# Patient Record
Sex: FEMALE | Race: WHITE | NOT HISPANIC OR LATINO | Employment: OTHER | URBAN - METROPOLITAN AREA
[De-identification: names, ages, dates, MRNs, and addresses within clinical notes are randomized per-mention and may not be internally consistent; named-entity substitution may affect disease eponyms.]

---

## 2017-01-02 ENCOUNTER — TRANSFERRED RECORDS (OUTPATIENT)
Dept: HEALTH INFORMATION MANAGEMENT | Facility: CLINIC | Age: 77
End: 2017-01-02

## 2017-01-05 ENCOUNTER — ANTICOAGULATION THERAPY VISIT (OUTPATIENT)
Dept: ANTICOAGULATION | Facility: CLINIC | Age: 77
End: 2017-01-05
Payer: MEDICARE

## 2017-01-05 DIAGNOSIS — Z79.01 LONG-TERM (CURRENT) USE OF ANTICOAGULANTS: Primary | ICD-10-CM

## 2017-01-05 LAB — INR POINT OF CARE: 2 (ref 0.86–1.14)

## 2017-01-05 PROCEDURE — 99207 ZZC NO CHARGE NURSE ONLY: CPT

## 2017-01-05 PROCEDURE — 85610 PROTHROMBIN TIME: CPT | Mod: QW

## 2017-01-05 PROCEDURE — 36416 COLLJ CAPILLARY BLOOD SPEC: CPT

## 2017-01-05 NOTE — PROGRESS NOTES
ANTICOAGULATION FOLLOW-UP CLINIC VISIT    Patient Name:  Geneva Shepherd  Date:  1/5/2017  Contact Type:  Face to Face    SUBJECTIVE:     Patient Findings     Positives No Problem Findings    Comments Patient is having knee replacement done on 1/16/17.  Will see PCP on 1/9/17 and will discuss holding warfarin and lovenox injections.  Pt will contact [facility] once discharged from TCU for followup.            OBJECTIVE    INR PROTIME   Date Value Ref Range Status   01/05/2017 2.0* 0.86 - 1.14 Final       ASSESSMENT / PLAN  INR assessment THER    Recheck INR In: 3 WEEKS    INR Location Clinic      Anticoagulation Summary as of 1/5/2017     INR goal 2.0-3.0   Selected INR 2.0 (1/5/2017)   Maintenance plan 7.5 mg (5 mg x 1.5) on Mon, Wed, Fri; 5 mg (5 mg x 1) all other days   Full instructions 7.5 mg on Mon, Wed, Fri; 5 mg all other days   Weekly total 42.5 mg   No change documented Camelia He RN   Plan last modified Ofelia Cavazos RN (12/31/2015)   Next INR check 1/26/2017   Target end date     Indications   Long-term (current) use of anticoagulants [Z79.01] [Z79.01]  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]  Embolism and thrombosis (H) (Resolved) [I74.9]         Anticoagulation Episode Summary     INR check location     Preferred lab     Send INR reminders to [facility]    Comments             See the Encounter Report to view Anticoagulation Flowsheet and Dosing Calendar (Go to Encounters tab in chart review, and find the Anticoagulation Therapy Visit)        Camelia He RN

## 2017-01-09 ENCOUNTER — OFFICE VISIT (OUTPATIENT)
Dept: INTERNAL MEDICINE | Facility: CLINIC | Age: 77
End: 2017-01-09
Payer: MEDICARE

## 2017-01-09 VITALS
TEMPERATURE: 98.2 F | WEIGHT: 178 LBS | HEIGHT: 66 IN | SYSTOLIC BLOOD PRESSURE: 110 MMHG | BODY MASS INDEX: 28.61 KG/M2 | OXYGEN SATURATION: 93 % | HEART RATE: 84 BPM | DIASTOLIC BLOOD PRESSURE: 62 MMHG

## 2017-01-09 DIAGNOSIS — E03.9 HYPOTHYROIDISM, UNSPECIFIED TYPE: ICD-10-CM

## 2017-01-09 DIAGNOSIS — Z23 NEED FOR PROPHYLACTIC VACCINATION AGAINST STREPTOCOCCUS PNEUMONIAE (PNEUMOCOCCUS): ICD-10-CM

## 2017-01-09 DIAGNOSIS — Z23 NEED FOR PROPHYLACTIC VACCINATION AND INOCULATION AGAINST INFLUENZA: ICD-10-CM

## 2017-01-09 DIAGNOSIS — Z01.818 PREOPERATIVE EXAMINATION: Primary | ICD-10-CM

## 2017-01-09 LAB
ANION GAP SERPL CALCULATED.3IONS-SCNC: 5 MMOL/L (ref 3–14)
BUN SERPL-MCNC: 9 MG/DL (ref 7–30)
CALCIUM SERPL-MCNC: 9.9 MG/DL (ref 8.5–10.1)
CHLORIDE SERPL-SCNC: 102 MMOL/L (ref 94–109)
CO2 SERPL-SCNC: 30 MMOL/L (ref 20–32)
CREAT SERPL-MCNC: 0.63 MG/DL (ref 0.52–1.04)
GFR SERPL CREATININE-BSD FRML MDRD: ABNORMAL ML/MIN/1.7M2
GLUCOSE SERPL-MCNC: 102 MG/DL (ref 70–99)
POTASSIUM SERPL-SCNC: 4.1 MMOL/L (ref 3.4–5.3)
SODIUM SERPL-SCNC: 137 MMOL/L (ref 133–144)
TSH SERPL DL<=0.005 MIU/L-ACNC: 1.9 MU/L (ref 0.4–4)

## 2017-01-09 PROCEDURE — 80048 BASIC METABOLIC PNL TOTAL CA: CPT | Performed by: INTERNAL MEDICINE

## 2017-01-09 PROCEDURE — 93000 ELECTROCARDIOGRAM COMPLETE: CPT | Performed by: INTERNAL MEDICINE

## 2017-01-09 PROCEDURE — 90670 PCV13 VACCINE IM: CPT | Performed by: INTERNAL MEDICINE

## 2017-01-09 PROCEDURE — 90662 IIV NO PRSV INCREASED AG IM: CPT | Performed by: INTERNAL MEDICINE

## 2017-01-09 PROCEDURE — 99215 OFFICE O/P EST HI 40 MIN: CPT | Mod: 25 | Performed by: INTERNAL MEDICINE

## 2017-01-09 PROCEDURE — 36415 COLL VENOUS BLD VENIPUNCTURE: CPT | Performed by: INTERNAL MEDICINE

## 2017-01-09 PROCEDURE — G0008 ADMIN INFLUENZA VIRUS VAC: HCPCS | Performed by: INTERNAL MEDICINE

## 2017-01-09 PROCEDURE — G0009 ADMIN PNEUMOCOCCAL VACCINE: HCPCS | Performed by: INTERNAL MEDICINE

## 2017-01-09 PROCEDURE — 84443 ASSAY THYROID STIM HORMONE: CPT | Performed by: INTERNAL MEDICINE

## 2017-01-09 NOTE — PROGRESS NOTES
Injectable Influenza Immunization Documentation    1.  Is the person to be vaccinated sick today?  No    2. Does the person to be vaccinated have an allergy to eggs or to a component of the vaccine?  No    3. Has the person to be vaccinated today ever had a serious reaction to influenza vaccine in the past?  No    4. Has the person to be vaccinated ever had Guillain-Idalia syndrome?  No     Form completed by Payal Lawler MA

## 2017-01-09 NOTE — PROGRESS NOTES
[address] [address]  [phone]  Dept: [phone]    PRE-OP EVALUATION:  Today's date: 2017    Geneva Shepherd (: 1940) presents for pre-operative evaluation assessment as requested by Dr. Maddox.  He requires evaluation and anesthesia risk assessment prior to undergoing surgery/procedure for treatment of left  Knee pain .  Proposed procedure: LEFT TOTAL KNEE ARTHROPLASTY     Date of Surgery/ Procedure: 17  Time of Surgery/ Procedure: AM  Hospital/Surgical Facility: [facility]  Primary Physician: Jacoby Boo  Type of Anesthesia Anticipated: to be determined    Patient has a Health Care Directive or Living Will:  Unsure        HPI:                                                      Brief HPI related to upcoming procedure:  Hx chronic left knee pain with DJD. Limiting activity.  Now presents for clearance to undergo surgical correction. See below for other medical issues. Had lab eval already throuhh the [facility] at [facility]. No infusions needed preop. Not able to  walk far with knee pain. Used Stationary bike for 20 min 1 week ago without chest pain or shortness of breath. See below for other medical issues. On chronic Coumadin for hx DVT  RLE , chronic LE varicosities and positive factor 5 Leiden. Has tolerated being off of Coumadin prior to procedures without bridging therapy as long as remains active         MEDICAL HISTORY:                                                      Patient Active Problem List    Diagnosis Date Noted     Insomnia, unspecified insomnia 2016     Priority: Medium     Strain of thoracic paraspinal muscles excluding T1 and T2 levels, initial encounter 2016     Priority: Medium     Hypothyroidism 2016     Priority: Medium     Long-term (current) use of anticoagulants [Z79.01] 12/15/2015     Priority: Medium     Depression 10/27/2015     Priority: Medium     Asymptomatic varicose veins,  bilateral 09/03/2015     Priority: Medium     Personal history of RLE DVT (deep vein thrombosis)(2003) 09/03/2015     Priority: Medium     Anticoagulated on Coumadin 09/03/2015     Priority: Medium     Hip joint replacement status: Left 8/31/15 08/31/2015     Priority: Medium     Elevated antinuclear antibody (JUAN ANTONIO) level 07/04/2015     Priority: Medium     Low back pain 08/07/2014     Priority: Medium     Diagnosis updated by automated process. Provider to review and confirm.       Obesity 09/11/2013     Priority: Medium     Osteoarthrosis, unspecified whether generalized or localized, involving lower leg 01/21/2013     Priority: Medium     Problem list name updated by automated process. Provider to review       Advanced directives, counseling/discussion 05/18/2012     Priority: Medium     Patient states has Advance Directive and will bring in a copy to clinic. 5/18/2012          HYPERLIPIDEMIA LDL GOAL <160 10/31/2010     Priority: Medium     GERD (gastroesophageal reflux disease) 08/06/2008     Priority: Medium     FACTOR 5 LEIDEN DEFECT (HYPERCOAGULABLE) 07/30/2003     Priority: Medium     Irritable bowel syndrome 09/09/2002     Priority: Medium      Past Medical History   Diagnosis Date     Asymptomatic varicose veins      Diverticulitis of colon (without mention of hemorrhage)(562.11)      Urinary tract infection, site not specified      Sprain of lumbar region      Irritable bowel syndrome      Pain in joint, lower leg      Embolism and thrombosis of unspecified site 3/03     RLE     FACTOR 5 LEIDEN DEFECT (HYPERCOAGULABLE) 7/03      Heterozygote     Phlebitis and thrombophlebitis of superficial vessels of lower extremities 7/03     right     Unspecified hypothyroidism      FRACTURE OF RIGHT LATERAL PROXIMAL TIBIA 11/07     Depression, major      Ankle fracture, lateral malleolus, closed  March 2012     right ankle     Hyperlipidemia LDL goal <160 10/31/2010     Gastro-oesophageal reflux disease      rare      DJD (degenerative joint disease)      Obesity 9/11/2013     Elevated antinuclear antibody (JUAN ANTONIO) level 7/4/2015     minimal     Insomnia      Past Surgical History   Procedure Laterality Date     C nonspecific procedure  7/00/01     Endometrial Biopsy.     C nonspecific procedure       Tubal Ligation.     C nonspecific procedure  6/02     negative coronary angio     C nonspecific procedure  7/04     RLE varicose vein stripping     Cholecystectomy       Vascular surgery       Arthroplasty knee  1/17/2013     Procedure: ARTHROPLASTY KNEE;  RIGHT TOTAL KNEE ARTHROPLASTY (BIOMET)^ ;  Surgeon: Benjamín Maddox MD;  Location:  OR     Arthroplasty hip Left 8/31/2015     Procedure: ARTHROPLASTY HIP;  Surgeon: Benjamín Maddox MD;  Location:  OR     Gyn surgery       tubal     Colonoscopy N/A 4/26/2016     Procedure: COMBINED COLONOSCOPY, SINGLE OR MULTIPLE BIOPSY/POLYPECTOMY BY BIOPSY;  Surgeon: Mario Coe MD;  Location:  GI     Current Outpatient Prescriptions   Medication Sig Dispense Refill     order for DME Equipment being ordered: Thigh High 30-40 mmhg Compression Stockings. 4 Device 0     PARoxetine (PAXIL) 20 MG tablet Take 2 tablets (40 mg) by mouth At Bedtime 180 tablet 1     warfarin (COUMADIN) 5 MG tablet Take 1 tablets daily, except 1 1/2 tablets on MWF,   as directed by anticoagulation clinic 135 tablet 0     order for DME Equipment being ordered: jobst stockings knee high bilateral medium strength 20-30 mm Hg.  Dispense 2 pair 2 Package 11     levothyroxine (SYNTHROID) 50 MCG tablet Take 1 tablet (50 mcg) by mouth daily 90 tablet 2     order for DME Equipment being ordered: [facility] Fax [phone]    Primary Dressing Mepilex Border 4x4   Qty 30  Length of Need: 1 month  Frequency of dressing change: daily 30 days 0     amitriptyline (ELAVIL) 25 MG tablet Take 1 tablet (25 mg) by mouth daily 90 tablet 3     order for DME Equipment being ordered: Compression Stockings  Knee High 20-30 mmhg 2 Device 2     order for DME Equipment being ordered: [facility] Fax [phone]    Primary Dressing Mepilex Border 4x4   Qty 30  Length of Need: 1 month  Frequency of dressing change: daily 30 days 0     ascorbic acid (VITAMIN C) 500 MG tablet Take 2 tablets (1,000 mg) by mouth daily 30 tablet      Acetaminophen (TYLENOL PO) Take 500 mg by mouth 2 times daily With scheduled oxycodone       vitamin  B complex with vitamin C (VITAMIN  B COMPLEX) TABS Take 1 tablet by mouth daily DOSE UNKNOWN       FOLIC ACID PO Take 400 mcg by mouth.       BETA CAROTENE PO Take 25,000 Units by mouth daily.       levOCARNitine (CARNITOR) 330 MG tablet Take by mouth 2 times daily        ZINC 50 MG OR CAPS 1 daily       CHROMIUM PICOLINATE 800 MCG OR TABS 1 daily       FISH OIL 1000 MG OR CAPS 1 daily       FLAX SEED OIL 1000 MG OR CAPS 1 daily       MULTIVITAMIN/MULTIMINERAL TABS   OR 1 TABLET DAILY 30 0     OXYBUTYNIN CHLORIDE PO Prescribed by urology       OTC products: occ Tylenol    Allergies   Allergen Reactions     Cephalexin Monohydrate      diarrhea      Latex Allergy: NO    Social History   Substance Use Topics     Smoking status: Former Smoker     Quit date: 09/01/1968     Smokeless tobacco: Never Used      Comment: quit in 1968     Alcohol Use: 0.0 oz/week     0 Standard drinks or equivalent per week      Comment: rare     History   Drug Use No       REVIEW OF SYSTEMS:                                                    C: NEGATIVE for fever, chills, change in weight  I: NEGATIVE for worrisome rashes, moles or lesions  E: NEGATIVE for  Irritation. Reduce vision bilateral eyes and cataracts and some vitreol changes left eye. Followed by eye doctor and will be having possible surgery in March  E/M: NEGATIVE for ear, mouth and throat problems  R: NEGATIVE for significant cough or SOB  B: NEGATIVE for masses, tenderness or discharge  CV: NEGATIVE for chest pain, palpitations. Stable edema with  "30-40mm Hg comp socks RLE and 20-30 mm Hgb LLE  GI: NEGATIVE for nausea, abdominal pain,   or change in bowel habits. 2 episodes of GERD after larger holiday meals with some belching. Took Zantac and resolved  : NEGATIVE for frequency, dysuria, or hematuria  M:  See HPI  N: NEGATIVE for weakness, dizziness or paresthesias  E: NEGATIVE for temperature intolerance, skin/hair changes  H:  On Coumadin for hx  DVT RLE in 2003 and [id number]  P: NEGATIVE for changes in mood or affect with Paxil use.     EXAM:                                                    /62 mmHg  Pulse 84  Temp(Src) 98.2  F (36.8  C) (Oral)  Ht 5' 6\" (1.676 m)  Wt 178 lb (80.74 kg)  BMI 28.74 kg/m2  SpO2 93%  Breastfeeding? No  Eye: PERRL, EOMI  HENT: ear canals and TM's normal and nose and mouth without ulcers or lesions,. Dentures  Neck: no adenopathy. Thyroid normal to palpation. No bruits  Pulm: Lungs clear to auscultation   CV: Regular rates and rhythm  GI: Soft, nontender, Normal active bowel sounds, No hepatosplenomegaly or masses palpable  Ext: Peripheral pulses intact.  Mild chronic BLE edema with nontender varicose veins. Wearing compression stockings. Tenderness to FROM left knee against resistance. No effusion  Neuro: Normal strength and tone, sensory exam grossly normal      DIAGNOSTICS:                                                    EKG:  NSR with rate 84. Poor lead detection lead 3. No acute ST/T changes    Recent Labs   Lab Test 01/05/17 12/29/16   1550 12/27/16 04/28/16   1925   02/15/16   1519  02/13/16   1801   HGB   --   14.2   --    --   13.6   --    --   11.9   PLT   --    --    --    --   284   --    --   308   INR  2.0*   --   4.2*   < >  0.97   < >  1.75*  8.85*   NA   --    --    --    --   139   --   135   --    POTASSIUM   --    --    --    --   4.0   --   4.6   --    CR   --    --    --    --   0.49*   --   0.70   --     < > = values in this interval not displayed.      Component      Latest Ref Rng " 1/9/2017   Sodium      133 - 144 mmol/L 137   Potassium      3.4 - 5.3 mmol/L 4.1   Chloride      94 - 109 mmol/L 102   Carbon Dioxide      20 - 32 mmol/L 30   Anion Gap      3 - 14 mmol/L 5   Glucose      70 - 99 mg/dL 102 (H)   Urea Nitrogen      7 - 30 mg/dL 9   Creatinine      0.52 - 1.04 mg/dL 0.63   GFR Estimate      >60 mL/min/1.7m2 >90 . . .   GFR Estimate If Black      >60 mL/min/1.7m2 >90 . . .   Calcium      8.5 - 10.1 mg/dL 9.9   TSH      0.40 - 4.00 mU/L 1.90       IMPRESSION:                                                    Reason for surgery/procedure: Left knee DJD   Diagnosis/reason for consult: Hypothyroidism, Depression ( well controlled with meds), positive factor 5 Leiden with previous hx DVT    The proposed surgical procedure is considered INTERMEDIATE risk.    REVISED CARDIAC RISK INDEX  The patient has the following serious cardiovascular risks for perioperative complications such as (MI, PE, VFib and 3  AV Block):  No serious cardiac risks  INTERPRETATION: 0 risks: Class I (very low risk - 0.4% complication rate)    The patient has the following additional risks for perioperative complications:  Factor 5 Leiden positive status      RECOMMENDATIONS:                                                      APPROVAL GIVEN to proceed with proposed procedure, without further diagnostic evaluation  Coumadin  on hold 5 days prior to surgery. Pt remaining active with ambulation while off Coumadin. No bridging needed preop  Recommend pt be restarted with anticoagulation  post op as soon as OK with ortho surgery with other DVT prophylaxis measures used (pneumoboots, Lovenox, etc)  between surgery and Coumadin re-initiation  Take  Levothyroxine and Paroxetine/Paxil the AM of surgery with a small sip water. Otherwise nothing else to eat/drink after midnight prior to surgery   Hold over the counter supplements between now and surgery  Prevnar and influenza vaccinations       Signed Electronically by: Jacoby  KENNEDY Boo MD    Copy of this evaluation report is provided to requesting physician.    [place] Preop Guidelines

## 2017-01-09 NOTE — NURSING NOTE
"Chief Complaint   Patient presents with     Pre-Op Exam       Initial /62 mmHg  Pulse 84  Temp(Src) 98.2  F (36.8  C) (Oral)  Ht 5' 6\" (1.676 m)  Wt 178 lb (80.74 kg)  BMI 28.74 kg/m2  SpO2 93%  Breastfeeding? No Estimated body mass index is 28.74 kg/(m^2) as calculated from the following:    Height as of this encounter: 5' 6\" (1.676 m).    Weight as of this encounter: 178 lb (80.74 kg).  BP completed using cuff size: regular    "

## 2017-01-09 NOTE — PATIENT INSTRUCTIONS
Coumadin  on hold 5 days prior to surgery. Pt remaining active with ambulation while off Coumadin. No bridging being used preop  Recommend pt be restarted with anticoagulation  post op as soon as OK with ortho surgery with other DVT prophylaxis measures used  between surgery and Coumadin re-initiation  Take  Levothyroxine and Paroxetine/Paxil the AM of surgery with a small sip water. Otherwise nothing else to eat/drink after midnight prior to surgery   Hold over the counter supplements between now and surgery

## 2017-01-09 NOTE — MR AVS SNAPSHOT
After Visit Summary   1/9/2017    Geneva Shepherd    MRN: [id number]           Patient Information     Date Of Birth          1940        Visit Information        Provider Department      1/9/2017 3:00 PM Jacoby Boo MD [facility]        Today's Diagnoses     Preoperative examination    -  1     Hypothyroidism, unspecified type           Care Instructions    Coumadin  on hold 5 days prior to surgery. Pt remaining active with ambulation while off Coumadin. No bridging being used preop  Recommend pt be restarted with anticoagulation  post op as soon as OK with ortho surgery with other DVT prophylaxis measures used  between surgery and Coumadin re-initiation  Take  Levothyroxine and Paroxetine/Paxil the AM of surgery with a small sip water. Otherwise nothing else to eat/drink after midnight prior to surgery   Hold over the counter supplements between now and surgery        Follow-ups after your visit        Your next 10 appointments already scheduled     Jan 16, 2017   Procedure with Benjamín Maddox MD   [facility] (--)    [address]  [facility] [address]   [phone]            Jan 27, 2017  1:00 PM   CONSUELO Extremity with Keren Velázquez PT   [facility] ([facility]  )    [address]  [facility] [id number] [phone]            Jan 30, 2017  1:20 PM   CONSUELO Extremity with Micaela Terrell, PT   [facility] ([facility]  )    [address]  [facility] [address] [phone]            Feb 02, 2017  1:20 PM   CONSUELO Extremity with Micaela Terrell, PT   [facility] ([facility]  )    [address]  [facility] [id number] [phone]              Who to contact     If you have questions or need follow up  "information about today's clinic visit or your schedule please contact [facility] directly at [phone].  Normal or non-critical lab and imaging results will be communicated to you by MyChart, letter or phone within 4 business days after the clinic has received the results. If you do not hear from us within 7 days, please contact the clinic through [email or web address] or phone. If you have a critical or abnormal lab result, we will notify you by phone as soon as possible.  Submit refill requests through [employer] or call your pharmacy and they will forward the refill request to us. Please allow 3 business days for your refill to be completed.          Additional Information About Your Visit        [email or web address] Information     [employer] gives you secure access to your electronic health record. If you see a primary care provider, you can also send messages to your care team and make appointments. If you have questions, please call your primary care clinic.  If you do not have a primary care provider, please call [phone] and they will assist you.        Care EveryWhere ID     This is your Care EveryWhere ID. This could be used by other organizations to access your [place] medical records  [id number]        Your Vitals Were     Pulse Temperature Height BMI (Body Mass Index) Pulse Oximetry Breastfeeding?    84 98.2  F (36.8  C) (Oral) 5' 6\" (1.676 m) 28.74 kg/m2 93% No       Blood Pressure from Last 3 Encounters:   01/09/17 110/62   12/17/16 112/70   08/20/16 120/80    Weight from Last 3 Encounters:   01/09/17 178 lb (80.74 kg)   12/17/16 178 lb (80.74 kg)   05/09/16 178 lb (80.74 kg)              We Performed the Following     Basic metabolic panel     EKG 12-lead complete w/read - Clinics     TSH with free T4 reflex        Primary Care Provider Office Phone # Fax #    Jacoby Boo [phone]       [facility] [address] [facility] [address]        Thank you!     Thank you " for choosing [facility]  for your care. Our goal is always to provide you with excellent care. Hearing back from our patients is one way we can continue to improve our services. Please take a few minutes to complete the written survey that you may receive in the mail after your visit with us. Thank you!             Your Updated Medication List - Protect others around you: Learn how to safely use, store and throw away your medicines at www.disposemymeds.org.          This list is accurate as of: 1/9/17  3:43 PM.  Always use your most recent med list.                   Brand Name Dispense Instructions for use    amitriptyline 25 MG tablet    ELAVIL    90 tablet    Take 1 tablet (25 mg) by mouth daily       ascorbic acid 500 MG tablet    VITAMIN C    30 tablet    Take 2 tablets (1,000 mg) by mouth daily       BETA CAROTENE PO      Take 25,000 Units by mouth daily.       Chromium Picolinate 800 MCG Tabs      1 daily       fish oil-omega-3 fatty acids 1000 MG capsule      1 daily       flax seed oil 1000 MG capsule      1 daily       FOLIC ACID PO      Take 400 mcg by mouth.       levOCARNitine 330 MG tablet    CARNITOR     Take by mouth 2 times daily       levothyroxine 50 MCG tablet    SYNTHROID    90 tablet    Take 1 tablet (50 mcg) by mouth daily       MULTIVITAMIN/MULTIMINERAL TABS   OR     30    1 TABLET DAILY       * order for DME     2 Device    Equipment being ordered: Compression Stockings Knee High 20-30 mmhg       * order for DME     30 days    Equipment being ordered: [facility] Fax [phone]  Primary Dressing Mepilex Border 4x4   Qty 30 Length of Need: 1 month Frequency of dressing change: daily       * order for DME     30 days    Equipment being ordered: [facility] Fax [phone]  Primary Dressing Mepilex Border 4x4   Qty 30 Length of Need: 1 month Frequency of dressing change: daily       order for DME     2 Package    Equipment being ordered: jobst  stockings knee high bilateral medium strength 20-30 mm Hg.  Dispense 2 pair       * order for DME     4 Device    Equipment being ordered: Thigh High 30-40 mmhg Compression Stockings.       OXYBUTYNIN CHLORIDE PO      Prescribed by urology       PARoxetine 20 MG tablet    PAXIL    180 tablet    Take 2 tablets (40 mg) by mouth At Bedtime       TYLENOL PO      Take 500 mg by mouth 2 times daily With scheduled oxycodone       vitamin B complex with vitamin C Tabs tablet      Take 1 tablet by mouth daily DOSE UNKNOWN       warfarin 5 MG tablet    COUMADIN    135 tablet    Take 1 tablets daily, except 1 1/2 tablets on MWF,   as directed by anticoagulation clinic       Zinc 50 MG Caps      1 daily       * Notice:  This list has 4 medication(s) that are the same as other medications prescribed for you. Read the directions carefully, and ask your doctor or other care provider to review them with you.

## 2017-01-09 NOTE — NURSING NOTE
Screening Questionnaire for Adult Immunization    Are you sick today?   No   Do you have allergies to medications, food, a vaccine component or latex?   Yes   Have you ever had a serious reaction after receiving a vaccination?   No   Do you have a long-term health problem with heart disease, lung disease, asthma, kidney disease, metabolic disease (e.g. diabetes), anemia, or other blood disorder?   No   Do you have cancer, leukemia, HIV/AIDS, or any other immune system problem?   No   In the past 3 months, have you taken medications that affect  your immune system, such as prednisone, other steroids, or anticancer drugs; drugs for the treatment of rheumatoid arthritis, Crohn s disease, or psoriasis; or have you had radiation treatments?   No   Have you had a seizure, or a brain or other nervous system problem?   No   During the past year, have you received a transfusion of blood or blood     products, or been given immune (gamma) globulin or antiviral drug?   No   For women: Are you pregnant or is there a chance you could become        pregnant during the next month?   No   Have you received any vaccinations in the past 4 weeks?   No     Immunization questionnaire was positive for at least one answer.  Notified PMD.      MNVFC doesn't apply on this patient    Per orders of Dr. Boo, injection of Prevnar 13  given by Payal Lawler. Patient instructed to remain in clinic for 20 minutes afterwards, and to report any adverse reaction to me immediately.       Screening performed by Payal Lawler on 1/9/2017 at 3:52 PM.

## 2017-01-14 ENCOUNTER — MYC MEDICAL ADVICE (OUTPATIENT)
Dept: INTERNAL MEDICINE | Facility: CLINIC | Age: 77
End: 2017-01-14

## 2017-01-14 DIAGNOSIS — E03.9 HYPOTHYROIDISM, UNSPECIFIED TYPE: Primary | ICD-10-CM

## 2017-01-14 RX ORDER — LEVOTHYROXINE SODIUM 50 UG/1
50 TABLET ORAL DAILY
Qty: 90 TABLET | Refills: 3 | Status: SHIPPED | OUTPATIENT
Start: 2017-01-14 | End: 2018-02-18

## 2017-01-16 ENCOUNTER — APPOINTMENT (OUTPATIENT)
Dept: PHYSICAL THERAPY | Facility: CLINIC | Age: 77
DRG: 470 | End: 2017-01-16
Attending: ORTHOPAEDIC SURGERY
Payer: MEDICARE

## 2017-01-16 ENCOUNTER — APPOINTMENT (OUTPATIENT)
Dept: GENERAL RADIOLOGY | Facility: CLINIC | Age: 77
DRG: 470 | End: 2017-01-16
Attending: ORTHOPAEDIC SURGERY
Payer: MEDICARE

## 2017-01-16 ENCOUNTER — ANESTHESIA (OUTPATIENT)
Dept: SURGERY | Facility: CLINIC | Age: 77
DRG: 470 | End: 2017-01-16
Payer: MEDICARE

## 2017-01-16 ENCOUNTER — ANESTHESIA EVENT (OUTPATIENT)
Dept: SURGERY | Facility: CLINIC | Age: 77
DRG: 470 | End: 2017-01-16
Payer: MEDICARE

## 2017-01-16 PROBLEM — Z96.659 KNEE JOINT REPLACEMENT STATUS: Status: ACTIVE | Noted: 2017-01-16

## 2017-01-16 PROCEDURE — 25000125 ZZHC RX 250: Performed by: NURSE ANESTHETIST, CERTIFIED REGISTERED

## 2017-01-16 PROCEDURE — 25000128 H RX IP 250 OP 636: Performed by: ANESTHESIOLOGY

## 2017-01-16 PROCEDURE — 25800025 ZZH RX 258: Performed by: ANESTHESIOLOGY

## 2017-01-16 PROCEDURE — 25000125 ZZHC RX 250: Performed by: ANESTHESIOLOGY

## 2017-01-16 PROCEDURE — 40000940 XR KNEE PORT LT 1/2 VW: Mod: LT

## 2017-01-16 PROCEDURE — 25000125 ZZHC RX 250: Performed by: ORTHOPAEDIC SURGERY

## 2017-01-16 PROCEDURE — S0077 INJECTION, CLINDAMYCIN PHOSP: HCPCS | Performed by: ORTHOPAEDIC SURGERY

## 2017-01-16 RX ORDER — EPHEDRINE SULFATE 50 MG/ML
INJECTION, SOLUTION INTRAMUSCULAR; INTRAVENOUS; SUBCUTANEOUS PRN
Status: DISCONTINUED | OUTPATIENT
Start: 2017-01-16 | End: 2017-01-16

## 2017-01-16 RX ORDER — DEXAMETHASONE SODIUM PHOSPHATE 4 MG/ML
INJECTION, SOLUTION INTRA-ARTICULAR; INTRALESIONAL; INTRAMUSCULAR; INTRAVENOUS; SOFT TISSUE PRN
Status: DISCONTINUED | OUTPATIENT
Start: 2017-01-16 | End: 2017-01-16

## 2017-01-16 RX ORDER — PROPOFOL 10 MG/ML
INJECTION, EMULSION INTRAVENOUS CONTINUOUS PRN
Status: DISCONTINUED | OUTPATIENT
Start: 2017-01-16 | End: 2017-01-16

## 2017-01-16 RX ORDER — PROPOFOL 10 MG/ML
INJECTION, EMULSION INTRAVENOUS PRN
Status: DISCONTINUED | OUTPATIENT
Start: 2017-01-16 | End: 2017-01-16

## 2017-01-16 RX ORDER — BUPIVACAINE HYDROCHLORIDE 7.5 MG/ML
INJECTION, SOLUTION INTRASPINAL PRN
Status: DISCONTINUED | OUTPATIENT
Start: 2017-01-16 | End: 2017-01-16

## 2017-01-16 RX ORDER — LIDOCAINE HYDROCHLORIDE 20 MG/ML
INJECTION, SOLUTION INFILTRATION; PERINEURAL PRN
Status: DISCONTINUED | OUTPATIENT
Start: 2017-01-16 | End: 2017-01-16

## 2017-01-16 RX ORDER — ONDANSETRON 2 MG/ML
INJECTION INTRAMUSCULAR; INTRAVENOUS PRN
Status: DISCONTINUED | OUTPATIENT
Start: 2017-01-16 | End: 2017-01-16

## 2017-01-16 RX ADMIN — PHENYLEPHRINE HYDROCHLORIDE 200 MCG: 10 INJECTION, SOLUTION INTRAMUSCULAR; INTRAVENOUS; SUBCUTANEOUS at 08:36

## 2017-01-16 RX ADMIN — DEXAMETHASONE SODIUM PHOSPHATE 4 MG: 4 INJECTION, SOLUTION INTRAMUSCULAR; INTRAVENOUS at 07:36

## 2017-01-16 RX ADMIN — PHENYLEPHRINE HYDROCHLORIDE 100 MCG: 10 INJECTION, SOLUTION INTRAMUSCULAR; INTRAVENOUS; SUBCUTANEOUS at 07:46

## 2017-01-16 RX ADMIN — Medication 10 MG: at 08:25

## 2017-01-16 RX ADMIN — BUPIVACAINE HYDROCHLORIDE IN DEXTROSE 12 MG: 7.5 INJECTION, SOLUTION SUBARACHNOID at 07:27

## 2017-01-16 RX ADMIN — PHENYLEPHRINE HYDROCHLORIDE 100 MCG: 10 INJECTION, SOLUTION INTRAMUSCULAR; INTRAVENOUS; SUBCUTANEOUS at 07:43

## 2017-01-16 RX ADMIN — PHENYLEPHRINE HYDROCHLORIDE 100 MCG: 10 INJECTION, SOLUTION INTRAMUSCULAR; INTRAVENOUS; SUBCUTANEOUS at 08:02

## 2017-01-16 RX ADMIN — ROPIVACAINE HYDROCHLORIDE 25 ML GIVEN: 5 INJECTION, SOLUTION EPIDURAL; INFILTRATION; PERINEURAL at 07:14

## 2017-01-16 RX ADMIN — PROPOFOL 30 MG: 10 INJECTION, EMULSION INTRAVENOUS at 07:31

## 2017-01-16 RX ADMIN — Medication 10 MG: at 08:16

## 2017-01-16 RX ADMIN — PHENYLEPHRINE HYDROCHLORIDE 200 MCG: 10 INJECTION, SOLUTION INTRAMUSCULAR; INTRAVENOUS; SUBCUTANEOUS at 07:55

## 2017-01-16 RX ADMIN — PHENYLEPHRINE HYDROCHLORIDE 200 MCG: 10 INJECTION, SOLUTION INTRAMUSCULAR; INTRAVENOUS; SUBCUTANEOUS at 08:25

## 2017-01-16 RX ADMIN — CLINDAMYCIN PHOSPHATE 900 MG: 18 INJECTION, SOLUTION INTRAVENOUS at 07:29

## 2017-01-16 RX ADMIN — PHENYLEPHRINE HYDROCHLORIDE 200 MCG: 10 INJECTION, SOLUTION INTRAMUSCULAR; INTRAVENOUS; SUBCUTANEOUS at 08:07

## 2017-01-16 RX ADMIN — PHENYLEPHRINE HYDROCHLORIDE 100 MCG: 10 INJECTION, SOLUTION INTRAMUSCULAR; INTRAVENOUS; SUBCUTANEOUS at 07:38

## 2017-01-16 RX ADMIN — Medication 5 MG: at 08:07

## 2017-01-16 RX ADMIN — PROPOFOL 50 MCG/KG/MIN: 10 INJECTION, EMULSION INTRAVENOUS at 07:31

## 2017-01-16 RX ADMIN — PHENYLEPHRINE HYDROCHLORIDE 200 MCG: 10 INJECTION, SOLUTION INTRAMUSCULAR; INTRAVENOUS; SUBCUTANEOUS at 08:16

## 2017-01-16 RX ADMIN — SODIUM CHLORIDE, POTASSIUM CHLORIDE, SODIUM LACTATE AND CALCIUM CHLORIDE: 600; 310; 30; 20 INJECTION, SOLUTION INTRAVENOUS at 07:54

## 2017-01-16 RX ADMIN — PROPOFOL 20 MG: 10 INJECTION, EMULSION INTRAVENOUS at 07:33

## 2017-01-16 RX ADMIN — ONDANSETRON 4 MG: 2 INJECTION INTRAMUSCULAR; INTRAVENOUS at 08:23

## 2017-01-16 RX ADMIN — LIDOCAINE HYDROCHLORIDE 60 MG: 20 INJECTION, SOLUTION INFILTRATION; PERINEURAL at 07:31

## 2017-01-16 ASSESSMENT — COPD QUESTIONNAIRES: COPD: 0

## 2017-01-16 ASSESSMENT — LIFESTYLE VARIABLES: TOBACCO_USE: 1

## 2017-01-16 NOTE — ANESTHESIA PREPROCEDURE EVALUATION
Anesthesia Evaluation     .        ROS/MED HX    ENT/Pulmonary:     (+)tobacco use, Past use , . .   (-) COPD, sleep apnea and recent URI   Neurologic:  - neg neurologic ROS     Cardiovascular:     (+) Dyslipidemia, ----. : . . . :. .       METS/Exercise Tolerance:  >4 METS   Hematologic: Comments: + FV leiden    (+) History of blood clots pt is anticoagulated, -      Musculoskeletal: Comment: S/p RAF, TKA  (+) arthritis, , , -       GI/Hepatic:     (+) GERD Asymptomatic on medication,       Renal/Genitourinary:      (-) renal disease   Endo:     (+) thyroid problem hypothyroidism, Obesity, .      Psychiatric:         Infectious Disease:         Malignancy:         Other:               Physical Exam      Airway   Mallampati: II  TM distance: >3 FB  Neck ROM: full    Dental   (+) chipped    Cardiovascular   Rhythm and rate: regular and normal      Pulmonary    breath sounds clear to auscultation                    Anesthesia Plan      History & Physical Review  History and physical reviewed and following examination; no interval change.    ASA Status:  2 .    NPO Status:  > 8 hours    Plan for Periph. Nerve Block for postop pain and Spinal          Postoperative Care  Postoperative pain management:  IV analgesics, Oral pain medications and Peripheral nerve block (Continuous).      Consents  Anesthetic plan, risks, benefits and alternatives discussed with:  Patient.  Use of blood products discussed: Yes.   Use of blood products discussed with Patient.  Consented to blood products.  .                          .

## 2017-01-16 NOTE — ANESTHESIA PROCEDURE NOTES
Peripheral nerve/Neuraxial procedure note : femoral  Pre-Procedure  Performed by VARGHESE GARCIA  Location: pre-op      Pre-Anesthestic Checklist: patient identified, IV checked, site marked, risks and benefits discussed, informed consent, monitors and equipment checked, at physician/surgeon's request and post-op pain management    Timeout  Correct Patient: Yes   Correct Procedure: Yes   Correct Site: Yes   Correct Laterality: Yes   Correct Position: Yes   Site Marked: Yes   .   Procedure Documentation    .    Procedure:    Femoral.  Local skin infiltrated with 5 mL of 1% lidocaine.     Ultrasound used to identify targeted nerve, plexus, or vascular marker and placed a needle adjacent to it. A permanent image is entered into the patient's record.  Patient Prep;mask, sterile gloves, chlorhexidine gluconate and isopropyl alcohol, patient draped.  .  Needle: insulated, short bevel (17 G. 2 in). .  Spinal Needle: . . . Catheter: 20 G .  .  .  Catheter threaded easily at the skin cm in the epidural space.     Assessment/Narrative  Paresthesias: No.  Injection made incrementally with aspirations every 5 mL..  The placement was negative for: blood aspirated, painful injection and site bleeding.  Bolus given via needle. No blood aspirated via catheter.   Secured via Tunneling.   Complications: none. Comments:  Continuous Femoral Nerve Block  20 ml 0.5% Ropivacaine with 1:400,000 Epinephrine via needle.  Catheter threaded easily. Negative aspiration, negative test dose (5 cc 0.5% rop with epi).  No abnormal pain or paresthesia throughout.  Patient tolerated well.     This catheter was never in epidural space, error on part of the  EPIC macro    Peripheral nerve/Neuraxial procedure note : intrathecal  Pre-Procedure  Performed by VARGHESE GARCIA  Location: OR      Pre-Anesthestic Checklist: patient identified, IV checked, risks and benefits discussed, informed consent, monitors and equipment checked and pre-op  evaluation    Timeout  Correct Patient: Yes   Correct Procedure: Yes   Correct Site: Yes   Correct Laterality: N/A   Correct Position: Yes   Site Marked: N/A   .   Procedure Documentation    .    Procedure:    Intrathecal.  Insertion Site:L3-4  (midline approach)      Patient Prep;mask, sterile gloves, povidone-iodine 7.5% surgical scrub, patient draped.  .  Needle: (). # of attempts: 1. # of redirects:. Spinal Needle: Lydia tip 25 G. 3.5 in.  Introducer used. Introducer: 20 G. .     Assessment/Narrative  Paresthesias: No.  .  .  clear CSF fluid removed . Comments:  Subarachnoid Block.  Clear CSF pre and post injection.  No abnormal pain or paresthesia throughout.  Patient tolerated well.    1.6 ml 0.75% bupivacaine with dextrose and 100 mcg epinephrine

## 2017-01-16 NOTE — ANESTHESIA POSTPROCEDURE EVALUATION
Patient: Geneva Shepherd    ARTHROPLASTY KNEE (Left Knee)  Additional InformationProcedure(s):  LEFT TOTAL KNEE ARTHROPLASTY  - Wound Class: I-Clean    Diagnosis:djd left knee  Diagnosis Additional Information: No value filed.    Anesthesia Type:  Periph. Nerve Block for postop pain, Spinal    Note:  Anesthesia Post Evaluation    Patient location during evaluation: PACU  Patient participation: Able to fully participate in evaluation  Level of consciousness: awake  Pain management: adequate  Airway patency: patent  Cardiovascular status: acceptable  Respiratory status: acceptable  Hydration status: acceptable  PONV: none     Anesthetic complications: None          Last vitals:  Filed Vitals:    01/16/17 1055 01/16/17 1125 01/16/17 1251   BP: 100/33 118/68 115/63   Pulse: 80 86 88   Temp:      Resp: 16 16 16   SpO2: 94% 91% 100%       Electronically Signed By: Jason Ayers MD  January 16, 2017  1:14 PM

## 2017-01-16 NOTE — ANESTHESIA CARE TRANSFER NOTE
Patient: Geneva Shepherd    ARTHROPLASTY KNEE (Left Knee)  Additional InformationProcedure(s):  LEFT TOTAL KNEE ARTHROPLASTY  - Wound Class: I-Clean    Diagnosis: djd left knee  Diagnosis Additional Information: No value filed.    Anesthesia Type:   Periph. Nerve Block for postop pain, Spinal     Note:  Airway :Face Mask  Patient transferred to:PACU  Comments: Pt to PACU with O2 via mask, airway patent, VSS.  Report to RN.      Vitals: (Last set prior to Anesthesia Care Transfer)              Electronically Signed By: MONY Whitmore CRNA  January 16, 2017  8:53 AM

## 2017-01-17 ENCOUNTER — APPOINTMENT (OUTPATIENT)
Dept: PHYSICAL THERAPY | Facility: CLINIC | Age: 77
DRG: 470 | End: 2017-01-17
Attending: ORTHOPAEDIC SURGERY
Payer: MEDICARE

## 2017-01-18 ENCOUNTER — APPOINTMENT (OUTPATIENT)
Dept: PHYSICAL THERAPY | Facility: CLINIC | Age: 77
DRG: 470 | End: 2017-01-18
Attending: ORTHOPAEDIC SURGERY
Payer: MEDICARE

## 2017-01-19 ENCOUNTER — APPOINTMENT (OUTPATIENT)
Dept: PHYSICAL THERAPY | Facility: CLINIC | Age: 77
DRG: 470 | End: 2017-01-19
Attending: ORTHOPAEDIC SURGERY
Payer: MEDICARE

## 2017-01-19 PROBLEM — N30.00 ACUTE CYSTITIS WITHOUT HEMATURIA: Status: ACTIVE | Noted: 2017-01-19

## 2017-01-20 ENCOUNTER — NURSING HOME VISIT (OUTPATIENT)
Dept: GERIATRICS | Facility: CLINIC | Age: 77
End: 2017-01-20
Payer: MEDICARE

## 2017-01-20 VITALS
RESPIRATION RATE: 16 BRPM | OXYGEN SATURATION: 94 % | DIASTOLIC BLOOD PRESSURE: 71 MMHG | HEART RATE: 86 BPM | TEMPERATURE: 97.2 F | WEIGHT: 180 LBS | BODY MASS INDEX: 29.07 KG/M2 | SYSTOLIC BLOOD PRESSURE: 116 MMHG

## 2017-01-20 DIAGNOSIS — K59.09 OTHER CONSTIPATION: ICD-10-CM

## 2017-01-20 DIAGNOSIS — D62 ANEMIA DUE TO BLOOD LOSS, ACUTE: ICD-10-CM

## 2017-01-20 DIAGNOSIS — Z47.1 AFTERCARE FOLLOWING KNEE JOINT REPLACEMENT SURGERY, UNSPECIFIED LATERALITY: ICD-10-CM

## 2017-01-20 DIAGNOSIS — Z96.659 AFTERCARE FOLLOWING KNEE JOINT REPLACEMENT SURGERY, UNSPECIFIED LATERALITY: ICD-10-CM

## 2017-01-20 DIAGNOSIS — N39.0 URINARY TRACT INFECTION, SITE UNSPECIFIED: ICD-10-CM

## 2017-01-20 DIAGNOSIS — Z86.718 PERSONAL HISTORY OF DVT (DEEP VEIN THROMBOSIS): ICD-10-CM

## 2017-01-20 DIAGNOSIS — Z79.01 LONG-TERM (CURRENT) USE OF ANTICOAGULANTS: ICD-10-CM

## 2017-01-20 PROBLEM — K59.00 CONSTIPATION: Status: ACTIVE | Noted: 2017-01-20

## 2017-01-20 PROCEDURE — 99310 SBSQ NF CARE HIGH MDM 45: CPT | Performed by: NURSE PRACTITIONER

## 2017-01-20 PROCEDURE — 99207 ZZC CDG-CORRECTLY CODED, REVIEWED AND AGREE: CPT | Performed by: NURSE PRACTITIONER

## 2017-01-20 NOTE — PROGRESS NOTES
[place] [facility]  PRIMARY CARE PROVIDER AND CLINIC:  Jacoby Boo [facility] [address]  / [facility] [address]  Chief Complaint   Patient presents with     Hospital F/U       HPI:    Geneva Shepherd is a 76 year old  (1940),admitted to the [facility] from [facility].  Hospital stay 1/16/17 through 1/19/17.  Admitted to this facility for  rehab, medical management and nursing care. Current issues are:      {FGS DX:113330}     Last 3 BPs: 116/71, 111/66, 131/79  Admission Weight: 180lbs  Last 3 INRs: 1.55, 1.24, 1.13    CODE STATUS/ADVANCE DIRECTIVES DISCUSSION:   CPR/Full code   Patient's living condition: lives alone    ALLERGIES:Cephalexin monohydrate  PAST MEDICAL HISTORY:  has a past medical history of Asymptomatic varicose veins; Diverticulitis of colon (without mention of hemorrhage)(562.11); Urinary tract infection, site not specified; Sprain of lumbar region; Irritable bowel syndrome; Pain in joint, lower leg; Embolism and thrombosis of unspecified site (3/03); 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  PAST SURGICAL HISTORY:  has past surgical history that includes NONSPECIFIC PROCEDURE (7/00/01); NONSPECIFIC PROCEDURE; NONSPECIFIC PROCEDURE (6/02); NONSPECIFIC PROCEDURE (7/04); Cholecystectomy; vascular surgery; Arthroplasty knee (1/17/2013); Arthroplasty hip (Left, 8/31/2015); GYN surgery; Colonoscopy (N/A, 4/26/2016); and Arthroplasty knee (Left, 1/16/2017).  FAMILY HISTORY: family history includes  Cardiovascular in her mother; Circulatory in her sister; Coronary Artery Disease in her father; HEART DISEASE in her father.  SOCIAL HISTORY:  reports that she quit smoking about 48 years ago. She has never used smokeless tobacco. She reports that she drinks alcohol. She reports that she does not use illicit drugs.    Post Discharge Medication Reconciliation Status: {[facility] Med Rec (Provider):124442}.  Current Outpatient Prescriptions   Medication Sig Dispense Refill     ciprofloxacin (CIPRO) 500 MG tablet Take 1 tablet (500 mg) by mouth every 12 hours for 5 days  0     oxyCODONE (ROXICODONE) 5 MG IR tablet Take 1-2 tablets (5-10 mg) by mouth every 3 hours as needed for moderate to severe pain 70 tablet 0     senna-docusate (SENOKOT-S;PERICOLACE) 8.6-50 MG per tablet Take 1-2 tablets by mouth 2 times daily 100 tablet 0     Amitriptyline HCl (ELAVIL PO) Take 25 mg by mouth At Bedtime       Ascorbic Acid (VITAMIN C PO) Take 1,000 mg by mouth every morning (Patient takes 2 X 500 mg = 1,000 mg dose)       levOCARNitine (CARNITOR) 330 MG tablet Take 330 mg by mouth every morning       PARoxetine HCl (PAXIL PO) Take 40 mg by mouth daily (patient takes 2 X 20 mg = 40 mg dose)       WARFARIN SODIUM PO Take 5-7.5 mg by mouth every evening (Patient takes 5 mg dose on Tuesday,Thursday, Saturday and Sunday. Patient takes 1.5 X 5 mg = 7.5 mg dose on Monday,Wednesday and Friday)       BIOTIN PO Take 1 tablet by mouth every morning       TURMERIC PO Take 1 capsule by mouth every morning       MAGNESIUM OXIDE PO Take 400 mg by mouth daily       Acetylcysteine (N-ACETYL-L-CYSTEINE PO) Take 1 capsule by mouth every morning       COCONUT OIL PO Take 1 capsule by mouth every morning       levothyroxine (SYNTHROID) 50 MCG tablet Take 1 tablet (50 mcg) by mouth daily 90 tablet 3     order for DME Equipment being ordered: Thigh High 30-40 mmhg Compression Stockings. 4 Device 0     order for DME Equipment being ordered: jobst stockings knee  high bilateral medium strength 20-30 mm Hg.  Dispense 2 pair 2 Package 11     order for DME Equipment being ordered: [facility] Fax [phone]    Primary Dressing Mepilex Border 4x4   Qty 30  Length of Need: 1 month  Frequency of dressing change: daily 30 days 0     order for DME Equipment being ordered: Compression Stockings Knee High 20-30 mmhg 2 Device 2     order for DME Equipment being ordered: [facility] Fax [phone]    Primary Dressing Mepilex Border 4x4   Qty 30  Length of Need: 1 month  Frequency of dressing change: daily 30 days 0     Acetaminophen (TYLENOL PO) Take 1,000 mg by mouth daily as needed        vitamin  B complex with vitamin C (VITAMIN  B COMPLEX) TABS Take 1 tablet by mouth daily DOSE UNKNOWN       FOLIC ACID PO Take 400 mcg by mouth daily        BETA CAROTENE PO Take 25,000 Units by mouth daily.       ZINC 50 MG OR CAPS 1 daily       CHROMIUM PICOLINATE 800 MCG OR TABS 1 daily       FISH OIL 1000 MG OR CAPS 1 daily       FLAX SEED OIL 1000 MG OR CAPS 1 daily       MULTIVITAMIN/MULTIMINERAL TABS   OR 1 TABLET DAILY 30 0       ROS:  {ROS FGS:405630}    Exam:  /71 mmHg  Pulse 86  Temp(Src) 97.2  F (36.2  C)  Resp 16  Wt 180 lb (81.647 kg)  SpO2 94%  {Nursing home physical exam :502372}    Lab/Diagnostic data:  CBC RESULTS:   Recent Labs   Lab Test  01/19/17   0625  01/18/17   0628  01/17/17   0644  01/16/17   1255   04/28/16   1925  02/13/16   1801   WBC   --    --    --    --    --   9.1  9.8   RBC   --    --    --    --    --   4.25  3.86   HGB   --   11.3*  11.2*   --    < >  13.6  11.9   HCT   --    --    --    --    --   41.7  37.7   MCV   --    --    --    --    --   98  98   MCH   --    --    --    --    --   32.0  30.8   MCHC   --    --    --    --    --   32.6  31.6   RDW   --    --    --    --    --   13.6  14.9   PLT  230   --    --   246   --   284  308    < > = values in this interval not displayed.       Last Basic Metabolic Panel:  Recent Labs    Lab Test  01/19/17   0625  01/16/17   1255  01/09/17   1603  04/28/16   1925   NA   --    --   137  139   POTASSIUM   --    --   4.1  4.0   CHLORIDE   --    --   102  104   TUAN   --    --   9.9  9.9   CO2   --    --   30  29   BUN   --    --   9  5*   CR  0.60  0.57  0.63  0.49*   GLC   --    --   102*  97       Liver Function Studies -   Recent Labs   Lab Test  02/15/16   1519  09/22/15   1233   PROTTOTAL  7.5  6.9   ALBUMIN  3.0*  3.4   BILITOTAL  0.3  0.3   ALKPHOS  130  185*   AST  21  27   ALT  45  34       TSH   Date Value Ref Range Status   01/09/2017 1.90 0.40 - 4.00 mU/L Final   02/15/2016 3.20 0.40 - 4.00 mU/L Final     INR     1.55   1/19/2017  INR     1.24   1/18/2017  INR     1.13   1/17/2017  INR     1.02   1/16/2017  INR      2.0   1/5/2017  INR      4.2   12/27/2016  INR      3.1   12/6/2016  INR      1.7   11/10/2016  INR      2.6   10/13/2016  INR      2.4   9/8/2016  INR      2.8   8/31/2016  INR      1.9   8/10/2016  INR      1.7   7/20/2016  INR      2.0   7/6/2016  INR      1.7   6/15/2016  INR      2.6   5/23/2016  INR     0.97   4/28/2016  INR     1.06   4/26/2016  INR     1.36   2/20/2016  INR     1.75   2/15/2016  INR     8.85   2/13/2016            ASSESSMENT/PLAN:  {FGS DX INITIAL:684499}    Orders:  ***    Information reviewed:  Medications, vital signs, orders, nursing notes, problem list, hospital information. Total time spent with patient visit was {1/2/3/4/5:659427} including {1/2/3/4/5:607216}. Greater than 50% of total time spent with counseling and coordinating care.    Electronically signed by:  Pamella Waldron

## 2017-01-20 NOTE — PROGRESS NOTES
[place] [facility]  PRIMARY CARE PROVIDER AND CLINIC:  Jacoby Boo [facility] CL[facility]IC [address]  / [facility] [address]  Chief Complaint   Patient presents with     Hospital F/U       HPI:    Geneva Shepherd is a 76 year old  (1940),PMH IBS, DJD, obesity, RLE DVT on anticoagulation, hypothyroidism presents for scheduled surgery. admitted to the [facility] from [facility].  Hospital stay 1/16/17 through 1/19/17  DJD s/p left TKA Dr. Maddox: post op complications includes  UTI: started on cipro  Anemia: no blood transfusion required, Hgb 11.3 at DC  .  Admitted to this facility for  rehab, medical management and nursing care. Current issues are:    On exam today patient is very pleasant, alert, states pain in left knee is rated 4/10 after therapy, denies fever, chills, cough, congestion, SOB, CP, N/V/D has had some constipation, no abdominal pain.   .     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  PAST SURGICAL HISTORY:  has past surgical history that includes NONSPECIFIC PROCEDURE (7/00/01); NONSPECIFIC PROCEDURE; NONSPECIFIC PROCEDURE (6/02); NONSPECIFIC PROCEDURE (7/04); Cholecystectomy; vascular surgery; Arthroplasty knee (1/17/2013); Arthroplasty hip (Left, 8/31/2015); GYN surgery; Colonoscopy (N/A, 4/26/2016); and Arthroplasty knee (Left, 1/16/2017).  FAMILY HISTORY: family history includes Cardiovascular in her mother; Circulatory in her sister; Coronary Artery Disease in her father; HEART DISEASE in her father.  SOCIAL HISTORY:  reports that she quit smoking about 48 years ago. She has never used smokeless tobacco. She reports that she drinks alcohol. She reports that she does not use illicit drugs.    Post Discharge Medication Reconciliation Status: discharge medications reconciled, continue medications without change.  Current Outpatient Prescriptions   Medication Sig Dispense Refill     ciprofloxacin (CIPRO) 500 MG tablet Take 1 tablet (500 mg) by mouth every 12 hours for 5 days  0     oxyCODONE (ROXICODONE) 5 MG IR tablet Take 1-2 tablets (5-10 mg) by mouth every 3 hours as needed for moderate to severe pain 70 tablet 0     senna-docusate (SENOKOT-S;PERICOLACE) 8.6-50 MG per tablet Take 1-2 tablets by mouth 2 times daily 100 tablet 0     Amitriptyline HCl (ELAVIL PO) Take 25 mg by mouth At Bedtime       Ascorbic Acid (VITAMIN C PO) Take 1,000 mg by mouth every morning (Patient takes 2 X 500 mg = 1,000 mg dose)       levOCARNitine (CARNITOR) 330 MG tablet Take 330 mg by mouth every morning       PARoxetine HCl (PAXIL PO) Take 40 mg by mouth daily (patient takes 2 X 20 mg = 40 mg dose)       WARFARIN SODIUM PO Take 5-7.5 mg by mouth every evening (Patient takes 5 mg dose on Tuesday,Thursday, Saturday and Sunday. Patient takes 1.5 X 5 mg = 7.5 mg dose on Monday,Wednesday and Friday)       BIOTIN PO Take 1 tablet by mouth every morning       TURMERIC PO Take 1 capsule by  mouth every morning       MAGNESIUM OXIDE PO Take 400 mg by mouth daily       Acetylcysteine (N-ACETYL-L-CYSTEINE PO) Take 1 capsule by mouth every morning       COCONUT OIL PO Take 1 capsule by mouth every morning       levothyroxine (SYNTHROID) 50 MCG tablet Take 1 tablet (50 mcg) by mouth daily 90 tablet 3     order for DME Equipment being ordered: Thigh High 30-40 mmhg Compression Stockings. 4 Device 0     order for DME Equipment being ordered: jobst stockings knee high bilateral medium strength 20-30 mm Hg.  Dispense 2 pair 2 Package 11     order for DME Equipment being ordered: [facility] Fax [phone]    Primary Dressing Mepilex Border 4x4   Qty 30  Length of Need: 1 month  Frequency of dressing change: daily 30 days 0     order for DME Equipment being ordered: Compression Stockings Knee High 20-30 mmhg 2 Device 2     order for DME Equipment being ordered: [facility] Fax [phone]    Primary Dressing Mepilex Border 4x4   Qty 30  Length of Need: 1 month  Frequency of dressing change: daily 30 days 0     Acetaminophen (TYLENOL PO) Take 1,000 mg by mouth daily as needed        vitamin  B complex with vitamin C (VITAMIN  B COMPLEX) TABS Take 1 tablet by mouth daily DOSE UNKNOWN       FOLIC ACID PO Take 400 mcg by mouth daily        BETA CAROTENE PO Take 25,000 Units by mouth daily.       ZINC 50 MG OR CAPS 1 daily       CHROMIUM PICOLINATE 800 MCG OR TABS 1 daily       FISH OIL 1000 MG OR CAPS 1 daily       FLAX SEED OIL 1000 MG OR CAPS 1 daily       MULTIVITAMIN/MULTIMINERAL TABS   OR 1 TABLET DAILY 30 0       ROS:  10 point ROS of systems including Constitutional, Eyes, Respiratory, Cardiovascular, Gastroenterology, Genitourinary, Integumentary, Muscularskeletal, Psychiatric were all negative except for pertinent positives noted in my HPI.    Exam:  /71 mmHg  Pulse 86  Temp(Src) 97.2  F (36.2  C)  Resp 16  Wt 180 lb (81.647 kg)  SpO2 94%  GENERAL APPEARANCE:  Alert, in no  distress, morbidly obese  ENT:  Mouth and posterior oropharynx normal, moist mucous membranes, normal hearing acuity  EYES:  EOM, conjunctivae, lids, pupils and irises normal, PERRL  RESP:  respiratory effort and palpation of chest normal, lungs clear to auscultation , no respiratory distress  CV:  Palpation and auscultation of heart done , regular rate and rhythm, no murmur, rub, or gallop, no edema  ABDOMEN:  normal bowel sounds, soft, nontender, no hepatosplenomegaly or other masses  M/S:   Examination of:   right upper extremity, left upper extremity and right lower extremity  Inspection, ROM, stability and muscle strength normal and decreased ROM and strength LLE  SKIN:  Inspection of skin and subcutaneous tissue baseline, did not visualize left knee incision  NEURO:   Cranial nerves 2-12 are normal tested and grossly at patient's baseline, speech WNL    Lab/Diagnostic data:  CBC RESULTS:   Recent Labs   Lab Test  01/19/17   0625  01/18/17   0628  01/17/17   0644  01/16/17   1255   04/28/16   1925  02/13/16   1801   WBC   --    --    --    --    --   9.1  9.8   RBC   --    --    --    --    --   4.25  3.86   HGB   --   11.3*  11.2*   --    < >  13.6  11.9   HCT   --    --    --    --    --   41.7  37.7   MCV   --    --    --    --    --   98  98   MCH   --    --    --    --    --   32.0  30.8   MCHC   --    --    --    --    --   32.6  31.6   RDW   --    --    --    --    --   13.6  14.9   PLT  230   --    --   246   --   284  308    < > = values in this interval not displayed.       Last Basic Metabolic Panel:  Recent Labs   Lab Test  01/19/17   0625  01/16/17   1255  01/09/17   1603  04/28/16   1925   NA   --    --   137  139   POTASSIUM   --    --   4.1  4.0   CHLORIDE   --    --   102  104   TUAN   --    --   9.9  9.9   CO2   --    --   30  29   BUN   --    --   9  5*   CR  0.60  0.57  0.63  0.49*   GLC   --    --   102*  97       Liver Function Studies -   Recent Labs   Lab Test  02/15/16   1519  09/22/15    1233   PROTTOTAL  7.5  6.9   ALBUMIN  3.0*  3.4   BILITOTAL  0.3  0.3   ALKPHOS  130  185*   AST  21  27   ALT  45  34       TSH   Date Value Ref Range Status   01/09/2017 1.90 0.40 - 4.00 mU/L Final   02/15/2016 3.20 0.40 - 4.00 mU/L Final     INR     1.55   1/19/2017  INR     1.24   1/18/2017  INR     1.13   1/17/2017  INR     1.02   1/16/2017  INR      2.0   1/5/2017  INR      4.2   12/27/2016  INR      3.1   12/6/2016  INR      1.7   11/10/2016  INR      2.6   10/13/2016  INR      2.4   9/8/2016  INR      2.8   8/31/2016  INR      1.9   8/10/2016  INR      1.7   7/20/2016  INR      2.0   7/6/2016  INR      1.7   6/15/2016  INR      2.6   5/23/2016  INR     0.97   4/28/2016  INR     1.06   4/26/2016  INR     1.36   2/20/2016  INR     1.75   2/15/2016  INR     8.85   2/13/2016            ASSESSMENT/PLAN:  Osteoarthrosis involving lower leg  Aftercare following knee joint replacement surgery, unspecified laterality  Acute s/p Left TKA Dr. Maddox: PT and OT for strengthening, oxycodone 5-10mg q 3 hours prn, tylenol 1000mg TID scheduled, on coumadin for Hx of DVT     Urinary tract infection, site unspecified  Acute: cipro 500mg q 12 hours, monitor for further symptoms    Constipation:   Acute: senna s 2 PO bid scheduled, miralax 17gm QD prn    Anemia due to blood loss, acute  Acute: Hgb on Monday, follow    Personal history of RLE DVT (deep vein thrombosis)(2003)  Long-term (current) use of anticoagulants [Z79.01]  On coumadin chronically, INR goal of 2-3       Orders:  BMP and Hgb on Monday  Tylenol 1000mg TID scheduled  Senna s 2 PO BID  miralax 17gm QD prn    Information reviewed:  Medications, vital signs, orders, nursing notes, problem list, hospital information. Total time spent with patient visit was 45 min including patient visit and review of past records. Greater than 50% of total time spent with counseling and coordinating care.    Electronically signed by:  Tonya Lynn Haase, APRN CNP

## 2017-01-23 ENCOUNTER — TRANSFERRED RECORDS (OUTPATIENT)
Dept: HEALTH INFORMATION MANAGEMENT | Facility: CLINIC | Age: 77
End: 2017-01-23

## 2017-01-23 LAB
BUN SERPL-MCNC: <10 MG/DL (ref 9–26)
CALCIUM SERPL-MCNC: 9.8 MG/DL (ref 8.4–10.2)
CHLORIDE SERPLBLD-SCNC: 108 MMOL/L (ref 98–109)
CO2 SERPL-SCNC: 27 MMOL/L (ref 22–31)
CREAT SERPL-MCNC: 0.59 MG/DL (ref 0.55–1.02)
GFR SERPL CREATININE-BSD FRML MDRD: >60 ML/MIN/1.73M2
GLUCOSE SERPL-MCNC: 95 MG/DL (ref 70–100)
HEMOGLOBIN: 11.4 G/DL (ref 11.8–15.5)
POTASSIUM SERPL-SCNC: 4.2 MMOL/L (ref 3.5–5.2)
SODIUM SERPL-SCNC: 140 MMOL/L (ref 136–145)

## 2017-01-24 ENCOUNTER — NURSING HOME VISIT (OUTPATIENT)
Dept: GERIATRICS | Facility: CLINIC | Age: 77
End: 2017-01-24
Payer: MEDICARE

## 2017-01-24 DIAGNOSIS — Z79.01 LONG-TERM (CURRENT) USE OF ANTICOAGULANTS: ICD-10-CM

## 2017-01-24 DIAGNOSIS — Z47.1 AFTERCARE FOLLOWING KNEE JOINT REPLACEMENT SURGERY, UNSPECIFIED LATERALITY: Primary | ICD-10-CM

## 2017-01-24 DIAGNOSIS — N39.0 URINARY TRACT INFECTION, SITE UNSPECIFIED: ICD-10-CM

## 2017-01-24 DIAGNOSIS — Z96.659 AFTERCARE FOLLOWING KNEE JOINT REPLACEMENT SURGERY, UNSPECIFIED LATERALITY: Primary | ICD-10-CM

## 2017-01-24 PROCEDURE — 99207 ZZC CDG-CORRECTLY CODED, REVIEWED AND AGREE: CPT | Performed by: INTERNAL MEDICINE

## 2017-01-24 PROCEDURE — 99304 1ST NF CARE SF/LOW MDM 25: CPT | Performed by: INTERNAL MEDICINE

## 2017-01-25 ENCOUNTER — DISCHARGE SUMMARY NURSING HOME (OUTPATIENT)
Dept: GERIATRICS | Facility: CLINIC | Age: 77
End: 2017-01-25
Payer: MEDICARE

## 2017-01-25 VITALS
BODY MASS INDEX: 28.93 KG/M2 | OXYGEN SATURATION: 96 % | RESPIRATION RATE: 18 BRPM | TEMPERATURE: 97 F | HEIGHT: 66 IN | DIASTOLIC BLOOD PRESSURE: 64 MMHG | SYSTOLIC BLOOD PRESSURE: 126 MMHG | HEART RATE: 95 BPM | WEIGHT: 180 LBS

## 2017-01-25 DIAGNOSIS — N39.0 URINARY TRACT INFECTION, SITE UNSPECIFIED: ICD-10-CM

## 2017-01-25 DIAGNOSIS — Z47.1 AFTERCARE FOLLOWING KNEE JOINT REPLACEMENT SURGERY, UNSPECIFIED LATERALITY: ICD-10-CM

## 2017-01-25 DIAGNOSIS — Z96.659 AFTERCARE FOLLOWING KNEE JOINT REPLACEMENT SURGERY, UNSPECIFIED LATERALITY: ICD-10-CM

## 2017-01-25 DIAGNOSIS — D62 ANEMIA DUE TO BLOOD LOSS, ACUTE: ICD-10-CM

## 2017-01-25 DIAGNOSIS — Z86.718 PERSONAL HISTORY OF DVT (DEEP VEIN THROMBOSIS): ICD-10-CM

## 2017-01-25 DIAGNOSIS — K59.09 OTHER CONSTIPATION: ICD-10-CM

## 2017-01-25 DIAGNOSIS — Z79.01 ANTICOAGULATED ON COUMADIN: ICD-10-CM

## 2017-01-25 PROCEDURE — 99316 NF DSCHRG MGMT 30 MIN+: CPT | Performed by: NURSE PRACTITIONER

## 2017-01-25 RX ORDER — POLYETHYLENE GLYCOL 3350 17 G/17G
17 POWDER, FOR SOLUTION ORAL DAILY PRN
COMMUNITY
End: 2017-03-14

## 2017-01-25 RX ORDER — AMOXICILLIN 250 MG
2 CAPSULE ORAL 2 TIMES DAILY
COMMUNITY
End: 2017-03-14

## 2017-01-25 RX ORDER — ACETAMINOPHEN 500 MG
1000 TABLET ORAL 3 TIMES DAILY
COMMUNITY
End: 2019-04-25

## 2017-01-25 NOTE — PROGRESS NOTES
[place] [facility] DISCHARGE SUMMARY    PATIENT'S NAME: Geneva Shepherd  YOB: 1940  MEDICAL RECORD NUMBER:  [id number]    PRIMARY CARE PROVIDER AND CLINIC RESPONSIBLE AFTER TRANSFER: Jacoby Boo [facility] [address]  / [facility] [address]    CODE STATUS/ADVANCE DIRECTIVES DISCUSSION:   CPR/Full code        Allergies   Allergen Reactions     Cephalexin Monohydrate      diarrhea       TRANSFERRING PROVIDERS: Tonya Lynn Haase, APRN CNP, Dr. Martinez MD  DATE OF SNF ADMISSION:  January / 19 / 2017  DATE OF SNF (anticipated) DISCHARGE: January / 25 / 2016  DISCHARGE DISPOSITION: [facility] Provider   Nursing Facility: [facility] stay 1/16/17 to 1/19/17.     Condition on Discharge:  Stable.  Function:  Walking > 150 feet using a can for short distances and walker for longer distances, indep with ADL's  Cognitive Scores: BIMS 15/15    Equipment: walker    DISCHARGE DIAGNOSIS:   1. Osteoarthrosis involving lower leg    2. Aftercare following knee joint replacement surgery, unspecified laterality    3. Urinary tract infection, site unspecified    4. Anemia due to blood loss, acute    5. Other constipation    6. Personal history of RLE DVT (deep vein thrombosis)(2003)    7. 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    DISCHARGE MEDICATIONS:  Current Outpatient Prescriptions   Medication Sig Dispense Refill     Warfarin Therapy Reminder 1 each continuous prn       senna-docusate (SENOKOT-S;PERICOLACE) 8.6-50 MG per tablet Take 2 tablets by mouth 2 times daily       acetaminophen (TYLENOL) 500 MG tablet Take 1,000 mg by mouth 3 times daily       polyethylene glycol (MIRALAX/GLYCOLAX) powder Take 17 g by mouth daily as needed for constipation       oxyCODONE (ROXICODONE) 5 MG IR tablet Take 1-2 tablets (5-10 mg) by mouth every 3 hours as needed for moderate to severe pain 70 tablet 0     Amitriptyline HCl (ELAVIL PO) Take 25 mg by mouth At Bedtime       Ascorbic Acid (VITAMIN C PO) Take 1,000 mg by mouth every morning (Patient takes 2 X 500 mg = 1,000 mg dose)       levOCARNitine (CARNITOR) 330 MG tablet Take 330 mg by mouth every morning       PARoxetine HCl (PAXIL PO) Take 40 mg by mouth daily (patient takes 2 X 20 mg = 40 mg dose)       TURMERIC PO Take 1 capsule by mouth every morning       MAGNESIUM OXIDE PO Take 400 mg by mouth daily       COCONUT OIL PO Take 1 capsule by mouth every morning       levothyroxine (SYNTHROID) 50 MCG tablet Take 1 tablet (50 mcg) by mouth daily 90 tablet 3     order for DME Equipment being ordered: Thigh High 30-40 mmhg Compression Stockings. 4 Device 0     order for DME Equipment being ordered: jobst stockings knee high bilateral medium strength 20-30 mm Hg.  Dispense 2 pair 2 Package 11     order for DME Equipment being ordered: 2 Minutes Order Fax [phone]    Primary Dressing Mepilex Border 4x4   Qty 30  Length of Need: 1 month  Frequency of dressing change: daily 30 days 0     order for DME Equipment being ordered: Compression Stockings Knee High 20-30 mmhg 2 Device 2     order for DME Equipment being ordered: [employer]  "Order Fax [phone]    Primary Dressing Mepilex Border 4x4   Qty 30  Length of Need: 1 month  Frequency of dressing change: daily 30 days 0     vitamin  B complex with vitamin C (VITAMIN  B COMPLEX) TABS Take 1 tablet by mouth daily DOSE UNKNOWN       FOLIC ACID PO Take 400 mcg by mouth daily        BETA CAROTENE PO Take 25,000 Units by mouth daily.       ZINC 50 MG OR CAPS 1 daily       CHROMIUM PICOLINATE 800 MCG OR TABS 1 daily       FISH OIL 1000 MG OR CAPS 1 daily       FLAX SEED OIL 1000 MG OR CAPS 1 daily       MULTIVITAMIN/MULTIMINERAL TABS   OR 1 TABLET DAILY 30 0     BIOTIN PO Take 1 tablet by mouth every morning       Acetylcysteine (N-ACETYL-L-CYSTEINE PO) Take 1 capsule by mouth every morning         MEDICATION CHANGES/RATIONALE:   Started patient on senna and miralax for constipation  No other medication changes made  Last 3 BPs: 110/66, 115/74, 149/72.  Admission Weight: 180lbs  Last 3 INRS: 1.5, 1.9, 2.8    Controlled medications sent with patient: Script for oxycodone 5-10mg q 4 hours prn medication for 35 tabs and 0 refills given to patient at dischage to have them fill at their out patient pharmacy     ROS:    10 point ROS of systems including Constitutional, Eyes, Respiratory, Cardiovascular, Gastroenterology, Genitourinary, Integumentary, Muscularskeletal, Psychiatric were all negative except for pertinent positives noted in my HPI.    Physical Exam:   Vitals: /64 mmHg  Pulse 95  Temp(Src) 97  F (36.1  C)  Resp 18  Ht 5' 6\" (1.676 m)  Wt 180 lb (81.647 kg)  BMI 29.07 kg/m2  SpO2 96%  BMI= Body mass index is 29.07 kg/(m^2).    GENERAL APPEARANCE:  Alert, in no distress  ENT:  Mouth and posterior oropharynx normal, moist mucous membranes, normal hearing acuity  EYES:  EOM, conjunctivae, lids, pupils and irises normal, PERRL  RESP:  respiratory effort and palpation of chest normal, lungs clear to auscultation , no respiratory distress  CV:  Palpation and auscultation of heart done , " regular rate and rhythm, no murmur, rub, or gallop, no edema  ABDOMEN:  normal bowel sounds, soft, nontender, no hepatosplenomegaly or other masses  M/S:   Examination of:   right upper extremity, left upper extremity and right lower extremity  Inspection, ROM, stability and muscle strength normal and decreased strength and ROM LLE ROM is 109 degrees flexion  SKIN:  Inspection of skin and subcutaneous tissue baseline  NEURO:   Cranial nerves 2-12 are normal tested and grossly at patient's baseline, speech WNL     HPI Nursing Facility Course:  Patient progressed in therapy to indep ambulation walking > 150 feet using a cane, patient uses a walker for longer distances and community mobility. Patient is able to flex left knee to 109 degrees passive will DC home with OP PT.    ASSESSMENT/PLAN:  Osteoarthrosis involving lower leg  Aftercare following knee joint replacement surgery, unspecified laterality  Acute s/p Left TKA Dr. Maddox: DC home with OP PT, oxycodone 5-10mg q 3 hours prn, tylenol 1000mg TID scheduled, on coumadin for Hx of DVT     Urinary tract infection, site unspecified  Resolved, Tx with Cipro    Constipation:   Acute: senna s 2 PO bid scheduled, miralax 17gm QD prn    Anemia due to blood loss, acute  Acute: stable    Personal history of RLE DVT (deep vein thrombosis)(2003)  Long-term (current) use of anticoagulants [Z79.01]  On coumadin chronically, INR goal of 2-3      DISCHARGE PLAN:  Outpatient Physical Therapy per [facility]  Patient instructed to follow-up with:  PCP in 5 to 7 days       Current [place] scheduled appointments:  Next 5 appointments (look out 90 days)     Jan 31, 2017  1:00 PM   Office Visit with Jacoby Boo MD   [facility] ([facility]    [address] [address] [phone]                  Pending labs: none    SNF labs   CBC RESULTS:   Lab Test 01/23/17 01/19/17   0625  01/18/17   0628  01/16/17   7939    WBC   --    --    --    --    RBC   --    --    --    --    HGB  11.4*   --   11.3*   --    HCT   --    --    --    --    MCV   --    --    --    --    MCH   --    --    --    --    MCHC   --    --    --    --    RDW   --    --    --    --    PLT   --   230   --   246       Last Basic Metabolic Panel:  Lab Test 01/23/17 01/19/17   0625  01/09/17   1603   NA  140   --   137   POTASSIUM  4.2   --   4.1   CHLORIDE  108   --   102   TUAN  9.8   --   9.9   CO2  27   --   30   BUN  <10   --   9   CR  0.59  0.60  0.63   GLC  95   --   102*     TSH   Date Value Ref Range Status   01/09/2017 1.90 0.40 - 4.00 mU/L Final     Discharge Treatments: none    TOTAL DISCHARGE TIME:   Greater than 30 minutes  Electronically signed by:  Tonya Lynn Haase, APRN CNP

## 2017-01-27 ENCOUNTER — THERAPY VISIT (OUTPATIENT)
Dept: PHYSICAL THERAPY | Facility: CLINIC | Age: 77
End: 2017-01-27
Payer: MEDICARE

## 2017-01-27 DIAGNOSIS — Z47.1 AFTERCARE FOLLOWING LEFT KNEE JOINT REPLACEMENT SURGERY: Primary | ICD-10-CM

## 2017-01-27 DIAGNOSIS — R26.9 ABNORMAL GAIT: ICD-10-CM

## 2017-01-27 DIAGNOSIS — Z96.652 AFTERCARE FOLLOWING LEFT KNEE JOINT REPLACEMENT SURGERY: Primary | ICD-10-CM

## 2017-01-27 PROCEDURE — G8978 MOBILITY CURRENT STATUS: HCPCS | Mod: GP | Performed by: PHYSICAL THERAPIST

## 2017-01-27 PROCEDURE — 97110 THERAPEUTIC EXERCISES: CPT | Mod: GP | Performed by: PHYSICAL THERAPIST

## 2017-01-27 PROCEDURE — 97161 PT EVAL LOW COMPLEX 20 MIN: CPT | Mod: GP | Performed by: PHYSICAL THERAPIST

## 2017-01-27 PROCEDURE — 97530 THERAPEUTIC ACTIVITIES: CPT | Mod: GP | Performed by: PHYSICAL THERAPIST

## 2017-01-27 PROCEDURE — G8979 MOBILITY GOAL STATUS: HCPCS | Mod: GP | Performed by: PHYSICAL THERAPIST

## 2017-01-27 ASSESSMENT — ACTIVITIES OF DAILY LIVING (ADL)
PAIN: NOT ANSWERED
GIVING WAY, BUCKLING OR SHIFTING OF KNEE: I HAVE THE SYMPTOM BUT IT DOES NOT AFFECT MY ACTIVITY
KNEE_ACTIVITY_OF_DAILY_LIVING_SCORE: 66.16
LIMPING: I HAVE THE SYMPTOM BUT IT DOES NOT AFFECT MY ACTIVITY
RAW_SCORE: 46.31
STAND: ACTIVITY IS MINIMALLY DIFFICULT
SIT WITH YOUR KNEE BENT: ACTIVITY IS SOMEWHAT DIFFICULT
HOW_WOULD_YOU_RATE_THE_CURRENT_FUNCTION_OF_YOUR_KNEE_DURING_YOUR_USUAL_DAILY_ACTIVITIES_ON_A_SCALE_FROM_0_TO_100_WITH_100_BEING_YOUR_LEVEL_OF_KNEE_FUNCTION_PRIOR_TO_YOUR_INJURY_AND_0_BEING_THE_INABILITY_TO_PERFORM_ANY_OF_YOUR_USUAL_DAILY_ACTIVITIES?: 80
STIFFNESS: THE SYMPTOM AFFECTS MY ACTIVITY SLIGHTLY
GO DOWN STAIRS: ACTIVITY IS MINIMALLY DIFFICULT
SQUAT: ACTIVITY IS FAIRLY DIFFICULT
KNEE_ACTIVITY_OF_DAILY_LIVING_SUM: 43
WALK: ACTIVITY IS MINIMALLY DIFFICULT
HOW_WOULD_YOU_RATE_THE_OVERALL_FUNCTION_OF_YOUR_KNEE_DURING_YOUR_USUAL_DAILY_ACTIVITIES?: NEARLY NORMAL
AS_A_RESULT_OF_YOUR_KNEE_INJURY,_HOW_WOULD_YOU_RATE_YOUR_CURRENT_LEVEL_OF_DAILY_ACTIVITY?: NEARLY NORMAL
RISE FROM A CHAIR: ACTIVITY IS SOMEWHAT DIFFICULT
KNEEL ON THE FRONT OF YOUR KNEE: ACTIVITY IS VERY DIFFICULT
SWELLING: THE SYMPTOM AFFECTS MY ACTIVITY SLIGHTLY
GO UP STAIRS: ACTIVITY IS MINIMALLY DIFFICULT
WEAKNESS: I HAVE THE SYMPTOM BUT IT DOES NOT AFFECT MY ACTIVITY

## 2017-01-27 NOTE — PROGRESS NOTES
[place] [facility] Initial Evaluation    Subjective:    Geneva Shepherd is a 76 year old female with a left knee condition.  Condition occurred with:  Degenerative joint disease.  Condition occurred: for unknown reasons.  This is a new condition  Status post L TKA from 1-16-17. Went to [facility] for 7 days. Walking with walker. Has upstairs and basement stairs which she is using a non-reciprocal gait and railing. Sleeping well. Taking oxycodone for pain relief. Overall feels she is doing very well.    Patient reports pain:  Anterior.  Radiates to:  Lower leg.  Pain is described as aching and is intermittent and reported as 2/10 and 7/10.   Worse during: varies.  Symptoms are exacerbated by ascending stairs, descending stairs and bending/squatting and relieved by rest and NSAID's.  Since onset symptoms are gradually improving.        General health as reported by patient is good.                  Barriers include:  Stairs.    Red flags:  None as reported by the patient.                      Objective:      Gait:    Weight Bearing Status:  WBAT   Assistive Devices:  Walker  Deviations:  Knee:  Knee extension decr LGeneral Deviations:  Janiya decr                                                      Knee Evaluation:  ROM:    AROM      Extension: Left:    Right:  12    PROM      Extension: Left: 10   Right:   Flexion: Left: 85   Right:       Strength:         Quad Set Left: Good    Pain:             Edema:    Circumference:      Joint Line:  Left:  42.9 cm   Right:  41.0 cm              General     ROS    Assessment/Plan:      Patient is a 76 year old female with left side knee complaints.  Presents with signs and sx's consistent with post surgical status. Staples in place, will have them removed next week. Walking fairly well with walker. Her quad set is good. Has an independent SLR with min extensor lag. She is off to a great start. She is motivated to improve.  Patient has the following significant  findings with corresponding treatment plan.                Diagnosis 1:  S/p L TKA  Pain -  hot/cold therapy, self management, education and home program  Decreased ROM/flexibility - manual therapy, therapeutic exercise and home program  Decreased strength - therapeutic exercise, therapeutic activities and home program  Edema - cold therapy and self management/home program  Impaired gait - gait training  Decreased function - therapeutic activities and home program    Therapy Evaluation Codes:   1) History comprised of:   Personal factors that impact the plan of care:      None.    Comorbidity factors that impact the plan of care are:      None.     Medications impacting care: None.  2) Examination of Body Systems comprised of:   Body structures and functions that impact the plan of care:      Knee.   Activity limitations that impact the plan of care are:      Bathing, Bending, Dressing, Squatting/kneeling, Stairs and Walking.  3) Clinical presentation characteristics are:   Stable/Uncomplicated.  4) Decision-Making    Low complexity using standardized patient assessment instrument and/or measureable assessment of functional outcome.  Cumulative Therapy Evaluation is: Low complexity.    Previous and current functional limitations:  (See Goal Flow Sheet for this information)    Short term and Long term goals: (See Goal Flow Sheet for this information)     Communication ability:  Patient appears to be able to clearly communicate and understand verbal and written communication and follow directions correctly.  Treatment Explanation - The following has been discussed with the patient:   RX ordered/plan of care  Anticipated outcomes  Possible risks and side effects  This patient would benefit from PT intervention to resume normal activities.   Rehab potential is good.    Frequency:  2 X week, once daily  Duration:  for 4 weeks (per MD orders)  Discharge Plan:  Achieve all LTG.  Independent in home treatment  program.  Reach maximal therapeutic benefit.    Please refer to the daily flowsheet for treatment today, total treatment time and time spent performing 1:1 timed codes.

## 2017-01-27 NOTE — Clinical Note
DEPARTMENT OF HEALTH AND HUMAN SERVICES  CENTERS FOR MEDICARE & MEDICAID SERVICES    PLAN/UPDATED PLAN OF PROGRESS FOR OUTPATIENT REHABILITATION    PATIENTS NAME:  Geneva Shepherd   : 1940  PROVID[facility] NUMBER:    [id number]  [id number]  PROVIDER NAME: [facility] PHYS[facility]  MEDICAL RECORD NUMBER: [id number]   START OF CARE DATE:  SOC Date: 17   TYPE:  PT  PRIMARY/TREATMENT DIAGNOSIS: (Pertinent Medical Diagnosis)     Aftercare following left knee joint replacement surgery  Abnormal gait    VISITS FROM START OF CARE:  Rxs Used: 1     [facility] Initial Evaluation    Subjective:  Geneva Shepherd is a 76 year old female with a left knee condition.  Condition occurred with:  Degenerative joint disease.  Condition occurred: for unknown reasons.  This is a new condition  Status post L TKA from 17. Went to [place] for 7 days. Walking with walker. Has upstairs and basement stairs which she is using a non-reciprocal gait and railing. Sleeping well. Taking oxycodone for pain relief. Overall feels she is doing very well.  Patient reports pain:  Anterior.  Radiates to:  Lower leg.  Pain is described as aching and is intermittent and reported as 2/10 and 7/10.   Worse during: varies.  Symptoms are exacerbated by ascending stairs, descending stairs and bending/squatting and relieved by rest and NSAID's.  Since onset symptoms are gradually improving.  General health as reported by patient is good.  Barriers include:  Stairs.  Red flags:  None as reported by the patient.  Pertinent medical history includes:  Thyroid problems and depression.  Medical allergies: yes (Keflex).  Other surgeries include:  Orthopedic surgery and other (Rt knee, lt hip, gallbladder).  Current medications:  Thyroid medication and anti-depressants.  Current occupation is Retired.   Knee Activity of Daily Living Score: 66.16       Objective:  Gait:    Weight Bearing  Status:  WBAT   Assistive Devices:  Walker  Deviations:  Knee:  Knee extension decr LGeneral Deviations:  Janiya decr  Knee Evaluation:  ROM:    AROM  Extension: Left:    Right:  12  PROM  Extension: Left: 10   Right:   Flexion: Left: 85   Right:   Strength:   Quad Set Left: Good    Pain:   Edema:    Circumference:  Joint Line:  Left:  42.9 cm   Right:  41.0 cm    ROS  Assessment/Plan:    Patient is a 76 year old female with left side knee complaints.  Presents with signs and sx's consistent with post surgical status. Staples in place, will have them removed next week. Walking fairly well with walker. Her quad set is good. Has an independent SLR with min extensor lag. She is off to a great start. She is motivated to improve.  Patient has the following significant findings with corresponding treatment plan.                Diagnosis 1:  S/p L TKA  Pain -  hot/cold therapy, self management, education and home program  Decreased ROM/flexibility - manual therapy, therapeutic exercise and home program  Decreased strength - therapeutic exercise, therapeutic activities and home program  Edema - cold therapy and self management/home program  Impaired gait - gait training  Decreased function - therapeutic activities and home program    Therapy Evaluation Codes:   1) History comprised of:   Personal factors that impact the plan of care:      None.    Comorbidity factors that impact the plan of care are:      None.     Medications impacting care: None.  2) Examination of Body Systems comprised of:   Body structures and functions that impact the plan of care:      Knee.   Activity limitations that impact the plan of care are:      Bathing, Bending, Dressing, Squatting/kneeling, Stairs and Walking.  3) Clinical presentation characteristics are:   Stable/Uncomplicated.  4) Decision-Making    Low complexity using standardized patient assessment instrument and/or measureable assessment of functional outcome.  Cumulative Therapy  "Evaluation is: Low complexity.    Previous and current functional limitations:  (See Goal Flow Sheet for this information)    Short term and Long term goals: (See Goal Flow Sheet for this information)   Communication ability:  Patient appears to be able to clearly communicate and understand verbal and written communication and follow directions correctly.  Treatment Explanation - The following has been discussed with the patient:   RX ordered/plan of care  Anticipated outcomes  Possible risks and side effects  This patient would benefit from PT intervention to resume normal activities.   Rehab potential is good.  Frequency:  2 X week, once daily  Duration:  for 4 weeks (per MD orders)  Discharge Plan:  Achieve all LTG.  Independent in home treatment program.  Reach maximal therapeutic benefit.      Caregiver Signature/Credentials _____________________________ Date ________       Treating Provider: eKren Velázquez, PT   I have reviewed and certified the need for these services and plan of treatment while under my care.        PHYSICIAN'S SIGNATURE:   _____________________________________ Date___________                         Benjamín Maddox    Certification period:  Beginning of Cert date period: 01/27/17 to  End of Cert period date: 04/26/17     Functional Level Progress Report: Please see attached \"Goal Flow sheet for Functional level.\"    ____X____ Continue Services or       ________ DC Services                Service dates: From  SOC Date: 01/27/17 date to present                           "

## 2017-01-27 NOTE — PROGRESS NOTES
[place] [facility]  PRIMARY CARE PROVIDER AND CLINIC:  Jacoby Boo [facility] [address] / [facility] [address]      Pt seen by Dr Henriquez on 1/24/17 for an initial TCU visit    HPI:    Geneva Shepherd is a 76 year old  (1940), PMH of RLE DVT on anticoagulation, who underwent a L TKA on 1/16/17 by Dr Maddox.    There were no significant post op complications  Pt was treated with cipro for a UTI.     Admitted to this facility for  rehab, medical management and nursing care.      Pt reports feeling very well  Pain has been controlled  She denies dysuria, fevers, chills, abd pain.  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  PAST SURGICAL HISTORY:  has past surgical history that includes NONSPECIFIC PROCEDURE (7/00/01); NONSPECIFIC PROCEDURE; NONSPECIFIC PROCEDURE (6/02); NONSPECIFIC PROCEDURE (7/04); Cholecystectomy; vascular surgery; Arthroplasty knee (1/17/2013); Arthroplasty hip (Left, 8/31/2015); GYN surgery; Colonoscopy  (N/A, 4/26/2016); and Arthroplasty knee (Left, 1/16/2017).  FAMILY HISTORY: family history includes Cardiovascular in her mother; Circulatory in her sister; Coronary Artery Disease in her father; HEART DISEASE in her father.  SOCIAL HISTORY:  reports that she quit smoking about 48 years ago. She has never used smokeless tobacco. She reports that she drinks alcohol. She reports that she does not use illicit drugs.        Post Discharge Medication Reconciliation Status:   .  Current Outpatient Prescriptions   Medication Sig Dispense Refill     Warfarin Therapy Reminder 1 each continuous prn       senna-docusate (SENOKOT-S;PERICOLACE) 8.6-50 MG per tablet Take 2 tablets by mouth 2 times daily       acetaminophen (TYLENOL) 500 MG tablet Take 1,000 mg by mouth 3 times daily       polyethylene glycol (MIRALAX/GLYCOLAX) powder Take 17 g by mouth daily as needed for constipation       oxyCODONE (ROXICODONE) 5 MG IR tablet Take 1-2 tablets (5-10 mg) by mouth every 3 hours as needed for moderate to severe pain 70 tablet 0     Amitriptyline HCl (ELAVIL PO) Take 25 mg by mouth At Bedtime       Ascorbic Acid (VITAMIN C PO) Take 1,000 mg by mouth every morning (Patient takes 2 X 500 mg = 1,000 mg dose)       levOCARNitine (CARNITOR) 330 MG tablet Take 330 mg by mouth every morning       PARoxetine HCl (PAXIL PO) Take 40 mg by mouth daily (patient takes 2 X 20 mg = 40 mg dose)       BIOTIN PO Take 1 tablet by mouth every morning       TURMERIC PO Take 1 capsule by mouth every morning       MAGNESIUM OXIDE PO Take 400 mg by mouth daily       Acetylcysteine (N-ACETYL-L-CYSTEINE PO) Take 1 capsule by mouth every morning       COCONUT OIL PO Take 1 capsule by mouth every morning       levothyroxine (SYNTHROID) 50 MCG tablet Take 1 tablet (50 mcg) by mouth daily 90 tablet 3     order for DME Equipment being ordered: Thigh High 30-40 mmhg Compression Stockings. 4 Device 0     order for DME Equipment being ordered: jobst stockings knee  high bilateral medium strength 20-30 mm Hg.  Dispense 2 pair 2 Package 11     order for DME Equipment being ordered: [employer] Order Fax [phone]    Primary Dressing Mepilex Border 4x4   Qty 30  Length of Need: 1 month  Frequency of dressing change: daily 30 days 0     order for DME Equipment being ordered: Compression Stockings Knee High 20-30 mmhg 2 Device 2     order for DME Equipment being ordered: [employer] Order Fax [phone]    Primary Dressing Mepilex Border 4x4   Qty 30  Length of Need: 1 month  Frequency of dressing change: daily 30 days 0     vitamin  B complex with vitamin C (VITAMIN  B COMPLEX) TABS Take 1 tablet by mouth daily DOSE UNKNOWN       FOLIC ACID PO Take 400 mcg by mouth daily        BETA CAROTENE PO Take 25,000 Units by mouth daily.       ZINC 50 MG OR CAPS 1 daily       CHROMIUM PICOLINATE 800 MCG OR TABS 1 daily       FISH OIL 1000 MG OR CAPS 1 daily       FLAX SEED OIL 1000 MG OR CAPS 1 daily       MULTIVITAMIN/MULTIMINERAL TABS   OR 1 TABLET DAILY 30 0       ROS:  10 point ROS neg    Exam:    GENERAL APPEARANCE:  Alert, in no distress, very pleasant  HEENT neg  RESP:  RR 12, Lungs clear  CV:  RRR no M  ABDOMEN: soft, non-tender  M/S:   Mod swelling R knee, no erythema, no post calf tenderness. Incision was not examined by me  Gait not assessed  NEURO:   Cranial nerves 2-12 are normal tested and grossly at patient's baseline, speech WNL        Assessment    S/p L TKA  Pt is doing well, is progressing in therapies  Plan PT/OT, continue current pain med regimen. Chronic warfarin. Bowel regimen  Possible d/c to home tomorrow      Polo Henriquez MD

## 2017-01-30 ENCOUNTER — THERAPY VISIT (OUTPATIENT)
Dept: PHYSICAL THERAPY | Facility: CLINIC | Age: 77
End: 2017-01-30
Payer: MEDICARE

## 2017-01-30 ENCOUNTER — ANTICOAGULATION THERAPY VISIT (OUTPATIENT)
Dept: ANTICOAGULATION | Facility: CLINIC | Age: 77
End: 2017-01-30
Payer: MEDICARE

## 2017-01-30 DIAGNOSIS — Z96.652 AFTERCARE FOLLOWING LEFT KNEE JOINT REPLACEMENT SURGERY: Primary | ICD-10-CM

## 2017-01-30 DIAGNOSIS — Z79.01 LONG-TERM (CURRENT) USE OF ANTICOAGULANTS: Primary | ICD-10-CM

## 2017-01-30 DIAGNOSIS — R26.9 ABNORMAL GAIT: ICD-10-CM

## 2017-01-30 DIAGNOSIS — Z47.1 AFTERCARE FOLLOWING LEFT KNEE JOINT REPLACEMENT SURGERY: Primary | ICD-10-CM

## 2017-01-30 LAB — INR POINT OF CARE: 2.8 (ref 0.86–1.14)

## 2017-01-30 PROCEDURE — 36416 COLLJ CAPILLARY BLOOD SPEC: CPT

## 2017-01-30 PROCEDURE — 97110 THERAPEUTIC EXERCISES: CPT | Mod: GP | Performed by: PHYSICAL THERAPIST

## 2017-01-30 PROCEDURE — 85610 PROTHROMBIN TIME: CPT | Mod: QW

## 2017-01-30 PROCEDURE — 97530 THERAPEUTIC ACTIVITIES: CPT | Mod: GP | Performed by: PHYSICAL THERAPIST

## 2017-01-30 PROCEDURE — 99207 ZZC NO CHARGE NURSE ONLY: CPT

## 2017-01-30 NOTE — PROGRESS NOTES
ANTICOAGULATION FOLLOW-UP CLINIC VISIT    Patient Name:  Geneva Shepherd  Date:  1/30/2017  Contact Type:  Face to Face    SUBJECTIVE:     Patient Findings     Positives No Problem Findings    Comments Recent knee surgery 2 weeks   ago           OBJECTIVE    INR PROTIME   Date Value Ref Range Status   01/30/2017 2.8* 0.86 - 1.14 Final       ASSESSMENT / PLAN  INR assessment THER    Recheck INR In: 3 WEEKS    INR Location Clinic      Anticoagulation Summary as of 1/30/2017     INR goal 2.0-3.0   Selected INR 2.8 (1/30/2017)   Maintenance plan 7.5 mg (5 mg x 1.5) on Mon, Wed, Fri; 5 mg (5 mg x 1) all other days   Full instructions 7.5 mg on Mon, Wed, Fri; 5 mg all other days   Weekly total 42.5 mg   No change documented Huma Juares, RN   Plan last modified Ofelia Cavazos RN (12/31/2015)   Next INR check 2/20/2017   Target end date     Indications   Long-term (current) use of anticoagulants [Z79.01] [Z79.01]  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]  Embolism and thrombosis (H) (Resolved) [I74.9]         Anticoagulation Episode Summary     INR check location     Preferred lab     Send INR reminders to  [facility]    Comments             See the Encounter Report to view Anticoagulation Flowsheet and Dosing Calendar (Go to Encounters tab in chart review, and find the Anticoagulation Therapy Visit)        uHma Juares RN

## 2017-01-30 NOTE — PROGRESS NOTES
Subjective:                                       Pertinent medical history includes:  Thyroid problems and depression.  Medical allergies: yes (Keflex).  Other surgeries include:  Orthopedic surgery and other (Rt knee, lt hip, gallbladder).  Current medications:  Thyroid medication and anti-depressants.  Current occupation is Retired.                     Knee Activity of Daily Living Score: 66.16            Objective:    System    Physical Exam    General     ROS    Assessment/Plan:

## 2017-02-02 ENCOUNTER — THERAPY VISIT (OUTPATIENT)
Dept: PHYSICAL THERAPY | Facility: CLINIC | Age: 77
End: 2017-02-02
Payer: MEDICARE

## 2017-02-02 DIAGNOSIS — Z96.659 AFTERCARE FOLLOWING KNEE JOINT REPLACEMENT SURGERY, UNSPECIFIED LATERALITY: Primary | ICD-10-CM

## 2017-02-02 DIAGNOSIS — Z96.652 AFTERCARE FOLLOWING LEFT KNEE JOINT REPLACEMENT SURGERY: ICD-10-CM

## 2017-02-02 DIAGNOSIS — Z47.1 AFTERCARE FOLLOWING LEFT KNEE JOINT REPLACEMENT SURGERY: ICD-10-CM

## 2017-02-02 DIAGNOSIS — Z47.1 AFTERCARE FOLLOWING KNEE JOINT REPLACEMENT SURGERY, UNSPECIFIED LATERALITY: Primary | ICD-10-CM

## 2017-02-02 PROCEDURE — 97110 THERAPEUTIC EXERCISES: CPT | Mod: GP | Performed by: PHYSICAL THERAPIST

## 2017-02-02 PROCEDURE — 97530 THERAPEUTIC ACTIVITIES: CPT | Mod: GP | Performed by: PHYSICAL THERAPIST

## 2017-02-10 ENCOUNTER — THERAPY VISIT (OUTPATIENT)
Dept: PHYSICAL THERAPY | Facility: CLINIC | Age: 77
End: 2017-02-10
Payer: MEDICARE

## 2017-02-10 DIAGNOSIS — Z47.1 AFTERCARE FOLLOWING LEFT KNEE JOINT REPLACEMENT SURGERY: Primary | ICD-10-CM

## 2017-02-10 DIAGNOSIS — Z96.652 AFTERCARE FOLLOWING LEFT KNEE JOINT REPLACEMENT SURGERY: Primary | ICD-10-CM

## 2017-02-10 DIAGNOSIS — R26.9 ABNORMAL GAIT: ICD-10-CM

## 2017-02-10 PROCEDURE — 97110 THERAPEUTIC EXERCISES: CPT | Mod: GP | Performed by: PHYSICAL THERAPIST

## 2017-02-13 ENCOUNTER — OFFICE VISIT (OUTPATIENT)
Dept: INTERNAL MEDICINE | Facility: CLINIC | Age: 77
End: 2017-02-13
Payer: MEDICARE

## 2017-02-13 ENCOUNTER — THERAPY VISIT (OUTPATIENT)
Dept: PHYSICAL THERAPY | Facility: CLINIC | Age: 77
End: 2017-02-13
Payer: MEDICARE

## 2017-02-13 VITALS
HEART RATE: 87 BPM | BODY MASS INDEX: 29.05 KG/M2 | DIASTOLIC BLOOD PRESSURE: 72 MMHG | SYSTOLIC BLOOD PRESSURE: 110 MMHG | WEIGHT: 180 LBS | OXYGEN SATURATION: 94 % | TEMPERATURE: 98.1 F

## 2017-02-13 DIAGNOSIS — Z79.01 LONG-TERM (CURRENT) USE OF ANTICOAGULANTS: ICD-10-CM

## 2017-02-13 DIAGNOSIS — Z96.659 S/P KNEE REPLACEMENT: Primary | ICD-10-CM

## 2017-02-13 DIAGNOSIS — R26.9 ABNORMAL GAIT: ICD-10-CM

## 2017-02-13 DIAGNOSIS — Z96.652 AFTERCARE FOLLOWING LEFT KNEE JOINT REPLACEMENT SURGERY: ICD-10-CM

## 2017-02-13 DIAGNOSIS — Z47.1 AFTERCARE FOLLOWING LEFT KNEE JOINT REPLACEMENT SURGERY: ICD-10-CM

## 2017-02-13 PROCEDURE — 97110 THERAPEUTIC EXERCISES: CPT | Mod: GP | Performed by: PHYSICAL THERAPIST

## 2017-02-13 PROCEDURE — 99213 OFFICE O/P EST LOW 20 MIN: CPT | Performed by: INTERNAL MEDICINE

## 2017-02-13 PROCEDURE — 97530 THERAPEUTIC ACTIVITIES: CPT | Mod: GP | Performed by: PHYSICAL THERAPIST

## 2017-02-13 NOTE — MR AVS SNAPSHOT
After Visit Summary   2/13/2017    Geneva Shepherd    MRN: [id number]           Patient Information     Date Of Birth          1940        Visit Information        Provider Department      2/13/2017 2:30 PM Jacoby Boo MD [facility]        Today's Diagnoses     S/P knee replacement    -  1    Long-term (current) use of anticoagulants [Z79.01]           Follow-ups after your visit        Your next 10 appointments already scheduled     Feb 20, 2017 12:10 PM CST   CONSUELO Extremity with Micaela Terrell, PT   [facility] ([facility]  )    [address]  [place] [id number] [phone]            Feb 20, 2017  1:00 PM CST   Anticoagulation Visit with [facility]   [facility]    [address] [id number] [phone]            Feb 24, 2017  2:20 PM CST   CONSUELO Extremity with Keren Velázquez PT   [facility] ([facility]  )    [address]  [place] [id number] [phone]              Who to contact     If you have questions or need follow up information about today's clinic visit or your schedule please contact [facility] directly at [phone].  Normal or non-critical lab and imaging results will be communicated to you by MyChart, letter or phone within 4 business days after the clinic has received the results. If you do not hear from us within 7 days, please contact the clinic through MyChart or phone. If you have a critical or abnormal lab result, we will notify you by phone as soon as possible.  Submit refill requests through "[employer] or call your pharmacy and they will forward the refill request to us. Please allow 3 business days for your refill to be completed.          Additional Information  About Your Visit        [email or web address] Information     [employer] gives you secure access to your electronic health record. If you see a primary care provider, you can also send messages to your care team and make appointments. If you have questions, please call your primary care clinic.  If you do not have a primary care provider, please call [phone] and they will assist you.        Care EveryWhere ID     This is your Care EveryWhere ID. This could be used by other organizations to access your [place] medical records  [id number]        Your Vitals Were     Pulse Temperature Pulse Oximetry Breastfeeding? BMI (Body Mass Index)       87 98.1  F (36.7  C) (Oral) 94% No 29.05 kg/m2        Blood Pressure from Last 3 Encounters:   02/13/17 110/72   01/25/17 126/64   01/20/17 116/71    Weight from Last 3 Encounters:   02/13/17 180 lb (81.6 kg)   01/25/17 180 lb (81.6 kg)   01/20/17 180 lb (81.6 kg)              Today, you had the following     No orders found for display       Primary Care Provider Office Phone # Fax #    Jacoby Boo [phone]       [facility] [address] [facility] [address]        Thank you!     Thank you for choosing [facility][1]  for your care. Our goal is always to provide you with excellent care. Hearing back from our patients is one way we can continue to improve our services. Please take a few minutes to complete the written survey that you may receive in the mail after your visit with us. Thank you!             Your Updated Medication List - Protect others around you: Learn how to safely use, store and throw away your medicines at www.disposemymeds.org.          This list is accurate as of: 2/13/17 11:59 PM.  Always use your most recent med list.                   Brand Name Dispense Instructions for use    acetaminophen 500 MG tablet    TYLENOL     Take 1,000 mg by mouth 3 times daily       BETA CAROTENE PO      Take 25,000 Units by mouth  daily.       BIOTIN PO      Take 1 tablet by mouth every morning       Chromium Picolinate 800 MCG Tabs      1 daily       COCONUT OIL PO      Take 1 capsule by mouth every morning       ELAVIL PO      Take 25 mg by mouth At Bedtime       fish oil-omega-3 fatty acids 1000 MG capsule      1 daily       flax seed oil 1000 MG capsule      1 daily       FOLIC ACID PO      Take 400 mcg by mouth daily       levOCARNitine 330 MG tablet    CARNITOR     Take 330 mg by mouth every morning       levothyroxine 50 MCG tablet    SYNTHROID    90 tablet    Take 1 tablet (50 mcg) by mouth daily       MAGNESIUM OXIDE PO      Take 400 mg by mouth daily       MULTIVITAMIN/MULTIMINERAL TABS   OR     30    1 TABLET DAILY       N-ACETYL-L-CYSTEINE PO      Take 1 capsule by mouth every morning       * order for DME     2 Device    Equipment being ordered: Compression Stockings Knee High 20-30 mmhg       * order for DME     30 days    Equipment being ordered: [employer] Fax [phone]  Primary Dressing Mepilex Border 4x4   Qty 30 Length of Need: 1 month Frequency of dressing change: daily       * order for DME     30 days    Equipment being ordered: [employer] Fax [phone]  Primary Dressing Mepilex Border 4x4   Qty 30 Length of Need: 1 month Frequency of dressing change: daily       order for DME     2 Package    Equipment being ordered: jobst stockings knee high bilateral medium strength 20-30 mm Hg.  Dispense 2 pair       * order for DME     4 Device    Equipment being ordered: Thigh High 30-40 mmhg Compression Stockings.       oxyCODONE 5 MG IR tablet    ROXICODONE    70 tablet    Take 1-2 tablets (5-10 mg) by mouth every 3 hours as needed for moderate to severe pain       PAXIL PO      Take 40 mg by mouth daily (patient takes 2 X 20 mg = 40 mg dose)       polyethylene glycol powder    MIRALAX/GLYCOLAX     Take 17 g by mouth daily as needed for constipation       senna-docusate 8.6-50 MG per tablet     SENOKOT-S;PERICOLACE     Take 2 tablets by mouth 2 times daily       TURMERIC PO      Take 1 capsule by mouth every morning       vitamin B complex with vitamin C Tabs tablet      Take 1 tablet by mouth daily DOSE UNKNOWN       VITAMIN C PO      Take 1,000 mg by mouth every morning (Patient takes 2 X 500 mg = 1,000 mg dose)       Warfarin Therapy Reminder      1 each continuous prn       Zinc 50 MG Caps      1 daily       * Notice:  This list has 4 medication(s) that are the same as other medications prescribed for you. Read the directions carefully, and ask your doctor or other care provider to review them with you.

## 2017-02-13 NOTE — NURSING NOTE
"Chief Complaint   Patient presents with     Hospital F/U       Initial /72  Pulse 87  Temp 98.1  F (36.7  C) (Oral)  Wt 180 lb (81.6 kg)  SpO2 94%  Breastfeeding? No  BMI 29.05 kg/m2 Estimated body mass index is 29.05 kg/(m^2) as calculated from the following:    Height as of 1/25/17: 5' 6\" (1.676 m).    Weight as of this encounter: 180 lb (81.6 kg).  Medication Reconciliation: complete    "

## 2017-02-13 NOTE — PROGRESS NOTES
SUBJECTIVE:                                                    Geneva Shepherd is a 76 year old female who presents to clinic today for the following health issues:          Hospital Follow-up Visit:    Hospital/Nursing Home/ Rehab Facility: [facility]  Date of Admission: 1-16-17  Date of Discharge: 1-19-17 to [place]- d/c'd 1-25-17  Reason(s) for Admission: left knee replacement  No tele contact after discharge            Problems taking medications regularly:  None       Medication changes since discharge: None       Problems adhering to non-medication therapy:  None    Summary of hospitalization:  [facility] and NH rehab  discharge summaries reviewed  Diagnostic Tests/Treatments reviewed.  Follow up needed: none. Post op Hgb 1/23/17 noted and almost back to normal  Other Healthcare Providers Involved in Patient s Care:         ortho  Update since discharge: improved.     Post Discharge Medication Reconciliation: discharge medications reconciled, continue medications without change.  Plan of care communicated with patient     Coding guidelines for this visit:  Type of Medical   Decision Making Face-to-Face Visit       within 7 Days of discharge Face-to-Face Visit        within 14 days of discharge   Moderate Complexity [id number]   High Complexity [id number]            Pt's past medical history, family history, habits, medications and allergies were reviewed with the patient today.  See snap shot for  HCM status. Most recent lab results reviewed with pt. Problem list and histories reviewed & adjusted, as indicated.  Additional history as below:    Hospital and NH discharge summaries reviewed. Part of the NH summary as below:    Osteoarthrosis involving lower leg  Aftercare following knee joint replacement surgery, unspecified laterality  Acute s/p Left TKA Dr. Maddox: DC home with OP PT, oxycodone 5-10mg q 3 hours prn, tylenol 1000mg TID scheduled, on coumadin for Hx of DVT  "     Urinary tract infection, site unspecified  Resolved, Tx with Cipro     Constipation:   Acute: senna s 2 PO bid scheduled, miralax 17gm QD prn     Anemia due to blood loss, acute  Acute: stable     Personal history of RLE DVT (deep vein thrombosis)(2003)  Long-term (current) use of anticoagulants [Z79.01]  On coumadin chronically, INR goal of 2-3     Using 4-6 Oxycodone/day. Doing PT and had therapy today and getting better.  NO N/V or costipation. No chest pain or shortness of breath. Minimal anemia after surgery. Last Hgb 11.4. Last INR  2.8 after surgery.  Using compression stockings for chronic edema. No orthopnea, PND  PHQ= 0    Additional ROS:   Constitutional, HEENT, Cardiovascular, Pulmonary, GI and , Neuro, MSK and Psych review of systems/symptoms are otherwise negative or unchanged from previous, except as noted above.      OBJECTIVE:  /72  Pulse 87  Temp 98.1  F (36.7  C) (Oral)  Wt 180 lb (81.6 kg)  SpO2 94%  Breastfeeding? No  BMI 29.05 kg/m2   Estimated body mass index is 29.05 kg/(m^2) as calculated from the following:    Height as of 1/25/17: 5' 6\" (1.676 m).    Weight as of this encounter: 180 lb (81.6 kg).  Eye: PERRL, EOMI  HENT: ear canals and TM's normal and nose and mouth without ulcers or lesions   Neck: no adenopathy. Thyroid normal to palpation. No bruits  Pulm: Lungs clear to auscultation   CV: Regular rates and rhythm  GI: Soft, nontender, Normal active bowel sounds, No hepatosplenomegaly or masses palpable  Ext: Peripheral pulses intact. Chronic 1 plus BLE edema with varicose veins. Left TKA incision CDI.  Neuro: Normal strength and tone, sensory exam grossly normal    Assessment/Plan: (See plan discussion below for further details)  1. S/P knee replacement  Stable. Continue management per ortho    2. Long-term (current) use of anticoagulants [Z79.01]   INR at goal. Continue Coumadin and repeat  INR check 2/20/17            Jacoby Boo MD  Internal Medicine " [facility]

## 2017-02-14 ASSESSMENT — PATIENT HEALTH QUESTIONNAIRE - PHQ9: SUM OF ALL RESPONSES TO PHQ QUESTIONS 1-9: 0

## 2017-02-19 PROBLEM — R26.9 ABNORMAL GAIT: Status: RESOLVED | Noted: 2017-01-27 | Resolved: 2017-02-19

## 2017-02-20 ENCOUNTER — THERAPY VISIT (OUTPATIENT)
Dept: PHYSICAL THERAPY | Facility: CLINIC | Age: 77
End: 2017-02-20
Payer: MEDICARE

## 2017-02-20 ENCOUNTER — ANTICOAGULATION THERAPY VISIT (OUTPATIENT)
Dept: ANTICOAGULATION | Facility: CLINIC | Age: 77
End: 2017-02-20
Payer: MEDICARE

## 2017-02-20 DIAGNOSIS — Z47.1 AFTERCARE FOLLOWING LEFT KNEE JOINT REPLACEMENT SURGERY: ICD-10-CM

## 2017-02-20 DIAGNOSIS — Z96.652 AFTERCARE FOLLOWING LEFT KNEE JOINT REPLACEMENT SURGERY: ICD-10-CM

## 2017-02-20 DIAGNOSIS — Z86.718 PERSONAL HISTORY OF DVT (DEEP VEIN THROMBOSIS): ICD-10-CM

## 2017-02-20 DIAGNOSIS — Z79.01 LONG-TERM (CURRENT) USE OF ANTICOAGULANTS: Primary | ICD-10-CM

## 2017-02-20 LAB — INR POINT OF CARE: 3.2 (ref 0.86–1.14)

## 2017-02-20 PROCEDURE — 85610 PROTHROMBIN TIME: CPT | Mod: QW

## 2017-02-20 PROCEDURE — 97530 THERAPEUTIC ACTIVITIES: CPT | Mod: GP | Performed by: PHYSICAL THERAPIST

## 2017-02-20 PROCEDURE — 36416 COLLJ CAPILLARY BLOOD SPEC: CPT

## 2017-02-20 PROCEDURE — 97110 THERAPEUTIC EXERCISES: CPT | Mod: GP | Performed by: PHYSICAL THERAPIST

## 2017-02-20 RX ORDER — WARFARIN SODIUM 5 MG/1
TABLET ORAL
Qty: 135 TABLET | Refills: 0 | Status: SHIPPED | OUTPATIENT
Start: 2017-02-20 | End: 2017-05-22

## 2017-02-20 ASSESSMENT — ACTIVITIES OF DAILY LIVING (ADL)
KNEE_ACTIVITY_OF_DAILY_LIVING_SUM: 61
KNEE_ACTIVITY_OF_DAILY_LIVING_SCORE: 87.14
KNEEL ON THE FRONT OF YOUR KNEE: ACTIVITY IS MINIMALLY DIFFICULT
SIT WITH YOUR KNEE BENT: ACTIVITY IS NOT DIFFICULT
HOW_WOULD_YOU_RATE_THE_OVERALL_FUNCTION_OF_YOUR_KNEE_DURING_YOUR_USUAL_DAILY_ACTIVITIES?: NEARLY NORMAL
WALK: ACTIVITY IS NOT DIFFICULT
GO UP STAIRS: ACTIVITY IS MINIMALLY DIFFICULT
SWELLING: THE SYMPTOM AFFECTS MY ACTIVITY SLIGHTLY
GIVING WAY, BUCKLING OR SHIFTING OF KNEE: I HAVE THE SYMPTOM BUT IT DOES NOT AFFECT MY ACTIVITY
SQUAT: ACTIVITY IS MINIMALLY DIFFICULT
RAW_SCORE: 61
RISE FROM A CHAIR: ACTIVITY IS NOT DIFFICULT
PAIN: I HAVE THE SYMPTOM BUT IT DOES NOT AFFECT MY ACTIVITY
WEAKNESS: I DO NOT HAVE THE SYMPTOM
STAND: ACTIVITY IS NOT DIFFICULT
AS_A_RESULT_OF_YOUR_KNEE_INJURY,_HOW_WOULD_YOU_RATE_YOUR_CURRENT_LEVEL_OF_DAILY_ACTIVITY?: NEARLY NORMAL
GO DOWN STAIRS: ACTIVITY IS MINIMALLY DIFFICULT
LIMPING: I DO NOT HAVE THE SYMPTOM
HOW_WOULD_YOU_RATE_THE_CURRENT_FUNCTION_OF_YOUR_KNEE_DURING_YOUR_USUAL_DAILY_ACTIVITIES_ON_A_SCALE_FROM_0_TO_100_WITH_100_BEING_YOUR_LEVEL_OF_KNEE_FUNCTION_PRIOR_TO_YOUR_INJURY_AND_0_BEING_THE_INABILITY_TO_PERFORM_ANY_OF_YOUR_USUAL_DAILY_ACTIVITIES?: 0
STIFFNESS: I HAVE THE SYMPTOM BUT IT DOES NOT AFFECT MY ACTIVITY

## 2017-02-20 NOTE — TELEPHONE ENCOUNTER
warfarin    Last Written Prescription Date: 11/18/2017  Last Fill Qty: 135, # refills: 0  Last Office Visit with [facility] or [facility] prescribing provider: 1/9/17       Date and Result of Last PT/INR:   Lab Results   Component Value Date    INR 2.8 01/30/2017    INR 1.55 01/19/2017    INR 1.24 01/18/2017    INR 2.0 01/05/2017        Prescription approved per [facility] Refill Protocol.

## 2017-02-20 NOTE — MR AVS SNAPSHOT
MarshaDebra Shephedr   2/20/2017 1:00 PM   Anticoagulation Therapy Visit    Description:  76 year old female   Provider:   ANTICOAGULATION CLINIC   Department:   Anti Coagulation           INR as of 2/20/2017     Today's INR 3.2!      Anticoagulation Summary as of 2/20/2017     INR goal 2.0-3.0   Today's INR 3.2!   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        February 2017 Details    Sun Mon Tue Wed Thu Fri Sat        1               2               3               4                 5               6               7               8               9               10               11                 12               13               14               15               16               17               18                 19               20      5 mg   See details      21      5 mg         22      7.5 mg         23      5 mg         24      7.5 mg         25      5 mg           26      5 mg         27      7.5 mg         28      5 mg              Date Details   02/20 This INR check               How to take your warfarin dose     To take:  5 mg Take 1 of the 5 mg tablets.    To take:  7.5 mg Take 1.5 of the 5 mg tablets.           March 2017 Details    Sun Mon Tue Wed Thu Fri Sat        1      7.5  mg         2      5 mg         3      7.5 mg         4      5 mg           5      5 mg         6            7               8               9               10               11                 12               13               14               15               16               17               18                 19               20               21               22               23               24               25                 26               27               28               29               30               31                 Date Details   No additional details    Date of next INR:  3/6/2017         How to take your warfarin dose     To take:  5 mg Take 1 of the 5 mg tablets.    To take:  7.5 mg Take 1.5 of the 5 mg tablets.

## 2017-02-20 NOTE — PROGRESS NOTES
ANTICOAGULATION FOLLOW-UP CLINIC VISIT    Patient Name:  Geneva Shepherd  Date:  2/20/2017  Contact Type:  Face to Face    SUBJECTIVE:     Patient Findings     Positives No Problem Findings           OBJECTIVE    INR Protime   Date Value Ref Range Status   02/20/2017 3.2 (A) 0.86 - 1.14 Final       ASSESSMENT / PLAN  INR assessment THER    Recheck INR In: 2 WEEKS    INR Location Clinic      Anticoagulation Summary as of 2/20/2017     INR goal 2.0-3.0   Today's INR 3.2!   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

## 2017-03-03 ENCOUNTER — TRANSFERRED RECORDS (OUTPATIENT)
Dept: HEALTH INFORMATION MANAGEMENT | Facility: CLINIC | Age: 77
End: 2017-03-03

## 2017-03-08 ENCOUNTER — ANTICOAGULATION THERAPY VISIT (OUTPATIENT)
Dept: ANTICOAGULATION | Facility: CLINIC | Age: 77
End: 2017-03-08
Payer: MEDICARE

## 2017-03-08 DIAGNOSIS — Z79.01 LONG-TERM (CURRENT) USE OF ANTICOAGULANTS: ICD-10-CM

## 2017-03-08 LAB — INR POINT OF CARE: 2.8 (ref 0.86–1.14)

## 2017-03-08 PROCEDURE — 36416 COLLJ CAPILLARY BLOOD SPEC: CPT

## 2017-03-08 PROCEDURE — 85610 PROTHROMBIN TIME: CPT | Mod: QW

## 2017-03-08 PROCEDURE — 99207 ZZC NO CHARGE NURSE ONLY: CPT

## 2017-03-08 NOTE — MR AVS SNAPSHOT
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  Please call  [phone] to cancel and/or reschedule your appointment   Please call  [phone] with any problems or questions regarding your therapy.        March 2017 Details    Sun Mon Tue Wed Thu Fri Sat        1               2               3               4                 5               6               7               8      7.5 mg   See details      9      5 mg         10      7.5 mg         11      5 mg           12      5 mg         13      7.5 mg         14      5 mg         15            16               17               18                 19               20               21               22               23               24               25                 26               27               28               29               30               31                 Date Details   03/08 This INR check       Date of next INR:  3/15/2017         How to take your warfarin dose     To take:  5 mg Take 1 of the 5 mg tablets.    To take:  7.5 mg Take 1.5 of the 5 mg tablets.

## 2017-03-08 NOTE — PROGRESS NOTES
ANTICOAGULATION FOLLOW-UP CLINIC VISIT    Patient Name:  Geneva Shepherd  Date:  3/8/2017  Contact Type:  Face to Face    SUBJECTIVE:     Patient Findings     Positives No Problem Findings           OBJECTIVE    INR Protime   Date Value Ref Range Status   03/08/2017 2.8 (A) 0.86 - 1.14 Final       ASSESSMENT / PLAN  No question data found.  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

## 2017-03-14 ENCOUNTER — OFFICE VISIT (OUTPATIENT)
Dept: INTERNAL MEDICINE | Facility: CLINIC | Age: 77
End: 2017-03-14
Payer: MEDICARE

## 2017-03-14 VITALS
WEIGHT: 180 LBS | DIASTOLIC BLOOD PRESSURE: 76 MMHG | HEART RATE: 80 BPM | BODY MASS INDEX: 29.05 KG/M2 | TEMPERATURE: 97.8 F | SYSTOLIC BLOOD PRESSURE: 122 MMHG | OXYGEN SATURATION: 98 %

## 2017-03-14 DIAGNOSIS — Z01.818 PREOPERATIVE EXAMINATION: Primary | ICD-10-CM

## 2017-03-14 DIAGNOSIS — D62 ANEMIA DUE TO BLOOD LOSS, ACUTE: ICD-10-CM

## 2017-03-14 DIAGNOSIS — F32.0 MAJOR DEPRESSIVE DISORDER, SINGLE EPISODE, MILD (H): ICD-10-CM

## 2017-03-14 DIAGNOSIS — Z13.6 CARDIOVASCULAR SCREENING; LDL GOAL LESS THAN 130: ICD-10-CM

## 2017-03-14 DIAGNOSIS — H57.9 EYE DISORDER: ICD-10-CM

## 2017-03-14 DIAGNOSIS — Z79.01 LONG-TERM (CURRENT) USE OF ANTICOAGULANTS: ICD-10-CM

## 2017-03-14 DIAGNOSIS — G47.00 INSOMNIA, UNSPECIFIED TYPE: Primary | ICD-10-CM

## 2017-03-14 PROBLEM — N30.00 ACUTE CYSTITIS WITHOUT HEMATURIA: Status: RESOLVED | Noted: 2017-01-19 | Resolved: 2017-03-14

## 2017-03-14 PROBLEM — N39.0 URINARY TRACT INFECTION: Status: RESOLVED | Noted: 2017-01-20 | Resolved: 2017-03-14

## 2017-03-14 PROBLEM — K59.00 CONSTIPATION: Status: RESOLVED | Noted: 2017-01-20 | Resolved: 2017-03-14

## 2017-03-14 LAB
CHOLEST SERPL-MCNC: 171 MG/DL
HDLC SERPL-MCNC: 48 MG/DL
HGB BLD-MCNC: 12.8 G/DL (ref 11.7–15.7)
INR PPP: 2.36 (ref 0.86–1.14)
IRON SATN MFR SERPL: 22 % (ref 15–46)
IRON SERPL-MCNC: 76 UG/DL (ref 35–180)
LDLC SERPL CALC-MCNC: 91 MG/DL
NONHDLC SERPL-MCNC: 123 MG/DL
TIBC SERPL-MCNC: 343 UG/DL (ref 240–430)
TRIGL SERPL-MCNC: 158 MG/DL

## 2017-03-14 PROCEDURE — 83550 IRON BINDING TEST: CPT | Performed by: INTERNAL MEDICINE

## 2017-03-14 PROCEDURE — 85610 PROTHROMBIN TIME: CPT | Performed by: INTERNAL MEDICINE

## 2017-03-14 PROCEDURE — 85018 HEMOGLOBIN: CPT | Performed by: INTERNAL MEDICINE

## 2017-03-14 PROCEDURE — 80061 LIPID PANEL: CPT | Performed by: INTERNAL MEDICINE

## 2017-03-14 PROCEDURE — 99214 OFFICE O/P EST MOD 30 MIN: CPT | Performed by: INTERNAL MEDICINE

## 2017-03-14 PROCEDURE — 36415 COLL VENOUS BLD VENIPUNCTURE: CPT | Performed by: INTERNAL MEDICINE

## 2017-03-14 PROCEDURE — 83540 ASSAY OF IRON: CPT | Performed by: INTERNAL MEDICINE

## 2017-03-14 NOTE — PROGRESS NOTES
[facility]  [address] [address]  [phone]  Dept: [phone]    PRE-OP EVALUATION:  Today's date: 3/14/2017    Geneva Shepherd (: 1940) presents for pre-operative evaluation assessment as requested by Dr. Johnston.  She requires evaluation and anesthesia risk assessment prior to undergoing surgery/procedure for treatment of left eye .  Proposed procedure: Vitrectomy    Date of Surgery/ Procedure: 3-16-17  Time of Surgery/ Procedure: 730am  Hospital/Surgical Facility: [facility]  Fax number for surgical facility: [phone]  Primary Physician: Jacoby Boo  Type of Anesthesia Anticipated: to be determined    Patient has a Health Care Directive or Living Will:  YES         HPI:                                                      Brief HPI related to upcoming procedure:  Hx reduced vision acuity left eye. No clinic notes from ophthalmology to review. Pt states pressures have been OK. Following  consultation with ophthalmology, pt has elected to  proceed with surgical correction as above. See below for other medical issues that are stable including hypothyroidism, depression, insomnia and hx recurrent DVT with factor 5 Leiden (on Coumadin now longterm). Per pt, pt has been instructed by opthalmology that she may continue Coumadin  prior to surgery.  Pt does some general walking through the day for exercise. NO specific exercise program now. Denies chest pain or shortness of breath with this level of activity           MEDICAL HISTORY:                                                      Patient Active Problem List    Diagnosis Date Noted     Major depressive disorder, single episode, mild (H) 2017     Priority: Medium     Aftercare following left knee joint replacement surgery 2017     Priority: Medium     Anemia due to blood loss, acute 2017     Priority: Medium     Knee joint replacement status 2017     Priority:  Medium     Insomnia, unspecified insomnia 02/17/2016     Priority: Medium     Hypothyroidism 01/01/2016     Priority: Medium     Long-term (current) use of anticoagulants [Z79.01] 12/15/2015     Priority: Medium     Asymptomatic varicose veins, bilateral 09/03/2015     Priority: Medium     Personal history of RLE DVT (deep vein thrombosis)(2003) 09/03/2015     Priority: Medium     Anticoagulated on Coumadin 09/03/2015     Priority: Medium     Hip joint replacement status: Left 8/31/15 08/31/2015     Priority: Medium     Elevated antinuclear antibody (JUAN ANTONIO) level 07/04/2015     Priority: Medium     Obesity 09/11/2013     Priority: Medium     Osteoarthrosis involving lower leg 01/21/2013     Priority: Medium     Problem list name updated by automated process. Provider to review       Advanced directives, counseling/discussion 05/18/2012     Priority: Medium     Patient states has Advance Directive and will bring in a copy to clinic. 5/18/2012          GERD (gastroesophageal reflux disease) 08/06/2008     Priority: Medium     FACTOR 5 LEIDEN DEFECT (HYPERCOAGULABLE) 07/30/2003     Priority: Medium     Irritable bowel syndrome 09/09/2002     Priority: Medium      Past Medical History   Diagnosis Date     Ankle fracture, lateral malleolus, closed  March 2012     right ankle     Asymptomatic varicose veins      Depression, major      Diverticulitis of colon (without mention of hemorrhage)(562.11)      DJD (degenerative joint disease)      Elevated antinuclear antibody (JUAN ANTONIO) level 7/4/2015     minimal     Embolism and thrombosis of unspecified site 3/03     RLE     FACTOR 5 LEIDEN DEFECT (HYPERCOAGULABLE) 7/03      Heterozygote     FRACTURE OF RIGHT LATERAL PROXIMAL TIBIA 11/07     Gastro-oesophageal reflux disease      rare     Hyperlipidemia LDL goal <160 10/31/2010     Insomnia      Irritable bowel syndrome      Obesity 9/11/2013     Pain in joint, lower leg      Phlebitis and thrombophlebitis of superficial vessels of  lower extremities 7/03     right     Sprain of lumbar region      Unspecified hypothyroidism      Urinary tract infection, site not specified      Past Surgical History   Procedure Laterality Date     C nonspecific procedure  7/00/01     Endometrial Biopsy.     C nonspecific procedure       Tubal Ligation.     C nonspecific procedure  6/02     negative coronary angio     C nonspecific procedure  7/04     RLE varicose vein stripping     Cholecystectomy       Vascular surgery       Arthroplasty knee  1/17/2013     Procedure: ARTHROPLASTY KNEE;  RIGHT TOTAL KNEE ARTHROPLASTY (BIOMET)^ ;  Surgeon: Benjamín Maddox MD;  Location:  OR     Arthroplasty hip Left 8/31/2015     Procedure: ARTHROPLASTY HIP;  Surgeon: Benjamín Maddox MD;  Location:  OR     Gyn surgery       tubal     Colonoscopy N/A 4/26/2016     Procedure: COMBINED COLONOSCOPY, SINGLE OR MULTIPLE BIOPSY/POLYPECTOMY BY BIOPSY;  Surgeon: Mario Coe MD;  Location:  GI     Arthroplasty knee Left 1/16/2017     Procedure: ARTHROPLASTY KNEE;  Surgeon: Benjamín Maddox MD;  Location:  OR     Current Outpatient Prescriptions   Medication Sig Dispense Refill     warfarin (COUMADIN) 5 MG tablet 1 1/2 tablets (7.5mg)Mon Wed Fri, and 1 tablet all other days per  tablet 0     acetaminophen (TYLENOL) 500 MG tablet Take 1,000 mg by mouth 3 times daily       Amitriptyline HCl (ELAVIL PO) Take 25 mg by mouth At Bedtime       Ascorbic Acid (VITAMIN C PO) Take 1,000 mg by mouth every morning (Patient takes 2 X 500 mg = 1,000 mg dose)       levOCARNitine (CARNITOR) 330 MG tablet Take 330 mg by mouth every morning       PARoxetine HCl (PAXIL PO) Take 40 mg by mouth daily (patient takes 2 X 20 mg = 40 mg dose)       BIOTIN PO Take 1 tablet by mouth every morning       TURMERIC PO Take 1 capsule by mouth every morning       MAGNESIUM OXIDE PO Take 400 mg by mouth daily       Acetylcysteine (N-ACETYL-L-CYSTEINE PO) Take 1 capsule by  mouth every morning       COCONUT OIL PO Take 1 capsule by mouth every morning       levothyroxine (SYNTHROID) 50 MCG tablet Take 1 tablet (50 mcg) by mouth daily 90 tablet 3     order for DME Equipment being ordered: Thigh High 30-40 mmhg Compression Stockings. 4 Device 0     order for DME Equipment being ordered: [facility] Fax [phone]    Primary Dressing Mepilex Border 4x4   Qty 30  Length of Need: 1 month  Frequency of dressing change: daily 30 days 0     order for DME Equipment being ordered: Compression Stockings Knee High 20-30 mmhg 2 Device 2     vitamin  B complex with vitamin C (VITAMIN  B COMPLEX) TABS Take 1 tablet by mouth daily DOSE UNKNOWN       FOLIC ACID PO Take 400 mcg by mouth daily        BETA CAROTENE PO Take 25,000 Units by mouth daily.       ZINC 50 MG OR CAPS 1 daily       CHROMIUM PICOLINATE 800 MCG OR TABS 1 daily       FISH OIL 1000 MG OR CAPS 1 daily       FLAX SEED OIL 1000 MG OR CAPS 1 daily       MULTIVITAMIN/MULTIMINERAL TABS   OR 1 TABLET DAILY 30 0     [DISCONTINUED] Warfarin Therapy Reminder 1 each continuous prn       OTC products: None, except as noted above    Allergies   Allergen Reactions     Cephalexin Monohydrate      diarrhea      Latex Allergy: NO    Social History   Substance Use Topics     Smoking status: Former Smoker     Quit date: 9/1/1968     Smokeless tobacco: Never Used      Comment: quit in 1968     Alcohol use 0.0 oz/week     0 Standard drinks or equivalent per week      Comment: 1 glass of wine a month     History   Drug Use No       REVIEW OF SYSTEMS:                                                    C: NEGATIVE for fever, chills, change in weight  I: NEGATIVE for worrisome rashes, moles or lesions  E:  See HPI  E/M: NEGATIVE for ear, mouth and throat problems  R: NEGATIVE for significant cough or SOB  B: NEGATIVE for masses, tenderness or discharge  CV: NEGATIVE for chest pain, palpitations. Stable BLE peripheral edema. No orthopnea, PND. Hx  nontender varicose veins. Using compression stockings  GI: NEGATIVE for nausea, abdominal pain, heartburn, or change in bowel habits  : NEGATIVE for frequency, dysuria, or hematuria  M:  POSITIVE for mild general arthralgias, stable. Better with ROM exercises  N: NEGATIVE for weakness, dizziness or paresthesias  E:  POSITIVE for hypothyroidism, on replacement therapy  H: NEGATIVE for bleeding problems with Coumadin. On AC chronically for hx positive F5L and  recurrent DVTs  P: NEGATIVE for changes in mood or affect with medication. Recent PHQ=0    EXAM:                                                    /76  Pulse 80  Temp 97.8  F (36.6  C) (Oral)  Wt 180 lb (81.6 kg)  SpO2 98%  Breastfeeding? No  BMI 29.05 kg/m2  Eye: PERRL, EOMI  HENT: ear canals and TM's normal and nose and mouth without ulcers or lesions   Neck: no adenopathy. Thyroid normal to palpation. No bruits  Pulm: Lungs clear to auscultation   CV: Regular rates and rhythm  GI: Soft, nontender, Normal active bowel sounds, No hepatosplenomegaly or masses palpable  Ext: Peripheral pulses intact. Mild BLE chronic edema. Wearing compression stockings. PIP and DIP joints hands with osteoarthritis changes  Neuro: Normal strength and tone, sensory exam grossly normal      DIAGNOSTICS:                                                    EK17 showed NSR with rate 84. No acute ST/T changes  Labs:  Component      Latest Ref Rng & Units 2017 2017 3/14/2017   Sodium      136 - 145 mmol/L  140    Potassium      3.5 - 5.2 mmol/L  4.2    Chloride      98 - 109 mmol/L  108    Carbon Dioxide      22 - 31 mmol/L  27    Glucose      70 - 100 mg/dL  95    Urea Nitrogen      9 - 26 mg/dL  <10    Creatinine      0.55 - 1.02 mg/dL  0.59    Calcium      8.4 - 10.2 mg/dL  9.8    GFR Estimate      >60 ml/min/1.73m2  >60    GFR Estimate If Black      >60 ml/min/1.73m2  >60    TSH      0.40 - 4.00 mU/L 1.90     Hemoglobin      11.7 - 15.7 g/dL   12.8   INR       0.86 - 1.14   2.36 (H)          IMPRESSION:                                                    Reason for surgery/procedure: reduced acuity left eye  Diagnosis/reason for consult: depression (stable), hx recurrent DVT with Factor 5 Leiden (on Coumadin longterm and INR at goal), hypothyroidism, chronic insomnia (stable)    The proposed surgical procedure is considered LOW risk.    REVISED CARDIAC RISK INDEX  The patient has the following serious cardiovascular risks for perioperative complications such as (MI, PE, VFib and 3  AV Block):  No serious cardiac risks  INTERPRETATION: 0 risks: Class I (very low risk - 0.4% complication rate)    The patient has the following additional risks for perioperative complications:  No identified additional risks        RECOMMENDATIONS:                                                      APPROVAL GIVEN to proceed with proposed procedure, without further diagnostic evaluation  Take Paroxetine and  Levothyroxine the AM of surgery when you first get up with a small sip water. Otherwise nothing to eat/drink after midnight prior to surgery  Continue current Coumadin dosing. Further management per anticoagulation clinic       Signed Electronically by: Jacoby Boo MD    Copy of this evaluation report is provided to requesting physician.    [place] Preop Guidelines

## 2017-03-14 NOTE — MR AVS SNAPSHOT
After Visit Summary   3/14/2017    Geneva Shepherd    MRN: [id number]           Patient Information     Date Of Birth          1940        Visit Information        Provider Department      3/14/2017 9:00 AM Jacoby Boo MD [facility]        Today's Diagnoses     Preoperative examination    -  1    Eye disorder        Anemia due to blood loss, acute        Major depressive disorder, single episode, mild (H)        Long-term (current) use of anticoagulants [Z79.01]        CARDIOVASCULAR SCREENING; LDL GOAL LESS THAN 130          Care Instructions    Labs as ordered  Take Paroxetine and  Levothyroxine the AM of surgery when you first get up with a small sip water. Otherwise nothing to eat/drink after midnight prior to surgery        Follow-ups after your visit        Your next 10 appointments already scheduled     Mar 15, 2017  2:45 PM CDT   Anticoagulation Visit with [facility]   [facility]    [address] [id number] [phone]              Who to contact     If you have questions or need follow up information about today's clinic visit or your schedule please contact [facility] directly at [phone].  Normal or non-critical lab and imaging results will be communicated to you by MyChart, letter or phone within 4 business days after the clinic has received the results. If you do not hear from us within 7 days, please contact the clinic through MyChart or phone. If you have a critical or abnormal lab result, we will notify you by phone as soon as possible.  Submit refill requests through [facility] or call your pharmacy and they will forward the refill request to us. Please allow 3 business days for your refill to be completed.          Additional Information About Your Visit        [employer] Information     [facility] gives you secure access to your  electronic health record. If you see a primary care provider, you can also send messages to your care team and make appointments. If you have questions, please call your primary care clinic.  If you do not have a primary care provider, please call [phone] and they will assist you.        Care EveryWhere ID     This is your Care EveryWhere ID. This could be used by other organizations to access your [facility] medical records  [id number]        Your Vitals Were     Pulse Temperature Pulse Oximetry Breastfeeding? BMI (Body Mass Index)       80 97.8  F (36.6  C) (Oral) 98% No 29.05 kg/m2        Blood Pressure from Last 3 Encounters:   03/14/17 122/76   02/13/17 110/72   01/25/17 126/64    Weight from Last 3 Encounters:   03/14/17 180 lb (81.6 kg)   02/13/17 180 lb (81.6 kg)   01/25/17 180 lb (81.6 kg)              We Performed the Following     DEPRESSION ACTION PLAN (DAP) Order [[id number]]     Hemoglobin     INR     Iron and iron binding capacity     Lipid panel reflex to direct LDL          Today's Medication Changes          These changes are accurate as of: 3/14/17  9:35 AM.  If you have any questions, ask your nurse or doctor.               These medicines have changed or have updated prescriptions.        Dose/Directions    * order for DME   This may have changed:  Another medication with the same name was removed. Continue taking this medication, and follow the directions you see here.   Used for:  Venous ulcer of ankle, right (H), Venous (peripheral) insufficiency, Varicose vein of leg   Changed by:  Abi Cardenas DPM, Podiatry/Foot and Ankle Surgery        Equipment being ordered: Compression Stockings Knee High 20-30 mmhg   Quantity:  2 Device   Refills:  2       * order for DME   This may have changed:  Another medication with the same name was removed. Continue taking this medication, and follow the directions you see here.   Used for:  Venous ulcer of ankle, right (H)   Changed by:  Abi Cardenas  CRYSTAL, Podiatry/Foot and Ankle Surgery        Equipment being ordered: [facility] Fax [phone]  Primary Dressing Mepilex Border 4x4   Qty 30 Length of Need: 1 month Frequency of dressing change: daily   Quantity:  30 days   Refills:  0       * order for DME   This may have changed:  Another medication with the same name was removed. Continue taking this medication, and follow the directions you see here.   Used for:  Venous ulcer of right leg (H), Venous (peripheral) insufficiency, Swelling of limb   Changed by:  Abi Cardenas DPM, Podiatry/Foot and Ankle Surgery        Equipment being ordered: Thigh High 30-40 mmhg Compression Stockings.   Quantity:  4 Device   Refills:  0       warfarin 5 MG tablet   Commonly known as:  COUMADIN   This may have changed:  Another medication with the same name was removed. Continue taking this medication, and follow the directions you see here.   Used for:  Long-term (current) use of anticoagulants, Personal history of DVT (deep vein thrombosis)   Changed by:  Jacoby Boo MD        1 1/2 tablets (7.5mg)Mon Wed Fri, and 1 tablet all other days per ACC   Quantity:  135 tablet   Refills:  0       * Notice:  This list has 3 medication(s) that are the same as other medications prescribed for you. Read the directions carefully, and ask your doctor or other care provider to review them with you.      Stop taking these medicines if you haven't already. Please contact your care team if you have questions.     order for DME   Stopped by:  Jacoby Boo MD           oxyCODONE 5 MG IR tablet   Commonly known as:  ROXICODONE   Stopped by:  Jacoby Boo MD           polyethylene glycol powder   Commonly known as:  MIRALAX/GLYCOLAX   Stopped by:  Jacoby Boo MD           senna-docusate 8.6-50 MG per tablet   Commonly known as:  SENOKOT-S;PERICOLACE   Stopped by:  Jacoby Boo MD                    Primary Care Provider Office Phone # Fax #    Jacoby Boo [phone]  [phone]       [facility] [address]  [facility] [address]        Thank you!     Thank you for choosing [facility]  for your care. Our goal is always to provide you with excellent care. Hearing back from our patients is one way we can continue to improve our services. Please take a few minutes to complete the written survey that you may receive in the mail after your visit with us. Thank you!             Your Updated Medication List - Protect others around you: Learn how to safely use, store and throw away your medicines at www.disposemymeds.org.          This list is accurate as of: 3/14/17  9:35 AM.  Always use your most recent med list.                   Brand Name Dispense Instructions for use    acetaminophen 500 MG tablet    TYLENOL     Take 1,000 mg by mouth 3 times daily       BETA CAROTENE PO      Take 25,000 Units by mouth daily.       BIOTIN PO      Take 1 tablet by mouth every morning       Chromium Picolinate 800 MCG Tabs      1 daily       COCONUT OIL PO      Take 1 capsule by mouth every morning       ELAVIL PO      Take 25 mg by mouth At Bedtime       fish oil-omega-3 fatty acids 1000 MG capsule      1 daily       flax seed oil 1000 MG capsule      1 daily       FOLIC ACID PO      Take 400 mcg by mouth daily       levOCARNitine 330 MG tablet    CARNITOR     Take 330 mg by mouth every morning       levothyroxine 50 MCG tablet    SYNTHROID    90 tablet    Take 1 tablet (50 mcg) by mouth daily       MAGNESIUM OXIDE PO      Take 400 mg by mouth daily       MULTIVITAMIN/MULTIMINERAL TABS   OR     30    1 TABLET DAILY       N-ACETYL-L-CYSTEINE PO      Take 1 capsule by mouth every morning       * order for DME     2 Device    Equipment being ordered: Compression Stockings Knee High 20-30 mmhg       * order for DME     30 days    Equipment being ordered: [facility] Fax [phone]  Primary Dressing Mepilex Border 4x4   Qty 30 Length of Need: 1 month  Frequency of dressing change: daily       * order for DME     4 Device    Equipment being ordered: Thigh High 30-40 mmhg Compression Stockings.       PAXIL PO      Take 40 mg by mouth daily (patient takes 2 X 20 mg = 40 mg dose)       TURMERIC PO      Take 1 capsule by mouth every morning       vitamin B complex with vitamin C Tabs tablet      Take 1 tablet by mouth daily DOSE UNKNOWN       VITAMIN C PO      Take 1,000 mg by mouth every morning (Patient takes 2 X 500 mg = 1,000 mg dose)       warfarin 5 MG tablet    COUMADIN    135 tablet    1 1/2 tablets (7.5mg)Mon Wed Fri, and 1 tablet all other days per ACC       Zinc 50 MG Caps      1 daily       * Notice:  This list has 3 medication(s) that are the same as other medications prescribed for you. Read the directions carefully, and ask your doctor or other care provider to review them with you.

## 2017-03-14 NOTE — NURSING NOTE
"Chief Complaint   Patient presents with     Pre-Op Exam       Initial /76  Pulse 80  Temp 97.8  F (36.6  C) (Oral)  Wt 180 lb (81.6 kg)  SpO2 98%  Breastfeeding? No  BMI 29.05 kg/m2 Estimated body mass index is 29.05 kg/(m^2) as calculated from the following:    Height as of 1/25/17: 5' 6\" (1.676 m).    Weight as of this encounter: 180 lb (81.6 kg).  Medication Reconciliation: complete    "

## 2017-03-14 NOTE — PATIENT INSTRUCTIONS
Labs as ordered  Take Paroxetine and  Levothyroxine the AM of surgery when you first get up with a small sip water. Otherwise nothing to eat/drink after midnight prior to surgery

## 2017-03-14 NOTE — TELEPHONE ENCOUNTER
Amitriptyline HCl (ELAVIL PO)      Last Written Prescription Date:  0  Last Fill Quantity: 0,   # refills: 0  Last Office Visit with G, P or [facility] prescribing provider: 3/14/2017  Future Office visit:       Routing refill request to provider for review/approval because:  Medication is reported/historical

## 2017-03-23 ENCOUNTER — ANTICOAGULATION THERAPY VISIT (OUTPATIENT)
Dept: ANTICOAGULATION | Facility: CLINIC | Age: 77
End: 2017-03-23
Payer: MEDICARE

## 2017-03-23 DIAGNOSIS — Z79.01 LONG-TERM (CURRENT) USE OF ANTICOAGULANTS: ICD-10-CM

## 2017-03-23 LAB — INR POINT OF CARE: 3.2 (ref 0.86–1.14)

## 2017-03-23 PROCEDURE — 99207 ZZC NO CHARGE NURSE ONLY: CPT

## 2017-03-23 PROCEDURE — 36416 COLLJ CAPILLARY BLOOD SPEC: CPT

## 2017-03-23 PROCEDURE — 85610 PROTHROMBIN TIME: CPT | Mod: QW

## 2017-03-23 NOTE — PROGRESS NOTES
ANTICOAGULATION FOLLOW-UP CLINIC VISIT    Patient Name:  Geneva Shepherd  Date:  3/23/2017  Contact Type:  Face to Face    SUBJECTIVE:     Patient Findings     Positives Activity level change (pt had eye surgery and has been less active since procedure )           OBJECTIVE    INR Protime   Date Value Ref Range Status   03/23/2017 3.2 (A) 0.86 - 1.14 Final       ASSESSMENT / PLAN  INR assessment SUPRA    Recheck INR In: 2 WEEKS    INR Location Clinic      Anticoagulation Summary as of 3/23/2017     INR goal 2.0-3.0   Today's INR 3.2!   Maintenance plan 7.5 mg (5 mg x 1.5) on Mon, Wed, Fri; 5 mg (5 mg x 1) all other days   Full instructions 3/23: 2.5 mg; Otherwise 7.5 mg on Mon, Wed, Fri; 5 mg all other days   Weekly total 42.5 mg   Plan last modified Ofelia Cavazos RN (12/31/2015)   Next INR check 4/6/2017   Target end date     Indications   Long-term (current) use of anticoagulants [Z79.01] [Z79.01]  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]  Embolism and thrombosis (H) (Resolved) [I74.9]         Anticoagulation Episode Summary     INR check location     Preferred lab     Send INR reminders to [facility]    Comments             See the Encounter Report to view Anticoagulation Flowsheet and Dosing Calendar (Go to Encounters tab in chart review, and find the Anticoagulation Therapy Visit)    Dosage adjustment made based on physician directed care plan.    Camelia He RN

## 2017-03-23 NOTE — MR AVS SNAPSHOT
MarshaDebra Shepherd   3/23/2017 4:15 PM   Anticoagulation Therapy Visit    Description:  76 year old female   Provider:   ANTICOAGULATION CLINIC   Department:   Anti Coagulation           INR as of 3/23/2017     Today's INR 3.2!      Anticoagulation Summary as of 3/23/2017     INR goal 2.0-3.0   Today's INR 3.2!   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        March 2017 Details    Sun Mon Tue Wed Thu Fri Sat        1               2               3               4                 5               6               7               8               9               10               11                 12               13               14               15               16               17               18                 19               20               21               22               23      2.5 mg   See details      24      7.5 mg         25      5 mg           26      5 mg         27      7.5 mg         28      5 mg         29      7.5 mg         30      5 mg         31      7.5 mg           Date Details   03/23 This INR check               How to take your warfarin dose     To take:  2.5 mg Take 0.5 of a 5 mg tablet.    To take:  5 mg Take 1 of the 5 mg tablets.    To take:  7.5 mg Take 1.5  of the 5 mg tablets.           April 2017 Details    Sun Mon Tue Wed Thu Fri Sat           1      5 mg           2      5 mg         3      7.5 mg         4      5 mg         5      7.5 mg         6            7               8                 9               10               11               12               13               14               15                 16               17               18               19               20               21               22                 23               24               25               26               27               28               29                 30                      Date Details   No additional details    Date of next INR:  4/6/2017         How to take your warfarin dose     To take:  5 mg Take 1 of the 5 mg tablets.    To take:  7.5 mg Take 1.5 of the 5 mg tablets.

## 2017-04-06 ENCOUNTER — ANTICOAGULATION THERAPY VISIT (OUTPATIENT)
Dept: ANTICOAGULATION | Facility: CLINIC | Age: 77
End: 2017-04-06
Payer: MEDICARE

## 2017-04-06 DIAGNOSIS — Z79.01 LONG-TERM (CURRENT) USE OF ANTICOAGULANTS: ICD-10-CM

## 2017-04-06 LAB — INR POINT OF CARE: 2.4 (ref 0.86–1.14)

## 2017-04-06 PROCEDURE — 36416 COLLJ CAPILLARY BLOOD SPEC: CPT

## 2017-04-06 PROCEDURE — 99207 ZZC NO CHARGE NURSE ONLY: CPT

## 2017-04-06 PROCEDURE — 85610 PROTHROMBIN TIME: CPT | Mod: QW

## 2017-04-06 NOTE — PROGRESS NOTES
ANTICOAGULATION FOLLOW-UP CLINIC VISIT    Patient Name:  Geneva Shepherd  Date:  4/6/2017  Contact Type:  Face to Face    SUBJECTIVE:     Patient Findings     Positives No Problem Findings           OBJECTIVE    INR Protime   Date Value Ref Range Status   04/06/2017 2.4 (A) 0.86 - 1.14 Final       ASSESSMENT / PLAN  INR assessment THER    Recheck INR In: 3 WEEKS    INR Location Clinic      Anticoagulation Summary as of 4/6/2017     INR goal 2.0-3.0   Today's INR 2.4   Maintenance plan 7.5 mg (5 mg x 1.5) on Mon, Wed, Fri; 5 mg (5 mg x 1) all other days   Full instructions 7.5 mg on Mon, Wed, Fri; 5 mg all other days   Weekly total 42.5 mg   No change documented Camelia He RN   Plan last modified Ofelia Cavazos RN (12/31/2015)   Next INR check 4/27/2017   Target end date     Indications   Long-term (current) use of anticoagulants [Z79.01] [Z79.01]  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    Camelia He RN

## 2017-04-06 NOTE — MR AVS SNAPSHOT
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        April 2017 Details    Sun Mon Tue Wed Thu Fri Sat           1                 2               3               4               5               6      5 mg   See details      7      7.5 mg         8      5 mg           9      5 mg         10      7.5 mg         11      5 mg         12      7.5 mg         13      5 mg         14      7.5 mg         15      5 mg           16      5 mg         17      7.5 mg         18      5 mg         19      7.5 mg         20      5 mg         21      7.5 mg         22      5 mg           23      5 mg         24      7.5 mg         25      5 mg         26      7.5 mg         27            28               29                 30                      Date Details   04/06 This INR check       Date of next INR:  4/27/2017         How to take your warfarin dose     To take:  5 mg Take 1 of the 5 mg tablets.    To take:  7.5 mg Take 1.5  of the 5 mg tablets.

## 2017-04-27 ENCOUNTER — ANTICOAGULATION THERAPY VISIT (OUTPATIENT)
Dept: ANTICOAGULATION | Facility: CLINIC | Age: 77
End: 2017-04-27
Payer: MEDICARE

## 2017-04-27 DIAGNOSIS — Z79.01 LONG-TERM (CURRENT) USE OF ANTICOAGULANTS: ICD-10-CM

## 2017-04-27 LAB — INR POINT OF CARE: 3.1 (ref 0.86–1.14)

## 2017-04-27 PROCEDURE — 36416 COLLJ CAPILLARY BLOOD SPEC: CPT

## 2017-04-27 PROCEDURE — 99207 ZZC NO CHARGE NURSE ONLY: CPT

## 2017-04-27 PROCEDURE — 85610 PROTHROMBIN TIME: CPT | Mod: QW

## 2017-04-27 NOTE — MR AVS SNAPSHOT
MarshaDebra Shepherd   4/27/2017 4:15 PM   Anticoagulation Therapy Visit    Description:  76 year old female   Provider:   ANTICOAGULATION CLINIC   Department:   Anti Coagulation           INR as of 4/27/2017     Today's INR 3.1!      Anticoagulation Summary as of 4/27/2017     INR goal 2.0-3.0   Today's INR 3.1!   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        April 2017 Details    Sun Mon Tue Wed Thu Fri Sat           1                 2               3               4               5               6               7               8                 9               10               11               12               13               14               15                 16               17               18               19               20               21               22                 23               24               25               26               27      2.5 mg   See details      28      7.5 mg         29      5 mg           30      5 mg                Date Details   04/27 This INR check               How to take your warfarin dose     To take:  2.5 mg Take 0.5 of a 5 mg tablet.    To take:  5 mg Take 1 of the 5 mg tablets.    To take:  7.5 mg Take 1.5 of the 5 mg tablets.            May 2017 Details    Sun Mon Tue Wed Thu Fri Sat      1      7.5 mg         2      5 mg         3      7.5 mg         4      5 mg         5      7.5 mg         6      5 mg           7      5 mg         8      7.5 mg         9      5 mg         10      7.5 mg         11      5 mg         12      7.5 mg         13      5 mg           14      5 mg         15      7.5 mg         16      5 mg         17      7.5 mg         18      5 mg         19      7.5 mg         20      5 mg           21      5 mg         22      7.5 mg         23      5 mg         24      7.5 mg         25            26               27                 28               29               30               31                   Date Details   No additional details    Date of next INR:  5/25/2017         How to take your warfarin dose     To take:  5 mg Take 1 of the 5 mg tablets.    To take:  7.5 mg Take 1.5 of the 5 mg tablets.

## 2017-04-27 NOTE — PROGRESS NOTES
ANTICOAGULATION FOLLOW-UP CLINIC VISIT    Patient Name:  Geneva Shepherd  Date:  4/27/2017  Contact Type:  Face to Face    SUBJECTIVE:     Patient Findings     Positives No Problem Findings           OBJECTIVE    INR Protime   Date Value Ref Range Status   04/27/2017 3.1 (A) 0.86 - 1.14 Final       ASSESSMENT / PLAN  No question data found.  Anticoagulation Summary as of 4/27/2017     INR goal 2.0-3.0   Today's INR 3.1!   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    Camelia He RN

## 2017-05-08 ENCOUNTER — TRANSFERRED RECORDS (OUTPATIENT)
Dept: HEALTH INFORMATION MANAGEMENT | Facility: CLINIC | Age: 77
End: 2017-05-08

## 2017-05-22 DIAGNOSIS — Z79.01 LONG-TERM (CURRENT) USE OF ANTICOAGULANTS: ICD-10-CM

## 2017-05-22 DIAGNOSIS — Z86.718 PERSONAL HISTORY OF DVT (DEEP VEIN THROMBOSIS): ICD-10-CM

## 2017-05-22 RX ORDER — WARFARIN SODIUM 5 MG/1
TABLET ORAL
Qty: 135 TABLET | Refills: 0 | Status: SHIPPED | OUTPATIENT
Start: 2017-05-22 | End: 2017-08-17

## 2017-05-22 NOTE — TELEPHONE ENCOUNTER
Prescription approved per [facility] [place] [place] Refill Protocol.

## 2017-05-22 NOTE — TELEPHONE ENCOUNTER
Warfarin     Last Written Prescription Date: 2/20/17  Last Fill Qty: 135, # refills: 0  Last Office Visit with [facility] prescribing provider: 3/14/17       Date and Result of Last PT/INR:   Lab Results   Component Value Date    INR 3.1 04/27/2017    INR 2.4 04/06/2017    INR 2.36 03/14/2017    INR 1.55 01/19/2017

## 2017-05-25 ENCOUNTER — ANTICOAGULATION THERAPY VISIT (OUTPATIENT)
Dept: ANTICOAGULATION | Facility: CLINIC | Age: 77
End: 2017-05-25
Payer: MEDICARE

## 2017-05-25 DIAGNOSIS — Z79.01 LONG-TERM (CURRENT) USE OF ANTICOAGULANTS: ICD-10-CM

## 2017-05-25 LAB — INR POINT OF CARE: 3.9 (ref 0.86–1.14)

## 2017-05-25 PROCEDURE — 85610 PROTHROMBIN TIME: CPT | Mod: QW

## 2017-05-25 PROCEDURE — 99207 ZZC NO CHARGE NURSE ONLY: CPT

## 2017-05-25 PROCEDURE — 36416 COLLJ CAPILLARY BLOOD SPEC: CPT

## 2017-05-25 NOTE — PROGRESS NOTES
ANTICOAGULATION FOLLOW-UP CLINIC VISIT    Patient Name:  Geneva Shepherd  Date:  5/25/2017  Contact Type:  Face to Face    SUBJECTIVE:     Patient Findings     Positives No Problem Findings           OBJECTIVE    INR Protime   Date Value Ref Range Status   05/25/2017 3.9 (A) 0.86 - 1.14 Final       ASSESSMENT / PLAN  INR assessment SUPRA    Recheck INR In: 2 WEEKS    INR Location Clinic      Anticoagulation Summary as of 5/25/2017     INR goal 2.0-3.0   Today's INR 3.9!   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

## 2017-05-25 NOTE — MR AVS SNAPSHOT
MarshaDebra Shepherd   5/25/2017 4:15 PM   Anticoagulation Therapy Visit    Description:  76 year old female   Provider:   ANTICOAGULATION CLINIC   Department:   Anti Coagulation           INR as of 5/25/2017     Today's INR 3.9!      Anticoagulation Summary as of 5/25/2017     INR goal 2.0-3.0   Today's INR 3.9!   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        May 2017 Details    Sun Mon Tue Wed Thu Fri Sat      1               2               3               4               5               6                 7               8               9               10               11               12               13                 14               15               16               17               18               19               20                 21               22               23               24               25      2.5 mg   See details      26      5 mg         27      5 mg           28      5 mg         29      7.5 mg         30      5 mg         31      7.5 mg             Date Details   05/25 This INR check               How to take your warfarin dose     To take:  2.5 mg Take 0.5 of a 5 mg tablet.    To take:  5 mg Take 1 of the 5 mg tablets.    To take:  7.5 mg  Take 1.5 of the 5 mg tablets.           June 2017 Details    Sun Mon Tue Wed Thu Fri Sat         1      5 mg         2      5 mg         3      5 mg           4      5 mg         5      7.5 mg         6      5 mg         7            8               9               10                 11               12               13               14               15               16               17                 18               19               20               21               22               23               24                 25               26               27               28               29               30                 Date Details   No additional details    Date of next INR:  6/7/2017         How to take your warfarin dose     To take:  5 mg Take 1 of the 5 mg tablets.    To take:  7.5 mg Take 1.5 of the 5 mg tablets.

## 2017-05-31 DIAGNOSIS — F32.A DEPRESSION, UNSPECIFIED DEPRESSION TYPE: ICD-10-CM

## 2017-05-31 RX ORDER — PAROXETINE 20 MG/1
TABLET, FILM COATED ORAL
Qty: 180 TABLET | Refills: 0 | Status: SHIPPED | OUTPATIENT
Start: 2017-05-31 | End: 2017-08-30

## 2017-06-07 ENCOUNTER — ANTICOAGULATION THERAPY VISIT (OUTPATIENT)
Dept: ANTICOAGULATION | Facility: CLINIC | Age: 77
End: 2017-06-07
Payer: MEDICARE

## 2017-06-07 DIAGNOSIS — Z79.01 LONG-TERM (CURRENT) USE OF ANTICOAGULANTS: ICD-10-CM

## 2017-06-07 LAB — INR POINT OF CARE: 2.9 (ref 0.86–1.14)

## 2017-06-07 PROCEDURE — 85610 PROTHROMBIN TIME: CPT | Mod: QW

## 2017-06-07 PROCEDURE — 99207 ZZC NO CHARGE NURSE ONLY: CPT

## 2017-06-07 PROCEDURE — 36416 COLLJ CAPILLARY BLOOD SPEC: CPT

## 2017-06-07 NOTE — MR AVS SNAPSHOT
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        June 2017 Details    Sun Mon Tue Wed Thu Fri Sat         1               2               3                 4               5               6               7      7.5 mg   See details      8      5 mg         9      7.5 mg         10      5 mg           11      5 mg         12      7.5 mg         13      5 mg         14      7.5 mg         15      5 mg         16      7.5 mg         17      5 mg           18      5 mg         19      7.5 mg         20      5 mg         21      7.5 mg         22      5 mg         23      7.5 mg         24      5 mg           25      5 mg         26      7.5 mg         27      5 mg         28            29               30                 Date Details   06/07 This INR check       Date of next INR:  6/28/2017         How to take your warfarin dose     To take:  5 mg Take 1 of the 5 mg tablets.    To take:  7.5 mg Take 1.5 of the 5 mg  tablets.

## 2017-06-29 ENCOUNTER — ANTICOAGULATION THERAPY VISIT (OUTPATIENT)
Dept: ANTICOAGULATION | Facility: CLINIC | Age: 77
End: 2017-06-29
Payer: MEDICARE

## 2017-06-29 DIAGNOSIS — Z79.01 LONG-TERM (CURRENT) USE OF ANTICOAGULANTS: ICD-10-CM

## 2017-06-29 LAB — INR POINT OF CARE: 3.6 (ref 0.86–1.14)

## 2017-06-29 PROCEDURE — 99207 ZZC NO CHARGE NURSE ONLY: CPT

## 2017-06-29 PROCEDURE — 36416 COLLJ CAPILLARY BLOOD SPEC: CPT

## 2017-06-29 PROCEDURE — 85610 PROTHROMBIN TIME: CPT | Mod: QW

## 2017-06-29 NOTE — PROGRESS NOTES
ANTICOAGULATION FOLLOW-UP CLINIC VISIT    Patient Name:  Geneva Shepherd  Date:  6/29/2017  Contact Type:  Face to Face    SUBJECTIVE:     Patient Findings     Positives Change in diet/appetite (less greens )           OBJECTIVE    INR Protime   Date Value Ref Range Status   06/29/2017 3.6 (A) 0.86 - 1.14 Final       ASSESSMENT / PLAN  No question data found.  Anticoagulation Summary as of 6/29/2017     INR goal 2.0-3.0   Today's INR 3.6!   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    Camelia He RN

## 2017-06-29 NOTE — MR AVS SNAPSHOT
MarshaDebra Shepherd   6/29/2017 4:15 PM   Anticoagulation Therapy Visit    Description:  76 year old female   Provider:   ANTICOAGULATION CLINIC   Department:   Anti Coagulation           INR as of 6/29/2017     Today's INR 3.6!      Anticoagulation Summary as of 6/29/2017     INR goal 2.0-3.0   Today's INR 3.6!   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        June 2017 Details    Sun Mon Tue Wed Thu Fri Sat         1               2               3                 4               5               6               7               8               9               10                 11               12               13               14               15               16               17                 18               19               20               21               22               23               24                 25               26               27               28               29      2.5 mg   See details      30      7.5 mg           Date Details   06/29 This INR check               How to take your warfarin dose     To take:  2.5 mg Take 0.5 of a 5 mg tablet.    To take:  7.5 mg Take 1.5 of the 5 mg tablets.           July 2017 Details    Sun Mon Tue Wed Thu Fri Sat           1       5 mg           2      5 mg         3      7.5 mg         4      5 mg         5      7.5 mg         6      5 mg         7      7.5 mg         8      5 mg           9      5 mg         10      7.5 mg         11      5 mg         12      7.5 mg         13      5 mg         14      7.5 mg         15      5 mg           16      5 mg         17      7.5 mg         18      5 mg         19      7.5 mg         20            21               22                 23               24               25               26               27               28               29                 30               31                     Date Details   No additional details    Date of next INR:  7/20/2017         How to take your warfarin dose     To take:  5 mg Take 1 of the 5 mg tablets.    To take:  7.5 mg Take 1.5 of the 5 mg tablets.

## 2017-06-30 ENCOUNTER — HOSPITAL ENCOUNTER (OUTPATIENT)
Dept: MAMMOGRAPHY | Facility: CLINIC | Age: 77
Discharge: HOME | End: 2017-06-30
Attending: INTERNAL MEDICINE | Admitting: INTERNAL MEDICINE
Payer: MEDICARE

## 2017-06-30 DIAGNOSIS — Z12.31 VISIT FOR SCREENING MAMMOGRAM: ICD-10-CM

## 2017-06-30 PROCEDURE — G0202 SCR MAMMO BI INCL CAD: HCPCS

## 2017-07-20 ENCOUNTER — ANTICOAGULATION THERAPY VISIT (OUTPATIENT)
Dept: ANTICOAGULATION | Facility: CLINIC | Age: 77
End: 2017-07-20
Payer: MEDICARE

## 2017-07-20 DIAGNOSIS — Z79.01 LONG-TERM (CURRENT) USE OF ANTICOAGULANTS: ICD-10-CM

## 2017-07-20 LAB — INR POINT OF CARE: 2.9 (ref 0.86–1.14)

## 2017-07-20 PROCEDURE — 36416 COLLJ CAPILLARY BLOOD SPEC: CPT

## 2017-07-20 PROCEDURE — 99207 ZZC NO CHARGE NURSE ONLY: CPT

## 2017-07-20 PROCEDURE — 85610 PROTHROMBIN TIME: CPT | Mod: QW

## 2017-07-20 NOTE — MR AVS SNAPSHOT
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        July 2017 Details    Sun Mon Tue Wed Thu Fri Sat           1                 2               3               4               5               6               7               8                 9               10               11               12               13               14               15                 16               17               18               19               20      5 mg   See details      21      7.5 mg         22      5 mg           23      5 mg         24      7.5 mg         25      5 mg         26      7.5 mg         27      5 mg         28      7.5 mg         29      5 mg           30      5 mg         31      7.5 mg               Date Details   07/20 This INR check               How to take your warfarin dose     To take:  5 mg Take 1 of the 5 mg tablets.    To take:  7.5 mg Take 1.5 of the 5 mg tablets.           August 2017 Details     Sun Mon Tue Wed Thu Fri Sat       1      5 mg         2      7.5 mg         3      5 mg         4      7.5 mg         5      5 mg           6      5 mg         7      7.5 mg         8      5 mg         9      7.5 mg         10      5 mg         11      7.5 mg         12      5 mg           13      5 mg         14      7.5 mg         15      5 mg         16      7.5 mg         17            18               19                 20               21               22               23               24               25               26                 27               28               29               30               31                  Date Details   No additional details    Date of next INR:  8/17/2017         How to take your warfarin dose     To take:  5 mg Take 1 of the 5 mg tablets.    To take:  7.5 mg Take 1.5 of the 5 mg tablets.

## 2017-07-20 NOTE — PROGRESS NOTES
ANTICOAGULATION FOLLOW-UP CLINIC VISIT    Patient Name:  Geneva Shepherd  Date:  7/20/2017  Contact Type:  Face to Face    SUBJECTIVE:     Patient Findings     Positives No Problem Findings           OBJECTIVE    INR Protime   Date Value Ref Range Status   07/20/2017 2.9 (A) 0.86 - 1.14 Final       ASSESSMENT / PLAN  INR assessment THER    Recheck INR In: 4 WEEKS    INR Location Clinic      Anticoagulation Summary as of 7/20/2017     INR goal 2.0-3.0   Today's INR 2.9   Maintenance plan 7.5 mg (5 mg x 1.5) on Mon, Wed, Fri; 5 mg (5 mg x 1) all other days   Full instructions 7.5 mg on Mon, Wed, Fri; 5 mg all other days   Weekly total 42.5 mg   No change documented Huma Juares, RN   Plan last modified Ofelia Cavazos RN (12/31/2015)   Next INR check 8/17/2017   Target end date     Indications   Long-term (current) use of anticoagulants [Z79.01] [Z79.01]  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]  Embolism and thrombosis (H) (Resolved) [I74.9]         Anticoagulation Episode Summary     INR check location     Preferred lab     Send INR reminders to [facility]    Comments             See the Encounter Report to view Anticoagulation Flowsheet and Dosing Calendar (Go to Encounters tab in chart review, and find the Anticoagulation Therapy Visit)        Huma Juares RN

## 2017-08-11 ENCOUNTER — TRANSFERRED RECORDS (OUTPATIENT)
Dept: HEALTH INFORMATION MANAGEMENT | Facility: CLINIC | Age: 77
End: 2017-08-11

## 2017-08-14 ENCOUNTER — TELEPHONE (OUTPATIENT)
Dept: INTERNAL MEDICINE | Facility: CLINIC | Age: 77
End: 2017-08-14

## 2017-08-14 NOTE — TELEPHONE ENCOUNTER
Reason for Call:  Other call back    Detailed comments: pt is very fatigue and wants to talk to dr massey nurse about it. Please call pt back ASAP    Phone Number Patient can be reached at: Home number on file [phone] (home)    Best Time: anytime    Can we leave a detailed message on this number? YES    Call taken on 8/14/2017 at 4:22 PM by GOPI WILCOX

## 2017-08-17 ENCOUNTER — OFFICE VISIT (OUTPATIENT)
Dept: INTERNAL MEDICINE | Facility: CLINIC | Age: 77
End: 2017-08-17
Payer: MEDICARE

## 2017-08-17 ENCOUNTER — ANTICOAGULATION THERAPY VISIT (OUTPATIENT)
Dept: ANTICOAGULATION | Facility: CLINIC | Age: 77
End: 2017-08-17
Payer: MEDICARE

## 2017-08-17 VITALS
BODY MASS INDEX: 29.05 KG/M2 | WEIGHT: 180 LBS | SYSTOLIC BLOOD PRESSURE: 126 MMHG | DIASTOLIC BLOOD PRESSURE: 76 MMHG | HEART RATE: 82 BPM | TEMPERATURE: 98 F

## 2017-08-17 DIAGNOSIS — F32.0 MAJOR DEPRESSIVE DISORDER, SINGLE EPISODE, MILD (H): ICD-10-CM

## 2017-08-17 DIAGNOSIS — Z79.01 LONG-TERM (CURRENT) USE OF ANTICOAGULANTS: ICD-10-CM

## 2017-08-17 DIAGNOSIS — R53.83 OTHER FATIGUE: Primary | ICD-10-CM

## 2017-08-17 DIAGNOSIS — R06.83 SNORING: ICD-10-CM

## 2017-08-17 DIAGNOSIS — Z86.718 PERSONAL HISTORY OF DVT (DEEP VEIN THROMBOSIS): ICD-10-CM

## 2017-08-17 PROBLEM — D62 ANEMIA DUE TO BLOOD LOSS, ACUTE: Status: RESOLVED | Noted: 2017-01-20 | Resolved: 2017-08-17

## 2017-08-17 LAB
ERYTHROCYTE [DISTWIDTH] IN BLOOD BY AUTOMATED COUNT: 14.1 % (ref 10–15)
HCT VFR BLD AUTO: 43.5 % (ref 35–47)
HGB BLD-MCNC: 13.7 G/DL (ref 11.7–15.7)
INR POINT OF CARE: 1.9 (ref 0.86–1.14)
MCH RBC QN AUTO: 31.1 PG (ref 26.5–33)
MCHC RBC AUTO-ENTMCNC: 31.5 G/DL (ref 31.5–36.5)
MCV RBC AUTO: 99 FL (ref 78–100)
PLATELET # BLD AUTO: 284 10E9/L (ref 150–450)
RBC # BLD AUTO: 4.41 10E12/L (ref 3.8–5.2)
WBC # BLD AUTO: 8.7 10E9/L (ref 4–11)

## 2017-08-17 PROCEDURE — 85610 PROTHROMBIN TIME: CPT | Mod: QW

## 2017-08-17 PROCEDURE — 82533 TOTAL CORTISOL: CPT | Performed by: INTERNAL MEDICINE

## 2017-08-17 PROCEDURE — 84443 ASSAY THYROID STIM HORMONE: CPT | Performed by: INTERNAL MEDICINE

## 2017-08-17 PROCEDURE — 99214 OFFICE O/P EST MOD 30 MIN: CPT | Performed by: INTERNAL MEDICINE

## 2017-08-17 PROCEDURE — 85027 COMPLETE CBC AUTOMATED: CPT | Performed by: INTERNAL MEDICINE

## 2017-08-17 PROCEDURE — 80053 COMPREHEN METABOLIC PANEL: CPT | Performed by: INTERNAL MEDICINE

## 2017-08-17 PROCEDURE — 99207 ZZC NO CHARGE NURSE ONLY: CPT

## 2017-08-17 PROCEDURE — 36416 COLLJ CAPILLARY BLOOD SPEC: CPT

## 2017-08-17 RX ORDER — WARFARIN SODIUM 5 MG/1
TABLET ORAL
Qty: 135 TABLET | Refills: 3 | Status: SHIPPED | OUTPATIENT
Start: 2017-08-17 | End: 2018-11-27

## 2017-08-17 ASSESSMENT — PATIENT HEALTH QUESTIONNAIRE - PHQ9: SUM OF ALL RESPONSES TO PHQ QUESTIONS 1-9: 3

## 2017-08-17 NOTE — NURSING NOTE
"Chief Complaint   Patient presents with     Fatigue       Initial /76  Pulse 82  Temp 98  F (36.7  C) (Oral)  Wt 180 lb (81.6 kg)  BMI 29.05 kg/m2 Estimated body mass index is 29.05 kg/(m^2) as calculated from the following:    Height as of 1/25/17: 5' 6\" (1.676 m).    Weight as of this encounter: 180 lb (81.6 kg).  Medication Reconciliation: complete      "

## 2017-08-17 NOTE — MR AVS SNAPSHOT
After Visit Summary   8/17/2017    Geneva Shepherd    MRN: [id number]           Patient Information     Date Of Birth          1940        Visit Information        Provider Department      8/17/2017 6:30 PM Jacoby Boo MD [facility]        Today's Diagnoses     Other fatigue    -  1    Snoring          Care Instructions    Labs as ordered  Continue Paroxetine at bedtime as done the past couple days  If fatigue not better with above, then schedule appt with  Sleep Clinic. Call for appt  Future cataract surgeries when ready to have done  Flu shot this Fall           Follow-ups after your visit        Additional Services     SLEEP EVALUATION & MANAGEMENT REFERRAL - ADULT       Please be aware that coverage of these services is subject to the terms and limitations of your health insurance plan.  Call member services at your health plan with any benefit or coverage questions.      Please bring the following to your appointment:    >>   List of current medications   >>   This referral request   >>   Any documents/labs given to you for this referral    [facility]ichael[facility])   [phone] (Age 18 and up)                  Your next 10 appointments already scheduled     Sep 01, 2017  4:00 PM CDT   Anticoagulation Visit with  ANTICO[facility]ULATION CLINIC   [facility]    [address] [address] [phone]              Future tests that were ordered for you today     Open Future Orders        Priority Expected Expires Ordered    SLEEP EVALUATION & MANAGEMENT REFERRAL - ADULT Routine  8/17/2018 8/17/2017            Who to contact     If you have questions or need follow up information about today's clinic visit or your schedule please contact [facility] directly at [phone].  Normal or non-critical lab and imaging results will be communicated to you by  MyChart, letter or phone within 4 business days after the clinic has received the results. If you do not hear from us within 7 days, please contact the clinic through [employer] or phone. If you have a critical or abnormal lab result, we will notify you by phone as soon as possible.  Submit refill requests through [employer] or call your pharmacy and they will forward the refill request to us. Please allow 3 business days for your refill to be completed.          Additional Information About Your Visit        [employer] Information     [employer] gives you secure access to your electronic health record. If you see a primary care provider, you can also send messages to your care team and make appointments. If you have questions, please call your primary care clinic.  If you do not have a primary care provider, please call [phone] and they will assist you.        Care EveryWhere ID     This is your Care EveryWhere ID. This could be used by other organizations to access your [place] medical records  [id number]        Your Vitals Were     Pulse Temperature BMI (Body Mass Index)             82 98  F (36.7  C) (Oral) 29.05 kg/m2          Blood Pressure from Last 3 Encounters:   08/17/17 126/76   03/14/17 122/76   02/13/17 110/72    Weight from Last 3 Encounters:   08/17/17 180 lb (81.6 kg)   03/14/17 180 lb (81.6 kg)   02/13/17 180 lb (81.6 kg)              We Performed the Following     CBC with platelets     Comprehensive metabolic panel     Cortisol     TSH with free T4 reflex          Today's Medication Changes          These changes are accurate as of: 8/17/17  7:09 PM.  If you have any questions, ask your nurse or doctor.               These medicines have changed or have updated prescriptions.        Dose/Directions    PARoxetine 20 MG tablet   Commonly known as:  PAXIL   This may have changed:  Another medication with the same name was removed. Continue taking this medication, and follow the directions you see here.    Used for:  Depression, unspecified depression type   Changed by:  Jacoby Boo MD        TAKE 2 TABLETS BY MOUTH AT BEDTIME   Quantity:  180 tablet   Refills:  0                Primary Care Provider Office Phone # Fax #    Jacoby Boo [phone]       [address] [facility] [address]        Equal Access to Services     [facility]: Hadii aad ku hadasho Soomaali, waaxda luqadaha, qaybta kaalmada adeegyada, waxay idiin hayaan adeeg kharash la'aan . So [facility] [phone].    ATENCIÓN: Si habla español, tiene a luciano disposición servicios gratuitos de asistencia lingüística. Llame al [phone].    We comply with applicable federal civil rights laws and [place] laws. We do not discriminate on the basis of race, color, national origin, age, disability sex, sexual orientation or gender identity.            Thank you!     Thank you for choosing [facility]  for your care. Our goal is always to provide you with excellent care. Hearing back from our patients is one way we can continue to improve our services. Please take a few minutes to complete the written survey that you may receive in the mail after your visit with us. Thank you!             Your Updated Medication List - Protect others around you: Learn how to safely use, store and throw away your medicines at www.disposemymeds.org.          This list is accurate as of: 8/17/17  7:09 PM.  Always use your most recent med list.                   Brand Name Dispense Instructions for use Diagnosis    acetaminophen 500 MG tablet    TYLENOL     Take 1,000 mg by mouth 3 times daily        amitriptyline 25 MG tablet    ELAVIL    90 tablet    Take 1 tablet (25 mg) by mouth At Bedtime    Insomnia, unspecified type       BETA CAROTENE PO      Take 25,000 Units by mouth daily.        BIOTIN PO      Take 1 tablet by mouth every morning        Chromium Picolinate 800 MCG Tabs      1 daily        COCONUT OIL PO      Take 1 capsule by  mouth every morning        fish oil-omega-3 fatty acids 1000 MG capsule      1 daily        flax seed oil 1000 MG capsule      1 daily        FOLIC ACID PO      Take 400 mcg by mouth daily        levOCARNitine 330 MG tablet    CARNITOR     Take 330 mg by mouth every morning        levothyroxine 50 MCG tablet    SYNTHROID    90 tablet    Take 1 tablet (50 mcg) by mouth daily    Hypothyroidism, unspecified type       MAGNESIUM OXIDE PO      Take 400 mg by mouth daily        MULTIVITAMIN/MULTIMINERAL TABS   OR     30    1 TABLET DAILY        N-ACETYL-L-CYSTEINE PO      Take 1 capsule by mouth every morning        * order for DME     2 Device    Equipment being ordered: Compression Stockings Knee High 20-30 mmhg    Venous ulcer of ankle, right (H), Venous (peripheral) insufficiency, Varicose vein of leg       * order for DME     30 days    Equipment being ordered: [employer] Fax [phone]  Primary Dressing Mepilex Border 4x4   Qty 30 Length of Need: 1 month Frequency of dressing change: daily    Venous ulcer of ankle, right (H)       * order for DME     4 Device    Equipment being ordered: Thigh High 30-40 mmhg Compression Stockings.    Venous ulcer of right leg (H), Venous (peripheral) insufficiency, Swelling of limb       PARoxetine 20 MG tablet    PAXIL    180 tablet    TAKE 2 TABLETS BY MOUTH AT BEDTIME    Depression, unspecified depression type       TURMERIC PO      Take 1 capsule by mouth every morning        vitamin B complex with vitamin C Tabs tablet      Take 1 tablet by mouth daily DOSE UNKNOWN        VITAMIN C PO      Take 1,000 mg by mouth every morning (Patient takes 2 X 500 mg = 1,000 mg dose)        warfarin 5 MG tablet    COUMADIN    135 tablet    TAKE 1 & 1/2 TABLETS (7.5MG) BY MOUTH ON MON WED FRI, AND 1 TABLET ALL OTHER DAYS PER [facility]    Long-term (current) use of anticoagulants, Personal history of DVT (deep vein thrombosis)       Zinc 50 MG Caps      1 daily        * Notice:  This list  has 3 medication(s) that are the same as other medications prescribed for you. Read the directions carefully, and ask your doctor or other care provider to review them with you.

## 2017-08-17 NOTE — PROGRESS NOTES
ANTICOAGULATION FOLLOW-UP CLINIC VISIT    Patient Name:  Geneva Shepherd  Date:  8/17/2017  Contact Type:  Face to Face    SUBJECTIVE:     Patient Findings     Positives No Problem Findings    Comments Tired in am lately           OBJECTIVE    INR Protime   Date Value Ref Range Status   08/17/2017 1.9 (A) 0.86 - 1.14 Final       ASSESSMENT / PLAN  INR assessment THER    Recheck INR In: 2 WEEKS    INR Location Clinic      Anticoagulation Summary as of 8/17/2017     INR goal 2.0-3.0   Today's INR 1.9!   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

## 2017-08-17 NOTE — PROGRESS NOTES
"  SUBJECTIVE:   Geneva Shepherd is a 76 year old female who presents to clinic today for the following health issues:      Fatigued, Patient recently discovered she snores    Amount of exercise or physical activity: 3-4 days/week for an average of 30-45 minutes    Problems taking medications regularly: No    Medication side effects: none    Diet: regular (no restrictions)    Pt's past medical history, family history, habits, medications and allergies were reviewed with the patient today.  See snap shot for  HCM status. Most recent lab results reviewed with pt. Problem list and histories reviewed & adjusted, as indicated.  Additional history as below:    Granddaughter was with pt and said that she snores a lot. Moved Paxil to bedtime along with Nortriptyline for the past 2 days and then slept a little better. Energy maybe a little better.  Usually fatigued in AM. States to awake a little more in the later afternoon or early evening. Likes to stay up at night and read. Bedtime 11:30 PM. Able  to fall asleep Has nocturia 2x/night often and falls asleep again soon. Up 9 AM in AM. Has coffee in AM only.  Mood good with meds. History of bilateral cataract. Patient will be having surgery likely sometime in the next 6 months      Additional ROS:   Constitutional, HEENT, Cardiovascular, Pulmonary, GI and , Neuro, MSK and Psych review of systems/symptoms are otherwise negative or unchanged from previous, except as noted above.      OBJECTIVE:  /76  Pulse 82  Temp 98  F (36.7  C) (Oral)  Wt 180 lb (81.6 kg)  BMI 29.05 kg/m2   Estimated body mass index is 29.05 kg/(m^2) as calculated from the following:    Height as of 1/25/17: 5' 6\" (1.676 m).    Weight as of this encounter: 180 lb (81.6 kg).  Eye: PERRL, EOMI  HENT: ear canals and TM's normal and nose and mouth without ulcers or lesions   Neck: no adenopathy. Thyroid normal to palpation. No bruits  Pulm: Lungs clear to auscultation   CV: Regular rates and " rhythm  GI: Soft, nontender, Normal active bowel sounds, No hepatosplenomegaly or masses palpable  Ext: Peripheral pulses intact. Mild chronic BLE edema with nontender varicose veins  Neuro: Normal strength and tone, sensory exam grossly normal    Assessment/Plan: (See plan discussion below for further details)  1. Other fatigue  ?  obstructive sleep apnea vs other.  - Cortisol  - Comprehensive metabolic panel  - CBC with platelets  - TSH with free T4 reflex  - SLEEP EVALUATION & MANAGEMENT REFERRAL - ADULT; Future    2. Snoring  Needs sleep study to assess for ? JULIO  - SLEEP EVALUATION & MANAGEMENT REFERRAL - ADULT; Future    3. Major depressive disorder, single episode, mild (H)  Mood doing well with Paxil. Continue med. PHQ=3    4. Personal history of RLE DVT (deep vein thrombosis)(2003)   INR today minimally low. Plan per [facility]. Continue Coumadin  - warfarin (COUMADIN) 5 MG tablet; 1.5 TABLETS (7.5MG) BY MOUTH ON MON WED FRI, AND 1 TABLET ALL OTHER DAYS PER [facility]  Dispense: 135 tablet; Refill: 3    Plan discussion:  Labs as ordered  Continue Paroxetine at bedtime as done the past couple days  If fatigue not better with above, then schedule appt with  Sleep Clinic. Call for appt  Future cataract surgeries when ready to have done  Flu shot this Fall        Jacoby Boo MD  Internal Medicine Department  [facility]

## 2017-08-17 NOTE — MR AVS SNAPSHOT
MarshaDebra Shepherd   8/17/2017 6:15 PM   Anticoagulation Therapy Visit    Description:  76 year old female   Provider:   ANTICOAGULATION CLINIC   Department:   Anti Coagulation           INR as of 8/17/2017     Today's INR 1.9!      Anticoagulation Summary as of 8/17/2017     INR goal 2.0-3.0   Today's INR 1.9!   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        August 2017 Details    Sun Mon Tue Wed Thu Fri Sat       1               2               3               4               5                 6               7               8               9               10               11               12                 13               14               15               16               17      5 mg   See details      18      7.5 mg         19      5 mg           20      5 mg         21      7.5 mg         22      5 mg         23      7.5 mg         24      5 mg         25      7.5 mg         26      5 mg           27      5 mg         28      7.5 mg         29      5 mg         30      7.5 mg         31      5 mg            Date Details   08/17 This INR check               How to take your warfarin dose     To take:  5 mg Take 1 of the 5 mg tablets.    To take:  7.5 mg Take 1.5 of the 5 mg tablets.           September 2017  Details    Sun Mon Tue Wed Thu Fri Sat          1            2                 3               4               5               6               7               8               9                 10               11               12               13               14               15               16                 17               18               19               20               21               22               23                 24               25               26               27               28               29               30                Date Details   No additional details    Date of next INR:  9/1/2017         How to take your warfarin dose     To take:  7.5 mg Take 1.5 of the 5 mg tablets.

## 2017-08-18 LAB
ALBUMIN SERPL-MCNC: 4 G/DL (ref 3.4–5)
ALP SERPL-CCNC: 123 U/L (ref 40–150)
ALT SERPL W P-5'-P-CCNC: 32 U/L (ref 0–50)
ANION GAP SERPL CALCULATED.3IONS-SCNC: 7 MMOL/L (ref 3–14)
AST SERPL W P-5'-P-CCNC: 28 U/L (ref 0–45)
BILIRUB SERPL-MCNC: 0.4 MG/DL (ref 0.2–1.3)
BUN SERPL-MCNC: 12 MG/DL (ref 7–30)
CALCIUM SERPL-MCNC: 10.4 MG/DL (ref 8.5–10.1)
CHLORIDE SERPL-SCNC: 102 MMOL/L (ref 94–109)
CO2 SERPL-SCNC: 30 MMOL/L (ref 20–32)
CORTIS SERPL-MCNC: 5.8 UG/DL (ref 4–22)
CREAT SERPL-MCNC: 0.7 MG/DL (ref 0.52–1.04)
GFR SERPL CREATININE-BSD FRML MDRD: 81 ML/MIN/1.7M2
GLUCOSE SERPL-MCNC: 113 MG/DL (ref 70–99)
POTASSIUM SERPL-SCNC: 3.9 MMOL/L (ref 3.4–5.3)
PROT SERPL-MCNC: 7.5 G/DL (ref 6.8–8.8)
SODIUM SERPL-SCNC: 139 MMOL/L (ref 133–144)
TSH SERPL DL<=0.005 MIU/L-ACNC: 1.53 MU/L (ref 0.4–4)

## 2017-08-18 NOTE — PATIENT INSTRUCTIONS
Labs as ordered  Continue Paroxetine at bedtime as done the past couple days  If fatigue not better with above, then schedule appt with  Sleep Clinic. Call for appt  Future cataract surgeries when ready to have done  Flu shot this Fall

## 2017-08-19 ENCOUNTER — MYC MEDICAL ADVICE (OUTPATIENT)
Dept: URGENT CARE | Facility: URGENT CARE | Age: 77
End: 2017-08-19

## 2017-08-19 DIAGNOSIS — E83.52 HYPERCALCEMIA: Primary | ICD-10-CM

## 2017-08-30 DIAGNOSIS — F32.A DEPRESSION, UNSPECIFIED DEPRESSION TYPE: ICD-10-CM

## 2017-08-30 RX ORDER — PAROXETINE 20 MG/1
TABLET, FILM COATED ORAL
Qty: 180 TABLET | Refills: 1 | Status: SHIPPED | OUTPATIENT
Start: 2017-08-30 | End: 2018-02-28

## 2017-08-30 NOTE — TELEPHONE ENCOUNTER
paxil        Last Written Prescription Date: 5/31/17  Last Fill Quantity: 180; # refills: 0  Last Office Visit with [facility], [facility] or  Health prescribing provider:  8/17/17        Last PHQ-9 score on record=   PHQ-9 SCORE 8/17/2017   Total Score -   Total Score 3       Lab Results   Component Value Date    AST 28 08/17/2017     Lab Results   Component Value Date    ALT 32 08/17/2017

## 2017-09-18 ENCOUNTER — ANTICOAGULATION THERAPY VISIT (OUTPATIENT)
Dept: ANTICOAGULATION | Facility: CLINIC | Age: 77
End: 2017-09-18
Payer: MEDICARE

## 2017-09-18 DIAGNOSIS — Z79.01 LONG-TERM (CURRENT) USE OF ANTICOAGULANTS: ICD-10-CM

## 2017-09-18 LAB — INR POINT OF CARE: 2 (ref 0.86–1.14)

## 2017-09-18 PROCEDURE — 36416 COLLJ CAPILLARY BLOOD SPEC: CPT

## 2017-09-18 PROCEDURE — 99207 ZZC NO CHARGE NURSE ONLY: CPT

## 2017-09-18 PROCEDURE — 85610 PROTHROMBIN TIME: CPT | Mod: QW

## 2017-09-18 NOTE — MR AVS SNAPSHOT
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        September 2017 Details    Sun Mon Tue Wed Thu Fri Sat          1               2                 3               4               5               6               7               8               9                 10               11               12               13               14               15               16                 17               18      7.5 mg   See details      19      5 mg         20      7.5 mg         21      5 mg         22      7.5 mg         23      5 mg           24      5 mg         25      7.5 mg         26      5 mg         27      7.5 mg         28      5 mg         29      7.5 mg         30      5 mg          Date Details   09/18 This INR check               How to take your warfarin dose     To take:  5 mg Take 1 of the 5 mg tablets.    To take:  7.5 mg Take 1.5 of the 5 mg tablets.           October 2017 Details    Sun Mon Tue  Wed Thu Fri Sat     1      5 mg         2      7.5 mg         3      5 mg         4      7.5 mg         5      5 mg         6      7.5 mg         7      5 mg           8      5 mg         9      7.5 mg         10      5 mg         11      7.5 mg         12      5 mg         13      7.5 mg         14      5 mg           15      5 mg         16            17               18               19               20               21                 22               23               24               25               26               27               28                 29               30               31                    Date Details   No additional details    Date of next INR:  10/16/2017         How to take your warfarin dose     To take:  5 mg Take 1 of the 5 mg tablets.    To take:  7.5 mg Take 1.5 of the 5 mg tablets.

## 2017-09-18 NOTE — PROGRESS NOTES
ANTICOAGULATION FOLLOW-UP CLINIC VISIT    Patient Name:  Geneva Shepherd  Date:  9/18/2017  Contact Type:  Face to Face    SUBJECTIVE:     Patient Findings     Positives No Problem Findings           OBJECTIVE    INR Protime   Date Value Ref Range Status   09/18/2017 2.0 (A) 0.86 - 1.14 Final       ASSESSMENT / PLAN  No question data found.  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    Camelia He RN

## 2017-10-17 ENCOUNTER — ANTICOAGULATION THERAPY VISIT (OUTPATIENT)
Dept: ANTICOAGULATION | Facility: CLINIC | Age: 77
End: 2017-10-17
Payer: MEDICARE

## 2017-10-17 LAB — INR POINT OF CARE: 2.5 (ref 0.86–1.14)

## 2017-10-17 PROCEDURE — 85610 PROTHROMBIN TIME: CPT | Mod: QW

## 2017-10-17 PROCEDURE — 36416 COLLJ CAPILLARY BLOOD SPEC: CPT

## 2017-10-17 NOTE — MR AVS SNAPSHOT
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        October 2017 Details    Sun Mon Tue Wed Thu Fri Sat     1               2               3               4               5               6               7                 8               9               10               11               12               13               14                 15               16               17      5 mg   See details      18      7.5 mg         19      5 mg         20      7.5 mg         21      5 mg           22      5 mg         23      7.5 mg         24      5 mg         25      7.5 mg         26      5 mg         27      7.5 mg         28      5 mg           29      5 mg         30      7.5 mg         31      5 mg              Date Details   10/17 This INR check               How to take your warfarin dose     To take:  5 mg Take 1 of the 5 mg tablets.    To take:  7.5 mg Take 1.5 of the 5 mg tablets.           November 2017 Details    Sun Mon Tue Wed Thu Fri Sat        1      7.5 mg         2      5 mg         3      7.5 mg         4      5 mg           5      5 mg         6      7.5 mg         7      5 mg         8      7.5 mg         9      5 mg         10      7.5 mg         11      5 mg           12       5 mg         13      7.5 mg         14            15               16               17               18                 19               20               21               22               23               24               25                 26               27               28               29               30                  Date Details   No additional details    Date of next INR:  11/14/2017         How to take your warfarin dose     To take:  5 mg Take 1 of the 5 mg tablets.    To take:  7.5 mg Take 1.5 of the 5 mg tablets.

## 2017-10-17 NOTE — PROGRESS NOTES
ANTICOAGULATION FOLLOW-UP CLINIC VISIT    Patient Name:  Geneva Shepherd  Date:  10/17/2017  Contact Type:  Face to Face    SUBJECTIVE:     Patient Findings     Positives No Problem Findings           OBJECTIVE    INR Protime   Date Value Ref Range Status   10/17/2017 2.5 (A) 0.86 - 1.14 Final       ASSESSMENT / PLAN  INR assessment THER    Recheck INR In: 4 WEEKS    INR Location Clinic      Anticoagulation Summary as of 10/17/2017     INR goal 2.0-3.0   Today's INR 2.5   Maintenance plan 7.5 mg (5 mg x 1.5) on Mon, Wed, Fri; 5 mg (5 mg x 1) all other days   Full instructions 7.5 mg on Mon, Wed, Fri; 5 mg all other days   Weekly total 42.5 mg   No change documented Ofelia Cavazos RN   Plan last modified Ofelia Cavazos RN (12/31/2015)   Next INR check 11/14/2017   Target end date     Indications   Long-term (current) use of anticoagulants [Z79.01] [Z79.01]  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    Ofelia Cavazos RN

## 2017-10-19 ENCOUNTER — OFFICE VISIT (OUTPATIENT)
Dept: URGENT CARE | Facility: URGENT CARE | Age: 77
End: 2017-10-19
Payer: MEDICARE

## 2017-10-19 VITALS
OXYGEN SATURATION: 97 % | DIASTOLIC BLOOD PRESSURE: 70 MMHG | TEMPERATURE: 97.9 F | HEART RATE: 88 BPM | SYSTOLIC BLOOD PRESSURE: 122 MMHG
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
## 2017-10-19 DIAGNOSIS — Z79.01 LONG-TERM (CURRENT) USE OF ANTICOAGULANTS: ICD-10-CM

## 2017-10-19 DIAGNOSIS — M62.830 BACK MUSCLE SPASM: ICD-10-CM

## 2017-10-19 DIAGNOSIS — S39.012A REPETITIVE STRAIN INJURY OF LOWER BACK, INITIAL ENCOUNTER: Primary | ICD-10-CM

## 2017-10-19 PROCEDURE — 99214 OFFICE O/P EST MOD 30 MIN: CPT | Performed by: PHYSICIAN ASSISTANT

## 2017-10-19 RX ORDER — TIZANIDINE 2 MG/1
2 TABLET ORAL 3 TIMES DAILY
Qty: 30 TABLET | Refills: 0 | Status: SHIPPED | OUTPATIENT
Start: 2017-10-19 | End: 2018-02-16

## 2017-10-19 RX ORDER — OMEPRAZOLE 40 MG/1
40 CAPSULE, DELAYED RELEASE ORAL DAILY
Qty: 7 CAPSULE | Refills: 0 | Status: SHIPPED | OUTPATIENT
Start: 2017-10-19 | End: 2017-11-20

## 2017-10-19 RX ORDER — METHYLPREDNISOLONE 4 MG
TABLET, DOSE PACK ORAL
Qty: 21 TABLET | Refills: 0 | Status: SHIPPED | OUTPATIENT
Start: 2017-10-19 | End: 2017-11-20

## 2017-10-19 NOTE — MR AVS SNAPSHOT
After Visit Summary   10/19/2017    Geneva Shepherd    MRN: [id number]           Patient Information     Date Of Birth          1940        Visit Information        Provider Department      10/19/2017 3:25 PM Zach Anaya PA-C [facility]        Today's Diagnoses     Repetitive strain injury of lower back, initial encounter    -  1    Back muscle spasm        Long-term (current) use of anticoagulants [Z79.01]           Follow-ups after your visit        Your next 10 appointments already scheduled     Nov 16, 2017  3:45 PM CST   Anticoagulation Visit with  ANTICOAGULATION CLINIC   [facility]    [address] [address] [phone]              Who to contact     If you have questions or need follow up information about today's clinic visit or your schedule please contact [facility] directly at [phone].  Normal or non-critical lab and imaging results will be communicated to you by [facility], letter or phone within 4 business days after the clinic has received the results. If you do not hear from us within 7 days, please contact the clinic through [email or web address] or phone. If you have a critical or abnormal lab result, we will notify you by phone as soon as possible.  Submit refill requests through [employer] or call your pharmacy and they will forward the refill request to us. Please allow 3 business days for your refill to be completed.          Additional Information About Your Visit        MyChart Information     [employer] gives you secure access to your electronic health record. If you see a primary care provider, you can also send messages to your care team and make appointments. If you have questions, please call your primary care clinic.  If you do not have a primary care provider, please call [phone] and they will assist you.        Care  EveryWhere ID     This is your Care EveryWhere ID. This could be used by other organizations to access your [facility] medical records  [id number]        Your Vitals Were     Pulse Temperature Pulse Oximetry             88 97.9  F (36.6  C) (Oral) 97%          Blood Pressure from Last 3 Encounters:   10/19/17 122/70   08/17/17 126/76   03/14/17 122/76    Weight from Last 3 Encounters:   08/17/17 180 lb (81.6 kg)   03/14/17 180 lb (81.6 kg)   02/13/17 180 lb (81.6 kg)              Today, you had the following     No orders found for display         Today's Medication Changes          These changes are accurate as of: 10/19/17 11:59 PM.  If you have any questions, ask your nurse or doctor.               Start taking these medicines.        Dose/Directions    methylPREDNISolone 4 MG tablet   Commonly known as:  MEDROL DOSEPAK   Used for:  Repetitive strain injury of lower back, initial encounter   Started by:  Zach Anaya PA-C        Follow package instructions   Quantity:  21 tablet   Refills:  0       omeprazole 40 MG capsule   Commonly known as:  priLOSEC   Used for:  Long-term (current) use of anticoagulants   Started by:  Zach Anaya PA-C        Dose:  40 mg   Take 1 capsule (40 mg) by mouth daily Take 30-60 minutes before a meal.   Quantity:  7 capsule   Refills:  0       tiZANidine 2 MG tablet   Commonly known as:  ZANAFLEX   Used for:  Back muscle spasm   Started by:  Zach Anaya PA-C        Dose:  2 mg   Take 1 tablet (2 mg) by mouth 3 times daily   Quantity:  30 tablet   Refills:  0            Where to get your medicines      These medications were sent to [facility] [address] [address]     Phone:  [phone]     methylPREDNISolone 4 MG tablet    omeprazole 40 MG capsule    tiZANidine 2 MG tablet                Primary Care Provider Office Phone # Fax #    Jacoby Boo [phone]       [address] [facility]  [facility] [address]        Equal Access to Services     JING PAGAN : Hadii aad ku hadellislaura Humaali, waluisda luqgeneha, qaoctavianota monolindacynthia yun. So [facility] [phone].    ATENCIÓN: Si habla español, tiene a luciano disposición servicios gratuitos de asistencia lingüística. Adilsoname al [phone].    We comply with applicable federal civil rights laws and [place] laws. We do not discriminate on the basis of race, color, national origin, age, disability, sex, sexual orientation, or gender identity.            Thank you!     Thank you for choosing [facility]  for your care. Our goal is always to provide you with excellent care. Hearing back from our patients is one way we can continue to improve our services. Please take a few minutes to complete the written survey that you may receive in the mail after your visit with us. Thank you!             Your Updated Medication List - Protect others around you: Learn how to safely use, store and throw away your medicines at www.disposemymeds.org.          This list is accurate as of: 10/19/17 11:59 PM.  Always use your most recent med list.                   Brand Name Dispense Instructions for use Diagnosis    acetaminophen 500 MG tablet    TYLENOL     Take 1,000 mg by mouth 3 times daily        amitriptyline 25 MG tablet    ELAVIL    90 tablet    Take 1 tablet (25 mg) by mouth At Bedtime    Insomnia, unspecified type       BETA CAROTENE PO      Take 25,000 Units by mouth daily.        BIOTIN PO      Take 1 tablet by mouth every morning        Chromium Picolinate 800 MCG Tabs      1 daily        COCONUT OIL PO      Take 1 capsule by mouth every morning        fish oil-omega-3 fatty acids 1000 MG capsule      1 daily        flax seed oil 1000 MG capsule      1 daily        FOLIC ACID PO      Take 400 mcg by mouth daily        levOCARNitine 330 MG tablet    CARNITOR     Take 330 mg by mouth every morning         levothyroxine 50 MCG tablet    SYNTHROID    90 tablet    Take 1 tablet (50 mcg) by mouth daily    Hypothyroidism, unspecified type       MAGNESIUM OXIDE PO      Take 400 mg by mouth daily        methylPREDNISolone 4 MG tablet    MEDROL DOSEPAK    21 tablet    Follow package instructions    Repetitive strain injury of lower back, initial encounter       MULTIVITAMIN/MULTIMINERAL TABS   OR     30    1 TABLET DAILY        N-ACETYL-L-CYSTEINE PO      Take 1 capsule by mouth every morning        omeprazole 40 MG capsule    priLOSEC    7 capsule    Take 1 capsule (40 mg) by mouth daily Take 30-60 minutes before a meal.    Long-term (current) use of anticoagulants       * order for DME     2 Device    Equipment being ordered: Compression Stockings Knee High 20-30 mmhg    Venous ulcer of ankle, right (H), Venous (peripheral) insufficiency, Varicose vein of leg       * order for DME     30 days    Equipment being ordered: [facility] Fax [phone]  Primary Dressing Mepilex Border 4x4   Qty 30 Length of Need: 1 month Frequency of dressing change: daily    Venous ulcer of ankle, right (H)       * order for DME     4 Device    Equipment being ordered: Thigh High 30-40 mmhg Compression Stockings.    Venous ulcer of right leg (H), Venous (peripheral) insufficiency, Swelling of limb       PARoxetine 20 MG tablet    PAXIL    180 tablet    TAKE 2 TABLETS BY MOUTH AT BEDTIME    Depression, unspecified depression type       tiZANidine 2 MG tablet    ZANAFLEX    30 tablet    Take 1 tablet (2 mg) by mouth 3 times daily    Back muscle spasm       TURMERIC PO      Take 1 capsule by mouth every morning        vitamin B complex with vitamin C Tabs tablet      Take 1 tablet by mouth daily DOSE UNKNOWN        VITAMIN C PO      Take 1,000 mg by mouth every morning (Patient takes 2 X 500 mg = 1,000 mg dose)        warfarin 5 MG tablet    COUMADIN    135 tablet    1.5 TABLETS (7.5MG) BY MOUTH ON MON WED FRI, AND 1 TABLET ALL OTHER  DAYS PER ACC    Personal history of DVT (deep vein thrombosis)       Zinc 50 MG Caps      1 daily        * Notice:  This list has 3 medication(s) that are the same as other medications prescribed for you. Read the directions carefully, and ask your doctor or other care provider to review them with you.

## 2017-10-19 NOTE — NURSING NOTE
"Chief Complaint   Patient presents with     Back Pain     Back pain x 3 days. Possible injury from exercise.        Initial /70  Pulse 88  Temp 97.9  F (36.6  C) (Oral)  SpO2 97% Estimated body mass index is 29.05 kg/(m^2) as calculated from the following:    Height as of 1/25/17: 5' 6\" (1.676 m).    Weight as of 8/17/17: 180 lb (81.6 kg).  Medication Reconciliation: complete    "

## 2017-10-20 NOTE — PROGRESS NOTES
SUBJECTIVE  HPI: Geneva Shepherd is a 76 year old female who presents for evaluation of back pain  Symptoms began 2 day(s) ago, have been onset gradual and are worse.  Pain is located in the low back bilateral region, with radiation to does not radiate, and are at worst a 4 on a scale of 1-10.  Recent injury:recent heavy lifting and turning/bending   Personal hx of back pain is recurrent self limited episodes of low back pain in the past.  Pain is exacerbated by: certain movements, bending over.  Pain is relieved by: sitting still   Associated sx include: spasms.  Red flag symptoms: negative, fever, chills, night sweats.    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     FRACTURE OF RIGHT LATERAL PROXIMAL TIBIA 11/07     Gastro-oesophageal reflux disease     rare     Hyperlipidemia LDL goal <160 10/31/2010     Insomnia      Irritable bowel syndrome      Obesity 9/11/2013     Pain in joint, lower leg      Phlebitis and thrombophlebitis of superficial vessels of lower extremities 7/03    right     Sprain of lumbar region      Unspecified hypothyroidism      Urinary tract infection, site not specified      Allergies   Allergen Reactions     Cephalexin Monohydrate      diarrhea     Social History   Substance Use Topics     Smoking status: Former Smoker     Quit date: 9/1/1968     Smokeless tobacco: Never Used      Comment: quit in 1968     Alcohol use 0.0 oz/week     0 Standard drinks or equivalent per week      Comment: 1 glass of wine a month       ROS:  CONSTITUTIONAL:POSITIVE  for fever,  chills  INTEGUMENTARY/SKIN: NEGATIVE for worrisome rashes, moles or lesions  ENT/MOUTH: NEGATIVE for ear, mouth and throat problems  RESP:NEGATIVE for significant cough or SOB  CV: NEGATIVE for chest pain, palpitations or peripheral edema  GI: NEGATIVE for nausea, abdominal pain, heartburn, or change in bowel habits  MUSCULOSKELETAL: Positive for lower back pain, spasms  NEURO: NEGATIVE for weakness, dizziness or paresthesias    OBJECTIVE:  /70  Pulse 88  Temp 97.9  F (36.6  C) (Oral)  SpO2 97%  Back examination: Back symmetric, no curvature. ROM normal. No CVA tenderness., positive findings: paraspinal muscle spasm, tenderness to palpation lumbar region  STRAIGHT leg raise test: negative  GENERAL APPEARANCE: healthy, alert and no distress  ABDOMEN:  soft, nontender, no HSM or masses and bowel sounds normal  NEURO: Normal strength and tone with no weakness or sensory deficit noted, reflexes normal   Extremities: no peripheral edema or tenderness, peripheral pulses normal  MS:  Positive for lower back pain, tenderness, spasms  SKIN: no suspicious lesions or rashes    ASSESSMENT/IMPRESSION/PLAN:      ICD-10-CM    1. Repetitive strain injury of lower back, initial encounter S39.012A methylPREDNISolone (MEDROL DOSEPAK) 4 MG tablet   2. Back muscle spasm M62.830 tiZANidine (ZANAFLEX) 2 MG tablet   3. Long-term (current) use of anticoagulants [Z79.01] Z79.01 omeprazole (PRILOSEC) 40 MG capsule       EDUCATION      1.  Continue stretching and strengthening exercises.       2.  Continue prn heat or ice application.    Orders Placed This Encounter     methylPREDNISolone (MEDROL DOSEPAK) 4 MG tablet     omeprazole (PRILOSEC) 40 MG capsule     tiZANidine (ZANAFLEX) 2 MG tablet     Follow up with PCP as needed

## 2017-10-26 ENCOUNTER — RADIANT APPOINTMENT (OUTPATIENT)
Dept: GENERAL RADIOLOGY | Facility: CLINIC | Age: 77
End: 2017-10-26
Attending: NURSE PRACTITIONER
Payer: MEDICARE

## 2017-10-26 ENCOUNTER — OFFICE VISIT (OUTPATIENT)
Dept: URGENT CARE | Facility: URGENT CARE | Age: 77
End: 2017-10-26
Payer: MEDICARE

## 2017-10-26 VITALS
RESPIRATION RATE: 18 BRPM | OXYGEN SATURATION: 99 % | SYSTOLIC BLOOD PRESSURE: 120 MMHG | HEART RATE: 79 BPM | TEMPERATURE: 98 F | DIASTOLIC BLOOD PRESSURE: 74 MMHG
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
## 2017-10-26 DIAGNOSIS — R07.89 CHEST WALL PAIN: ICD-10-CM
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
## 2017-10-26 LAB — INR PPP: 3.68 (ref 0.86–1.14)

## 2017-10-26 PROCEDURE — 71020 XR CHEST 2 VW: CPT

## 2017-10-26 PROCEDURE — 99213 OFFICE O/P EST LOW 20 MIN: CPT | Performed by: NURSE PRACTITIONER

## 2017-10-26 PROCEDURE — 36415 COLL VENOUS BLD VENIPUNCTURE: CPT | Performed by: NURSE PRACTITIONER

## 2017-10-26 PROCEDURE — 85610 PROTHROMBIN TIME: CPT | Performed by: NURSE PRACTITIONER

## 2017-10-26 NOTE — NURSING NOTE
"Chief Complaint   Patient presents with     Fall     PT was weeding outside when she fell and injured her lt side x today. Pt complains chest pain.        Initial /74  Pulse 79  Temp 98  F (36.7  C) (Oral)  Resp 18  SpO2 99% Estimated body mass index is 29.05 kg/(m^2) as calculated from the following:    Height as of 1/25/17: 5' 6\" (1.676 m).    Weight as of 8/17/17: 180 lb (81.6 kg).  Medication Reconciliation: complete    "

## 2017-10-26 NOTE — PROGRESS NOTES
"HPI  Pt c/o fall. She states that she was weeding today. She was bending over and lost her balance. She fell and landed on her right arm. She denies arm pain, but feels like she \"pulled something\" in her chest. Denies chest pain, cough, or sob. No bruising to chest. Denies hitting head or LOC. No other signs of bleeding. She notes she is on Warfarin. Her last INR was 2.5 last week.     ROS   10 point ROS assessed, documented in HPI otherwise negative.     Physical Exam   Constitutional: She is oriented to person, place, and time and well-developed, well-nourished, and in no distress. No distress.   HENT:   Head: Normocephalic.   Eyes: Pupils are equal, round, and reactive to light.   Neck: Neck supple.   Cardiovascular: Normal rate and regular rhythm.    Pulmonary/Chest: Effort normal and breath sounds normal. No respiratory distress. She has no wheezes. She has no rales. She exhibits tenderness.   Abdominal: Soft. She exhibits no distension. There is no tenderness.   Neurological: She is alert and oriented to person, place, and time. Gait normal. GCS score is 15.   Skin: Skin is warm and dry. She is not diaphoretic.   No bruising noted to chest.        /74  Pulse 79  Temp 98  F (36.7  C) (Oral)  Resp 18  SpO2 99%        MDM:  Exam is unremarkable. I did think about rib fx, tension pneumothorax and pneumothorax. CXR obtained, unremarkable and unchanged. INR 3.68. We discussed results. Pt has not taken her Warfarin yet today. She will hold tonight's dose and call her PCP in the am. I do not think she needs advanced imaging at this time. She looks well and there is no evidence of bleeding. Recommend rest, ice, Tylenol. We discussed very specific s/s that would warrant urgent re-evaluation. Pt verbalizes understanding and agreement of plan.       Dx:  Chest wall pain  Fall    Ronda Caro, KATHERIN  "

## 2017-10-26 NOTE — MR AVS SNAPSHOT
After Visit Summary   10/26/2017    Geneva Shepherd    MRN: [id number]           Patient Information     Date Of Birth          1940        Visit Information        Provider Department      10/26/2017 5:30 PM Ronda Caro NP [facility]        Today's Diagnoses     Fall, initial encounter    -  1    Chest wall pain           Follow-ups after your visit        Your next 10 appointments already scheduled     Nov 16, 2017  3:45 PM CST   Anticoagulation Visit with  ANTICOAGULATION CLINIC   [facility] ([facility]    [address] [address] [phone]              Who to contact     If you have questions or need follow up information about today's clinic visit or your schedule please contact [facility] directly at [phone].  Normal or non-critical lab and imaging results will be communicated to you by [employer]art, letter or phone within 4 business days after the clinic has received the results. If you do not hear from us within 7 days, please contact the clinic through Soundvamphart or phone. If you have a critical or abnormal lab result, we will notify you by phone as soon as possible.  Submit refill requests through [employer] or call your pharmacy and they will forward the refill request to us. Please allow 3 business days for your refill to be completed.          Additional Information About Your Visit        MyChart Information     [employer] gives you secure access to your electronic health record. If you see a primary care provider, you can also send messages to your care team and make appointments. If you have questions, please call your primary care clinic.  If you do not have a primary care provider, please call [phone] and they will assist you.        Care EveryWhere ID     This is your Care EveryWhere ID. This could be used by other organizations to access  your [facility] medical records  [id number]        Your Vitals Were     Pulse Temperature Respirations Pulse Oximetry          79 98  F (36.7  C) (Oral) 18 99%         Blood Pressure from Last 3 Encounters:   10/26/17 120/74   10/19/17 122/70   08/17/17 126/76    Weight from Last 3 Encounters:   08/17/17 180 lb (81.6 kg)   03/14/17 180 lb (81.6 kg)   02/13/17 180 lb (81.6 kg)              We Performed the Following     INR        Primary Care Provider Office Phone # Fax #    Jacoby Boo [phone]       [address] [facility] [address]        Equal Access to Services     JING PAGAN : Hadii david garnicao Socandida, waaxda luqadaha, qaybta kaalmada adeegyada, cynthia cueva . So [facility] [phone].    ATENCIÓN: Si habla español, tiene a luciano disposición servicios gratuitos de asistencia lingüística. Llame al [phone].    We comply with applicable federal civil rights laws and [place] laws. We do not discriminate on the basis of race, color, national origin, age, disability, sex, sexual orientation, or gender identity.            Thank you!     Thank you for choosing [facility]  [place] your care. Our goal is always to provide you with excellent care. Hearing back from our patients is one way we can continue to improve our services. Please take a few minutes to complete the written survey that you may receive in the mail after your visit with us. Thank you!             Your Updated Medication List - Protect others around you: Learn how to safely use, store and throw away your medicines at www.disposemymeds.org.          This list is accurate as of: 10/26/17  7:04 PM.  Always use your most recent med list.                   Brand Name Dispense Instructions for use Diagnosis    acetaminophen 500 MG tablet    TYLENOL     Take 1,000 mg by mouth 3 times daily        amitriptyline 25 MG tablet    ELAVIL    90 tablet    Take 1 tablet (25 mg) by  mouth At Bedtime    Insomnia, unspecified type       BETA CAROTENE PO      Take 25,000 Units by mouth daily.        BIOTIN PO      Take 1 tablet by mouth every morning        Chromium Picolinate 800 MCG Tabs      1 daily        COCONUT OIL PO      Take 1 capsule by mouth every morning        fish oil-omega-3 fatty acids 1000 MG capsule      1 daily        flax seed oil 1000 MG capsule      1 daily        FOLIC ACID PO      Take 400 mcg by mouth daily        levOCARNitine 330 MG tablet    CARNITOR     Take 330 mg by mouth every morning        levothyroxine 50 MCG tablet    SYNTHROID    90 tablet    Take 1 tablet (50 mcg) by mouth daily    Hypothyroidism, unspecified type       MAGNESIUM OXIDE PO      Take 400 mg by mouth daily        methylPREDNISolone 4 MG tablet    MEDROL DOSEPAK    21 tablet    Follow package instructions    Repetitive strain injury of lower back, initial encounter       MULTIVITAMIN/MULTIMINERAL TABS   OR     30    1 TABLET DAILY        N-ACETYL-L-CYSTEINE PO      Take 1 capsule by mouth every morning        omeprazole 40 MG capsule    priLOSEC    7 capsule    Take 1 capsule (40 mg) by mouth daily Take 30-60 minutes before a meal.    Long-term (current) use of anticoagulants       * order for DME     2 Device    Equipment being ordered: Compression Stockings Knee High 20-30 mmhg    Venous ulcer of ankle, right (H), Venous (peripheral) insufficiency, Varicose vein of leg       * order for DME     30 days    Equipment being ordered: [employer] Fax [phone]  Primary Dressing Mepilex Border 4x4   Qty 30 Length of Need: 1 month Frequency of dressing change: daily    Venous ulcer of ankle, right (H)       * order for DME     4 Device    Equipment being ordered: Thigh High 30-40 mmhg Compression Stockings.    Venous ulcer of right leg (H), Venous (peripheral) insufficiency, Swelling of limb       PARoxetine 20 MG tablet    PAXIL    180 tablet    TAKE 2 TABLETS BY MOUTH AT BEDTIME     Depression, unspecified depression type       tiZANidine 2 MG tablet    ZANAFLEX    30 tablet    Take 1 tablet (2 mg) by mouth 3 times daily    Back muscle spasm       TURMERIC PO      Take 1 capsule by mouth every morning        vitamin B complex with vitamin C Tabs tablet      Take 1 tablet by mouth daily DOSE UNKNOWN        VITAMIN C PO      Take 1,000 mg by mouth every morning (Patient takes 2 X 500 mg = 1,000 mg dose)        warfarin 5 MG tablet    COUMADIN    135 tablet    1.5 TABLETS (7.5MG) BY MOUTH ON MON WED FRI, AND 1 TABLET ALL OTHER DAYS PER ACC    Personal history of DVT (deep vein thrombosis)       Zinc 50 MG Caps      1 daily        * Notice:  This list has 3 medication(s) that are the same as other medications prescribed for you. Read the directions carefully, and ask your doctor or other care provider to review them with you.

## 2017-11-10 ENCOUNTER — TELEPHONE (OUTPATIENT)
Dept: INTERNAL MEDICINE | Facility: CLINIC | Age: 77
End: 2017-11-10

## 2017-11-10 NOTE — TELEPHONE ENCOUNTER
Geneva Shepherd is a 76 year old female who calls with heartburn and back pain. No nausea and no vomiting . Please prescribe medication .    NURSING ASSESSMENT:  Description:  Burning in chest and back pain midline   Onset/duration:  Past week   Precip. factors:  Did have a recent fall  Associated symptoms:  None   Improves/worsens symptoms:  Heartburn: tried Omeprazole and tried OTC acid reducer generic Pepcid   Pain scale (0-10)   3/10  LMP/preg/breast feeding:  na  Last exam/Treatment:  8-17-17  Allergies:   Allergies   Allergen Reactions     Cephalexin Monohydrate      diarrhea       MEDICATIONS:   Taking medication(s) as prescribed? Yes  Taking over the counter medication(s?) Yes  Any medication side effects? No significant side effects    Any barriers to taking medication(s) as prescribed?  No  Medication(s) improving/managing symptoms?  No  Medication reconciliation completed: Yes      NURSING PLAN: Routed to provider Yes    RECOMMENDED DISPOSITION: appt 11/20  Will call back if needs earlier Will comply with recommendation: Yes  If further questions/concerns or if symptoms do not improve, worsen or new symptoms develop, call your PCP or [facility] as soon as possible.      Guideline used:  Telephone Triage Protocols for Nurses, Fifth Edition, Shannon Juares RN

## 2017-11-20 ENCOUNTER — OFFICE VISIT (OUTPATIENT)
Dept: INTERNAL MEDICINE | Facility: CLINIC | Age: 77
End: 2017-11-20
Payer: MEDICARE

## 2017-11-20 ENCOUNTER — ANTICOAGULATION THERAPY VISIT (OUTPATIENT)
Dept: ANTICOAGULATION | Facility: CLINIC | Age: 77
End: 2017-11-20
Payer: MEDICARE

## 2017-11-20 VITALS
HEART RATE: 84 BPM | BODY MASS INDEX: 29.05 KG/M2 | TEMPERATURE: 98 F | OXYGEN SATURATION: 95 % | SYSTOLIC BLOOD PRESSURE: 120 MMHG | DIASTOLIC BLOOD PRESSURE: 72 MMHG | WEIGHT: 180 LBS

## 2017-11-20 DIAGNOSIS — Z23 NEED FOR PROPHYLACTIC VACCINATION AND INOCULATION AGAINST INFLUENZA: ICD-10-CM

## 2017-11-20 DIAGNOSIS — K21.9 GASTROESOPHAGEAL REFLUX DISEASE, ESOPHAGITIS PRESENCE NOT SPECIFIED: ICD-10-CM

## 2017-11-20 DIAGNOSIS — Z79.01 LONG-TERM (CURRENT) USE OF ANTICOAGULANTS: ICD-10-CM

## 2017-11-20 DIAGNOSIS — R07.89 CHEST WALL PAIN: Primary | ICD-10-CM

## 2017-11-20 LAB — INR POINT OF CARE: 2.7 (ref 0.86–1.14)

## 2017-11-20 PROCEDURE — 99207 ZZC NO CHARGE NURSE ONLY: CPT

## 2017-11-20 PROCEDURE — 36416 COLLJ CAPILLARY BLOOD SPEC: CPT

## 2017-11-20 PROCEDURE — 90662 IIV NO PRSV INCREASED AG IM: CPT | Performed by: INTERNAL MEDICINE

## 2017-11-20 PROCEDURE — 99214 OFFICE O/P EST MOD 30 MIN: CPT | Mod: 25 | Performed by: INTERNAL MEDICINE

## 2017-11-20 PROCEDURE — G0008 ADMIN INFLUENZA VIRUS VAC: HCPCS | Performed by: INTERNAL MEDICINE

## 2017-11-20 PROCEDURE — 85610 PROTHROMBIN TIME: CPT | Mod: QW

## 2017-11-20 NOTE — PROGRESS NOTES
ANTICOAGULATION FOLLOW-UP CLINIC VISIT    Patient Name:  Geneva Shepherd  Date:  11/20/2017  Contact Type:  Face to Face    SUBJECTIVE:     Patient Findings     Positives No Problem Findings           OBJECTIVE    INR Protime   Date Value Ref Range Status   11/20/2017 2.7 (A) 0.86 - 1.14 Final       ASSESSMENT / PLAN  INR assessment THER    Recheck INR In: 4 WEEKS    INR Location Clinic      Anticoagulation Summary as of 11/20/2017     INR goal 2.0-3.0   Today's INR 2.7   Maintenance plan 7.5 mg (5 mg x 1.5) on Mon, Wed, Fri; 5 mg (5 mg x 1) all other days   Full instructions 7.5 mg on Mon, Wed, Fri; 5 mg all other days   Weekly total 42.5 mg   No change documented Camelia He RN   Plan last modified Ofelia Cavazos RN (12/31/2015)   Next INR check 12/18/2017   Target end date     Indications   Long-term (current) use of anticoagulants [Z79.01] [Z79.01]  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

## 2017-11-20 NOTE — MR AVS SNAPSHOT
After Visit Summary   11/20/2017    Geneva Shepherd    MRN: [id number]           Patient Information     Date Of Birth          1940        Visit Information        Provider Department      11/20/2017 4:30 PM Jacoby Boo MD [facility]        Today's Diagnoses     Chest wall pain    -  1    Gastroesophageal reflux disease, esophagitis presence not specified        Need for prophylactic vaccination and inoculation against influenza           Follow-ups after your visit        Your next 10 appointments already scheduled     Dec 18, 2017  4:00 PM CST   Anticoagulation Visit with  ANTICOAGULATION CLINIC   [facility] ([facility])    [address] [address] [phone]              Who to contact     If you have questions or need follow up information about today's clinic visit or your schedule please contact [facility] directly at [phone].  Normal or non-critical lab and imaging results will be communicated to you by MyChart, letter or phone within 4 business days after the clinic has received the results. If you do not hear from us within 7 days, please contact the clinic through [employer] or phone. If you have a critical or abnormal lab result, we will notify you by phone as soon as possible.  Submit refill requests through [employer] or call your pharmacy and they will forward the refill request to us. Please allow 3 business days for your refill to be completed.          Additional Information About Your Visit        MyChart Information     [employer] gives you secure access to your electronic health record. If you see a primary care provider, you can also send messages to your care team and make appointments. If you have questions, please call your primary care clinic.  If you do not have a primary care provider, please call [phone] and they will assist you.         Care EveryWhere ID     This is your Care EveryWhere ID. This could be used by other organizations to access your [place] medical records  [id number]        Your Vitals Were     Pulse Temperature Pulse Oximetry BMI (Body Mass Index)          84 98  F (36.7  C) (Oral) 95% 29.05 kg/m2         Blood Pressure from Last 3 Encounters:   11/20/17 120/72   10/26/17 120/74   10/19/17 122/70    Weight from Last 3 Encounters:   11/20/17 180 lb (81.6 kg)   08/17/17 180 lb (81.6 kg)   03/14/17 180 lb (81.6 kg)              We Performed the Following     ADMIN INFLUENZA (For MEDICARE Patients ONLY) []     FLU VACCINE, INCREASED ANTIGEN, PRESV FREE, AGE 65+ [17998]     Vaccine Administration, Initial [22640]        Primary Care Provider Office Phone # Fax #    Jacoby Boo [phone]       [address] [facility] [address]        Equal Access to Services     JING PAGAN : Hadii aad ku hadasho Soomaali, waaxda luqadaha, qaybta kaalmada adeegyada, waxay idiin haysarojn winston cueva . So [facility] [phone].    ATENCIÓN: Si habla español, tiene a luciano disposición servicios gratuitos de asistencia lingüística. Llame al [phone].    We comply with applicable federal civil rights laws and [place] laws. We do not discriminate on the basis of race, color, national origin, age, disability, sex, sexual orientation, or gender identity.            Thank you!     Thank you for choosing [facility]  for your care. Our goal is always to provide you with excellent care. Hearing back from our patients is one way we can continue to improve our services. Please take a few minutes to complete the written survey that you may receive in the mail after your visit with us. Thank you!             Your Updated Medication List - Protect others around you: Learn how to safely use, store and throw away your medicines at www.disposemymeds.org.          This list is accurate as of: 11/20/17 11:59 PM.   Always use your most recent med list.                   Brand Name Dispense Instructions for use Diagnosis    acetaminophen 500 MG tablet    TYLENOL     Take 1,000 mg by mouth 3 times daily        amitriptyline 25 MG tablet    ELAVIL    90 tablet    Take 1 tablet (25 mg) by mouth At Bedtime    Insomnia, unspecified type       BETA CAROTENE PO      Take 25,000 Units by mouth daily.        BIOTIN PO      Take 1 tablet by mouth every morning        Chromium Picolinate 800 MCG Tabs      1 daily        COCONUT OIL PO      Take 1 capsule by mouth every morning        fish oil-omega-3 fatty acids 1000 MG capsule      1 daily        flax seed oil 1000 MG capsule      1 daily        FOLIC ACID PO      Take 400 mcg by mouth daily        levOCARNitine 330 MG tablet    CARNITOR     Take 330 mg by mouth every morning        levothyroxine 50 MCG tablet    SYNTHROID    90 tablet    Take 1 tablet (50 mcg) by mouth daily    Hypothyroidism, unspecified type       MAGNESIUM OXIDE PO      Take 400 mg by mouth daily        MULTIVITAMIN/MULTIMINERAL TABS   OR     30    1 TABLET DAILY        N-ACETYL-L-CYSTEINE PO      Take 1 capsule by mouth every morning        * order for DME     2 Device    Equipment being ordered: Compression Stockings Knee High 20-30 mmhg    Venous ulcer of ankle, right (H), Venous (peripheral) insufficiency, Varicose vein of leg       * order for DME     30 days    Equipment being ordered: [employer] Fax [phone]  Primary Dressing Mepilex Border 4x4   Qty 30 Length of Need: 1 month Frequency of dressing change: daily    Venous ulcer of ankle, right (H)       * order for DME     4 Device    Equipment being ordered: Thigh High 30-40 mmhg Compression Stockings.    Venous ulcer of right leg (H), Venous (peripheral) insufficiency, Swelling of limb       PARoxetine 20 MG tablet    PAXIL    180 tablet    TAKE 2 TABLETS BY MOUTH AT BEDTIME    Depression, unspecified depression type       tiZANidine 2 MG  tablet    ZANAFLEX    30 tablet    Take 1 tablet (2 mg) by mouth 3 times daily    Back muscle spasm       TURMERIC PO      Take 1 capsule by mouth every morning        vitamin B complex with vitamin C Tabs tablet      Take 1 tablet by mouth daily DOSE UNKNOWN        VITAMIN C PO      Take 1,000 mg by mouth every morning (Patient takes 2 X 500 mg = 1,000 mg dose)        warfarin 5 MG tablet    COUMADIN    135 tablet    1.5 TABLETS (7.5MG) BY MOUTH ON MON WED FRI, AND 1 TABLET ALL OTHER DAYS PER ACC    Personal history of DVT (deep vein thrombosis)       Zinc 50 MG Caps      1 daily        * Notice:  This list has 3 medication(s) that are the same as other medications prescribed for you. Read the directions carefully, and ask your doctor or other care provider to review them with you.

## 2017-11-20 NOTE — PROGRESS NOTES
"  SUBJECTIVE:   Geneva Shepherd is a 76 year old female who presents to clinic today for the following health issues:      Chief Complaint   Patient presents with     Pain     under rib cage, x 2 weeks   Patient has fallen twice this year.      Pt's past medical history, family history, habits, medications and allergies were reviewed with the patient today.  See snap shot for  HCM status. Most recent lab results reviewed with pt. Problem list and histories reviewed & adjusted, as indicated.  Additional history as below:    Pain mostly left chest wall when getting up from floor after exercise class. Had initial strain of chest when fell in late October. See in UC 10/26/17. Had neg CXR then.  Pt denies shortness of breath or cough.   No rash on the chest wall.  Increased acid reflex  Recently.  Has been drinking a little more caffeine than usual. Diet consisting of a little bit more fatty food. No alcohol use. Denies abdominal pain. Denies blood in her stool       Additional ROS:   Constitutional, HEENT, Cardiovascular, Pulmonary, GI and , Neuro, MSK and Psych review of systems/symptoms are otherwise negative or unchanged from previous, except as noted above.      OBJECTIVE:  /72  Pulse 84  Temp 98  F (36.7  C) (Oral)  Wt 180 lb (81.6 kg)  SpO2 95%  BMI 29.05 kg/m2   Estimated body mass index is 29.05 kg/(m^2) as calculated from the following:    Height as of 1/25/17: 5' 6\" (1.676 m).    Weight as of this encounter: 180 lb (81.6 kg).  Eye: PERRL, EOMI  HENT: ear canals and TM's normal and nose and mouth without ulcers or lesions   Neck: no adenopathy. Thyroid normal to palpation. No bruits  Pulm: Lungs clear to auscultation   CV: Regular rates and rhythm  GI: Soft, nontender including epigastric area, Normal active bowel sounds, No hepatosplenomegaly or masses palpable  Ext: Peripheral pulses intact. No edema.  Neuro: Normal strength and tone, sensory exam grossly normal  MSK:  Mild tenderness to " palpation along the lateral chest wall oblique musculature. No rash.    Assessment/Plan: (See plan discussion below for further details)  1. Chest wall pain     Musculoskeletal strain. Discussed stretching of the chest wall in the morning. Ice and Tylenol as needed.    2. Gastroesophageal reflux disease, esophagitis presence not specified   Reduce caffeine intake. May use ranitidine 150 mg twice a day OTC as needed    3. Need for prophylactic vaccination and inoculation against influenza  - FLU VACCINE, INCREASED ANTIGEN, PRESV FREE, AGE 65+ [50000]  - Vaccine Administration, Initial [78401]  - ADMIN INFLUENZA (For MEDICARE Patients ONLY) []        Jacoby Boo MD  Internal Medicine Department  [facility]          Injectable Influenza Immunization Documentation    1.  Is the person to be vaccinated sick today?   No    2. Does the person to be vaccinated have an allergy to a component   of the vaccine?   No  Egg Allergy Algorithm Link    3. Has the person to be vaccinated ever had a serious reaction   to influenza vaccine in the past?   No    4. Has the person to be vaccinated ever had Guillain-Barré syndrome?   No    Form completed by Queenie Gonzalez CMA

## 2017-11-20 NOTE — MR AVS SNAPSHOT
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        November 2017 Details    Sun Mon Tue Wed Thu Fri Sat        1               2               3               4                 5               6               7               8               9               10               11                 12               13               14               15               16               17               18                 19               20      7.5 mg   See details      21      5 mg         22      7.5 mg         23      5 mg         24      7.5 mg         25      5 mg           26      5 mg         27      7.5 mg         28      5 mg         29      7.5 mg         30      5 mg            Date Details   11/20 This INR check               How to take your warfarin dose     To take:  5 mg Take 1 of the 5 mg tablets.    To take:  7.5 mg Take 1.5 of the 5 mg tablets.           December 2017 Details    Sun Mon Tue Wed  Thu Fri Sat          1      7.5 mg         2      5 mg           3      5 mg         4      7.5 mg         5      5 mg         6      7.5 mg         7      5 mg         8      7.5 mg         9      5 mg           10      5 mg         11      7.5 mg         12      5 mg         13      7.5 mg         14      5 mg         15      7.5 mg         16      5 mg           17      5 mg         18            19               20               21               22               23                 24               25               26               27               28               29               30                 31                      Date Details   No additional details    Date of next INR:  12/18/2017         How to take your warfarin dose     To take:  5 mg Take 1 of the 5 mg tablets.    To take:  7.5 mg Take 1.5 of the 5 mg tablets.

## 2017-11-20 NOTE — NURSING NOTE
"Chief Complaint   Patient presents with     Pain     under rib cage, x 2 weeks       Initial /72  Pulse 84  Temp 98  F (36.7  C) (Oral)  Wt 180 lb (81.6 kg)  SpO2 95%  BMI 29.05 kg/m2 Estimated body mass index is 29.05 kg/(m^2) as calculated from the following:    Height as of 1/25/17: 5' 6\" (1.676 m).    Weight as of this encounter: 180 lb (81.6 kg).  Medication Reconciliation: complete  "

## 2017-12-18 ENCOUNTER — ANTICOAGULATION THERAPY VISIT (OUTPATIENT)
Dept: ANTICOAGULATION | Facility: CLINIC | Age: 77
End: 2017-12-18
Payer: MEDICARE

## 2017-12-18 DIAGNOSIS — Z79.01 LONG-TERM (CURRENT) USE OF ANTICOAGULANTS: ICD-10-CM

## 2017-12-18 LAB — INR POINT OF CARE: 2.6 (ref 0.86–1.14)

## 2017-12-18 PROCEDURE — 85610 PROTHROMBIN TIME: CPT | Mod: QW

## 2017-12-18 PROCEDURE — 36416 COLLJ CAPILLARY BLOOD SPEC: CPT

## 2017-12-18 PROCEDURE — 99207 ZZC NO CHARGE NURSE ONLY: CPT

## 2017-12-18 NOTE — PROGRESS NOTES
ANTICOAGULATION FOLLOW-UP CLINIC VISIT    Patient Name:  Geneva Shepherd  Date:  12/18/2017  Contact Type:  Face to Face    SUBJECTIVE:     Patient Findings     Positives No Problem Findings           OBJECTIVE    INR Protime   Date Value Ref Range Status   12/18/2017 2.6 (A) 0.86 - 1.14 Final       ASSESSMENT / PLAN  No question data found.  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

## 2017-12-18 NOTE — MR AVS SNAPSHOT
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        December 2017 Details    Sun Mon Tue Wed Thu Fri Sat          1               2                 3               4               5               6               7               8               9                 10               11               12               13               14               15               16                 17               18      7.5 mg   See details      19      5 mg         20      7.5 mg         21      5 mg         22      7.5 mg         23      5 mg           24      5 mg         25      7.5 mg         26      5 mg         27      7.5 mg         28      5 mg         29      7.5 mg         30      5 mg           31      5 mg                Date Details   12/18 This INR check               How to take your warfarin dose     To take:  5 mg Take 1 of the 5 mg tablets.    To take:  7.5 mg Take 1.5 of the 5 mg tablets.            January 2018 Details    Sun Mon Tue Wed Thu Fri Sat      1      7.5 mg         2      5 mg         3      7.5 mg         4      5 mg         5      7.5 mg         6      5 mg           7      5 mg         8      7.5 mg         9      5 mg         10      7.5 mg         11      5 mg         12      7.5 mg         13      5 mg           14      5 mg         15            16               17               18               19               20                 21               22               23               24               25               26               27                 28               29               30               31                   Date Details   No additional details    Date of next INR:  1/15/2018         How to take your warfarin dose     To take:  5 mg Take 1 of the 5 mg tablets.    To take:  7.5 mg Take 1.5 of the 5 mg tablets.

## 2018-01-19 ENCOUNTER — TRANSFERRED RECORDS (OUTPATIENT)
Dept: HEALTH INFORMATION MANAGEMENT | Facility: CLINIC | Age: 78
End: 2018-01-19

## 2018-01-30 ENCOUNTER — ANTICOAGULATION THERAPY VISIT (OUTPATIENT)
Dept: ANTICOAGULATION | Facility: CLINIC | Age: 78
End: 2018-01-30
Payer: MEDICARE

## 2018-01-30 LAB — INR POINT OF CARE: 2.3 (ref 0.86–1.14)

## 2018-01-30 PROCEDURE — 36416 COLLJ CAPILLARY BLOOD SPEC: CPT

## 2018-01-30 PROCEDURE — 85610 PROTHROMBIN TIME: CPT | Mod: QW

## 2018-01-30 NOTE — MR AVS SNAPSHOT
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  Please call  [phone] to cancel and/or reschedule your appointment   Please call  [phone] with any problems or questions regarding your therapy.        January 2018 Details    Sun Mon Tue Wed Thu Fri Sat      1               2               3               4               5               6                 7               8               9               10               11               12               13                 14               15               16               17               18               19               20                 21               22               23               24               25               26               27                 28               29               30      5 mg   See details      31      7.5 mg             Date Details   01/30 This INR check               How to take your warfarin dose     To take:  5 mg Take 1 of the 5 mg tablets.    To take:  7.5 mg Take 1.5 of the 5 mg tablets.           February 2018 Details    Sun Mon Tue Wed Thu Fri Sat         1      5 mg          2      7.5 mg         3      5 mg           4      5 mg         5      7.5 mg         6      5 mg         7      7.5 mg         8      5 mg         9      7.5 mg         10      5 mg           11      5 mg         12      7.5 mg         13      5 mg         14      7.5 mg         15      5 mg         16            17                 18               19               20               21               22               23               24                 25               26               27               28                   Date Details   No additional details    Date of next INR:  2/16/2018         How to take your warfarin dose     To take:  5 mg Take 1 of the 5 mg tablets.    To take:  7.5 mg Take 1.5 of the 5 mg tablets.

## 2018-01-30 NOTE — PROGRESS NOTES
ANTICOAGULATION FOLLOW-UP CLINIC VISIT    Patient Name:  Geneva Shepherd  Date:  1/30/2018  Contact Type:  Face to Face    SUBJECTIVE:     Patient Findings     Positives No Problem Findings           OBJECTIVE    INR Protime   Date Value Ref Range Status   01/30/2018 2.3 (A) 0.86 - 1.14 Final       ASSESSMENT / PLAN  INR assessment THER    Recheck INR In: 3 WEEKS    INR Location Clinic      Anticoagulation Summary as of 1/30/2018     INR goal 2.0-3.0   Today's INR 2.3   Maintenance plan 7.5 mg (5 mg x 1.5) on Mon, Wed, Fri; 5 mg (5 mg x 1) all other days   Full instructions 7.5 mg on Mon, Wed, Fri; 5 mg all other days   Weekly total 42.5 mg   Plan last modified Ofelia Cavazos RN (12/31/2015)   Next INR check 2/16/2018   Target end date     Indications   Long-term (current) use of anticoagulants [Z79.01] [Z79.01]  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    Comments             See the Encounter Report to view Anticoagulation Flowsheet and Dosing Calendar (Go to Encounters tab in chart review, and find the Anticoagulation Therapy Visit)    Dosage adjustment made based on physician directed care plan.    Ofelia Cavazos RN

## 2018-02-16 ENCOUNTER — ANTICOAGULATION THERAPY VISIT (OUTPATIENT)
Dept: ANTICOAGULATION | Facility: CLINIC | Age: 78
End: 2018-02-16
Payer: MEDICARE

## 2018-02-16 ENCOUNTER — OFFICE VISIT (OUTPATIENT)
Dept: INTERNAL MEDICINE | Facility: CLINIC | Age: 78
End: 2018-02-16
Payer: MEDICARE

## 2018-02-16 VITALS
TEMPERATURE: 97.7 F | SYSTOLIC BLOOD PRESSURE: 124 MMHG | RESPIRATION RATE: 16 BRPM | WEIGHT: 180 LBS | HEIGHT: 67 IN | DIASTOLIC BLOOD PRESSURE: 72 MMHG | HEART RATE: 88 BPM | BODY MASS INDEX: 28.25 KG/M2

## 2018-02-16 DIAGNOSIS — H26.9 CATARACT OF RIGHT EYE, UNSPECIFIED CATARACT TYPE: ICD-10-CM

## 2018-02-16 DIAGNOSIS — R41.3 MEMORY CHANGE: ICD-10-CM

## 2018-02-16 DIAGNOSIS — E83.52 HYPERCALCEMIA: ICD-10-CM

## 2018-02-16 DIAGNOSIS — Z01.818 PREOP GENERAL PHYSICAL EXAM: Primary | ICD-10-CM

## 2018-02-16 LAB
CALCIUM SERPL-MCNC: 10.7 MG/DL (ref 8.5–10.1)
INR POINT OF CARE: 2.4 (ref 0.86–1.14)

## 2018-02-16 PROCEDURE — 83970 ASSAY OF PARATHORMONE: CPT | Performed by: INTERNAL MEDICINE

## 2018-02-16 PROCEDURE — 85610 PROTHROMBIN TIME: CPT | Mod: QW

## 2018-02-16 PROCEDURE — 93000 ELECTROCARDIOGRAM COMPLETE: CPT | Performed by: INTERNAL MEDICINE

## 2018-02-16 PROCEDURE — 82607 VITAMIN B-12: CPT | Performed by: INTERNAL MEDICINE

## 2018-02-16 PROCEDURE — 36416 COLLJ CAPILLARY BLOOD SPEC: CPT

## 2018-02-16 PROCEDURE — 82310 ASSAY OF CALCIUM: CPT | Performed by: INTERNAL MEDICINE

## 2018-02-16 PROCEDURE — 99215 OFFICE O/P EST HI 40 MIN: CPT | Performed by: INTERNAL MEDICINE

## 2018-02-16 NOTE — MR AVS SNAPSHOT
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        February 2018 Details    Sun Mon Tue Wed Thu Fri Sat         1               2               3                 4               5               6               7               8               9               10                 11               12               13               14               15               16      7.5 mg   See details      17      5 mg           18      5 mg         19      7.5 mg         20      5 mg         21      7.5 mg         22      5 mg         23            24                 25               26               27               28                   Date Details   02/16 This INR check       Date of next INR:  2/23/2018         How to take your warfarin dose     To take:  5 mg Take 1 of the 5 mg tablets.    To take:  7.5 mg Take 1.5 of the 5 mg tablets.

## 2018-02-16 NOTE — PROGRESS NOTES
ANTICOAGULATION FOLLOW-UP CLINIC VISIT    Patient Name:  Geneva Shepherd  Date:  2/16/2018  Contact Type:  Face to Face    SUBJECTIVE:     Patient Findings     Positives Hospital admission (Pt having cataract eye surgery 2/19/18, and having 2nd eye surgery 2/26/18)           OBJECTIVE    INR Protime   Date Value Ref Range Status   02/16/2018 2.4 (A) 0.86 - 1.14 Final       ASSESSMENT / PLAN  INR assessment THER    Recheck INR In: 1 WEEK    INR Location Clinic      Anticoagulation Summary as of 2/16/2018     INR goal 2.0-3.0   Today's INR 2.4   Maintenance plan 7.5 mg (5 mg x 1.5) on Mon, Wed, Fri; 5 mg (5 mg x 1) all other days   Full instructions 7.5 mg on Mon, Wed, Fri; 5 mg all other days   Weekly total 42.5 mg   No change documented Ofelia Cavazos RN   Plan last modified Ofelia Cavazos RN (12/31/2015)   Next INR check 2/23/2018   Target end date     Indications   Long-term (current) use of anticoagulants [Z79.01] [Z79.01]  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    Ofelia Cavazos RN

## 2018-02-16 NOTE — PROGRESS NOTES
[address] [address]  [phone]  Dept: [phone]    PRE-OP EVALUATION:  Today's date: 2018    Geneva Shepherd (: 1940) presents for pre-operative evaluation assessment as requested by Dr. Neal.  She requires evaluation and anesthesia risk assessment prior to undergoing surgery/procedure for treatment of bilateral cataracts  Proposed procedure: Cataract extraction/IOL placement    Date of Surgery/ Procedure: 2018 (left) , 18 (right)  Time of Surgery/ Procedure: 8:20am  Hospital/Surgical Facility: [facility]  Fax number for surgical facility: [phone]  Primary Physician: Jacoby Boo  Type of Anesthesia Anticipated: Local with MAC    Patient has a Health Care Directive or Living Will:  NO    1. NO - Do you have a history of heart attack, stroke, stent, bypass or surgery on an artery in the head, neck, heart or legs?  2. NO - Do you ever have any pain or discomfort in your chest?  3. NO - Do you have a history of  Heart Failure?  4. NO - Are you troubled by shortness of breath when: walking on the level, up a slight hill or at night?  5. NO - Do you currently have a cold, bronchitis or other respiratory infection?  6. NO - Do you have a cough, shortness of breath or wheezing?  7. NO - Do you sometimes get pains in the calves of your legs when you walk?  8. YES - DO YOU OR ANYONE IN YOUR FAMILY HAVE PREVIOUS HISTORY OF BLOOD CLOTS?  Hx RLE DVT  9. NO - Do you or does anyone in your family have a serious bleeding problem such as prolonged bleeding following surgeries or cuts?  10. NO - Have you ever had problems with anemia or been told to take iron pills?  11. NO - Have you had any abnormal blood loss such as black, tarry or bloody stools, or abnormal vaginal bleeding?  12. NO - Have you ever had a blood transfusion?  13. NO - Have you or any of your relatives ever had problems with anesthesia?  14. NO - Do you have  sleep apnea, excessive snoring or daytime drowsiness?  15. NO - Do you have any prosthetic heart valves?  16. YES - DO YOU HAVE PROSTHETIC JOINTS?   RTKA, LTKA, LTHA  17. NO - Is there any chance that you may be pregnant?      HPI:                                                      Brief HPI related to upcoming procedure: Pt has bilateral cataracts with vision clouding left worse than right. Presents for clearance to undergo surgical correction. See below for other medical issues           MEDICAL HISTORY:                                                      Patient Active Problem List    Diagnosis Date Noted     Gastroesophageal reflux disease, esophagitis presence not specified 11/23/2017     Priority: Medium     Major depressive disorder, single episode, mild (H) 03/14/2017     Priority: Medium     Knee joint replacement status 01/16/2017     Priority: Medium     Insomnia 02/17/2016     Priority: Medium     Hypothyroidism 01/01/2016     Priority: Medium     Long-term (current) use of anticoagulants [Z79.01] 12/15/2015     Priority: Medium     Asymptomatic varicose veins, bilateral 09/03/2015     Priority: Medium     Personal history of RLE DVT (deep vein thrombosis)(2003) 09/03/2015     Priority: Medium     Anticoagulated on Coumadin 09/03/2015     Priority: Medium     Hip joint replacement status: Left 8/31/15 08/31/2015     Priority: Medium     Elevated antinuclear antibody (JUAN ANTONIO) level 07/04/2015     Priority: Medium     Obesity 09/11/2013     Priority: Medium     Osteoarthrosis involving lower leg 01/21/2013     Priority: Medium     Problem list name updated by automated process. Provider to review       Advanced directives, counseling/discussion 05/18/2012     Priority: Medium     Patient states has Advance Directive and will bring in a copy to clinic. 5/18/2012          FACTOR 5 LEIDEN DEFECT (HYPERCOAGULABLE) 07/30/2003     Priority: Medium     Irritable bowel syndrome 09/09/2002     Priority: Medium       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     FRACTURE OF RIGHT LATERAL PROXIMAL TIBIA 11/07     Gastro-oesophageal reflux disease     rare     Hyperlipidemia LDL goal <160 10/31/2010     Insomnia      Irritable bowel syndrome      Obesity 9/11/2013     Pain in joint, lower leg      Phlebitis and thrombophlebitis of superficial vessels of lower extremities 7/03    right     Sprain of lumbar region      Unspecified hypothyroidism      Urinary tract infection, site not specified      Past Surgical History:   Procedure Laterality Date     ARTHROPLASTY HIP Left 8/31/2015    Procedure: ARTHROPLASTY HIP;  Surgeon: Benjamín Maddox MD;  Location:  OR     ARTHROPLASTY KNEE  1/17/2013    Procedure: ARTHROPLASTY KNEE;  RIGHT TOTAL KNEE ARTHROPLASTY (BIOMET)^ ;  Surgeon: Benjamín Maddox MD;  Location:  OR     ARTHROPLASTY KNEE Left 1/16/2017    Procedure: ARTHROPLASTY KNEE;  Surgeon: Benjamín Maddox MD;  Location:  OR      NONSPECIFIC PROCEDURE  7/00/01    Endometrial Biopsy.     C NONSPECIFIC PROCEDURE      Tubal Ligation.     C NONSPECIFIC PROCEDURE  6/02    negative coronary angio     C NONSPECIFIC PROCEDURE  7/04    RLE varicose vein stripping     CHOLECYSTECTOMY       COLONOSCOPY N/A 4/26/2016    Procedure: COMBINED COLONOSCOPY, SINGLE OR MULTIPLE BIOPSY/POLYPECTOMY BY BIOPSY;  Surgeon: Mario Coe MD;  Location:  GI     GYN SURGERY      tubal     VASCULAR SURGERY       Current Outpatient Prescriptions   Medication Sig Dispense Refill     tiZANidine  (ZANAFLEX) 2 MG tablet Take 1 tablet (2 mg) by mouth 3 times daily 30 tablet 0     PARoxetine (PAXIL) 20 MG tablet TAKE 2 TABLETS BY MOUTH AT BEDTIME 180 tablet 1     warfarin (COUMADIN) 5 MG tablet 1.5 TABLETS (7.5MG) BY MOUTH ON MON WED FRI, AND 1 TABLET ALL OTHER DAYS PER  tablet 3     amitriptyline (ELAVIL) 25 MG tablet Take 1 tablet (25 mg) by mouth At Bedtime 90 tablet 3     acetaminophen (TYLENOL) 500 MG tablet Take 1,000 mg by mouth 3 times daily       Ascorbic Acid (VITAMIN C PO) Take 1,000 mg by mouth every morning (Patient takes 2 X 500 mg = 1,000 mg dose)       levOCARNitine (CARNITOR) 330 MG tablet Take 330 mg by mouth every morning       BIOTIN PO Take 1 tablet by mouth every morning       TURMERIC PO Take 1 capsule by mouth every morning       MAGNESIUM OXIDE PO Take 400 mg by mouth daily       Acetylcysteine (N-ACETYL-L-CYSTEINE PO) Take 1 capsule by mouth every morning       COCONUT OIL PO Take 1 capsule by mouth every morning       levothyroxine (SYNTHROID) 50 MCG tablet Take 1 tablet (50 mcg) by mouth daily 90 tablet 3     order for DME Equipment being ordered: Thigh High 30-40 mmhg Compression Stockings. 4 Device 0     order for DME Equipment being ordered: [facility] Fax [phone]    Primary Dressing Mepilex Border 4x4   Qty 30  Length of Need: 1 month  Frequency of dressing change: daily 30 days 0     order for DME Equipment being ordered: Compression Stockings Knee High 20-30 mmhg 2 Device 2     vitamin  B complex with vitamin C (VITAMIN  B COMPLEX) TABS Take 1 tablet by mouth daily DOSE UNKNOWN       FOLIC ACID PO Take 400 mcg by mouth daily        BETA CAROTENE PO Take 25,000 Units by mouth daily.       ZINC 50 MG OR CAPS 1 daily       CHROMIUM PICOLINATE 800 MCG OR TABS 1 daily       FISH OIL 1000 MG OR CAPS 1 daily       FLAX SEED OIL 1000 MG OR CAPS 1 daily       MULTIVITAMIN/MULTIMINERAL TABS   OR 1 TABLET DAILY 30 0     OTC products: None, except as noted  "above    Allergies   Allergen Reactions     Cephalexin Monohydrate      diarrhea      Latex Allergy: NO    Social History   Substance Use Topics     Smoking status: Former Smoker     Quit date: 9/1/1968     Smokeless tobacco: Never Used      Comment: quit in 1968     Alcohol use 0.0 oz/week     0 Standard drinks or equivalent per week      Comment: 1 glass of wine a month     History   Drug Use No       REVIEW OF SYSTEMS:                                                    CONSTITUTIONAL: NEGATIVE for fever, chills, change in weight  INTEGUMENTARY/SKIN: NEGATIVE for worrisome rashes, moles or lesions  EYES:  See HPI  ENT/MOUTH: NEGATIVE for ear, mouth and throat problems  RESP: NEGATIVE for significant cough or SOB  BREAST: NEGATIVE for masses, tenderness or discharge  CV: NEGATIVE for chest pain, palpitations. Minimal BLE peripheral edema. No orthopnea, PND. Hx  Varicose veins  GI: NEGATIVE for nausea, abdominal pain, heartburn, or change in bowel habits  : NEGATIVE for frequency, dysuria, or hematuria  MUSCULOSKELETAL: NEGATIVE for significant arthralgias or myalgia limiting activity  NEURO: NEGATIVE for weakness, dizziness or paresthesias  ENDOCRINE: NEGATIVE for temperature intolerance. On thyroid replacement  HEME: NEGATIVE for bleeding problems/ On Coumadin for hx recurrent DVT  PSYCHIATRIC: NEGATIVE for changes in mood or affect    EXAM:                                                    /72  Pulse 88  Temp 97.7  F (36.5  C) (Oral)  Resp 16  Ht 5' 7\" (1.702 m)  Wt 180 lb (81.6 kg)  BMI 28.19 kg/m2  Eye: PERRL, EOMI. Bilateral cataracts  HENT: ear canals and TM's normal and nose and mouth without ulcers or lesions   Neck: no adenopathy. Thyroid normal to palpation. No bruits  Pulm: Lungs clear to auscultation   CV: Regular rates and rhythm  GI: Soft, nontender, Normal active bowel sounds, No hepatosplenomegaly or masses palpable  Ext: Peripheral pulses intact. 1 plus chronic BLE edema. Nontender " varicose veins. DIP and PIP joints hands with osteoarthritis changes  Neuro: Normal strength and tone, sensory exam grossly normal      DIAGNOSTICS:                                                    EKG:  NSR with rate 83. No acute ST/T changes    Recent Labs   Lab Test 01/30/18 12/18/17 08/17/17   1912   03/14/17   0945  01/23/17 01/19/17   0625   HGB   --    --    --   13.7   --   12.8   --   11.4*   --    PLT   --    --    --   284   --    --    --    --   230   INR  2.3*  2.6*   < >   --    < >  2.36*   < >   --   1.55*   NA   --    --    --   139   --    --    --   140   --    POTASSIUM   --    --    --   3.9   --    --    --   4.2   --    CR   --    --    --   0.70   --    --    --   0.59  0.60    < > = values in this interval not displayed.      Component      Latest Ref Rng & Units 2/16/2018   INR      0.86 - 1.14 2.4 (A)   Calcium      8.5 - 10.1 mg/dL 10.7 (H)   Parathyroid Hormone Intact      12 - 72 pg/mL 40     IMPRESSION:                                                    Reason for surgery/procedure: Bilateral cataracts  Diagnosis/reason for consult: Chronic anticoagulation for hx DVTs with Factor 5 Leiden (INR at goal), depression (stable), stable chronic edema without orthopnea/PND (controlled with compression stockings), mild hypercalcemia    The proposed surgical procedure is considered LOW risk.    REVISED CARDIAC RISK INDEX  The patient has the following serious cardiovascular risks for perioperative complications such as (MI, PE, VFib and 3  AV Block):  No serious cardiac risks  INTERPRETATION: 0 risks: Class I (very low risk - 0.4% complication rate)    The patient has the following additional risks for perioperative complications:  No identified additional risks       RECOMMENDATIONS:                                                      APPROVAL GIVEN to proceed with proposed procedure, without further diagnostic evaluation   Stop All calcium supplementation  Hold meds AM of surgery  NPO  after midnight prior to surgery  Recheck calcium level in 2 weeks    Signed Electronically by: Jacoby Boo MD    Copy of this evaluation report is provided to requesting physician.    [facility] Preop Guidelines

## 2018-02-16 NOTE — MR AVS SNAPSHOT
After Visit Summary   2/16/2018    Geneva Shepherd    MRN: [id number]           Patient Information     Date Of Birth          1940        Visit Information        Provider Department      2/16/2018 4:30 PM Jacoby Boo MD [facility]        Today's Diagnoses     Preop general physical exam    -  1    Cataract of right eye, unspecified cataract type        Hypercalcemia        Memory change          Care Instructions      Before Your Surgery      Call your surgeon if there is any change in your health. This includes signs of a cold or flu (such as a sore throat, runny nose, cough, rash or fever).    Do not smoke, drink alcohol or take over the counter medicine (unless your surgeon or primary care doctor tells you to) for the 24 hours before and after surgery.    If you take prescribed drugs: Follow your doctor s orders about which medicines to take and which to stop until after surgery.    Eating and drinking prior to surgery: follow the instructions from your surgeon    Take a shower or bath the night before surgery. Use the soap your surgeon gave you to gently clean your skin. If you do not have soap from your surgeon, use your regular soap. Do not shave or scrub the surgery site.  Wear clean pajamas and have clean sheets on your bed.           Follow-ups after your visit        Your next 10 appointments already scheduled     Feb 23, 2018  4:00 PM CST   Anticoagulation Visit with [facility]   [facility]    [address] [place] [address] [phone]              Who to contact     If you have questions or need follow up information about today's clinic visit or your schedule please contact [facility] directly at [phone].  Normal or non-critical lab and imaging results will be communicated to you by MyChart, letter or phone within 4 business  "days after the clinic has received the results. If you do not hear from us within 7 days, please contact the clinic through [employer] or phone. If you have a critical or abnormal lab result, we will notify you by phone as soon as possible.  Submit refill requests through [employer] or call your pharmacy and they will forward the refill request to us. Please allow 3 business days for your refill to be completed.          Additional Information About Your Visit        [employer] Information     [employer] gives you secure access to your electronic health record. If you see a primary care provider, you can also send messages to your care team and make appointments. If you have questions, please call your primary care clinic.  If you do not have a primary care provider, please call [phone] and they will assist you.        Care EveryWhere ID     This is your Care EveryWhere ID. This could be used by other organizations to access your [place] medical records  [id number]        Your Vitals Were     Pulse Temperature Respirations Height BMI (Body Mass Index)       88 97.7  F (36.5  C) (Oral) 16 5' 7\" (1.702 m) 28.19 kg/m2        Blood Pressure from Last 3 Encounters:   02/16/18 124/72   11/20/17 120/72   10/26/17 120/74    Weight from Last 3 Encounters:   02/16/18 180 lb (81.6 kg)   11/20/17 180 lb (81.6 kg)   08/17/17 180 lb (81.6 kg)              We Performed the Following     Calcium     EKG 12-lead complete w/read - Clinics     Parathyroid Hormone Intact     Vitamin B12          Today's Medication Changes          These changes are accurate as of 2/16/18 11:59 PM.  If you have any questions, ask your nurse or doctor.               Stop taking these medicines if you haven't already. Please contact your care team if you have questions.     CALCIUM PO   Stopped by:  Jacoby Boo MD                    Primary Care Provider Office Phone # Fax #    Jacoby Boo [phone]       [address] [place] [address] " [phone]        Equal Access to Services     [facility] : Hadii aad ku hadellislaura Humaali, waluisda luqgeneha, qaybedgardo monolindacynthia yun. So [facility] [phone].    ATENCIÓN: Si habla español, tiene a luciano disposición servicios gratuitos de asistencia lingüística. Adilsoname al [phone].    We comply with applicable federal civil rights laws and [place] laws. We do not discriminate on the basis of race, color, national origin, age, disability, sex, sexual orientation, or gender identity.            Thank you!     Thank you for choosing [facility]  for your care. Our goal is always to provide you with excellent care. Hearing back from our patients is one way we can continue to improve our services. Please take a few minutes to complete the written survey that you may receive in the mail after your visit with us. Thank you!             Your Updated Medication List - Protect others around you: Learn how to safely use, store and throw away your medicines at www.disposemymeds.org.          This list is accurate as of 2/16/18 11:59 PM.  Always use your most recent med list.                   Brand Name Dispense Instructions for use Diagnosis    acetaminophen 500 MG tablet    TYLENOL     Take 1,000 mg by mouth 3 times daily        amitriptyline 25 MG tablet    ELAVIL    90 tablet    Take 1 tablet (25 mg) by mouth At Bedtime    Insomnia, unspecified type       BETA CAROTENE PO      Take 25,000 Units by mouth daily.        BIOTIN PO      Take 1 tablet by mouth every morning        Chromium Picolinate 800 MCG Tabs      1 daily        COCONUT OIL PO      Take 1 capsule by mouth every morning        fish oil-omega-3 fatty acids 1000 MG capsule      1 daily        flax seed oil 1000 MG capsule      1 daily        FOLIC ACID PO      Take 400 mcg by mouth daily        levOCARNitine 330 MG tablet    CARNITOR     Take 330 mg by mouth every morning        levothyroxine 50 MCG  tablet    SYNTHROID    90 tablet    Take 1 tablet (50 mcg) by mouth daily    Hypothyroidism, unspecified type       MAGNESIUM OXIDE PO      Take 400 mg by mouth daily        MULTIVITAMIN/MULTIMINERAL TABS   OR     30    1 TABLET DAILY        N-ACETYL-L-CYSTEINE PO      Take 1 capsule by mouth every morning        * order for DME     2 Device    Equipment being ordered: Compression Stockings Knee High 20-30 mmhg    Venous ulcer of ankle, right (H), Venous (peripheral) insufficiency, Varicose vein of leg       * order for DME     30 days    Equipment being ordered: [employer] Fax [phone]  Primary Dressing Mepilex Border 4x4   Qty 30 Length of Need: 1 month Frequency of dressing change: daily    Venous ulcer of ankle, right (H)       * order for DME     4 Device    Equipment being ordered: Thigh High 30-40 mmhg Compression Stockings.    Venous ulcer of right leg (H), Venous (peripheral) insufficiency, Swelling of limb       PARoxetine 20 MG tablet    PAXIL    180 tablet    TAKE 2 TABLETS BY MOUTH AT BEDTIME    Depression, unspecified depression type       TURMERIC PO      Take 1 capsule by mouth every morning        vitamin B complex with vitamin C Tabs tablet      Take 1 tablet by mouth daily DOSE UNKNOWN        VITAMIN C PO      Take 1,000 mg by mouth every morning (Patient takes 2 X 500 mg = 1,000 mg dose)        warfarin 5 MG tablet    COUMADIN    135 tablet    1.5 TABLETS (7.5MG) BY MOUTH ON MON WED FRI, AND 1 TABLET ALL OTHER DAYS PER [facility]    Personal history of DVT (deep vein thrombosis)       Zinc 50 MG Caps      1 daily        * Notice:  This list has 3 medication(s) that are the same as other medications prescribed for you. Read the directions carefully, and ask your doctor or other care provider to review them with you.

## 2018-02-17 LAB
PTH-INTACT SERPL-MCNC: 40 PG/ML (ref 12–72)
VIT B12 SERPL-MCNC: 583 PG/ML (ref 193–986)

## 2018-02-17 ASSESSMENT — PATIENT HEALTH QUESTIONNAIRE - PHQ9: SUM OF ALL RESPONSES TO PHQ QUESTIONS 1-9: 0

## 2018-02-18 DIAGNOSIS — G47.00 INSOMNIA, UNSPECIFIED TYPE: ICD-10-CM

## 2018-02-18 DIAGNOSIS — E03.9 HYPOTHYROIDISM, UNSPECIFIED TYPE: ICD-10-CM

## 2018-02-18 NOTE — TELEPHONE ENCOUNTER
"Requested Prescriptions   Pending Prescriptions Disp Refills     levothyroxine (SYNTHROID/LEVOTHROID) 50 MCG tablet [Pharmacy Med Name: LEVOTHYROXINE 50 MCG TABLET]  Last Written Prescription Date:  01/14/2017  Last Fill Quantity: 90 TABLETS,  # refills: 3   Last office visit: 2/16/2018 with prescribing provider:  02/16/2018   Future Office Visit:     90 tablet 3     Sig: TAKE 1 TABLET (50 MCG) BY MOUTH DAILY    Thyroid Protocol Passed    2/18/2018  5:53 AM       Passed - Patient is 12 years or older       Passed - Recent or future visit with authorizing provider's specialty    Patient had office visit in the last year or has a visit in the next 30 days with authorizing provider.  See \"Patient Info\" tab in inbasket, or \"Choose Columns\" in Meds & Orders section of the refill encounter.            Passed - Normal TSH on file in past 12 months    Recent Labs   Lab Test  08/17/17   1912   TSH  1.53             Passed - No active pregnancy on record    If patient is pregnant or has had a positive pregnancy test, please check TSH.         Passed - No positive pregnancy test in past 12 months    If patient is pregnant or has had a positive pregnancy test, please check TSH.          amitriptyline (ELAVIL) 25 MG tablet [Pharmacy Med Name: AMITRIPTYLINE HCL 25 MG TAB]       90 tablet 3     Sig: TAKE 1 TABLET (25 MG) BY MOUTH AT BEDTIME    Tricyclic Antidepressants Protocol Passed    2/18/2018  5:53 AM       Passed - Blood pressure under 140/90 in past 12 months    BP Readings from Last 3 Encounters:   02/16/18 124/72   11/20/17 120/72   10/26/17 120/74                Passed - Recent (12 mo) or future (30 d) visit with authorizing provider's specialty     Patient had office visit in the last year or has a visit in the next 30 days with authorizing provider.  See \"Patient Info\" tab in inbasket, or \"Choose Columns\" in Meds & Orders section of the refill encounter.            Passed - Patient is age 18 or older       Passed - No " active pregnancy on record       Passed - No positive pregnancy test in past 12 months        PHQ-9 SCORE 2/13/2017 8/17/2017 2/16/2018   Total Score - - -   Total Score 0 3 0

## 2018-02-20 RX ORDER — LEVOTHYROXINE SODIUM 50 UG/1
TABLET ORAL
Qty: 90 TABLET | Refills: 1 | Status: SHIPPED | OUTPATIENT
Start: 2018-02-20 | End: 2018-08-16

## 2018-02-23 ENCOUNTER — ANTICOAGULATION THERAPY VISIT (OUTPATIENT)
Dept: ANTICOAGULATION | Facility: CLINIC | Age: 78
End: 2018-02-23
Payer: MEDICARE

## 2018-02-23 DIAGNOSIS — Z79.01 LONG-TERM (CURRENT) USE OF ANTICOAGULANTS: ICD-10-CM

## 2018-02-23 LAB — INR POINT OF CARE: 2.1 (ref 0.86–1.14)

## 2018-02-23 PROCEDURE — 99207 ZZC NO CHARGE NURSE ONLY: CPT

## 2018-02-23 PROCEDURE — 36416 COLLJ CAPILLARY BLOOD SPEC: CPT

## 2018-02-23 PROCEDURE — 85610 PROTHROMBIN TIME: CPT | Mod: QW

## 2018-02-23 NOTE — PROGRESS NOTES
ANTICOAGULATION FOLLOW-UP CLINIC VISIT    Patient Name:  Geneva Shepherd  Date:  2/23/2018  Contact Type:  Face to Face    SUBJECTIVE:     Patient Findings     Positives Inflammation    Comments Recent cataract surgery           OBJECTIVE    INR Protime   Date Value Ref Range Status   02/23/2018 2.1 (A) 0.86 - 1.14 Final       ASSESSMENT / PLAN  INR assessment THER    Recheck INR In: 4 WEEKS    INR Location Clinic      Anticoagulation Summary as of 2/23/2018     INR goal 2.0-3.0   Today's INR 2.1   Maintenance plan 7.5 mg (5 mg x 1.5) on Mon, Wed, Fri; 5 mg (5 mg x 1) all other days   Full instructions 7.5 mg on Mon, Wed, Fri; 5 mg all other days   Weekly total 42.5 mg   No change documented Huma Juares, RN   Plan last modified Ofelia Cavazos RN (12/31/2015)   Next INR check 3/23/2018   Target end date     Indications   Long-term (current) use of anticoagulants [Z79.01] [Z79.01]  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

## 2018-02-23 NOTE — MR AVS SNAPSHOT
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        February 2018 Details    Sun Mon Tue Wed Thu Fri Sat         1               2               3                 4               5               6               7               8               9               10                 11               12               13               14               15               16               17                 18               19               20               21               22               23      7.5 mg   See details      24      5 mg           25      5 mg         26      7.5 mg         27      5 mg         28      7.5 mg             Date Details   02/23 This INR check               How to take your warfarin dose     To take:  5 mg Take 1 of the 5 mg tablets.    To take:  7.5 mg Take 1.5 of the 5 mg tablets.           March 2018 Details    Sun Mon Tue Wed Thu Fri Sat         1      5 mg         2      7.5 mg         3       5 mg           4      5 mg         5      7.5 mg         6      5 mg         7      7.5 mg         8      5 mg         9      7.5 mg         10      5 mg           11      5 mg         12      7.5 mg         13      5 mg         14      7.5 mg         15      5 mg         16      7.5 mg         17      5 mg           18      5 mg         19      7.5 mg         20      5 mg         21      7.5 mg         22      5 mg         23            24                 25               26               27               28               29               30               31                Date Details   No additional details    Date of next INR:  3/23/2018         How to take your warfarin dose     To take:  5 mg Take 1 of the 5 mg tablets.    To take:  7.5 mg Take 1.5 of the 5 mg tablets.

## 2018-02-28 DIAGNOSIS — F32.A DEPRESSION, UNSPECIFIED DEPRESSION TYPE: ICD-10-CM

## 2018-02-28 RX ORDER — PAROXETINE 20 MG/1
TABLET, FILM COATED ORAL
Qty: 180 TABLET | Refills: 1 | Status: SHIPPED | OUTPATIENT
Start: 2018-02-28 | End: 2018-08-26

## 2018-02-28 NOTE — TELEPHONE ENCOUNTER
Requested Prescriptions   Pending Prescriptions Disp Refills     PARoxetine (PAXIL) 20 MG tablet [Pharmacy Med Name: PAROXETINE HCL 20 MG  Last Written Prescription Date:  8/30/2017  Last Fill Quantity: 180,  # refills: 1   Last office visit: 2/16/2018 with prescribing provider:  2/16/2018   Future Office Visit:     TABLET] 180 tablet 1     Sig: TAKE 2 TABLETS BY MOUTH AT BEDTIME    SSRIs Protocol Failed    2/28/2018  2:05 AM       Failed - No positive pregnancy test in last 12 months       Passed - Patient is age 18 or older       Passed - No active pregnancy on record        PHQ-9 SCORE 2/13/2017 8/17/2017 2/16/2018   Total Score - - -   Total Score 0 3 0

## 2018-04-06 ENCOUNTER — ANTICOAGULATION THERAPY VISIT (OUTPATIENT)
Dept: ANTICOAGULATION | Facility: CLINIC | Age: 78
End: 2018-04-06
Payer: MEDICARE

## 2018-04-06 LAB — INR POINT OF CARE: 3 (ref 0.86–1.14)

## 2018-04-06 PROCEDURE — 85610 PROTHROMBIN TIME: CPT | Mod: QW

## 2018-04-06 PROCEDURE — 36416 COLLJ CAPILLARY BLOOD SPEC: CPT

## 2018-04-06 NOTE — PROGRESS NOTES
ANTICOAGULATION FOLLOW-UP CLINIC VISIT    Patient Name:  Geneva Shepherd  Date:  4/6/2018  Contact Type:  Face to Face    SUBJECTIVE:     Patient Findings     Positives No Problem Findings           OBJECTIVE    INR Protime   Date Value Ref Range Status   04/06/2018 3.0 (A) 0.86 - 1.14 Final       ASSESSMENT / PLAN  INR assessment THER    Recheck INR In: 5 WEEKS    INR Location Clinic      Anticoagulation Summary as of 4/6/2018     INR goal 2.0-3.0   Today's INR 3.0   Maintenance plan 7.5 mg (5 mg x 1.5) on Mon, Wed, Fri; 5 mg (5 mg x 1) all other days   Full instructions 4/6: 5 mg; Otherwise 7.5 mg on Mon, Wed, Fri; 5 mg all other days   Weekly total 42.5 mg   Plan last modified Ofelia Cavazos RN (12/31/2015)   Next INR check 5/10/2018   Target end date     Indications   Long-term (current) use of anticoagulants [Z79.01] [Z79.01]  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    Ofelia Cavazos RN

## 2018-04-06 NOTE — MR AVS SNAPSHOT
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        April 2018 Details    Sun Mon Tue Wed Thu Fri Sat     1               2               3               4               5               6      5 mg   See details      7      5 mg           8      5 mg         9      7.5 mg         10      5 mg         11      7.5 mg         12      5 mg         13      7.5 mg         14      5 mg           15      5 mg         16      7.5 mg         17      5 mg         18      7.5 mg         19      5 mg         20      7.5 mg         21      5 mg           22      5 mg         23      7.5 mg         24      5 mg         25      7.5 mg         26      5 mg         27      7.5 mg         28      5 mg           29      5 mg         30      7.5 mg               Date Details   04/06 This INR check               How to take your warfarin dose     To take:  5 mg Take 1 of the 5 mg tablets.    To take:  7.5 mg Take  1.5 of the 5 mg tablets.           May 2018 Details    Sun Mon Tue Wed Thu Fri Sat       1      5 mg         2      7.5 mg         3      5 mg         4      7.5 mg         5      5 mg           6      5 mg         7      7.5 mg         8      5 mg         9      7.5 mg         10            11               12                 13               14               15               16               17               18               19                 20               21               22               23               24               25               26                 27               28               29               30               31                  Date Details   No additional details    Date of next INR:  5/10/2018         How to take your warfarin dose     To take:  5 mg Take 1 of the 5 mg tablets.    To take:  7.5 mg Take 1.5 of the 5 mg tablets.

## 2018-04-20 ENCOUNTER — TRANSFERRED RECORDS (OUTPATIENT)
Dept: HEALTH INFORMATION MANAGEMENT | Facility: CLINIC | Age: 78
End: 2018-04-20

## 2018-05-09 ENCOUNTER — TELEPHONE (OUTPATIENT)
Dept: NURSING | Facility: CLINIC | Age: 78
End: 2018-05-09

## 2018-05-09 NOTE — TELEPHONE ENCOUNTER
Geneva Shepherd is a 77 year old female who calls with finger injury.    NURSING ASSESSMENT:  Description:  Fell on Monday. Injured third finger on left hand. On Warfarin and had a lot of bleeding into the tissue and swelling. Can move the finger but it is stiff. No break in skin. Cannot straighten finger d/t swelling.  Onset/duration:  Fell 5/7  Precip. factors:  Tripped and fell  Associated symptoms:  Did not hit head. Denies any other injuries. Has redness and bruising coloring but temperature of finger is normal.   Pain scale (0-10)  Painful to move the finger.  Last exam/Treatment:  2/16/18  Allergies:   Allergies   Allergen Reactions     Cephalexin Monohydrate      diarrhea       MEDICATIONS:   Using tylenol for pain      RECOMMENDED DISPOSITION:  See in 24 hours - declined appt in IM/ortho walk in, said she will go to   Will comply with recommendation: Yes  If further questions/concerns or if symptoms do not improve, worsen or new symptoms develop, call your PCP or [facility] as soon as possible.      Guideline used:  Telephone Triage Protocols for Nurses, Fifth Edition, Shannon Antonio RN

## 2018-05-09 NOTE — TELEPHONE ENCOUNTER
[facility] says sometimes they will check her INR in [facility] and then INR RN will advise. Is this an option?

## 2018-05-10 ENCOUNTER — RADIANT APPOINTMENT (OUTPATIENT)
Dept: GENERAL RADIOLOGY | Facility: CLINIC | Age: 78
End: 2018-05-10
Attending: FAMILY MEDICINE
Payer: MEDICARE

## 2018-05-10 ENCOUNTER — ANTICOAGULATION THERAPY VISIT (OUTPATIENT)
Dept: ANTICOAGULATION | Facility: CLINIC | Age: 78
End: 2018-05-10
Payer: MEDICARE

## 2018-05-10 ENCOUNTER — OFFICE VISIT (OUTPATIENT)
Dept: URGENT CARE | Facility: URGENT CARE | Age: 78
End: 2018-05-10
Payer: MEDICARE

## 2018-05-10 VITALS
OXYGEN SATURATION: 100 % | SYSTOLIC BLOOD PRESSURE: 126 MMHG | HEART RATE: 81 BPM | DIASTOLIC BLOOD PRESSURE: 72 MMHG | TEMPERATURE: 97.9 F

## 2018-05-10 DIAGNOSIS — Z79.01 LONG-TERM (CURRENT) USE OF ANTICOAGULANTS: ICD-10-CM

## 2018-05-10 DIAGNOSIS — S62.645A CLOSED NONDISPLACED FRACTURE OF PROXIMAL PHALANX OF LEFT RING FINGER, INITIAL ENCOUNTER: Primary | ICD-10-CM

## 2018-05-10 DIAGNOSIS — S69.92XA HAND INJURY, LEFT, INITIAL ENCOUNTER: ICD-10-CM

## 2018-05-10 LAB — INR POINT OF CARE: 2.8 (ref 0.86–1.14)

## 2018-05-10 PROCEDURE — 85610 PROTHROMBIN TIME: CPT | Mod: QW

## 2018-05-10 PROCEDURE — 36416 COLLJ CAPILLARY BLOOD SPEC: CPT

## 2018-05-10 PROCEDURE — 29125 APPL SHORT ARM SPLINT STATIC: CPT | Performed by: FAMILY MEDICINE

## 2018-05-10 PROCEDURE — 99207 ZZC NO CHARGE NURSE ONLY: CPT

## 2018-05-10 PROCEDURE — 99214 OFFICE O/P EST MOD 30 MIN: CPT | Mod: 25 | Performed by: FAMILY MEDICINE

## 2018-05-10 PROCEDURE — 73120 X-RAY EXAM OF HAND: CPT | Mod: LT

## 2018-05-10 NOTE — PROGRESS NOTES
ANTICOAGULATION FOLLOW-UP CLINIC VISIT    Patient Name:  Geneva Shepherd  Date:  5/10/2018  Contact Type:  Face to Face    SUBJECTIVE:     Patient Findings     Positives No Problem Findings           OBJECTIVE    INR Protime   Date Value Ref Range Status   05/10/2018 2.8 (A) 0.86 - 1.14 Final       ASSESSMENT / PLAN  No question data found.  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  Does have bruising and her fingers are swollen.  Pt is seeing urgent care today to have her hand examined.     Camelia He RN

## 2018-05-10 NOTE — MR AVS SNAPSHOT
MarshaDebra Shepherd   5/10/2018 4:00 PM   Anticoagulation Therapy Visit    Description:  77 year old female   Provider:   ANTICOAGULATION CLINIC   Department:   Anti Coagulation           INR as of 5/10/2018     Today's INR 2.8      Anticoagulation Summary as of 5/10/2018     INR goal 2.0-3.0   Today's INR 2.8   Full instructions 7.5 mg on Mon, Wed, Fri; 5 mg all other days   Next INR check 6/14/2018    Indications   Long-term (current) use of anticoagulants [Z79.01] [Z79.01]  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]  Embolism and thrombosis (H) (Resolved) [I74.9]         Your next Anticoagulation Clinic appointment(s)     Jun 14, 2018  4:00 PM CDT   Anticoagulation Visit with  ANTICOAGULATION CLINIC   [facility]    [address] [address] [phone]              Contact Numbers     [facility]  Please call  [phone] to cancel and/or reschedule your appointment   Please call  [phone] with any problems or questions regarding your therapy.        May 2018 Details    Sun Mon Tue Wed Thu Fri Sat       1               2               3               4               5                 6               7               8               9               10      5 mg   See details      11      7.5 mg         12      5 mg           13      5 mg         14      7.5 mg         15      5 mg         16      7.5 mg         17      5 mg         18      7.5 mg         19      5 mg           20      5 mg         21      7.5 mg         22      5 mg         23      7.5 mg         24      5 mg         25      7.5 mg         26      5 mg           27      5 mg         28      7.5 mg         29      5 mg         30      7.5 mg         31      5 mg            Date Details   05/10 This INR check               How to take your warfarin dose     To take:  5 mg Take 1 of the 5 mg tablets.    To take:  7.5 mg Take 1.5 of the 5 mg  tablets.           June 2018 Details    Sun Mon Tue Wed Thu Fri Sat          1      7.5 mg         2      5 mg           3      5 mg         4      7.5 mg         5      5 mg         6      7.5 mg         7      5 mg         8      7.5 mg         9      5 mg           10      5 mg         11      7.5 mg         12      5 mg         13      7.5 mg         14            15               16                 17               18               19               20               21               22               23                 24               25               26               27               28               29               30                Date Details   No additional details    Date of next INR:  6/14/2018         How to take your warfarin dose     To take:  5 mg Take 1 of the 5 mg tablets.    To take:  7.5 mg Take 1.5 of the 5 mg tablets.

## 2018-05-10 NOTE — MR AVS SNAPSHOT
After Visit Summary   5/10/2018    Geneva Shepherd    MRN: [id number]           Patient Information     Date Of Birth          1940        Visit Information        Provider Department      5/10/2018 3:50 PM Kavon Shah,  [facility]        Today's Diagnoses     Hand injury, left, initial encounter    -  1       Follow-ups after your visit        Additional Services     ORTHOPEDICS ADULT REFERRAL       Your provider has referred you to: FMG: [facility] - Aggie Rush -  [facility]  [phone]   [email or web address]  UMP: Orthopaedic [facility] [phone]   [email or web address]    UM: Sports Medicine [facility] [phone]   [email or web address]    Please be aware that coverage of these services is subject to the terms and limitations of your health insurance plan.  Call member services at your health plan with any benefit or coverage questions.      Please bring the following to your appointment:    >>   Any x-rays, CTs or MRIs which have been performed.  Contact the facility where they were done to arrange for  prior to your scheduled appointment.    >>   List of current medications   >>   This referral request   >>   Any documents/labs given to you for this referral                  Your next 10 appointments already scheduled     Jun 14, 2018  4:00 PM CDT   Anticoagulation Visit with  ANTICOAGULATION CLINIC   [facility]    [address] [address] [phone]              Who to contact     If you have questions or need follow up information about today's clinic visit or your schedule please contact [facility] directly at  [phone].  Normal or non-critical lab and imaging results will be communicated to you by MyChart, letter or phone within 4 business days after the clinic has received the results. If you do not hear from us within 7 days, please contact the clinic through [employer] or phone. If you have a critical or abnormal lab result, we will notify you by phone as soon as possible.  Submit refill requests through [employer] or call your pharmacy and they will forward the refill request to us. Please allow 3 business days for your refill to be completed.          Additional Information About Your Visit        [employer] Information     [employer] gives you secure access to your electronic health record. If you see a primary care provider, you can also send messages to your care team and make appointments. If you have questions, please call your primary care clinic.  If you do not have a primary care provider, please call [phone] and they will assist you.        Care EveryWhere ID     This is your Care EveryWhere ID. This could be used by other organizations to access your [facility] medical records  [id number]        Your Vitals Were     Pulse Temperature Pulse Oximetry             81 97.9  F (36.6  C) (Oral) 100%          Blood Pressure from Last 3 Encounters:   05/10/18 126/72   02/16/18 124/72   11/20/17 120/72    Weight from Last 3 Encounters:   02/16/18 180 lb (81.6 kg)   11/20/17 180 lb (81.6 kg)   08/17/17 180 lb (81.6 kg)              We Performed the Following     APPLY SHORT ARM SPLINT STATIC     ORTHOPEDICS ADULT REFERRAL     XR Hand Port Left 2 Views        Primary Care Provider Office Phone # Fax #    Jacoby Boo [phone]       [address]  [facility] [address]        Equal Access to Services     [facility] LATASHA AH: Jensen Sanchez, waaxda luqadaha, qaybta kaalmada nate, cynthia ulrich. So [facility] [phone].    ATENCIÓN: Si anand dumont, rose milian luciano  disposición servicios gratuitos de asistencia lingüística. Amado frye [phone].    We comply with applicable federal civil rights laws and [place] laws. We do not discriminate on the basis of race, color, national origin, age, disability, sex, sexual orientation, or gender identity.            Thank you!     Thank you for choosing [facility]  for your care. Our goal is always to provide you with excellent care. Hearing back from our patients is one way we can continue to improve our services. Please take a few minutes to complete the written survey that you may receive in the mail after your visit with us. Thank you!             Your Updated Medication List - Protect others around you: Learn how to safely use, store and throw away your medicines at www.disposemymeds.org.          This list is accurate as of 5/10/18  5:01 PM.  Always use your most recent med list.                   Brand Name Dispense Instructions for use Diagnosis    acetaminophen 500 MG tablet    TYLENOL     Take 1,000 mg by mouth 3 times daily        amitriptyline 25 MG tablet    ELAVIL    90 tablet    TAKE 1 TABLET (25 MG) BY MOUTH AT BEDTIME    Insomnia, unspecified type       BETA CAROTENE PO      Take 25,000 Units by mouth daily.        BIOTIN PO      Take 1 tablet by mouth every morning        Chromium Picolinate 800 MCG Tabs      1 daily        COCONUT OIL PO      Take 1 capsule by mouth every morning        fish oil-omega-3 fatty acids 1000 MG capsule      1 daily        flax seed oil 1000 MG capsule      1 daily        FOLIC ACID PO      Take 400 mcg by mouth daily        levOCARNitine 330 MG tablet    CARNITOR     Take 330 mg by mouth every morning        levothyroxine 50 MCG tablet    SYNTHROID/LEVOTHROID    90 tablet    TAKE 1 TABLET (50 MCG) BY MOUTH DAILY    Hypothyroidism, unspecified type       MAGNESIUM OXIDE PO      Take 400 mg by mouth daily        MULTIVITAMIN/MULTIMINERAL TABS   OR     30    1  TABLET DAILY        N-ACETYL-L-CYSTEINE PO      Take 1 capsule by mouth every morning        * order for DME     2 Device    Equipment being ordered: Compression Stockings Knee High 20-30 mmhg    Venous ulcer of ankle, right (H), Venous (peripheral) insufficiency, Varicose vein of leg       * order for DME     30 days    Equipment being ordered: [employer] Fax [phone]  Primary Dressing Mepilex Border 4x4   Qty 30 Length of Need: 1 month Frequency of dressing change: daily    Venous ulcer of ankle, right (H)       * order for DME     4 Device    Equipment being ordered: Thigh High 30-40 mmhg Compression Stockings.    Venous ulcer of right leg (H), Venous (peripheral) insufficiency, Swelling of limb       PARoxetine 20 MG tablet    PAXIL    180 tablet    TAKE 2 TABLETS BY MOUTH AT BEDTIME    Depression, unspecified depression type       TURMERIC PO      Take 1 capsule by mouth every morning        vitamin B complex with vitamin C Tabs tablet      Take 1 tablet by mouth daily DOSE UNKNOWN        VITAMIN C PO      Take 1,000 mg by mouth every morning (Patient takes 2 X 500 mg = 1,000 mg dose)        warfarin 5 MG tablet    COUMADIN    135 tablet    1.5 TABLETS (7.5MG) BY MOUTH ON MON WED FRI, AND 1 TABLET ALL OTHER DAYS PER ACC    Personal history of DVT (deep vein thrombosis)       Zinc 50 MG Caps      1 daily        * Notice:  This list has 3 medication(s) that are the same as other medications prescribed for you. Read the directions carefully, and ask your doctor or other care provider to review them with you.

## 2018-05-11 ENCOUNTER — TRANSFERRED RECORDS (OUTPATIENT)
Dept: HEALTH INFORMATION MANAGEMENT | Facility: CLINIC | Age: 78
End: 2018-05-11

## 2018-05-11 NOTE — PROGRESS NOTES
SUBJECTIVE:  Chief Complaint   Patient presents with     Musculoskeletal Problem     Pt fell on Monday left hand is swollen and bruised and is on an anticoagulant (WARFRIN)  Ring finger seems deformed   .ident presents with a chief complaint of left hand and finger  fourth.  The injury occurred 1 day ago.   The injury happened while while walking.   How: fall  immediate pain  The patient complained of moderate pain and has had decreased ROM.    Pain exacerbated by movement    He treated it initially with ice.   This is the first time this type of injury has occurred to this patient.     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     FRACTURE OF RIGHT LATERAL PROXIMAL TIBIA 11/07     Gastro-oesophageal reflux disease     rare     Hyperlipidemia LDL goal <160 10/31/2010     Insomnia      Irritable bowel syndrome      Obesity 9/11/2013     Pain in joint, lower leg      Phlebitis and thrombophlebitis of superficial vessels of lower extremities 7/03    right     Sprain of lumbar region      Unspecified hypothyroidism      Urinary tract infection, site not specified      Allergies   Allergen Reactions     Cephalexin Monohydrate      diarrhea     Social History   Substance Use Topics     Smoking status: Former Smoker     Quit date: 9/1/1968     Smokeless tobacco: Never Used      Comment: quit in 1968     Alcohol use 0.0 oz/week     0 Standard drinks or equivalent per week      Comment: 1 glass of wine a month       ROSINTEGUMENTARY/SKIN: NEGATIVE for open wound/bleeding and positive for  bruising  MUSCULOSKELETAL: NEGATIVE for joint swelling, paresthesias, radicular pain    EXAM: /72  Pulse 81  Temp 97.9  F (36.6  C) (Oral)  SpO2 100%   Gen: healthy,alert,no distress  Extremity: finger and hand has pain with palpation and rom.   There is not compromise to the distal circulation.  Pulses are +2 and CRT is brisk.  EXTREMITIES: peripheral pulses normal  SKIN: bruise  NEURO: Normal strength and tone, sensory exam grossly normal, mentation intact and speech normal    X-RAY with proximal non displaced finger fx, read by Dr. Kavon Shah    Web roll applied, 3in by 12 in orthoglass splint applied wrapped with ace wrap      ICD-10-CM    1. Closed nondisplaced fracture of proximal phalanx of left ring finger, initial encounter S62.645A    2. Hand injury, left, initial encounter S69.92XA XR Hand Port Left 2 Views     APPLY SHORT ARM SPLINT STATIC     ORTHOPEDICS ADULT REFERRAL     CANCELED: XR Hand Left G/E 3 Views     RICE

## 2018-05-14 ENCOUNTER — TRANSFERRED RECORDS (OUTPATIENT)
Dept: HEALTH INFORMATION MANAGEMENT | Facility: CLINIC | Age: 78
End: 2018-05-14

## 2018-06-04 ENCOUNTER — TRANSFERRED RECORDS (OUTPATIENT)
Dept: HEALTH INFORMATION MANAGEMENT | Facility: CLINIC | Age: 78
End: 2018-06-04

## 2018-06-28 ENCOUNTER — ANTICOAGULATION THERAPY VISIT (OUTPATIENT)
Dept: ANTICOAGULATION | Facility: CLINIC | Age: 78
End: 2018-06-28

## 2018-06-28 ENCOUNTER — OFFICE VISIT (OUTPATIENT)
Dept: INTERNAL MEDICINE | Facility: CLINIC | Age: 78
End: 2018-06-28
Payer: MEDICARE

## 2018-06-28 VITALS
DIASTOLIC BLOOD PRESSURE: 70 MMHG | OXYGEN SATURATION: 96 % | HEART RATE: 77 BPM | BODY MASS INDEX: 28.19 KG/M2 | SYSTOLIC BLOOD PRESSURE: 130 MMHG | TEMPERATURE: 97.9 F | RESPIRATION RATE: 16 BRPM | WEIGHT: 180 LBS

## 2018-06-28 DIAGNOSIS — Z23 NEED FOR PROPHYLACTIC VACCINATION WITH TETANUS-DIPHTHERIA (TD): ICD-10-CM

## 2018-06-28 DIAGNOSIS — F32.0 MAJOR DEPRESSIVE DISORDER, SINGLE EPISODE, MILD (H): ICD-10-CM

## 2018-06-28 DIAGNOSIS — R53.83 OTHER FATIGUE: ICD-10-CM

## 2018-06-28 DIAGNOSIS — Z79.01 LONG-TERM (CURRENT) USE OF ANTICOAGULANTS: ICD-10-CM

## 2018-06-28 DIAGNOSIS — E03.9 HYPOTHYROIDISM, UNSPECIFIED TYPE: Primary | ICD-10-CM

## 2018-06-28 LAB
INR PPP: 2.19 (ref 0.86–1.14)
TSH SERPL DL<=0.005 MIU/L-ACNC: 3.2 MU/L (ref 0.4–4)

## 2018-06-28 PROCEDURE — 90715 TDAP VACCINE 7 YRS/> IM: CPT | Performed by: INTERNAL MEDICINE

## 2018-06-28 PROCEDURE — 85610 PROTHROMBIN TIME: CPT | Performed by: INTERNAL MEDICINE

## 2018-06-28 PROCEDURE — 36415 COLL VENOUS BLD VENIPUNCTURE: CPT | Performed by: INTERNAL MEDICINE

## 2018-06-28 PROCEDURE — 99207 ZZC NO CHARGE NURSE ONLY: CPT | Performed by: INTERNAL MEDICINE

## 2018-06-28 PROCEDURE — 84443 ASSAY THYROID STIM HORMONE: CPT | Performed by: INTERNAL MEDICINE

## 2018-06-28 PROCEDURE — 90471 IMMUNIZATION ADMIN: CPT | Performed by: INTERNAL MEDICINE

## 2018-06-28 PROCEDURE — 99214 OFFICE O/P EST MOD 30 MIN: CPT | Mod: 25 | Performed by: INTERNAL MEDICINE

## 2018-06-28 ASSESSMENT — PATIENT HEALTH QUESTIONNAIRE - PHQ9: 5. POOR APPETITE OR OVEREATING: MORE THAN HALF THE DAYS

## 2018-06-28 ASSESSMENT — ANXIETY QUESTIONNAIRES
5. BEING SO RESTLESS THAT IT IS HARD TO SIT STILL: NOT AT ALL
7. FEELING AFRAID AS IF SOMETHING AWFUL MIGHT HAPPEN: NOT AT ALL
3. WORRYING TOO MUCH ABOUT DIFFERENT THINGS: MORE THAN HALF THE DAYS
6. BECOMING EASILY ANNOYED OR IRRITABLE: SEVERAL DAYS
GAD7 TOTAL SCORE: 7
1. FEELING NERVOUS, ANXIOUS, OR ON EDGE: SEVERAL DAYS
IF YOU CHECKED OFF ANY PROBLEMS ON THIS QUESTIONNAIRE, HOW DIFFICULT HAVE THESE PROBLEMS MADE IT FOR YOU TO DO YOUR WORK, TAKE CARE OF THINGS AT HOME, OR GET ALONG WITH OTHER PEOPLE: SOMEWHAT DIFFICULT
2. NOT BEING ABLE TO STOP OR CONTROL WORRYING: SEVERAL DAYS

## 2018-06-28 NOTE — PROGRESS NOTES
"  SUBJECTIVE:   Geneva Shepherd is a 77 year old female who presents to clinic today for the following health issues:      Fatigue      Duration: chronic    Description (location/character/radiation): NA    Intensity:  Moderate     Accompanying signs and symptoms: very tired, tension, stress    History (similar episodes/previous evaluation): awhile back    Precipitating or alleviating factors: None    Therapies tried and outcome: None       Pt's past medical history, family history, habits, medications and allergies were reviewed with the patient today.  See snap shot for  HCM status. Most recent lab results reviewed with pt. Problem list and histories reviewed & adjusted, as indicated.  Additional history as below:     Has general sense of fatigue   Occ nap in the day. Will feel refreshed after  Had a lot more fatigue last Saturday but was doing a lot of activities watching [employer] activities, doing outdoors work, etc  Sometimes overwhelmed trying to get to  all [employer] activities but enjoys doing those things  Some trouble falling asleep but doing reading or using Ipad at night  Bed time midnight or 1 AM and falls asleep then. Gets up 9-10 AM  Hx snoring  when was with granddaughter  visiting.   Denies CP or shortness of breath with the activities  Fatigue issue has been chronic. Same complaints Aug 2017 when had neg labs       Additional ROS:   Constitutional, HEENT, Cardiovascular, Pulmonary, GI and , Neuro, MSK and Psych review of systems/symptoms are otherwise negative or unchanged from previous, except as noted above.      OBJECTIVE:  /70  Pulse 77  Temp 97.9  F (36.6  C) (Oral)  Resp 16  Wt 180 lb (81.6 kg)  SpO2 96%  BMI 28.19 kg/m2   Estimated body mass index is 28.19 kg/(m^2) as calculated from the following:    Height as of 2/16/18: 5' 7\" (1.702 m).    Weight as of this encounter: 180 lb (81.6 kg).     HENT: ear canals and TM's normal and nose and mouth without ulcers or lesions. " Mildly narrowed airway  Neck: no adenopathy. Thyroid normal to palpation. No bruits  Pulm: Lungs clear to auscultation   CV: Regular rates and rhythm  GI: Soft, nontender, Normal active bowel sounds, No hepatosplenomegaly or masses palpable  Ext: Peripheral pulses intact. Chronic nontendner varicose veins and minimal BLE edema.  Neuro: Normal strength and tone, sensory exam grossly normal  Gen: Normal affect      Assessment/Plan: (See plan discussion below for further details)  1. Hypothyroidism, unspecified type   Continue current levothyroxine dose.  Lab as ordered  - TSH with free T4 reflex    2. Major depressive disorder, single episode, mild (H)   PHQ = 4. Continue Paxil for now. If energy not improving with plan below, will consider change to SNRI    3. Long-term (current) use of anticoagulants [Z79.01]  Overdue for INR with Coumadin use  - INR    4. Other fatigue  Sx chronic. With previous normal  Labs and hx snoring, ? Sleep apnea. Referral for sleep clinic consultation as below  - SLEEP EVALUATION & MANAGEMENT REFERRAL - ADULT -[facility] [phone]  (Age 18 and up); Future    5. Need for prophylactic vaccination with tetanus-diphtheria (TD)  - TDAP VACCINE (ADACEL)    Plan discussion:  Continue current meds  Labs today as ordered   Refereral to  Sleep Clinic for possible obstructive sleep apnea. Call for appt.  If labs OK and  No sleep apnea, will consider referral  to therapist for counselling  TDAP vaccine       Jacoby Boo MD  Internal Medicine Department  [facility]

## 2018-06-28 NOTE — PATIENT INSTRUCTIONS
Continue current meds  Labs today as ordered   Refereral to  Sleep Clinic for possible obstructive sleep apnea. Call for appt.  If labs OK and  No sleep apnea, will consider referral  to therapist for counselling  TDAP vaccine

## 2018-06-28 NOTE — PROGRESS NOTES
ANTICOAGULATION FOLLOW-UP CLINIC VISIT    Patient Name:  Geneva Shepherd  Date:  6/28/2018  Contact Type:  Telephone/ INR checked at MD appointment today, results called to pt home     SUBJECTIVE:     Patient Findings     Positives No Problem Findings           OBJECTIVE    INR   Date Value Ref Range Status   06/28/2018 2.19 (H) 0.86 - 1.14 Final       ASSESSMENT / PLAN  No question data found.  Anticoagulation Summary as of 6/28/2018     INR goal 2.0-3.0   Today's INR No new INR was available at the time of this encounter.   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

## 2018-06-28 NOTE — MR AVS SNAPSHOT
After Visit Summary   6/28/2018    Geneva Shepherd    MRN: [id number]           Patient Information     Date Of Birth          1940        Visit Information        Provider Department      6/28/2018 9:00 AM Jacoby Boo MD [facility]        Today's Diagnoses     Hypothyroidism, unspecified type    -  1    Major depressive disorder, single episode, mild (H)        Long-term (current) use of anticoagulants [Z79.01]        Other fatigue        Need for prophylactic vaccination with tetanus-diphtheria (TD)          Care Instructions    Continue current meds  Labs today as ordered   Refereral to  Sleep Clinic for possible obstructive sleep apnea. Call for appt.  If labs OK and  No sleep apnea, will consider referral  to therapist for counselling  TDAP vaccine          Follow-ups after your visit        Additional Services     SLEEP EVALUATION & MANAGEMENT REFERRAL - ADULT [facility] [phone]  (Age 18 and up)       Please be aware that coverage of these services is subject to the terms and limitations of your health insurance plan.  Call member services at your health plan with any benefit or coverage questions.      Please bring the following to your appointment:    >>   List of current medications   >>   This referral request   >>   Any documents/labs given to you for this referral                      Future tests that were ordered for you today     Open Future Orders        Priority Expected Expires Ordered    SLEEP EVALUATION & MANAGEMENT REFERRAL - ADULT [facility] [phone]  (Age 18 and up) Routine  6/28/2019 6/28/2018            Who to contact     If you have questions or need follow up information about today's clinic visit or your schedule please contact [facility] directly at [phone].  Normal or non-critical lab and imaging results will be communicated to you by MyChart, letter  or phone within 4 business days after the clinic has received the results. If you do not hear from us within 7 days, please contact the clinic through [employer] or phone. If you have a critical or abnormal lab result, we will notify you by phone as soon as possible.  Submit refill requests through [employer] or call your pharmacy and they will forward the refill request to us. Please allow 3 business days for your refill to be completed.          Additional Information About Your Visit        [facility] Information     [employer] gives you secure access to your electronic health record. If you see a primary care provider, you can also send messages to your care team and make appointments. If you have questions, please call your primary care clinic.  If you do not have a primary care provider, please call [phone] and they will assist you.        Care EveryWhere ID     This is your Care EveryWhere ID. This could be used by other organizations to access your [facility] medical records  [id number]        Your Vitals Were     Pulse Temperature Respirations Pulse Oximetry BMI (Body Mass Index)       77 97.9  F (36.6  C) (Oral) 16 96% 28.19 kg/m2        Blood Pressure from Last 3 Encounters:   06/28/18 130/70   05/10/18 126/72   02/16/18 124/72    Weight from Last 3 Encounters:   06/28/18 180 lb (81.6 kg)   02/16/18 180 lb (81.6 kg)   11/20/17 180 lb (81.6 kg)              We Performed the Following     INR     TDAP VACCINE (ADACEL)     TSH with free T4 reflex        Primary Care Provider Office Phone # Fax #    Jacoby Boo [phone]       [address] [place] [id number]        Equal Access to Services     [facility]: Hadii david naylor Socandida, waaxda luqadaha, qaybta kaalmada cynthia sullivan. So [facility] [phone].    ATENCIÓN: Si habla español, tiene a luciano disposición servicios gratuitos de asistencia lingüística. Llame al [phone].    We comply with  applicable federal civil rights laws and [place] laws. We do not discriminate on the basis of race, color, national origin, age, disability, sex, sexual orientation, or gender identity.            Thank you!     Thank you for choosing [facility]  for your care. Our goal is always to provide you with excellent care. Hearing back from our patients is one way we can continue to improve our services. Please take a few minutes to complete the written survey that you may receive in the mail after your visit with us. Thank you!             Your Updated Medication List - Protect others around you: Learn how to safely use, store and throw away your medicines at www.disposemymeds.org.          This list is accurate as of 6/28/18  9:44 AM.  Always use your most recent med list.                   Brand Name Dispense Instructions for use Diagnosis    acetaminophen 500 MG tablet    TYLENOL     Take 1,000 mg by mouth 3 times daily        amitriptyline 25 MG tablet    ELAVIL    90 tablet    TAKE 1 TABLET (25 MG) BY MOUTH AT BEDTIME    Insomnia, unspecified type       BETA CAROTENE PO      Take 25,000 Units by mouth daily.        BIOTIN PO      Take 1 tablet by mouth every morning        Chromium Picolinate 800 MCG Tabs      1 daily        COCONUT OIL PO      Take 1 capsule by mouth every morning        fish oil-omega-3 fatty acids 1000 MG capsule      1 daily        flax seed oil 1000 MG capsule      1 daily        FOLIC ACID PO      Take 400 mcg by mouth daily        levOCARNitine 330 MG tablet    CARNITOR     Take 330 mg by mouth every morning        levothyroxine 50 MCG tablet    SYNTHROID/LEVOTHROID    90 tablet    TAKE 1 TABLET (50 MCG) BY MOUTH DAILY    Hypothyroidism, unspecified type       MAGNESIUM OXIDE PO      Take 400 mg by mouth daily        MULTIVITAMIN/MULTIMINERAL TABS   OR     30    1 TABLET DAILY        N-ACETYL-L-CYSTEINE PO      Take 1 capsule by mouth every morning        * order for  DME     2 Device    Equipment being ordered: Compression Stockings Knee High 20-30 mmhg    Venous ulcer of ankle, right (H), Venous (peripheral) insufficiency, Varicose vein of leg       * order for DME     30 days    Equipment being ordered: [facility] Fax [phone]  Primary Dressing Mepilex Border 4x4   Qty 30 Length of Need: 1 month Frequency of dressing change: daily    Venous ulcer of ankle, right (H)       * order for DME     4 Device    Equipment being ordered: Thigh High 30-40 mmhg Compression Stockings.    Venous ulcer of right leg (H), Venous (peripheral) insufficiency, Swelling of limb       PARoxetine 20 MG tablet    PAXIL    180 tablet    TAKE 2 TABLETS BY MOUTH AT BEDTIME    Depression, unspecified depression type       TURMERIC PO      Take 1 capsule by mouth every morning        vitamin B complex with vitamin C Tabs tablet      Take 1 tablet by mouth daily DOSE UNKNOWN        VITAMIN C PO      Take 1,000 mg by mouth every morning (Patient takes 2 X 500 mg = 1,000 mg dose)        warfarin 5 MG tablet    COUMADIN    135 tablet    1.5 TABLETS (7.5MG) BY MOUTH ON MON WED FRI, AND 1 TABLET ALL OTHER DAYS PER [facility]    Personal history of DVT (deep vein thrombosis)       Zinc 50 MG Caps      1 daily        * Notice:  This list has 3 medication(s) that are the same as other medications prescribed for you. Read the directions carefully, and ask your doctor or other care provider to review them with you.

## 2018-06-28 NOTE — MR AVS SNAPSHOT
Geneva Shepherd   6/28/2018   Anticoagulation Therapy Visit    Description:  77 year old female   Provider:  Jacoby Boo MD   Department:  Ox Anti Coagulation           INR as of 6/28/2018     Today's INR No new INR was available at the time of this encounter.      Anticoagulation Summary as of 6/28/2018     INR goal 2.0-3.0   Today's INR No new INR was available at the time of this encounter.   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        June 2018 Details    Sun Mon Tue Wed Thu Fri Sat          1               2                 3               4               5               6               7               8               9                 10               11               12               13               14               15               16                 17               18               19               20               21               22               23                 24               25               26               27               28      5 mg   See details      29      7.5 mg         30      5 mg          Date Details   06/28 This INR check               How to take your warfarin dose     To take:  5 mg Take 1 of the 5 mg tablets.    To take:  7.5 mg Take 1.5 of the 5 mg tablets.            July 2018 Details    Sun Mon Tue Wed Thu Fri Sat     1      5 mg         2      7.5 mg         3      5 mg         4      7.5 mg         5      5 mg         6      7.5 mg         7      5 mg           8      5 mg         9      7.5 mg         10      5 mg         11      7.5 mg         12      5 mg         13      7.5 mg         14      5 mg           15      5 mg         16      7.5 mg         17      5 mg         18      7.5 mg         19      5 mg         20      7.5 mg         21      5 mg           22      5 mg         23      7.5 mg         24      5 mg         25      7.5 mg         26      5 mg         27      7.5 mg         28      5 mg           29      5 mg         30      7.5 mg         31      5 mg              Date Details   No additional details            How to take your warfarin dose     To take:  5 mg Take 1 of the 5 mg tablets.    To take:  7.5 mg Take 1.5 of the 5 mg tablets.           August 2018 Details    Sun Mon Tue Wed Thu Fri Sat        1      7.5 mg         2            3               4                 5               6               7               8               9               10               11                 12               13               14               15               16               17               18                 19               20               21               22               23               24               25                 26               27               28               29               30               31                 Date Details   No additional details    Date of next INR:  8/2/2018         How to take your warfarin dose     To take:  5 mg Take 1 of the 5 mg tablets.    To take:  7.5 mg Take 1.5 of the 5 mg tablets.

## 2018-06-28 NOTE — NURSING NOTE
Screening Questionnaire for Adult Immunization    Are you sick today?   No   Do you have allergies to medications, food, a vaccine component or latex?   No   Have you ever had a serious reaction after receiving a vaccination?   No   Do you have a long-term health problem with heart disease, lung disease, asthma, kidney disease, metabolic disease (e.g. diabetes), anemia, or other blood disorder?   No   Do you have cancer, leukemia, HIV/AIDS, or any other immune system problem?   No   In the past 3 months, have you taken medications that affect  your immune system, such as prednisone, other steroids, or anticancer drugs; drugs for the treatment of rheumatoid arthritis, Crohn s disease, or psoriasis; or have you had radiation treatments?   No   Have you had a seizure, or a brain or other nervous system problem?   No   During the past year, have you received a transfusion of blood or blood     products, or been given immune (gamma) globulin or antiviral drug?   No   For women: Are you pregnant or is there a chance you could become        pregnant during the next month?   No   Have you received any vaccinations in the past 4 weeks?   No     Immunization questionnaire answers were all negative.        Per orders of Dr. Zamudio, injection of TDAP Adacel given by Camelia Salgado. Patient instructed to remain in clinic for 15 minutes afterwards, and to report any adverse reaction to me immediately.       Screening performed by Camelia Salgado on 6/28/2018 at 9:52 AM.    Due to injection administration, patient instructed to remain in clinic for 15 minutes  afterwards, and to report any adverse reaction to me immediately.

## 2018-06-28 NOTE — LETTER
My Depression Action Plan  Name: Geneva Shepherd   Date of Birth 1940  Date: 6/28/2018    My doctor: Jacoby Boo   My clinic: [address] [address] [phone]          GREEN    ZONE   Good Control    What it looks like:     Things are going generally well. You have normal up s and down s. You may even feel depressed from time to time, but bad moods usually last less than a day.   What you need to do:  1. Continue to care for yourself (see self care plan)  2. Check your depression survival kit and update it as needed  3. Follow your physician s recommendations including any medication.  4. Do not stop taking medication unless you consult with your physician first.           YELLOW         ZONE Getting Worse    What it looks like:     Depression is starting to interfere with your life.     It may be hard to get out of bed; you may be starting to isolate yourself from others.    Symptoms of depression are starting to last most all day and this has happened for several days.     You may have suicidal thoughts but they are not constant.   What you need to do:     1. Call your care team, your response to treatment will improve if you keep your care team informed of your progress. Yellow periods are signs an adjustment may need to be made.     2. Continue your self-care, even if you have to fake it!    3. Talk to someone in your support network    4. Open up your depression survival kit           RED    ZONE Medical Alert - Get Help    What it looks like:     Depression is seriously interfering with your life.     You may experience these or other symptoms: You can t get out of bed most days, can t work or engage in other necessary activities, you have trouble taking care of basic hygiene, or basic responsibilities, thoughts of suicide or death that will not go away, self-injurious behavior.     What you need to do:  1. Call your care  team and request a same-day appointment. If they are not available (weekends or after hours) call your local crisis line, emergency room or 911.            Depression Self Care Plan / Survival Kit    Self-Care for Depression  Here s the deal. Your body and mind are really not as separate as most people think.  What you do and think affects how you feel and how you feel influences what you do and think. This means if you do things that people who feel good do, it will help you feel better.  Sometimes this is all it takes.  There is also a place for medication and therapy depending on how severe your depression is, so be sure to consult with your medical provider and/ or Behavioral Health Consultant if your symptoms are worsening or not improving.     In order to better manage my stress, I will:    Exercise  Get some form of exercise, every day. This will help reduce pain and release endorphins, the  feel good  chemicals in your brain. This is almost as good as taking antidepressants!  This is not the same as joining a gym and then never going! (they count on that by the way ) It can be as simple as just going for a walk or doing some gardening, anything that will get you moving.      Hygiene   Maintain good hygiene (Get out of bed in the morning, Make your bed, Brush your teeth, Take a shower, and Get dressed like you were going to work, even if you are unemployed).  If your clothes don't fit try to get ones that do.    Diet  I will strive to eat foods that are good for me, drink plenty of water, and avoid excessive sugar, caffeine, alcohol, and other mood-altering substances.  Some foods that are helpful in depression are: complex carbohydrates, B vitamins, flaxseed, fish or fish oil, fresh fruits and vegetables.    Psychotherapy  I agree to participate in Individual Therapy (if recommended).    Medication  If prescribed medications, I agree to take them.  Missing doses can result in serious side effects.  I  understand that drinking alcohol, or other illicit drug use, may cause potential side effects.  I will not stop my medication abruptly without first discussing it with my provider.    Staying Connected With Others  I will stay in touch with my friends, family members, and my primary care provider/team.    Use your imagination  Be creative.  We all have a creative side; it doesn t matter if it s oil painting, sand castles, or mud pies! This will also kick up the endorphins.    Witness Beauty  (AKA stop and smell the roses) Take a look outside, even in mid-winter. Notice colors, textures. Watch the squirrels and birds.     Service to others  Be of service to others.  There is always someone else in need.  By helping others we can  get out of ourselves  and remember the really important things.  This also provides opportunities for practicing all the other parts of the program.    Humor  Laugh and be silly!  Adjust your TV habits for less news and crime-drama and more comedy.    Control your stress  Try breathing deep, massage therapy, biofeedback, and meditation. Find time to relax each day.     My support system    Clinic Contact:  Phone number:    Contact 1:  Phone number:    Contact 2:  Phone number:    [place]/:  Phone number:    Therapist:  Phone number:    Local crisis center:    Phone number:    Other community support:  Phone number:

## 2018-06-29 ASSESSMENT — PATIENT HEALTH QUESTIONNAIRE - PHQ9: SUM OF ALL RESPONSES TO PHQ QUESTIONS 1-9: 4

## 2018-06-29 ASSESSMENT — ANXIETY QUESTIONNAIRES: GAD7 TOTAL SCORE: 7

## 2018-07-11 ENCOUNTER — MYC MEDICAL ADVICE (OUTPATIENT)
Dept: INTERNAL MEDICINE | Facility: CLINIC | Age: 78
End: 2018-07-11

## 2018-07-11 DIAGNOSIS — E83.52 HYPERCALCEMIA: Primary | ICD-10-CM

## 2018-07-12 ENCOUNTER — TRANSFERRED RECORDS (OUTPATIENT)
Dept: HEALTH INFORMATION MANAGEMENT | Facility: CLINIC | Age: 78
End: 2018-07-12

## 2018-07-18 ENCOUNTER — TELEPHONE (OUTPATIENT)
Dept: INTERNAL MEDICINE | Facility: CLINIC | Age: 78
End: 2018-07-18

## 2018-07-18 NOTE — TELEPHONE ENCOUNTER
Pt was sent the Ufree message below 1 week ago but has not been read. Needs f/u lab test. Please call pt and relay this Ufree message:    From   Jacoby Boo MD    To   Marsha Joao    Sent   7/11/2018 11:02 PM          Geneva,   I apologize for the delay discussing your lab results with you.   Your thyroid function remains normal and not the cause of your fatigue. Continue your current dose of Levothyroxine. Please contact your pharmacy if you need further refills of your medication.   When I last saw you last  in clinic and ordered those labs, I forgot to also follow-up on your history of mild calcium elevation to be sure that was also not contributing to fatigue.   Therefore I would ask that you schedule one additional nonfasting lab appointment in the next week to be sure the calcium has not risen further as that can also contribute to fatigue, abdominal pain and muscle cramping if too high.   If the calcium has risen further, I will discuss management options with you for that. If, however, the calcium level  Has improved, then it will be very important to have you see the sleep clinic as we discussed to look into the possibility of sleep apnea affecting the quality of your sleep and causing the fatigue

## 2018-08-14 ENCOUNTER — APPOINTMENT (OUTPATIENT)
Dept: LAB | Facility: CLINIC | Age: 78
End: 2018-08-14
Payer: MEDICARE

## 2018-08-14 ENCOUNTER — ANTICOAGULATION THERAPY VISIT (OUTPATIENT)
Dept: ANTICOAGULATION | Facility: CLINIC | Age: 78
End: 2018-08-14
Payer: MEDICARE

## 2018-08-14 DIAGNOSIS — E83.52 HYPERCALCEMIA: ICD-10-CM

## 2018-08-14 DIAGNOSIS — Z79.01 LONG-TERM (CURRENT) USE OF ANTICOAGULANTS: ICD-10-CM

## 2018-08-14 LAB
ANION GAP SERPL CALCULATED.3IONS-SCNC: 7 MMOL/L (ref 3–14)
BUN SERPL-MCNC: 12 MG/DL (ref 7–30)
CALCIUM SERPL-MCNC: 9.9 MG/DL (ref 8.5–10.1)
CHLORIDE SERPL-SCNC: 104 MMOL/L (ref 94–109)
CO2 SERPL-SCNC: 26 MMOL/L (ref 20–32)
CREAT SERPL-MCNC: 0.56 MG/DL (ref 0.52–1.04)
GFR SERPL CREATININE-BSD FRML MDRD: >90 ML/MIN/1.7M2
GLUCOSE SERPL-MCNC: 125 MG/DL (ref 70–99)
INR POINT OF CARE: 2.8 (ref 0.86–1.14)
POTASSIUM SERPL-SCNC: 4.6 MMOL/L (ref 3.4–5.3)
SODIUM SERPL-SCNC: 137 MMOL/L (ref 133–144)

## 2018-08-14 PROCEDURE — 00000402 ZZHCL STATISTIC TOTAL PROTEIN: Performed by: INTERNAL MEDICINE

## 2018-08-14 PROCEDURE — 85610 PROTHROMBIN TIME: CPT | Mod: QW

## 2018-08-14 PROCEDURE — 80048 BASIC METABOLIC PNL TOTAL CA: CPT | Performed by: INTERNAL MEDICINE

## 2018-08-14 PROCEDURE — 82306 VITAMIN D 25 HYDROXY: CPT | Performed by: INTERNAL MEDICINE

## 2018-08-14 PROCEDURE — 36416 COLLJ CAPILLARY BLOOD SPEC: CPT

## 2018-08-14 PROCEDURE — 84165 PROTEIN E-PHORESIS SERUM: CPT | Performed by: INTERNAL MEDICINE

## 2018-08-14 NOTE — MR AVS SNAPSHOT
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        August 2018 Details    Sun Mon Tue Wed Thu Fri Sat        1               2               3               4                 5               6               7               8               9               10               11                 12               13               14      5 mg   See details      15      7.5 mg         16      5 mg         17      7.5 mg         18      5 mg           19      5 mg         20      7.5 mg         21      5 mg         22      7.5 mg         23      5 mg         24      7.5 mg         25      5 mg           26      5 mg         27      7.5 mg         28      5 mg         29      7.5 mg         30      5 mg         31      7.5 mg           Date Details   08/14 This INR check               How to take your warfarin dose     To take:  5 mg Take 1 of the 5 mg tablets.    To take:  7.5 mg Take 1.5 of the 5 mg tablets.            September 2018 Details    Sun Mon Tue Wed Thu Fri Sat           1      5 mg           2      5 mg         3      7.5 mg         4      5 mg         5      7.5 mg         6      5 mg         7      7.5 mg         8      5 mg           9      5 mg         10      7.5 mg         11      5 mg         12      7.5 mg         13      5 mg         14      7.5 mg         15      5 mg           16      5 mg         17      7.5 mg         18            19               20               21               22                 23               24               25               26               27               28               29                 30                      Date Details   No additional details    Date of next INR:  9/18/2018         How to take your warfarin dose     To take:  5 mg Take 1 of the 5 mg tablets.    To take:  7.5 mg Take 1.5 of the 5 mg tablets.

## 2018-08-14 NOTE — PROGRESS NOTES
ANTICOAGULATION FOLLOW-UP CLINIC VISIT    Patient Name:  Geneva Shepherd  Date:  8/14/2018  Contact Type:  Face to Face    SUBJECTIVE:     Patient Findings     Positives No Problem Findings           OBJECTIVE    INR Protime   Date Value Ref Range Status   08/14/2018 2.8 (A) 0.86 - 1.14 Final       ASSESSMENT / PLAN  INR assessment THER    Recheck INR In: 5 WEEKS    INR Location Clinic      Anticoagulation Summary as of 8/14/2018     INR goal 2.0-3.0   Today's INR 2.8   Warfarin maintenance plan 7.5 mg (5 mg x 1.5) on Mon, Wed, Fri; 5 mg (5 mg x 1) all other days   Full warfarin instructions 7.5 mg on Mon, Wed, Fri; 5 mg all other days   Weekly warfarin total 42.5 mg   No change documented Huma Juares, RN   Plan last modified Ofelia Cavazos RN (12/31/2015)   Next INR check 9/18/2018   Target end date     Indications   Long-term (current) use of anticoagulants [Z79.01] [Z79.01]  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

## 2018-08-15 LAB
ALBUMIN SERPL ELPH-MCNC: 4.4 G/DL (ref 3.7–5.1)
ALPHA1 GLOB SERPL ELPH-MCNC: 0.3 G/DL (ref 0.2–0.4)
ALPHA2 GLOB SERPL ELPH-MCNC: 0.7 G/DL (ref 0.5–0.9)
B-GLOBULIN SERPL ELPH-MCNC: 0.8 G/DL (ref 0.6–1)
DEPRECATED CALCIDIOL+CALCIFEROL SERPL-MC: 41 UG/L (ref 20–75)
GAMMA GLOB SERPL ELPH-MCNC: 1 G/DL (ref 0.7–1.6)
M PROTEIN SERPL ELPH-MCNC: 0 G/DL
PROT PATTERN SERPL ELPH-IMP: NORMAL

## 2018-08-16 DIAGNOSIS — G47.00 INSOMNIA, UNSPECIFIED TYPE: ICD-10-CM

## 2018-08-16 DIAGNOSIS — E03.9 HYPOTHYROIDISM, UNSPECIFIED TYPE: ICD-10-CM

## 2018-08-16 RX ORDER — LEVOTHYROXINE SODIUM 50 UG/1
TABLET ORAL
Qty: 90 TABLET | Refills: 2 | Status: SHIPPED | OUTPATIENT
Start: 2018-08-16 | End: 2019-05-11

## 2018-08-16 NOTE — TELEPHONE ENCOUNTER
"Requested Prescriptions   Pending Prescriptions Disp Refills     amitriptyline (ELAVIL) 25 MG tablet [Pharmacy Med Name: AMITRIPTYLINE HCL 25 MG TAB] 90 tablet 1     Sig: TAKE 1 TABLET (25 MG) BY MOUTH AT BEDTIME  Last Written Prescription Date:  02/20/2018  Last Fill Quantity: 90,  # refills: 01   Last Office Visit: 6/28/2018   Future Office Visit:         Tricyclic Agents ( Annual appt and no PHQ9) Passed    8/16/2018  1:40 AM       Passed - Blood Pressure under 140/90 in past 12 mos    BP Readings from Last 3 Encounters:   06/28/18 130/70   05/10/18 126/72   02/16/18 124/72                Passed - Recent (12 mo) or future (30 days) visit within authorizing provider's specialty    Patient had office visit in the last 12 months or has a visit in the next 30 days with authorizing provider or within the authorizing provider's specialty.  See \"Patient Info\" tab in inbasket, or \"Choose Columns\" in Meds & Orders section of the refill encounter.           Passed - Patient is age 18 or older       Passed - Patient is not pregnant       Passed - No positive pregnancy test on record in past 12 mos        levothyroxine (SYNTHROID/LEVOTHROID) 50 MCG tablet [Pharmacy Med Name: LEVOTHYROXINE 50 MCG TABLET]  Last Written Prescription Date:  02/20/2018  Last Fill Quantity: 90,  # refills: 01   Last Office Visit: 6/28/2018   Future Office Visit:      90 tablet 1     Sig: TAKE 1 TABLET (50 MCG) BY MOUTH DAILY    Thyroid Protocol Passed    8/16/2018  1:40 AM       Passed - Patient is 12 years or older       Passed - Recent (12 mo) or future (30 days) visit within the authorizing provider's specialty    Patient had office visit in the last 12 months or has a visit in the next 30 days with authorizing provider or within the authorizing provider's specialty.  See \"Patient Info\" tab in inbasket, or \"Choose Columns\" in Meds & Orders section of the refill encounter.           Passed - Normal TSH on file in past 12 months    Recent Labs "   Lab Test  06/28/18   0953   TSH  3.20             Passed - No active pregnancy on record    If patient is pregnant or has had a positive pregnancy test, please check TSH.         Passed - No positive pregnancy test in past 12 months    If patient is pregnant or has had a positive pregnancy test, please check TSH.

## 2018-08-26 DIAGNOSIS — F32.A DEPRESSION, UNSPECIFIED DEPRESSION TYPE: ICD-10-CM

## 2018-08-26 NOTE — TELEPHONE ENCOUNTER
"Requested Prescriptions   Pending Prescriptions Disp Refills     PARoxetine (PAXIL) 20 MG tablet [Pharmacy Med Name: PAROXETINE HCL 20 MG TABLET] 180 tablet 1    Last Written Prescription Date:  2/28/2018  Last Fill Quantity: 180,  # refills: 1   Last office visit: 6/28/2018 with prescribing provider:  6/28/2018   Future Office Visit:     Sig: TAKE 2 TABLETS BY MOUTH AT BEDTIME    SSRIs Protocol Passed    8/26/2018  3:46 AM  PHQ-9 SCORE 8/17/2017 2/16/2018 6/28/2018   Total Score - - -   Total Score 3 0 4     HAZEL-7 SCORE 6/28/2018   Total Score 7                Passed - PHQ-9 score less than 5 in past 6 months    Please review last PHQ-9 score.          Passed - Patient is age 18 or older       Passed - No active pregnancy on record       Passed - No positive pregnancy test in last 12 months       Passed - Recent (6 mo) or future (30 days) visit within the authorizing provider's specialty    Patient had office visit in the last 6 months or has a visit in the next 30 days with authorizing provider or within the authorizing provider's specialty.  See \"Patient Info\" tab in inbasket, or \"Choose Columns\" in Meds & Orders section of the refill encounter.              "

## 2018-08-28 RX ORDER — PAROXETINE 20 MG/1
TABLET, FILM COATED ORAL
Qty: 180 TABLET | Refills: 1 | Status: SHIPPED | OUTPATIENT
Start: 2018-08-28 | End: 2019-02-28

## 2018-10-04 ENCOUNTER — ANTICOAGULATION THERAPY VISIT (OUTPATIENT)
Dept: ANTICOAGULATION | Facility: CLINIC | Age: 78
End: 2018-10-04
Payer: MEDICARE

## 2018-10-04 DIAGNOSIS — Z86.718 PERSONAL HISTORY OF DVT (DEEP VEIN THROMBOSIS): ICD-10-CM

## 2018-10-04 DIAGNOSIS — Z79.01 LONG TERM CURRENT USE OF ANTICOAGULANT THERAPY: Primary | ICD-10-CM

## 2018-10-04 LAB — INR POINT OF CARE: 3.8 (ref 0.86–1.14)

## 2018-10-04 PROCEDURE — 99207 ZZC NO CHARGE NURSE ONLY: CPT

## 2018-10-04 PROCEDURE — 36416 COLLJ CAPILLARY BLOOD SPEC: CPT

## 2018-10-04 PROCEDURE — 85610 PROTHROMBIN TIME: CPT | Mod: QW

## 2018-10-04 NOTE — MR AVS SNAPSHOT
MarshaDebra Shepherd   10/4/2018 4:15 PM   Anticoagulation Therapy Visit    Description:  77 year old female   Provider:   ANTICOAGULATION CLINIC   Department:   Anti Coagulation           INR as of 10/4/2018     Today's INR 3.8!      Anticoagulation Summary as of 10/4/2018     INR goal 2.0-3.0   Today's INR 3.8!   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  Please call  [phone] to cancel and/or reschedule your appointment   Please call  [phone] with any problems or questions regarding your therapy.        October 2018 Details    Sun Mon Tue Wed Thu Fri Sat      1               2               3               4      2.5 mg   See details      5      7.5 mg         6      5 mg           7      5 mg         8      7.5 mg         9      5 mg         10      7.5 mg         11      5 mg         12      7.5 mg         13      5 mg           14      5 mg         15      7.5 mg         16      5 mg         17      7.5 mg         18            19               20                 21               22               23               24               25               26               27                 28               29               30               31                   Date Details   10/04 This INR check       Date of next INR:  10/18/2018         How to take your warfarin dose     To take:  2.5 mg Take 0.5 of a 5 mg tablet.    To take:  5  mg Take 1 of the 5 mg tablets.    To take:  7.5 mg Take 1.5 of the 5 mg tablets.

## 2018-10-04 NOTE — PROGRESS NOTES
ANTICOAGULATION FOLLOW-UP CLINIC VISIT    Patient Name:  Geneva Shepherd  Date:  10/4/2018  Contact Type:  Face to Face    SUBJECTIVE:     Patient Findings     Positives No Problem Findings           OBJECTIVE    INR Protime   Date Value Ref Range Status   10/04/2018 3.8 (A) 0.86 - 1.14 Final       ASSESSMENT / PLAN  INR assessment SUPRA    Recheck INR In: 2 WEEKS    INR Location Clinic      Anticoagulation Summary as of 10/4/2018     INR goal 2.0-3.0   Today's INR 3.8!   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

## 2018-10-24 ENCOUNTER — ANTICOAGULATION THERAPY VISIT (OUTPATIENT)
Dept: ANTICOAGULATION | Facility: CLINIC | Age: 78
End: 2018-10-24
Payer: MEDICARE

## 2018-10-24 DIAGNOSIS — Z79.01 LONG TERM CURRENT USE OF ANTICOAGULANT THERAPY: ICD-10-CM

## 2018-10-24 LAB — INR POINT OF CARE: 3.3 (ref 0.86–1.14)

## 2018-10-24 PROCEDURE — 85610 PROTHROMBIN TIME: CPT | Mod: QW

## 2018-10-24 PROCEDURE — 36416 COLLJ CAPILLARY BLOOD SPEC: CPT

## 2018-10-24 PROCEDURE — 99207 ZZC NO CHARGE NURSE ONLY: CPT

## 2018-10-24 NOTE — PROGRESS NOTES
ANTICOAGULATION FOLLOW-UP CLINIC VISIT    Patient Name:  Geneva Shepherd  Date:  10/24/2018  Contact Type:  Face to Face    SUBJECTIVE:     Patient Findings     Positives Change in diet/appetite    Comments Not eating as many greens           OBJECTIVE    INR Protime   Date Value Ref Range Status   10/24/2018 3.3 (A) 0.86 - 1.14 Final       ASSESSMENT / PLAN  INR assessment SUPRA    Recheck INR In: 2 WEEKS    INR Location Clinic      Anticoagulation Summary as of 10/24/2018     INR goal 2.0-3.0   Today's INR 3.3!   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

## 2018-10-24 NOTE — MR AVS SNAPSHOT
MarshaDebra Shepherd   10/24/2018 4:00 PM   Anticoagulation Therapy Visit    Description:  77 year old female   Provider:   ANTICOAGULATION CLINIC   Department:   Anti Coagulation           INR as of 10/24/2018     Today's INR 3.3!      Anticoagulation Summary as of 10/24/2018     INR goal 2.0-3.0   Today's INR 3.3!   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        October 2018 Details    Sun Mon Tue Wed Thu Fri Sat      1               2               3               4               5               6                 7               8               9               10               11               12               13                 14               15               16               17               18               19               20                 21               22               23               24      5 mg   See details      25      5 mg         26      7.5 mg         27      5 mg           28      5 mg         29      7.5 mg         30      5 mg         31      7.5 mg             Date Details   10/24 This INR check               How to take your warfarin dose     To take:  5 mg Take 1 of the 5 mg tablets.    To take:  7.5 mg Take 1.5 of the 5 mg tablets.           November 2018  Details    Sun Mon Tue Wed Thu Fri Sat         1      5 mg         2      7.5 mg         3      5 mg           4      5 mg         5      7.5 mg         6      5 mg         7      7.5 mg         8            9               10                 11               12               13               14               15               16               17                 18               19               20               21               22               23               24                 25               26               27               28               29               30                 Date Details   No additional details    Date of next INR:  11/8/2018         How to take your warfarin dose     To take:  5 mg Take 1 of the 5 mg tablets.    To take:  7.5 mg Take 1.5 of the 5 mg tablets.

## 2018-11-08 ENCOUNTER — ANTICOAGULATION THERAPY VISIT (OUTPATIENT)
Dept: ANTICOAGULATION | Facility: CLINIC | Age: 78
End: 2018-11-08
Payer: MEDICARE

## 2018-11-08 DIAGNOSIS — Z79.01 LONG TERM CURRENT USE OF ANTICOAGULANT THERAPY: ICD-10-CM

## 2018-11-08 LAB — INR POINT OF CARE: 3 (ref 0.86–1.14)

## 2018-11-08 PROCEDURE — 36416 COLLJ CAPILLARY BLOOD SPEC: CPT

## 2018-11-08 PROCEDURE — 99207 ZZC NO CHARGE NURSE ONLY: CPT

## 2018-11-08 PROCEDURE — 85610 PROTHROMBIN TIME: CPT | Mod: QW

## 2018-11-08 NOTE — MR AVS SNAPSHOT
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        November 2018 Details    Sun Mon Tue Wed Thu Fri Sat         1               2               3                 4               5               6               7               8      5 mg   See details      9      7.5 mg         10      5 mg           11      5 mg         12      7.5 mg         13      5 mg         14      7.5 mg         15      5 mg         16      7.5 mg         17      5 mg           18      5 mg         19      7.5 mg         20      5 mg         21      7.5 mg         22      5 mg         23      7.5 mg         24      5 mg           25      5 mg         26      7.5 mg         27      5 mg         28      7.5 mg         29      5 mg         30      7.5 mg           Date Details   11/08 This INR check               How to take your warfarin dose     To take:  5 mg Take 1 of the 5 mg tablets.    To take:  7.5 mg Take 1.5 of the 5 mg  tablets.           December 2018 Details    Sun Mon Tue Wed Thu Fri Sat           1      5 mg           2      5 mg         3      7.5 mg         4      5 mg         5      7.5 mg         6            7               8                 9               10               11               12               13               14               15                 16               17               18               19               20               21               22                 23               24               25               26               27               28               29                 30               31                     Date Details   No additional details    Date of next INR:  12/6/2018         How to take your warfarin dose     To take:  5 mg Take 1 of the 5 mg tablets.    To take:  7.5 mg Take 1.5 of the 5 mg tablets.

## 2018-11-08 NOTE — PROGRESS NOTES
ANTICOAGULATION FOLLOW-UP CLINIC VISIT    Patient Name:  Geneva Shepherd  Date:  11/8/2018  Contact Type:  Face to Face    SUBJECTIVE:     Patient Findings     Positives No Problem Findings           OBJECTIVE    INR Protime   Date Value Ref Range Status   11/08/2018 3.0 (A) 0.86 - 1.14 Final       ASSESSMENT / PLAN  No question data found.  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

## 2018-11-27 DIAGNOSIS — Z86.718 PERSONAL HISTORY OF DVT (DEEP VEIN THROMBOSIS): ICD-10-CM

## 2018-11-28 RX ORDER — WARFARIN SODIUM 5 MG/1
TABLET ORAL
Qty: 135 TABLET | Refills: 0 | Status: SHIPPED | OUTPATIENT
Start: 2018-11-28 | End: 2019-03-27

## 2018-12-06 ENCOUNTER — ANTICOAGULATION THERAPY VISIT (OUTPATIENT)
Dept: ANTICOAGULATION | Facility: CLINIC | Age: 78
End: 2018-12-06
Payer: MEDICARE

## 2018-12-06 DIAGNOSIS — Z79.01 LONG TERM CURRENT USE OF ANTICOAGULANT THERAPY: ICD-10-CM

## 2018-12-06 LAB — INR POINT OF CARE: 1.6 (ref 0.86–1.14)

## 2018-12-06 PROCEDURE — 99207 ZZC NO CHARGE NURSE ONLY: CPT

## 2018-12-06 PROCEDURE — 85610 PROTHROMBIN TIME: CPT | Mod: QW

## 2018-12-06 PROCEDURE — 36416 COLLJ CAPILLARY BLOOD SPEC: CPT

## 2018-12-06 NOTE — PROGRESS NOTES
ANTICOAGULATION FOLLOW-UP CLINIC VISIT    Patient Name:  Geneva Shepherd  Date:  12/6/2018  Contact Type:  Face to Face    SUBJECTIVE:     Patient Findings     Positives Activity level change (pt is also now riding her stationary bike daily ), Missed doses (pt thinks that she took a double dose on 11/30 so she held Sat and took 1/2 dose on Sunday )           OBJECTIVE    INR Protime   Date Value Ref Range Status   12/06/2018 1.6 (A) 0.86 - 1.14 Final       ASSESSMENT / PLAN  No question data found.  Anticoagulation Summary as of 12/6/2018     INR goal 2.0-3.0   Today's INR 1.6!   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

## 2018-12-06 NOTE — MR AVS SNAPSHOT
MarshaDebra Shepherd   12/6/2018 4:00 PM   Anticoagulation Therapy Visit    Description:  78 year old female   Provider:   ANTICOAGULATION CLINIC   Department:   Anti Coagulation           INR as of 12/6/2018     Today's INR 1.6!      Anticoagulation Summary as of 12/6/2018     INR goal 2.0-3.0   Today's INR 1.6!   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 ([facility])    [address] [address] [phone]              Contact Numbers     [facility]  Please call  [phone] to cancel and/or reschedule your appointment   Please call  [phone] with any problems or questions regarding your therapy.        December 2018 Details    Sun Mon Tue Wed Thu Fri Sat           1                 2               3               4               5               6      7.5 mg   See details      7      7.5 mg         8      5 mg           9      5 mg         10      7.5 mg         11      5 mg         12      7.5 mg         13      5 mg         14      7.5 mg         15      5 mg           16      5 mg         17      7.5 mg         18      5 mg         19      7.5 mg         20            21               22                 23               24               25               26               27               28               29                 30               31                     Date Details   12/06 This INR check       Date of next INR:  12/20/2018         How to take your warfarin dose     To take:  5 mg Take 1 of the 5 mg tablets.    To  take:  7.5 mg Take 1.5 of the 5 mg tablets.

## 2018-12-20 ENCOUNTER — ANTICOAGULATION THERAPY VISIT (OUTPATIENT)
Dept: ANTICOAGULATION | Facility: CLINIC | Age: 78
End: 2018-12-20
Payer: MEDICARE

## 2018-12-20 DIAGNOSIS — Z79.01 LONG TERM CURRENT USE OF ANTICOAGULANT THERAPY: ICD-10-CM

## 2018-12-20 LAB — INR POINT OF CARE: 3 (ref 0.86–1.14)

## 2018-12-20 PROCEDURE — 85610 PROTHROMBIN TIME: CPT | Mod: QW

## 2018-12-20 PROCEDURE — 99207 ZZC NO CHARGE NURSE ONLY: CPT

## 2018-12-20 PROCEDURE — 36416 COLLJ CAPILLARY BLOOD SPEC: CPT

## 2018-12-20 NOTE — PROGRESS NOTES
ANTICOAGULATION FOLLOW-UP CLINIC VISIT    Patient Name:  Geneva Shepherd  Date:  12/20/2018  Contact Type:  Face to Face    SUBJECTIVE:     Patient Findings     Positives:   No Problem Findings           OBJECTIVE    INR Protime   Date Value Ref Range Status   12/20/2018 3.0 (A) 0.86 - 1.14 Final       ASSESSMENT / PLAN  No question data found.  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

## 2019-01-07 ENCOUNTER — TRANSFERRED RECORDS (OUTPATIENT)
Dept: HEALTH INFORMATION MANAGEMENT | Facility: CLINIC | Age: 79
End: 2019-01-07

## 2019-01-17 ENCOUNTER — ANTICOAGULATION THERAPY VISIT (OUTPATIENT)
Dept: ANTICOAGULATION | Facility: CLINIC | Age: 79
End: 2019-01-17
Payer: MEDICARE

## 2019-01-17 DIAGNOSIS — Z79.01 LONG TERM CURRENT USE OF ANTICOAGULANT THERAPY: ICD-10-CM

## 2019-01-17 LAB — INR POINT OF CARE: 2.9 (ref 0.86–1.14)

## 2019-01-17 PROCEDURE — 36416 COLLJ CAPILLARY BLOOD SPEC: CPT

## 2019-01-17 PROCEDURE — 99207 ZZC NO CHARGE NURSE ONLY: CPT

## 2019-01-17 PROCEDURE — 85610 PROTHROMBIN TIME: CPT | Mod: QW

## 2019-01-17 NOTE — PROGRESS NOTES
ANTICOAGULATION FOLLOW-UP CLINIC VISIT    Patient Name:  Geneva Shepherd  Date:  2019  Contact Type:  Face to Face    SUBJECTIVE:     Patient Findings     Positives:   No Problem Findings           OBJECTIVE    INR Protime   Date Value Ref Range Status   2019 2.9 (A) 0.86 - 1.14 Final       ASSESSMENT / PLAN  INR assessment THER    Recheck INR In: 4 WEEKS    INR Location Clinic      Anticoagulation Summary  As of 2019    INR goal:   2.0-3.0   TTR:   60.0 % (3 y)   INR used for dosin.9 (2019)   Warfarin maintenance plan:   7.5 mg (5 mg x 1.5) every Mon, Wed, Fri; 5 mg (5 mg x 1) all other days   Full warfarin instructions:   7.5 mg every Mon, Wed, Fri; 5 mg all other days   Weekly warfarin total:   42.5 mg   No change documented:   Camelia He RN   Plan last modified:   Ofelia Cavazos RN (2015)   Next INR check:   2019   Target end date:       Indications    Long term current use of anticoagulant therapy [Z79.01]  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

## 2019-02-01 ENCOUNTER — TELEPHONE (OUTPATIENT)
Dept: INTERNAL MEDICINE | Facility: CLINIC | Age: 79
End: 2019-02-01

## 2019-02-01 DIAGNOSIS — R60.9 EDEMA, UNSPECIFIED TYPE: Primary | ICD-10-CM

## 2019-02-01 NOTE — TELEPHONE ENCOUNTER
Printed order for compression stockings. Called patient to see if she wants to  order or have it faxed to a pharmacy.

## 2019-02-01 NOTE — TELEPHONE ENCOUNTER
I am unable to print prescription for compression stockings from home.  Prescription entered in the chart and signed with no print.  Please reprint prescription and have partner in clinic today sign the prescription and then contact patient to see if she wants it mailed to her home or wishes to pick it up as there is no other information provided with original message regarding where patient was prescription sent

## 2019-02-01 NOTE — TELEPHONE ENCOUNTER
Reason for Call: Request for an order or referral: [facility] [employer]    Order or referral being requested: order for compression stockings    Date needed: as soon as possible    Has the patient been seen by the PCP for this problem? YES    Additional comments: left leg 20/30, right leg 30/40    Phone number Patient can be reached at:  Home number on file [phone] (home)    Best Time:  asap    Can we leave a detailed message on this number?  YES    Call taken on 2/1/2019 at 1:26 PM by Jazmyne Meng

## 2019-02-28 DIAGNOSIS — F32.A DEPRESSION, UNSPECIFIED DEPRESSION TYPE: ICD-10-CM

## 2019-02-28 RX ORDER — PAROXETINE 20 MG/1
TABLET, FILM COATED ORAL
Qty: 60 TABLET | Refills: 0 | Status: SHIPPED | OUTPATIENT
Start: 2019-02-28 | End: 2019-04-01

## 2019-02-28 NOTE — LETTER
[facility]  [address] [id number] [phone]            Geneva Shepherd  [address]  [place] [address]        February 28, 2019    Dear Geneva,    While refilling your prescription today, we noticed that you are due for an appointment with your provider.  We will refill your prescription for 30 days, but a follow-up appointment must be made before any additional refills can be approved.     Taking care of your health is important to us and we look forward to seeing you in the near future.  Please call us at [phone] or [phone] (or use [employer]) to schedule an appointment.     Please disregard this notice if you have already made an appointment.    Sincerely,        [facility]

## 2019-03-06 ENCOUNTER — ANTICOAGULATION THERAPY VISIT (OUTPATIENT)
Dept: ANTICOAGULATION | Facility: CLINIC | Age: 79
End: 2019-03-06
Payer: MEDICARE

## 2019-03-06 DIAGNOSIS — Z79.01 LONG TERM CURRENT USE OF ANTICOAGULANT THERAPY: ICD-10-CM

## 2019-03-06 LAB — INR POINT OF CARE: 1.8 (ref 0.86–1.14)

## 2019-03-06 PROCEDURE — 36416 COLLJ CAPILLARY BLOOD SPEC: CPT

## 2019-03-06 PROCEDURE — 85610 PROTHROMBIN TIME: CPT | Mod: QW

## 2019-03-06 PROCEDURE — 99207 ZZC NO CHARGE NURSE ONLY: CPT

## 2019-03-06 NOTE — PROGRESS NOTES
ANTICOAGULATION FOLLOW-UP CLINIC VISIT    Patient Name:  Geneva Shepherd  Date:  3/6/2019  Contact Type:  Face to Face    SUBJECTIVE:     Patient Findings     Positives:   Activity level change (pt has been exercising daily for the past month )           OBJECTIVE    INR Protime   Date Value Ref Range Status   2019 1.8 (A) 0.86 - 1.14 Final       ASSESSMENT / PLAN  No question data found.  Anticoagulation Summary  As of 3/6/2019    INR goal:   2.0-3.0   TTR:   60.9 % (3.1 y)   INR used for dosin.8! 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

## 2019-03-13 NOTE — PATIENT INSTRUCTIONS
May try stopping Coconut and fish oil and flax seed to see if stools thicken a little more. If not helpful, then try adding Metamucil/Citrucel daily as needed   INR today  Fasting labs in 3 months. For fasting labs, please refrain from eating for 8 hours or more.  Be sure to  drink water and take your  medications the day of the test.   Bone density test - [place]  Mammogram as scheduled   Check with insurance or speak with your pharmacist re: Shingrix vaccine coverage for shingles prevention.  This is a 2 shot series done 2-6 months apart

## 2019-03-13 NOTE — PROGRESS NOTES
"    SUBJECTIVE:   Geneva Shepherd is a 78 year old female who presents for Preventive Visit.    Answers for HPI/ROS submitted by the patient on 3/15/2019   Annual Exam:  In general, how would you rate your overall physical health?: good  Frequency of exercise:: 6-7 days/week riding stationary bike for 30 min   Do you usually eat at least 4 servings of fruit and vegetables a day, include whole grains & fiber, and avoid regularly eating high fat or \"junk\" foods? : Yes  Taking medications regularly:: Yes  Medication side effects:: Not applicable  Activities of Daily Living: no assistance needed  Home safety: no safety concerns identified  Hearing Impairment:: need to ask people to speak up or repeat themselves, difficulty understanding soft or whispered speech, difficulty understanding speech on the telephone  In the past 6 months, have you been bothered by leaking of urine?: Yes  In general, how would you rate your overall mental or emotional health?: good  Additional concerns today:: No  Duration of exercise:: 30-45 minutes  Are you in the first 12 months of your Medicare Part B coverage?  No        PHQ-2 Score:      Do you feel safe in your environment? Yes    Do you have a Health Care Directive? Yes: Patient states has Advance Directive and will bring in a copy to clinic.    Additional concerns to address?  No    Fall risk:       Cognitive Screenin) Repeat 3 items (Leader, Season, Table)    2) Clock draw: NORMAL  3) 3 item recall: Recalls 3 objects  Results: 3 items recalled: COGNITIVE IMPAIRMENT LESS LIKELY    Mini-CogTM Copyright NICK Petit. Licensed by the author for use in [facility]; reprinted with permission (hetal@.[email or web address]). All rights reserved.      Do you have sleep apnea, excessive snoring or daytime drowsiness?: no            Reviewed and updated as needed this visit by clinical staff         Reviewed and updated as needed this visit by Provider        Social History     Tobacco Use     " Smoking status: Former Smoker     Last attempt to quit: 1968     Years since quittin.5     Smokeless tobacco: Never Used     Tobacco comment: quit in    Substance Use Topics     Alcohol use: Yes     Alcohol/week: 0.0 oz     Comment: 1 glass of wine a month                           Current providers sharing in care for this patient include:   Patient Care Team:  Jacoby Boo MD as PCP - General  Jacoby Boo MD as Assigned PCP    The following health maintenance items are reviewed in Epic and correct as of today:  Health Maintenance   Topic Date Due     ZOSTER IMMUNIZATION (1 of 2) 1992     MEDICARE ANNUAL WELLNESS VISIT  2007     OP ANNUAL INR REFERRAL  2017     INFLUENZA VACCINE (1) 2018     FALL RISK ASSESSMENT  2018     PHQ-9 Q6 MONTHS  2018     TSH Q1 YEAR  2019     LIPID SCREEN Q5 YR FEMALE (SYSTEM ASSIGNED)  2022     ADVANCE DIRECTIVE PLANNING Q5 YRS  2023     COLONOSCOPY Q10 YR  2026     DTAP/TDAP/TD IMMUNIZATION (2 - Td) 2028     DEXA SCAN SCREENING (SYSTEM ASSIGNED)  Completed     DEPRESSION ACTION PLAN  Completed     PNEUMOCOCCAL IMMUNIZATION 65+ LOW/MEDIUM RISK  Completed     IPV IMMUNIZATION  Aged Out     MENINGITIS IMMUNIZATION  Aged Out     Labs reviewed in EPIC      ROS:  CONSTITUTIONAL: NEGATIVE for fever, chills, change in weight  INTEGUMENTARY/SKIN: NEGATIVE for worrisome rashes, moles or lesions  EYES: NEGATIVE for vision changes or irritation. Eye Exam  2018  ENT/MOUTH: NEGATIVE for ear pain, mouth and throat problems. Has bilateral hearing los and would jhonny testing and possible hearing aids  RESP: NEGATIVE for significant cough or SOB  BREAST: NEGATIVE for masses, tenderness or discharge  CV: NEGATIVE for chest pain, palpitations. Stable mild BLE peripheral edema. Controlled with compression stockings  GI: NEGATIVE for nausea, abdominal pain, heartburn. Rare loose stools  : NEGATIVE for frequency, dysuria,  "or hematuria. No vaginal sx. On suppressive Nitrofurantoin. No lung sx with med  MUSCULOSKELETAL: NEGATIVE for significant arthralgias or myalgia  NEURO: NEGATIVE for weakness, dizziness or paresthesias  ENDOCRINE: NEGATIVE for temperature intolerance, skin/hair changes  HEME: NEGATIVE for bleeding problems. On Coumadin and due for INR  PSYCHIATRIC: NEGATIVE for changes in mood or affect. Sleeping well.  Energy good during the day    OBJECTIVE:   /80   Pulse 80   Temp 97.5  F (36.4  C) (Oral)   Wt 81.6 kg (180 lb)   SpO2 95%   BMI 28.19 kg/m   Estimated body mass index is 28.19 kg/m  as calculated from the following:    Height as of 2/16/18: 1.702 m (5' 7\").    Weight as of 6/28/18: 81.6 kg (180 lb).  EXAM:   General appearance -  alert, no distress.  Normal-appearing affect  Skin - No rashes or lesions.  Head - normocephalic, atraumatic  Eyes - JASSI, EOMI, fundi exam with nondilated pupils negative.  Ears - External ears normal. Canals clear. TM's normal.  Nose/Sinuses - Nares normal. Septum midline. Mucosa normal. No drainage or sinus tenderness.  Oropharynx - No erythema, no adenopathy, no exudates.  Neck - Supple without adenopathy or thyromegaly. No bruits.  Lungs - Clear to auscultation without wheezes/rhonchi.  Heart - Regular rate and rhythm without murmurs, clicks, or gallops.  Nodes - No supraclavicular, axillary, or inguinal adenopathy palpable.  Breasts - deferred  Abdomen - Abdomen soft, non-tender. BS normal. No masses or hepatosplenomegaly palpable. No bruits.  Extremities -No cyanosis, clubbing.  Chronic varicose veins BLE  which are nontender to palpation.  Mild bilateral lower extremity edema well controlled with compression stockings, .    Musculoskeletal - Spine ROM normal. Muscular strength intact.  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 Reflexes normal and " "symmetric. Sensation grossly WNL.  Genital/Rectal - deferred        ASSESSMENT / PLAN:   1. Medicare annual wellness visit, initial  See below for screening and discussion re: Shingrix .  Other healthcare maintenance up-to-date    2. Major depressive disorder, single episode, mild (H)  PHQ = 0.  Patient wishes to continue current medication    3. Coagulation defect (H)  On anticoagulation therapy with Coumadin.   Due for INR today     4. Hypothyroidism, unspecified type  On levothyroxine.  Due for lab follow-up  - TSH with free T4 reflex; Future    5. Asymptomatic menopausal state   Due for reassessment bone density with DEXA  - DX Hip/Pelvis/Spine; Future    6. Screening for diabetes mellitus  - Basic metabolic panel; Future    7. CARDIOVASCULAR SCREENING; LDL GOAL LESS THAN 100  - Lipid panel reflex to direct LDL Fasting; Future      End of Life Planning:  Patient currently has an advanced directive: Yes.  Practitioner is supportive of decision.    COUNSELING:  Reviewed preventive health counseling, as reflected in patient instructions    BP Readings from Last 1 Encounters:   06/28/18 130/70     Estimated body mass index is 28.19 kg/m  as calculated from the following:    Height as of 2/16/18: 1.702 m (5' 7\").    Weight as of 6/28/18: 81.6 kg (180 lb).    BP Screening:   Last 3 BP Readings:    BP Readings from Last 3 Encounters:   03/15/19 132/80   06/28/18 130/70   05/10/18 126/72       The following was recommended to the patient:  Re-screen BP within a year and recommended lifestyle modifications       reports that she quit smoking about 50 years ago. she has never used smokeless tobacco.      Appropriate preventive services were discussed with this patient, including applicable screening as appropriate for cardiovascular disease, diabetes, osteopenia/osteoporosis, and glaucoma.  As appropriate for age/gender, discussed screening for colorectal cancer, prostate cancer, breast cancer, and cervical cancer. " Checklist reviewing preventive services available has been given to the patient.    Reviewed patients plan of care and provided an AVS. The Basic Care Plan (routine screening as documented in Health Maintenance) for Geneva meets the Care Plan requirement. This Care Plan has been established and reviewed with the Patient.    Counseling Resources:  ATP IV Guidelines  Pooled Cohorts Equation Calculator  Breast Cancer Risk Calculator  FRAX Risk Assessment  ICSI Preventive Guidelines  Dietary Guidelines for Americans, 2010  USDA's MyPlate  ASA Prophylaxis  Lung CA Screening      PLAN:   may try stopping Coconut and fish oil and flax seed to see if stools thicken a little more. If not helpful, then try adding Metamucil/Citrucel daily as needed   INR today  Fasting labs in 3 months. For fasting labs, please refrain from eating for 8 hours or more.  Be sure to  drink water and take your  medications the day of the test.   B[facility] density test - [facility]  Mammogram as scheduled  Check with insurance or speak with your pharmacist re: Shingrix vaccine coverage for shingles prevention.  This is a 2 shot series done 2-6 months apart             Jacoby Boo MD  [facility]

## 2019-03-15 ENCOUNTER — OFFICE VISIT (OUTPATIENT)
Dept: INTERNAL MEDICINE | Facility: CLINIC | Age: 79
End: 2019-03-15
Payer: MEDICARE

## 2019-03-15 ENCOUNTER — ANTICOAGULATION THERAPY VISIT (OUTPATIENT)
Dept: ANTICOAGULATION | Facility: CLINIC | Age: 79
End: 2019-03-15
Payer: MEDICARE

## 2019-03-15 VITALS
WEIGHT: 180 LBS | SYSTOLIC BLOOD PRESSURE: 132 MMHG | OXYGEN SATURATION: 95 % | BODY MASS INDEX: 28.19 KG/M2 | TEMPERATURE: 97.5 F | HEART RATE: 80 BPM | DIASTOLIC BLOOD PRESSURE: 80 MMHG

## 2019-03-15 DIAGNOSIS — Z00.00 MEDICARE ANNUAL WELLNESS VISIT, INITIAL: Primary | ICD-10-CM

## 2019-03-15 DIAGNOSIS — D68.9 COAGULATION DEFECT (H): ICD-10-CM

## 2019-03-15 DIAGNOSIS — Z78.0 ASYMPTOMATIC MENOPAUSAL STATE: ICD-10-CM

## 2019-03-15 DIAGNOSIS — Z13.6 CARDIOVASCULAR SCREENING; LDL GOAL LESS THAN 100: ICD-10-CM

## 2019-03-15 DIAGNOSIS — F32.0 MAJOR DEPRESSIVE DISORDER, SINGLE EPISODE, MILD (H): ICD-10-CM

## 2019-03-15 DIAGNOSIS — Z13.1 SCREENING FOR DIABETES MELLITUS: ICD-10-CM

## 2019-03-15 DIAGNOSIS — E03.9 HYPOTHYROIDISM, UNSPECIFIED TYPE: ICD-10-CM

## 2019-03-15 LAB — INR POINT OF CARE: 2.8 (ref 0.86–1.14)

## 2019-03-15 PROCEDURE — 36416 COLLJ CAPILLARY BLOOD SPEC: CPT

## 2019-03-15 PROCEDURE — G0438 PPPS, INITIAL VISIT: HCPCS | Performed by: INTERNAL MEDICINE

## 2019-03-15 PROCEDURE — 85610 PROTHROMBIN TIME: CPT | Mod: QW

## 2019-03-15 RX ORDER — NITROFURANTOIN MACROCRYSTAL 100 MG
CAPSULE ORAL
Refills: 1 | COMMUNITY
Start: 2019-01-07 | End: 2023-11-23

## 2019-03-15 ASSESSMENT — PATIENT HEALTH QUESTIONNAIRE - PHQ9: SUM OF ALL RESPONSES TO PHQ QUESTIONS 1-9: 0

## 2019-03-15 ASSESSMENT — ACTIVITIES OF DAILY LIVING (ADL): CURRENT_FUNCTION: NO ASSISTANCE NEEDED

## 2019-03-15 NOTE — PROGRESS NOTES
ANTICOAGULATION FOLLOW-UP CLINIC VISIT    Patient Name:  Geneva Shepherd  Date:  3/15/2019  Contact Type:  Face to Face    SUBJECTIVE:     Patient Findings     Comments:   The patient was assessed for diet, medication, and activity level changes, missed or extra doses, bruising or bleeding, with no problem findings.             OBJECTIVE    INR Protime   Date Value Ref Range Status   03/15/2019 2.8 (A) 0.86 - 1.14 Final       ASSESSMENT / PLAN  INR assessment THER    Recheck INR In: 2 WEEKS    INR Location Clinic      Anticoagulation Summary  As of 3/15/2019    INR goal:   2.0-3.0   TTR:   61.1 % (3.2 y)   INR used for dosin.8 (3/15/2019)   Warfarin maintenance plan:   5 mg (5 mg x 1) every Tue, Thu, Sat; 7.5 mg (5 mg x 1.5) all other days   Full warfarin instructions:   5 mg every Tue, Thu, Sat; 7.5 mg all other days   Weekly warfarin total:   45 mg   Plan last modified:   Camelia He RN (3/6/2019)   Next INR check:   3/29/2019   Target end date:       Indications    Long term current use of anticoagulant therapy [Z79.01]  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    Ofelia Cavazos RN

## 2019-03-18 ENCOUNTER — HOSPITAL ENCOUNTER (OUTPATIENT)
Dept: MAMMOGRAPHY | Facility: CLINIC | Age: 79
Discharge: HOME | End: 2019-03-18
Attending: INTERNAL MEDICINE | Admitting: INTERNAL MEDICINE
Payer: MEDICARE

## 2019-03-18 DIAGNOSIS — Z12.31 VISIT FOR SCREENING MAMMOGRAM: ICD-10-CM

## 2019-03-18 PROCEDURE — 77063 BREAST TOMOSYNTHESIS BI: CPT

## 2019-03-27 DIAGNOSIS — Z86.718 PERSONAL HISTORY OF DVT (DEEP VEIN THROMBOSIS): ICD-10-CM

## 2019-03-27 RX ORDER — WARFARIN SODIUM 5 MG/1
TABLET ORAL
Qty: 110 TABLET | Refills: 0 | Status: SHIPPED | OUTPATIENT
Start: 2019-03-27 | End: 2019-06-20

## 2019-03-27 NOTE — TELEPHONE ENCOUNTER
"Requested Prescriptions   Pending Prescriptions Disp Refills     warfarin (COUMADIN) 5 MG tablet [Pharmacy Med Name: WARFARIN SODIUM 5 MG TABLET] 135 tablet 0     Sig: TAKE ONE AND ONE-HALF TABLETS BY MOUTH ON MON, WED, FRI AND ONE TABLET BY MOUTH ALL OTHER DAYS.    Vitamin K Antagonists Failed - 3/27/2019  9:58 AM       Failed - INR is within goal in the past 6 weeks    Confirm INR is within goal in the past 6 weeks.     Recent Labs   Lab Test 03/15/19   INR 2.8*                      Passed - Recent (12 mo) or future (30 days) visit within the authorizing provider's specialty    Patient had office visit in the last 12 months or has a visit in the next 30 days with authorizing provider or within the authorizing provider's specialty.  See \"Patient Info\" tab in inbasket, or \"Choose Columns\" in Meds & Orders section of the refill encounter.             Passed - Medication is active on med list       Passed - Patient is 18 years of age or older       Passed - Patient is not pregnant       Passed - No positive pregnancy on file in past 12 months          "

## 2019-03-29 ENCOUNTER — TRANSFERRED RECORDS (OUTPATIENT)
Dept: HEALTH INFORMATION MANAGEMENT | Facility: CLINIC | Age: 79
End: 2019-03-29

## 2019-04-01 DIAGNOSIS — F32.A DEPRESSION, UNSPECIFIED DEPRESSION TYPE: ICD-10-CM

## 2019-04-01 RX ORDER — PAROXETINE 20 MG/1
TABLET, FILM COATED ORAL
Qty: 60 TABLET | Refills: 5 | Status: SHIPPED | OUTPATIENT
Start: 2019-04-01 | End: 2019-10-16

## 2019-04-01 NOTE — TELEPHONE ENCOUNTER
"Requested Prescriptions   Pending Prescriptions Disp Refills     PARoxetine (PAXIL) 20 MG tablet [Pharmacy Med Name: PAROXETINE HCL 20 MG TABLET] 60 tablet 0     Sig: TAKE 2 TABLETS BY MOUTH AT BEDTIME    SSRIs Protocol Passed - 4/1/2019  1:44 AM       Passed - PHQ-9 score less than 5 in past 6 months    Please review last PHQ-9 score.          Passed - Medication is active on med list       Passed - Patient is age 18 or older       Passed - No active pregnancy on record       Passed - No positive pregnancy test in last 12 months       Passed - Recent (6 mo) or future (30 days) visit within the authorizing provider's specialty    Patient had office visit in the last 6 months or has a visit in the next 30 days with authorizing provider or within the authorizing provider's specialty.  See \"Patient Info\" tab in inbasket, or \"Choose Columns\" in Meds & Orders section of the refill encounter.            Last Written Prescription Date:  2/28/19  Last Fill Quantity: 60,  # refills: 0   Last office visit: 3/15/2019 with prescribing provider:  3/15/19   Future Office Visit:      "

## 2019-04-04 ENCOUNTER — ANCILLARY PROCEDURE (OUTPATIENT)
Dept: BONE DENSITY | Facility: CLINIC | Age: 79
End: 2019-04-04
Attending: INTERNAL MEDICINE
Payer: MEDICARE

## 2019-04-04 ENCOUNTER — ANTICOAGULATION THERAPY VISIT (OUTPATIENT)
Dept: ANTICOAGULATION | Facility: CLINIC | Age: 79
End: 2019-04-04
Payer: MEDICARE

## 2019-04-04 DIAGNOSIS — Z78.0 MENOPAUSE: ICD-10-CM

## 2019-04-04 DIAGNOSIS — Z79.01 LONG TERM CURRENT USE OF ANTICOAGULANT THERAPY: ICD-10-CM

## 2019-04-04 DIAGNOSIS — Z78.0 ASYMPTOMATIC MENOPAUSAL STATE: ICD-10-CM

## 2019-04-04 LAB — INR POINT OF CARE: 2.2 (ref 0.86–1.14)

## 2019-04-04 PROCEDURE — 85610 PROTHROMBIN TIME: CPT | Mod: QW

## 2019-04-04 PROCEDURE — 99207 ZZC NO CHARGE NURSE ONLY: CPT

## 2019-04-04 PROCEDURE — 77085 DXA BONE DENSITY AXL VRT FX: CPT | Performed by: INTERNAL MEDICINE

## 2019-04-04 PROCEDURE — 36416 COLLJ CAPILLARY BLOOD SPEC: CPT

## 2019-04-04 PROCEDURE — 77081 DXA BONE DENSITY APPENDICULR: CPT | Mod: 59 | Performed by: INTERNAL MEDICINE

## 2019-04-04 NOTE — PROGRESS NOTES
ANTICOAGULATION FOLLOW-UP CLINIC VISIT    Patient Name:  Geneva Shepherd  Date:  2019  Contact Type:  Face to Face    SUBJECTIVE:     Patient Findings     Comments:   The patient was assessed for diet, medication, and activity level changes, missed or extra doses, bruising or bleeding, with no problem findings.             OBJECTIVE    INR Protime   Date Value Ref Range Status   2019 2.2 (A) 0.86 - 1.14 Final       ASSESSMENT / PLAN  No question data found.  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

## 2019-04-25 ENCOUNTER — OFFICE VISIT (OUTPATIENT)
Dept: INTERNAL MEDICINE | Facility: CLINIC | Age: 79
End: 2019-04-25
Payer: MEDICARE

## 2019-04-25 VITALS
RESPIRATION RATE: 16 BRPM | WEIGHT: 180 LBS | OXYGEN SATURATION: 98 % | BODY MASS INDEX: 28.19 KG/M2 | TEMPERATURE: 98.4 F | SYSTOLIC BLOOD PRESSURE: 130 MMHG | DIASTOLIC BLOOD PRESSURE: 80 MMHG | HEART RATE: 83 BPM

## 2019-04-25 DIAGNOSIS — H02.9 EYELID DISORDER: ICD-10-CM

## 2019-04-25 DIAGNOSIS — Z01.818 PREOP GENERAL PHYSICAL EXAM: Primary | ICD-10-CM

## 2019-04-25 DIAGNOSIS — D68.9 COAGULATION DEFECT (H): ICD-10-CM

## 2019-04-25 PROCEDURE — 93000 ELECTROCARDIOGRAM COMPLETE: CPT | Performed by: INTERNAL MEDICINE

## 2019-04-25 PROCEDURE — 99215 OFFICE O/P EST HI 40 MIN: CPT | Performed by: INTERNAL MEDICINE

## 2019-04-25 NOTE — PROGRESS NOTES
[address] [address]  [phone]  Dept: [phone]    PRE-OP EVALUATION:  Today's date: 2019    Geneva Shepherd (: 1940) presents for pre-operative evaluation assessment as requested by Dr. Gustavo Shah.  She requires evaluation and anesthesia risk assessment prior to undergoing surgery/procedure for treatment of Skin tag on Left Eyelid .     Proposed Surgery/ Procedure: Skin Removed from left eyelid  Date of Surgery/ Procedure: 2019  Time of Surgery/ Procedure: 2:30pm  Hospital/Surgical Facility: [facility]  Fax number for surgical facility: [phone]  Primary Physician: Jacoby Boo  Type of Anesthesia Anticipated: Local with MAC    Patient has a Health Care Directive or Living Will:  Yes    1. NO - Do you have a history of heart attack, stroke, stent, bypass or surgery on an artery in the head, neck, heart or legs?  2. NO - Do you ever have any pain or discomfort in your chest?  3. NO - Do you have a history of  Heart Failure?  4. NO - Are you troubled by shortness of breath when: walking on the level, up a slight hill or at night?  5. NO - Do you currently have a cold, bronchitis or other respiratory infection?  6. NO - Do you have a cough, shortness of breath or wheezing?  7. NO - Do you sometimes get pains in the calves of your legs when you walk?  8. YES - DO YOU OR ANYONE IN YOUR FAMILY HAVE PREVIOUS HISTORY OF BLOOD CLOTS?  Hx RLE DVT in past  9. NO - Do you or does anyone in your family have a serious bleeding problem such as prolonged bleeding following surgeries or cuts?  10. NO - Have you ever had problems with anemia or been told to take iron pills?  11. NO - Have you had any abnormal blood loss such as black, tarry or bloody stools, or abnormal vaginal bleeding?  12. NO - Have you ever had a blood transfusion?  13. NO - Have you or any of your relatives ever had problems with anesthesia?  14. NO  - Do you have sleep apnea, excessive snoring or daytime drowsiness?  15. NO - Do you have any prosthetic heart valves?  16. YES - DO YOU HAVE PROSTHETIC JOINTS? LTHA and BTKA  17. NO - Is there any chance that you may be pregnant?      HPI:     HPI related to upcoming procedure: History of skin tag growth left upper eyelid.  Causes some irritation to patient.  Following consultation with eye surgeon, patient is elected to undergo surgical removal.  See below for other medical issues.  On chronic anticoagulation for history of previous unprovoked DVT and factor V Leiden mutation  With regards to exercise tolerance, using a stationary bike 30-45 min daily  without chest pain or shortness of breath   See below for other stable medical issues  Per my nurses discussion with Dr. Shah's office, okay for patient to be on Coumadin for procedure but will try to keep as close to 2 as possible. Most recent INR 2.2 and will have INR recheck again the day before the procedure to be sure at safe level    MEDICAL HISTORY:     Patient Active Problem List    Diagnosis Date Noted     Hypercalcemia      Priority: Medium     Gastroesophageal reflux disease, esophagitis presence not specified 11/23/2017     Priority: Medium     Major depressive disorder, single episode, mild (H) 03/14/2017     Priority: Medium     Knee joint replacement status 01/16/2017     Priority: Medium     Insomnia 02/17/2016     Priority: Medium     Hypothyroidism 01/01/2016     Priority: Medium     Long term current use of anticoagulant therapy 12/15/2015     Priority: Medium     Asymptomatic varicose veins, bilateral 09/03/2015     Priority: Medium     Personal history of RLE DVT (deep vein thrombosis)(2003) 09/03/2015     Priority: Medium     Anticoagulated on Coumadin 09/03/2015     Priority: Medium     Hip joint replacement status: Left 8/31/15 08/31/2015     Priority: Medium     Elevated antinuclear antibody (JUAN ANTONIO) level 07/04/2015     Priority: Medium      Obesity 09/11/2013     Priority: Medium     Osteoarthrosis involving lower leg 01/21/2013     Priority: Medium     Problem list name updated by automated process. Provider to review       Advanced directives, counseling/discussion 05/18/2012     Priority: Medium     Patient states has Advance Directive and will bring in a copy to clinic. 5/18/2012          FACTOR 5 LEIDEN DEFECT (HYPERCOAGULABLE) 07/30/2003     Priority: Medium     Irritable bowel syndrome 09/09/2002     Priority: Medium      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     FRACTURE OF RIGHT LATERAL PROXIMAL TIBIA 11/07     Gastro-oesophageal reflux disease     rare     Hypercalcemia      Hyperlipidemia LDL goal <160 10/31/2010     Insomnia      Irritable bowel syndrome      Obesity 9/11/2013     Pain in joint, lower leg      Phlebitis and thrombophlebitis of superficial vessels of lower extremities 7/03    right     Sprain of lumbar region      Unspecified hypothyroidism      Urinary tract infection, site not specified      Past Surgical History:   Procedure Laterality Date     ARTHROPLASTY HIP Left 8/31/2015    Procedure: ARTHROPLASTY HIP;  Surgeon: Benjamín Maddox MD;  Location:  OR     ARTHROPLASTY KNEE  1/17/2013    Procedure: ARTHROPLASTY KNEE;  RIGHT TOTAL KNEE ARTHROPLASTY (BIOMET)^ ;  Surgeon: Benjamín Maddox MD;  Location: [facility] OR     ARTHROPLASTY KNEE Left 1/16/2017    Procedure: ARTHROPLASTY KNEE;  Surgeon: Benjamín Maddox MD;  Location:  OR     C NONSPECIFIC PROCEDURE  7/00/01     Endometrial Biopsy.     C NONSPECIFIC PROCEDURE      Tubal Ligation.     C NONSPECIFIC PROCEDURE  6/02    negative coronary angio     C NONSPECIFIC PROCEDURE  7/04    RLE varicose vein stripping     CHOLECYSTECTOMY       COLONOSCOPY N/A 4/26/2016    Procedure: COMBINED COLONOSCOPY, SINGLE OR MULTIPLE BIOPSY/POLYPECTOMY BY BIOPSY;  Surgeon: Mario Coe MD;  Location:  GI     GYN SURGERY      tubal     VASCULAR SURGERY       Current Outpatient Medications   Medication Sig Dispense Refill     acetaminophen (TYLENOL) 500 MG tablet Take 1,000 mg by mouth 3 times daily       Acetylcysteine (N-ACETYL-L-CYSTEINE PO) Take 1 capsule by mouth every morning       amitriptyline (ELAVIL) 25 MG tablet TAKE 1 TABLET (25 MG) BY MOUTH AT BEDTIME 90 tablet 2     Ascorbic Acid (VITAMIN C PO) Take 1,000 mg by mouth every morning (Patient takes 2 X 500 mg = 1,000 mg dose)       BETA CAROTENE PO Take 25,000 Units by mouth daily.       BIOTIN PO Take 1 tablet by mouth every morning       CHROMIUM PICOLINATE 800 MCG OR TABS 1 daily       COCONUT OIL PO Take 1 capsule by mouth every morning       FISH OIL 1000 MG OR CAPS 1 daily       FLAX SEED OIL 1000 MG OR CAPS 1 daily       FOLIC ACID PO Take 400 mcg by mouth daily        levOCARNitine (CARNITOR) 330 MG tablet Take 330 mg by mouth every morning       levothyroxine (SYNTHROID/LEVOTHROID) 50 MCG tablet TAKE 1 TABLET (50 MCG) BY MOUTH DAILY 90 tablet 2     MAGNESIUM OXIDE PO Take 400 mg by mouth daily       MULTIVITAMIN/MULTIMINERAL TABS   OR 1 TABLET DAILY 30 0     nitroFURantoin macrocrystal (MACRODANTIN) 100 MG capsule TAKE 1 CAPSULE BY MOUTH EVERY DAY  1     order for DME Equipment being ordered:  Knee high Jobst compression stockings.  left leg 20-30 mm Hg, right leg 30-40mm Hg 2 Package 5     PARoxetine (PAXIL) 20 MG tablet TAKE 2 TABLETS BY MOUTH AT BEDTIME 60 tablet 5     TURMERIC PO Take 1 capsule by mouth every morning       vitamin  B complex with vitamin C (VITAMIN  B  COMPLEX) TABS Take 1 tablet by mouth daily DOSE UNKNOWN       warfarin (COUMADIN) 5 MG tablet Take one tablet Tues/Thurs/Sat and 1 1/2 tablets all other days as directed by the anticoagulation clinic 110 tablet 0     ZINC 50 MG OR CAPS 1 daily       OTC products: None, except as noted above    Allergies   Allergen Reactions     Cephalexin Monohydrate      diarrhea      Latex Allergy: NO    Social History     Tobacco Use     Smoking status: Former Smoker     Last attempt to quit: 1968     Years since quittin.6     Smokeless tobacco: Never Used     Tobacco comment: quit in    Substance Use Topics     Alcohol use: Yes     Alcohol/week: 0.0 oz     Comment: 1 glass of wine a month     History   Drug Use No       REVIEW OF SYSTEMS:   CONSTITUTIONAL: NEGATIVE for fever, chills, change in weight  INTEGUMENTARY/SKIN:  Denies acute rashes. Skin tage left eyelid as above  EYES: NEGATIVE for vision changes. Skin issues left eye lid as above  ENT/MOUTH: NEGATIVE for ear, mouth and throat problems  RESP: NEGATIVE for significant cough or SOB  BREAST: NEGATIVE for masses, tenderness or discharge  CV: NEGATIVE for chest pain, palpitations. Stable mild peripheral edema/varicose veins with compression stockings  GI: NEGATIVE for nausea, abdominal pain, heartburn, or change in bowel habits  : NEGATIVE for frequency, dysuria, or hematuria  MUSCULOSKELETAL: NEGATIVE for significant arthralgias or myalgia that limit acitvity  NEURO: NEGATIVE for weakness, dizziness or paresthesias  ENDOCRINE: NEGATIVE for temperature intolerance.   HEME: NEGATIVE for bleeding problems with Coumadin. On lifelong with FVL and prior DVT  PSYCHIATRIC: NEGATIVE for changes in mood or affect with use of Paxil    EXAM:   /80   Pulse 83   Temp 98.4  F (36.9  C) (Oral)   Resp 16   Wt 81.6 kg (180 lb)   SpO2 98%   BMI 28.19 kg/m    Eye: PERRL, EOMI.  Approx 4 mm skin tag left upper lateral  eyelid   HENT: ear canals and TM's normal and  nose and mouth without ulcers or lesions   Neck: no adenopathy. Thyroid normal to palpation. No bruits  Pulm: Lungs clear to auscultation   CV: Regular rates and rhythm  GI: Soft, nontender, Normal active bowel sounds, No hepatosplenomegaly or masses palpable  Ext: Peripheral pulses intact. Mild chronic BLE edema with varicose veins   Neuro: Normal strength and tone, sensory exam grossly normal      DIAGNOSTICS:   EKG:  NSR with rate 78. No acute ST/T changes    Recent Labs   Lab Test 04/04/19 03/15/19  08/14/18  1507  08/17/17  1912  03/14/17  0945  01/19/17  0625   HGB  --   --   --   --   --  13.7  --  12.8   < >  --    PLT  --   --   --   --   --  284  --   --   --  230   INR 2.2* 2.8*   < >  --    < >  --    < > 2.36*   < > 1.55*   NA  --   --   --  137  --  139  --   --    < >  --    POTASSIUM  --   --   --  4.6  --  3.9  --   --    < >  --    CR  --   --   --  0.56  --  0.70  --   --    < > 0.60    < > = values in this interval not displayed.        IMPRESSION:   Reason for surgery/procedure: Left eyelid skin tag  Diagnosis/reason for consult:   1.  Hx of unprovoked DVT/F5L. On lifelong anticoagulation with Coumadin goal IN 2-3.  Per surgeon's office, OK to remain on Coumadin. Will keep INR low end of goal  2. Hypothyroidism -TSH at goal with levothyroxine when last checked June 2018.  Clinically euthyroid currently.  Will recheck when has fasting labs early next week  3. Depression stable -   Continue Paxil   4. Insomnia - stable with Elavil    The proposed surgical procedure is considered LOW risk.    REVISED CARDIAC RISK INDEX  The patient has the following serious cardiovascular risks for perioperative complications such as (MI, PE, VFib and 3  AV Block):  No serious cardiac risks  INTERPRETATION: 0 risks: Class I (very low risk - 0.4% complication rate)    The patient has the following additional risks for perioperative complications:  No identified additional risks         RECOMMENDATIONS:     APPROVAL  GIVEN to proceed with proposed procedure, without further diagnostic evaluation     Pt to have fasting labs done 2 days before surgery including TSH and INR  Will then give instructions re:  Coumadin dosing the 2 days prior to surgery based on  INR lab result 5/1/19  No solid food after midnight prior to surgery.  May have clear liquids up to 4 hours prior to surgery  May take usual morning medications the day of surgery    Signed Electronically by: Jacoby Boo MD    Copy of this evaluation report is provided to requesting physician.    [facility] Preop Guidelines    Revised Cardiac Risk Index

## 2019-04-26 ENCOUNTER — TELEPHONE (OUTPATIENT)
Dept: INTERNAL MEDICINE | Facility: CLINIC | Age: 79
End: 2019-04-26

## 2019-05-01 DIAGNOSIS — E03.9 HYPOTHYROIDISM, UNSPECIFIED TYPE: ICD-10-CM

## 2019-05-01 DIAGNOSIS — Z13.1 SCREENING FOR DIABETES MELLITUS: ICD-10-CM

## 2019-05-01 DIAGNOSIS — Z13.6 CARDIOVASCULAR SCREENING; LDL GOAL LESS THAN 100: ICD-10-CM

## 2019-05-01 DIAGNOSIS — D68.9 COAGULATION DEFECT (H): ICD-10-CM

## 2019-05-01 LAB
ANION GAP SERPL CALCULATED.3IONS-SCNC: 4 MMOL/L (ref 3–14)
BUN SERPL-MCNC: 15 MG/DL (ref 7–30)
CALCIUM SERPL-MCNC: 10 MG/DL (ref 8.5–10.1)
CHLORIDE SERPL-SCNC: 106 MMOL/L (ref 94–109)
CHOLEST SERPL-MCNC: 175 MG/DL
CO2 SERPL-SCNC: 30 MMOL/L (ref 20–32)
CREAT SERPL-MCNC: 0.64 MG/DL (ref 0.52–1.04)
GFR SERPL CREATININE-BSD FRML MDRD: 86 ML/MIN/{1.73_M2}
GLUCOSE SERPL-MCNC: 101 MG/DL (ref 70–99)
HDLC SERPL-MCNC: 62 MG/DL
INR PPP: 2.11 (ref 0.86–1.14)
LDLC SERPL CALC-MCNC: 95 MG/DL
NONHDLC SERPL-MCNC: 113 MG/DL
POTASSIUM SERPL-SCNC: 4.4 MMOL/L (ref 3.4–5.3)
SODIUM SERPL-SCNC: 140 MMOL/L (ref 133–144)
TRIGL SERPL-MCNC: 90 MG/DL
TSH SERPL DL<=0.005 MIU/L-ACNC: 4 MU/L (ref 0.4–4)

## 2019-05-01 PROCEDURE — 36415 COLL VENOUS BLD VENIPUNCTURE: CPT | Performed by: INTERNAL MEDICINE

## 2019-05-01 PROCEDURE — 80048 BASIC METABOLIC PNL TOTAL CA: CPT | Performed by: INTERNAL MEDICINE

## 2019-05-01 PROCEDURE — 84443 ASSAY THYROID STIM HORMONE: CPT | Performed by: INTERNAL MEDICINE

## 2019-05-01 PROCEDURE — 85610 PROTHROMBIN TIME: CPT | Performed by: INTERNAL MEDICINE

## 2019-05-01 PROCEDURE — 80061 LIPID PANEL: CPT | Performed by: INTERNAL MEDICINE

## 2019-05-03 ENCOUNTER — HOSPITAL PATHOLOGY (OUTPATIENT)
Dept: OTHER | Facility: CLINIC | Age: 79
End: 2019-05-03

## 2019-05-06 LAB — COPATH REPORT: NORMAL

## 2019-05-11 DIAGNOSIS — E03.9 HYPOTHYROIDISM, UNSPECIFIED TYPE: ICD-10-CM

## 2019-05-11 DIAGNOSIS — G47.00 INSOMNIA, UNSPECIFIED TYPE: ICD-10-CM

## 2019-05-11 NOTE — TELEPHONE ENCOUNTER
"Requested Prescriptions   Pending Prescriptions Disp Refills     levothyroxine (SYNTHROID/LEVOTHROID) 50 MCG tablet [Pharmacy Med Name: LEVOTHYROXINE 50 MCG TABLET] 90 tablet 2     Sig: TAKE 1 TABLET (50 MCG) BY MOUTH DAILY   Last Written Prescription Date:  8/16/2018  Last Fill Quantity: 90,  # refills: 2   Last Office Visit: 4/25/2019   Future Office Visit:         Thyroid Protocol Passed - 5/11/2019  8:24 AM        Passed - Patient is 12 years or older        Passed - Recent (12 mo) or future (30 days) visit within the authorizing provider's specialty     Patient had office visit in the last 12 months or has a visit in the next 30 days with authorizing provider or within the authorizing provider's specialty.  See \"Patient Info\" tab in inbasket, or \"Choose Columns\" in Meds & Orders section of the refill encounter.              Passed - Medication is active on med list        Passed - Normal TSH on file in past 12 months     Recent Labs   Lab Test 05/01/19  1026   TSH 4.00              Passed - No active pregnancy on record     If patient is pregnant or has had a positive pregnancy test, please check TSH.          Passed - No positive pregnancy test in past 12 months     If patient is pregnant or has had a positive pregnancy test, please check TSH.          amitriptyline (ELAVIL) 25 MG tablet [Pharmacy Med Name: AMITRIPTYLINE HCL 25 MG TAB] 90 tablet 2     Sig: TAKE 1 TABLET (25 MG) BY MOUTH AT BEDTIME   Last Written Prescription Date:  8/16/2018  Last Fill Quantity: 90,  # refills: 2   Last Office Visit: 4/25/2019   Future Office Visit:         Tricyclic Agents ( Annual appt and no PHQ9) Passed - 5/11/2019  8:24 AM        Passed - Blood Pressure under 140/90 in past 12 mos     BP Readings from Last 3 Encounters:   04/25/19 130/80   03/15/19 132/80   06/28/18 130/70                 Passed - Recent (12 mo) or future (30 days) visit within authorizing provider's specialty     Patient had office visit in the last 12 " "months or has a visit in the next 30 days with authorizing provider or within the authorizing provider's specialty.  See \"Patient Info\" tab in inbasket, or \"Choose Columns\" in Meds & Orders section of the refill encounter.              Passed - Medication is active on med list        Passed - Patient is age 18 or older        Passed - Patient is not pregnant        Passed - No positive pregnancy test on record in past 12 mos          "

## 2019-05-14 RX ORDER — LEVOTHYROXINE SODIUM 50 UG/1
TABLET ORAL
Qty: 90 TABLET | Refills: 2 | Status: SHIPPED | OUTPATIENT
Start: 2019-05-14 | End: 2020-03-05

## 2019-06-10 ENCOUNTER — ANTICOAGULATION THERAPY VISIT (OUTPATIENT)
Dept: ANTICOAGULATION | Facility: CLINIC | Age: 79
End: 2019-06-10
Payer: MEDICARE

## 2019-06-10 DIAGNOSIS — Z79.01 LONG TERM CURRENT USE OF ANTICOAGULANT THERAPY: ICD-10-CM

## 2019-06-10 LAB — INR POINT OF CARE: 1.8 (ref 0.86–1.14)

## 2019-06-10 PROCEDURE — 85610 PROTHROMBIN TIME: CPT | Mod: QW

## 2019-06-10 PROCEDURE — 99207 ZZC NO CHARGE NURSE ONLY: CPT

## 2019-06-10 PROCEDURE — 36416 COLLJ CAPILLARY BLOOD SPEC: CPT

## 2019-06-10 NOTE — PROGRESS NOTES
ANTICOAGULATION FOLLOW-UP CLINIC VISIT    Patient Name:  Geneva Shepherd  Date:  6/10/2019  Contact Type:  Face to Face    SUBJECTIVE:         OBJECTIVE    INR Protime   Date Value Ref Range Status   06/10/2019 1.8 (A) 0.86 - 1.14 Final       ASSESSMENT / PLAN  INR assessment SUB    Recheck INR In: 3 WEEKS    INR Location Clinic      Anticoagulation Summary  As of 6/10/2019    INR goal:   2.0-3.0   TTR:   61.8 % (3.4 y)   INR used for dosin.8! (6/10/2019)   Warfarin maintenance plan:   5 mg (5 mg x 1) every Tue, Thu, Sat; 7.5 mg (5 mg x 1.5) all other days   Full warfarin instructions:   5 mg every Tue, Thu, Sat; 7.5 mg all other days   Weekly warfarin total:   45 mg   Plan last modified:   Camelia He RN (3/6/2019)   Next INR check:   2019   Target end date:       Indications    Long term current use of anticoagulant therapy [Z79.01]  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]  Embolism and thrombosis (H) (Resolved) [I74.9]             Anticoagulation Episode Summary     INR check location:       Preferred lab:       Send INR reminders to:   [facility]    Comments:               See the Encounter Report to view Anticoagulation Flowsheet and Dosing Calendar (Go to Encounters tab in chart review, and find the Anticoagulation Therapy Visit)        Leigha Donaldson RN

## 2019-06-20 DIAGNOSIS — Z86.718 PERSONAL HISTORY OF DVT (DEEP VEIN THROMBOSIS): ICD-10-CM

## 2019-06-20 RX ORDER — WARFARIN SODIUM 5 MG/1
TABLET ORAL
Qty: 110 TABLET | Refills: 0 | Status: SHIPPED | OUTPATIENT
Start: 2019-06-20 | End: 2019-09-12

## 2019-06-20 NOTE — TELEPHONE ENCOUNTER
"Requested Prescriptions   Pending Prescriptions Disp Refills     warfarin (COUMADIN) 5 MG tablet [Pharmacy Med Name: WARFARIN SODIUM 5 MG TABLET] 110 tablet 0     Sig: TAKE ONE TABLET TUES/THURS/SAT AND 1 1/2 TABLETS ALL OTHER DAYS AS DIRECTED BY [facility]       Vitamin K Antagonists Failed - 6/20/2019 11:08 AM        Failed - INR is within goal in the past 6 weeks     Confirm INR is within goal in the past 6 weeks.     Recent Labs   Lab Test 06/10/19   INR 1.8*                       Passed - Recent (12 mo) or future (30 days) visit within the authorizing provider's specialty     Patient had office visit in the last 12 months or has a visit in the next 30 days with authorizing provider or within the authorizing provider's specialty.  See \"Patient Info\" tab in inbasket, or \"Choose Columns\" in Meds & Orders section of the refill encounter.              Passed - Medication is active on med list        Passed - Patient is 18 years of age or older        Passed - Patient is not pregnant        Passed - No positive pregnancy on file in past 12 months        Last Written Prescription Date:  3/27/2019  Last Fill Quantity: 110,  # refills: 0   Last office visit: 4/25/2019 with prescribing provider:  4/25/2019   Future Office Visit:      "

## 2019-06-26 DIAGNOSIS — Z86.718 PERSONAL HISTORY OF DVT (DEEP VEIN THROMBOSIS): ICD-10-CM

## 2019-06-26 RX ORDER — WARFARIN SODIUM 5 MG/1
TABLET ORAL
Qty: 109 TABLET | Refills: 1 | Status: SHIPPED | OUTPATIENT
Start: 2019-06-26 | End: 2020-07-06

## 2019-06-26 NOTE — TELEPHONE ENCOUNTER
"Requested Prescriptions   Pending Prescriptions Disp Refills     warfarin (COUMADIN) 5 MG tablet [Pharmacy Med Name: WARFARIN SODIUM 5 MG TABLET] 109 tablet 1     Sig: TAKE ONE AND ONE-HALF TABLETS BY MOUTH ON MON, WED, FRI AND ONE TABLET BY MOUTH ALL OTHER DAYS.       Vitamin K Antagonists Failed - 6/26/2019  2:09 AM        Failed - INR is within goal in the past 6 weeks     Confirm INR is within goal in the past 6 weeks.     Recent Labs   Lab Test 06/10/19   INR 1.8*                       Passed - Recent (12 mo) or future (30 days) visit within the authorizing provider's specialty     Patient had office visit in the last 12 months or has a visit in the next 30 days with authorizing provider or within the authorizing provider's specialty.  See \"Patient Info\" tab in inbasket, or \"Choose Columns\" in Meds & Orders section of the refill encounter.              Passed - Medication is active on med list        Passed - Patient is 18 years of age or older        Passed - Patient is not pregnant        Passed - No positive pregnancy on file in past 12 months          "

## 2019-07-03 ENCOUNTER — TRANSFERRED RECORDS (OUTPATIENT)
Dept: HEALTH INFORMATION MANAGEMENT | Facility: CLINIC | Age: 79
End: 2019-07-03

## 2019-07-08 ENCOUNTER — ANTICOAGULATION THERAPY VISIT (OUTPATIENT)
Dept: ANTICOAGULATION | Facility: CLINIC | Age: 79
End: 2019-07-08
Payer: MEDICARE

## 2019-07-08 DIAGNOSIS — Z79.01 LONG TERM CURRENT USE OF ANTICOAGULANT THERAPY: ICD-10-CM

## 2019-07-08 LAB
INR POINT OF CARE: 2.3 (ref 0.86–1.14)
INR POINT OF CARE: 2.3 (ref 0.86–1.14)

## 2019-07-08 PROCEDURE — 36416 COLLJ CAPILLARY BLOOD SPEC: CPT

## 2019-07-08 PROCEDURE — 85610 PROTHROMBIN TIME: CPT | Mod: QW

## 2019-07-08 NOTE — PROGRESS NOTES
ANTICOAGULATION FOLLOW-UP CLINIC VISIT    Patient Name:  Geneva Shepherd  Date:  2019  Contact Type:  Face to Face    SUBJECTIVE:         OBJECTIVE    INR Protime   Date Value Ref Range Status   2019 2.3 (A) 0.86 - 1.14 Final   2019 2.3 (A) 0.86 - 1.14 Final       ASSESSMENT / PLAN  INR assessment THER    Recheck INR In: 4 WEEKS    INR Location Clinic      Anticoagulation Summary  As of 2019    INR goal:   2.0-3.0   TTR:   61.7 % (3.5 y)   INR used for dosin.3 (2019)   Warfarin maintenance plan:   5 mg (5 mg x 1) every Tue, Thu, Sat; 7.5 mg (5 mg x 1.5) all other days   Full warfarin instructions:   5 mg every Tue, Thu, Sat; 7.5 mg all other days   Weekly warfarin total:   45 mg   Plan last modified:   Camelia He RN (3/6/2019)   Next INR check:   2019   Target end date:       Indications    Long term current use of anticoagulant therapy [Z79.01]  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]  Embolism and thrombosis (H) (Resolved) [I74.9]             Anticoagulation Episode Summary     INR check location:       Preferred lab:       Send INR reminders to:   [facility]    Comments:               See the Encounter Report to view Anticoagulation Flowsheet and Dosing Calendar (Go to Encounters tab in chart review, and find the Anticoagulation Therapy Visit)        Leigha Donaldson RN

## 2019-08-12 ENCOUNTER — TELEPHONE (OUTPATIENT)
Dept: INTERNAL MEDICINE | Facility: CLINIC | Age: 79
End: 2019-08-12

## 2019-08-20 ENCOUNTER — TELEPHONE (OUTPATIENT)
Dept: ANTICOAGULATION | Facility: CLINIC | Age: 79
End: 2019-08-20

## 2019-08-20 NOTE — TELEPHONE ENCOUNTER
Pt came to [facility] appt 1 hours earlier than scheduled. Unable to work pt in at that time, pt agreed to wait, but then left. Called pt to apologize that could not get her in prior to her scheduled time, only able to leave a message.

## 2019-08-29 ENCOUNTER — ANTICOAGULATION THERAPY VISIT (OUTPATIENT)
Dept: ANTICOAGULATION | Facility: CLINIC | Age: 79
End: 2019-08-29
Payer: MEDICARE

## 2019-08-29 LAB — INR POINT OF CARE: 1.9 (ref 0.86–1.14)

## 2019-08-29 PROCEDURE — 85610 PROTHROMBIN TIME: CPT | Mod: QW

## 2019-08-29 PROCEDURE — 36416 COLLJ CAPILLARY BLOOD SPEC: CPT

## 2019-08-29 NOTE — PROGRESS NOTES
ANTICOAGULATION FOLLOW-UP CLINIC VISIT    Patient Name:  Geneva Shepherd  Date:  2019  Contact Type:  Face to Face    SUBJECTIVE:  Patient Findings     Positives:   Change in diet/appetite    Comments:   Pt reports that she has been eating more fresh green vegetables lately.        Clinical Outcomes     Comments:   Pt reports that she has been eating more fresh green vegetables lately.           OBJECTIVE    INR Protime   Date Value Ref Range Status   2019 1.9 (A) 0.86 - 1.14 Final       ASSESSMENT / PLAN  INR assessment SUB    Recheck INR In: 3 WEEKS    INR Location Clinic      Anticoagulation Summary  As of 2019    INR goal:   2.0-3.0   TTR:   62.2 % (3.6 y)   INR used for dosin.9! (2019)   Warfarin maintenance plan:   5 mg (5 mg x 1) every Tue, Thu, Sat; 7.5 mg (5 mg x 1.5) all other days   Full warfarin instructions:   : 7.5 mg; Otherwise 5 mg every Tue, Thu, Sat; 7.5 mg all other days   Weekly warfarin total:   45 mg   Plan last modified:   Camelia He RN (3/6/2019)   Next INR check:   2019   Target end date:       Indications    Long term current use of anticoagulant therapy [Z79.01]  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]  Embolism and thrombosis (H) (Resolved) [I74.9]             Anticoagulation Episode Summary     INR check location:       Preferred lab:       Send INR reminders to:   [facility]    Comments:               See the Encounter Report to view Anticoagulation Flowsheet and Dosing Calendar (Go to Encounters tab in chart review, and find the Anticoagulation Therapy Visit)    Pt INR is 1.9 today. Pt states that she has been eating more greens recently. Pt advised to take 7.5 mg today 19 then continue maintenance dose of 5 mg on , ,  and 7.5 mg all the other days. Recheck INR in 3 weeks. Geneva aware if signs of clotting (pain, tenderness, swelling, color change in leg or arm, SOB) and bleeding occur  (blood in stool, urine, large bruising, bleeding gums, nosebleeds) to have INR check sooner. If sx severe report to ER or concerned for stroke call 911. If general questions or concerns arise, call clinic.         Barbie Reyes RN

## 2019-09-12 DIAGNOSIS — Z86.718 PERSONAL HISTORY OF DVT (DEEP VEIN THROMBOSIS): ICD-10-CM

## 2019-09-12 RX ORDER — WARFARIN SODIUM 5 MG/1
TABLET ORAL
Qty: 110 TABLET | Refills: 1 | Status: SHIPPED | OUTPATIENT
Start: 2019-09-12 | End: 2020-02-24

## 2019-09-12 NOTE — TELEPHONE ENCOUNTER
Requested Prescriptions   Pending Prescriptions Disp Refills     warfarin ANTICOAGULANT (COUMADIN) 5 MG tablet [Pharmacy Med Name: WARFARIN SODIUM 5 MG TABLET] 110 tablet 0     Sig: TAKE ONE TABLET TUES/THURS/SAT AND 1 1/2 TABLETS ALL OTHER DAYS AS DIRECTED BY [facility]       There is no refill protocol information for this order

## 2019-09-19 ENCOUNTER — ANTICOAGULATION THERAPY VISIT (OUTPATIENT)
Dept: ANTICOAGULATION | Facility: CLINIC | Age: 79
End: 2019-09-19
Payer: MEDICARE

## 2019-09-19 LAB — INR POINT OF CARE: 1.9 (ref 0.86–1.14)

## 2019-09-19 PROCEDURE — 36416 COLLJ CAPILLARY BLOOD SPEC: CPT

## 2019-09-19 PROCEDURE — 85610 PROTHROMBIN TIME: CPT | Mod: QW

## 2019-09-19 NOTE — PROGRESS NOTES
ANTICOAGULATION FOLLOW-UP CLINIC VISIT    Patient Name:  Geneva Shepherd  Date:  2019  Contact Type:  Face to Face    SUBJECTIVE:  Patient Findings     Positives:   Change in activity (Pt has been more active, walking treadmill daily.), Change in diet/appetite (Pt having more greens in diet, plan s to cont eating as she has been )             OBJECTIVE    INR Protime   Date Value Ref Range Status   2019 1.9 (A) 0.86 - 1.14 Final       ASSESSMENT / PLAN  INR assessment SUB    Recheck INR In: 3 WEEKS    INR Location Clinic      Anticoagulation Summary  As of 2019    INR goal:   2.0-3.0   TTR:   61.3 % (3.7 y)   INR used for dosin.9! (2019)   Warfarin maintenance plan:   5 mg (5 mg x 1) every Tue, Sat; 7.5 mg (5 mg x 1.5) all other days   Full warfarin instructions:   5 mg every Tue, Sat; 7.5 mg all other days   Weekly warfarin total:   47.5 mg   Plan last modified:   Ofelia Cavazos RN (2019)   Next INR check:   10/10/2019   Target end date:       Indications    Long term current use of anticoagulant therapy [Z79.01]  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]  Embolism and thrombosis (H) (Resolved) [I74.9]             Anticoagulation Episode Summary     INR check location:       Preferred lab:       Send INR reminders to:   [facility]    Comments:               See the Encounter Report to view Anticoagulation Flowsheet and Dosing Calendar (Go to Encounters tab in chart review, and find the Anticoagulation Therapy Visit)    Dosage adjustment made based on physician directed care plan.    Ofelia Cavazos RN

## 2019-10-01 ENCOUNTER — HEALTH MAINTENANCE LETTER (OUTPATIENT)
Age: 79
End: 2019-10-01

## 2019-10-16 DIAGNOSIS — F32.A DEPRESSION, UNSPECIFIED DEPRESSION TYPE: ICD-10-CM

## 2019-10-17 ENCOUNTER — ANTICOAGULATION THERAPY VISIT (OUTPATIENT)
Dept: ANTICOAGULATION | Facility: CLINIC | Age: 79
End: 2019-10-17
Payer: MEDICARE

## 2019-10-17 LAB — INR POINT OF CARE: 2.4 (ref 0.86–1.14)

## 2019-10-17 PROCEDURE — 36416 COLLJ CAPILLARY BLOOD SPEC: CPT

## 2019-10-17 PROCEDURE — 85610 PROTHROMBIN TIME: CPT | Mod: QW

## 2019-10-17 RX ORDER — PAROXETINE 20 MG/1
TABLET, FILM COATED ORAL
Qty: 180 TABLET | Refills: 0 | Status: SHIPPED | OUTPATIENT
Start: 2019-10-17 | End: 2020-01-14

## 2019-10-17 NOTE — PROGRESS NOTES
ANTICOAGULATION FOLLOW-UP CLINIC VISIT    Patient Name:  Geneva Shepherd  Date:  10/17/2019  Contact Type:  Face to Face    SUBJECTIVE:  Patient Findings     Comments:   The patient was assessed for diet, medication, and activity level changes, missed or extra doses, bruising or bleeding, with no problem findings.          Clinical Outcomes     Comments:   The patient was assessed for diet, medication, and activity level changes, missed or extra doses, bruising or bleeding, with no problem findings.             OBJECTIVE    INR Protime   Date Value Ref Range Status   10/17/2019 2.4 (A) 0.86 - 1.14 Final       ASSESSMENT / PLAN  INR assessment THER    Recheck INR In: 4 WEEKS    INR Location Clinic      Anticoagulation Summary  As of 10/17/2019    INR goal:   2.0-3.0   TTR:   61.6 % (3.7 y)   INR used for dosin.4 (10/17/2019)   Warfarin maintenance plan:   5 mg (5 mg x 1) every Tue, Sat; 7.5 mg (5 mg x 1.5) all other days   Full warfarin instructions:   5 mg every Tue, Sat; 7.5 mg all other days   Weekly warfarin total:   47.5 mg   Plan last modified:   Ofelia Cavazos RN (2019)   Next INR check:   2019   Target end date:       Indications    Long term current use of anticoagulant therapy [Z79.01]  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]  Embolism and thrombosis (H) (Resolved) [I74.9]             Anticoagulation Episode Summary     INR check location:       Preferred lab:       Send INR reminders to:   [facility]    Comments:               See the Encounter Report to view Anticoagulation Flowsheet and Dosing Calendar (Go to Encounters tab in chart review, and find the Anticoagulation Therapy Visit)    Dosage adjustment made based on physician directed care plan.    Ofelia Cavazos RN

## 2019-10-17 NOTE — TELEPHONE ENCOUNTER
"Requested Prescriptions   Pending Prescriptions Disp Refills     PARoxetine (PAXIL) 20 MG tablet [Pharmacy Med Name: PAROXETINE HCL 20 MG TABLET] 180 tablet 1     Sig: TAKE 2 TABLETS BY MOUTH AT BEDTIME       SSRIs Protocol Failed - 10/16/2019  8:54 PM        Failed - PHQ-9 score less than 5 in past 6 months     Please review last PHQ-9 score.           Passed - Medication is active on med list        Passed - Patient is age 18 or older        Passed - No active pregnancy on record        Passed - No positive pregnancy test in last 12 months        Passed - Recent (6 mo) or future (30 days) visit within the authorizing provider's specialty     Patient had office visit in the last 6 months or has a visit in the next 30 days with authorizing provider or within the authorizing provider's specialty.  See \"Patient Info\" tab in inbasket, or \"Choose Columns\" in Meds & Orders section of the refill encounter.            Last Written Prescription Date:  4/1/19  Last Fill Quantity: 60,  # refills: 5   Last office visit: 4/25/2019 with prescribing provider:  4/25/19   Future Office Visit:      PHQ-9 SCORE 2/16/2018 6/28/2018 3/15/2019   PHQ-9 Total Score - - -   PHQ-9 Total Score 0 4 0         "

## 2019-10-17 NOTE — TELEPHONE ENCOUNTER
PHQ-9 score:    PHQ-9 SCORE 3/15/2019   PHQ-9 Total Score -   PHQ-9 Total Score 0         Prescription approved per [facility] Refill Protocol.    Laine PALMA RN, BSN, PHN

## 2019-11-05 ENCOUNTER — OFFICE VISIT (OUTPATIENT)
Dept: VASCULAR SURGERY | Facility: CLINIC | Age: 79
End: 2019-11-05
Payer: MEDICARE

## 2019-11-05 PROCEDURE — 99213 OFFICE O/P EST LOW 20 MIN: CPT | Performed by: SURGERY

## 2019-11-05 NOTE — PROGRESS NOTES
[facility] Office Note    Geneva Shepherd is a 78-year-old female whom I have seen in the past with a right lower extremity venous stasis ulcer.  He was able to heal the wound by increasing the compression to 30 to 40 mmHg on her right lower extremity.  Since she healed the wound, she has been wearing 20-30 mmHg compression hose bilaterally.  She rides a stationary bicycle for 30 to 45 minutes every day and walks as much as possible using a cane for stability.      Past medical history:  Medical:  Heterozygous factor V Leiden mutation with history of right lower extremity superficial thrombophlebitis, hypothyroidism, hyperlipidemia, hypercalcemia, gastroesophageal reflux disease, right proximal tibia fracture    Surgical:  Bilateral lower extremity vein ablations, cholecystectomy, tubal ligation, endometrial biopsy, right total knee arthroplasty 1/17/2013, left knee arthroplasty 1/16/2017, left hip arthroplasty 8/31/2015    Medicines: Paroxetine, warfarin, levothyroxine, amitriptyline, Macrodantin, vitamin C, levocarnitine, biotin, turmeric, magnesium, N-acetylcysteine, coconut oil, vitamin B complex, folic acid, beta-carotene, zinc, chromium, fish oil, flaxseed, multivitamin    Allergies:  Cephalexin    Social history:  She is a retired registered nurse, is a non-smoker does not use alcohol.  She is mother of 3 children.    12 point review of systems was completed and was reviewed.  It is significant for dry cough, urinary frequency, hearing loss, arthritis, leg pain, hypothyroidism, skin rash, depression and anxiety    Physical exam  She is 5 feet 6 inches tall weighs 180 pounds  General: Pleasant male in no acute distress.  HEENT: Normocephalic, atraumatic.  EOMI.  External ears and nose are normal.  Respiratory: Normal respiratory effort  Cardiovascular: Pulse is regular  Psychiatric: Judgment, insight, mood and affect are normal  Musculoskeletal: Gait and station appear fairly normal for her age.  She is  somewhat tentative and unsteady on her feet.  There is no cyanosis of her nailbeds though there is a skin is of her toes bilaterally from venous congestion  Neurologic: Grossly normal  Extremities: Right lower extremity: 6 mm varicosities coursing down the distal medial right thigh, medial right leg with advanced lipodermatosclerosis from the mid leg to the ankle with circumferential scarring and firmness.  There is a scar on the distal medial right ankle from a healed ulcer.  Left lower extremity: 8 to 10 mm varicose veins left medial thigh extending medial to the left knee and onto the medial aspect of the left leg.  Lipodermatosclerosis from the mid leg to the ankle circumferentially.  There is a scar on the medial aspect the left ankle and a stasis dermatitis on the anterior aspect of the left ankle.    There is 1-2+ edema of the right ankle and foot.  There is no significant edema of the left leg or foot.    Impression  CEAP 5 bilateral lower extremity chronic venous insufficiency.  She is managing well with compression, elevation and exercise.  He has not had recurrent venous stasis ulcers and continues to wear compression on a daily basis.    I see no reason for intervention at this time given how well she is doing clinically.  I discussed the importance of continued compression, elevation and exercise.  She knows to contact us if she has concerns or questions in the future.  She will return on an as-needed basis.    LIBBY Durham MD    Please send a copy of this dictation to Dr. Gustavo Boo

## 2019-11-21 ENCOUNTER — ANTICOAGULATION THERAPY VISIT (OUTPATIENT)
Dept: ANTICOAGULATION | Facility: CLINIC | Age: 79
End: 2019-11-21
Payer: MEDICARE

## 2019-11-21 LAB — INR POINT OF CARE: 2.7 (ref 0.86–1.14)

## 2019-11-21 PROCEDURE — 36416 COLLJ CAPILLARY BLOOD SPEC: CPT

## 2019-11-21 PROCEDURE — 85610 PROTHROMBIN TIME: CPT | Mod: QW

## 2019-11-21 NOTE — PROGRESS NOTES
ANTICOAGULATION FOLLOW-UP CLINIC VISIT    Patient Name:  Geneva Shepherd  Date:  2019  Contact Type:  Face to Face    SUBJECTIVE:  Patient Findings     Comments:   The patient was assessed for diet, medication, and activity level changes, missed or extra doses, bruising or bleeding, with no problem findings.          Clinical Outcomes     Comments:   The patient was assessed for diet, medication, and activity level changes, missed or extra doses, bruising or bleeding, with no problem findings.             OBJECTIVE    INR Protime   Date Value Ref Range Status   2019 2.7 (A) 0.86 - 1.14 Final       ASSESSMENT / PLAN  INR assessment THER    Recheck INR In: 4 WEEKS    INR Location Clinic      Anticoagulation Summary  As of 2019    INR goal:   2.0-3.0   TTR:   72.9 % (1 y)   INR used for dosin.7 (2019)   Warfarin maintenance plan:   5 mg (5 mg x 1) every Tue, Sat; 7.5 mg (5 mg x 1.5) all other days   Full warfarin instructions:   5 mg every Tue, Sat; 7.5 mg all other days   Weekly warfarin total:   47.5 mg   No change documented:   Ofelia Cavazos, RN   Plan last modified:   Ofelia Cavazos RN (2019)   Next INR check:   2019   Target end date:       Indications    Long term current use of anticoagulant therapy [Z79.01]  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]  Embolism and thrombosis (H) (Resolved) [I74.9]             Anticoagulation Episode Summary     INR check location:       Preferred lab:       Send INR reminders to:   [facility]    Comments:               See the Encounter Report to view Anticoagulation Flowsheet and Dosing Calendar (Go to Encounters tab in chart review, and find the Anticoagulation Therapy Visit)    Dosage adjustment made based on physician directed care plan.    Ofelia Cavazos RN

## 2019-12-20 ENCOUNTER — TELEPHONE (OUTPATIENT)
Dept: INTERNAL MEDICINE | Facility: CLINIC | Age: 79
End: 2019-12-20

## 2019-12-20 NOTE — TELEPHONE ENCOUNTER
Phone message left reminding Geneva that she is due for an INR and to please call and make an appt.

## 2020-01-02 ENCOUNTER — ANTICOAGULATION THERAPY VISIT (OUTPATIENT)
Dept: ANTICOAGULATION | Facility: CLINIC | Age: 80
End: 2020-01-02
Payer: MEDICARE

## 2020-01-02 LAB — INR POINT OF CARE: 3.6 (ref 0.86–1.14)

## 2020-01-02 PROCEDURE — 99207 ZZC NO CHARGE NURSE ONLY: CPT

## 2020-01-02 PROCEDURE — 36416 COLLJ CAPILLARY BLOOD SPEC: CPT

## 2020-01-02 PROCEDURE — 85610 PROTHROMBIN TIME: CPT | Mod: QW

## 2020-01-02 NOTE — PROGRESS NOTES
ANTICOAGULATION FOLLOW-UP CLINIC VISIT    Patient Name:  Geneva Shepherd  Date:  1/2/2020  Contact Type:  Face to Face    SUBJECTIVE:  Patient Findings     Positives:   Change in activity (past 2 weeks, pt has not been walking as much as before. ), Change in diet/appetite (Pt has been having less greens due to Holidays, now is getting back to eating her reg diet.), Other complaints (Pt has been stressed with Holidays, possibly con)             OBJECTIVE    INR Protime   Date Value Ref Range Status   01/02/2020 3.6 (A) 0.86 - 1.14 Final       ASSESSMENT / PLAN  INR assessment SUPRA    Recheck INR In: 3 WEEKS    INR Location Clinic      Anticoagulation Summary  As of 1/2/2020    INR goal:   2.0-3.0   TTR:   68.5 % (1 y)   INR used for dosing:   3.6! (1/2/2020)   Warfarin maintenance plan:   5 mg (5 mg x 1) every Tue, Sat; 7.5 mg (5 mg x 1.5) all other days   Full warfarin instructions:   1/2: 2.5 mg; 1/3: 5 mg; Otherwise 5 mg every Tue, Sat; 7.5 mg all other days   Weekly warfarin total:   47.5 mg   Plan last modified:   Ofelia Cavazos RN (9/19/2019)   Next INR check:      Target end date:       Indications    Long term current use of anticoagulant therapy [Z79.01]  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]  Embolism and thrombosis (H) (Resolved) [I74.9]             Anticoagulation Episode Summary     INR check location:       Preferred lab:       Send INR reminders to:   [facility]    Comments:               See the Encounter Report to view Anticoagulation Flowsheet and Dosing Calendar (Go to Encounters tab in chart review, and find the Anticoagulation Therapy Visit)    Dosage adjustment made based on physician directed care plan.    INR 3.6, has not been walking over the Holidays, eating less greens, Pt will take 2.5mg today, 5mg 1/3.20, then cont maintenance dose 7.5mg all days except 5mg Tu/Sat, INR check 2 weeks. Pt aware s/s of bleeding and when to seek medical help.    Ofelia  Benjamín Cavazos, RN

## 2020-01-14 DIAGNOSIS — F32.A DEPRESSION, UNSPECIFIED DEPRESSION TYPE: ICD-10-CM

## 2020-01-14 RX ORDER — PAROXETINE 20 MG/1
TABLET, FILM COATED ORAL
Qty: 180 TABLET | Refills: 0 | Status: SHIPPED | OUTPATIENT
Start: 2020-01-14 | End: 2020-03-18

## 2020-01-14 NOTE — TELEPHONE ENCOUNTER
"Requested Prescriptions   Pending Prescriptions Disp Refills     PARoxetine (PAXIL) 20 MG tablet [Pharmacy Med Name: PAROXETINE HCL 20 MG TABLET] 180 tablet 0     Sig: TAKE 2 TABLETS BY MOUTH AT BEDTIME  Last Written Prescription Date:  10/17/19  Last Fill Quantity: 180 tab,  # refills: 0   Last office visit: 4/25/2019 with prescribing provider:  Rajeev   Future Office Visit:         SSRIs Protocol Failed - 1/14/2020  1:59 AM        Failed - PHQ-9 score less than 5 in past 6 months     Please review last PHQ-9 score.   PHQ-9 SCORE 2/16/2018 6/28/2018 3/15/2019   PHQ-9 Total Score - - -   PHQ-9 Total Score 0 4 0     HAZEL-7 SCORE 6/28/2018   Total Score 7           Passed - Medication is active on med list        Passed - Patient is age 18 or older        Passed - No active pregnancy on record        Passed - No positive pregnancy test in last 12 months        Passed - Recent (6 mo) or future (30 days) visit within the authorizing provider's specialty     Patient had office visit in the last 6 months or has a visit in the next 30 days with authorizing provider or within the authorizing provider's specialty.  See \"Patient Info\" tab in inbasket, or \"Choose Columns\" in Meds & Orders section of the refill encounter.             "

## 2020-01-14 NOTE — TELEPHONE ENCOUNTER
Patient usually comes in March for annual medicare wellness exam . Please approve til then .Franca Juares RN

## 2020-01-16 ENCOUNTER — ANTICOAGULATION THERAPY VISIT (OUTPATIENT)
Dept: ANTICOAGULATION | Facility: CLINIC | Age: 80
End: 2020-01-16
Payer: MEDICARE

## 2020-01-16 ENCOUNTER — TELEPHONE (OUTPATIENT)
Dept: ANTICOAGULATION | Facility: CLINIC | Age: 80
End: 2020-01-16

## 2020-01-16 DIAGNOSIS — Z86.718 PERSONAL HISTORY OF DVT (DEEP VEIN THROMBOSIS): Primary | ICD-10-CM

## 2020-01-16 LAB — INR POINT OF CARE: 2.2 (ref 0.86–1.14)

## 2020-01-16 PROCEDURE — 85610 PROTHROMBIN TIME: CPT | Mod: QW

## 2020-01-16 PROCEDURE — 36416 COLLJ CAPILLARY BLOOD SPEC: CPT

## 2020-01-16 NOTE — PROGRESS NOTES
ANTICOAGULATION FOLLOW-UP CLINIC VISIT    Patient Name:  Geneva Shepherd  Date:  2020  Contact Type:  Face to Face    SUBJECTIVE:  Patient Findings     Comments:   The patient was assessed for diet, medication, and activity level changes, missed or extra doses, bruising or bleeding, with no problem findings.          Clinical Outcomes     Comments:   The patient was assessed for diet, medication, and activity level changes, missed or extra doses, bruising or bleeding, with no problem findings.             OBJECTIVE    INR Protime   Date Value Ref Range Status   2020 2.2 (A) 0.86 - 1.14 Final       ASSESSMENT / PLAN  INR assessment THER    Recheck INR In: 4 WEEKS    INR Location Clinic      Anticoagulation Summary  As of 2020    INR goal:   2.0-3.0   TTR:   66.9 % (1 y)   INR used for dosin.2 (2020)   Warfarin maintenance plan:   5 mg (5 mg x 1) every Tue, Sat; 7.5 mg (5 mg x 1.5) all other days   Full warfarin instructions:   5 mg every Tue, Sat; 7.5 mg all other days   Weekly warfarin total:   47.5 mg   No change documented:   Ofelia Cavazos, RN   Plan last modified:   Ofelia Cavazos RN (2019)   Next INR check:   2020   Target end date:       Indications    Long term current use of anticoagulant therapy [Z79.01]  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]  Embolism and thrombosis (H) (Resolved) [I74.9]             Anticoagulation Episode Summary     INR check location:       Preferred lab:       Send INR reminders to:   [facility]    Comments:               See the Encounter Report to view Anticoagulation Flowsheet and Dosing Calendar (Go to Encounters tab in chart review, and find the Anticoagulation Therapy Visit)    Dosage adjustment made based on physician directed care plan.    Ofelia Cavazos RN

## 2020-02-03 ENCOUNTER — TRANSFERRED RECORDS (OUTPATIENT)
Dept: HEALTH INFORMATION MANAGEMENT | Facility: CLINIC | Age: 80
End: 2020-02-03

## 2020-02-21 ENCOUNTER — ANTICOAGULATION THERAPY VISIT (OUTPATIENT)
Dept: ANTICOAGULATION | Facility: CLINIC | Age: 80
End: 2020-02-21
Payer: MEDICARE

## 2020-02-21 DIAGNOSIS — Z79.01 LONG TERM CURRENT USE OF ANTICOAGULANT THERAPY: ICD-10-CM

## 2020-02-21 DIAGNOSIS — Z86.718 PERSONAL HISTORY OF DVT (DEEP VEIN THROMBOSIS): ICD-10-CM

## 2020-02-21 LAB — INR POINT OF CARE: 2.6 (ref 0.86–1.14)

## 2020-02-21 PROCEDURE — 85610 PROTHROMBIN TIME: CPT | Mod: QW

## 2020-02-21 PROCEDURE — 36416 COLLJ CAPILLARY BLOOD SPEC: CPT

## 2020-02-21 PROCEDURE — 99207 ZZC NO CHARGE NURSE ONLY: CPT

## 2020-02-21 NOTE — PROGRESS NOTES
ANTICOAGULATION FOLLOW-UP CLINIC VISIT    Patient Name:  Geneva Shepherd  Date:  2020  Contact Type:  Face to Face    SUBJECTIVE:  Patient Findings     Comments:   The patient was assessed for diet, medication, and activity level changes, missed or extra doses, bruising or bleeding, with no problem findings.          Clinical Outcomes     Comments:   The patient was assessed for diet, medication, and activity level changes, missed or extra doses, bruising or bleeding, with no problem findings.             OBJECTIVE    INR Protime   Date Value Ref Range Status   2020 2.6 (A) 0.86 - 1.14 Final       ASSESSMENT / PLAN  INR assessment THER    Recheck INR In: 4 WEEKS    INR Location Clinic      Anticoagulation Summary  As of 2020    INR goal:   2.0-3.0   TTR:   66.9 % (1 y)   INR used for dosin.6 (2020)   Warfarin maintenance plan:   5 mg (5 mg x 1) every Tue, Sat; 7.5 mg (5 mg x 1.5) all other days   Full warfarin instructions:   5 mg every Tue, Sat; 7.5 mg all other days   Weekly warfarin total:   47.5 mg   Plan last modified:   Ofelia Cavazos RN (2019)   Next INR check:   3/20/2020   Target end date:   Indefinite    Indications    Long term current use of anticoagulant therapy [Z79.01]  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]  Embolism and thrombosis (H) (Resolved) [I74.9]  Personal history of RLE DVT (deep vein thrombosis)() [Z86.718]             Anticoagulation Episode Summary     INR check location:       Preferred lab:       Send INR reminders to:   [facility]    Comments:         Anticoagulation Care Providers     Provider Role Specialty Phone number    Jacoby Boo MD Referring Internal Medicine [phone]            See the Encounter Report to view Anticoagulation Flowsheet and Dosing Calendar (Go to Encounters tab in chart review, and find the Anticoagulation Therapy Visit)        Leigha Donaldson RN

## 2020-02-24 DIAGNOSIS — Z86.718 PERSONAL HISTORY OF DVT (DEEP VEIN THROMBOSIS): ICD-10-CM

## 2020-02-24 RX ORDER — WARFARIN SODIUM 5 MG/1
TABLET ORAL
Qty: 115 TABLET | Refills: 1 | Status: SHIPPED | OUTPATIENT
Start: 2020-02-24 | End: 2020-08-06

## 2020-02-24 NOTE — TELEPHONE ENCOUNTER
".  Requested Prescriptions   Pending Prescriptions Disp Refills     warfarin ANTICOAGULANT (COUMADIN) 5 MG tablet [Pharmacy Med Name: WARFARIN SODIUM 5 MG TABLET] 110 tablet 1     Sig: TAKE ONE TABLET TUES/THURS/SAT AND 1 1/2 TABLETS ALL OTHER DAYS AS DIRECTED BY [facility]       Vitamin K Antagonists Failed - 2/24/2020  1:24 AM        Failed - INR is within goal in the past 6 weeks     Confirm INR is within goal in the past 6 weeks.     Recent Labs   Lab Test 02/21/20   INR 2.6*                       Passed - Recent (12 mo) or future (30 days) visit within the authorizing provider's specialty     Patient has had an office visit with the authorizing provider or a provider within the authorizing providers department within the previous 12 mos or has a future within next 30 days. See \"Patient Info\" tab in inbasket, or \"Choose Columns\" in Meds & Orders section of the refill encounter.              Passed - Medication is active on med list        Passed - Patient is 18 years of age or older        Passed - Patient is not pregnant        Passed - No positive pregnancy on file in past 12 months          "

## 2020-03-05 DIAGNOSIS — E03.9 HYPOTHYROIDISM, UNSPECIFIED TYPE: ICD-10-CM

## 2020-03-05 RX ORDER — LEVOTHYROXINE SODIUM 50 UG/1
TABLET ORAL
Qty: 90 TABLET | Refills: 0 | Status: SHIPPED | OUTPATIENT
Start: 2020-03-05 | End: 2020-03-18

## 2020-03-05 NOTE — TELEPHONE ENCOUNTER
"Requested Prescriptions   Pending Prescriptions Disp Refills     levothyroxine (SYNTHROID/LEVOTHROID) 50 MCG tablet [Pharmacy Med Name: LEVOTHYROXINE 50 MCG TABLET] 90 tablet 2     Sig: TAKE 1 TABLET (50 MCG) BY MOUTH DAILY       Thyroid Protocol Passed - 3/5/2020  1:27 PM        Passed - Patient is 12 years or older        Passed - Recent (12 mo) or future (30 days) visit within the authorizing provider's specialty     Patient has had an office visit with the authorizing provider or a provider within the authorizing providers department within the previous 12 mos or has a future within next 30 days. See \"Patient Info\" tab in inbasket, or \"Choose Columns\" in Meds & Orders section of the refill encounter.              Passed - Medication is active on med list        Passed - Normal TSH on file in past 12 months     Recent Labs   Lab Test 05/01/19  1026   TSH 4.00              Passed - No active pregnancy on record     If patient is pregnant or has had a positive pregnancy test, please check TSH.          Passed - No positive pregnancy test in past 12 months     If patient is pregnant or has had a positive pregnancy test, please check TSH.            "

## 2020-03-18 DIAGNOSIS — E03.9 HYPOTHYROIDISM, UNSPECIFIED TYPE: ICD-10-CM

## 2020-03-18 DIAGNOSIS — G47.00 INSOMNIA, UNSPECIFIED TYPE: ICD-10-CM

## 2020-03-18 DIAGNOSIS — F32.A DEPRESSION, UNSPECIFIED DEPRESSION TYPE: ICD-10-CM

## 2020-03-18 RX ORDER — LEVOTHYROXINE SODIUM 50 UG/1
50 TABLET ORAL DAILY
Qty: 90 TABLET | Refills: 1 | Status: SHIPPED | OUTPATIENT
Start: 2020-03-18 | End: 2020-07-06

## 2020-03-18 RX ORDER — PAROXETINE 20 MG/1
40 TABLET, FILM COATED ORAL EVERY MORNING
Qty: 180 TABLET | Refills: 1 | Status: SHIPPED | OUTPATIENT
Start: 2020-03-18 | End: 2020-07-06

## 2020-03-18 NOTE — TELEPHONE ENCOUNTER
Patients wellness exam postponed until 7/6/20 due to Covid. Patient requesting refills on medications until seen. Refills pended.

## 2020-04-03 ENCOUNTER — ANTICOAGULATION THERAPY VISIT (OUTPATIENT)
Dept: ANTICOAGULATION | Facility: CLINIC | Age: 80
End: 2020-04-03

## 2020-04-03 DIAGNOSIS — Z79.01 LONG TERM CURRENT USE OF ANTICOAGULANT THERAPY: ICD-10-CM

## 2020-04-03 DIAGNOSIS — Z86.718 PERSONAL HISTORY OF DVT (DEEP VEIN THROMBOSIS): ICD-10-CM

## 2020-04-03 LAB
CAPILLARY BLOOD COLLECTION: NORMAL
INR PPP: 2.3 (ref 0.86–1.14)

## 2020-04-03 PROCEDURE — 85610 PROTHROMBIN TIME: CPT | Performed by: INTERNAL MEDICINE

## 2020-04-03 PROCEDURE — 36416 COLLJ CAPILLARY BLOOD SPEC: CPT | Performed by: INTERNAL MEDICINE

## 2020-04-03 PROCEDURE — 99207 ZZC NO CHARGE NURSE ONLY: CPT

## 2020-04-03 NOTE — PROGRESS NOTES
ANTICOAGULATION FOLLOW-UP CLINIC VISIT    Patient Name:  Geneva Shepherd  Date:  4/3/2020  Contact Type:  Telephone    SUBJECTIVE:  Patient Findings     Comments:   The patient was assessed for diet, medication, and activity level changes, missed or extra doses, bruising or bleeding, with no problem findings.          Clinical Outcomes     Comments:   The patient was assessed for diet, medication, and activity level changes, missed or extra doses, bruising or bleeding, with no problem findings.             OBJECTIVE    INR   Date Value Ref Range Status   2020 2.30 (H) 0.86 - 1.14 Final     Comment:     This test is intended for monitoring Coumadin therapy.  Results are not   accurate in patients with prolonged INR due to factor deficiency.         ASSESSMENT / PLAN  INR assessment THER    Recheck INR In: 6 WEEKS    INR Location Clinic      Anticoagulation Summary  As of 4/3/2020    INR goal:   2.0-3.0   TTR:   69.8 % (1 y)   INR used for dosin.30 (4/3/2020)   Warfarin maintenance plan:   5 mg (5 mg x 1) every Tue, Sat; 7.5 mg (5 mg x 1.5) all other days   Full warfarin instructions:   5 mg every Tue, Sat; 7.5 mg all other days   Weekly warfarin total:   47.5 mg   No change documented:   Leigha Donaldson RN   Plan last modified:   Ofelia Cavazos RN (2019)   Next INR check:   5/15/2020   Priority:   Maintenance   Target end date:   Indefinite    Indications    Long term current use of anticoagulant therapy [Z79.01]  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]  Embolism and thrombosis (H) (Resolved) [I74.9]  Personal history of RLE DVT (deep vein thrombosis)() [Z86.718]             Anticoagulation Episode Summary     INR check location:       Preferred lab:       Send INR reminders to:   [facility]    Comments:         Anticoagulation Care Providers     Provider Role Specialty Phone number    Jacoby Boo MD Referring Internal Medicine [phone]            See  the Encounter Report to view Anticoagulation Flowsheet and Dosing Calendar (Go to Encounters tab in chart review, and find the Anticoagulation Therapy Visit)        Leigha Donaldson RN

## 2020-05-15 ENCOUNTER — ANTICOAGULATION THERAPY VISIT (OUTPATIENT)
Dept: ANTICOAGULATION | Facility: CLINIC | Age: 80
End: 2020-05-15

## 2020-05-15 DIAGNOSIS — Z79.01 LONG TERM CURRENT USE OF ANTICOAGULANT THERAPY: ICD-10-CM

## 2020-05-15 DIAGNOSIS — Z86.718 PERSONAL HISTORY OF DVT (DEEP VEIN THROMBOSIS): ICD-10-CM

## 2020-05-15 LAB
CAPILLARY BLOOD COLLECTION: NORMAL
INR PPP: 2.3 (ref 0.86–1.14)

## 2020-05-15 PROCEDURE — 85610 PROTHROMBIN TIME: CPT | Performed by: INTERNAL MEDICINE

## 2020-05-15 PROCEDURE — 36416 COLLJ CAPILLARY BLOOD SPEC: CPT | Performed by: INTERNAL MEDICINE

## 2020-05-15 PROCEDURE — 99207 ZZC NO CHARGE NURSE ONLY: CPT

## 2020-05-15 NOTE — PROGRESS NOTES
ANTICOAGULATION FOLLOW-UP CLINIC VISIT    Patient Name:  Geneva Shepherd  Date:  5/15/2020  Contact Type:  Telephone    SUBJECTIVE:  Patient Findings     Comments:   The patient was assessed for diet, medication, and activity level changes, missed or extra doses, bruising or bleeding, with no problem findings.          Clinical Outcomes     Comments:   The patient was assessed for diet, medication, and activity level changes, missed or extra doses, bruising or bleeding, with no problem findings.             OBJECTIVE    Recent labs: (last 7 days)     05/15/20  1409   INR 2.30*       ASSESSMENT / PLAN  INR assessment THER    Recheck INR In: 6 WEEKS    INR Location Clinic      Anticoagulation Summary  As of 5/15/2020    INR goal:   2.0-3.0   TTR:   70.0 % (1 y)   INR used for dosin.30 (5/15/2020)   Warfarin maintenance plan:   5 mg (5 mg x 1) every Tue, Sat; 7.5 mg (5 mg x 1.5) all other days   Full warfarin instructions:   5 mg every Tue, Sat; 7.5 mg all other days   Weekly warfarin total:   47.5 mg   Plan last modified:   Ofelia Cavazos RN (2019)   Next INR check:   2020   Priority:   Maintenance   Target end date:   Indefinite    Indications    Long term current use of anticoagulant therapy [Z79.01]  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]  Embolism and thrombosis (H) (Resolved) [I74.9]  Personal history of RLE DVT (deep vein thrombosis)() [Z86.718]             Anticoagulation Episode Summary     INR check location:       Preferred lab:       Send INR reminders to:   [facility]    Comments:         Anticoagulation Care Providers     Provider Role Specialty Phone number    Jacoby Boo MD Referring Internal Medicine [phone]            See the Encounter Report to view Anticoagulation Flowsheet and Dosing Calendar (Go to Encounters tab in chart review, and find the Anticoagulation Therapy Visit)        Leigha Donaldson RN

## 2020-06-26 ENCOUNTER — ANTICOAGULATION THERAPY VISIT (OUTPATIENT)
Dept: ANTICOAGULATION | Facility: CLINIC | Age: 80
End: 2020-06-26

## 2020-06-26 DIAGNOSIS — Z86.718 PERSONAL HISTORY OF DVT (DEEP VEIN THROMBOSIS): ICD-10-CM

## 2020-06-26 DIAGNOSIS — Z79.01 LONG TERM CURRENT USE OF ANTICOAGULANT THERAPY: ICD-10-CM

## 2020-06-26 LAB
CAPILLARY BLOOD COLLECTION: NORMAL
INR PPP: 2.6 (ref 0.86–1.14)

## 2020-06-26 PROCEDURE — 99207 ZZC NO CHARGE NURSE ONLY: CPT

## 2020-06-26 PROCEDURE — 36416 COLLJ CAPILLARY BLOOD SPEC: CPT | Performed by: INTERNAL MEDICINE

## 2020-06-26 PROCEDURE — 85610 PROTHROMBIN TIME: CPT | Performed by: INTERNAL MEDICINE

## 2020-07-06 ENCOUNTER — OFFICE VISIT (OUTPATIENT)
Dept: INTERNAL MEDICINE | Facility: CLINIC | Age: 80
End: 2020-07-06
Payer: MEDICARE

## 2020-07-06 VITALS
BODY MASS INDEX: 28.93 KG/M2 | OXYGEN SATURATION: 97 % | TEMPERATURE: 98.6 F | RESPIRATION RATE: 16 BRPM | SYSTOLIC BLOOD PRESSURE: 126 MMHG | DIASTOLIC BLOOD PRESSURE: 80 MMHG | WEIGHT: 180 LBS | HEART RATE: 88 BPM | HEIGHT: 66 IN

## 2020-07-06 DIAGNOSIS — F32.0 MAJOR DEPRESSIVE DISORDER, SINGLE EPISODE, MILD (H): ICD-10-CM

## 2020-07-06 DIAGNOSIS — Z86.718 PERSONAL HISTORY OF DVT (DEEP VEIN THROMBOSIS): ICD-10-CM

## 2020-07-06 DIAGNOSIS — Z12.31 ENCOUNTER FOR SCREENING MAMMOGRAM FOR BREAST CANCER: ICD-10-CM

## 2020-07-06 DIAGNOSIS — Z00.00 MEDICARE ANNUAL WELLNESS VISIT, SUBSEQUENT: Primary | ICD-10-CM

## 2020-07-06 DIAGNOSIS — E03.9 HYPOTHYROIDISM, UNSPECIFIED TYPE: ICD-10-CM

## 2020-07-06 DIAGNOSIS — R41.3 MEMORY LOSS: ICD-10-CM

## 2020-07-06 DIAGNOSIS — Z13.1 SCREENING FOR DIABETES MELLITUS: ICD-10-CM

## 2020-07-06 DIAGNOSIS — G47.00 INSOMNIA, UNSPECIFIED TYPE: ICD-10-CM

## 2020-07-06 LAB
ANION GAP SERPL CALCULATED.3IONS-SCNC: 1 MMOL/L (ref 3–14)
BUN SERPL-MCNC: 11 MG/DL (ref 7–30)
CALCIUM SERPL-MCNC: 10.3 MG/DL (ref 8.5–10.1)
CHLORIDE SERPL-SCNC: 104 MMOL/L (ref 94–109)
CO2 SERPL-SCNC: 31 MMOL/L (ref 20–32)
CREAT SERPL-MCNC: 0.66 MG/DL (ref 0.52–1.04)
GFR SERPL CREATININE-BSD FRML MDRD: 85 ML/MIN/{1.73_M2}
GLUCOSE SERPL-MCNC: 104 MG/DL (ref 70–99)
POTASSIUM SERPL-SCNC: 3.9 MMOL/L (ref 3.4–5.3)
SODIUM SERPL-SCNC: 136 MMOL/L (ref 133–144)
TSH SERPL DL<=0.005 MIU/L-ACNC: 1.48 MU/L (ref 0.4–4)

## 2020-07-06 PROCEDURE — 99214 OFFICE O/P EST MOD 30 MIN: CPT | Mod: 25 | Performed by: INTERNAL MEDICINE

## 2020-07-06 PROCEDURE — 80048 BASIC METABOLIC PNL TOTAL CA: CPT | Performed by: INTERNAL MEDICINE

## 2020-07-06 PROCEDURE — G0439 PPPS, SUBSEQ VISIT: HCPCS | Performed by: INTERNAL MEDICINE

## 2020-07-06 PROCEDURE — 84443 ASSAY THYROID STIM HORMONE: CPT | Performed by: INTERNAL MEDICINE

## 2020-07-06 PROCEDURE — 36415 COLL VENOUS BLD VENIPUNCTURE: CPT | Performed by: INTERNAL MEDICINE

## 2020-07-06 RX ORDER — DONEPEZIL HYDROCHLORIDE 10 MG/1
10 TABLET, FILM COATED ORAL AT BEDTIME
Qty: 90 TABLET | Refills: 3 | Status: SHIPPED | OUTPATIENT
Start: 2020-07-06 | End: 2021-07-29

## 2020-07-06 RX ORDER — PAROXETINE 20 MG/1
40 TABLET, FILM COATED ORAL EVERY MORNING
Qty: 180 TABLET | Refills: 3 | Status: SHIPPED | OUTPATIENT
Start: 2020-07-06 | End: 2020-09-10

## 2020-07-06 RX ORDER — WARFARIN SODIUM 5 MG/1
TABLET ORAL
Qty: 145 TABLET | Refills: 3 | Status: SHIPPED | OUTPATIENT
Start: 2020-07-06 | End: 2021-06-24

## 2020-07-06 RX ORDER — DONEPEZIL HYDROCHLORIDE 5 MG/1
5 TABLET, FILM COATED ORAL AT BEDTIME
Qty: 30 TABLET | Refills: 0 | Status: SHIPPED | OUTPATIENT
Start: 2020-07-06 | End: 2020-08-05

## 2020-07-06 RX ORDER — LEVOTHYROXINE SODIUM 50 UG/1
50 TABLET ORAL DAILY
Qty: 90 TABLET | Refills: 3 | Status: SHIPPED | OUTPATIENT
Start: 2020-07-06 | End: 2020-09-10

## 2020-07-06 ASSESSMENT — ANXIETY QUESTIONNAIRES
5. BEING SO RESTLESS THAT IT IS HARD TO SIT STILL: SEVERAL DAYS
7. FEELING AFRAID AS IF SOMETHING AWFUL MIGHT HAPPEN: NOT AT ALL
GAD7 TOTAL SCORE: 2
6. BECOMING EASILY ANNOYED OR IRRITABLE: SEVERAL DAYS
2. NOT BEING ABLE TO STOP OR CONTROL WORRYING: NOT AT ALL
1. FEELING NERVOUS, ANXIOUS, OR ON EDGE: NOT AT ALL
3. WORRYING TOO MUCH ABOUT DIFFERENT THINGS: NOT AT ALL
IF YOU CHECKED OFF ANY PROBLEMS ON THIS QUESTIONNAIRE, HOW DIFFICULT HAVE THESE PROBLEMS MADE IT FOR YOU TO DO YOUR WORK, TAKE CARE OF THINGS AT HOME, OR GET ALONG WITH OTHER PEOPLE: NOT DIFFICULT AT ALL

## 2020-07-06 ASSESSMENT — PATIENT HEALTH QUESTIONNAIRE - PHQ9
SUM OF ALL RESPONSES TO PHQ QUESTIONS 1-9: 2
5. POOR APPETITE OR OVEREATING: NOT AT ALL

## 2020-07-06 ASSESSMENT — MIFFLIN-ST. JEOR: SCORE: 1318.22

## 2020-07-06 ASSESSMENT — ACTIVITIES OF DAILY LIVING (ADL): CURRENT_FUNCTION: NO ASSISTANCE NEEDED

## 2020-07-06 NOTE — PATIENT INSTRUCTIONS
Donepizil (generic Aricept) 5mg tab, 1 tab daily at bedtime for memory loss. If tolerating med after 30 days (no nausea  or lightheadedness), then contact pharmacy to fill prescription for the 10mg tab (now on file), 1 tab daily at bedtime   See me in mid October for follow-up of memory  Continue other medications   Labs as ordered today   Flu vaccine in the Fall (September, October)   Continue walking exercise  Eye appt later this month as scheduled   Healthcare  Directive discussed and given copy.   Patient to complete and return to clinic  Pt was informed regarding extra E&M billing for management of new or established medical issues not related to today's wellness visit  Mammogram appt - [facility]

## 2020-07-06 NOTE — PROGRESS NOTES
"SUBJECTIVE:   Geneva Shepherd is a 79 year old female who presents for Preventive Visit and issues as below.    Are you in the first 12 months of your Medicare coverage?  No    Healthy Habits:     In general, how would you rate your overall health?  Very good    Frequency of exercise:  6-7 days/week    Duration of exercise:  45-60 minutes    Do you usually eat at least 4 servings of fruit and vegetables a day, include whole grains    & fiber and avoid regularly eating high fat or \"junk\" foods?  Yes    Taking medications regularly:  Yes    Barriers to taking medications:  None    Medication side effects:  Not applicable    Ability to successfully perform activities of daily living:  No assistance needed    Home Safety:  No safety concerns identified    Hearing Impairment:  No hearing concerns    In the past 6 months, have you been bothered by leaking of urine? Yes    In general, how would you rate your overall mental or emotional health?  Very good      PHQ-2 Total Score: 0    Additional concerns today:  No    Do you feel safe in your environment? Yes    Have you ever done Advance Care Planning? (For example, a Health Directive, POLST, or a discussion with a medical provider or your loved ones about your wishes): No, advance care planning information given to patient to review.  Patient plans to discuss their wishes with loved ones or provider.      Fall risk  Fallen 2 or more times in the past year?: No  Any fall with injury in the past year?: No    Cognitive Screening   1) Repeat 3 items (Leader, Season, Table)    2) Clock draw: ABNORMAL   3) 3 item recall: Recalls 1 object   Results: ABNORMAL clock, 1-2 items recalled: PROBABLE COGNITIVE IMPAIRMENT, **INFORM PROVIDER**  Six Item Cognitive Impairment Test   (6CIT):      What year is it?                               Correct - 0 points    What month is it?                               Correct - 0 points      Give the patient an address to remember with five " components:   [place] ( first and last name - 2 components)   [address]  (number and name of street - 2 components)   [place] ( city - 1 component)      About what time is it (within the hour)? Correct - 0 points    Count backwards from 20 to 1:   One error - 2 points    Say the months of the year in reverse: More than one error - 4 points    Repeat the address phrase:   All wrong - 10 points    Total 6CIT Score:      16/28    Interpretation: The 6CIT uses an inverse score and questions are weighted to produce a total out of 28. Scores of 0-7 are considered normal and 8 or more significant.    Advantages The test has high sensitivity without compromising specificity even in mild dementia. It is easy to translate linguistically and culturally.  Disadvantages The main disadvantage is in the scoring and weighting of the test, which is initially confusing, however computer models have simplified this greatly.    Probability Statistics: At the 7/8 cut off: Overall figures sensitivity 90% specificity 100%, in mild dementia sensitivity = 78% , specificity = 100%    Copyright 2000 [facility], [facility]. Courtesy of Dr. All Palma    Mini-CogTM Copyright S Marisabel. Licensed by the author for use in [facility]; reprinted with permission (soob@[facility]). All rights reserved.      Do you have sleep apnea, excessive snoring or daytime drowsiness?: no    Reviewed and updated as needed this visit by clinical staff         Reviewed and updated as needed this visit by Provider        Social History     Tobacco Use     Smoking status: Former Smoker     Last attempt to quit: 1968     Years since quittin.8     Smokeless tobacco: Never Used     Tobacco comment: quit in    Substance Use Topics     Alcohol use: Yes     Alcohol/week: 0.0 standard drinks     Comment: 1 glass of wine a month     If you drink alcohol do you typically have >3 drinks per day or >7 drinks per week? No,  Rarely    Alcohol Use 3/15/2019   Prescreen: >3 drinks/day or >7 drinks/week? No             Current providers sharing in care for this patient include:   Patient Care Team:  Jacoby Boo MD as PCP - General  Jacoby Boo MD as Assigned PCP    The following health maintenance items are reviewed in Epic and correct as of today:  Health Maintenance   Topic Date Due     ZOSTER IMMUNIZATION (1 of 2) 11/29/1992     PHQ-9  09/15/2019     MEDICARE ANNUAL WELLNESS VISIT  03/15/2020     FALL RISK ASSESSMENT  03/15/2020     TSH W/FREE T4 REFLEX  05/01/2020     INFLUENZA VACCINE (1) 09/01/2020     ADVANCE CARE PLANNING  06/28/2023     LIPID  05/01/2024     COLORECTAL CANCER SCREENING  04/26/2026     DTAP/TDAP/TD IMMUNIZATION (2 - Td) 06/28/2028     DEXA  Completed     DEPRESSION ACTION PLAN  Completed     PNEUMOCOCCAL IMMUNIZATION 65+ LOW/MEDIUM RISK  Completed     IPV IMMUNIZATION  Aged Out     MENINGITIS IMMUNIZATION  Aged Out     Labs reviewed in EPIC      Review of Systems  CONSTITUTIONAL: NEGATIVE for fever, chills, change in weight  INTEGUMENTARY/SKIN: NEGATIVE for worrisome rashes, moles or lesions  EYES:  POSITIVE for reduced vision. Followed by eye clinic and told needed YAG procedure. Had procedure and much improved and seen in January but then worsening again past 6 weeks.  Pt has spoken with eye clinic and has f/u in later July. Using magnifying glass occ for reading  ENT/MOUTH: NEGATIVE for ear, mouth and throat problems  RESP: NEGATIVE for significant cough or SOB  BREAST: NEGATIVE for masses, tenderness or discharge  CV: NEGATIVE for chest pain, palpitations . Trace BLE peripheral edema without orthopnea, PND. Using compression stockings. Has been followed by vascular clinic  GI: NEGATIVE for nausea, abdominal pain, heartburn, or change in bowel habits  : NEGATIVE for frequency, dysuria, or hematuria. Has residual urine and doing straight cath twice a day. Followed by Dr Cartagena from Urology.  "  MUSCULOSKELETAL: NEGATIVE for significant arthralgias or myalgia. Hx osteoarthritis . No pain that limites activity  NEURO: NEGATIVE for weakness, dizziness or paresthesias. Uses cane  with ambulation for stabilization. No falls. Reduce memory as above  ENDOCRINE: NEGATIVE for temperature intolerance. On thyroid replacement  HEME: NEGATIVE for bleeding problems with Warfarin. INR UTD.   PSYCHIATRIC: NEGATIVE for changes in mood or affect with Paxil. See HAZEL and PHQ  Scores from today  that are goal. Sleeping well with med    OBJECTIVE:   /80   Pulse 88   Temp 98.6  F (37  C) (Temporal)   Resp 16   Ht 1.676 m (5' 6\")   Wt 81.6 kg (180 lb)   SpO2 97%   BMI 29.05 kg/m   Estimated body mass index is 28.19 kg/m  as calculated from the following:    Height as of 2/16/18: 1.702 m (5' 7\").    Weight as of 4/25/19: 81.6 kg (180 lb).  Physical Exam  General appearance -   alert, no distress. Pleasant affect  Skin - No rashes or lesions.  Head - normocephalic, atraumatic  Eyes - JASSI, EOMI, fundi exam with nondilated pupils negative.  Ears - External ears normal. Canals clear. TM's normal.  Nose/Sinuses - Nares normal. Septum midline. Mucosa normal. No drainage or sinus tenderness.  Oropharynx - No erythema, no adenopathy, no exudates.  Neck - Supple without adenopathy or thyromegaly. No bruits.  Lungs - Clear to auscultation without wheezes/rhonchi.  Heart - Regular rate and rhythm without murmurs, clicks, or gallops.  Nodes - No supraclavicular, axillary, or inguinal adenopathy palpable.  Breasts - deferred  Abdomen - Abdomen soft, non-tender. BS normal. No masses or hepatosplenomegaly palpable. No bruits.  Extremities -No cyanosis, clubbing, Minimal BLE edema.   Has compression stockings on  Musculoskeletal - Spine ROM normal. Muscular strength intact.   Peripheral pulses - radial=4/4, femoral=4/4, posterior tibial=4/4, dorsalis pedis=4/4,  Neuro - Gait stable with cane.  Reflexes normal and symmetric. " "Sensation grossly WNL.  Genital/Rectal - deferred      ASSESSMENT / PLAN:   1. Medicare annual wellness visit, subsequent  See below for healthcare maintenance.  Otherwise up-to-date    2. Memory loss  Previous brain imaging has shown small vessel ischemic disease and degenerative atrophy.  Previous B12 and thyroid labs normal. 6 CIT as above.  We will therefore start Aricept with titration schedule as below.  Counseled regarding possible nausea and lightheadedness.  - donepezil (ARICEPT) 5 MG tablet; Take 1 tablet (5 mg) by mouth At Bedtime  Dispense: 30 tablet; Refill: 0  - donepezil (ARICEPT) 10 MG tablet; Take 1 tablet (10 mg) by mouth At Bedtime  Dispense: 90 tablet; Refill: 3    3. Major depressive disorder, single episode, mild (H)  Controlled.  Continue medication  - PARoxetine (PAXIL) 20 MG tablet; Take 2 tablets (40 mg) by mouth every morning  Dispense: 180 tablet; Refill: 3    4. Insomnia, unspecified type  Controlled.  Continue medication  - amitriptyline (ELAVIL) 25 MG tablet; Take 1 tablet (25 mg) by mouth At Bedtime  Dispense: 90 tablet; Refill: 3    5. Hypothyroidism, unspecified type  Controlled.  Continue medication. Lab as ordered  - levothyroxine (SYNTHROID/LEVOTHROID) 50 MCG tablet; Take 1 tablet (50 mcg) by mouth daily  Dispense: 90 tablet; Refill: 3  - TSH with free T4 reflex    6. Personal history of RLE DVT (deep vein thrombosis)(2003)  On long-term anticoagulation.  INR up-to-date.  Using compression stockings  - warfarin ANTICOAGULANT (COUMADIN) 5 MG tablet; Take 1 tablet (Tuesdays and  Saturdays) and 1.5 tablets all other days or as directed by the Anticoagulation Clinic  Dispense: 145 tablet; Refill: 3    7. Screening for diabetes mellitus  - Basic metabolic panel      COUNSELING:  Reviewed preventive health counseling, as reflected in patient instructions    Estimated body mass index is 28.19 kg/m  as calculated from the following:    Height as of 2/16/18: 1.702 m (5' 7\").    Weight as " of 4/25/19: 81.6 kg (180 lb).         reports that she quit smoking about 51 years ago. She has never used smokeless tobacco.      Appropriate preventive services were discussed with this patient, including applicable screening as appropriate for cardiovascular disease, diabetes, osteopenia/osteoporosis, and glaucoma.  As appropriate for age/gender, discussed screening for colorectal cancer, prostate cancer, breast cancer, and cervical cancer. Checklist reviewing preventive services available has been given to the patient.    Reviewed patients plan of care and provided an AVS. The Basic Care Plan (routine screening as documented in Health Maintenance) for Geneva meets the Care Plan requirement. This Care Plan has been established and reviewed with the Patient.    Counseling Resources:  ATP IV Guidelines  Pooled Cohorts Equation Calculator  Breast Cancer Risk Calculator  FRAX Risk Assessment  ICSI Preventive Guidelines  Dietary Guidelines for Americans, 2010  USDA's MyPlate  ASA Prophylaxis  Lung CA Screening      PLAN:   Donepizil (generic Aricept) 5mg tab, 1 tab daily at bedtime for memory loss. If tolerating med after 30 days (no nausea  or lightheadedness), then contact pharmacy to fill prescription for the 10mg tab (now on file), 1 tab daily at bedtime   See me in mid October for follow-up of memory  Continue other medications   Labs as ordered today   Flu vaccine in the Fall (September, October)   Continue walking exercise  Eye appt later this month as scheduled   Healthcare  Directive discussed and given copy.   Patient to complete and return to clinic  Pt was informed regarding extra E&M billing for management of new or established medical issues not related to today's wellness visit  [facility]ammogram appt - [facility]          Jacoby Boo MD  [facility]

## 2020-07-07 ASSESSMENT — ANXIETY QUESTIONNAIRES: GAD7 TOTAL SCORE: 2

## 2020-07-08 ENCOUNTER — TELEPHONE (OUTPATIENT)
Dept: INTERNAL MEDICINE | Facility: CLINIC | Age: 80
End: 2020-07-08

## 2020-07-08 NOTE — TELEPHONE ENCOUNTER
Pt was looking where to go for Audiology test . RN recommended we could do a referral . Or that [facility] does these . PT is going to try [facility].Franca Juares RN

## 2020-07-16 PROBLEM — R41.3 MEMORY LOSS: Status: ACTIVE | Noted: 2020-07-16

## 2020-07-31 ENCOUNTER — TRANSFERRED RECORDS (OUTPATIENT)
Dept: HEALTH INFORMATION MANAGEMENT | Facility: CLINIC | Age: 80
End: 2020-07-31

## 2020-08-06 DIAGNOSIS — Z86.718 PERSONAL HISTORY OF DVT (DEEP VEIN THROMBOSIS): ICD-10-CM

## 2020-08-06 RX ORDER — WARFARIN SODIUM 5 MG/1
TABLET ORAL
Qty: 115 TABLET | Refills: 1 | Status: SHIPPED | OUTPATIENT
Start: 2020-08-06 | End: 2020-12-09

## 2020-08-07 ENCOUNTER — ANTICOAGULATION THERAPY VISIT (OUTPATIENT)
Dept: INTERNAL MEDICINE | Facility: CLINIC | Age: 80
End: 2020-08-07

## 2020-08-07 DIAGNOSIS — Z79.01 LONG TERM CURRENT USE OF ANTICOAGULANT THERAPY: ICD-10-CM

## 2020-08-07 DIAGNOSIS — Z86.718 PERSONAL HISTORY OF DVT (DEEP VEIN THROMBOSIS): ICD-10-CM

## 2020-08-07 LAB
CAPILLARY BLOOD COLLECTION: NORMAL
INR PPP: 2.6 (ref 0.86–1.14)

## 2020-08-07 PROCEDURE — 36416 COLLJ CAPILLARY BLOOD SPEC: CPT | Performed by: INTERNAL MEDICINE

## 2020-08-07 PROCEDURE — 85610 PROTHROMBIN TIME: CPT | Performed by: INTERNAL MEDICINE

## 2020-08-07 NOTE — PROGRESS NOTES
ANTICOAGULATION MANAGEMENT     Patient Name:  Geneva Shepherd  Date:  2020    ASSESSMENT /SUBJECTIVE:    Today's INR result of 2.6 is therapeutic. Goal INR of 2.0-3.0      Warfarin dose taken: Warfarin taken as previously instructed    Diet: No new diet changes affecting INR    Medication changes/ interactions: No new medications/supplements affecting INR    Previous INR: Therapeutic     S/S of bleeding or thromboembolism: No    New injury or illness: No    Upcoming surgery, procedure or cardioversion: No    Additional findings: None      PLAN:    Spoke with Geneva regarding INR result and instructed:     Warfarin Dosing Instructions: Continue your current warfarin dose 5 mg Tue Sat and 7.6 mg all other days    Instructed patient to follow up no later than: 6 weeks  Lab visit scheduled    Education provided: Monitoring for bleeding signs and symptoms and Monitoring for clotting signs and symptoms      Geneva,  verbalizes understanding and agrees to warfarin dosing plan.    Instructed to call the Anticoagulation Clinic for any changes, questions or concerns. (#[phone])        Mame Way RN      OBJECTIVE:  Recent labs: (last 7 days)     20  1541   INR 2.60*         No question data found.  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    Comments:         Anticoagulation Care Providers     Provider Role Specialty Phone number    Jacoby Boo MD Referring Internal Medicine [phone]

## 2020-08-07 NOTE — PROGRESS NOTES
Anticoagulation Management    Unable to reach Geneva today.    Today's INR result of 2.6 is therapeutic (goal INR of 2.0-3.0).  Result received from: Clinic Lab    Left message for patient to give us a call back    Tentative plan: continue dosing of 5 mg Tue, sat and 7.5 mg all other days and recheck INR in 6 weeks.    Anticoagulation clinic to follow up    Mame Way RN

## 2020-08-20 ENCOUNTER — OFFICE VISIT (OUTPATIENT)
Dept: INTERNAL MEDICINE | Facility: CLINIC | Age: 80
End: 2020-08-20
Payer: MEDICARE

## 2020-08-20 VITALS
DIASTOLIC BLOOD PRESSURE: 82 MMHG | BODY MASS INDEX: 29.05 KG/M2 | WEIGHT: 180 LBS | RESPIRATION RATE: 16 BRPM | TEMPERATURE: 98 F | OXYGEN SATURATION: 92 % | HEART RATE: 84 BPM | SYSTOLIC BLOOD PRESSURE: 130 MMHG

## 2020-08-20 DIAGNOSIS — Z01.818 PREOP GENERAL PHYSICAL EXAM: Primary | ICD-10-CM

## 2020-08-20 DIAGNOSIS — H33.21 DETACHED RETINA, RIGHT: ICD-10-CM

## 2020-08-20 DIAGNOSIS — E83.52 HYPERCALCEMIA: ICD-10-CM

## 2020-08-20 LAB
ERYTHROCYTE [DISTWIDTH] IN BLOOD BY AUTOMATED COUNT: 14.3 % (ref 10–15)
HCT VFR BLD AUTO: 44.6 % (ref 35–47)
HGB BLD-MCNC: 14.1 G/DL (ref 11.7–15.7)
MCH RBC QN AUTO: 30.7 PG (ref 26.5–33)
MCHC RBC AUTO-ENTMCNC: 31.6 G/DL (ref 31.5–36.5)
MCV RBC AUTO: 97 FL (ref 78–100)
PLATELET # BLD AUTO: 249 10E9/L (ref 150–450)
PTH-INTACT SERPL-MCNC: 35 PG/ML (ref 18–80)
RBC # BLD AUTO: 4.59 10E12/L (ref 3.8–5.2)
WBC # BLD AUTO: 7.6 10E9/L (ref 4–11)

## 2020-08-20 PROCEDURE — 82306 VITAMIN D 25 HYDROXY: CPT | Performed by: INTERNAL MEDICINE

## 2020-08-20 PROCEDURE — 36415 COLL VENOUS BLD VENIPUNCTURE: CPT | Performed by: INTERNAL MEDICINE

## 2020-08-20 PROCEDURE — 80048 BASIC METABOLIC PNL TOTAL CA: CPT | Performed by: INTERNAL MEDICINE

## 2020-08-20 PROCEDURE — 93000 ELECTROCARDIOGRAM COMPLETE: CPT | Performed by: INTERNAL MEDICINE

## 2020-08-20 PROCEDURE — 99215 OFFICE O/P EST HI 40 MIN: CPT | Mod: 25 | Performed by: INTERNAL MEDICINE

## 2020-08-20 PROCEDURE — 85027 COMPLETE CBC AUTOMATED: CPT | Performed by: INTERNAL MEDICINE

## 2020-08-20 PROCEDURE — 83970 ASSAY OF PARATHORMONE: CPT | Performed by: INTERNAL MEDICINE

## 2020-08-20 NOTE — PATIENT INSTRUCTIONS
Take Levothyroxine the AM of surgery with a smal sip of water. Otherwise nothing to eat/ drink after midnight prior to surgery  Labs as ordered  I would recommend you receive an influenza (flu) vaccine  this Fall (September or October)

## 2020-08-20 NOTE — PROGRESS NOTES
[facility]  [address] [address]  [phone]  Dept: [phone]    PRE-OP EVALUATION:  Today's date: 2020    Geneva Shepherd (: 1940) presents for pre-operative evaluation assessment as requested by Dr. Muñoz.  She requires evaluation and anesthesia risk assessment prior to undergoing surgery/procedure for treatment of Detached Retina  right eye.    Proposed Surgery/ Procedure:   Date of Surgery/ Procedure: 2020  Time of Surgery/ Procedure: 7:30am  Hospital/Surgical Facility: [facility] Fax Number: [phone]  Primary Physician: Jacoby Boo  Type of Anesthesia Anticipated: Beta Block    Preoperative Questionnaire:   No - Have you ever had a heart attack or stroke?  No - Have you ever had surgery on your heart or blood vessels, such as a stent, coronary (heart) bypass, or surgery on an artery in the head, neck, heart, or legs?  No - Do you have chest pain when you are physically active?  No - Do you have a history of heart failure?  No - Do you currently have a cold, bronchitis, or symptoms of other respiratory (head and chest) infections?  No - Do you have a cough, shortness of breath, or wheezing?  YES - DO YOU OR ANYONE IN YOUR FAMILY HAVE A HISTORY OF BLOOD CLOTS? hx recurrent DVT with factor 5 Leiden (on Coumadin now longterm)  No - Do you or anyone in your family have a serious bleeding problem, such as long-lasting bleeding after surgeries or cuts?  No - Have you ever had anemia or been told to take iron pills?  No - Have you had any abnormal blood loss such as black, tarry or bloody stools, or abnormal vaginal bleeding?  No - Have you ever had a blood transfusion?  Yes - Are you willing to have a blood transfusion if it is medically needed before, during, or after your surgery?  No - Have you or anyone in your family ever had problems with anesthesia (sedation for surgery)?  No - Do you have sleep apnea,  excessive snoring, or daytime drowsiness?   No - Do you have any artifical heart valves or other implanted medical devices, such as a pacemaker, defibrillator, or continuous glucose monitor?  YES - DO YOU HAVE ANY ARTIFICIAL JOINTS?   BTKA and LTHA  No - Are you allergic to latex?  No - Is there any chance that you may be pregnant?    Patient has a Health Care Directive or Living Will:  NO    HPI:     HPI related to upcoming procedure: History of full-thickness macular hole on the right affecting central vision and trace epiretinal membrane.  Following consultation with ophthalmology, patient elected to undergo surgical treatment for these.  See below for other medical issues.  Regarding exercise tolerance, patient is doing no    formal exercise but doing general cleaning and  working around the house. Did 12,000 steps in one day without chest pain or shortness of breath     MEDICAL HISTORY:     Patient Active Problem List    Diagnosis Date Noted     Memory loss 07/16/2020     Priority: Medium     Hypercalcemia      Priority: Medium     Gastroesophageal reflux disease, esophagitis presence not specified 11/23/2017     Priority: Medium     Major depressive disorder, single episode, mild (H) 03/14/2017     Priority: Medium     Knee joint replacement status 01/16/2017     Priority: Medium     Insomnia 02/17/2016     Priority: Medium     Hypothyroidism 01/01/2016     Priority: Medium     Long term current use of anticoagulant therapy 12/15/2015     Priority: Medium     Asymptomatic varicose veins, bilateral 09/03/2015     Priority: Medium     Personal history of RLE DVT (deep vein thrombosis)(2003) 09/03/2015     Priority: Medium     Anticoagulated on Coumadin 09/03/2015     Priority: Medium     Hip joint replacement status: Left 8/31/15 08/31/2015     Priority: Medium     Elevated antinuclear antibody (JUAN ANTONIO) level 07/04/2015     Priority: Medium     Obesity 09/11/2013     Priority: Medium     Osteoarthrosis involving  lower leg 01/21/2013     Priority: Medium     Problem list name updated by automated process. Provider to review       Advanced directives, counseling/discussion 05/18/2012     Priority: Medium     Patient states has Advance Directive and will bring in a copy to clinic. 5/18/2012          FACTOR 5 LEIDEN DEFECT (HYPERCOAGULABLE) 07/30/2003     Priority: Medium     Irritable bowel syndrome 09/09/2002     Priority: Medium      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     FRACTURE OF RIGHT LATERAL PROXIMAL TIBIA 11/07     Gastro-oesophageal reflux disease     rare     Hypercalcemia      Hyperlipidemia LDL goal <160 10/31/2010     Insomnia      Irritable bowel syndrome      Obesity 9/11/2013     Pain in joint, lower leg      Phlebitis and thrombophlebitis of superficial vessels of lower extremities 7/03    right     Sprain of lumbar region      Unspecified hypothyroidism      Urinary tract infection, site not specified      Past Surgical History:   Procedure Laterality Date     ARTHROPLASTY HIP Left 8/31/2015    Procedure: ARTHROPLASTY HIP;  Surgeon: Benjamín Maddox MD;  Location:  OR     ARTHROPLASTY KNEE  1/17/2013    Procedure: ARTHROPLASTY KNEE;  RIGHT TOTAL KNEE ARTHROPLASTY (BIOMET)^ ;  Surgeon: Benjamín Maddox MD;  Location:  OR     ARTHROPLASTY KNEE Left 1/16/2017    Procedure: ARTHROPLASTY KNEE;  Surgeon: Benjamín Maddox MD;  Location:  OR     C NONSPECIFIC PROCEDURE  7/00/01    Endometrial Biopsy.     C NONSPECIFIC PROCEDURE      Tubal Ligation.     C  NONSPECIFIC PROCEDURE  6/02    negative coronary angio     C NONSPECIFIC PROCEDURE  7/04    RLE varicose vein stripping     CHOLECYSTECTOMY       COLONOSCOPY N/A 4/26/2016    Procedure: COMBINED COLONOSCOPY, SINGLE OR MULTIPLE BIOPSY/POLYPECTOMY BY BIOPSY;  Surgeon: Mario Coe MD;  Location:  GI     GYN SURGERY      tubal     VASCULAR SURGERY       Current Outpatient Medications   Medication Sig Dispense Refill     Acetylcysteine (N-ACETYL-L-CYSTEINE PO) Take 1 capsule by mouth every morning       amitriptyline (ELAVIL) 25 MG tablet Take 1 tablet (25 mg) by mouth At Bedtime 90 tablet 3     Ascorbic Acid (VITAMIN C PO) Take 1,000 mg by mouth every morning (Patient takes 2 X 500 mg = 1,000 mg dose)       BETA CAROTENE PO Take 25,000 Units by mouth daily.       BIOTIN PO Take 1 tablet by mouth every morning       CHROMIUM PICOLINATE 800 MCG OR TABS 1 daily       COCONUT OIL PO Take 1 capsule by mouth every morning       donepezil (ARICEPT) 10 MG tablet Take 1 tablet (10 mg) by mouth At Bedtime 90 tablet 3     FISH OIL 1000 MG OR CAPS 1 daily       FLAX SEED OIL 1000 MG OR CAPS 1 daily       FOLIC ACID PO Take 400 mcg by mouth daily        levOCARNitine (CARNITOR) 330 MG tablet Take 330 mg by mouth every morning       levothyroxine (SYNTHROID/LEVOTHROID) 50 MCG tablet Take 1 tablet (50 mcg) by mouth daily 90 tablet 3     MAGNESIUM OXIDE PO Take 400 mg by mouth daily       MULTIVITAMIN/MULTIMINERAL TABS   OR 1 TABLET DAILY 30 0     nitroFURantoin macrocrystal (MACRODANTIN) 100 MG capsule TAKE 1 CAPSULE BY MOUTH EVERY DAY  1     PARoxetine (PAXIL) 20 MG tablet Take 2 tablets (40 mg) by mouth every morning 180 tablet 3     TURMERIC PO Take 1 capsule by mouth every morning       vitamin  B complex with vitamin C (VITAMIN  B COMPLEX) TABS Take 1 tablet by mouth daily DOSE UNKNOWN       warfarin ANTICOAGULANT (COUMADIN) 5 MG tablet TAKE ONE TABLET TUES/SAT AND 1 AND 1/2 TABLETS ALL OTHER DAYS AS DIRECTED BY   tablet 1     warfarin ANTICOAGULANT (COUMADIN) 5 MG tablet Take 1 tablet ( and  ) and 1.5 tablets all other days or as directed by the Anticoagulation Clinic 145 tablet 3     ZINC 50 MG OR CAPS 1 daily       OTC products: None, except as noted above    Allergies   Allergen Reactions     Cephalexin Monohydrate      diarrhea      Latex Allergy: NO    Social History     Tobacco Use     Smoking status: Former Smoker     Last attempt to quit: 1968     Years since quittin.0     Smokeless tobacco: Never Used     Tobacco comment: quit in    Substance Use Topics     Alcohol use: Yes     Alcohol/week: 0.0 standard drinks     Comment: 1 glass of wine a month     History   Drug Use No       REVIEW OF SYSTEMS:   CONSTITUTIONAL: NEGATIVE for fever, chills, change in weight  INTEGUMENTARY/SKIN: NEGATIVE for worrisome rashes, moles or lesions  EYES: See HPI  ENT/MOUTH: NEGATIVE for ear, mouth and throat problems  RESP: NEGATIVE for significant cough or SOB  BREAST: NEGATIVE for masses, tenderness or discharge  CV: NEGATIVE for chest pain, palpitations. Stable mild BLE peripheral edema with compression stockings  GI: NEGATIVE for nausea, abdominal pain, heartburn, or change in bowel habits  : NEGATIVE for frequency, dysuria, or hematuria  MUSCULOSKELETAL: NEGATIVE for significant arthralgias or myalgia that limit activity  NEURO: NEGATIVE for weakness, dizziness or paresthesias. Mild memory loss, stable with Donepizil  ENDOCRINE: NEGATIVE for temperature intolerance, skin/hair changes  HEME: NEGATIVE for bleeding problems with Warfarin use  PSYCHIATRIC: NEGATIVE for changes in mood or affect with meds    EXAM:   /82   Pulse 84   Temp 98  F (36.7  C) (Temporal)   Resp 16   Wt 81.6 kg (180 lb)   SpO2 92%   BMI 29.05 kg/m    Eye: PERRL, EOMI.    HENT: ear canals and TM's normal and nose and mouth without ulcers or lesions   Neck: no adenopathy. Thyroid normal to palpation. No bruits  Pulm:  Lungs clear to auscultation   CV: Regular rates and rhythm  GI: Soft, nontender, Normal active bowel sounds, No hepatosplenomegaly or masses palpable  Ext: Peripheral pulses intact. 1 plus BLE edema. Compression stockings  Neuro: Normal strength and tone, sensory exam grossly normal      DIAGNOSTICS:   EKG: Normal sinus rhythm with rate 77.  No acute ST/T changes.  Computer reading possible anterolateral infarct.  However, small R waves seen in lead V1 and the findings in V1 and V2 are exactly the same as they were in July 2014 with patient then underwent a stress echo which was negative for wall motion abnormalities or ischemic/infarct findings    Recent Labs   Lab Test 08/07/20  1541 07/06/20  1704 06/26/20  1416  05/01/19  1026  08/17/17  1912  03/14/17  0945  01/19/17  0625   HGB  --   --   --   --   --   --  13.7  --  12.8   < >  --    PLT  --   --   --   --   --   --  284  --   --   --  230   INR 2.60*  --  2.60*   < > 2.11*   < >  --    < > 2.36*   < > 1.55*   NA  --  136  --   --  140   < > 139  --   --    < >  --    POTASSIUM  --  3.9  --   --  4.4   < > 3.9  --   --    < >  --    CR  --  0.66  --   --  0.64   < > 0.70  --   --    < > 0.60    < > = values in this interval not displayed.      Component      Latest Ref Rng & Units 7/6/2020 8/20/2020   Sodium      133 - 144 mmol/L 136 136   Potassium      3.4 - 5.3 mmol/L 3.9 4.4   Chloride      94 - 109 mmol/L 104 103   Carbon Dioxide      20 - 32 mmol/L 31 25   Anion Gap      3 - 14 mmol/L 1 (L) 8   Glucose      70 - 99 mg/dL 104 (H) 94   Urea Nitrogen      7 - 30 mg/dL 11 13   Creatinine      0.52 - 1.04 mg/dL 0.66 0.60   GFR Estimate      >60 mL/min/1.73:m2 85 88   GFR Estimate If Black      >60 mL/min/1.73:m2 >90 >90   Calcium      8.5 - 10.1 mg/dL 10.3 (H) 10.8 (H)   TSH      0.40 - 4.00 mU/L 1.48    Vitamin D Deficiency screening      20 - 75 ug/L  44   Parathyroid Hormone Intact      18 - 80 pg/mL  35       IMPRESSION:   Reason for surgery/procedure:  Full-thickness macular hole right eye along with trace epiretinal membrane affecting vision  Diagnosis/reason for consult:   1. Hx recurrent DVT with factor 5 Leiden - on longterm warfarin.  Per discussion with vitreoretinal surgery clinic, patient does not have to stop anticoagulation therapy for this procedure  2.  Mild depression -controlled approximately  3.  Mild chronic bilateral lower extremity edema -controlled with compression stockings.  No orthopnea or PND  4.  Mild hypercalcemia -normal vitamin D and parathyroid levels.  Patient has been on calcium supplement and will discontinue recheck in future  5.  Hypothyroidism - controlled  6. Recurrent UTIs -patient on prophylactic antibiotic therapy through urology.  No recent urinary symptoms  7. Hx memory loss - stable with Aricept    The proposed surgical procedure is considered LOW risk.    REVISED CARDIAC RISK INDEX  The patient has the following serious cardiovascular risks for perioperative complications such as (MI, PE, VFib and 3  AV Block):  No serious cardiac risks  INTERPRETATION: 0 risks: Class I (very low risk - 0.4% complication rate)    The patient has the following additional risks for perioperative complications:  No identified additional risks         RECOMMENDATIONS:     APPROVAL GIVEN to proceed with proposed procedure, without further diagnostic evaluation     PLAN:   Take Levothyroxine the AM of surgery with a smal sip of water. Otherwise nothing to eat/ drink after midnight prior to surgery  Labs as ordered  I would recommend you receive an influenza (flu) vaccine  this Fall (September or October)      Signed Electronically by: Jacoby Boo MD    Copy of this evaluation report is provided to requesting physician.    [place] Preop Guidelines    Revised Cardiac Risk Index

## 2020-08-21 LAB
ANION GAP SERPL CALCULATED.3IONS-SCNC: 8 MMOL/L (ref 3–14)
BUN SERPL-MCNC: 13 MG/DL (ref 7–30)
CALCIUM SERPL-MCNC: 10.8 MG/DL (ref 8.5–10.1)
CHLORIDE SERPL-SCNC: 103 MMOL/L (ref 94–109)
CO2 SERPL-SCNC: 25 MMOL/L (ref 20–32)
CREAT SERPL-MCNC: 0.6 MG/DL (ref 0.52–1.04)
DEPRECATED CALCIDIOL+CALCIFEROL SERPL-MC: 44 UG/L (ref 20–75)
GFR SERPL CREATININE-BSD FRML MDRD: 88 ML/MIN/{1.73_M2}
GLUCOSE SERPL-MCNC: 94 MG/DL (ref 70–99)
POTASSIUM SERPL-SCNC: 4.4 MMOL/L (ref 3.4–5.3)
SODIUM SERPL-SCNC: 136 MMOL/L (ref 133–144)

## 2020-09-09 DIAGNOSIS — F32.0 MAJOR DEPRESSIVE DISORDER, SINGLE EPISODE, MILD (H): ICD-10-CM

## 2020-09-09 DIAGNOSIS — G47.00 INSOMNIA, UNSPECIFIED TYPE: ICD-10-CM

## 2020-09-09 DIAGNOSIS — E03.9 HYPOTHYROIDISM, UNSPECIFIED TYPE: ICD-10-CM

## 2020-09-10 RX ORDER — LEVOTHYROXINE SODIUM 50 UG/1
TABLET ORAL
Qty: 90 TABLET | Refills: 2 | Status: SHIPPED | OUTPATIENT
Start: 2020-09-10 | End: 2021-06-05

## 2020-09-10 RX ORDER — PAROXETINE 20 MG/1
40 TABLET, FILM COATED ORAL EVERY MORNING
Qty: 180 TABLET | Refills: 2 | Status: SHIPPED | OUTPATIENT
Start: 2020-09-10 | End: 2021-06-24

## 2020-09-10 NOTE — TELEPHONE ENCOUNTER
Prescription approved per [facility] Refill Protocol.    Transferred previously approved prescription's to new preferred pharmacy.     Laine CROWDERN, RN, PHN

## 2020-09-18 ENCOUNTER — ANTICOAGULATION THERAPY VISIT (OUTPATIENT)
Dept: ANTICOAGULATION | Facility: CLINIC | Age: 80
End: 2020-09-18

## 2020-09-18 DIAGNOSIS — Z79.01 LONG TERM CURRENT USE OF ANTICOAGULANT THERAPY: ICD-10-CM

## 2020-09-18 DIAGNOSIS — Z86.718 PERSONAL HISTORY OF DVT (DEEP VEIN THROMBOSIS): ICD-10-CM

## 2020-09-18 LAB
CAPILLARY BLOOD COLLECTION: NORMAL
INR PPP: 2.4 (ref 0.86–1.14)

## 2020-09-18 PROCEDURE — 36416 COLLJ CAPILLARY BLOOD SPEC: CPT | Performed by: INTERNAL MEDICINE

## 2020-09-18 PROCEDURE — 99207 ZZC NO CHARGE NURSE ONLY: CPT

## 2020-09-18 PROCEDURE — 85610 PROTHROMBIN TIME: CPT | Performed by: INTERNAL MEDICINE

## 2020-09-18 NOTE — PROGRESS NOTES
ANTICOAGULATION FOLLOW-UP CLINIC VISIT    Patient Name:  Geneva Shepherd  Date:  2020  Contact Type:  Telephone    SUBJECTIVE:  Patient Findings     Comments:   The patient was assessed for diet, medication, and activity level changes, missed or extra doses, bruising or bleeding, with no problem findings.          Clinical Outcomes     Comments:   The patient was assessed for diet, medication, and activity level changes, missed or extra doses, bruising or bleeding, with no problem findings.             OBJECTIVE    Recent labs: (last 7 days)     20  1536   INR 2.40*       ASSESSMENT / PLAN  INR assessment THER    Recheck INR In: 6 WEEKS    INR Location Clinic      Anticoagulation Summary  As of 2020    INR goal:   2.0-3.0   TTR:   89.2 % (1 y)   INR used for dosin.40 (2020)   Warfarin maintenance plan:   5 mg (5 mg x 1) every Tue, Sat; 7.5 mg (5 mg x 1.5) all other days   Full warfarin instructions:   5 mg every Tue, Sat; 7.5 mg all other days   Weekly warfarin total:   47.5 mg   Plan last modified:   Ofelia Cavazos RN (2019)   Next INR check:   10/29/2020   Priority:   Maintenance   Target end date:   Indefinite    Indications    Long term current use of anticoagulant therapy [Z79.01]  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]  Embolism and thrombosis (H) (Resolved) [I74.9]  Personal history of RLE DVT (deep vein thrombosis)() [Z86.718]             Anticoagulation Episode Summary     INR check location:       Preferred lab:       Send INR reminders to:   [facility]    Comments:         Anticoagulation Care Providers     Provider Role Specialty Phone number    Jacoby Boo MD Referring Internal Medicine [phone]            See the Encounter Report to view Anticoagulation Flowsheet and Dosing Calendar (Go to Encounters tab in chart review, and find the Anticoagulation Therapy Visit)        Leigha Donaldson RN

## 2020-09-24 ENCOUNTER — TRANSFERRED RECORDS (OUTPATIENT)
Dept: HEALTH INFORMATION MANAGEMENT | Facility: CLINIC | Age: 80
End: 2020-09-24

## 2020-10-07 ENCOUNTER — TRANSFERRED RECORDS (OUTPATIENT)
Dept: HEALTH INFORMATION MANAGEMENT | Facility: CLINIC | Age: 80
End: 2020-10-07

## 2020-10-09 ENCOUNTER — TRANSFERRED RECORDS (OUTPATIENT)
Dept: HEALTH INFORMATION MANAGEMENT | Facility: CLINIC | Age: 80
End: 2020-10-09

## 2020-10-20 ENCOUNTER — TELEPHONE (OUTPATIENT)
Dept: VASCULAR SURGERY | Facility: CLINIC | Age: 80
End: 2020-10-20

## 2020-10-20 DIAGNOSIS — I83.892 VARICOSE VEINS OF LEFT LEG WITH EDEMA: Primary | ICD-10-CM

## 2020-10-20 NOTE — TELEPHONE ENCOUNTER
Patient's last visit was with Dr. Durham. Plan stated, no intervention needed and return as needed.     Patient states she recentlly fell and twisted her left ankle last week. She is currently on warfarin. She had a lot of swelling and had some veins problems. Some area on her left leg is very tender, swollen. Would like to be seen, but next available appointment is not until end of November to December. Will consult with Dr. Durham and call patient back.     Instruct patient to report to ER if starts to experience shortness of breath of difficulty breathing.

## 2020-10-21 NOTE — TELEPHONE ENCOUNTER
Called pt back and got her scheduled for next Tuesday 10/27 at 2:30 for U/S to rule out DVT and 3:00 appt with Dr. Durham.     Pt is really only concerned about if there is anything wrong with the left leg (any blood clots). Otherwise, pt was not really having any concerning vein symptoms prior to her fall. Last seen 12/2016 with CPN.    Pt in agreement with appt and had no further questions.    Ronda Page, BSN, RN  [facility]  [facility] [employer]

## 2020-10-27 ENCOUNTER — ANCILLARY PROCEDURE (OUTPATIENT)
Dept: ULTRASOUND IMAGING | Facility: CLINIC | Age: 80
End: 2020-10-27
Attending: SURGERY
Payer: MEDICARE

## 2020-10-27 ENCOUNTER — OFFICE VISIT (OUTPATIENT)
Dept: VASCULAR SURGERY | Facility: CLINIC | Age: 80
End: 2020-10-27
Attending: SURGERY
Payer: MEDICARE

## 2020-10-27 DIAGNOSIS — M79.672 LEFT FOOT PAIN: Primary | ICD-10-CM

## 2020-10-27 DIAGNOSIS — I87.2 CHRONIC VENOUS INSUFFICIENCY: ICD-10-CM

## 2020-10-27 DIAGNOSIS — I83.892 VARICOSE VEINS OF LEFT LEG WITH EDEMA: ICD-10-CM

## 2020-10-27 PROCEDURE — 99213 OFFICE O/P EST LOW 20 MIN: CPT | Performed by: SURGERY

## 2020-10-27 PROCEDURE — 93971 EXTREMITY STUDY: CPT | Mod: LT | Performed by: SURGERY

## 2020-10-27 NOTE — PROGRESS NOTES
[facility] office note  Geneva Shepherd resents at this time for left leg pain and swelling.  She fell about 3 and half weeks ago after catching her left foot in a door.  The left foot swelled significantly and was quite bruised.  He did not have significant calf, thigh pain or pleuritic chest pain or shortness of breath.  Swelling has improved though she still has some discomfort in her left foot.  She was concerned that she might have a deep vein thrombosis.    Her past medical history is significant for chronic venous insufficiency with a right lower extremity venous stasis ulcer in the past.  She has known CEAP 4 left lower extremity chronic venous insufficiency and has been wearing compression and remaining quite active with walking and exercise as best she can.    Physical exam  General: Pleasant female in no distress.    Left lower extremity: She has advanced lipodermatosclerosis with a shrunken left ankle due to advanced venous stasis disease.  There is no skin breakdown or rash.    I examined her left foot carefully and notes some tenderness over the mid lateral metatarsals but no deformity.    There is a small eschar on the proximal dorsal lateral left foot where she caught her foot in the door but this is superficial and seems to be healing nicely.  There is no surrounding cellulitis.  There is no edema of her foot on today's exam.  She has purpuric discoloration of her left foot from her venous congestion.    She has 2+ left dorsalis pedis and posterior tibial pulses.    Venous duplex ultrasound reveals no evidence of left lower extremity deep vein thrombosis.    Impression  Fall with left foot and ankle trauma which seems to be healing satisfactorily.  There do not appear to be any venous thromboembolic complications.  I have encouraged her to continue to wear compression and remain as active as possible.  She is going to try to do some walking tomorrow to see how her left foot will feel.    She will  return on an as-needed basis.    C Yin Durham MD    Dictated using Dragon voice recognition software may result in transcription errors

## 2020-10-27 NOTE — LETTER
10/27/2020         RE: Geneva Shepherd  [address]  [facility] [address]        Dear Colleague,    Thank you for referring your patient, Geneva Shepherd, to the [facility] [place]. Please see a copy of my visit note below.    [facility] office note  Geneva Shepherd resents at this time for left leg pain and swelling.  She fell about 3 and half weeks ago after catching her left foot in a door.  The left foot swelled significantly and was quite bruised.  He did not have significant calf, thigh pain or pleuritic chest pain or shortness of breath.  Swelling has improved though she still has some discomfort in her left foot.  She was concerned that she might have a deep vein thrombosis.    Her past medical history is significant for chronic venous insufficiency with a right lower extremity venous stasis ulcer in the past.  She has known CEAP 4 left lower extremity chronic venous insufficiency and has been wearing compression and remaining quite active with walking and exercise as best she can.    Physical exam  General: Pleasant female in no distress.    Left lower extremity: She has advanced lipodermatosclerosis with a shrunken left ankle due to advanced venous stasis disease.  There is no skin breakdown or rash.    I examined her left foot carefully and notes some tenderness over the mid lateral metatarsals but no deformity.    There is a small eschar on the proximal dorsal lateral left foot where she caught her foot in the door but this is superficial and seems to be healing nicely.  There is no surrounding cellulitis.  There is no edema of her foot on today's exam.  She has purpuric discoloration of her left foot from her venous congestion.    She has 2+ left dorsalis pedis and posterior tibial pulses.    Venous duplex ultrasound reveals no evidence of left lower extremity deep vein thrombosis.    Impression  Fall with left foot and ankle trauma which seems to be healing  satisfactorily.  There do not appear to be any venous thromboembolic complications.  I have encouraged her to continue to wear compression and remain as active as possible.  She is going to try to do some walking tomorrow to see how her left foot will feel.    She will return on an as-needed basis.    LIBBY Durham MD    Dictated using Dragon voice recognition software may result in transcription errors      Again, thank you for allowing me to participate in the care of your patient.        Sincerely,        Cisco Durham MD

## 2020-11-19 ENCOUNTER — ANTICOAGULATION THERAPY VISIT (OUTPATIENT)
Dept: INTERNAL MEDICINE | Facility: CLINIC | Age: 80
End: 2020-11-19

## 2020-11-19 DIAGNOSIS — Z86.718 PERSONAL HISTORY OF DVT (DEEP VEIN THROMBOSIS): ICD-10-CM

## 2020-11-19 DIAGNOSIS — Z79.01 LONG TERM CURRENT USE OF ANTICOAGULANT THERAPY: ICD-10-CM

## 2020-11-19 LAB
CAPILLARY BLOOD COLLECTION: NORMAL
INR PPP: 2.5 (ref 0.86–1.14)

## 2020-11-19 PROCEDURE — 36416 COLLJ CAPILLARY BLOOD SPEC: CPT | Performed by: INTERNAL MEDICINE

## 2020-11-19 PROCEDURE — 85610 PROTHROMBIN TIME: CPT | Performed by: INTERNAL MEDICINE

## 2020-11-19 PROCEDURE — 99207 PR NO CHARGE NURSE ONLY: CPT | Performed by: INTERNAL MEDICINE

## 2020-11-19 NOTE — PROGRESS NOTES
Anticoagulation Management    Unable to reach Geneva today.    Today's INR result of 2.5 is therapeutic (goal INR of 2.0-3.0).  Result received from: Clinic Lab    Follow up required to assess for changes     No instructions provided. Unable to leave voicemail. Attempted to call x2. Phone rang and then made noise like fax machine. No voicemail.       Anticoagulation clinic to follow up    Robert Fulton RN

## 2020-11-20 ENCOUNTER — TELEPHONE (OUTPATIENT)
Dept: INTERNAL MEDICINE | Facility: CLINIC | Age: 80
End: 2020-11-20

## 2020-11-20 DIAGNOSIS — Z79.01 LONG TERM CURRENT USE OF ANTICOAGULANT THERAPY: Primary | ICD-10-CM

## 2020-11-20 DIAGNOSIS — Z86.718 PERSONAL HISTORY OF DVT (DEEP VEIN THROMBOSIS): ICD-10-CM

## 2020-11-20 NOTE — PROGRESS NOTES
ANTICOAGULATION FOLLOW-UP CLINIC VISIT    Patient Name:  Geneva Shepherd  Date:  2020  Contact Type:  Telephone    SUBJECTIVE:  Patient Findings     Comments:  The patient was assessed for diet, medication, and activity level changes, missed or extra doses, bruising or bleeding, with no problem findings.          Clinical Outcomes     Comments:  The patient was assessed for diet, medication, and activity level changes, missed or extra doses, bruising or bleeding, with no problem findings.             OBJECTIVE    Recent labs: (last 7 days)     20  1655   INR 2.50*       ASSESSMENT / PLAN  INR assessment THER    Recheck INR In: 6 WEEKS    INR Location Clinic      Anticoagulation Summary  As of 2020    INR goal:  2.0-3.0   TTR:  90.7 % (1 y)   INR used for dosin.50 (2020)   Warfarin maintenance plan:  5 mg (5 mg x 1) every Tue, Sat; 7.5 mg (5 mg x 1.5) all other days   Full warfarin instructions:  5 mg every Tue, Sat; 7.5 mg all other days   Weekly warfarin total:  47.5 mg   Plan last modified:  Ofelia Cavazos RN (2019)   Next INR check:  2020   Priority:  Maintenance   Target end date:  Indefinite    Indications    Long term current use of anticoagulant therapy [Z79.01]  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]  Embolism and thrombosis (H) (Resolved) [I74.9]  Personal history of RLE DVT (deep vein thrombosis)() [Z86.718]             Anticoagulation Episode Summary     INR check location:      Preferred lab:      Send INR reminders to:  [facility]    Comments:        Anticoagulation Care Providers     Provider Role Specialty Phone number    Jacoby Boo MD Referring Internal Medicine [phone]            See the Encounter Report to view Anticoagulation Flowsheet and Dosing Calendar (Go to Encounters tab in chart review, and find the Anticoagulation Therapy Visit)        Leigha Donaldson RN

## 2020-11-20 NOTE — PROGRESS NOTES
Attempted to call again and unable to leave message.     Dominga Fulton RN   [facility]  [place], [place], [place]

## 2020-12-09 DIAGNOSIS — Z86.718 PERSONAL HISTORY OF DVT (DEEP VEIN THROMBOSIS): ICD-10-CM

## 2020-12-09 RX ORDER — WARFARIN SODIUM 5 MG/1
TABLET ORAL
Qty: 115 TABLET | Refills: 1 | Status: SHIPPED | OUTPATIENT
Start: 2020-12-09 | End: 2021-04-11

## 2020-12-31 ENCOUNTER — ANTICOAGULATION THERAPY VISIT (OUTPATIENT)
Dept: NURSING | Facility: CLINIC | Age: 80
End: 2020-12-31
Payer: MEDICARE

## 2020-12-31 DIAGNOSIS — Z86.718 PERSONAL HISTORY OF DVT (DEEP VEIN THROMBOSIS): ICD-10-CM

## 2020-12-31 DIAGNOSIS — Z79.01 LONG TERM CURRENT USE OF ANTICOAGULANT THERAPY: ICD-10-CM

## 2020-12-31 LAB
CAPILLARY BLOOD COLLECTION: NORMAL
INR PPP: 3.2 (ref 0.86–1.14)

## 2020-12-31 PROCEDURE — 36416 COLLJ CAPILLARY BLOOD SPEC: CPT | Performed by: INTERNAL MEDICINE

## 2020-12-31 PROCEDURE — 85610 PROTHROMBIN TIME: CPT | Performed by: INTERNAL MEDICINE

## 2020-12-31 PROCEDURE — 99207 PR NO CHARGE NURSE ONLY: CPT

## 2020-12-31 NOTE — PROGRESS NOTES
ANTICOAGULATION FOLLOW-UP CLINIC VISIT    Patient Name:  Geneva Shepherd  Date:  12/31/2020  Contact Type:  Telephone/ Patient    SUBJECTIVE:  Patient Findings     Positives:  Change in activity    Comments:  Pt reports that she has not been walking lately with the winter weather. Pt hopes to start walking indoors at [facility]. Will keep the maintenance dose the same and recheck in 2 weeks.        Clinical Outcomes     Comments:  Pt reports that she has not been walking lately with the winter weather. [facility]. Will keep the maintenance dose the same and recheck in 2 weeks.           OBJECTIVE    Recent labs: (last 7 days)     12/31/20  1451   INR 3.20*       ASSESSMENT / PLAN  INR assessment SUPRA    Recheck INR In: 2 WEEKS    INR Location Outside lab      Anticoagulation Summary  As of 12/31/2020    INR goal:  2.0-3.0   TTR:  94.8 % (1 y)   INR used for dosing:  3.20 (12/31/2020)   Warfarin maintenance plan:  5 mg (5 mg x 1) every Tue, Sat; 7.5 mg (5 mg x 1.5) all other days   Full warfarin instructions:  12/31: 2.5 mg; Otherwise 5 mg every Tue, Sat; 7.5 mg all other days   Weekly warfarin total:  47.5 mg   Plan last modified:  Ofelia Cavazos RN (9/19/2019)   Next INR check:  1/14/2021   Priority:  Maintenance   Target end date:  Indefinite    Indications    Long term current use of anticoagulant therapy [Z79.01]  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2 today. See findings. Pt advised to take 2.5 mg today 12/31/20 then continue taking 5 mg on Tuesdays, Saturdays and 7.5 mg all the other days. Recheck INR in 2 weeks scheduled on 1/14/21 at [facility]. Geneva aware if signs of clotting (pain, tenderness, swelling, color change in leg or arm, SOB) and bleeding occur (blood in stool, urine, large bruising, bleeding gums, nosebleeds) to have INR check sooner. If sx severe report to ER or concerned for stroke call 911. If general questions or concerns arise, call clinic.         Barbie Reyes RN

## 2021-01-15 ENCOUNTER — HEALTH MAINTENANCE LETTER (OUTPATIENT)
Age: 81
End: 2021-01-15

## 2021-01-21 ENCOUNTER — ANTICOAGULATION THERAPY VISIT (OUTPATIENT)
Dept: ANTICOAGULATION | Facility: CLINIC | Age: 81
End: 2021-01-21

## 2021-01-21 DIAGNOSIS — Z79.01 LONG TERM CURRENT USE OF ANTICOAGULANT THERAPY: ICD-10-CM

## 2021-01-21 DIAGNOSIS — Z86.718 PERSONAL HISTORY OF DVT (DEEP VEIN THROMBOSIS): ICD-10-CM

## 2021-01-21 LAB
CAPILLARY BLOOD COLLECTION: NORMAL
INR PPP: 1.9 (ref 0.86–1.14)

## 2021-01-21 PROCEDURE — 85610 PROTHROMBIN TIME: CPT | Performed by: INTERNAL MEDICINE

## 2021-01-21 PROCEDURE — 36416 COLLJ CAPILLARY BLOOD SPEC: CPT | Performed by: INTERNAL MEDICINE

## 2021-01-21 NOTE — PROGRESS NOTES
ANTICOAGULATION MANAGEMENT     Patient Name:  Geneva Shepherd  Date:  1/21/2021    ASSESSMENT /SUBJECTIVE:    Today's INR result of 1.9 is subtherapeutic. Goal INR of 2.0-3.0      Warfarin dose taken: Missed dose(s) may be affecting INR    Diet: No new diet changes affecting INR    Medication changes/ interactions: No new medications/supplements affecting INR    Previous INR: Supratherapeutic     S/S of bleeding or thromboembolism: No    New injury or illness: No    Upcoming surgery, procedure or cardioversion: No    Additional findings: None      PLAN:    Telephone call with Geneva regarding INR result and instructed:     Warfarin Dosing Instructions: Continue your current warfarin dose 5mg Tue/Sat and 7.5mgAOD    Instructed patient to follow up no later than: 2 weeks  Lab visit scheduled    Education provided: Target INR goal and significance of current INR result      Geneva verbalizes understanding and agrees to warfarin dosing plan.    Instructed to call the Anticoagulation Clinic for any changes, questions or concerns. (#[phone])        Camelia He RN      OBJECTIVE:  Recent labs: (last 7 days)     01/21/21  1404   INR 1.90*         INR assessment SUB    Recheck INR In: 2 WEEKS    INR Location Clinic      Anticoagulation Summary  As of 1/21/2021    INR goal:  2.0-3.0   TTR:  95.4 % (1 y)   INR used for dosing:  No new INR was available at the time of this encounter.   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

## 2021-01-22 ENCOUNTER — TRANSFERRED RECORDS (OUTPATIENT)
Dept: HEALTH INFORMATION MANAGEMENT | Facility: CLINIC | Age: 81
End: 2021-01-22

## 2021-02-17 ENCOUNTER — TELEPHONE (OUTPATIENT)
Dept: VASCULAR SURGERY | Facility: CLINIC | Age: 81
End: 2021-02-17

## 2021-02-17 NOTE — TELEPHONE ENCOUNTER
Nurse (writer) called pt back regarding her question/concern on left leg (foot area) she had cut last Thursday. Pt states it is red and painful. Pt has MD appt with her primary tomorrow. She will discuss this with him. Pt agrees with plan.

## 2021-02-17 NOTE — TELEPHONE ENCOUNTER
Pt/ called, cut her left leg last Thursday, red and painful.Right leg discoloration also?  Cannot wear support hose.Please call pt.

## 2021-02-18 ENCOUNTER — ANTICOAGULATION THERAPY VISIT (OUTPATIENT)
Dept: ANTICOAGULATION | Facility: CLINIC | Age: 81
End: 2021-02-18

## 2021-02-18 ENCOUNTER — OFFICE VISIT (OUTPATIENT)
Dept: INTERNAL MEDICINE | Facility: CLINIC | Age: 81
End: 2021-02-18
Payer: MEDICARE

## 2021-02-18 VITALS
OXYGEN SATURATION: 96 % | SYSTOLIC BLOOD PRESSURE: 124 MMHG | DIASTOLIC BLOOD PRESSURE: 82 MMHG | BODY MASS INDEX: 28.28 KG/M2 | HEIGHT: 66 IN | WEIGHT: 176 LBS | RESPIRATION RATE: 16 BRPM | TEMPERATURE: 98.4 F | HEART RATE: 90 BPM

## 2021-02-18 DIAGNOSIS — Z79.01 LONG TERM CURRENT USE OF ANTICOAGULANT THERAPY: ICD-10-CM

## 2021-02-18 DIAGNOSIS — F32.0 MAJOR DEPRESSIVE DISORDER, SINGLE EPISODE, MILD (H): ICD-10-CM

## 2021-02-18 DIAGNOSIS — R41.3 MEMORY LOSS: ICD-10-CM

## 2021-02-18 DIAGNOSIS — L03.116 CELLULITIS OF LEFT LOWER EXTREMITY: Primary | ICD-10-CM

## 2021-02-18 DIAGNOSIS — Z86.718 PERSONAL HISTORY OF DVT (DEEP VEIN THROMBOSIS): ICD-10-CM

## 2021-02-18 DIAGNOSIS — G47.00 INSOMNIA, UNSPECIFIED TYPE: ICD-10-CM

## 2021-02-18 LAB
CAPILLARY BLOOD COLLECTION: NORMAL
INR PPP: 4 (ref 0.86–1.14)

## 2021-02-18 PROCEDURE — 36416 COLLJ CAPILLARY BLOOD SPEC: CPT | Performed by: INTERNAL MEDICINE

## 2021-02-18 PROCEDURE — 99214 OFFICE O/P EST MOD 30 MIN: CPT | Performed by: INTERNAL MEDICINE

## 2021-02-18 PROCEDURE — 85610 PROTHROMBIN TIME: CPT | Performed by: INTERNAL MEDICINE

## 2021-02-18 PROCEDURE — 99207 PR NO CHARGE NURSE ONLY: CPT

## 2021-02-18 RX ORDER — CEPHALEXIN 500 MG/1
500 CAPSULE ORAL 4 TIMES DAILY
Qty: 40 CAPSULE | Refills: 0 | Status: SHIPPED | OUTPATIENT
Start: 2021-02-18 | End: 2021-02-28

## 2021-02-18 ASSESSMENT — PATIENT HEALTH QUESTIONNAIRE - PHQ9: SUM OF ALL RESPONSES TO PHQ QUESTIONS 1-9: 3

## 2021-02-18 ASSESSMENT — MIFFLIN-ST. JEOR: SCORE: 1295.08

## 2021-02-18 NOTE — PROGRESS NOTES
Assessment & Plan     ASSESSMENT:   1. Cellulitis of left lower extremity  Likely related to patient previously scraping off callus from the right foot which is now resolved.  Will treat with cephalexin for 10 days.  Patient counseled to contact clinic if redness is spreading despite oral medication and will then have patient admitted for IV antibiotics.  Otherwise will have patient seen again in clinic early next week for recheck  - cephALEXin (KEFLEX) 500 MG capsule; Take 1 capsule (500 mg) by mouth 4 times daily for 10 days  Dispense: 40 capsule; Refill: 0    2. Major depressive disorder, single episode, mild (H)  Controlled.  Continue Paxil    3. Memory loss  Stable.  Unclear if donepezil may be affecting sleep quality as insomnia is a chronic issue.  We will try moving donepezil to the morning to see if it makes a difference    4. Insomnia, unspecified type  Patient sleeping for 8 hours after going to bed.  Patient states she is dreaming also some REM sleep appears to be present.  Continue amitriptyline.  Adjustment donepezil dosing as above    5. Long term current use of anticoagulant therapy  On long-term anticoagulation.  INR elevated today.  Likely to rise further with antibiotic therapy.  See below for change in warfarin dosing and patient will have INR recheck next Monday with [facility] for further instruction.  Patient has some tenderness along the superficial varicose veins but doubt deep vein thrombosis with current elevated INR.  We will therefore defer ultrasound therapy of the leg        PLAN:  Cephalexin 500mg  capsule, 1 capsule 4 times for  10 days for left leg skin infection   Since already took Warfarin this AM, then do NOT take any tonight or tomorrow. Then 5mg dose on Saturday and Sunday and get a recheck INR on Monday with [facility] to decide further dosing of Warfarin   Move Donepizil to  morning to see if helps sleep   Continue other medications   See me next Tuesday at 7:40 AM to re-evaluation of  cellulitis      (Chart documentation was completed, in part, with [employer] voice-recognition software. Even though reviewed, some grammatical, spelling, and word errors may remain.)    Jacoby Boo MD  Internal Medicine Department  [facility]      Jeff Bean is a 80 year old who presents for the following health issues  accompanied by herself:    HPI   Chief Complaint   Patient presents with     Swelling     Patient has swelling in right leg with redness and warm to the touch     Insomnia     Patient states she cannot reach REM Sleep-Think  it maybe a side effect of the Donepezil      Most recent lab results reviewed with pt.      Patient had scarped off a callus near the first MTP joint on her right foot last week.  Had some bleeding after that.  Since then, patient has developed some redness on the medial side of her right calf which is warm and tender to palpation.  Denies fevers or chills.  Has history of chronic vein varicosities.  INR was 1.9 on January 21 and 4.0 today when seen by [facility].  Patient denies shortness of breath or chest pain.  Patient has been on amitriptyline for 20+ years for chronic insomnia.  Was started on donepezil in July 2020 for some memory loss.  Patient states that she is dreaming a lot.  Has some stress/tension just in general regarding the Covid pandemic and worrying about her children.  Denies actual depression with current use of Paxil.  PHQ = 3 Patient will often stay up until midnight or 1 AM and then get up at about 10 AM.  She states the reason for her staying up late at night is because she enjoys doing things then rather than insomnia issues nodding of the fall asleep.  Feels memory has been stable with use of donepezil.  Denies nausea, vomiting or lightheadedness  Regarding warfarin dosing, patient was instructed by [facility] to hold warfarin today but patient states she accidentally already took warfarin this morning.  Normally takes it at  "night     PHQ-9 ([employer]) 2/18/2021   1.  Little interest or pleasure in doing things 0   2.  Feeling down, depressed, or hopeless 0   3.  Trouble falling or staying asleep, or sleeping too much 2   4.  Feeling tired or having little energy 0   5.  Poor appetite or overeating 0   6.  Feeling bad about yourself 0   7.  Trouble concentrating 1   8.  Moving slowly or restless 0   9.  Suicidal or self-harm thoughts 0   PHQ-9 Total Score 3       Additional ROS:   Constitutional, HEENT, Cardiovascular, Pulmonary, GI and , Neuro, MSK and Psych review of systems/symptoms are otherwise negative or unchanged from previous, except as noted above.      OBJECTIVE:  /82   Pulse 90   Temp 98.4  F (36.9  C) (Temporal)   Resp 16   Ht 1.676 m (5' 6\")   Wt 79.8 kg (176 lb)   SpO2 96%   BMI 28.41 kg/m     Estimated body mass index is 28.41 kg/m  as calculated from the following:    Height as of this encounter: 1.676 m (5' 6\").    Weight as of this encounter: 79.8 kg (176 lb).   Gen: Normal affect  Pulm: Lungs clear to auscultation   CV: Regular rates and rhythm  GI: Soft, nontender, Normal active bowel sounds, No hepatosplenomegaly or masses palpable  Ext: Peripheral pulses intact.  Chronic large vein varicosities bilateral calves.  Warm erythematous patch right medial calf from the proximal ankle to about 4 inches below the knee.  Varicose veins are compressible but mildly tender   Neuro: 2/3 recall 5 min.  Alert and answering questions appropriately          BMI:   Estimated body mass index is 28.41 kg/m  as calculated from the following:    Height as of this encounter: 1.676 m (5' 6\").    Weight as of this encounter: 79.8 kg (176 lb).       "

## 2021-02-18 NOTE — PROGRESS NOTES
Attempted to call patient,after ringing, there is a beep for a fax machine, no voicemail.  Sheryl Frey RN, BSN

## 2021-02-18 NOTE — PROGRESS NOTES
Anticoagulation Management    Unable to reach [facility] today.    Today's INR result of 4.0 is supratherapeutic (goal INR of 2.0-3.0).  Result received from: Clinic Lab    Follow up required to confirm warfarin dose taken and assess for changes    No instructions provided. Unable to leave voicemail.   Number goes to a Fax machine, no VM available    Anticoagulation clinic to follow up    Camelia He RN

## 2021-02-18 NOTE — PATIENT INSTRUCTIONS
Cephalexin 500mg  capsule, 1 capsule 4 times for  10 days for left leg skin infection   Since already took Warfarin this AM, then do NOT take any tonight or tomorrow. Then 5mg dose on Saturday and Sunday and get a recheck INR on Monday with [facility] to decide further dosing of Warfarin   Move Donepizil to  Morning to see if helps sleep   Continue other medications   See me next Tuesday at 7:40 AM to re-evaluation of cellulitis

## 2021-02-19 NOTE — PROGRESS NOTES
ANTICOAGULATION FOLLOW-UP CLINIC VISIT    Patient Name:  Geneva Shepherd  Date:  2021  Contact Type:  Pt received dosing instructions from  during visit on 21    SUBJECTIVE:  Patient Findings     Comments:  See 21 office visit with . Pt diagnosed with left lower leg cellulitis. Pt being treated with keflex. Pt already took her warfarin dose on 21.  advised pt to HOLD warfarin today 21 then take 5 mg on 21 and 21. Recheck the INR on 21. Pt scheduled at [facility]. Dosing calendar updated.        Clinical Outcomes     Comments:  See 21 office visit with . Pt diagnosed with left lower leg cellulitis. Pt being treated with keflex. Pt already took her warfarin dose on 21.  advised pt to HOLD warfarin today 21 then take 5 mg on 21 and 21. Recheck the INR on 21. Pt scheduled at [facility]. Dosing calendar updated.           OBJECTIVE    Recent labs: (last 7 days)     21  1610   INR 4.00*       ASSESSMENT / PLAN  INR assessment SUPRA    Recheck INR In: 4 DAYS    INR Location Outside lab      Anticoagulation Summary  As of 2021    INR goal:  2.0-3.0   TTR:  91.4 % (1 y)   INR used for dosin.00 (2021)   Warfarin maintenance plan:  5 mg (5 mg x 1) every Tue, Sat; 7.5 mg (5 mg x 1.5) all other days   Full warfarin instructions:  : Hold; : 5 mg; Otherwise 5 mg every Tue, Sat; 7.5 mg all other days   Weekly warfarin total:  47.5 mg   Plan last modified:  Ofelia Cavazos RN (2019)   Next INR check:  3/4/2021   Priority:  Maintenance   Target end date:  Indefinite    Indications    Long term current use of anticoagulant therapy [Z79.01]  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]  Embolism and thrombosis (H) (Resolved) [I74.9]  Personal history of RLE DVT (deep vein thrombosis)() [Z86.718]             Anticoagulation Episode Summary     INR  check location:      Preferred lab:      Send INR reminders to:  [facility]    Comments:        Anticoagulation Care Providers     Provider Role Specialty Phone number    Jacoby Boo MD Referring Internal Medicine [phone]            See the Encounter Report to view Anticoagulation Flowsheet and Dosing Calendar (Go to Encounters tab in chart review, and find the Anticoagulation Therapy Visit)    Pt INR was 4.0 on 2/18/21. See findings. Pt will HOLD warfarin today 2/19/21 and take 5 mg on Saturday and Sunday and she is scheduled to recheck the INR on Monday 2/22/21 at [facility].    Barbie Reyes RN

## 2021-02-21 PROBLEM — L03.116 CELLULITIS OF LEFT LOWER EXTREMITY: Status: ACTIVE | Noted: 2021-02-21

## 2021-02-22 ENCOUNTER — ANTICOAGULATION THERAPY VISIT (OUTPATIENT)
Dept: ANTICOAGULATION | Facility: CLINIC | Age: 81
End: 2021-02-22

## 2021-02-22 DIAGNOSIS — Z86.718 PERSONAL HISTORY OF DVT (DEEP VEIN THROMBOSIS): ICD-10-CM

## 2021-02-22 DIAGNOSIS — Z79.01 LONG TERM CURRENT USE OF ANTICOAGULANT THERAPY: ICD-10-CM

## 2021-02-22 LAB
CAPILLARY BLOOD COLLECTION: NORMAL
INR PPP: 1.9 (ref 0.86–1.14)

## 2021-02-22 PROCEDURE — 36416 COLLJ CAPILLARY BLOOD SPEC: CPT | Performed by: INTERNAL MEDICINE

## 2021-02-22 PROCEDURE — 85610 PROTHROMBIN TIME: CPT | Performed by: INTERNAL MEDICINE

## 2021-02-22 NOTE — PROGRESS NOTES
ANTICOAGULATION MANAGEMENT     Patient Name:  Geneva Shepherd  Date:  2/22/2021    ASSESSMENT /SUBJECTIVE:    Today's INR result of 1.9 is subtherapeutic. Goal INR of 2.0-3.0      Warfarin dose taken: Less warfarin taken than planned which may be affecting INR    Diet: No new diet changes affecting INR    Medication changes/ interactions: Potential interaction between Keflex  and warfarin which may affect subsequent INRs    Previous INR: Supratherapeutic     S/S of bleeding or thromboembolism: No    New injury or illness: No    Upcoming surgery, procedure or cardioversion: No    Additional findings: None      PLAN:    Telephone call with Geneva regarding INR result and instructed:     Warfarin Dosing Instructions: 2/23 7.5mg then  Continue your current warfarin dose 7.5mg Mon and 5mg AOD    Instructed patient to follow up no later than: 1 week  Lab visit scheduled    Education provided: Target INR goal and significance of current INR result      Left detailed message on VM advising on dosing.      Instructed to call the Anticoagulation Clinic for any changes, questions or concerns. (#[phone])        Camelia He RN      OBJECTIVE:  Recent labs: (last 7 days)     02/18/21  1610 02/22/21  1630   INR 4.00* 1.90*         No question data found.  Anticoagulation Summary  As of 2/22/2021    INR goal:  2.0-3.0   TTR:  90.8 % (1 y)   INR used for dosing:  No new INR was available at the time of this encounter.   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

## 2021-02-23 ENCOUNTER — OFFICE VISIT (OUTPATIENT)
Dept: INTERNAL MEDICINE | Facility: CLINIC | Age: 81
End: 2021-02-23
Payer: MEDICARE

## 2021-02-23 VITALS
OXYGEN SATURATION: 95 % | HEART RATE: 77 BPM | BODY MASS INDEX: 28.57 KG/M2 | RESPIRATION RATE: 16 BRPM | SYSTOLIC BLOOD PRESSURE: 124 MMHG | WEIGHT: 177 LBS | TEMPERATURE: 97.1 F | DIASTOLIC BLOOD PRESSURE: 78 MMHG

## 2021-02-23 DIAGNOSIS — L03.116 CELLULITIS OF LEFT LOWER EXTREMITY: ICD-10-CM

## 2021-02-23 DIAGNOSIS — E83.52 HYPERCALCEMIA: ICD-10-CM

## 2021-02-23 DIAGNOSIS — E03.9 HYPOTHYROIDISM, UNSPECIFIED TYPE: ICD-10-CM

## 2021-02-23 LAB
ANION GAP SERPL CALCULATED.3IONS-SCNC: 8 MMOL/L (ref 3–14)
BUN SERPL-MCNC: 14 MG/DL (ref 7–30)
CALCIUM SERPL-MCNC: 9.8 MG/DL (ref 8.5–10.1)
CHLORIDE SERPL-SCNC: 104 MMOL/L (ref 94–109)
CO2 SERPL-SCNC: 23 MMOL/L (ref 20–32)
CREAT SERPL-MCNC: 0.59 MG/DL (ref 0.52–1.04)
GFR SERPL CREATININE-BSD FRML MDRD: 88 ML/MIN/{1.73_M2}
GLUCOSE SERPL-MCNC: 98 MG/DL (ref 70–99)
POTASSIUM SERPL-SCNC: 3.7 MMOL/L (ref 3.4–5.3)
SODIUM SERPL-SCNC: 136 MMOL/L (ref 133–144)
TSH SERPL DL<=0.005 MIU/L-ACNC: 3.21 MU/L (ref 0.4–4)

## 2021-02-23 PROCEDURE — 36415 COLL VENOUS BLD VENIPUNCTURE: CPT | Performed by: INTERNAL MEDICINE

## 2021-02-23 PROCEDURE — 84443 ASSAY THYROID STIM HORMONE: CPT | Performed by: INTERNAL MEDICINE

## 2021-02-23 PROCEDURE — 99213 OFFICE O/P EST LOW 20 MIN: CPT | Performed by: INTERNAL MEDICINE

## 2021-02-23 PROCEDURE — 80048 BASIC METABOLIC PNL TOTAL CA: CPT | Performed by: INTERNAL MEDICINE

## 2021-02-23 NOTE — PATIENT INSTRUCTIONS
Finish  10 day course of Cephalexin antibiotic  Restart use of compression stocking after done with antibiotics   Labs today as ordered    Full warfarin instructions:  2/23: 7.5 mg; Otherwise 5 mg every Tue, Sat; 7.5 mg all other days   Weekly warfarin total:  47.5 mg       Next INR check:  3/1/2021

## 2021-02-23 NOTE — PROGRESS NOTES
Assessment & Plan     ASSESSMENT:    1. Cellulitis of left lower extremity  Significantly improved.  Patient to finish course of cephalexin.  After that, may then restart use of compression stockings for the leg to help control chronic varicose veins.     2. Hypercalcemia  Previous mild hypercalcemia with normal PTH and vitamin D.  Patient cut back on supplemental calcium.  Needs recheck  - Basic metabolic panel    3. Hypothyroidism, unspecified type  On levothyroxine.  Due for recheck.  Clinically euthyroid  - TSH with free T4 reflex      PLAN:   Finish  10 day course of Cephalexin antibiotic  Restart use of compression stocking after done with antibiotics   Labs today as ordered    Full warfarin instructions:  2/23: 7.5 mg; Otherwise 5 mg every Tue, Sat; 7.5 mg all other days   Weekly warfarin total:  47.5 mg       Next INR check:  3/1/2021           (Chart documentation was completed, in part, with [employer] voice-recognition software. Even though reviewed, some grammatical, spelling, and word errors may remain.)    Jacoby Boo MD  Internal Medicine Department  [facility]   Geneva is a 80 year old who presents for the following health issues  accompanied by herself:    HPI   Chief Complaint   Patient presents with     Follow Up     for Cellulitis      Most recent lab results reviewed with pt.      Patient was seen last week for  left lower extremity medial calf cellulitis and tenderness along be superficial varicosities.  Was started on cephalexin.  Since that time, patient denies any loose stool side effects with antibiotic.  Patient is on Coumadin and INR monitored and slightly low at 1.9 yesterday.  Adjustments have been made to dosing and will have recheck again on 3/1/2021.  Patient denies any current fevers or chills.  Pain in left calf has resolved.  Hot feeling of the skin has resolved and redness is much better per patient.  History of mild  "hypercalcemia.  Normal PTH at that time.  Patient has cut back on calcium intake and due for recheck.  Denies muscle cramping or abdominal pain.  Patient feels she is sleeping better also since having moved donepezil to the a.m.       Additional ROS:   Constitutional, HEENT, Cardiovascular, Pulmonary, GI and , Neuro, MSK and Psych review of systems/symptoms are otherwise negative or unchanged from previous, except as noted above.      OBJECTIVE:  /78   Pulse 77   Temp 97.1  F (36.2  C) (Temporal)   Resp 16   Wt 80.3 kg (177 lb)   SpO2 95%   BMI 28.57 kg/m     Estimated body mass index is 28.57 kg/m  as calculated from the following:    Height as of 2/18/21: 1.676 m (5' 6\").    Weight as of this encounter: 80.3 kg (177 lb).     Neck: no adenopathy. Thyroid normal to palpation. No bruits  Pulm: Lungs clear to auscultation   CV: Regular rates and rhythm  GI: Soft, nontender, Normal active bowel sounds, No hepatosplenomegaly or masses palpable  Ext: Peripheral pulses intact. Trace LLE edema.  Normal temperature to palpation of skin along the left medial calf.  Erythema significantly less.  Varicose veins now nontender to palpation          BMI:   Estimated body mass index is 28.57 kg/m  as calculated from the following:    Height as of 2/18/21: 1.676 m (5' 6\").    Weight as of this encounter: 80.3 kg (177 lb).     "

## 2021-03-01 ENCOUNTER — ANTICOAGULATION THERAPY VISIT (OUTPATIENT)
Dept: INTERNAL MEDICINE | Facility: CLINIC | Age: 81
End: 2021-03-01

## 2021-03-01 DIAGNOSIS — Z86.718 PERSONAL HISTORY OF DVT (DEEP VEIN THROMBOSIS): ICD-10-CM

## 2021-03-01 DIAGNOSIS — Z79.01 LONG TERM CURRENT USE OF ANTICOAGULANT THERAPY: ICD-10-CM

## 2021-03-01 LAB
CAPILLARY BLOOD COLLECTION: NORMAL
INR PPP: 3.7 (ref 0.86–1.14)

## 2021-03-01 PROCEDURE — 85610 PROTHROMBIN TIME: CPT | Performed by: INTERNAL MEDICINE

## 2021-03-01 PROCEDURE — 36416 COLLJ CAPILLARY BLOOD SPEC: CPT | Performed by: INTERNAL MEDICINE

## 2021-03-01 NOTE — PROGRESS NOTES
ANTICOAGULATION MANAGEMENT     Patient Name:  Geneva Shepherd  Date:  3/1/2021    ASSESSMENT /SUBJECTIVE:    Today's INR result of 3.7 is supratherapeutic. Goal INR of 2.0-3.0      Warfarin dose taken: Warfarin taken as instructed    Diet: No new diet changes affecting INR    Medication changes/ interactions: finished abx on 228/21    Previous INR: Subtherapeutic     S/S of bleeding or thromboembolism: No    New injury or illness: No    Upcoming surgery, procedure or cardioversion: No    Additional findings: None      PLAN:    Telephone call with [place] regarding INR result and instructed:     Warfarin Dosing Instructions: 5 mg tonight then continue your current warfarin dose of 5 mg T Sat; 7.5 mg ROW    Instructed patient to follow up no later than: 1 week  Lab visit scheduled    Education provided: None required      Geneva verbalizes understanding and agrees to warfarin dosing plan.    Instructed to call the Anticoagulation Clinic for any changes, questions or concerns. (#[phone])        Richa Trevino RN      OBJECTIVE:  Recent labs: (last 7 days)     02/22/21  1630 03/01/21  1604   INR 1.90* 3.70*         INR assessment SUPRA    Recheck INR In: 8 DAYS    INR Location Clinic      Anticoagulation Summary  As of 3/1/2021    INR goal:  2.0-3.0   TTR:  90.0 % (1 y)   INR used for dosing:  3.70 (3/1/2021)   Warfarin maintenance plan:  5 mg (5 mg x 1) every Tue, Sat; 7.5 mg (5 mg x 1.5) all other days   Full warfarin instructions:  3/1: 5 mg; Otherwise 5 mg every Tue, Sat; 7.5 mg all other days   Weekly warfarin total:  47.5 mg   Plan last modified:  Ofelia Cavazos, RN (9/19/2019)   Next INR check:  3/9/2021   Priority:  Maintenance   Target end date:  Indefinite    Indications    Long term current use of anticoagulant therapy [Z79.01]  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

## 2021-03-09 ENCOUNTER — ANTICOAGULATION THERAPY VISIT (OUTPATIENT)
Dept: ANTICOAGULATION | Facility: CLINIC | Age: 81
End: 2021-03-09

## 2021-03-09 DIAGNOSIS — Z79.01 LONG TERM CURRENT USE OF ANTICOAGULANT THERAPY: ICD-10-CM

## 2021-03-09 DIAGNOSIS — Z86.718 PERSONAL HISTORY OF DVT (DEEP VEIN THROMBOSIS): ICD-10-CM

## 2021-03-09 LAB
CAPILLARY BLOOD COLLECTION: NORMAL
INR PPP: 3.4 (ref 0.86–1.14)

## 2021-03-09 PROCEDURE — 85610 PROTHROMBIN TIME: CPT | Performed by: INTERNAL MEDICINE

## 2021-03-09 PROCEDURE — 36416 COLLJ CAPILLARY BLOOD SPEC: CPT | Performed by: INTERNAL MEDICINE

## 2021-03-10 NOTE — PROGRESS NOTES
Anticoagulation Management    Unable to reach [place] today.        Follow up required to confirm warfarin dose taken and assess for changes    LM to call back for dosing instructions.       Anticoagulation clinic to follow up    Camelia He RN

## 2021-03-10 NOTE — PROGRESS NOTES
Patient took 2.5 mg yesterday and will resume normal dosing thereafter. She has completed antibiotics on 2/28 and is feeling back to baseline.    ANTICOAGULATION MANAGEMENT     Patient Name:  Geneva Shepherd  Date:  3/10/2021    ASSESSMENT /SUBJECTIVE:    Today's INR result of 3.4 is supratherapeutic. Goal INR of 2.0-3.0      Warfarin dose taken: Warfarin taken as instructed    Diet: No new diet changes affecting INR    Medication changes/ interactions: No new medications/supplements affecting INR    Previous INR: Supratherapeutic     S/S of bleeding or thromboembolism: No    New injury or illness: No    Upcoming surgery, procedure or cardioversion: No    Additional findings: None      PLAN:    Telephone call with Geneva regarding INR result and instructed:     Warfarin Dosing Instructions: 2.5 mg reduced dose (tues) then continue your current warfarin dose of 5 mg Tue Sat; 7.5 mg all other days    Instructed patient to follow up no later than: 1 week  Lab visit scheduled    Education provided: Monitoring for bleeding signs and symptoms and Monitoring for clotting signs and symptoms      Geneva verbalizes understanding and agrees to warfarin dosing plan.    Instructed to call the Anticoagulation Clinic for any changes, questions or concerns. (#[phone])        Mame Way RN      OBJECTIVE:  Recent labs: (last 7 days)     03/09/21  1558   INR 3.40*         No question data found.  Anticoagulation Summary  As of 3/9/2021    INR goal:  2.0-3.0   TTR:  87.7 % (1 y)   INR used for dosing:  3.40 (3/9/2021)   Warfarin maintenance plan:  5 mg (5 mg x 1) every Tue, Sat; 7.5 mg (5 mg x 1.5) all other days   Full warfarin instructions:  3/9: 2.5 mg; Otherwise 5 mg every Tue, Sat; 7.5 mg all other days   Weekly warfarin total:  47.5 mg   Plan last modified:  Ofelia Cavazos RN (9/19/2019)   Next INR check:  3/24/2021   Priority:  Maintenance   Target end date:  Indefinite    Indications    Long term current use of  anticoagulant therapy [Z79.01]  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]  Embolism and thrombosis (H) (Resolved) [I74.9]  Personal history of RLE DVT (deep vein thrombosis)(2003) [Z86.718]             Anticoagulation Episode Summary     INR check location:      Preferred lab:      Send INR reminders to:  [facility]    Comments:        Anticoagulation Care Providers     Provider Role Specialty Phone number    Jacoby Boo MD Referring Internal Medicine [phone]

## 2021-03-19 ENCOUNTER — ANTICOAGULATION THERAPY VISIT (OUTPATIENT)
Dept: ANTICOAGULATION | Facility: CLINIC | Age: 81
End: 2021-03-19

## 2021-03-19 DIAGNOSIS — Z79.01 LONG TERM CURRENT USE OF ANTICOAGULANT THERAPY: ICD-10-CM

## 2021-03-19 DIAGNOSIS — Z86.718 PERSONAL HISTORY OF DVT (DEEP VEIN THROMBOSIS): ICD-10-CM

## 2021-03-19 LAB
CAPILLARY BLOOD COLLECTION: NORMAL
INR PPP: 4.6 (ref 0.86–1.14)

## 2021-03-19 PROCEDURE — 36416 COLLJ CAPILLARY BLOOD SPEC: CPT | Performed by: INTERNAL MEDICINE

## 2021-03-19 PROCEDURE — 99207 PR NO CHARGE NURSE ONLY: CPT

## 2021-03-19 PROCEDURE — 85610 PROTHROMBIN TIME: CPT | Performed by: INTERNAL MEDICINE

## 2021-03-19 NOTE — PROGRESS NOTES
ANTICOAGULATION FOLLOW-UP CLINIC VISIT    Patient Name:  Geneva Shepherd  Date:  3/19/2021  Contact Type:  Telephone    SUBJECTIVE:  Patient Findings     Positives:  Change in health (tired  welling!!!)    Comments:  The patient was assessed for diet, medication, and activity level changes, missed or extra doses, bruising or bleeding, with no problem findings.          Clinical Outcomes     Comments:  The patient was assessed for diet, medication, and activity level changes, missed or extra doses, bruising or bleeding, with no problem findings.             OBJECTIVE    Recent labs: (last 7 days)     21  1403   INR 4.60*       ASSESSMENT / PLAN  INR assessment SUPRA    Recheck INR In: 1 WEEK    INR Location Clinic      Anticoagulation Summary  As of 3/19/2021    INR goal:  2.0-3.0   TTR:  85.1 % (1 y)   INR used for dosin.60 (3/19/2021)   Warfarin maintenance plan:  5 mg (5 mg x 1) every Tue, Sat; 7.5 mg (5 mg x 1.5) all other days   Full warfarin instructions:  3/19: Hold; 3/25: 5 mg; Otherwise 5 mg every Tue, Sat; 7.5 mg all other days   Weekly warfarin total:  47.5 mg   Plan last modified:  Leigha Donaldson RN (3/19/2021)   Next INR check:  3/26/2021   Priority:  Maintenance   Target end date:  Indefinite    Indications    Long term current use of anticoagulant therapy [Z79.01]  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]  Embolism and thrombosis (H) (Resolved) [I74.9]  Personal history of RLE DVT (deep vein thrombosis)() [Z86.718]             Anticoagulation Episode Summary     INR check location:      Preferred lab:      Send INR reminders to:  [facility]    Comments:        Anticoagulation Care Providers     Provider Role Specialty Phone number    Jacoby Boo MD Referring Internal Medicine [phone]            See the Encounter Report to view Anticoagulation Flowsheet and Dosing Calendar (Go to Encounters tab in chart review, and find the Anticoagulation Therapy  Visit)        Leigha Donaldson RN

## 2021-03-22 NOTE — TELEPHONE ENCOUNTER
Patient called and states she bumped her ankle and the area is open. Bleeding is controlled, area is localized. She is on Coumadin currently. Patient states she will check with her PCP first, but thinking she may need to see Dr. Durham again. States she has been wearing her compression stocking daily.

## 2021-03-23 ENCOUNTER — TELEPHONE (OUTPATIENT)
Dept: INTERNAL MEDICINE | Facility: CLINIC | Age: 81
End: 2021-03-23

## 2021-03-23 ENCOUNTER — ANTICOAGULATION THERAPY VISIT (OUTPATIENT)
Dept: ANTICOAGULATION | Facility: CLINIC | Age: 81
End: 2021-03-23

## 2021-03-23 ENCOUNTER — OFFICE VISIT (OUTPATIENT)
Dept: INTERNAL MEDICINE | Facility: CLINIC | Age: 81
End: 2021-03-23
Payer: MEDICARE

## 2021-03-23 VITALS
BODY MASS INDEX: 28.21 KG/M2 | OXYGEN SATURATION: 98 % | SYSTOLIC BLOOD PRESSURE: 122 MMHG | WEIGHT: 175.5 LBS | HEIGHT: 66 IN | TEMPERATURE: 98.4 F | DIASTOLIC BLOOD PRESSURE: 80 MMHG | HEART RATE: 93 BPM

## 2021-03-23 DIAGNOSIS — Z86.718 PERSONAL HISTORY OF DVT (DEEP VEIN THROMBOSIS): ICD-10-CM

## 2021-03-23 DIAGNOSIS — L97.919 VENOUS ULCER OF RIGHT LOWER EXTREMITY WITH VARICOSE VEINS (H): Primary | ICD-10-CM

## 2021-03-23 DIAGNOSIS — Z79.01 LONG TERM CURRENT USE OF ANTICOAGULANT THERAPY: ICD-10-CM

## 2021-03-23 DIAGNOSIS — L03.116 CELLULITIS OF LEFT LOWER EXTREMITY: ICD-10-CM

## 2021-03-23 DIAGNOSIS — Z79.01 ANTICOAGULATED ON COUMADIN: ICD-10-CM

## 2021-03-23 DIAGNOSIS — I83.019 VENOUS ULCER OF RIGHT LOWER EXTREMITY WITH VARICOSE VEINS (H): Primary | ICD-10-CM

## 2021-03-23 LAB
ERYTHROCYTE [DISTWIDTH] IN BLOOD BY AUTOMATED COUNT: 14.6 % (ref 10–15)
HCT VFR BLD AUTO: 42.2 % (ref 35–47)
HGB BLD-MCNC: 13.3 G/DL (ref 11.7–15.7)
INR PPP: 2.72 (ref 0.86–1.14)
MCH RBC QN AUTO: 31.8 PG (ref 26.5–33)
MCHC RBC AUTO-ENTMCNC: 31.5 G/DL (ref 31.5–36.5)
MCV RBC AUTO: 101 FL (ref 78–100)
PLATELET # BLD AUTO: 264 10E9/L (ref 150–450)
RBC # BLD AUTO: 4.18 10E12/L (ref 3.8–5.2)
WBC # BLD AUTO: 7.5 10E9/L (ref 4–11)

## 2021-03-23 PROCEDURE — 99214 OFFICE O/P EST MOD 30 MIN: CPT | Performed by: INTERNAL MEDICINE

## 2021-03-23 PROCEDURE — 36415 COLL VENOUS BLD VENIPUNCTURE: CPT | Performed by: INTERNAL MEDICINE

## 2021-03-23 PROCEDURE — 85610 PROTHROMBIN TIME: CPT | Performed by: INTERNAL MEDICINE

## 2021-03-23 PROCEDURE — 85027 COMPLETE CBC AUTOMATED: CPT | Performed by: INTERNAL MEDICINE

## 2021-03-23 RX ORDER — SULFAMETHOXAZOLE/TRIMETHOPRIM 800-160 MG
1 TABLET ORAL 2 TIMES DAILY
Qty: 20 TABLET | Refills: 0 | Status: SHIPPED | OUTPATIENT
Start: 2021-03-23 | End: 2021-04-02

## 2021-03-23 ASSESSMENT — MIFFLIN-ST. JEOR: SCORE: 1292.81

## 2021-03-23 NOTE — PROGRESS NOTES
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     *  Treat with a different antibitoic than you just took for the cellulitis to cover additional organisms that may have been missed by the cephalexin.     --Bactrim DS, 1 tablet twice per day for 10 days    *  Clean gently with simple soap and water    *  Cover with wet to dry gauze pads for debridement.     *  definitely need to follow up with the [facility] or at least with the vascular surgeons at the Vein Clinic because they need to make that this wound heals otherwise this places you at huge risk fo future infection.      There are more options for wound healing that the Wound CLinic can recommend for you.      *  INR recheck in 5-7 days while on this antibiotic because it may definiteily adversely affect your INR.      *  Continue all other medications at the same doses.  Contact your usual pharmacy if you need refills.     *  Go to the Emergency Room if you develop any noticeable worsening of the redness, wound size, drainage, or pain, as these are signs and symptoms of worsening infection that will require IV antibiotics.         Plan: INR, CBC with platelets,         sulfamethoxazole-trimethoprim (BACTRIM DS)         800-160 MG tablet            (Z86.718) Personal history of RLE DVT (deep vein thrombosis)(2003)  Comment: recheck INR 5-7 days into abx course.   Plan: INR, CBC with platelets            (Z79.01) Anticoagulated on Coumadin  Comment:   Plan:     (L03.116) Cellulitis of left lower extremity  Comment: resovled with cephalexin   Plan:                     Return in about 2 weeks (around 4/6/2021) for With your primary MD, for recheck, if the symptoms fail to improve.    Shorty Ying MD  [facility]  "JULIA Bean is a 80 year old who presents for the following health issues     HPI     Musculoskeletal problem/pain  Onset/Duration: x1 week  Description  Location: ankle - right  Joint Swelling: YES  Redness: YES  Pain: YES  Warmth: YES  Intensity:  moderate  Progression of Symptoms:  worsening  Accompanying signs and symptoms: open wound with yellow drainage   Fevers: no  Numbness/tingling/weakness: no  History  Trauma to the area: YES- hit RT ankle on something last week  Recent illness:  no  Previous similar problem: YES- similar sx years ago, had to go to wound clinic  Previous evaluation:  no  Precipitating or alleviating factors:  Aggravating factors include: none  Therapies tried and outcome: stockings and elevation - helps with sx      2.  Recently treated for cellulitis in left lower extremity with cephalexin, finished the antibiotic course a day or two before the right lower leg wound appeared.     3. history of multiple DVT, hypercoagulable, on chronci anticoagulation therapy with warfarin under the directio of the anticoagulation clinic.     **I reviewed the information recorded in the patient's EPIC chart (including but not limited to medical history, surgical history, family history, problem list, medication list, and allergy list) and updated the information as indicated based on the patients reported information.         Review of Systems   Constitutional, HEENT, cardiovascular, pulmonary, gi and gu systems are negative, except as otherwise noted.      Objective    /80   Pulse 93   Temp 98.4  F (36.9  C) (Temporal)   Ht 1.676 m (5' 6\")   Wt 79.6 kg (175 lb 8 oz)   SpO2 98%   BMI 28.33 kg/m    Body mass index is 28.33 kg/m .  Physical Exam   GENERAL alert and no distress  EYES:  Normal sclera,conjunctiva, EOMI  HENT: oral and posterior pharynx without lesions or erythema, facies symmetric  NECK: Neck supple. No LAD, without thyroidmegaly.  RESP: Clear to ausculation " bilaterally without wheezes or crackles. Normal BS in all fields.  CV: RRR normal S1S2 without murmurs, rubs or gallops.  LYMPH: no cervical lymph adenopathy appreciated  EXT:  Mild bilateral lower extremity edema  Diffuse varicose veins in bilateral lower extremities.   Venous insufficiency present.   Palpable DP pulses on right foot  PSYCH: Alert and oriented times 3; speech- coherent  SKIN:  No obvious significant skin lesions on visible portions of face     Previously noted cellulitis in left lower leg has resolved.   Small round 1 cm open wound just medial to the right latreral malleolus with surrounding erythema, mild wawrmth to touch.  (see picture)

## 2021-03-23 NOTE — TELEPHONE ENCOUNTER
Patient states she was seen by her PCP today regarding the open area in her right ankle. Was then prescribed Bactrim. Recommended by her PCP to follow up with Dr. Durham. Appointment scheduled.

## 2021-03-23 NOTE — TELEPHONE ENCOUNTER
Geneva calling stating she had a venous INR drawn in lab today. The result is pending at this time. Advised Geneva to take her usual dose of 5mg tonight if she does not hear back from [facility] by 6pm. [facility] to follow up with the patient this evening or tomorrow morning with the venous result.

## 2021-03-23 NOTE — PROGRESS NOTES
ANTICOAGULATION MANAGEMENT     Patient Name:  Geneva Shepherd  Date:  3/23/2021    ASSESSMENT /SUBJECTIVE:    Today's INR result of 2.72 is therapeutic. Goal INR of 2.0-3.0      Warfarin dose taken: Warfarin taken as instructed    Diet: No new diet changes affecting INR    Medication changes/ interactions: Potential interaction between Bactrim  and warfarin which may affect subsequent INRs    Previous INR: Supratherapeutic     S/S of bleeding or thromboembolism: No    New injury or illness: No    Upcoming surgery, procedure or cardioversion: No    Additional findings: None      PLAN:    Telephone call with Geneva regarding INR result and instructed:     Warfarin Dosing Instructions: Continue your current warfarin dose 5mg Tu/Thu/Sat and 7.5mg AOD, dose was adjusted last week with elevated INR     Instructed patient to follow up no later than: 3 days    Lab visit scheduled    Education provided: Target INR goal and significance of current INR result and Potential interaction between warfarin and Bactrim       Geneva  verbalizes understanding and agrees to warfarin dosing plan.    Instructed to call the Anticoagulation Clinic for any changes, questions or concerns. (#[phone])        Camelia He RN      OBJECTIVE:  Recent labs: (last 7 days)     03/19/21  1403 03/23/21  1523   INR 4.60* 2.72*         No question data found.  Anticoagulation Summary  As of 3/23/2021    INR goal:  2.0-3.0   TTR:  84.1 % (1 y)   INR used for dosing:  No new INR was available at the time of this encounter.   Warfarin maintenance plan:  5 mg (5 mg x 1) every Tue, Thu, Sat; 7.5 mg (5 mg x 1.5) all other days   Full warfarin instructions:  3/25: 5 mg; Otherwise 5 mg every Tue, Thu, Sat; 7.5 mg all other days   Weekly warfarin total:  45 mg   Plan last modified:  Camelia He RN (3/23/2021)   Next INR check:  3/29/2021   Priority:  Maintenance   Target end date:  Indefinite    Indications    Long term current use of  anticoagulant therapy [Z79.01]  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]  Embolism and thrombosis (H) (Resolved) [I74.9]  Personal history of RLE DVT (deep vein thrombosis)(2003) [Z86.718]             Anticoagulation Episode Summary     INR check location:      Preferred lab:      Send INR reminders to:  [facility]    Comments:        Anticoagulation Care Providers     Provider Role Specialty Phone number    Jacoby Boo MD Referring Internal Medicine [phone]

## 2021-03-24 ENCOUNTER — OFFICE VISIT (OUTPATIENT)
Dept: VASCULAR SURGERY | Facility: CLINIC | Age: 81
End: 2021-03-24
Payer: MEDICARE

## 2021-03-24 DIAGNOSIS — L98.492 CHRONIC SKIN ULCER WITH FAT LAYER EXPOSED (H): Primary | ICD-10-CM

## 2021-03-24 DIAGNOSIS — I83.893 SYMPTOMATIC VARICOSE VEINS OF BOTH LOWER EXTREMITIES: ICD-10-CM

## 2021-03-24 PROCEDURE — 99213 OFFICE O/P EST LOW 20 MIN: CPT | Performed by: SURGERY

## 2021-03-24 NOTE — PATIENT INSTRUCTIONS
*  Small infected open wound on right ankle, most liekly venous stasis ulcer.     *  Treat with a different antibitoic than you just took for the cellulitis to cover additional organisms that may have been missed by the cephalexin.     --Bactrim DS, 1 tablet twice per day for 10 days    *  Clean gently with simple soap and water    *  Cover with wet to dry gauze pads for debridement.     *  definitely need to follow up with the [facility] or at least with the vascular surgeons at the Vein Clinic because they need to make that this wound heals otherwise this places you at huge risk fo future infection.      There are more options for wound healing that the Wound CLinic can recommend for you.      *  INR recheck in 5-7 days while on this antibiotic because it may definiteily adversely affect your INR.      *  Continue all other medications at the same doses.  Contact your usual pharmacy if you need refills.     *  Go to the Emergency Room if you develop any noticeable worsening of the redness, wound size, drainage, or pain, as these are signs and symptoms of worsening infection that will require IV antibiotics.

## 2021-03-24 NOTE — PROGRESS NOTES
[facility] CONSULTATION    HPI:    Geneva Shepherd is a pleasant 80 year old female referred by Dr. Jacoby Boo for evaluation of a new traumatic right ankle ulcer.  She has a history of a previous right lateral ankle ulcer for which she was treated in the [facility] [place] with eventual healing.  She underwent apparent stripping/ablation of her right great saphenous vein by Dr. Jourdan Villar in the past.  She also states that she had attempted ablation of her left lower extremity, unsuccessfully.    I last saw the patient in October 2020 regarding left leg swelling which resulted after she fell.  It was mainly her left foot that was swollen at that time and she did not develop any wounds.  Ultrasound at that time revealed no evidence of deep vein thrombosis.  The swelling healed without consequence.    The patient states that she bumped her right ankle about 1 week ago opening the skin and that this has progressed slowly over the last week.  She denies fevers or chills and has not been having any purulent drainage.  The wound is painful, however.    She has a history of right lower extremity deep vein thrombosis and is heterozygous for factor V Leiden mutation.  She is maintained on chronic warfarin anticoagulation.    PAST MEDICAL HISTORY:   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     FRACTURE OF RIGHT LATERAL PROXIMAL TIBIA 11/07     Gastro-oesophageal reflux disease     rare     Hypercalcemia      Hyperlipidemia LDL goal <160 10/31/2010     Insomnia      Irritable bowel  syndrome      Obesity 2013     Pain in joint, lower leg      Phlebitis and thrombophlebitis of superficial vessels of lower extremities     right     Sprain of lumbar region      Unspecified hypothyroidism      Urinary tract infection, site not specified        PAST SURGICAL HISTORY:   Past Surgical History:   Procedure Laterality Date     ARTHROPLASTY HIP Left 2015    Procedure: ARTHROPLASTY HIP;  Surgeon: Benjamín Maddox MD;  Location:  OR     ARTHROPLASTY KNEE  2013    Procedure: ARTHROPLASTY KNEE;  RIGHT TOTAL KNEE ARTHROPLASTY (BIOMET)^ ;  Surgeon: Benjamín Maddox MD;  Location:  OR     ARTHROPLASTY KNEE Left 2017    Procedure: ARTHROPLASTY KNEE;  Surgeon: Benjamín Maddox MD;  Location:  OR     CHOLECYSTECTOMY       COLONOSCOPY N/A 2016    Procedure: COMBINED COLONOSCOPY, SINGLE OR MULTIPLE BIOPSY/POLYPECTOMY BY BIOPSY;  Surgeon: Mario Coe MD;  Location:  GI     GYN SURGERY      tubal     VASCULAR SURGERY       [facility] NONSPECIFIC PROCEDURE      Endometrial Biopsy.     [facility] NONSPECIFIC PROCEDURE      Tubal Ligation.     Z NONSPECIFIC PROCEDURE      negative coronary angio     Z NONSPECIFIC PROCEDURE      RLE varicose vein stripping       FAMILY HISTORY:   Family History   Problem Relation Age of Onset     Cardiovascular Mother          aa age 76     Heart Disease Father          age 63 mi     Coronary Artery Disease Father      Circulatory Sister        SOCIAL HISTORY:   Social History     Tobacco Use     Smoking status: Former Smoker     Quit date: 1968     Years since quittin.5     Smokeless tobacco: Never Used     Tobacco comment: quit in    Substance Use Topics     Alcohol use: Yes     Alcohol/week: 0.0 standard drinks     Comment: 1 glass of wine a month       Vital signs:  There were no vitals taken for this visit.    Current Outpatient Medications   Medication Sig Dispense Refill      Acetylcysteine (N-ACETYL-L-CYSTEINE PO) Take 1 capsule by mouth every morning       amitriptyline (ELAVIL) 25 MG tablet TAKE 1 TABLET BY MOUTH AT BEDTIME 90 tablet 2     Ascorbic Acid (VITAMIN C PO) Take 1,000 mg by mouth every morning (Patient takes 2 X 500 mg = 1,000 mg dose)       BETA CAROTENE PO Take 25,000 Units by mouth daily.       BIOTIN PO Take 1 tablet by mouth every morning       CHROMIUM PICOLINATE 800 MCG OR TABS 1 daily       COCONUT OIL PO Take 1 capsule by mouth every morning       donepezil (ARICEPT) 10 MG tablet Take 1 tablet (10 mg) by mouth At Bedtime 90 tablet 3     FISH OIL 1000 MG OR CAPS 1 daily       FLAX SEED OIL 1000 MG OR CAPS 1 daily       FOLIC ACID PO Take 400 mcg by mouth daily        levOCARNitine (CARNITOR) 330 MG tablet Take 330 mg by mouth every morning       levothyroxine (SYNTHROID/LEVOTHROID) 50 MCG tablet TAKE 1 TABLET BY MOUTH EVERY DAY 90 tablet 2     MAGNESIUM OXIDE PO Take 400 mg by mouth daily       MULTIVITAMIN/MULTIMINERAL TABS   OR 1 TABLET DAILY 30 0     nitroFURantoin macrocrystal (MACRODANTIN) 100 MG capsule TAKE 1 CAPSULE BY MOUTH EVERY DAY  1     PARoxetine (PAXIL) 20 MG tablet TAKE 2 TABLETS (40 MG) BY MOUTH EVERY MORNING 180 tablet 2     sulfamethoxazole-trimethoprim (BACTRIM DS) 800-160 MG tablet Take 1 tablet by mouth 2 times daily for 10 days 20 tablet 0     TURMERIC PO Take 1 capsule by mouth every morning       vitamin  B complex with vitamin C (VITAMIN  B COMPLEX) TABS Take 1 tablet by mouth daily DOSE UNKNOWN       warfarin ANTICOAGULANT (COUMADIN) 5 MG tablet TAKE ONE TABLET TUES/SAT AND 1 AND 1/2 TABLETS ALL OTHER DAYS AS DIRECTED BY  tablet 1     warfarin ANTICOAGULANT (COUMADIN) 5 MG tablet Take 1 tablet (Tuesdays and  Saturdays) and 1.5 tablets all other days or as directed by the Anticoagulation Clinic 145 tablet 3     ZINC 50 MG OR CAPS 1 daily         PHYSICAL EXAM:  General: Pleasant, NAD.   HEENT: Normocephalic, atraumatic, external ears  and nose normal.   Respiratory: Normal respiratory effort.   Cardiovascular: Pulse is regular.   Musculoskeletal: Gait and station normal.  The joints of her fingers and toes without deformity.  There is no cyanosis of her nailbeds.   EXTREMITIES: Right lower extremity: There is a right lateral ankle/lateral malleolar wound measuring about 13 mm in diameter, covered with whitish fibrinous debris, surrounded by a 6 to 8 cm diameter area of erythema.  There is no drainage..    PULSES: R (3=normal pulse, 0=no palpable pulse) dorsalis pedis: 0; posterior tibial: 3.      Neurologic: Grossly normal  Psychiatric: Mood, affect, judgment and insight are normal     ASSESSMENT:  Traumatic wound right lateral ankle with underlying CEAP 5 chronic venous insufficiency.  Feel this wound is infected at this time but certainly there is inflammation.  There is fibrinous debris in the wound that needs to be debrided mechanically.    Additionally, she has rather advanced chronic venous insufficiency and will need a right lower extremity competency study so that in the event of nonhealing of the right lateral ankle wound with the best conservative measures from the [facility] [place], we could intervene to treat her superficial venous insufficiency in an effort to achieve healing.    PLAN:  1.  Referral to the [facility] wound healing [place] for appropriate wound care  2.  Mechanical debridement of the right lateral ankle wound  3.  Right lower extremity venous competency study with video visit to discuss results    Wound debridement  After applying topical 4% lidocaine anesthesia, I sharply debrided the right lateral ankle wound with a 15 scalpel down to bleeding subcutaneous tissue.  There are buds of granulation tissue in the base of the wound covering approximately 30% of the wound.  There is no purulence.    We dressed the wound with a 4% lidocaine moistened gauze to control post debridement pain and then  had the patient apply a knee-high compression garment.    I instructed her to use the Mepilex dressings at home for the wound the next time she showers.     Cisco Durham MD    Dictated using Dragon voice recognition software which may result in transcription errors        [facility]OLUTIONS NEW PATIENT:

## 2021-03-24 NOTE — LETTER
3/24/2021         RE: Geneva Shepherd  [address]  [facility] [address]        Dear Colleague,    Thank you for referring your patient, Geneva Shepherd, to the [facility] [place]. Please see a copy of my visit note below.    [facility]SULT[facility]    HPI:    Geneva Shepherd is a pleasant 80 year old female referred by Dr. Jacoby Boo for evaluation of a new traumatic right ankle ulcer.  She has a history of a previous right lateral ankle ulcer for which she was treated in the [facility] [place] with eventual healing.  She underwent apparent stripping/ablation of her right great saphenous vein by Dr. Jourdan Villar in the past.  She also states that she had attempted ablation of her left lower extremity, unsuccessfully.    I last saw the patient in October 2020 regarding left leg swelling which resulted after she fell.  It was mainly her left foot that was swollen at that time and she did not develop any wounds.  Ultrasound at that time revealed no evidence of deep vein thrombosis.  The swelling healed without consequence.    The patient states that she bumped her right ankle about 1 week ago opening the skin and that this has progressed slowly over the last week.  She denies fevers or chills and has not been having any purulent drainage.  The wound is painful, however.    She has a history of right lower extremity deep vein thrombosis and is heterozygous for factor V Leiden mutation.  She is maintained on chronic warfarin anticoagulation.    PAST MEDICAL HISTORY:   Past Medical History:   Diagnosis Date     Ankle fracture, lateral malleolus, closed  March 2012    right ankle     Asymptomatic varicose veins      Depression, major      Diverticulitis of colon (without mention of hemorrhage)(562.11)      DJD (degenerative joint disease)      Elevated antinuclear antibody (JUAN ANTONIO) level 7/4/2015    minimal     Embolism and thrombosis of unspecified site 3/03    RLE      FACTOR 5 LEIDEN DEFECT (HYPERCOAGULABLE)      Heterozygote     FRACTURE OF RIGHT LATERAL PROXIMAL TIBIA      Gastro-oesophageal reflux disease     rare     Hypercalcemia      Hyperlipidemia LDL goal <160 10/31/2010     Insomnia      Irritable bowel syndrome      Obesity 2013     Pain in joint, lower leg      Phlebitis and thrombophlebitis of superficial vessels of lower extremities     right     Sprain of lumbar region      Unspecified hypothyroidism      Urinary tract infection, site not specified        PAST SURGICAL HISTORY:   Past Surgical History:   Procedure Laterality Date     ARTHROPLASTY HIP Left 2015    Procedure: ARTHROPLASTY HIP;  Surgeon: Benjamín Maddox MD;  Location:  OR     ARTHROPLASTY KNEE  2013    Procedure: ARTHROPLASTY KNEE;  RIGHT TOTAL KNEE ARTHROPLASTY (BIOMET)^ ;  Surgeon: Benjamín Maddox MD;  Location:  OR     ARTHROPLASTY KNEE Left 2017    Procedure: ARTHROPLASTY KNEE;  Surgeon: Benjamín Maddox MD;  Location:  OR     CHOLECYSTECTOMY       COLONOSCOPY N/A 2016    Procedure: COMBINED COLONOSCOPY, SINGLE OR MULTIPLE BIOPSY/POLYPECTOMY BY BIOPSY;  Surgeon: Mario Coe MD;  Location:  GI     GYN SURGERY      tubal     VASCULAR SURGERY       Z NONSPECIFIC PROCEDURE      Endometrial Biopsy.     ZZC NONSPECIFIC PROCEDURE      Tubal Ligation.     ZZC NONSPECIFIC PROCEDURE      negative coronary angio     ZZC NONSPECIFIC PROCEDURE      RLE varicose vein stripping       FAMILY HISTORY:   Family History   Problem Relation Age of Onset     Cardiovascular Mother          aa age 76     Heart Disease Father          age 63 mi     Coronary Artery Disease Father      Circulatory Sister        SOCIAL HISTORY:   Social History     Tobacco Use     Smoking status: Former Smoker     Quit date: 1968     Years since quittin.5     Smokeless tobacco: Never Used     Tobacco comment: quit in  1968   Substance Use Topics     Alcohol use: Yes     Alcohol/week: 0.0 standard drinks     Comment: 1 glass of wine a month       Vital signs:  There were no vitals taken for this visit.    Current Outpatient Medications   Medication Sig Dispense Refill     Acetylcysteine (N-ACETYL-L-CYSTEINE PO) Take 1 capsule by mouth every morning       amitriptyline (ELAVIL) 25 MG tablet TAKE 1 TABLET BY MOUTH AT BEDTIME 90 tablet 2     Ascorbic Acid (VITAMIN C PO) Take 1,000 mg by mouth every morning (Patient takes 2 X 500 mg = 1,000 mg dose)       BETA CAROTENE PO Take 25,000 Units by mouth daily.       BIOTIN PO Take 1 tablet by mouth every morning       CHROMIUM PICOLINATE 800 MCG OR TABS 1 daily       COCONUT OIL PO Take 1 capsule by mouth every morning       donepezil (ARICEPT) 10 MG tablet Take 1 tablet (10 mg) by mouth At Bedtime 90 tablet 3     FISH OIL 1000 MG OR CAPS 1 daily       FLAX SEED OIL 1000 MG OR CAPS 1 daily       FOLIC ACID PO Take 400 mcg by mouth daily        levOCARNitine (CARNITOR) 330 MG tablet Take 330 mg by mouth every morning       levothyroxine (SYNTHROID/LEVOTHROID) 50 MCG tablet TAKE 1 TABLET BY MOUTH EVERY DAY 90 tablet 2     MAGNESIUM OXIDE PO Take 400 mg by mouth daily       MULTIVITAMIN/MULTIMINERAL TABS   OR 1 TABLET DAILY 30 0     nitroFURantoin macrocrystal (MACRODANTIN) 100 MG capsule TAKE 1 CAPSULE BY MOUTH EVERY DAY  1     PARoxetine (PAXIL) 20 MG tablet TAKE 2 TABLETS (40 MG) BY MOUTH EVERY MORNING 180 tablet 2     sulfamethoxazole-trimethoprim (BACTRIM DS) 800-160 MG tablet Take 1 tablet by mouth 2 times daily for 10 days 20 tablet 0     TURMERIC PO Take 1 capsule by mouth every morning       vitamin  B complex with vitamin C (VITAMIN  B COMPLEX) TABS Take 1 tablet by mouth daily DOSE UNKNOWN       warfarin ANTICOAGULANT (COUMADIN) 5 MG tablet TAKE ONE TABLET TUES/SAT AND 1 AND 1/2 TABLETS ALL OTHER DAYS AS DIRECTED BY  tablet 1     warfarin ANTICOAGULANT (COUMADIN) 5 MG tablet  Take 1 tablet (Tuesdays and  Saturdays) and 1.5 tablets all other days or as directed by the Anticoagulation Clinic 145 tablet 3     ZINC 50 MG OR CAPS 1 daily         PHYSICAL EXAM:  General: Pleasant, NAD.   HEENT: Normocephalic, atraumatic, external ears and nose normal.   Respiratory: Normal respiratory effort.   Cardiovascular: Pulse is regular.   Musculoskeletal: Gait and station normal.  The joints of her fingers and toes without deformity.  There is no cyanosis of her nailbeds.   EXTREMITIES: Right lower extremity: There is a right lateral ankle/lateral malleolar wound measuring about 13 mm in diameter, covered with whitish fibrinous debris, surrounded by a 6 to 8 cm diameter area of erythema.  There is no drainage..    PULSES: R (3=normal pulse, 0=no palpable pulse) dorsalis pedis: 0; posterior tibial: 3.      Neurologic: Grossly normal  Psychiatric: Mood, affect, judgment and insight are normal     ASSESSMENT:  Traumatic wound right lateral ankle with underlying CEAP 5 chronic venous insufficiency.  Feel this wound is infected at this time but certainly there is inflammation.  There is fibrinous debris in the wound that needs to be debrided mechanically.    Additionally, she has rather advanced chronic venous insufficiency and will need a right lower extremity competency study so that in the event of nonhealing of the right lateral ankle wound with the best conservative measures from the [facility] [place], we could intervene to treat her superficial venous insufficiency in an effort to achieve healing.    PLAN:  1.  Referral to the [facility] [place] for appropriate wound care  2.  Mechanical debridement of the right lateral ankle wound  3.  Right lower extremity venous competency study with video visit to discuss results    Wound debridement  After applying topical 4% lidocaine anesthesia, I sharply debrided the right lateral ankle wound with a 15 scalpel down to  bleeding subcutaneous tissue.  There are buds of granulation tissue in the base of the wound covering approximately 30% of the wound.  There is no purulence.    We dressed the wound with a 4% lidocaine moistened gauze to control post debridement pain and then had the patient apply a knee-high compression garment.    I instructed her to use the Mepilex dressings at home for the wound the next time she showers.     Cisco Durham MD    Dictated using Dragon voice recognition software which may result in transcription errors        [facility] NEW PATIENT:                  Again, thank you for allowing me to participate in the care of your patient.        Sincerely,        Cisco Durham MD

## 2021-03-25 ENCOUNTER — DOCUMENTATION ONLY (OUTPATIENT)
Dept: INTERNAL MEDICINE | Facility: CLINIC | Age: 81
End: 2021-03-25

## 2021-03-25 NOTE — PROGRESS NOTES
ANTICOAGULATION  MANAGEMENT     Interacting Medication Review    Interacting medication(s): Sulfamethoxazole-Trimethoprim (Bactrim) with warfarin.    Duration: 10 days  (3/23 to 4/2)    Indication: venous stasis ulcer     New medication?: Yes, interaction may increase INR and risk of bleeding       PLAN     Continue current warfarin dose. Recommend to check INR on 3/26    Spoke with Geneva    No adjustment to Anticoagulation Calendar was required    Camelia He RN

## 2021-03-25 NOTE — PROGRESS NOTES
After Visit Follow Up  Per pt request, faxed their compression hose Rx to [facility].   Pt would like 2 pair(s) of Beige, Closed-toe, Knee high, 20-30mmhg compression hose. Fax confirmed.    Pt aware they will be shipped to their home address.    Dina Oliva

## 2021-03-26 ENCOUNTER — ANTICOAGULATION THERAPY VISIT (OUTPATIENT)
Dept: ANTICOAGULATION | Facility: CLINIC | Age: 81
End: 2021-03-26

## 2021-03-26 DIAGNOSIS — Z79.01 LONG TERM CURRENT USE OF ANTICOAGULANT THERAPY: ICD-10-CM

## 2021-03-26 DIAGNOSIS — Z86.718 PERSONAL HISTORY OF DVT (DEEP VEIN THROMBOSIS): ICD-10-CM

## 2021-03-26 DIAGNOSIS — Z79.01 LONG TERM CURRENT USE OF ANTICOAGULANT THERAPY: Primary | ICD-10-CM

## 2021-03-26 LAB
CAPILLARY BLOOD COLLECTION: NORMAL
INR PPP: 4.4 (ref 0.86–1.14)

## 2021-03-26 PROCEDURE — 85610 PROTHROMBIN TIME: CPT | Performed by: INTERNAL MEDICINE

## 2021-03-26 PROCEDURE — 99207 PR NO CHARGE NURSE ONLY: CPT

## 2021-03-26 PROCEDURE — 36416 COLLJ CAPILLARY BLOOD SPEC: CPT | Performed by: INTERNAL MEDICINE

## 2021-03-26 NOTE — PROGRESS NOTES
ANTICOAGULATION FOLLOW-UP CLINIC VISIT    Patient Name:  Geneva Shepherd  Date:  3/26/2021  Contact Type:  Telephone    SUBJECTIVE:  Patient Findings     Comments:  The patient was assessed for diet, medication, and activity level changes, missed or extra doses, bruising or bleeding, with no problem findings.          Clinical Outcomes     Comments:  The patient was assessed for diet, medication, and activity level changes, missed or extra doses, bruising or bleeding, with no problem findings.             OBJECTIVE    Recent labs: (last 7 days)     21  1227   INR 4.40*       ASSESSMENT / PLAN  INR assessment SUPRA    Recheck INR In: 1 WEEK    INR Location Clinic      Anticoagulation Summary  As of 3/26/2021    INR goal:  2.0-3.0   TTR:  83.4 % (1 y)   INR used for dosin.40 (3/26/2021)   Warfarin maintenance plan:  5 mg (5 mg x 1) every Mon, Thu; 7.5 mg (5 mg x 1.5) all other days   Full warfarin instructions:  3/26: Hold; 3/27: 2.5 mg; Otherwise 5 mg every Mon, Thu; 7.5 mg all other days   Weekly warfarin total:  47.5 mg   Plan last modified:  Leigha Donaldson RN (3/26/2021)   Next INR check:  2021   Priority:  Maintenance   Target end date:  Indefinite    Indications    Long term current use of anticoagulant therapy [Z79.01]  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]  Embolism and thrombosis (H) (Resolved) [I74.9]  Personal history of RLE DVT (deep vein thrombosis)() [Z86.718]             Anticoagulation Episode Summary     INR check location:      Preferred lab:      Send INR reminders to:  [facility]    Comments:        Anticoagulation Care Providers     Provider Role Specialty Phone number    Jacoby Boo MD Referring Internal Medicine [phone]            See the Encounter Report to view Anticoagulation Flowsheet and Dosing Calendar (Go to Encounters tab in chart review, and find the Anticoagulation Therapy Visit)        Leigha Donaldson, RN

## 2021-03-31 ENCOUNTER — HOSPITAL ENCOUNTER (OUTPATIENT)
Dept: WOUND CARE | Facility: CLINIC | Age: 81
Discharge: HOME | End: 2021-03-31
Attending: SURGERY | Admitting: SURGERY
Payer: MEDICARE

## 2021-03-31 ENCOUNTER — ANCILLARY PROCEDURE (OUTPATIENT)
Dept: ULTRASOUND IMAGING | Facility: CLINIC | Age: 81
End: 2021-03-31
Attending: SURGERY
Payer: MEDICARE

## 2021-03-31 VITALS
SYSTOLIC BLOOD PRESSURE: 143 MMHG | WEIGHT: 174.6 LBS | HEART RATE: 91 BPM | DIASTOLIC BLOOD PRESSURE: 83 MMHG | TEMPERATURE: 98.3 F | BODY MASS INDEX: 28.06 KG/M2 | HEIGHT: 66 IN

## 2021-03-31 DIAGNOSIS — S81.001A OPEN WOUND OF KNEE, LEG, AND ANKLE, RIGHT, INITIAL ENCOUNTER: ICD-10-CM

## 2021-03-31 DIAGNOSIS — S91.001A OPEN WOUND OF KNEE, LEG, AND ANKLE, RIGHT, INITIAL ENCOUNTER: ICD-10-CM

## 2021-03-31 DIAGNOSIS — S81.801A OPEN WOUND OF KNEE, LEG, AND ANKLE, RIGHT, INITIAL ENCOUNTER: ICD-10-CM

## 2021-03-31 DIAGNOSIS — I73.9 PAD (PERIPHERAL ARTERY DISEASE) (H): Primary | ICD-10-CM

## 2021-03-31 DIAGNOSIS — I83.893 SYMPTOMATIC VARICOSE VEINS OF BOTH LOWER EXTREMITIES: ICD-10-CM

## 2021-03-31 PROCEDURE — G0463 HOSPITAL OUTPT CLINIC VISIT: HCPCS | Mod: 25

## 2021-03-31 PROCEDURE — 99213 OFFICE O/P EST LOW 20 MIN: CPT | Performed by: SURGERY

## 2021-03-31 PROCEDURE — 93971 EXTREMITY STUDY: CPT | Mod: RT | Performed by: SURGERY

## 2021-03-31 PROCEDURE — 97602 WOUND(S) CARE NON-SELECTIVE: CPT

## 2021-03-31 PROCEDURE — 87070 CULTURE OTHR SPECIMN AEROBIC: CPT | Performed by: SURGERY

## 2021-03-31 RX ORDER — NEOMYCIN SULFATE, POLYMYXIN B SULFATE, AND DEXAMETHASONE 3.5; 10000; 1 MG/G; [USP'U]/G; MG/G
OINTMENT OPHTHALMIC
COMMUNITY
Start: 2020-08-21 | End: 2021-08-20

## 2021-03-31 RX ORDER — COCONUT OIL
1 OIL (ML) MISCELLANEOUS
COMMUNITY
End: 2021-06-04

## 2021-03-31 RX ORDER — ZINC GLUCONATE 50 MG
50 TABLET ORAL DAILY
COMMUNITY

## 2021-03-31 RX ORDER — PREDNISOLONE ACETATE 10 MG/ML
SUSPENSION/ DROPS OPHTHALMIC
COMMUNITY
Start: 2020-10-09 | End: 2021-08-20

## 2021-03-31 RX ORDER — NICOTINE POLACRILEX 2 MG
1 GUM BUCCAL DAILY
COMMUNITY

## 2021-03-31 RX ORDER — METHOCARBAMOL 500 MG/1
TABLET, FILM COATED ORAL
COMMUNITY
Start: 2020-09-30 | End: 2021-06-04

## 2021-03-31 RX ORDER — ACETAMINOPHEN 500 MG
500 TABLET ORAL EVERY 4 HOURS PRN
COMMUNITY

## 2021-03-31 RX ORDER — CALCIUM CARB/VITAMIN D3/VIT K1 500-500-40
800 TABLET,CHEWABLE ORAL
COMMUNITY
End: 2022-03-21

## 2021-03-31 ASSESSMENT — MIFFLIN-ST. JEOR: SCORE: 1288.73

## 2021-03-31 NOTE — DISCHARGE INSTRUCTIONS
[facility]  [address] [facility] [address] [address]    Call us at [phone] if you have any questions about your wounds, have redness or swelling around your wound, have a fever of 101 or greater or if you have any other problems or concerns. We answer the phone Monday through Friday 8 am to 4 pm, please leave a message as we check the voicemail frequently throughout the day.     Geneva Shepherd      1940    Medications/supplements to aid in healin. 1 packet of Cristobal Supplement into your favorite beverage twice a day. Purchase at [facility] in [place] [address], [employer] or "[employer]  2. Vitamin D3 10,000 iu per day  3. Vitamin C 2,000 mg daily  4. Methyl Folate 1000 mcg daily   5. Vitamin B 12 1000 mcg daily  6. Hesperidin Diosmin 1,000 mg the lymph formula tablet twice daily order from "[employer] or from [employer]    A diet high in protein is important for wound healing, we recommend getting 90 grams of protein per day. Taking protein shakes or bars are a good way to get extra protein in your diet.  Other good sources of protein:  Pork 26g per 3 oz  Whey protein powder - 24g per scoop (on average)  Greek yogurt - 23g per 8oz   Chicken or Turkey - 23g per 3oz  Fish - 20-25g per 3oz  Beef - 18-23g per 3oz  Navy beans - 20g per cup  Cottage cheese - 14g per 1/2 cup   Lentils - 13g per 1/4 cup  Beef jerky 13g per 1oz  2% milk - 8g per cup  Peanut butter - 8g per 2 tablespoons  Eggs - 6g per egg  Mixed nuts - 6g per 2oz     Wound care recommendations to Right lower leg:  After cleansing with saline or wound cleanser, apply small amount of VASHE on gauze, then Apply blue stripe edemawear  unless you are bathing/cleaning your leg. Apply 4x4 gauze/roll gauze on top of edemawear.   Change dressing 3 times a day.     Remove compression dressing if toes turn blue and/or start to feel numb and is not relieved by elevating the leg for one  hour.   Walk as much as you can. When you sit raise your ankle above your hips to promote wound healing.      STEPHAN Stoner M.D. March 31, 2021    Wound Clinic follow up in 3 weeks    COVID vaccine information at [email or web address]    If you had a positive experience please indicate that on your patient satisfaction survey form that [facility] will be sending you.    It was a pleasure meeting with you today.  Thank you for allowing me and my team the privilege of caring for you today.  YOU are the reason we are here, and I truly hope we provided you with the excellent service you deserve.  Please let us know if there is anything else we can do for you so that we can be sure you are leaving completely satisfied with your care experience.

## 2021-03-31 NOTE — PROGRESS NOTES
Patient arrived for wound care visit. Certified Wound Care Nurse time spent evaluating patient record, completed a full evaluation and documented wound(s) & mikal-wound skin; provided recommendation based on treatment plan. Applied dressing, reviewed discharge instructions, patient education, and discussed plan of care with appropriate medical team staff members and patient and/or family members.

## 2021-03-31 NOTE — PROGRESS NOTES
[facility] [place] Progress Note    Subject: Geneva Shepherd right lower extremity venous hypertensive ulceration with associated cellulitis, venous insufficiency, factor V Leiden, history of deep venous thromboses, no history of pulmonary emboli, history of bilateral knee replacements, no diabetes, does not utilize tobacco, has [facility] 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  She is currently on Bactrim.  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  She denies fevers chills sweats.  Medical record reviewed.    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     Asymptomatic varicose veins, bilateral     Personal history of RLE DVT (deep vein thrombosis)(2003)     Anticoagulated on Coumadin     Long term current use of anticoagulant therapy     Hypothyroidism     Insomnia     Knee joint replacement status     Major depressive disorder, single episode, mild (H)     Gastroesophageal reflux disease, esophagitis presence not specified     Hypercalcemia     Memory loss     Cellulitis of left lower extremity     Open wound of knee, leg, and ankle, right, initial encounter     Past Medical History:   Diagnosis Date     Ankle fracture, lateral malleolus, closed  March 2012    right ankle     Asymptomatic varicose veins      Depression, major      Diverticulitis of colon (without mention of hemorrhage)(562.11)      DJD (degenerative joint disease)      Elevated antinuclear antibody (JUAN ANTONIO) level 7/4/2015    minimal     Embolism and thrombosis of unspecified site 3/03    RLE     FACTOR 5 LEIDEN DEFECT (HYPERCOAGULABLE) 7/03     Jimenez  "    FRACTURE OF RIGHT LATERAL PROXIMAL TIBIA 11/07     Gastro-oesophageal reflux disease     rare     Hypercalcemia      Hyperlipidemia LDL goal <160 10/31/2010     Insomnia      Irritable bowel syndrome      Obesity 9/11/2013     Pain in joint, lower leg      Phlebitis and thrombophlebitis of superficial vessels of lower extremities 7/03    right     Sprain of lumbar region      Unspecified hypothyroidism      Urinary tract infection, site not specified      Exam:  BP (!) 143/83   Pulse 91   Temp 98.3  F (36.8  C)   Ht 1.676 m (5' 6\")   Wt 79.2 kg (174 lb 9.6 oz)   BMI 28.18 kg/m    Wound (used by OP [facility] only) 03/31/21 1345 Right (Active)   Thickness/Stage full thickness 03/31/21 1300   Dressing Appearance moist drainage 03/31/21 1300   Base granulating 03/31/21 1300   Periwound intact;pink 03/31/21 1300   Periwound Temperature warm 03/31/21 1300   Periwound Skin Turgor soft 03/31/21 1300   Edges open 03/31/21 1300   Length (cm) 1 03/31/21 1300   Width (cm) 0.8 03/31/21 1300   Depth (cm) 0.1 03/31/21 1300   Wound (cm^2) 0.8 cm^2 03/31/21 1300   Wound Volume (cm^3) 0.08 cm^3 03/31/21 1300   Drainage Characteristics/Odor serosanguineous 03/31/21 1300   Drainage Amount moderate 03/31/21 1300   Care, Wound non-select wound debridement performed 03/31/21 1300     80-year-old female, palpable right posterior tibial pulse, no palpable right dorsalis pedis, palpable right popliteal pulse.  Cellulitis in periwound margin at the right intertibial wound, see photo documentation and measurements.  Sandoval culture was obtained.  Increase sensitivity and periwound margin with blanching erythema.  Consistent clinical signs and symptoms with cellulitis.        Impression: Factor V Leiden, cellulitis, right lower extremity venous hypertensive ulceration    Plan: We will dress the wounds with moist hypochlorous acid dressing changes twice a day, edema wear, vitamin D3 10,000 international units daily, B12 1 mg daily, micronized " purified flavonoid fraction 1 tablet daily, obtain ankle-brachial disease right lower extremity with duplex ultrasound, absent dorsalis pedis pulse, if intertibial artery occlusion, this is in the region of the angiosome for the ulceration..  Patient will return to the clinic in 3 weeks time    Too Stoner MD on 3/31/2021 at 2:37 PM

## 2021-04-02 ENCOUNTER — TELEPHONE (OUTPATIENT)
Dept: INTERNAL MEDICINE | Facility: CLINIC | Age: 81
End: 2021-04-02

## 2021-04-02 ENCOUNTER — ANTICOAGULATION THERAPY VISIT (OUTPATIENT)
Dept: ANTICOAGULATION | Facility: CLINIC | Age: 81
End: 2021-04-02

## 2021-04-02 DIAGNOSIS — Z86.718 PERSONAL HISTORY OF DVT (DEEP VEIN THROMBOSIS): ICD-10-CM

## 2021-04-02 DIAGNOSIS — Z79.01 LONG TERM CURRENT USE OF ANTICOAGULANT THERAPY: ICD-10-CM

## 2021-04-02 LAB
BACTERIA SPEC CULT: NO GROWTH
CAPILLARY BLOOD COLLECTION: NORMAL
INR PPP: 5.7 (ref 0.86–1.14)
Lab: NORMAL
SPECIMEN SOURCE: NORMAL

## 2021-04-02 PROCEDURE — 36416 COLLJ CAPILLARY BLOOD SPEC: CPT | Performed by: INTERNAL MEDICINE

## 2021-04-02 PROCEDURE — 85610 PROTHROMBIN TIME: CPT | Performed by: INTERNAL MEDICINE

## 2021-04-02 PROCEDURE — 99207 PR NO CHARGE NURSE ONLY: CPT

## 2021-04-02 NOTE — TELEPHONE ENCOUNTER
Call from the lab with critical poct inr of 5.7 for the patient.  Will route to [facility] for f/u. Ronit Chaudhry RN April 2, 2021 3:28 PM

## 2021-04-02 NOTE — PROGRESS NOTES
ANTICOAGULATION FOLLOW-UP CLINIC VISIT    Patient Name:  Geneva Shepherd  Date:  2021  Contact Type:  telephone    SUBJECTIVE:  Patient Findings     Comments:  The patient was assessed for diet, medication, and activity level changes, missed or extra doses, bruising or bleeding, with no problem findings. Open wound rt ankle.. Has been going to wound clinic. They are using a special treatment and it has been very painful        Clinical Outcomes     Comments:  The patient was assessed for diet, medication, and activity level changes, missed or extra doses, bruising or bleeding, with no problem findings. Open wound rt ankle.. Has been going to wound clinic. They are using a special treatment and it has been very painful           OBJECTIVE    Recent labs: (last 7 days)     21  1509   INR 5.70*       ASSESSMENT / PLAN  INR assessment SUPRA    Recheck INR In: 3 DAYS    INR Location Clinic      Anticoagulation Summary  As of 2021    INR goal:  2.0-3.0   TTR:  81.5 % (1 y)   INR used for dosin.70 (2021)   Warfarin maintenance plan:  5 mg (5 mg x 1) every Mon, Thu; 7.5 mg (5 mg x 1.5) all other days   Full warfarin instructions:  4/2: Hold; 4/3: Hold; Otherwise 5 mg every Mon, Thu; 7.5 mg all other days   Weekly warfarin total:  47.5 mg   Plan last modified:  Leigha Donaldson RN (3/26/2021)   Next INR check:  2021   Priority:  Critical   Target end date:  Indefinite    Indications    Long term current use of anticoagulant therapy [Z79.01]  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    Comments:        Anticoagulation Care Providers     Provider Role Specialty Phone number    Jacoby Boo MD Referring Internal Medicine [phone]             See the Encounter Report to view Anticoagulation Flowsheet and Dosing Calendar (Go to Encounters tab in chart review, and find the Anticoagulation Therapy Visit)        Leigha Donaldson RN

## 2021-04-05 ENCOUNTER — ANTICOAGULATION THERAPY VISIT (OUTPATIENT)
Dept: ANTICOAGULATION | Facility: CLINIC | Age: 81
End: 2021-04-05

## 2021-04-05 ENCOUNTER — OFFICE VISIT (OUTPATIENT)
Dept: URGENT CARE | Facility: URGENT CARE | Age: 81
End: 2021-04-05
Payer: MEDICARE

## 2021-04-05 ENCOUNTER — TELEPHONE (OUTPATIENT)
Dept: WOUND CARE | Facility: CLINIC | Age: 81
End: 2021-04-05

## 2021-04-05 VITALS
TEMPERATURE: 98.7 F | BODY MASS INDEX: 28.99 KG/M2 | SYSTOLIC BLOOD PRESSURE: 124 MMHG | WEIGHT: 179.6 LBS | HEART RATE: 94 BPM | RESPIRATION RATE: 16 BRPM | OXYGEN SATURATION: 96 % | DIASTOLIC BLOOD PRESSURE: 78 MMHG

## 2021-04-05 DIAGNOSIS — L03.115 CELLULITIS OF RIGHT LOWER EXTREMITY: Primary | ICD-10-CM

## 2021-04-05 DIAGNOSIS — Z79.01 LONG TERM CURRENT USE OF ANTICOAGULANT THERAPY: ICD-10-CM

## 2021-04-05 DIAGNOSIS — Z86.718 PERSONAL HISTORY OF DVT (DEEP VEIN THROMBOSIS): ICD-10-CM

## 2021-04-05 LAB
CAPILLARY BLOOD COLLECTION: NORMAL
INR PPP: 1.6 (ref 0.86–1.14)

## 2021-04-05 PROCEDURE — 36416 COLLJ CAPILLARY BLOOD SPEC: CPT | Performed by: INTERNAL MEDICINE

## 2021-04-05 PROCEDURE — 99214 OFFICE O/P EST MOD 30 MIN: CPT | Performed by: FAMILY MEDICINE

## 2021-04-05 PROCEDURE — 85610 PROTHROMBIN TIME: CPT | Performed by: INTERNAL MEDICINE

## 2021-04-05 PROCEDURE — 96372 THER/PROPH/DIAG INJ SC/IM: CPT | Performed by: FAMILY MEDICINE

## 2021-04-05 PROCEDURE — 99207 PR NO CHARGE NURSE ONLY: CPT

## 2021-04-05 RX ORDER — CEFTRIAXONE SODIUM 1 G
1 VIAL (EA) INJECTION ONCE
Status: COMPLETED | OUTPATIENT
Start: 2021-04-05 | End: 2021-04-05

## 2021-04-05 RX ADMIN — Medication 1 G: at 17:05

## 2021-04-05 NOTE — PROGRESS NOTES
SUBJECTIVE: Geneva Shepherd is a 80 year old female presenting with a chief complaint of rt lower ext redness.  Onset of symptoms was day(s) ago.  Course of illness is worsening.    Severity moderate  Current and Associated symptoms: none  Treatment measures tried include see med list.  Predisposing factors include see problem list.    Past Medical History:   Diagnosis Date     Ankle fracture, lateral malleolus, closed  2012    right ankle     Asymptomatic varicose veins      Depression, major      Diverticulitis of colon (without mention of hemorrhage)(562.11)      DJD (degenerative joint disease)      Elevated antinuclear antibody (JUAN ANTONIO) level 2015    minimal     Embolism and thrombosis of unspecified site 3/03    RLE     FACTOR 5 LEIDEN DEFECT (HYPERCOAGULABLE)      Heterozygote     FRACTURE OF RIGHT LATERAL PROXIMAL TIBIA      Gastro-oesophageal reflux disease     rare     Hypercalcemia      Hyperlipidemia LDL goal <160 10/31/2010     Insomnia      Irritable bowel syndrome      Obesity 2013     Pain in joint, lower leg      Phlebitis and thrombophlebitis of superficial vessels of lower extremities     right     Sprain of lumbar region      Unspecified hypothyroidism      Urinary tract infection, site not specified      Allergies   Allergen Reactions     Cephalexin Diarrhea     Social History     Tobacco Use     Smoking status: Former Smoker     Quit date: 1968     Years since quittin.6     Smokeless tobacco: Never Used     Tobacco comment: quit in    Substance Use Topics     Alcohol use: Yes     Alcohol/week: 0.0 standard drinks     Comment: 1 glass of wine a month       ROS:  SKIN: no rash  GI: no vomiting    OBJECTIVE:  /78   Pulse 94   Temp 98.7  F (37.1  C)   Resp 16   Wt 81.5 kg (179 lb 9.6 oz)   SpO2 96%   BMI 28.99 kg/m  GENERAL APPEARANCE: healthy, alert and no distress  SKIN: small ulcer and redness.warmth/tenderness      ICD-10-CM    1. Cellulitis of  right lower extremity  L03.115 cefTRIAXone (ROCEPHIN) injection 1 g     amoxicillin-clavulanate (AUGMENTIN) 875-125 MG tablet       Fluids/Rest, f/u if worse/not any better

## 2021-04-05 NOTE — PROGRESS NOTES
ANTICOAGULATION FOLLOW-UP CLINIC VISIT    Patient Name:  Geneva Shepherd  Date:  2021  Contact Type:  Telephone    SUBJECTIVE:  Patient Findings     Comments:  The patient was assessed for diet, medication, and activity level changes, missed or extra doses, bruising or bleeding, with no problem findings. Going to Urgent Care for possible infection          Clinical Outcomes     Comments:  The patient was assessed for diet, medication, and activity level changes, missed or extra doses, bruising or bleeding, with no problem findings. Going to Urgent Care for possible infection             OBJECTIVE    Recent labs: (last 7 days)     21  1618   INR 1.60*       ASSESSMENT / PLAN  INR assessment SUB    Recheck INR In: 1 WEEK    INR Location Clinic      Anticoagulation Summary  As of 2021    INR goal:  2.0-3.0   TTR:  80.9 % (1 y)   INR used for dosin.60 (2021)   Warfarin maintenance plan:  5 mg (5 mg x 1) every Mon, Thu; 7.5 mg (5 mg x 1.5) all other days   Full warfarin instructions:  5 mg every Mon, Thu; 7.5 mg all other days   Weekly warfarin total:  47.5 mg   Plan last modified:  Leigha Donaldson RN (3/26/2021)   Next INR check:  2021   Priority:  Critical   Target end date:  Indefinite    Indications    Long term current use of anticoagulant therapy [Z79.01]  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]  Embolism and thrombosis (H) (Resolved) [I74.9]  Personal history of RLE DVT (deep vein thrombosis)() [Z86.718]             Anticoagulation Episode Summary     INR check location:      Preferred lab:      Send INR reminders to:  [facility]    Comments:        Anticoagulation Care Providers     Provider Role Specialty Phone number    Jacoby Boo MD Referring Internal Medicine [phone]            See the Encounter Report to view Anticoagulation Flowsheet and Dosing Calendar (Go to Encounters tab in chart review, and find the Anticoagulation Therapy  Visit)        Leigha Donaldson RN

## 2021-04-05 NOTE — TELEPHONE ENCOUNTER
Called and spoke to patient.She is having a hard time getting the vashe damp gauze to stay on with edemawear. She will pull up the edemawear directly. She will go to the urgent care for concerns for infection per the recommendation of Dr. Stoner

## 2021-04-05 NOTE — TELEPHONE ENCOUNTER
[facility] Wound    Who is the name of the provider?:  Georgiana      What is the location you see this provider at?: [place]    Reason for call:  Seen in [facility] 3/31.  Wound more red, swollen and painful.    Can we leave a detailed message on this number?  YES

## 2021-04-06 ENCOUNTER — VIRTUAL VISIT (OUTPATIENT)
Dept: VASCULAR SURGERY | Facility: CLINIC | Age: 81
End: 2021-04-06
Attending: SURGERY
Payer: MEDICARE

## 2021-04-06 DIAGNOSIS — L98.492 CHRONIC SKIN ULCER WITH FAT LAYER EXPOSED (H): Primary | ICD-10-CM

## 2021-04-06 DIAGNOSIS — I83.892 VARICOSE VEINS OF LEFT LEG WITH EDEMA: ICD-10-CM

## 2021-04-06 PROCEDURE — 99213 OFFICE O/P EST LOW 20 MIN: CPT | Mod: 95 | Performed by: SURGERY

## 2021-04-06 NOTE — LETTER
4/6/2021         RE: Geneva Shepherd  [address]  [place] [address]        Dear Colleague,    Thank you for referring your patient, Geneva Shepherd, to the [facility] [place]. Please see a copy of my visit note below.    Geneva is a 80 year old who is being evaluated via a billable video visit.      How would you like to obtain your AVS? MyChart  If the video visit is dropped, the invitation should be resent by: Text to cell phone: [phone]  Will anyone else be joining your video visit? No      Video Start Time: 8:40 AM         Video-Visit Details    Type of service:  Video Visit    Video End Time:8:54 AM    Originating Location (pt. Location): Home    Distant Location (provider location):  [facility] [place]     Platform used for Video Visit: iAmplify     [facility] [place] Progress Note    Geneva Shepherd presents in follow-up of a recurrent left lower extremity venous stasis ulcer with chronic venous insufficiency.  Please see my consultation of 3/24/2021 for details.  She has since been seen by Dr. Kavon Stoner at the [facility] [place] and has been placed on best medical therapy for approval.  Unfortunately, she was in the emergency department yesterday because of increasing pain and periwound cellulitis.  She returned for a left lower extremity venous competency study on 3/31/2021, the results which we will discuss today.    Physical Exam  General: Pleasant female in no acute distress.  Blood pressure 149/74, pulse 83  Extremities: Her left leg wound is dressed, therefore the wound was not viewed.  Based on her previous examination, the wound measured some 15 mm in diameter and had surrounding erythema.  This was confirmed with her recent visit to the [facility] [place], the photos of which I reviewed.    She had a 3+ posterior tibial pulse but no palpable dorsalis pedis pulse, also noted by Dr. Stoner  who has ordered a arterial Doppler study to better assess flow to the anterior tibial artery distribution.    Ultrasound:  Left lower extremity: No evidence of deep vein thrombosis.  Her left proximal through distal femoral and popliteal veins are incompetent.  Her common femoral vein is competent however.  Her left great saphenous vein is surgically absent in the proximal and mid thigh but is patent from the distal thigh to the mid calf but is absent in the distal calf.  It is incompetent at the level of the knee and at the mid calf, measures 4.2 mm diameter maximally with reflux time of 1.6 seconds.  The source of multiple incompetent vein branches measuring up to 5.5 mm in diameter with reflux time of 1.7 seconds.  Her left small saphenous vein is incompetent from the mid calf to the ankle, measures 3.8 mm in diameter with reflux time of 1.7 seconds.  It has multiple incompetent vein branches coursing from it measuring up to 5.8 mm in diameter with reflux time of 1.4 seconds.  There is a 4.5 mm diameter incompetent  14 cm below the knee crease communicating with the small saphenous vein and an incompetent vein branch.    Right lower extremity: Normal phasicity, compression and augmentation of the common femoral vein with no evidence of deep vein thrombosis.    Assessment:  CEAP 6 left lower extremity venous insufficiency with a recurrent, traumatic left lateral ankle ulcer.  She is being treated for cellulitis at this time and is being cared for at the [facility] [place].    Wound is over the lateral leg and not directly in the distribution of the superficial venous insufficiency though I have little doubt that the superficial venous hypertension is contributing somewhat to slow healing of the wound.    I would like to allow at least 6 weeks of best wound management to see if this wound can be healed, or at least moving in the direction of healing.  The patient asked if she could continue  to ambulate and I feel this is very important and encouraged her to do so.    If after 6 weeks she is not making good progress, we could consider endovenous ablation of the left small saphenous vein with ultrasound-guided sclerotherapy of small saphenous and great saphenous tributaries in the lower leg which are likely contributing to superficial/subdermal venous hypertension.  Since we will be treating the lower portion of her small saphenous vein, I feel that a nonthermal technique such as cyanoacrylate glue ablation of the (b a good option for her.  This was discussed briefly with her.    Plan:  Continue best wound care and conservative measures to clear infection and hopefully help turn the wound for healing.  Dr. Stoner will check arterial Doppler study to ensure that she is getting adequate arterial flow for healing as well.    I will have Dr. Stoner communicate with me in about 6 weeks with a progress report so that we may consider treatment if she is not making progress.  Patient voiced understanding of our plan and agrees.  Her questions were answered.    Cisco Durham MD    Dictated using Dragon voice recognition software which may result in transcription errors              Again, thank you for allowing me to participate in the care of your patient.        Sincerely,        Cisco Durham MD

## 2021-04-06 NOTE — PROGRESS NOTES
Geneva is a 80 year old who is being evaluated via a billable video visit.      How would you like to obtain your AVS? MyChart  If the video visit is dropped, the invitation should be resent by: Text to cell phone: [phone]  Will anyone else be joining your video visit? No      Video Start Time: 8:40 AM         Video-Visit Details    Type of service:  Video Visit    Video End Time:8:54 AM    Originating Location (pt. Location): Home    Distant Location (provider location):  [facility] [place]     Platform used for Video Visit: Compumatrix     [facility] [place] Progress Note    Geneva Shepherd presents in follow-up of a recurrent left lower extremity venous stasis ulcer with chronic venous insufficiency.  Please see my consultation of 3/24/2021 for details.  She has since been seen by Dr. Kavon Stoner at the [facility] [place] and has been placed on best medical therapy for approval.  Unfortunately, she was in the emergency department yesterday because of increasing pain and periwound cellulitis.  She returned for a left lower extremity venous competency study on 3/31/2021, the results which we will discuss today.    Physical Exam  General: Pleasant female in no acute distress.  Blood pressure 149/74, pulse 83  Extremities: Her left leg wound is dressed, therefore the wound was not viewed.  Based on her previous examination, the wound measured some 15 mm in diameter and had surrounding erythema.  This was confirmed with her recent visit to the [facility] [place], the photos of which I reviewed.    She had a 3+ posterior tibial pulse but no palpable dorsalis pedis pulse, also noted by Dr. Stoner who has ordered a arterial Doppler study to better assess flow to the anterior tibial artery distribution.    Ultrasound:  Left lower extremity: No evidence of deep vein thrombosis.  Her left proximal through distal femoral and popliteal veins are incompetent.   Her common femoral vein is competent however.  Her left great saphenous vein is surgically absent in the proximal and mid thigh but is patent from the distal thigh to the mid calf but is absent in the distal calf.  It is incompetent at the level of the knee and at the mid calf, measures 4.2 mm diameter maximally with reflux time of 1.6 seconds.  The source of multiple incompetent vein branches measuring up to 5.5 mm in diameter with reflux time of 1.7 seconds.  Her left small saphenous vein is incompetent from the mid calf to the ankle, measures 3.8 mm in diameter with reflux time of 1.7 seconds.  It has multiple incompetent vein branches coursing from it measuring up to 5.8 mm in diameter with reflux time of 1.4 seconds.  There is a 4.5 mm diameter incompetent  14 cm below the knee crease communicating with the small saphenous vein and an incompetent vein branch.    Right lower extremity: Normal phasicity, compression and augmentation of the common femoral vein with no evidence of deep vein thrombosis.    Assessment:  CEAP 6 left lower extremity venous insufficiency with a recurrent, traumatic left lateral ankle ulcer.  She is being treated for cellulitis at this time and is being cared for at the [facility] [place].    Wound is over the lateral leg and not directly in the distribution of the superficial venous insufficiency though I have little doubt that the superficial venous hypertension is contributing somewhat to slow healing of the wound.    I would like to allow at least 6 weeks of best wound management to see if this wound can be healed, or at least moving in the direction of healing.  The patient asked if she could continue to ambulate and I feel this is very important and encouraged her to do so.    If after 6 weeks she is not making good progress, we could consider endovenous ablation of the left small saphenous vein with ultrasound-guided sclerotherapy of small saphenous and  great saphenous tributaries in the lower leg which are likely contributing to superficial/subdermal venous hypertension.  Since we will be treating the lower portion of her small saphenous vein, I feel that a nonthermal technique such as cyanoacrylate glue ablation of the (b a good option for her.  This was discussed briefly with her.    Plan:  Continue best wound care and conservative measures to clear infection and hopefully help turn the wound for healing.  Dr. Stoner will check arterial Doppler study to ensure that she is getting adequate arterial flow for healing as well.    I will have Dr. Stoner communicate with me in about 6 weeks with a progress report so that we may consider treatment if she is not making progress.  Patient voiced understanding of our plan and agrees.  Her questions were answered.    Cisco Durham MD    Dictated using Dragon voice recognition software which may result in transcription errors

## 2021-04-08 ENCOUNTER — TELEPHONE (OUTPATIENT)
Facility: CLINIC | Age: 81
End: 2021-04-08

## 2021-04-08 NOTE — TELEPHONE ENCOUNTER
Returned call to pt. Pt concerned about wound changes. She had gone to urgent care and was placed on an oral antibiotics. She still has concerns about the wound appearance. The edemawear is very painful for her and will not be placing it back on. Clinic note from 4/5 states to return if symptoms do not improve. Directed pt to return to clinic. Pt states she wants to see a practitioner at our clinic. Next available appt is 4/12 with Dr. Stoner. Pt took this appt and stated she will go to urgent care if symptoms worsen.

## 2021-04-09 DIAGNOSIS — G47.00 INSOMNIA, UNSPECIFIED TYPE: ICD-10-CM

## 2021-04-09 DIAGNOSIS — Z86.718 PERSONAL HISTORY OF DVT (DEEP VEIN THROMBOSIS): ICD-10-CM

## 2021-04-11 RX ORDER — WARFARIN SODIUM 5 MG/1
TABLET ORAL
Qty: 115 TABLET | Refills: 3 | Status: SHIPPED | OUTPATIENT
Start: 2021-04-11 | End: 2021-08-20

## 2021-04-12 ENCOUNTER — ANTICOAGULATION THERAPY VISIT (OUTPATIENT)
Dept: ANTICOAGULATION | Facility: CLINIC | Age: 81
End: 2021-04-12

## 2021-04-12 ENCOUNTER — HOSPITAL ENCOUNTER (OUTPATIENT)
Dept: WOUND CARE | Facility: CLINIC | Age: 81
Discharge: HOME | End: 2021-04-12
Attending: SURGERY | Admitting: SURGERY
Payer: MEDICARE

## 2021-04-12 VITALS — DIASTOLIC BLOOD PRESSURE: 81 MMHG | SYSTOLIC BLOOD PRESSURE: 135 MMHG | HEART RATE: 89 BPM | TEMPERATURE: 98 F

## 2021-04-12 DIAGNOSIS — Z86.718 PERSONAL HISTORY OF DVT (DEEP VEIN THROMBOSIS): ICD-10-CM

## 2021-04-12 DIAGNOSIS — Z79.01 LONG TERM CURRENT USE OF ANTICOAGULANT THERAPY: ICD-10-CM

## 2021-04-12 DIAGNOSIS — S81.801A OPEN WOUND OF KNEE, LEG, AND ANKLE, RIGHT, INITIAL ENCOUNTER: ICD-10-CM

## 2021-04-12 DIAGNOSIS — S91.001A OPEN WOUND OF KNEE, LEG, AND ANKLE, RIGHT, INITIAL ENCOUNTER: ICD-10-CM

## 2021-04-12 DIAGNOSIS — S81.001A OPEN WOUND OF KNEE, LEG, AND ANKLE, RIGHT, INITIAL ENCOUNTER: ICD-10-CM

## 2021-04-12 LAB — INR PPP: 2.3 (ref 0.86–1.14)

## 2021-04-12 PROCEDURE — 88305 TISSUE EXAM BY PATHOLOGIST: CPT | Mod: 26 | Performed by: PATHOLOGY

## 2021-04-12 PROCEDURE — 87176 TISSUE HOMOGENIZATION CULTR: CPT | Performed by: SURGERY

## 2021-04-12 PROCEDURE — 99207 PR NO CHARGE NURSE ONLY: CPT

## 2021-04-12 PROCEDURE — 17250 CHEM CAUT OF GRANLTJ TISSUE: CPT | Performed by: SURGERY

## 2021-04-12 PROCEDURE — 87070 CULTURE OTHR SPECIMN AEROBIC: CPT | Performed by: SURGERY

## 2021-04-12 PROCEDURE — 11042 DBRDMT SUBQ TIS 1ST 20SQCM/<: CPT | Performed by: SURGERY

## 2021-04-12 PROCEDURE — 87075 CULTR BACTERIA EXCEPT BLOOD: CPT | Performed by: SURGERY

## 2021-04-12 PROCEDURE — 11106 INCAL BX SKN SINGLE LES: CPT | Mod: XU | Performed by: SURGERY

## 2021-04-12 PROCEDURE — 99213 OFFICE O/P EST LOW 20 MIN: CPT | Mod: 25 | Performed by: SURGERY

## 2021-04-12 PROCEDURE — 88305 TISSUE EXAM BY PATHOLOGIST: CPT | Mod: TC | Performed by: SURGERY

## 2021-04-12 PROCEDURE — 85610 PROTHROMBIN TIME: CPT | Performed by: INTERNAL MEDICINE

## 2021-04-12 PROCEDURE — 11106 INCAL BX SKN SINGLE LES: CPT | Performed by: SURGERY

## 2021-04-12 PROCEDURE — 36416 COLLJ CAPILLARY BLOOD SPEC: CPT | Performed by: INTERNAL MEDICINE

## 2021-04-12 NOTE — PROGRESS NOTES
[facility] [place] Progress Note    Subject: Geneva Shepherd returns for evaluation of right lateral malleolar ulceration, was initiated on Augmentin for cellulitis, urgent care visit.  Factor V Leiden, history of right lower extremity deep venous thromboses in 2003, hypothyroidism, long-term use of warfarin, had recent elevation of INR to 5.6 related to antibiotic initiation, held for 3 days, has resumed normal utilization at this point.  Denies fevers chills or sweats.  Less pain at wound though minimal improvement.  She has not completed ankle-brachial indices yet.  Duplex ultrasound for venous insufficiency reviewed, she has seen Dr. Durham.  I have emphasized the need to complete the ankle-brachial indices.  She will arrange.    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     Asymptomatic varicose veins, bilateral     Personal history of RLE DVT (deep vein thrombosis)(2003)     Anticoagulated on Coumadin     Long term current use of anticoagulant therapy     Hypothyroidism     Insomnia     Knee joint replacement status     Major depressive disorder, single episode, mild (H)     Gastroesophageal reflux disease, esophagitis presence not specified     Hypercalcemia     Memory loss     Cellulitis of left lower extremity     Open wound of knee, leg, and ankle, right, initial encounter     Past Medical History:   Diagnosis Date     Ankle fracture, lateral malleolus, closed  March 2012    right ankle     Asymptomatic varicose veins      Depression, major      Diverticulitis of colon (without mention of hemorrhage)(562.11)      DJD (degenerative joint disease)      Elevated antinuclear antibody (JUAN ANTONIO) level 7/4/2015    minimal     Embolism and  thrombosis of unspecified site 3/03    RLE     FACTOR 5 LEIDEN DEFECT (HYPERCOAGULABLE) 7/03     Heterozygote     FRACTURE OF RIGHT LATERAL PROXIMAL TIBIA 11/07     Gastro-oesophageal reflux disease     rare     Hypercalcemia      Hyperlipidemia LDL goal <160 10/31/2010     Insomnia      Irritable bowel syndrome      Obesity 9/11/2013     Pain in joint, lower leg      Phlebitis and thrombophlebitis of superficial vessels of lower extremities 7/03    right     Sprain of lumbar region      Unspecified hypothyroidism      Urinary tract infection, site not specified      Exam:  /81   Pulse 89   Temp 98  F (36.7  C) (Temporal)   Wound (used by OP [facility] only) 03/31/21 1345 Right (Active)   Dressing Appearance moist drainage 04/12/21 1400   Length (cm) 1.4 04/12/21 1400   Width (cm) 1 04/12/21 1400   Depth (cm) 0.2 04/12/21 1400   Wound (cm^2) 1.4 cm^2 04/12/21 1400   Wound Volume (cm^3) 0.28 cm^3 04/12/21 1400   Wound healing % -75 04/12/21 1400   Drainage Characteristics/Odor serosanguineous 04/12/21 1400   Drainage Amount moderate 04/12/21 1400     Right lateral ankle wound with periwound cellulitis, increased biofilm.  Given lack of significant provement with cares, will perform biopsy and excisional debridement and culture.    Procedure:   Patient was determined to be capable of making their own medical decisions and informed consent was obtained. Topical anesthetic of 4% lidocaine was applied, 1% lidocaine infiltrated in periwound margin and wound base debridement was performed using a #15 blade down to and including subcutaneous tissue and biofilm, full-thickness.  Wound dermal biopsies were obtained and sent to dermatopathology for evaluation, all biofilm was debrided from the wound bed, specimen sent for aerobic and anaerobic culture.Bleeding controlled with light pressure and application of silver nitrate.. Patient tolerated procedure well.    Impression: Right lateral ankle ulceration, chronic, lymphedema  venous etiology, cellulitis on antibiotics, factor V Leiden on chronic warfarin, status post full-thickness dermatologic biopsy today and aerobic and anaerobic cultures    Plan: We will dress the wounds with multiple layer endoform antibacterial, zinc oxide light 2 layer wrap, keep dry, elevate leg when sitting, micronutrients.  Patient will return to the clinic in 1 weeks time    Too Stoner MD on 4/12/2021 at 3:07 PM

## 2021-04-12 NOTE — DISCHARGE INSTRUCTIONS
[facility]  [address] [address]    Call us at [phone] if you have any questions about your wounds, have redness or swelling around your wound, have a fever of 101 or greater or if you have any other problems or concerns. We answer the phone Monday through Friday 8 am to 4 pm, please leave a message as we check the voicemail frequently throughout the day.     Geneva Shepherd      1940    Medications/supplements to aid in healin packet of Cristobal Supplement into your favorite beverage twice a day.   Vitamin D3 10,000 iu per day  Vitamin C 2,000 mg daily  Methyl Folate 1000 mcg daily  Vitamin B 12 1000 mcg daily  Hesperidin Diosmin 1,000 mg the lymph formula tablet twice daily order from [employer] or from [employer]    Wound care recommendations to Right lower leg:  After cleansing with saline or wound cleanser, apply small amount of VASHE on gauze soak for 10 mintues  Then 4 layers of endoform antimicrobial Apply blue stripe edemawear . Absorbant pad.  Change dressing weekly.  Compression:   Your compression wrap is a 2 layer coflex wrap and should stay on until next week.   Please remove compression dressing if toes turn blue and/or tingle and can not be relieved by raising the leg for one hour.   Walk as much as you can. When you sit raise your ankle above your hips to promote wound healing.       STEPHAN Stoner M.D.. 2021    If you had a positive experience please indicate on your patient satisfaction survey form that [facility] will be sending you.

## 2021-04-12 NOTE — PROGRESS NOTES
ANTICOAGULATION FOLLOW-UP CLINIC VISIT    Patient Name:  Geneva Shepherd  Date:  2021  Contact Type:  Telephone    SUBJECTIVE:  Patient Findings     Comments:  The patient was assessed for diet, medication, and activity level changes, missed or extra doses, bruising or bleeding, with no problem findings. Will finish   Amoxicillin in about 2 days.  Still getting wound care and tissue sample was sent out today.            Clinical Outcomes     Comments:  The patient was assessed for diet, medication, and activity level changes, missed or extra doses, bruising or bleeding, with no problem findings. Will finish   Amoxicillin in about 2 days.  Still getting wound care and tissue sample was sent out today.               OBJECTIVE    Recent labs: (last 7 days)     21  1639   INR 2.30*       ASSESSMENT / PLAN  INR assessment THER    Recheck INR In: 1 WEEK    INR Location Clinic      Anticoagulation Summary  As of 2021    INR goal:  2.0-3.0   TTR:  79.8 % (1 y)   INR used for dosin.30 (2021)   Warfarin maintenance plan:  5 mg (5 mg x 1) every Mon, Thu; 7.5 mg (5 mg x 1.5) all other days   Full warfarin instructions:  5 mg every Mon, Thu; 7.5 mg all other days   Weekly warfarin total:  47.5 mg   No change documented:  Leigha Donaldson RN   Plan last modified:  Leigha Donaldson RN (3/26/2021)   Next INR check:  2021   Priority:  Critical   Target end date:  Indefinite    Indications    Long term current use of anticoagulant therapy [Z79.01]  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]  Embolism and thrombosis (H) (Resolved) [I74.9]  Personal history of RLE DVT (deep vein thrombosis)() [Z86.718]             Anticoagulation Episode Summary     INR check location:      Preferred lab:      Send INR reminders to:  MAR[facility]ONT    Comments:        Anticoagulation Care Providers     Provider Role Specialty Phone number    Jacoby Boo MD Referring Internal Medicine  [phone]            See the Encounter Report to view Anticoagulation Flowsheet and Dosing Calendar (Go to Encounters tab in chart review, and find the Anticoagulation Therapy Visit)        Leigha Donaldson RN

## 2021-04-14 LAB — COPATH REPORT: NORMAL

## 2021-04-16 ENCOUNTER — HOSPITAL ENCOUNTER (OUTPATIENT)
Dept: WOUND CARE | Facility: CLINIC | Age: 81
Discharge: HOME | End: 2021-04-16
Attending: SURGERY | Admitting: SURGERY
Payer: MEDICARE

## 2021-04-16 VITALS
SYSTOLIC BLOOD PRESSURE: 137 MMHG | TEMPERATURE: 97.4 F | RESPIRATION RATE: 16 BRPM | HEART RATE: 79 BPM | DIASTOLIC BLOOD PRESSURE: 67 MMHG

## 2021-04-16 DIAGNOSIS — S81.801A OPEN WOUND OF KNEE, LEG, AND ANKLE, RIGHT, INITIAL ENCOUNTER: ICD-10-CM

## 2021-04-16 DIAGNOSIS — S81.001A OPEN WOUND OF KNEE, LEG, AND ANKLE, RIGHT, INITIAL ENCOUNTER: ICD-10-CM

## 2021-04-16 DIAGNOSIS — S91.001A OPEN WOUND OF KNEE, LEG, AND ANKLE, RIGHT, INITIAL ENCOUNTER: ICD-10-CM

## 2021-04-16 PROCEDURE — 97602 WOUND(S) CARE NON-SELECTIVE: CPT

## 2021-04-17 LAB
BACTERIA SPEC CULT: NO GROWTH
SPECIMEN SOURCE: NORMAL

## 2021-04-19 LAB
BACTERIA SPEC CULT: NORMAL
Lab: NORMAL
SPECIMEN SOURCE: NORMAL

## 2021-04-20 ENCOUNTER — ANTICOAGULATION THERAPY VISIT (OUTPATIENT)
Dept: ANTICOAGULATION | Facility: CLINIC | Age: 81
End: 2021-04-20

## 2021-04-20 DIAGNOSIS — Z79.01 LONG TERM CURRENT USE OF ANTICOAGULANT THERAPY: ICD-10-CM

## 2021-04-20 DIAGNOSIS — Z86.718 PERSONAL HISTORY OF DVT (DEEP VEIN THROMBOSIS): ICD-10-CM

## 2021-04-20 LAB
CAPILLARY BLOOD COLLECTION: NORMAL
INR PPP: 3.2 (ref 0.86–1.14)

## 2021-04-20 PROCEDURE — 36416 COLLJ CAPILLARY BLOOD SPEC: CPT | Performed by: INTERNAL MEDICINE

## 2021-04-20 PROCEDURE — 85610 PROTHROMBIN TIME: CPT | Performed by: INTERNAL MEDICINE

## 2021-04-20 PROCEDURE — 99207 PR NO CHARGE NURSE ONLY: CPT

## 2021-04-20 NOTE — PROGRESS NOTES
ANTICOAGULATION FOLLOW-UP CLINIC VISIT    Patient Name:  Geneva Shepherd  Date:  4/20/2021  Contact Type:  Telephone    SUBJECTIVE:  Patient Findings     Comments:  The patient was assessed for diet, medication, and activity level changes, missed or extra doses, bruising or bleeding, with no problem findings.          Clinical Outcomes     Comments:  The patient was assessed for diet, medication, and activity level changes, missed or extra doses, bruising or bleeding, with no problem findings.             OBJECTIVE    Recent labs: (last 7 days)     04/20/21  1444   INR 3.20*       ASSESSMENT / PLAN  INR assessment SUPRA    Recheck INR In: 1 WEEK    INR Location Clinic      Anticoagulation Summary  As of 4/20/2021    INR goal:  2.0-3.0   TTR:  79.3 % (1 y)   INR used for dosing:  3.20 (4/20/2021)   Warfarin maintenance plan:  5 mg (5 mg x 1) every Mon, Thu; 7.5 mg (5 mg x 1.5) all other days   Full warfarin instructions:  4/20: 5 mg; Otherwise 5 mg every Mon, Thu; 7.5 mg all other days   Weekly warfarin total:  47.5 mg   Plan last modified:  Leigha Donaldson RN (3/26/2021)   Next INR check:  4/27/2021   Priority:  Critical   Target end date:  Indefinite    Indications    Long term current use of anticoagulant therapy [Z79.01]  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]  Embolism and thrombosis (H) (Resolved) [I74.9]  Personal history of RLE DVT (deep vein thrombosis)(2003) [Z86.718]             Anticoagulation Episode Summary     INR check location:      Preferred lab:      Send INR reminders to:  [facility]    Comments:        Anticoagulation Care Providers     Provider Role Specialty Phone number    Jacoby Boo MD Referring Internal Medicine [phone]            See the Encounter Report to view Anticoagulation Flowsheet and Dosing Calendar (Go to Encounters tab in chart review, and find the Anticoagulation Therapy Visit)        Leigha Donaldson, RN

## 2021-04-23 ENCOUNTER — HOSPITAL ENCOUNTER (OUTPATIENT)
Dept: ULTRASOUND IMAGING | Facility: CLINIC | Age: 81
End: 2021-04-23
Attending: SURGERY
Payer: MEDICARE

## 2021-04-23 ENCOUNTER — HOSPITAL ENCOUNTER (OUTPATIENT)
Dept: WOUND CARE | Facility: CLINIC | Age: 81
End: 2021-04-23
Attending: SURGERY
Payer: MEDICARE

## 2021-04-23 VITALS — HEART RATE: 83 BPM | SYSTOLIC BLOOD PRESSURE: 143 MMHG | TEMPERATURE: 98.4 F | DIASTOLIC BLOOD PRESSURE: 78 MMHG

## 2021-04-23 DIAGNOSIS — S91.001A OPEN WOUND OF KNEE, LEG, AND ANKLE, RIGHT, INITIAL ENCOUNTER: ICD-10-CM

## 2021-04-23 DIAGNOSIS — S81.801A OPEN WOUND OF KNEE, LEG, AND ANKLE, RIGHT, INITIAL ENCOUNTER: ICD-10-CM

## 2021-04-23 DIAGNOSIS — I73.9 PAD (PERIPHERAL ARTERY DISEASE) (H): ICD-10-CM

## 2021-04-23 DIAGNOSIS — S81.001A OPEN WOUND OF KNEE, LEG, AND ANKLE, RIGHT, INITIAL ENCOUNTER: ICD-10-CM

## 2021-04-23 PROCEDURE — 97602 WOUND(S) CARE NON-SELECTIVE: CPT

## 2021-04-23 PROCEDURE — 93922 UPR/L XTREMITY ART 2 LEVELS: CPT | Mod: XU

## 2021-04-23 NOTE — PROGRESS NOTES
[facility] [place] Nurse Note    Subject: Geneva Shepherd presents for nurse only visit for wound check and dressing change. Pt had testing earlier and wrap was removed prior to test.  Pt reports no issues with the wrap and has no report of pain.       Exam:  BP (!) 143/78   Pulse 83   Temp 98.4  F (36.9  C) (Temporal)   Wound (used by OP [facility] only) 03/31/21 1345 Right (Active)   Thickness/Stage full thickness 04/23/21 1300   Dressing Appearance moist drainage 04/23/21 1300   Base granulating 04/23/21 1300   Periwound intact 04/23/21 1300   Periwound Temperature warm 04/23/21 1300   Periwound Skin Turgor soft 04/23/21 1300   Edges open 04/23/21 1300   Length (cm) 2.2 04/23/21 1300   Width (cm) 1.4 04/23/21 1300   Depth (cm) 0.2 04/23/21 1300   Wound (cm^2) 3.08 cm^2 04/23/21 1300   Wound Volume (cm^3) 0.62 cm^3 04/23/21 1300   Wound healing % -285 04/23/21 1300   Drainage Characteristics/Odor serosanguineous 04/23/21 1300   Drainage Amount moderate 04/23/21 1300   Care, Wound non-select wound debridement performed 04/23/21 1300   Dressing Care dressing applied 04/23/21 1300     Procedure:  RLE: Topical anesthetic of 4% lidocaine was applied,non-selective debridement was performed, no bleeding occurred. Patient tolerated procedure well.  Procedure : RLE Wound redressed with moist Endoform AM, full sheet folded over the wound, Edema wear Navy stripe was applied then Application of 2 layer coflex wrap was applied.  Plan: Patient will return to the clinic next week.    Connie Jenkins, VELN, RN, CWON  April 23, 2021

## 2021-04-27 ENCOUNTER — ANTICOAGULATION THERAPY VISIT (OUTPATIENT)
Dept: ANTICOAGULATION | Facility: CLINIC | Age: 81
End: 2021-04-27

## 2021-04-27 DIAGNOSIS — Z79.01 LONG TERM CURRENT USE OF ANTICOAGULANT THERAPY: ICD-10-CM

## 2021-04-27 DIAGNOSIS — Z86.718 PERSONAL HISTORY OF DVT (DEEP VEIN THROMBOSIS): ICD-10-CM

## 2021-04-27 LAB
CAPILLARY BLOOD COLLECTION: NORMAL
INR PPP: 2.6 (ref 0.86–1.14)

## 2021-04-27 PROCEDURE — 36416 COLLJ CAPILLARY BLOOD SPEC: CPT | Performed by: INTERNAL MEDICINE

## 2021-04-27 PROCEDURE — 85610 PROTHROMBIN TIME: CPT | Performed by: INTERNAL MEDICINE

## 2021-04-27 NOTE — PROGRESS NOTES
ANTICOAGULATION MANAGEMENT     Patient Name:  Geneva Shepherd  Date:  4/27/2021    ASSESSMENT /SUBJECTIVE:    Today's INR result of 2.6 is therapeutic. Goal INR of 2.0-3.0      Warfarin dose taken: Warfarin taken as instructed    Diet: No new diet changes affecting INR    Medication changes/ interactions: No new medications/supplements affecting INR    Previous INR: Subtherapeutic     S/S of bleeding or thromboembolism: No    New injury or illness: No    Upcoming surgery, procedure or cardioversion: No    Additional findings: continues with the wound clinic       PLAN:    Telephone call with Geneva regarding INR result and instructed:     Warfarin Dosing Instructions: Continue your current warfarin dose 5mg Mon/Thur and 7.5mg AOD    Instructed patient to follow up no later than: 2 weeks  Lab visit scheduled    Education provided: Target INR goal and significance of current INR result, Monitoring for bleeding signs and symptoms and Monitoring for clotting signs and symptoms      Geneva verbalizes understanding and agrees to warfarin dosing plan.    Instructed to call the Anticoagulation Clinic for any changes, questions or concerns. (#[phone])        Camelia He RN      OBJECTIVE:  Recent labs: (last 7 days)     04/27/21  1605   INR 2.60*         No question data found.  Anticoagulation Summary  As of 4/27/2021    INR goal:  2.0-3.0   TTR:  78.7 % (1 y)   INR used for dosing:  No new INR was available at the time of this encounter.   Warfarin maintenance plan:  5 mg (5 mg x 1) every Mon, Thu; 7.5 mg (5 mg x 1.5) all other days   Full warfarin instructions:  5 mg every Mon, Thu; 7.5 mg all other days   Weekly warfarin total:  47.5 mg   No change documented:  Camelia He RN   Plan last modified:  Leigha Donaldson RN (3/26/2021)   Next INR check:  5/11/2021   Priority:  Critical   Target end date:  Indefinite    Indications    Long term current use of anticoagulant therapy [Z79.01]  FACTOR 5 LEIDEN  DEFECT (HYPERCOAGULABLE) [D68.9]  Embolism and thrombosis (H) (Resolved) [I74.9]  Personal history of RLE DVT (deep vein thrombosis)(2003) [Z86.718]             Anticoagulation Episode Summary     INR check location:      Preferred lab:      Send INR reminders to:  [facility]    Comments:        Anticoagulation Care Providers     Provider Role Specialty Phone number    Jacoby Boo MD Referring Internal Medicine [phone]

## 2021-04-29 ENCOUNTER — HOSPITAL ENCOUNTER (OUTPATIENT)
Dept: WOUND CARE | Facility: CLINIC | Age: 81
Discharge: HOME | End: 2021-04-29
Attending: SURGERY | Admitting: SURGERY
Payer: MEDICARE

## 2021-04-29 VITALS
TEMPERATURE: 96.9 F | RESPIRATION RATE: 18 BRPM | HEART RATE: 96 BPM | SYSTOLIC BLOOD PRESSURE: 147 MMHG | DIASTOLIC BLOOD PRESSURE: 69 MMHG

## 2021-04-29 DIAGNOSIS — S81.001A OPEN WOUND OF KNEE, LEG, AND ANKLE, RIGHT, INITIAL ENCOUNTER: ICD-10-CM

## 2021-04-29 DIAGNOSIS — I83.018 VENOUS STASIS ULCER OF OTHER PART OF RIGHT LOWER LEG WITH FAT LAYER EXPOSED WITH VARICOSE VEINS (H): ICD-10-CM

## 2021-04-29 DIAGNOSIS — S81.801A OPEN WOUND OF KNEE, LEG, AND ANKLE, RIGHT, INITIAL ENCOUNTER: ICD-10-CM

## 2021-04-29 DIAGNOSIS — L97.812 VENOUS STASIS ULCER OF OTHER PART OF RIGHT LOWER LEG WITH FAT LAYER EXPOSED WITH VARICOSE VEINS (H): ICD-10-CM

## 2021-04-29 DIAGNOSIS — S91.001A OPEN WOUND OF KNEE, LEG, AND ANKLE, RIGHT, INITIAL ENCOUNTER: ICD-10-CM

## 2021-04-29 PROCEDURE — 17250 CHEM CAUT OF GRANLTJ TISSUE: CPT | Performed by: SURGERY

## 2021-04-29 RX ORDER — BETAMETHASONE DIPROPIONATE 0.05 %
OINTMENT (GRAM) TOPICAL DAILY
Qty: 45 G | Refills: 3 | Status: SHIPPED | OUTPATIENT
Start: 2021-04-29 | End: 2023-03-07

## 2021-04-29 NOTE — PROGRESS NOTES
[facility]ound [facility] [place] Progress Note    Subject: Geneva Shepherd right lower extremity ulceration, lymphedema venous etiology, venous hypertensive ulceration, factor V Leiden, history of right lower extremity venous thrombosis, previous biopsy demonstrated no evidence of malignancy of the wound of the right lower extremity, does not utilize tobacco, we have been performing Spandage with 2 layer wrap, minimal change in dimensions.  Ankle-brachial indices were within normal limits, April 23, 2021.  She is on chronic anticoagulation.  Venous incompetency examination of March 31, 2021 reviewed, she is seeing Dr. Durham previously.    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     Asymptomatic varicose veins, bilateral     Personal history of RLE DVT (deep vein thrombosis)(2003)     Anticoagulated on Coumadin     Long term current use of anticoagulant therapy     Hypothyroidism     Insomnia     Knee joint replacement status     Major depressive disorder, single episode, mild (H)     Gastroesophageal reflux disease, esophagitis presence not specified     Hypercalcemia     Memory loss     Cellulitis of left lower extremity     Open wound of knee, leg, and ankle, right, initial encounter     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      FRACTURE OF RIGHT LATERAL PROXIMAL TIBIA 11/07     Gastro-oesophageal reflux disease     rare     Hypercalcemia      Hyperlipidemia LDL goal <160 10/31/2010     Insomnia      Irritable bowel syndrome      Obesity 9/11/2013     Pain in joint, lower leg      Phlebitis and thrombophlebitis of superficial vessels of lower extremities 7/03    right     Sprain of lumbar region      Unspecified hypothyroidism      Urinary tract infection, site not specified      Exam:  BP (!) 147/69   Pulse 96   Temp 96.9  F (36.1  C) (Temporal)   Resp 18   Wound (used by OP [facility] only) 03/31/21 1345 Right (Active)   Thickness/Stage full thickness 04/29/21 1400   Dressing Appearance moist drainage 04/29/21 1400   Base granulating 04/29/21 1400   Periwound intact 04/29/21 1400   Periwound Temperature warm 04/29/21 1400   Periwound Skin Turgor soft 04/29/21 1400   Edges open 04/29/21 1400   Length (cm) 2 04/29/21 1400   Width (cm) 1.3 04/29/21 1400   Depth (cm) 0.2 04/29/21 1400   Wound (cm^2) 2.6 cm^2 04/29/21 1400   Wound Volume (cm^3) 0.52 cm^3 04/29/21 1400   Wound healing % -225 04/29/21 1400   Drainage Characteristics/Odor serosanguineous 04/29/21 1400   Drainage Amount moderate 04/29/21 1400   Care, Wound non-select wound debridement performed 04/29/21 1400     Chronic wound right anterior lateral margin, no bone or tendon exposure, palpable right results pedis pulse, mild periwound inflammatory changes without erysipelas, interstitial edema is well controlled with edema wear.  Wound was treated with silver nitrate stick.      Impression: Chronic wound right lower extremity, lymphedema, lymphedema venous etiology, no significant provement in the past 4 weeks    Plan: We will seek approval for TheraSkin application 2.5 x 2.5 cm, 6 cm .  We will also trial patient performing her own dressing change on a daily basis, steroid to skin under Saran wrap, then application of antimicrobial endoform, would place hypochlorous acid on wound  during Saran wrap 60-minute treatment.  Continue edema wear and inelastic compression, continue micronutrients.  We will follow-up in approximately 3 to 4 weeks.  If no significant improvement in wound dimensions by at least 30 to 40% reduction, would proceed with application of advanced tissue product.    Patient will return to the clinic in 3 weeks time    Too Stoner MD on 4/29/2021 at 4:27 PM

## 2021-04-29 NOTE — DISCHARGE INSTRUCTIONS
[facility]  [address] [address]    Call us at [phone] if you have any questions about your wounds, have redness or swelling around your wound, have a fever of 101 or greater or if you have any other problems or concerns. We answer the phone Monday through Friday 8 am to 4 pm, please leave a message as we check the voicemail frequently throughout the day.     Geneva Shepherd      1940    Medications/supplements to aid in healin packet of Cristobal Supplement into your favorite beverage twice a day.  Vitamin D3 10,000 iu per day  Vitamin C 2,000 mg daily  Methyl Folate 1000 mcg daily  Vitamin B 12 1000 mcg daily  Hesperidin Diosmin 1,000 mg the lymph formula tablet twice daily order from [employer]} or from [place]  Wound care recommendations to Right lower leg:  After cleansing with soap and water in shower, apply small amount of VASHE on gauze to wound, Apply a thin layer of Betamethasone Ointment to intact skin on legs. Then wrap with saran wrap for one hour. After removing apply moisturizing cream (Cera-Ve, Aquaphor, Eucerin, ect.)  Then endoform antimicrobial Apply blue stripe edemawear then gauze and roll gauze  Change dressing daily  Compression:  Your compression wrap is spandigrip e and wear day and night for compression   Please remove compression dressing if toes turn blue and/or tingle and can not be relieved by raising the leg for one hour.       STEPHAN Stoner M.D. 2021      If you had a positive experience please indicate on your patient satisfaction survey form that [facility] will be sending you.

## 2021-05-03 ENCOUNTER — TELEPHONE (OUTPATIENT)
Facility: CLINIC | Age: 81
End: 2021-05-03

## 2021-05-03 NOTE — TELEPHONE ENCOUNTER
Returned call to pt. She was not clear if the endoform is supposed to be applied at each dressing change. Directed pt to change endoform at each dressing change. She stated she did not have enough endoform. Order placed at [facility] for endoform 4/29/21. Number given to pt for M to call. Pt also requesting new appt to assess wound. Unable to make any appt this week and requests nurse visit appt 5/10. No further questions or concerns.

## 2021-05-04 PROBLEM — L97.812 VENOUS STASIS ULCER OF OTHER PART OF RIGHT LOWER LEG WITH FAT LAYER EXPOSED WITH VARICOSE VEINS (H): Status: ACTIVE | Noted: 2021-05-04

## 2021-05-04 PROBLEM — I83.018 VENOUS STASIS ULCER OF OTHER PART OF RIGHT LOWER LEG WITH FAT LAYER EXPOSED WITH VARICOSE VEINS (H): Status: ACTIVE | Noted: 2021-05-04

## 2021-05-04 NOTE — ADDENDUM NOTE
Encounter addended by: Prema Gandhi RN on: 5/4/2021 12:37 PM   Actions taken: Visit diagnoses modified, Episode edited, Problem List modified

## 2021-05-11 ENCOUNTER — ANTICOAGULATION THERAPY VISIT (OUTPATIENT)
Dept: ANTICOAGULATION | Facility: CLINIC | Age: 81
End: 2021-05-11

## 2021-05-11 DIAGNOSIS — Z86.718 PERSONAL HISTORY OF DVT (DEEP VEIN THROMBOSIS): ICD-10-CM

## 2021-05-11 DIAGNOSIS — Z79.01 LONG TERM CURRENT USE OF ANTICOAGULANT THERAPY: ICD-10-CM

## 2021-05-11 LAB
CAPILLARY BLOOD COLLECTION: NORMAL
INR PPP: 4.2 (ref 0.86–1.14)

## 2021-05-11 PROCEDURE — 36416 COLLJ CAPILLARY BLOOD SPEC: CPT | Performed by: INTERNAL MEDICINE

## 2021-05-11 PROCEDURE — 99207 PR NO CHARGE NURSE ONLY: CPT

## 2021-05-11 PROCEDURE — 85610 PROTHROMBIN TIME: CPT | Performed by: INTERNAL MEDICINE

## 2021-05-11 NOTE — PROGRESS NOTES
ANTICOAGULATION FOLLOW-UP CLINIC VISIT    Patient Name:  Geneva Shepherd  Date:  2021  Contact Type:  Telephone    SUBJECTIVE:  Patient Findings     Comments:  The patient was assessed for diet, medication, and activity level changes, missed or extra doses, bruising or bleeding, with no problem findings.  Still seeing wound clinic for leg wound          Clinical Outcomes     Comments:  The patient was assessed for diet, medication, and activity level changes, missed or extra doses, bruising or bleeding, with no problem findings.  Still seeing wound clinic for leg wound             OBJECTIVE    Recent labs: (last 7 days)     21  1559   INR 4.20*       ASSESSMENT / PLAN  INR assessment SUPRA    Recheck INR In: 9 DAYS    INR Location Clinic      Anticoagulation Summary  As of 2021    INR goal:  2.0-3.0   TTR:  75.8 % (1 y)   INR used for dosin.20 (2021)   Warfarin maintenance plan:  5 mg (5 mg x 1) every Mon, Thu; 7.5 mg (5 mg x 1.5) all other days   Full warfarin instructions:  : Hold; 5/15: 5 mg; Otherwise 5 mg every Mon, Thu; 7.5 mg all other days   Weekly warfarin total:  47.5 mg   Plan last modified:  Leigha Donaldson RN (3/26/2021)   Next INR check:  2021   Priority:  Critical   Target end date:  Indefinite    Indications    Long term current use of anticoagulant therapy [Z79.01]  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]  Embolism and thrombosis (H) (Resolved) [I74.9]  Personal history of RLE DVT (deep vein thrombosis)() [Z86.718]             Anticoagulation Episode Summary     INR check location:      Preferred lab:      Send INR reminders to:  [facility]    Comments:        Anticoagulation Care Providers     Provider Role Specialty Phone number    Jacoby Boo MD Referring Internal Medicine [phone]            See the Encounter Report to view Anticoagulation Flowsheet and Dosing Calendar (Go to Encounters tab in chart review, and find the  Anticoagulation Therapy Visit)        Leigha Donaldson RN

## 2021-05-14 ENCOUNTER — HOSPITAL ENCOUNTER (OUTPATIENT)
Dept: WOUND CARE | Facility: CLINIC | Age: 81
Discharge: HOME | End: 2021-05-14
Attending: SURGERY | Admitting: SURGERY
Payer: MEDICARE

## 2021-05-14 VITALS
RESPIRATION RATE: 20 BRPM | DIASTOLIC BLOOD PRESSURE: 64 MMHG | HEART RATE: 88 BPM | TEMPERATURE: 97.6 F | SYSTOLIC BLOOD PRESSURE: 120 MMHG

## 2021-05-14 DIAGNOSIS — L97.812 VENOUS STASIS ULCER OF OTHER PART OF RIGHT LOWER LEG WITH FAT LAYER EXPOSED WITH VARICOSE VEINS (H): Primary | ICD-10-CM

## 2021-05-14 DIAGNOSIS — I83.018 VENOUS STASIS ULCER OF OTHER PART OF RIGHT LOWER LEG WITH FAT LAYER EXPOSED WITH VARICOSE VEINS (H): Primary | ICD-10-CM

## 2021-05-14 PROCEDURE — 97602 WOUND(S) CARE NON-SELECTIVE: CPT

## 2021-05-14 NOTE — PROGRESS NOTES
[facility] Wound [facility] [place] Nurse Note    Subject: Geneva Shepherd presents for nurse only visit for wound check and dressing change. Pt doesn't complain of any issues with wrap or dressing changes; she has consistently used the Vashe and Betamethasone as explained. She has been taking the supplements, Cristobal and high protein meals. Only complains of pain at night.        Exam:  /64   Pulse 88   Temp 97.6  F (36.4  C) (Temporal)   Resp 20   Wound (used by OP [facility]I only) 03/31/21 1345 Right (Active)   Thickness/Stage full thickness 05/14/21 1300   Dressing Appearance moist drainage 05/14/21 1300   Base granulating;slough 05/14/21 1300   Red (%), Wound Tissue Color 70 05/14/21 1300   Yellow (%), Wound Tissue Color 30 05/14/21 1300   Periwound intact;redness;other (see comments) 05/14/21 1300   Periwound Temperature warm 05/14/21 1300   Periwound Skin Turgor soft 05/14/21 1300   Edges open 05/14/21 1300   Length (cm) 2.2 05/14/21 1300   Width (cm) 1.5 05/14/21 1300   Depth (cm) 0.2 05/14/21 1300   Wound (cm^2) 3.3 cm^2 05/14/21 1300   Wound Volume (cm^3) 0.66 cm^3 05/14/21 1300   Wound healing % -312.5 05/14/21 1300   Drainage Characteristics/Odor serosanguineous 05/14/21 1300   Drainage Amount moderate 05/14/21 1300   Care, Wound non-select wound debridement performed 05/14/21 1300   Dressing Care dressing applied 05/14/21 1300     Procedure:  Topical anesthetic of 4% lidocaine was applied,non-selective debridement was performed, no bleeding occurred. Patient tolerated procedure well.  Procedure :Wound redressed with Endoform AM to wound bed, Edemawear Blue stripe with gauze and roll gauze to outside and then Spandage E.   Plan: Patient will return to the clinic in next week.   May 14, 2021

## 2021-05-14 NOTE — PROGRESS NOTES
[facility] Wound Healing [place] Nurse Note    Subject: Geneva Shepherd presents for nurse only visit for wound check and dressing change.       Exam:  /67   Pulse 79   Temp 97.4  F (36.3  C) (Temporal)   Resp 16      04/16/21 1400   Wound (used by OP [facility]I only) 03/31/21 1345 Right   Placement Date/Time: 03/31/21 1345   Side: Right  Orientation: lateral  Location: ankle   Thickness/Stage full thickness   Dressing Appearance moist drainage   Base granulating   Periwound intact   Periwound Temperature warm   Periwound Skin Turgor soft   Edges open   Length (cm) 2   Width (cm) 1.4   Depth (cm) 0.2   Wound (cm^2) 2.8 cm^2   Wound Volume (cm^3) 0.56 cm^3   Wound healing % -250   Drainage Characteristics/Odor serosanguineous   Drainage Amount moderate   Care, Wound non-select wound debridement performed     Procedure:  Topical anesthetic of 4% lidocaine was applied,non-selective debridement was performed, no bleeding occurred. Patient tolerated procedure well.  Procedure :Wound redressed with endoform antimicrobail Application of 2 layer coflex wrap zinc was applied.  Plan: Patient will return to the clinic in 1 weeks time  May 14, 2021

## 2021-05-20 ENCOUNTER — ANTICOAGULATION THERAPY VISIT (OUTPATIENT)
Dept: ANTICOAGULATION | Facility: CLINIC | Age: 81
End: 2021-05-20

## 2021-05-20 ENCOUNTER — HOSPITAL ENCOUNTER (OUTPATIENT)
Dept: WOUND CARE | Facility: CLINIC | Age: 81
Discharge: HOME | End: 2021-05-20
Attending: SURGERY | Admitting: SURGERY
Payer: MEDICARE

## 2021-05-20 VITALS
DIASTOLIC BLOOD PRESSURE: 81 MMHG | HEART RATE: 91 BPM | TEMPERATURE: 96.8 F | SYSTOLIC BLOOD PRESSURE: 140 MMHG | RESPIRATION RATE: 18 BRPM

## 2021-05-20 DIAGNOSIS — Z86.718 PERSONAL HISTORY OF DVT (DEEP VEIN THROMBOSIS): ICD-10-CM

## 2021-05-20 DIAGNOSIS — Z79.01 LONG TERM CURRENT USE OF ANTICOAGULANT THERAPY: ICD-10-CM

## 2021-05-20 DIAGNOSIS — S81.001A OPEN WOUND OF KNEE, LEG, AND ANKLE, RIGHT, INITIAL ENCOUNTER: ICD-10-CM

## 2021-05-20 DIAGNOSIS — I83.018 VENOUS STASIS ULCER OF OTHER PART OF RIGHT LOWER LEG WITH FAT LAYER EXPOSED WITH VARICOSE VEINS (H): ICD-10-CM

## 2021-05-20 DIAGNOSIS — L97.812 VENOUS STASIS ULCER OF OTHER PART OF RIGHT LOWER LEG WITH FAT LAYER EXPOSED WITH VARICOSE VEINS (H): ICD-10-CM

## 2021-05-20 DIAGNOSIS — S81.801A OPEN WOUND OF KNEE, LEG, AND ANKLE, RIGHT, INITIAL ENCOUNTER: ICD-10-CM

## 2021-05-20 DIAGNOSIS — S91.001A OPEN WOUND OF KNEE, LEG, AND ANKLE, RIGHT, INITIAL ENCOUNTER: ICD-10-CM

## 2021-05-20 LAB
CAPILLARY BLOOD COLLECTION: NORMAL
INR PPP: 2.5 (ref 0.86–1.14)

## 2021-05-20 PROCEDURE — 36416 COLLJ CAPILLARY BLOOD SPEC: CPT | Performed by: INTERNAL MEDICINE

## 2021-05-20 PROCEDURE — 99213 OFFICE O/P EST LOW 20 MIN: CPT | Performed by: SURGERY

## 2021-05-20 PROCEDURE — 85610 PROTHROMBIN TIME: CPT | Performed by: INTERNAL MEDICINE

## 2021-05-20 PROCEDURE — 97602 WOUND(S) CARE NON-SELECTIVE: CPT

## 2021-05-20 NOTE — PROGRESS NOTES
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  Medical record reviewed, wound is now on her percent granulation tissue, is making adequate progression, will hold on placement of advanced tissue product at this time given response to current treatment regime and wound improvement.    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     Asymptomatic varicose veins, bilateral     Personal history of RLE DVT (deep vein thrombosis)(2003)     Anticoagulated on Coumadin     Long term current use of anticoagulant therapy     Hypothyroidism     Insomnia     Knee joint replacement status     Major depressive disorder, single episode, mild (H)     Gastroesophageal reflux disease, esophagitis presence not specified     Hypercalcemia     Memory loss     Cellulitis of left lower extremity     Open wound of knee, leg, and ankle, right, initial encounter     Venous stasis ulcer of other part of right lower leg with fat layer exposed with varicose veins (H)     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     FRACTURE OF RIGHT LATERAL PROXIMAL TIBIA 11/07     Gastro-oesophageal reflux disease     rare     Hypercalcemia       Hyperlipidemia LDL goal <160 10/31/2010     Insomnia      Irritable bowel syndrome      Obesity 9/11/2013     Pain in joint, lower leg      Phlebitis and thrombophlebitis of superficial vessels of lower extremities 7/03    right     Sprain of lumbar region      Unspecified hypothyroidism      Urinary tract infection, site not specified      Exam:  BP (!) 140/81   Pulse 91   Temp 96.8  F (36  C) (Temporal)   Resp 18   Wound (used by OP WHI only) 03/31/21 1345 Right (Active)   Dressing Appearance moist drainage 05/20/21 1400   Length (cm) 2.1 05/20/21 1400   Width (cm) 1.2 05/20/21 1400   Depth (cm) 0.2 05/20/21 1400   Wound (cm^2) 2.52 cm^2 05/20/21 1400   Wound Volume (cm^3) 0.5 cm^3 05/20/21 1400   Wound healing % -215 05/20/21 1400   Drainage Characteristics/Odor serosanguineous 05/20/21 1400   Drainage Amount moderate 05/20/21 1400     Improved granulation tissue, well controlled interstitial edema with utilization of edema wear, no bone or tendon exposure, no periwound cellulitis.      Impression: Improved right lateral malleolus ulceration, factor V Leiden, chronic anticoagulation    Plan: Continue steroid underneath Saran wrap with hypochlorous acid on wound on a daily basis, continue current wound dressing regime, okay to swim with a waterproof Band-Aid at her home pool, continue utilization of edema wear 24 hours/day.  Given progression will hold on advanced tissue product application though reconsider at next visit if not greater than 40 to 50% improvement in wound dimensions.  Continue micronized purified flavonoid fraction and adjunct of micronutrients  Patient will return to the clinic in 6 weeks time    Too Stoner MD on 5/20/2021 at 3:12 PM

## 2021-05-20 NOTE — DISCHARGE INSTRUCTIONS
[facility]  [address] [address]    Call us at [phone] if you have any questions about your wounds, have redness or swelling around your wound, have a fever of 101 or greater or if you have any other problems or concerns. We answer the phone Monday through Friday 8 am to 4 pm, please leave a message as we check the voicemail frequently throughout the day.     Geneva Shepherd      1940    Medications/supplements to aid in healin packet of Cristobal Supplement into your favorite beverage twice a day.  Vitamin D3 10,000 iu per day  Vitamin C 2,000 mg daily  Methyl Folate 1000 mcg daily  Vitamin B 12 1000 mcg daily  Hesperidin Diosmin 1,000 mg the lymph formula tablet twice daily order from [employer] or from [employer]  Wound care recommendations to Right lower leg:  After cleansing with soap and water in shower, apply small amount of VASHE on gauze to wound, Apply a thin layer of Betamethasone Ointment to intact skin on legs.  Then wrap with saran wrap for one hour. After removing apply moisturizing cream (Cera-Ve, Aquaphor, Eucerin, ect.)  Then endoform antimicrobial Apply blue stripe edemawear then gauze and roll gauze  Change dressing daily  Compression:  Your compression wrap is spandigrip e and wear day and night for compression       STEPHAN Stoner M.D.. May 20, 2021      If you had a positive experience please indicate on your patient satisfaction survey form that [facility] will be sending you.    If you have any billing related questions please call the [facility] Business office at [phone]. The clinic staff does not handle billing related matters.

## 2021-05-20 NOTE — PROGRESS NOTES
ANTICOAGULATION MANAGEMENT     Patient Name:  Geneva Shepherd  Date:  5/20/2021    ASSESSMENT /SUBJECTIVE:    Today's INR result of 2.5 is therapeutic. Goal INR of 2.0-3.0      Warfarin dose taken: Warfarin taken as instructed    Diet: No new diet changes affecting INR    Medication changes/ interactions: No new medications/supplements affecting INR    Previous INR: Supratherapeutic     S/S of bleeding or thromboembolism: No    New injury or illness: No    Upcoming surgery, procedure or cardioversion: No    Additional findings: None      PLAN:    Telephone call with Geneva regarding INR result and instructed:     Warfarin Dosing Instructions: Continue your current warfarin dose 5mg Mon/Thu/Sat and 7.5mgAOD    Instructed patient to follow up no later than: 2 weeks  Lab visit scheduled    Education provided: Monitoring for bleeding signs and symptoms and Monitoring for clotting signs and symptoms      Geneva verbalizes understanding and agrees to warfarin dosing plan.    Instructed to call the Anticoagulation Clinic for any changes, questions or concerns. (#[phone])        Camelia He RN      OBJECTIVE:  Recent labs: (last 7 days)     05/20/21  1417   INR 2.50*         No question data found.  Anticoagulation Summary  As of 5/20/2021    INR goal:  2.0-3.0   TTR:  74.1 % (1 y)   INR used for dosing:  No new INR was available at the time of this encounter.   Warfarin maintenance plan:  5 mg (5 mg x 1) every Mon, Thu, Sat; 7.5 mg (5 mg x 1.5) all other days   Full warfarin instructions:  5 mg every Mon, Thu, Sat; 7.5 mg all other days   Weekly warfarin total:  45 mg   Plan last modified:  Camelia He RN (5/20/2021)   Next INR check:  6/3/2021   Priority:  Critical   Target end date:  Indefinite    Indications    Long term current use of anticoagulant therapy [Z79.01]  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]  Embolism and thrombosis (H) (Resolved) [I74.9]  Personal history of RLE DVT (deep vein  thrombosis)(2003) [Z86.718]             Anticoagulation Episode Summary     INR check location:      Preferred lab:      Send INR reminders to:  [facility]    Comments:        Anticoagulation Care Providers     Provider Role Specialty Phone number    Jacoby Boo MD Referring Internal Medicine [phone]

## 2021-06-01 ENCOUNTER — NURSE TRIAGE (OUTPATIENT)
Dept: INTERNAL MEDICINE | Facility: CLINIC | Age: 81
End: 2021-06-01

## 2021-06-01 NOTE — TELEPHONE ENCOUNTER
Geneva is having dizziness and fatigue she wants to follow up on . Will go to ER if sx worsen .    Reason for Disposition    Taking a medicine that could cause dizziness (e.g., blood pressure medications, diuretics)    Additional Information    Negative: Shock suspected (e.g., cold/pale/clammy skin, too weak to stand, low BP, rapid pulse)    Negative: Difficult to awaken or acting confused (e.g., disoriented, slurred speech)    Negative: Fainted, and still feels dizzy afterwards    Negative: Severe difficulty breathing (e.g., struggling for each breath, speaks in single words)    Negative: Overdose (accidental or intentional) of medications    Negative: New neurologic deficit that is present now: * Weakness of the face, arm, or leg on one side of the body * Numbness of the face, arm, or leg on one side of the body * Loss of speech or garbled speech    Negative: Heart beating < 50 beats per minute OR > 140 beats per minute    Negative: Sounds like a life-threatening emergency to the triager    Negative: Chest pain    Negative: Rectal bleeding, bloody stool, or tarry-black stool    Negative: Vomiting is the main symptom    Negative: Diarrhea is the main symptom    Negative: Headache is the main symptom    Negative: Heat exhaustion suspected (i.e., dehydration from heat exposure)    Negative: Patient states that he/she is having an anxiety/panic attack    Negative: SEVERE dizziness (e.g., unable to stand, requires support to walk, feels like passing out now)    Negative: SEVERE headache or neck pain    Negative: Spinning or tilting sensation (vertigo) present now and one or more stroke risk factors (i.e., hypertension, diabetes, prior stroke/TIA, heart attack, age over 60) (Exception: prior physician evaluation for this AND no different/worse than usual)    Negative: Loss of vision or double vision    Negative: Extra heart beats OR irregular heart beating (i.e., 'palpitations')    Negative: Difficulty breathing     "Negative: Drinking very little and has signs of dehydration (e.g., no urine > 12 hours, very dry mouth, very lightheaded)    Negative: Follows bleeding (e.g., stomach, rectum, vagina) (Exception: became dizzy from sight of small amount blood)    Negative: Patient sounds very sick or weak to the triager    Negative: Lightheadedness (dizziness) present now, after 2 hours of rest and fluids    Negative: Spinning or tilting sensation (vertigo) present now    Negative: Fever > 103 F (39.4 C)    Negative: Fever > 100.0 F (37.8 C) and has diabetes mellitus or a weak immune system (e.g., HIV positive, cancer chemotherapy, organ transplant, splenectomy, chronic steroids)    Negative: Vomiting occurs with dizziness    Negative: Patient wants to be seen    Answer Assessment - Initial Assessment Questions  1. DESCRIPTION: \"Describe your dizziness.\"      Dizzy   2. LIGHTHEADED: \"Do you feel lightheaded?\" (e.g., somewhat faint, woozy, weak upon standing)      no  3. VERTIGO: \"Do you feel like either you or the room is spinning or tilting?\" (i.e. vertigo)      no  4. SEVERITY: \"How bad is it?\"  \"Do you feel like you are going to faint?\" \"Can you stand and walk?\"    - MILD - walking normally    - MODERATE - interferes with normal activities (e.g., work, school)     - SEVERE - unable to stand, requires support to walk, feels like passing out now.       Mild to mod  5. ONSET:  \"When did the dizziness begin?\"      Past few weeks   6. AGGRAVATING FACTORS: \"Does anything make it worse?\" (e.g., standing, change in head position)      none  7. HEART RATE: \"Can you tell me your heart rate?\" \"How many beats in 15 seconds?\"  (Note: not all patients can do this)        no  8. CAUSE: \"What do you think is causing the dizziness?\"      unsure  9. RECURRENT SYMPTOM: \"Have you had dizziness before?\" If so, ask: \"When was the last time?\" \"What happened that time?\"      no  10. OTHER SYMPTOMS: \"Do you have any other symptoms?\" (e.g., fever, chest " "pain, vomiting, diarrhea, bleeding)        no  11. PREGNANCY: \"Is there any chance you are pregnant?\" \"When was your last menstrual period?\"        no    Protocols used: DIZZINESS-A-OH      "

## 2021-06-01 NOTE — TELEPHONE ENCOUNTER
" Unclear of cause based on info provided Pt states she is \"dizzy\" but denies lightheadedness and also denies vertigo per nursing note. Also states sx present a couple weeks. Pt to see me this Friday as scheduled  "

## 2021-06-02 ENCOUNTER — HOSPITAL ENCOUNTER (EMERGENCY)
Facility: CLINIC | Age: 81
Discharge: HOME | End: 2021-06-02
Attending: EMERGENCY MEDICINE | Admitting: EMERGENCY MEDICINE
Payer: MEDICARE

## 2021-06-02 VITALS
RESPIRATION RATE: 14 BRPM | HEART RATE: 73 BPM | OXYGEN SATURATION: 96 % | BODY MASS INDEX: 27.64 KG/M2 | HEIGHT: 66 IN | DIASTOLIC BLOOD PRESSURE: 65 MMHG | TEMPERATURE: 97.9 F | WEIGHT: 172 LBS | SYSTOLIC BLOOD PRESSURE: 146 MMHG

## 2021-06-02 DIAGNOSIS — R53.83 FATIGUE, UNSPECIFIED TYPE: ICD-10-CM

## 2021-06-02 DIAGNOSIS — R42 LIGHTHEADEDNESS: ICD-10-CM

## 2021-06-02 LAB
ALBUMIN UR-MCNC: NEGATIVE MG/DL
ANION GAP SERPL CALCULATED.3IONS-SCNC: 4 MMOL/L (ref 3–14)
APPEARANCE UR: CLEAR
BASOPHILS # BLD AUTO: 0.1 10E9/L (ref 0–0.2)
BASOPHILS NFR BLD AUTO: 0.7 %
BILIRUB UR QL STRIP: NEGATIVE
BUN SERPL-MCNC: 18 MG/DL (ref 7–30)
CALCIUM SERPL-MCNC: 10.4 MG/DL (ref 8.5–10.1)
CHLORIDE SERPL-SCNC: 105 MMOL/L (ref 94–109)
CO2 SERPL-SCNC: 28 MMOL/L (ref 20–32)
COLOR UR AUTO: NORMAL
CREAT SERPL-MCNC: 0.62 MG/DL (ref 0.52–1.04)
DIFFERENTIAL METHOD BLD: NORMAL
EOSINOPHIL # BLD AUTO: 0.1 10E9/L (ref 0–0.7)
EOSINOPHIL NFR BLD AUTO: 1.6 %
ERYTHROCYTE [DISTWIDTH] IN BLOOD BY AUTOMATED COUNT: 13.4 % (ref 10–15)
GFR SERPL CREATININE-BSD FRML MDRD: 86 ML/MIN/{1.73_M2}
GLUCOSE SERPL-MCNC: 103 MG/DL (ref 70–99)
GLUCOSE UR STRIP-MCNC: NEGATIVE MG/DL
HCT VFR BLD AUTO: 43 % (ref 35–47)
HGB BLD-MCNC: 13.9 G/DL (ref 11.7–15.7)
HGB UR QL STRIP: NEGATIVE
IMM GRANULOCYTES # BLD: 0 10E9/L (ref 0–0.4)
IMM GRANULOCYTES NFR BLD: 0.4 %
INR PPP: 2.36 (ref 0.86–1.14)
INTERPRETATION ECG - MUSE: NORMAL
KETONES UR STRIP-MCNC: NEGATIVE MG/DL
LEUKOCYTE ESTERASE UR QL STRIP: NEGATIVE
LYMPHOCYTES # BLD AUTO: 1.8 10E9/L (ref 0.8–5.3)
LYMPHOCYTES NFR BLD AUTO: 26.4 %
MCH RBC QN AUTO: 31.9 PG (ref 26.5–33)
MCHC RBC AUTO-ENTMCNC: 32.3 G/DL (ref 31.5–36.5)
MCV RBC AUTO: 99 FL (ref 78–100)
MONOCYTES # BLD AUTO: 0.6 10E9/L (ref 0–1.3)
MONOCYTES NFR BLD AUTO: 9.1 %
NEUTROPHILS # BLD AUTO: 4.1 10E9/L (ref 1.6–8.3)
NEUTROPHILS NFR BLD AUTO: 61.8 %
NITRATE UR QL: NEGATIVE
NRBC # BLD AUTO: 0 10*3/UL
NRBC BLD AUTO-RTO: 0 /100
PH UR STRIP: 5.5 PH (ref 5–7)
PLATELET # BLD AUTO: 236 10E9/L (ref 150–450)
POTASSIUM SERPL-SCNC: 4.1 MMOL/L (ref 3.4–5.3)
RBC # BLD AUTO: 4.36 10E12/L (ref 3.8–5.2)
SODIUM SERPL-SCNC: 137 MMOL/L (ref 133–144)
SOURCE: NORMAL
SP GR UR STRIP: 1.01 (ref 1–1.03)
TSH SERPL DL<=0.005 MIU/L-ACNC: 1.98 MU/L (ref 0.4–4)
UROBILINOGEN UR STRIP-MCNC: 0 MG/DL (ref 0–2)
WBC # BLD AUTO: 6.7 10E9/L (ref 4–11)

## 2021-06-02 PROCEDURE — 85610 PROTHROMBIN TIME: CPT | Performed by: EMERGENCY MEDICINE

## 2021-06-02 PROCEDURE — 99284 EMERGENCY DEPT VISIT MOD MDM: CPT

## 2021-06-02 PROCEDURE — 81003 URINALYSIS AUTO W/O SCOPE: CPT | Performed by: EMERGENCY MEDICINE

## 2021-06-02 PROCEDURE — 85025 COMPLETE CBC W/AUTO DIFF WBC: CPT | Performed by: EMERGENCY MEDICINE

## 2021-06-02 PROCEDURE — 84443 ASSAY THYROID STIM HORMONE: CPT | Performed by: EMERGENCY MEDICINE

## 2021-06-02 PROCEDURE — 93005 ELECTROCARDIOGRAM TRACING: CPT

## 2021-06-02 PROCEDURE — 80048 BASIC METABOLIC PNL TOTAL CA: CPT | Performed by: EMERGENCY MEDICINE

## 2021-06-02 ASSESSMENT — ENCOUNTER SYMPTOMS
BLOOD IN STOOL: 0
SHORTNESS OF BREATH: 0
FATIGUE: 1
FEVER: 0
LIGHT-HEADEDNESS: 1
NAUSEA: 0
FREQUENCY: 1
BACK PAIN: 1
VOMITING: 0
POLYDIPSIA: 1
DIZZINESS: 1

## 2021-06-02 ASSESSMENT — MIFFLIN-ST. JEOR: SCORE: 1276.94

## 2021-06-02 NOTE — ED TRIAGE NOTES
Pt reports constant dizziness (lightheaded) and increased fatigue for the past couple weeks. Unable to get in with PMD and they suggested she come in for eval. FAST exam normal. Pt ambulated independently to triage room. Pt does have to self cath at home, but reports new incontinence at [facility].

## 2021-06-03 ENCOUNTER — ANTICOAGULATION THERAPY VISIT (OUTPATIENT)
Dept: ANTICOAGULATION | Facility: CLINIC | Age: 81
End: 2021-06-03

## 2021-06-03 ENCOUNTER — TELEPHONE (OUTPATIENT)
Dept: INTERNAL MEDICINE | Facility: CLINIC | Age: 81
End: 2021-06-03

## 2021-06-03 DIAGNOSIS — Z86.718 PERSONAL HISTORY OF DVT (DEEP VEIN THROMBOSIS): ICD-10-CM

## 2021-06-03 DIAGNOSIS — Z79.01 LONG TERM CURRENT USE OF ANTICOAGULANT THERAPY: ICD-10-CM

## 2021-06-03 PROCEDURE — 99207 PR NO CHARGE NURSE ONLY: CPT

## 2021-06-03 NOTE — PROGRESS NOTES
ANTICOAGULATION FOLLOW-UP CLINIC VISIT    Patient Name:  Geneva Shepherd  Date:  6/3/2021  Contact Type:  Telephone    SUBJECTIVE:  Patient Findings     Comments:  The patient was assessed for diet, medication, and activity level changes, missed or extra doses, bruising or bleeding, with no problem findings.  Was in ER for dizziness.  INR done there          Clinical Outcomes     Comments:  The patient was assessed for diet, medication, and activity level changes, missed or extra doses, bruising or bleeding, with no problem findings.  Was in ER for dizziness.  INR done there             OBJECTIVE    Recent labs: (last 7 days)     21  2136   INR 2.36*       ASSESSMENT / PLAN  INR assessment THER    Recheck INR In: 3 WEEKS    INR Location Clinic      Anticoagulation Summary  As of 6/3/2021    INR goal:  2.0-3.0   TTR:  74.0 % (1 y)   INR used for dosin.36 (2021)   Warfarin maintenance plan:  5 mg (5 mg x 1) every Mon, Thu, Sat; 7.5 mg (5 mg x 1.5) all other days   Full warfarin instructions:  5 mg every Mon, Thu, Sat; 7.5 mg all other days   Weekly warfarin total:  45 mg   No change documented:  Leigha Donaldson RN   Plan last modified:  Camelia He RN (2021)   Next INR check:  2021   Priority:  Critical   Target end date:  Indefinite    Indications    Long term current use of anticoagulant therapy [Z79.01]  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]  Embolism and thrombosis (H) (Resolved) [I74.9]  Personal history of RLE DVT (deep vein thrombosis)() [Z86.718]             Anticoagulation Episode Summary     INR check location:      Preferred lab:      Send INR reminders to:  [facility][1]    Comments:        Anticoagulation Care Providers     Provider Role Specialty Phone number    Jacoby Boo MD Referring Internal Medicine [phone]            See the Encounter Report to view Anticoagulation Flowsheet and Dosing Calendar (Go to Encounters tab in chart  review, and find the Anticoagulation Therapy Visit)        Leigha Donaldson RN

## 2021-06-03 NOTE — ED PROVIDER NOTES
"  History   Chief Complaint:  Dizziness and Fatigue     HPI   Geneva Shepherd is a 80 year old female with history of hyperlipidemia, dementia, and DVT on Coumadin who presents with dizziness and fatigue. She states that she has been fatigued and lightheaded starting 5 months ago and worsening over the past couple of weeks. Her symptoms are worsened by movement. She states that she is typically able to go on walks, but has been unable to do so recently. She believes that she has been sleeping alright, and takes 1 nap daily. She also notes associated thirst, urinary frequency, and back \"tightness\" for the past 4 months. She has had to wear 2 depends at night. She had a telephone visit yesterday with plan to follow up with her primary care provider but was unable to make an appointment and she presented to the ED. Her appetite has been normal, although she has lost 18 lbs over the last year. She denies melena, nausea, and vomiting. No chest pain or shortness of breath. She denies a history of diabetes.     Review of Systems   Constitutional: Positive for fatigue. Negative for fever.   Respiratory: Negative for shortness of breath.    Cardiovascular: Negative for chest pain.   Gastrointestinal: Negative for blood in stool, nausea and vomiting.   Endocrine: Positive for polydipsia.   Genitourinary: Positive for frequency.   Musculoskeletal: Positive for back pain.   Neurological: Positive for dizziness and light-headedness.   All other systems reviewed and are negative.        Allergies:  Cephalexin     Medications:  Acetylcysteine  Elavil  Ascorbic acid  Beta carotene   Biotin  Chromium picolinate  Aricept   Folic acid  Carnitor   Synthroid  Magnesium  Methocarbamol  Multivitamin   Macrodantin   Paxil   Vitamin B   Coumadin   Zinc gluconate     Past Medical History:    Ankle fracture  Varicose veins  Depression  Diverticulitis  DJD  DVT  Factor 5 leiden " "defect  GERD  Hyperlipidemia  Insomnia  IBS  Phlebitis  Hypothyroidism   Dementia   Cellulitis   Osteoarthrosis     Past Surgical History:    Left hip arthroplasty  Right hip arthroplasty  Cholecystectomy  Tubal ligation  Vascular surgery    Family History:    Cardiovascular   Heart disease  CAD    Social History:  Presents alone to the ED  No alcohol, drug, or tobacco use     Physical Exam     Patient Vitals for the past 24 hrs:   BP Temp Temp src Pulse Resp SpO2 Height Weight   06/02/21 2200 138/69 -- -- 73 24 95 % -- --   06/02/21 2145 (!) 147/73 -- -- 73 21 -- -- --   06/02/21 2130 135/77 -- -- 73 15 -- -- --   06/02/21 2115 (!) 155/77 -- -- 80 (!) 32 94 % -- --   06/02/21 2100 (!) 143/78 -- -- 73 12 97 % -- --   06/02/21 2045 (!) 165/86 -- -- 76 12 95 % -- --   06/02/21 2030 (!) 155/70 -- -- 74 11 99 % -- --   06/02/21 2015 (!) 152/70 -- -- 73 20 99 % -- --   06/02/21 2000 (!) 154/80 -- -- 72 -- 97 % -- --   06/02/21 1753 132/60 97.9  F (36.6  C) Temporal 84 18 97 % 1.676 m (5' 6\") 78 kg (172 lb)     Physical Exam  General: Alert, interactive  Head:  Scalp is atraumatic  Eyes:  The pupils are equal, round, and reactive to light    EOM's intact    No scleral icterus  ENT:      Nose:  The external nose is normal  Ears:  External ears are normal  Mouth/Throat: The oropharynx is normal    Mucus membranes are moist     Neck:  Normal range of motion.      There is no rigidity.    Trachea is in the midline         CV:  Regular rate and rhythm    No murmur   Resp:  Breath sounds are clear bilaterally    Non-labored, no retractions or accessory muscle use      GI:  Abdomen is soft, no distension, no tenderness.       MS:  Normal strength in all 4 extremities  Skin:  Well healing wound on the malleolus of right lower extremity. Warm and dry.  Neuro: Strength 5/5 x4.  Sensation intact  In all 4 extremities.        Psych:  Awake. Alert.  Normal affect.      Appropriate interactions.    Emergency Department Course "   ECG  ECG taken at 1757  Normal sinus rhythm  Septal infarct, age undetermined   Rate 74 bpm. OH interval 168 ms. QRS duration 86 ms. QT/QTc 396/439 ms. P-R-T axes 57 52 43.     Laboratory:  CBC: WBC 6.7, HGB 13.9,    BMP: glucose 103 (H), calcium 10.4 (H) o/w WNL (Creatinine 0.62)     TSH with free T4 reflex: 1.98  INR: 2.36 (H)    UA with microscopic: WNL     Emergency Department Course:  Reviewed:  I reviewed nursing notes, vitals, past medical history and care everywhere    Assessments:  2015 I obtained history and examined the patient as noted above.   2226 I rechecked the patient and explained findings.     Disposition:  The patient was discharged to home.     Impression & Plan   Medical Decision Making:  Following presentation history and physical examination were performed, the above work-up was undertaken returning is largely unremarkable.  Laboratory work-up is reassuring and I do not have a clear-cut etiology for the patient's lightheadedness.  There is no signs of an acute infectious etiology, no signs of acute coronary syndrome or pulmonary embolism.  She has no headache to suggest intracranial hemorrhage she is anticoagulated and there is no focal neurologic deficit to suggest stroke.  Patient does relate that her sleep has been somewhat irregular lately which could be causing her symptoms.  Given the 5-month duration of her symptoms I believe she can safely be discharged home and closely follow-up with her primary care provider for further work-up.  Patient was in agreement this plan reassured to hear the work-up was unremarkable and the subsequently discharge.    Diagnosis:    ICD-10-CM    1. Lightheadedness  R42    2. Fatigue, unspecified type  R53.83      Scribe Disclosure:  I, Abi Kasper, am serving as a scribe at 8:14 PM on 6/2/2021 to document services personally performed by Mikey Way MD based on my observations and the provider's statements to me.              Trigger, Mikey Mello MD  06/03/21 0026

## 2021-06-03 NOTE — TELEPHONE ENCOUNTER
ANTICOAGULATION  MANAGEMENT: Discharge Review    Geneva Shepherd chart reviewed for anticoagulation continuity of care    Emergency room visit on 6/2 for lightheadedness and fatigue.    Discharge disposition: Home    Results:    Recent labs: (last 7 days)     06/02/21  2136   INR 2.36*     Anticoagulation inpatient management:     not applicable     Anticoagulation discharge instructions:     Warfarin dosing: home regimen continued   Bridging: No   INR goal change: No      Medication changes affecting anticoagulation: No    Additional factors affecting anticoagulation: No    Plan     No adjustment to anticoagulation plan needed  Did anticoag encounter with the INR from ER    Spoke with Geneva    Anticoagulation Calendar updated    Leigha Donaldson RN

## 2021-06-04 ENCOUNTER — OFFICE VISIT (OUTPATIENT)
Dept: INTERNAL MEDICINE | Facility: CLINIC | Age: 81
End: 2021-06-04
Payer: MEDICARE

## 2021-06-04 VITALS
BODY MASS INDEX: 28.89 KG/M2 | SYSTOLIC BLOOD PRESSURE: 132 MMHG | HEART RATE: 82 BPM | WEIGHT: 179 LBS | OXYGEN SATURATION: 95 % | RESPIRATION RATE: 16 BRPM | DIASTOLIC BLOOD PRESSURE: 72 MMHG | TEMPERATURE: 97.2 F

## 2021-06-04 DIAGNOSIS — R42 LIGHTHEADEDNESS: ICD-10-CM

## 2021-06-04 DIAGNOSIS — E03.9 HYPOTHYROIDISM, UNSPECIFIED TYPE: ICD-10-CM

## 2021-06-04 DIAGNOSIS — R32 URINARY INCONTINENCE, UNSPECIFIED TYPE: ICD-10-CM

## 2021-06-04 DIAGNOSIS — E83.52 HYPERCALCEMIA: ICD-10-CM

## 2021-06-04 DIAGNOSIS — R53.83 OTHER FATIGUE: ICD-10-CM

## 2021-06-04 PROCEDURE — 99214 OFFICE O/P EST MOD 30 MIN: CPT | Performed by: INTERNAL MEDICINE

## 2021-06-04 NOTE — PROGRESS NOTES
ASSESSMENT:   1. Other fatigue  Metabolic lab work-up negative.  Appears related to poor sleep quality with urination issues.  See recommendations with plan discussion below.  If not improving, patient to inform physician and will refer to sleep clinic to consider home sleep study to look into  other sleep quality issues.  Patient denies troubles falling asleep.  Energy seems improved on nights that she does sleep better and urinates less.  Previously without fatigue issue well on amitriptyline so doubt this is now contributing    2. Lightheadedness  Generally related to poor sleep episodes.  Denies symptoms now.  No vertigo.  Not position related.  No palpitations felt by patient.  However, if continues, will consider Ziopatch assessment    3. Urinary incontinence, unspecified type  Chronic with neurogenic bladder history.  Followed by urology notes follow-up next week.  Patient to stop caffeine intake and to straight catheter before bed to help empty her bladder.  Patient states she is not interested in medication therapy for symptoms at this time.  Patient to discuss further with urology    4. Hypercalcemia  Patient denies calcium supplement.  Mild calcium elevation.  Previous normal PTH and vitamin D.  Calcium level prior to this normal after being elevated prior to that so somewhat variable.  Recheck lab again in 3 months  - Basic metabolic panel; Future  - Parathyroid Hormone Intact; Future    5. Hypothyroidism, unspecified type  Controlled.  Continue current medication.  Recheck lab 1 year  - TSH with free T4 reflex; Future  - levothyroxine (SYNTHROID/LEVOTHROID) 50 MCG tablet; Take 1 tablet (50 mcg) by mouth daily  Dispense: 90 tablet; Refill: 3        PLAN:    Straight cath right before bedtime to empty the bladder  completely    Stop ALL caffeine intake  (coffee, eat, soda pop)  Stop fluid intake after 6 PM except for small amount for pills at night  Follow-up with Dr Cartagena next week re: bladder  "function  If daytime fatigue continues over the next few weeks despite these changes, then let me know and will refer to  Sleep clinic for possible home sleep study to rule out other causes for fatigue  Repeat nonfasting labs in 3 months regarding calcium    (Chart documentation was completed, in part, with [employer] voice-recognition software. Even though reviewed, some grammatical, spelling, and word errors may remain.)    Jacoby Boo MD  Internal Medicine Department  [facility] Bean is a 80 year old who presents for the following health issues     HPI     ED/UC Followup:    Facility:  [facility] ED  Date of visit: 06/02/2021  Reason for visit: Lightheadedness, Fatigue  Current Status: Patient c/o still having fatigued         Most recent lab results reviewed with pt.      ER note reviewed. Labs noted. Minimal calcium elevation which is not new.   Part of ER note as below:    \"Geneva Shepherd is a 80 year old female with history of hyperlipidemia, dementia, and DVT on Coumadin who presents with dizziness and fatigue. She states that she has been fatigued and lightheaded starting 5 months ago and worsening over the past couple of weeks. Her symptoms are worsened by movement. She states that she is typically able to go on walks, but has been unable to do so recently. She believes that she has been sleeping alright, and takes 1 nap daily. She also notes associated thirst, urinary frequency, and back \"tightness\" for the past 4 months. She has had to wear 2 depends at night. She had a telephone visit yesterday with plan to follow up with her primary care provider but was unable to make an appointment and she presented to the ED. Her appetite has been normal, although she has lost 18 lbs over the last year. She denies melena, nausea, and vomiting. No chest pain or shortness of breath. She denies a history of diabetes. \"       Going to be at midnight or 1 AM. Gets up at " 9 AM. Not feeling refreshed in AM   Has chronic bladder retention and doing self cath twice a day. Usually PM time  Is mid evening and not before bed. Will urinate at bedtime and still get a lot of urine out  Wears depends at night. Gets up at 3 AM with large amount of urine in Depends pad. Wakes up 2-3x/night because of urine  1 cup coffee in AM. Has some water at night with pills at 8 pm  Has f/u with Dr Cartagena next week  If gets better sleep at night, then tends to have better energy in the day  Patient lives by herself.  Unsure if she snores.  On amitriptyline long-term for insomnia.  Did not have fatigue symptoms in the past when using the medication prior to urinary symptoms coming on  Occasional lightheadedness.  Not specifically with position change.  Denies actual vertigo.  Denies palpitations.  Symptoms usually when feels more tired.  Denies symptoms now  Working with wound care for history right lower extremity skin ulceration.  Most recent note from 2 weeks ago reviewed.  Patient using a compression stocking on the right leg and wrap.  Denies current fevers or chills.  She states skin is improving      Component      Latest Ref Rng & Units 6/2/2021   Color Urine       Straw   Appearance Urine       Clear   Glucose Urine      NEG:Negative mg/dL Negative   Bilirubin Urine      NEG:Negative Negative   Ketones Urine      NEG:Negative mg/dL Negative   Specific Gravity Urine      1.003 - 1.035 1.008   Blood Urine      NEG:Negative Negative   pH Urine      5.0 - 7.0 pH 5.5   Protein Albumin Urine      NEG:Negative mg/dL Negative   Urobilinogen mg/dL      0.0 - 2.0 mg/dL 0.0   Nitrite Urine      NEG:Negative Negative   Leukocyte Esterase Urine      NEG:Negative Negative   Source       Midstream Urine         Component      Latest Ref Rng & Units 8/20/2020 2/23/2021 6/2/2021   WBC      4.0 - 11.0 10e9/L   6.7   RBC Count      3.8 - 5.2 10e12/L   4.36   Hemoglobin      11.7 - 15.7 g/dL   13.9   Hematocrit       "35.0 - 47.0 %   43.0   MCV      78 - 100 fl   99   MCH      26.5 - 33.0 pg   31.9   MCHC      31.5 - 36.5 g/dL   32.3   RDW      10.0 - 15.0 %   13.4   Platelet Count      150 - 450 10e9/L   236   Diff Method         Automated Method   % Neutrophils      %   61.8   % Lymphocytes      %   26.4   % Monocytes      %   9.1   % Eosinophils      %   1.6   % Basophils      %   0.7   % Immature Granulocytes      %   0.4   Nucleated RBCs      0 /100   0   Absolute Neutrophil      1.6 - 8.3 10e9/L   4.1   Absolute Lymphocytes      0.8 - 5.3 10e9/L   1.8   Absolute Monocytes      0.0 - 1.3 10e9/L   0.6   Absolute Eosinophils      0.0 - 0.7 10e9/L   0.1   Absolute Basophils      0.0 - 0.2 10e9/L   0.1   Abs Immature Granulocytes      0 - 0.4 10e9/L   0.0   Absolute Nucleated RBC         0.0   Sodium      133 - 144 mmol/L 136 136 137   Potassium      3.4 - 5.3 mmol/L 4.4 3.7 4.1   Chloride      94 - 109 mmol/L 103 104 105   Carbon Dioxide      20 - 32 mmol/L 25 23 28   Anion Gap      3 - 14 mmol/L 8 8 4   Glucose      70 - 99 mg/dL 94 98 103 (H)   Urea Nitrogen      7 - 30 mg/dL 13 14 18   Creatinine      0.52 - 1.04 mg/dL 0.60 0.59 0.62   GFR Estimate      >60 mL/min/1.73:m2 88 88 86   GFR Estimate If Black      >60 mL/min/1.73:m2 >90 >90 >90   Calcium      8.5 - 10.1 mg/dL 10.8 (H) 9.8 10.4 (H)   Vitamin D Deficiency screening      20 - 75 ug/L 44     Parathyroid Hormone Intact      18 - 80 pg/mL 35     TSH      0.40 - 4.00 mU/L  3.21 1.98       Additional ROS:   Constitutional, HEENT, Cardiovascular, Pulmonary, GI and , Neuro, MSK and Psych review of systems/symptoms are otherwise negative or unchanged from previous, except as noted above.      OBJECTIVE:  /72   Pulse 82   Temp 97.2  F (36.2  C) (Temporal)   Resp 16   Wt 81.2 kg (179 lb)   SpO2 95%   BMI 28.89 kg/m     Estimated body mass index is 28.89 kg/m  as calculated from the following:    Height as of 6/2/21: 1.676 m (5' 6\").    Weight as of this " encounter: 81.2 kg (179 lb).       Neck: no adenopathy. Thyroid normal to palpation. No bruits  Pulm: Lungs clear to auscultation   CV: Regular rates and rhythm. No ectopy  GI: Soft, nontender, Normal active bowel sounds, No hepatosplenomegaly or masses palpable  Ext: Peripheral pulses intact.  Mild BLE edema. RLE wrapped with dressing and not examined  Neuro: Normal strength and tone, sensory exam grossly normal. Neg Hallpike. Stable gait. No tremor. Gets up out of chair easily without lightheadedness sx

## 2021-06-04 NOTE — PATIENT INSTRUCTIONS
Straight cath right before bedtime to empty the bladder  completely    Stop ALL caffeine intake  (coffee, eat, soda pop)  Stop fluid intake after 6 PM except for small amount for pills at night  Follow-up with Dr Cartagena next week re: bladder function  If daytime fatigue continues over the next few weeks despite these changes, then let me know and will refer to  Sleep clinic for possible home sleep study to rule out other causes for fatigue

## 2021-06-05 PROBLEM — L03.116 CELLULITIS OF LEFT LOWER EXTREMITY: Status: RESOLVED | Noted: 2021-02-21 | Resolved: 2021-06-05

## 2021-06-05 RX ORDER — LEVOTHYROXINE SODIUM 50 UG/1
50 TABLET ORAL DAILY
Qty: 90 TABLET | Refills: 3 | Status: SHIPPED | OUTPATIENT
Start: 2021-06-05 | End: 2021-08-20

## 2021-06-23 ENCOUNTER — TELEPHONE (OUTPATIENT)
Dept: WOUND CARE | Facility: CLINIC | Age: 81
End: 2021-06-23

## 2021-06-23 DIAGNOSIS — F32.0 MAJOR DEPRESSIVE DISORDER, SINGLE EPISODE, MILD (H): ICD-10-CM

## 2021-06-23 NOTE — TELEPHONE ENCOUNTER
[facility]    Who is the name of the provider?:  Georgiana      What is the location you see this provider at?: [place]    Reason for call:  Please call to reschedule  appt, due to .    Can we leave a detailed message on this number?  YES

## 2021-06-24 ENCOUNTER — ANTICOAGULATION THERAPY VISIT (OUTPATIENT)
Dept: NURSING | Facility: CLINIC | Age: 81
End: 2021-06-24

## 2021-06-24 DIAGNOSIS — Z86.718 PERSONAL HISTORY OF DVT (DEEP VEIN THROMBOSIS): ICD-10-CM

## 2021-06-24 DIAGNOSIS — Z79.01 LONG TERM CURRENT USE OF ANTICOAGULANT THERAPY: ICD-10-CM

## 2021-06-24 LAB
CAPILLARY BLOOD COLLECTION: NORMAL
INR PPP: 3.7 (ref 0.86–1.14)

## 2021-06-24 PROCEDURE — 36416 COLLJ CAPILLARY BLOOD SPEC: CPT | Performed by: INTERNAL MEDICINE

## 2021-06-24 PROCEDURE — 85610 PROTHROMBIN TIME: CPT | Performed by: INTERNAL MEDICINE

## 2021-06-24 RX ORDER — PAROXETINE 20 MG/1
40 TABLET, FILM COATED ORAL EVERY MORNING
Qty: 180 TABLET | Refills: 1 | Status: SHIPPED | OUTPATIENT
Start: 2021-06-24 | End: 2021-08-20

## 2021-06-24 NOTE — PROGRESS NOTES
ANTICOAGULATION MANAGEMENT     Geneva Shepherd 80 year old female is on warfarin with supratherapeutic INR result. (Goal INR 2.0-3.0)    Recent labs: (last 7 days)     06/24/21  1434   INR 3.70*       ASSESSMENT     Source(s): Patient/Caregiver Call       Warfarin doses taken: Warfarin taken as instructed    Diet: Decreased greens/vitamin K in diet; plans to resume previous intake    New illness, injury, or hospitalization: No. Pt was in the ER on 6/2/21. Pt reports that her ankle wound is healing.    Medication/supplement changes: None noted    Signs or symptoms of bleeding or clotting: No    Previous INR: Therapeutic last 2(+) visits    Additional findings: None     PLAN     Recommended plan for temporary change(s) affecting INR     Dosing Instructions: Hold dose then continue your current warfarin dose with next INR in 2 weeks   HOLD on 6/24/21. Discussed reducing maintenance dose but pt declined and stated that she will go back to her previous green intake. Pt reports that she loves greens.    Summary  As of 6/24/2021    Full warfarin instructions:  6/24: Hold; Otherwise 5 mg every Mon, Thu, Sat; 7.5 mg all other days   Next INR check:  7/8/2021             Telephone call with Geneva whom agrees to plan and repeated back plan correctly    Lab visit scheduled    Education provided: Please call back if any changes to your diet, medications or how you've been taking warfarin    Plan made per [facility] anticoagulation protocol    Barbie Reyes, RN  Anticoagulation Clinic  6/24/2021    _______________________________________________________________________     Anticoagulation Episode Summary     Current INR goal:  2.0-3.0   TTR:  71.0 % (1 y)   Target end date:  Indefinite   Send INR reminders to:  [facility]    Indications    Long term current use of anticoagulant therapy [Z79.01]  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

## 2021-06-28 ENCOUNTER — HOSPITAL ENCOUNTER (OUTPATIENT)
Dept: WOUND CARE | Facility: CLINIC | Age: 81
Discharge: HOME | End: 2021-06-28
Attending: SURGERY | Admitting: SURGERY
Payer: MEDICARE

## 2021-06-28 ENCOUNTER — TRANSFERRED RECORDS (OUTPATIENT)
Dept: HEALTH INFORMATION MANAGEMENT | Facility: CLINIC | Age: 81
End: 2021-06-28

## 2021-06-28 VITALS
RESPIRATION RATE: 18 BRPM | DIASTOLIC BLOOD PRESSURE: 75 MMHG | TEMPERATURE: 96.8 F | SYSTOLIC BLOOD PRESSURE: 149 MMHG | HEART RATE: 85 BPM

## 2021-06-28 DIAGNOSIS — S81.001A OPEN WOUND OF KNEE, LEG, AND ANKLE, RIGHT, INITIAL ENCOUNTER: ICD-10-CM

## 2021-06-28 DIAGNOSIS — S91.001A OPEN WOUND OF KNEE, LEG, AND ANKLE, RIGHT, INITIAL ENCOUNTER: ICD-10-CM

## 2021-06-28 DIAGNOSIS — L97.812 VENOUS STASIS ULCER OF OTHER PART OF RIGHT LOWER LEG WITH FAT LAYER EXPOSED WITH VARICOSE VEINS (H): ICD-10-CM

## 2021-06-28 DIAGNOSIS — S81.801A OPEN WOUND OF KNEE, LEG, AND ANKLE, RIGHT, INITIAL ENCOUNTER: ICD-10-CM

## 2021-06-28 DIAGNOSIS — I83.018 VENOUS STASIS ULCER OF OTHER PART OF RIGHT LOWER LEG WITH FAT LAYER EXPOSED WITH VARICOSE VEINS (H): ICD-10-CM

## 2021-06-28 PROCEDURE — 97597 DBRDMT OPN WND 1ST 20 CM/<: CPT | Performed by: SURGERY

## 2021-06-28 PROCEDURE — 97602 WOUND(S) CARE NON-SELECTIVE: CPT

## 2021-06-28 PROCEDURE — 11042 DBRDMT SUBQ TIS 1ST 20SQCM/<: CPT

## 2021-06-28 RX ORDER — NITROFURANTOIN 25; 75 MG/1; MG/1
CAPSULE ORAL
COMMUNITY
Start: 2021-06-28 | End: 2021-08-20

## 2021-06-28 NOTE — PROGRESS NOTES
[facility] Progress Note    Subject: Geneva Shepherd chronic right lateral cardiac is fine yes abduction, lymphedema, improved with edema wear and utilization of antimicrobial endoform.    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     Asymptomatic varicose veins, bilateral     Personal history of RLE DVT (deep vein thrombosis)(2003)     Anticoagulated on Coumadin     Long term current use of anticoagulant therapy     Hypothyroidism     Insomnia     Knee joint replacement status     Major depressive disorder, single episode, mild (H)     Gastroesophageal reflux disease, esophagitis presence not specified     Hypercalcemia     Memory loss     Open wound of knee, leg, and ankle, right, initial encounter     Venous stasis ulcer of other part of right lower leg with fat layer exposed with varicose veins (H)     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     FRACTURE OF RIGHT LATERAL PROXIMAL TIBIA 11/07     Gastro-oesophageal reflux disease     rare     Hypercalcemia      Hyperlipidemia LDL goal <160 10/31/2010     Insomnia      Irritable bowel syndrome      Obesity 9/11/2013     Pain in joint, lower leg      Phlebitis and thrombophlebitis of superficial vessels of lower extremities 7/03    right     Sprain of lumbar region       Unspecified hypothyroidism      Urinary tract infection, site not specified      Exam:  BP (!) 149/75   Pulse 85   Temp 96.8  F (36  C) (Temporal)   Resp 18   Wound (used by OP [facility] only) 03/31/21 1345 Right (Active)   Thickness/Stage full thickness 06/28/21 1315   Dressing Appearance moist drainage 06/28/21 1315   Base granulating;slough 06/28/21 1315   Periwound intact 06/28/21 1315   Periwound Temperature warm 06/28/21 1315   Periwound Skin Turgor soft 06/28/21 1315   Edges open 06/28/21 1315   Length (cm) 1 06/28/21 1315   Width (cm) 0.7 06/28/21 1315   Depth (cm) 0.2 06/28/21 1315   Wound (cm^2) 0.7 cm^2 06/28/21 1315   Wound Volume (cm^3) 0.14 cm^3 06/28/21 1315   Wound healing % 12.5 06/28/21 1315   Drainage Characteristics/Odor serosanguineous 06/28/21 1315   Drainage Amount moderate 06/28/21 1315   Care, Wound non-select wound debridement performed 06/28/21 1315     Wound overall decreased dimensions, no cellulitis, no warmth exposure, no undermining, palpable right dorsalis pedis pulse.        Impression: Improving right lower extremity venous hypertensive ulceration with associated lymphedema    Plan: We will dress the wounds with antimicrobial endoform, edema wear, Tubigrip, change on a daily basis, micronized purified flavonoid fraction and adjunctive micronutrients.  Patient will return to the clinic in 4 weeks time    Too Stoner MD on 6/28/2021 at 1:20 PM

## 2021-06-28 NOTE — DISCHARGE INSTRUCTIONS
[facility]  [address] [address]    Call us at [phone] if you have any questions about your wounds, have redness or swelling around your wound, have a fever of 101 or greater or if you have any other problems or concerns. We answer the phone Monday through Friday 8 am to 4 pm, please leave a message as we check the voicemail frequently throughout the day.     Geneva Shepherd      1940    Medications/supplements to aid in healin packet of Cristobal Supplement into your favorite beverage twice a day.  Vitamin D3 10,000 iu per day  Vitamin C 2,000 mg daily  Methyl Folate 1000 mcg daily  Vitamin B 12 1000 mcg daily  Hesperidin Diosmin 1,000 mg the lymph formula tablet twice daily order from [employer] or from [employer]  Wound care recommendations to Right lower leg:  After cleansing with soap and water in shower, apply small amount of VASHE on gauze to wound, Apply a thin layer of Betamethasone Ointment to intact skin on legs.  Then wrap with saran wrap for one hour. After removing apply moisturizing cream (Cera-Ve, Aquaphor, Eucerin, ect.)  Then endoform antimicrobial Apply blue stripe edemawear then gauze and roll gauze  Change dressing daily  Compression:  Your compression wrap is spandigrip e and wear day and night for compression       STEPHAN Stoner M.D.. 2021      If you had a positive experience please indicate on your patient satisfaction survey form that [facility] will be sending you.    If you have any billing related questions please call the [facility] Business office at [phone]. The clinic staff does not handle billing related matters.

## 2021-07-13 DIAGNOSIS — Z86.718 PERSONAL HISTORY OF DVT (DEEP VEIN THROMBOSIS): Primary | ICD-10-CM

## 2021-07-14 ENCOUNTER — ANTICOAGULATION THERAPY VISIT (OUTPATIENT)
Dept: NURSING | Facility: CLINIC | Age: 81
End: 2021-07-14

## 2021-07-14 ENCOUNTER — LAB (OUTPATIENT)
Dept: LAB | Facility: CLINIC | Age: 81
End: 2021-07-14
Payer: MEDICARE

## 2021-07-14 DIAGNOSIS — Z86.718 PERSONAL HISTORY OF DVT (DEEP VEIN THROMBOSIS): ICD-10-CM

## 2021-07-14 LAB — INR BLD: 2.9 (ref 0.9–1.1)

## 2021-07-14 PROCEDURE — 36416 COLLJ CAPILLARY BLOOD SPEC: CPT

## 2021-07-14 PROCEDURE — 85610 PROTHROMBIN TIME: CPT

## 2021-07-14 NOTE — PROGRESS NOTES
ANTICOAGULATION MANAGEMENT     Geneva Shepherd 80 year old female is on warfarin with therapeutic INR result. (Goal INR 2.0-3.0)    Recent labs: (last 7 days)     07/14/21  1541   INR 2.9*       ASSESSMENT     Source(s): Patient/Caregiver Call       Warfarin doses taken: Warfarin taken as instructed    Diet: No new diet changes identified    New illness, injury, or hospitalization: Yes: working with urology regarding urinary retention. Pt's leg wound improving per report.    Medication/supplement changes: None noted    Signs or symptoms of bleeding or clotting: No    Previous INR: Supratherapeutic    Additional findings: None     PLAN     Recommended plan for no diet, medication or health factor changes affecting INR     Dosing Instructions: Continue your current warfarin dose with next INR in 4 weeks       Summary  As of 7/14/2021    Full warfarin instructions:  5 mg every Mon, Thu, Sat; 7.5 mg all other days   Next INR check:               Telephone call with Geneva who verbalizes understanding and agrees to plan    Lab visit scheduled    Education provided: Importance of consistent vitamin K intake    Plan made per [facility] anticoagulation protocol    Barbie Reyes RN  Anticoagulation Clinic  7/14/2021    _______________________________________________________________________     Anticoagulation Episode Summary     Current INR goal:  2.0-3.0   TTR:  66.2 % (1 y)   Target end date:  Indefinite   Send INR reminders to:  [facility]    Indications    Long term current use of anticoagulant therapy [Z79.01]  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

## 2021-07-29 DIAGNOSIS — R41.3 MEMORY LOSS: ICD-10-CM

## 2021-07-29 RX ORDER — DONEPEZIL HYDROCHLORIDE 10 MG/1
TABLET, FILM COATED ORAL
Qty: 90 TABLET | Refills: 3 | Status: SHIPPED | OUTPATIENT
Start: 2021-07-29 | End: 2021-08-20

## 2021-07-29 NOTE — TELEPHONE ENCOUNTER
Prescription approved per [facility] Refill Protocol.  Burt Durán RN  [facility] Triage Nurse

## 2021-08-06 ENCOUNTER — VIRTUAL VISIT (OUTPATIENT)
Dept: URGENT CARE | Facility: CLINIC | Age: 81
End: 2021-08-06
Payer: MEDICARE

## 2021-08-06 DIAGNOSIS — R53.81 MALAISE: Primary | ICD-10-CM

## 2021-08-06 PROCEDURE — 99441 PR PHYSICIAN TELEPHONE EVALUATION 5-10 MIN: CPT | Mod: 95 | Performed by: EMERGENCY MEDICINE

## 2021-08-06 NOTE — LETTER
"    8/6/2021        RE: Geneva Shepherd  [address]  [facility] [address]        Phone appointment:    Assessment: 3 to 4-week history of malaise and 80-year-old female with multiple medical problems.  No cough or any other respiratory symptoms.  She is not vaccinated against Covid.  She does self cath and is not able to determine whether she has a UTI.  She states her urine was checked in June by her urologist.  She has a chronic wound on her left leg but feels it is doing \"fine\".  Last wound care clinic 6 weeks ago.  She normally is a vigorous walker and has not been able to do her normal walking.      Plan: Covid PCR to be ordered.  It is critical that patient be seen in follow-up by her PCP the patient has noted a fairly precipitous change in her energy level of uncertain etiology.    HPI: Patient is a 80-year-old female who states she has been not herself for 3 to 4 weeks.  She has had marked fatigue and malaise.  She does states she has been having some rhinorrhea, headache, and feeling achy.  She has no cough or shortness of breath.  No obvious Covid exposure.  Patient is not vaccinated against Covid.  Patient does self cath and had her urine checked in June with no evidence of UTI.  She is unable to sense whether she has UTI symptoms.  Patient also has a chronic cellulitis involving her left leg with an open sore.  Last wound care clinic appointment 6 weeks ago but she feels the leg is doing fine.  No other complaints.      ROS: See HPI otherwise normal.    Allergies   Allergen Reactions     Cephalexin Diarrhea      Current Outpatient Medications   Medication Sig Dispense Refill     acetaminophen (TYLENOL) 500 MG tablet Take 500 mg by mouth       Acetylcysteine (N-ACETYL-L-CYSTEINE PO) Take 1 capsule by mouth every morning       amitriptyline (ELAVIL) 25 MG tablet TAKE 1 TABLET BY MOUTH AT BEDTIME 90 tablet 3     Ascorbic Acid (VITAMIN C PO) Take 1,000 mg by mouth every morning (Patient takes 2 " X 500 mg = 1,000 mg dose)       ascorbic acid 1000 MG TABS tablet Take 1,000 mg by mouth       BETA CAROTENE PO Take 25,000 Units by mouth daily.       betamethasone dipropionate (DIPROSONE) 0.05 % external ointment Apply topically daily 45 g 3     Biotin 1 MG CAPS Take 1 tablet by mouth       BIOTIN PO Take 1 tablet by mouth every morning       Chromium Picolinate 1000 MCG TABS Take 800 mcg by mouth       CHROMIUM PICOLINATE 800 MCG OR TABS 1 daily       COCONUT OIL PO Take 1 capsule by mouth every morning       donepezil (ARICEPT) 10 MG tablet TAKE ONE TABLET BY MOUTH EVERY NIGHT AT BEDTIME 90 tablet 3     FISH OIL 1000 MG OR CAPS 1 daily       FLAX SEED OIL 1000 MG OR CAPS 1 daily       FOLIC ACID PO Take 400 mcg by mouth daily        levOCARNitine (CARNITOR) 330 MG tablet Take 330 mg by mouth every morning       levothyroxine (SYNTHROID/LEVOTHROID) 50 MCG tablet Take 1 tablet (50 mcg) by mouth daily 90 tablet 3     magnesium 100 MG CAPS Take 400 mg by mouth       MAGNESIUM OXIDE PO Take 400 mg by mouth daily       Multiple Vitamin (MULTIVITAMIN ADULT PO) Take 1 tablet by mouth       MULTIVITAMIN/MULTIMINERAL TABS   OR 1 TABLET DAILY 30 0     neomycin-polymyxin-dexamethasone (MAXITROL) 3.5-28996-4.1 ophthalmic ointment APPLY 1/4 INCH THIN LAYER INTO RIGHT EYE TWICE A DAY AS DIRECTED. START AFTER SURGERY.       nitroFURantoin macrocrystal (MACRODANTIN) 100 MG capsule TAKE 1 CAPSULE BY MOUTH EVERY DAY  1     nitroFURantoin macrocrystal-monohydrate (MACROBID) 100 MG capsule        PARoxetine (PAXIL) 20 MG tablet TAKE 2 TABLETS (40 MG) BY MOUTH EVERY MORNING 180 tablet 1     prednisoLONE acetate (PRED FORTE) 1 % ophthalmic suspension INSTILL 1 DROP INTO RIGHT EYE TWICE A DAY AS DIRECTED       TURMERIC PO Take 1 capsule by mouth every morning       vitamin B complex with vitamin C (VITAMIN  B COMPLEX) tablet Take 1 tablet by mouth       warfarin ANTICOAGULANT (COUMADIN) 5 MG tablet TAKE ONE TABLET TUES/SAT AND 1 AND  1/2 TABLETS ALL OTHER DAYS AS DIRECTED BY  tablet 3     ZINC 50 MG OR CAPS 1 daily       zinc gluconate 50 MG tablet Take 50 mg by mouth           PE: No acute distress on the phone.  Patient is alert.  Nondyspneic sounding.        TREATMENT: None.      ASSESSMENT: Marked malaise and fatigue of 3 to 4-week duration in a 80-year-old female.  Etiology is unclear but follow-up is indicated with her PCP to further evaluate the cause of her symptoms.  I will order Covid and has asked patient to set up an appointment with her PCP over the next week.      DIAGNOSIS: Malaise.      PLAN: Covid PCR ordered.  Testing team to follow.  Patient is to schedule follow-up with Dr. Boo within the next week for reevaluation of her significant symptoms.    Time: 10 minutes        Sincerely,        Josep Castro MD

## 2021-08-07 DIAGNOSIS — R53.81 MALAISE: ICD-10-CM

## 2021-08-07 LAB — SARS-COV-2 RNA RESP QL NAA+PROBE: NEGATIVE

## 2021-08-07 PROCEDURE — U0005 INFEC AGEN DETEC AMPLI PROBE: HCPCS

## 2021-08-07 PROCEDURE — U0003 INFECTIOUS AGENT DETECTION BY NUCLEIC ACID (DNA OR RNA); SEVERE ACUTE RESPIRATORY SYNDROME CORONAVIRUS 2 (SARS-COV-2) (CORONAVIRUS DISEASE [COVID-19]), AMPLIFIED PROBE TECHNIQUE, MAKING USE OF HIGH THROUGHPUT TECHNOLOGIES AS DESCRIBED BY CMS-2020-01-R: HCPCS

## 2021-08-07 NOTE — PROGRESS NOTES
"Phone appointment:    Assessment: 3 to 4-week history of malaise and 80-year-old female with multiple medical problems.  No cough or any other respiratory symptoms.  She is not vaccinated against Covid.  She does self cath and is not able to determine whether she has a UTI.  She states her urine was checked in June by her urologist.  She has a chronic wound on her left leg but feels it is doing \"fine\".  Last wound care clinic 6 weeks ago.  She normally is a vigorous walker and has not been able to do her normal walking.      Plan: Covid PCR to be ordered.  It is critical that patient be seen in follow-up by her PCP the patient has noted a fairly precipitous change in her energy level of uncertain etiology.    HPI: Patient is a 80-year-old female who states she has been not herself for 3 to 4 weeks.  She has had marked fatigue and malaise.  She does states she has been having some rhinorrhea, headache, and feeling achy.  She has no cough or shortness of breath.  No obvious Covid exposure.  Patient is not vaccinated against Covid.  Patient does self cath and had her urine checked in June with no evidence of UTI.  She is unable to sense whether she has UTI symptoms.  Patient also has a chronic cellulitis involving her left leg with an open sore.  Last wound care clinic appointment 6 weeks ago but she feels the leg is doing fine.  No other complaints.      ROS: See HPI otherwise normal.    Allergies   Allergen Reactions     Cephalexin Diarrhea      Current Outpatient Medications   Medication Sig Dispense Refill     acetaminophen (TYLENOL) 500 MG tablet Take 500 mg by mouth       Acetylcysteine (N-ACETYL-L-CYSTEINE PO) Take 1 capsule by mouth every morning       amitriptyline (ELAVIL) 25 MG tablet TAKE 1 TABLET BY MOUTH AT BEDTIME 90 tablet 3     Ascorbic Acid (VITAMIN C PO) Take 1,000 mg by mouth every morning (Patient takes 2 X 500 mg = 1,000 mg dose)       ascorbic acid 1000 MG TABS tablet Take 1,000 mg by mouth       " BETA CAROTENE PO Take 25,000 Units by mouth daily.       betamethasone dipropionate (DIPROSONE) 0.05 % external ointment Apply topically daily 45 g 3     Biotin 1 MG CAPS Take 1 tablet by mouth       BIOTIN PO Take 1 tablet by mouth every morning       Chromium Picolinate 1000 MCG TABS Take 800 mcg by mouth       CHROMIUM PICOLINATE 800 MCG OR TABS 1 daily       COCONUT OIL PO Take 1 capsule by mouth every morning       donepezil (ARICEPT) 10 MG tablet TAKE ONE TABLET BY MOUTH EVERY NIGHT AT BEDTIME 90 tablet 3     FISH OIL 1000 MG OR CAPS 1 daily       FLAX SEED OIL 1000 MG OR CAPS 1 daily       FOLIC ACID PO Take 400 mcg by mouth daily        levOCARNitine (CARNITOR) 330 MG tablet Take 330 mg by mouth every morning       levothyroxine (SYNTHROID/LEVOTHROID) 50 MCG tablet Take 1 tablet (50 mcg) by mouth daily 90 tablet 3     magnesium 100 MG CAPS Take 400 mg by mouth       MAGNESIUM OXIDE PO Take 400 mg by mouth daily       Multiple Vitamin (MULTIVITAMIN ADULT PO) Take 1 tablet by mouth       MULTIVITAMIN/MULTIMINERAL TABS   OR 1 TABLET DAILY 30 0     neomycin-polymyxin-dexamethasone (MAXITROL) 3.5-88437-7.1 ophthalmic ointment APPLY 1/4 INCH THIN LAYER INTO RIGHT EYE TWICE A DAY AS DIRECTED. START AFTER SURGERY.       nitroFURantoin macrocrystal (MACRODANTIN) 100 MG capsule TAKE 1 CAPSULE BY MOUTH EVERY DAY  1     nitroFURantoin macrocrystal-monohydrate (MACROBID) 100 MG capsule        PARoxetine (PAXIL) 20 MG tablet TAKE 2 TABLETS (40 MG) BY MOUTH EVERY MORNING 180 tablet 1     prednisoLONE acetate (PRED FORTE) 1 % ophthalmic suspension INSTILL 1 DROP INTO RIGHT EYE TWICE A DAY AS DIRECTED       TURMERIC PO Take 1 capsule by mouth every morning       vitamin B complex with vitamin C (VITAMIN  B COMPLEX) tablet Take 1 tablet by mouth       warfarin ANTICOAGULANT (COUMADIN) 5 MG tablet TAKE ONE TABLET TUES/SAT AND 1 AND 1/2 TABLETS ALL OTHER DAYS AS DIRECTED BY  tablet 3     ZINC 50 MG OR CAPS 1 daily        zinc gluconate 50 MG tablet Take 50 mg by mouth           PE: No acute distress on the phone.  Patient is alert.  Nondyspneic sounding.        TREATMENT: None.      ASSESSMENT: Marked malaise and fatigue of 3 to 4-week duration in a 80-year-old female.  Etiology is unclear but follow-up is indicated with her PCP to further evaluate the cause of her symptoms.  I will order Covid and has asked patient to set up an appointment with her PCP over the next week.      DIAGNOSIS: Malaise.      PLAN: Covid PCR ordered.  Testing team to follow.  Patient is to schedule follow-up with Dr. Boo within the next week for reevaluation of her significant symptoms.    Time: 10 minutes

## 2021-08-11 ENCOUNTER — ANTICOAGULATION THERAPY VISIT (OUTPATIENT)
Dept: ANTICOAGULATION | Facility: CLINIC | Age: 81
End: 2021-08-11

## 2021-08-11 ENCOUNTER — LAB (OUTPATIENT)
Dept: LAB | Facility: CLINIC | Age: 81
End: 2021-08-11
Payer: MEDICARE

## 2021-08-11 DIAGNOSIS — Z79.01 LONG TERM CURRENT USE OF ANTICOAGULANT THERAPY: Primary | ICD-10-CM

## 2021-08-11 DIAGNOSIS — Z86.718 PERSONAL HISTORY OF DVT (DEEP VEIN THROMBOSIS): ICD-10-CM

## 2021-08-11 LAB — INR BLD: 4.1 (ref 0.9–1.1)

## 2021-08-11 PROCEDURE — 36416 COLLJ CAPILLARY BLOOD SPEC: CPT

## 2021-08-11 PROCEDURE — 85610 PROTHROMBIN TIME: CPT

## 2021-08-11 NOTE — PROGRESS NOTES
ANTICOAGULATION MANAGEMENT     Geneva Shepherd 80 year old female is on warfarin with supratherapeutic INR result. (Goal INR 2.0-3.0)    Recent labs: (last 7 days)     08/11/21  1542   INR 4.1*       ASSESSMENT     Source(s): Chart Review and Patient/Caregiver Call       Warfarin doses taken: Warfarin taken as instructed    Diet: No new diet changes identified    New illness, injury, or hospitalization: Yes: has been feeling general malaise for more than a month. Seen by  provider and advised to f/u with PCP    Medication/supplement changes: None noted    Signs or symptoms of bleeding or clotting: No    Previous INR: Therapeutic last visit; previously outside of goal range. Has been struggling on and off for quite some time with high INRs.    Additional findings: None     PLAN     Recommended plan for ongoing change(s) affecting INR     Dosing Instructions: Hold dose then Decrease your warfarin dose (11% change) with next INR in 10 days       Summary  As of 8/11/2021    Full warfarin instructions:  8/11: Hold; Otherwise 7.5 mg every Sun, Thu; 5 mg all other days   Next INR check:  8/20/2021             Telephone call with Geneva who verbalizes understanding and agrees to plan    Lab visit scheduled. Also scheduled visit same day with Dr. Boo.    Education provided: Please call back if any changes to your diet, medications or how you've been taking warfarin, Monitoring for bleeding signs and symptoms, Monitoring for clotting signs and symptoms and When to seek medical attention/emergency care    Plan made per [facility] anticoagulation protocol    Nemo Reyes RN  Anticoagulation Clinic  8/11/2021    _______________________________________________________________________     Anticoagulation Episode Summary     Current INR goal:  2.0-3.0   TTR:  59.1 % (1 y)   Target end date:  Indefinite   [facility]    Indications    Long term current use of anticoagulant therapy  [Z79.01]  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

## 2021-08-17 ENCOUNTER — TELEPHONE (OUTPATIENT)
Dept: WOUND CARE | Facility: CLINIC | Age: 81
End: 2021-08-17

## 2021-08-20 ENCOUNTER — ANTICOAGULATION THERAPY VISIT (OUTPATIENT)
Dept: ANTICOAGULATION | Facility: CLINIC | Age: 81
End: 2021-08-20
Payer: MEDICARE

## 2021-08-20 ENCOUNTER — LAB (OUTPATIENT)
Dept: LAB | Facility: CLINIC | Age: 81
End: 2021-08-20
Payer: MEDICARE

## 2021-08-20 ENCOUNTER — OFFICE VISIT (OUTPATIENT)
Dept: INTERNAL MEDICINE | Facility: CLINIC | Age: 81
End: 2021-08-20
Payer: MEDICARE

## 2021-08-20 ENCOUNTER — TELEPHONE (OUTPATIENT)
Dept: INTERNAL MEDICINE | Facility: CLINIC | Age: 81
End: 2021-08-20

## 2021-08-20 VITALS
HEART RATE: 82 BPM | OXYGEN SATURATION: 96 % | RESPIRATION RATE: 16 BRPM | SYSTOLIC BLOOD PRESSURE: 132 MMHG | TEMPERATURE: 98.3 F | WEIGHT: 176 LBS | BODY MASS INDEX: 28.28 KG/M2 | DIASTOLIC BLOOD PRESSURE: 70 MMHG | HEIGHT: 66 IN

## 2021-08-20 DIAGNOSIS — Z86.718 PERSONAL HISTORY OF DVT (DEEP VEIN THROMBOSIS): ICD-10-CM

## 2021-08-20 DIAGNOSIS — Z00.00 MEDICARE ANNUAL WELLNESS VISIT, SUBSEQUENT: ICD-10-CM

## 2021-08-20 DIAGNOSIS — R53.83 OTHER FATIGUE: ICD-10-CM

## 2021-08-20 DIAGNOSIS — Z12.31 ENCOUNTER FOR SCREENING MAMMOGRAM FOR BREAST CANCER: ICD-10-CM

## 2021-08-20 DIAGNOSIS — G47.00 INSOMNIA, UNSPECIFIED TYPE: ICD-10-CM

## 2021-08-20 DIAGNOSIS — D68.51 ACTIVATED PROTEIN C RESISTANCE (H): ICD-10-CM

## 2021-08-20 DIAGNOSIS — E03.9 HYPOTHYROIDISM, UNSPECIFIED TYPE: ICD-10-CM

## 2021-08-20 DIAGNOSIS — F32.0 MAJOR DEPRESSIVE DISORDER, SINGLE EPISODE, MILD (H): ICD-10-CM

## 2021-08-20 DIAGNOSIS — E83.52 HYPERCALCEMIA: ICD-10-CM

## 2021-08-20 DIAGNOSIS — Z79.01 LONG TERM CURRENT USE OF ANTICOAGULANT THERAPY: Primary | ICD-10-CM

## 2021-08-20 DIAGNOSIS — R41.3 MEMORY LOSS: ICD-10-CM

## 2021-08-20 LAB
CORTIS SERPL-MCNC: 8.4 UG/DL (ref 4–22)
INR BLD: 2.4 (ref 0.9–1.1)
PTH-INTACT SERPL-MCNC: 49 PG/ML (ref 18–80)

## 2021-08-20 PROCEDURE — 99207 PR NO CHARGE NURSE ONLY: CPT

## 2021-08-20 PROCEDURE — 84484 ASSAY OF TROPONIN QUANT: CPT | Performed by: EMERGENCY MEDICINE

## 2021-08-20 PROCEDURE — 36415 COLL VENOUS BLD VENIPUNCTURE: CPT | Performed by: INTERNAL MEDICINE

## 2021-08-20 PROCEDURE — 99213 OFFICE O/P EST LOW 20 MIN: CPT | Mod: 25 | Performed by: INTERNAL MEDICINE

## 2021-08-20 PROCEDURE — 80053 COMPREHEN METABOLIC PANEL: CPT | Performed by: INTERNAL MEDICINE

## 2021-08-20 PROCEDURE — G0439 PPPS, SUBSEQ VISIT: HCPCS | Performed by: INTERNAL MEDICINE

## 2021-08-20 PROCEDURE — 36416 COLLJ CAPILLARY BLOOD SPEC: CPT

## 2021-08-20 PROCEDURE — 83970 ASSAY OF PARATHORMONE: CPT | Performed by: INTERNAL MEDICINE

## 2021-08-20 PROCEDURE — 82533 TOTAL CORTISOL: CPT | Performed by: INTERNAL MEDICINE

## 2021-08-20 PROCEDURE — 85610 PROTHROMBIN TIME: CPT

## 2021-08-20 RX ORDER — WARFARIN SODIUM 5 MG/1
TABLET ORAL
Qty: 115 TABLET | Refills: 3 | Status: SHIPPED | OUTPATIENT
Start: 2021-08-20 | End: 2022-10-20

## 2021-08-20 RX ORDER — PAROXETINE 20 MG/1
40 TABLET, FILM COATED ORAL EVERY MORNING
Qty: 180 TABLET | Refills: 1 | Status: SHIPPED | OUTPATIENT
Start: 2021-08-20 | End: 2022-03-18

## 2021-08-20 RX ORDER — DONEPEZIL HYDROCHLORIDE 10 MG/1
TABLET, FILM COATED ORAL
Qty: 90 TABLET | Refills: 3 | Status: SHIPPED | OUTPATIENT
Start: 2021-08-20 | End: 2022-04-11

## 2021-08-20 RX ORDER — LEVOTHYROXINE SODIUM 50 UG/1
50 TABLET ORAL DAILY
Qty: 90 TABLET | Refills: 3 | Status: SHIPPED | OUTPATIENT
Start: 2021-08-20 | End: 2022-09-01

## 2021-08-20 ASSESSMENT — MIFFLIN-ST. JEOR: SCORE: 1295.08

## 2021-08-20 ASSESSMENT — PATIENT HEALTH QUESTIONNAIRE - PHQ9: SUM OF ALL RESPONSES TO PHQ QUESTIONS 1-9: 4

## 2021-08-20 NOTE — PATIENT INSTRUCTIONS
Continue current meds  Prescriptions refilled.    Labs today as ordered  Check with insurance or speak with your pharmacist re: Shingrix vaccine coverage for shingles prevention.  This is a 2 shot series done 2-6 months apart  I would recommend you receive an influenza (flu) vaccine  this Fall (October or early November)  Pt declines covid vaccine  See me in 6 months re: mental health. Memory, earlier as needed  Mammogram  Pt was informed regarding extra E&M billing for management of new or established medical issues not related to today's wellness visit

## 2021-08-20 NOTE — PROGRESS NOTES
ASSESSMENT:   1. Medicare annual wellness visit, subsequent  Discussed recommendation for Covid vaccination and Shingrix vaccine.  Patient declines both at this time but will consider the Shingrix option.  Encourage patient to be wearing hearing aids all the time.  Urinary issues being managed by urology    2. Major depressive disorder, single episode, mild (H)  PHQ at goal.  Controlled.  Continue current medication  - PARoxetine (PAXIL) 20 MG tablet; Take 2 tablets (40 mg) by mouth every morning  Dispense: 180 tablet; Refill: 1    3. Hypothyroidism, unspecified type  Recent TSH normal.  Continue current medication.  Repeat lab 1 year  - levothyroxine (SYNTHROID/LEVOTHROID) 50 MCG tablet; Take 1 tablet (50 mcg) by mouth daily  Dispense: 90 tablet; Refill: 3    4. Activated protein C resistance (H)  Previous DVT.  Recent INR at goal.  Continue warfarin therapy and further management per [facility]  - warfarin ANTICOAGULANT (COUMADIN) 5 MG tablet; TAKE ONE TABLET TUES/SAT AND 1 AND 1/2 TABLETS ALL OTHER DAYS AS DIRECTED BY [facility]  Dispense: 115 tablet; Refill: 3    5. Hypercalcemia  Mild elevation as below.  Labs as ordered  - Parathyroid Hormone Intact  - Comprehensive metabolic panel; Future  - Parathyroid Hormone Intact; Future  - Comprehensive metabolic panel    6. Insomnia, unspecified type  Controlled.  Continue current medication.  Recently feeling refreshed in the morning  - amitriptyline (ELAVIL) 25 MG tablet; Take 1 tablet (25 mg) by mouth At Bedtime  Dispense: 90 tablet; Refill: 3    7. Memory loss  6 CIT improved.  Continue current medication  - donepezil (ARICEPT) 10 MG tablet; TAKE ONE TABLET BY MOUTH EVERY NIGHT AT BEDTIME  Dispense: 90 tablet; Refill: 3    8. Other fatigue  Recent hemoglobin okay.  Thyroid okay.  Labs as ordered.  Patient lives by herself.  Denies known history of snoring previously  - Cortisol; Future  - Cortisol  - Comprehensive metabolic panel; Future  - Comprehensive metabolic  "panel    9. Encounter for screening mammogram for breast cancer  Due for breast cancer screening  - *MA Screening Digital Bilateral; Future      PLAN:  Continue current meds  Prescriptions refilled.    Labs today as ordered  Check with insurance or speak with your pharmacist re: Shingrix vaccine coverage for shingles prevention.  This is a 2 shot series done 2-6 months apart  I would recommend you receive an influenza (flu) vaccine  this Fall (October or early November)  Pt declines covid vaccine  See me in 6 months re: mental health. Memory, earlier as needed  Pt was informed regarding extra E&M billing for management of new or established medical issues not related to today's wellness visit        (Chart documentation was completed, in part, with [employer] voice-recognition software. Even though reviewed, some grammatical, spelling, and word errors may remain.)    Jacoby Boo MD  Internal Medicine Department  [facility]      Jeff Bean is a 80 year old who presents for the following health issues     HPI   Chief Complaint   Patient presents with     Fatigue     Tired though better recently     Add on     Wellness       Annual Wellness Visit    Patient has been advised of split billing requirements and indicates understanding: Yes     Are you in the first 12 months of your Medicare Part B coverage?  No    Physical Health:    In general, how would you rate your overall physical health? excellent    Outside of work, how many days during the week do you exercise?none    Outside of work, approximately how many minutes a day do you exercise?not applicable    If you drink alcohol do you typically have >3 drinks per day or >7 drinks per week? No    Do you usually eat at least 4 servings of fruit and vegetables a day, include whole grains & fiber and avoid regularly eating high fat or \"junk\" foods? Yes    Do you have any problems taking medications regularly? No    Do you have any " side effects from medications? not applicable    Needs assistance for the following daily activities: no assistance needed    Which of the following safety concerns are present in your home?  none identified     Hearing impairment: No    In the past 6 months, have you been bothered by leaking of urine? yes    Mental Health:    In general, how would you rate your overall mental or emotional health? good  PHQ-2 Score:0    PHQ 2020   PHQ-9 Total Score 2 3 4   Q9: Thoughts of better off dead/self-harm past 2 weeks Not at all Not at all Not at all     Do you feel safe in your environment? Yes    Have you ever done Advance Care Planning? (For example, a Health Directive, POLST, or a discussion with a medical provider or your loved ones about your wishes)? Yes, patient states has an Advance Care Planning document and will bring a copy to the clinic.    Fall risk:  Fallen 2 or more times in the past year?: No  Any fall with injury in the past year?: Yes    Cognitive Screenin) Repeat 3 items (Leader, Season, Table)    2) Clock draw: ABNORMAL   3) 3 item recall: Recalls 1 object   Results: ABNORMAL clock, 1 items recalled: PROBABLE COGNITIVE IMPAIRMENT, **INFORM PROVIDER**  Six Item Cognitive Impairment Test   (6CIT):      What year is it?                               Correct - 0 points    What month is it?                               Correct - 0 points      Give the patient an address to remember with five components:   Brett Ying ( first and last name - 2 components)   [address]  (number and name of street - 2 components)   [place] ( city - 1 component)      About what time is it (within the hour)? Correct - 0 points    Count backwards from 20 to 1:   Correct - 0 points    Say the months of the year in reverse: One error - 2 points    Repeat the address phrase:   3 errors - 6 points    Total 6CIT Score:          Interpretation: The 6CIT uses an inverse score and questions are  weighted to produce a total out of 28. Scores of 0-7 are considered normal and 8 or more significant.    Advantages The test has high sensitivity without compromising specificity even in mild dementia. It is easy to translate linguistically and culturally.  Disadvantages The main disadvantage is in the scoring and weighting of the test, which is initially confusing, however computer models have simplified this greatly.    Probability Statistics: At the 7/8 cut off: Overall figures sensitivity 90% specificity 100%, in mild dementia sensitivity = 78% , specificity = 100%    Copyright 2000 [facility], [facility]. Courtesy of Dr. All Palma    Mini-CogTM Copyright S Marisabel. Licensed by the author for use in [facility]; reprinted with permission (soob@[email or web address]). All rights reserved.      Do you have sleep apnea, excessive snoring or daytime drowsiness?: no    Current providers sharing in care for this patient include:   Patient Care Team:  Jacoby Boo MD as PCP - General  Jacoby Boo MD as Assigned PCP  Cisco Durham MD as Assigned Heart and Vascular Provider    Patient has been advised of split billing requirements and indicates understanding: Yes    Component      Latest Ref Rng & Units 2/23/2021 6/2/2021   WBC      4.0 - 11.0 10e9/L  6.7   RBC Count      3.8 - 5.2 10e12/L  4.36   Hemoglobin      11.7 - 15.7 g/dL  13.9   Hematocrit      35.0 - 47.0 %  43.0   MCV      78 - 100 fl  99   MCH      26.5 - 33.0 pg  31.9   MCHC      31.5 - 36.5 g/dL  32.3   RDW      10.0 - 15.0 %  13.4   Platelet Count      150 - 450 10e9/L  236   Diff Method        Automated Method   % Neutrophils      %  61.8   % Lymphocytes      %  26.4   % Monocytes      %  9.1   % Eosinophils      %  1.6   % Basophils      %  0.7   % Immature Granulocytes      %  0.4   Nucleated RBCs      0 /100  0   Absolute Neutrophil      1.6 - 8.3 10e9/L  4.1   Absolute Lymphocytes      0.8 - 5.3 10e9/L  1.8    Absolute Monocytes      0.0 - 1.3 10e9/L  0.6   Absolute Eosinophils      0.0 - 0.7 10e9/L  0.1   Absolute Basophils      0.0 - 0.2 10e9/L  0.1   Abs Immature Granulocytes      0 - 0.4 10e9/L  0.0   Absolute Nucleated RBC        0.0   Sodium      133 - 144 mmol/L 136 137   Potassium      3.4 - 5.3 mmol/L 3.7 4.1   Chloride      94 - 109 mmol/L 104 105   Carbon Dioxide      20 - 32 mmol/L 23 28   Anion Gap      3 - 14 mmol/L 8 4   Glucose      70 - 99 mg/dL 98 103 (H)   Urea Nitrogen      7 - 30 mg/dL 14 18   Creatinine      0.52 - 1.04 mg/dL 0.59 0.62   GFR Estimate      >60 mL/min/1.73:m2 88 86   GFR Estimate If Black      >60 mL/min/1.73:m2 >90 >90   Calcium      8.5 - 10.1 mg/dL 9.8 10.4 (H)   TSH      0.40 - 4.00 mU/L  1.98       Also due for follow-up regarding established medical issues of memory loss, depression, insomnia and previous history of DVT.  Mild recent hypercalcemia also noted.  Patient feels memory has been doing better with use of Aricept.  Denies lightheadedness with the medication.  Mood has been good with medication.  Patient sleeping okay.  Occasionally waking up at night but generally sleeping fairly well through the night.  No hangover feeling.  Patient had some increased fatigue over the springtime and early summer but states it has been better the last few weeks.   See below  ROS for other relevant info        Review of Systems   CONSTITUTIONAL: NEGATIVE for fever, chills Weight down 4 pounds in past 1 year  INTEGUMENTARY/SKIN:  POSITIVE for  skin ulcer RLE now fully  healed up  EYES: NEGATIVE for new  vision changes or irritation. Eye exam  July 2021  ENT/MOUTH: NEGATIVE for ear pain , mouth and throat problems. Rare  clear rhinorrhea.  No symptoms now.  Has hearing aids but has not been using very much recently.  RESP: NEGATIVE for significant cough or SOB  BREAST: NEGATIVE for masses, tenderness or discharge. Due for mammogram  CV: NEGATIVE for chest pain, palpitations. Denies  "peripheral edema with compression stocking use  GI: NEGATIVE for nausea, abdominal pain, heartburn, or change in bowel habits  : NEGATIVE for frequency, dysuria, or hematuria  MUSCULOSKELETAL: NEGATIVE for significant arthralgias or myalgia  NEURO: NEGATIVE for weakness, dizziness or paresthesias.  On Aricept. Feels memory stable with med. 6 CIT better with score now 8 (was  16 one year ago)  : Has some urinary urge  incontinence. Has reduced caffeine/water intake. Doing straight cath for incomplete empty. Followed by Urology. See notes from Dr Cartagena in chart  ENDOCRINE: NEGATIVE for temperature intolerance, skin/hair changes  HEME: NEGATIVE for bleeding problems. On Warfartin. Higher INRs recently but at goal today  PSYCHIATRIC: NEGATIVE for changes in mood or affect with current meds. Talks often with kids and grandkids. Had some prior fatigue and \"dragginess\" this past winter. PHQ=4            Additional ROS:   Constitutional, HEENT, Cardiovascular, Pulmonary, GI and , Neuro, MSK and Psych review of systems/symptoms are otherwise negative or unchanged from previous, except as noted above.      OBJECTIVE:  /70   Pulse 82   Temp 98.3  F (36.8  C) (Temporal)   Resp 16   Ht 1.676 m (5' 6\")   Wt 79.8 kg (176 lb)   SpO2 96%   BMI 28.41 kg/m     Estimated body mass index is 28.41 kg/m  as calculated from the following:    Height as of this encounter: 1.676 m (5' 6\").    Weight as of this encounter: 79.8 kg (176 lb).  Eye: PERRL, EOMI  HENT: ear canals and TM's normal and nose and mouth without ulcers or lesions   Neck: no adenopathy. Thyroid normal to palpation. No bruits  Pulm: Lungs clear to auscultation   CV: Regular rates and rhythm  GI: Soft, nontender, Normal active bowel sounds, No hepatosplenomegaly or masses palpable  Ext: Peripheral pulses intact. Trace BLE edema. Wearing compression stockings.  Previous skin ulceration right lower extremity healed.  Bilateral lower extremity nontender " varicose veins  Neuro: Normal strength and tone, sensory exam grossly normal  Gen: Normal affect

## 2021-08-20 NOTE — TELEPHONE ENCOUNTER
Patient Quality Outreach      Summary:    Patient has the following on her problem list/HM:     Depression / Dysthymia review    6 Month Remission: 4-8 month window range: 0    12 Month Remission: 10-14 month window range: 0       PHQ-9 SCORE 3/15/2019 7/6/2020 2/18/2021   PHQ-9 Total Score - - -   PHQ-9 Total Score 0 2 3       If PHQ-9 recheck is 5 or more, route to provider for next steps.    Patient is due/failing the following:   PHQ-9 Needed, Annual wellness, date due: 07/06/2021 and Immunizations    Type of outreach:    Sent [employer] message.    Questions for provider review:    None                                                                                                                                     Queenie Gonzalez [facility]       Chart routed to Care Team.

## 2021-08-21 PROBLEM — I83.018 VENOUS STASIS ULCER OF OTHER PART OF RIGHT LOWER LEG WITH FAT LAYER EXPOSED WITH VARICOSE VEINS (H): Status: RESOLVED | Noted: 2021-05-04 | Resolved: 2021-08-21

## 2021-08-21 PROBLEM — S81.001A OPEN WOUND OF KNEE, LEG, AND ANKLE, RIGHT, INITIAL ENCOUNTER: Status: RESOLVED | Noted: 2021-03-31 | Resolved: 2021-08-21

## 2021-08-21 PROBLEM — L97.812 VENOUS STASIS ULCER OF OTHER PART OF RIGHT LOWER LEG WITH FAT LAYER EXPOSED WITH VARICOSE VEINS (H): Status: RESOLVED | Noted: 2021-05-04 | Resolved: 2021-08-21

## 2021-08-21 PROBLEM — S91.001A OPEN WOUND OF KNEE, LEG, AND ANKLE, RIGHT, INITIAL ENCOUNTER: Status: RESOLVED | Noted: 2021-03-31 | Resolved: 2021-08-21

## 2021-08-21 PROBLEM — S81.801A OPEN WOUND OF KNEE, LEG, AND ANKLE, RIGHT, INITIAL ENCOUNTER: Status: RESOLVED | Noted: 2021-03-31 | Resolved: 2021-08-21

## 2021-08-21 LAB
ALBUMIN SERPL-MCNC: 3.7 G/DL (ref 3.4–5)
ALP SERPL-CCNC: 156 U/L (ref 40–150)
ALT SERPL W P-5'-P-CCNC: 78 U/L (ref 0–50)
ANION GAP SERPL CALCULATED.3IONS-SCNC: 5 MMOL/L (ref 3–14)
AST SERPL W P-5'-P-CCNC: 72 U/L (ref 0–45)
BILIRUB SERPL-MCNC: 0.4 MG/DL (ref 0.2–1.3)
BUN SERPL-MCNC: 12 MG/DL (ref 7–30)
CALCIUM SERPL-MCNC: 10.8 MG/DL (ref 8.5–10.1)
CHLORIDE BLD-SCNC: 103 MMOL/L (ref 94–109)
CO2 SERPL-SCNC: 28 MMOL/L (ref 20–32)
CREAT SERPL-MCNC: 0.64 MG/DL (ref 0.52–1.04)
GFR SERPL CREATININE-BSD FRML MDRD: 86 ML/MIN/1.73M2
GLUCOSE BLD-MCNC: 89 MG/DL (ref 70–99)
POTASSIUM BLD-SCNC: 4.3 MMOL/L (ref 3.4–5.3)
PROT SERPL-MCNC: 7.7 G/DL (ref 6.8–8.8)
SODIUM SERPL-SCNC: 136 MMOL/L (ref 133–144)

## 2021-08-23 ENCOUNTER — TELEPHONE (OUTPATIENT)
Dept: INTERNAL MEDICINE | Facility: CLINIC | Age: 81
End: 2021-08-23

## 2021-08-23 NOTE — TELEPHONE ENCOUNTER
Patient called regarding her lab results noting some of them were abnormal, explained that results are released as soon as results and Dr. Boo has not had a chance to comment on them but she will see the comments on the results via [employer]. Patient was satisfied with this and said she will wait for the mychart notification    Burt Durán RN

## 2021-08-23 NOTE — PROGRESS NOTES
ANTICOAGULATION FOLLOW-UP CLINIC VISIT    Patient Name:  Geneva Shepherd  Date:  2021  Contact Type:  Telephone    SUBJECTIVE:         OBJECTIVE    Recent labs: (last 7 days)     21  1508   INR 2.4*       ASSESSMENT / PLAN  INR assessment THER    Recheck INR In: 2 WEEKS    INR Location Clinic      Anticoagulation Summary  As of 2021    INR goal:  2.0-3.0   TTR:  57.6 % (1 y)   INR used for dosin.4 (2021)   Warfarin maintenance plan:  7.5 mg (5 mg x 1.5) every Sun, Thu; 5 mg (5 mg x 1) all other days   Full warfarin instructions:  7.5 mg every Sun, Thu; 5 mg all other days   Weekly warfarin total:  40 mg   Plan last modified:  Nemo Reyes RN (2021)   Next INR check:  9/3/2021   Priority:  Maintenance   Target end date:  Indefinite    Indications    Long term current use of anticoagulant therapy [Z79.01]  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]  Embolism and thrombosis (H) (Resolved) [I74.9]  Personal history of RLE DVT (deep vein thrombosis)() [Z86.718]             Anticoagulation Episode Summary     INR check location:      Preferred lab:      Send INR reminders to:  [facility]    Comments:        Anticoagulation Care Providers     Provider Role Specialty Phone number    Jacoby Boo MD Referring Internal Medicine [phone]            See the Encounter Report to view Anticoagulation Flowsheet and Dosing Calendar (Go to Encounters tab in chart review, and find the Anticoagulation Therapy Visit)        Leigha Donaldson RN

## 2021-08-23 NOTE — PROGRESS NOTES
ANTICOAGULATION MANAGEMENT     Geneva Shepherd 80 year old female is on warfarin with therapeutic INR result. (Goal INR 2.0-3.0)    Recent labs: (last 7 days)     08/20/21  1508   INR 2.4*       ASSESSMENT     Source(s): Chart Review and Patient/Caregiver Call       Warfarin doses taken: Warfarin taken as instructed    Diet: No new diet changes identified    New illness, injury, or hospitalization: No    Medication/supplement changes: None noted    Signs or symptoms of bleeding or clotting: No    Previous INR: Supratherapeutic    Additional findings: None     PLAN     Recommended plan for ongoing change(s) affecting INR     Dosing Instructions: Continue your current warfarin dose with next INR in 2 weeks       Summary  As of 8/20/2021    Full warfarin instructions:  7.5 mg every Sun, Thu; 5 mg all other days   Next INR check:  9/3/2021             Telephone call with  Geneva who verbalizes understanding and agrees to plan    Lab visit scheduled    Education provided: Geneva has elevated liver enzymes which she will discuss with Dr Boo.  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

## 2021-08-25 ENCOUNTER — HOSPITAL ENCOUNTER (EMERGENCY)
Facility: CLINIC | Age: 81
Discharge: HOME | End: 2021-08-25
Attending: EMERGENCY MEDICINE | Admitting: EMERGENCY MEDICINE
Payer: MEDICARE

## 2021-08-25 ENCOUNTER — NURSE TRIAGE (OUTPATIENT)
Dept: INTERNAL MEDICINE | Facility: CLINIC | Age: 81
End: 2021-08-25

## 2021-08-25 ENCOUNTER — APPOINTMENT (OUTPATIENT)
Dept: GENERAL RADIOLOGY | Facility: CLINIC | Age: 81
End: 2021-08-25
Attending: EMERGENCY MEDICINE
Payer: MEDICARE

## 2021-08-25 ENCOUNTER — APPOINTMENT (OUTPATIENT)
Dept: CT IMAGING | Facility: CLINIC | Age: 81
End: 2021-08-25
Attending: EMERGENCY MEDICINE
Payer: MEDICARE

## 2021-08-25 VITALS
DIASTOLIC BLOOD PRESSURE: 68 MMHG | WEIGHT: 178 LBS | SYSTOLIC BLOOD PRESSURE: 152 MMHG | HEART RATE: 80 BPM | RESPIRATION RATE: 16 BRPM | TEMPERATURE: 97.5 F | OXYGEN SATURATION: 97 % | BODY MASS INDEX: 28.61 KG/M2 | HEIGHT: 66 IN

## 2021-08-25 DIAGNOSIS — R74.8 ELEVATED LIVER ENZYMES: ICD-10-CM

## 2021-08-25 DIAGNOSIS — R55 NEAR SYNCOPE: ICD-10-CM

## 2021-08-25 DIAGNOSIS — K52.9 COLITIS: ICD-10-CM

## 2021-08-25 LAB
ALBUMIN SERPL-MCNC: 3.6 G/DL (ref 3.4–5)
ALBUMIN UR-MCNC: NEGATIVE MG/DL
ALP SERPL-CCNC: 147 U/L (ref 40–150)
ALT SERPL W P-5'-P-CCNC: 86 U/L (ref 0–50)
ANION GAP SERPL CALCULATED.3IONS-SCNC: <1 MMOL/L (ref 3–14)
APPEARANCE UR: CLEAR
AST SERPL W P-5'-P-CCNC: 69 U/L (ref 0–45)
ATRIAL RATE - MUSE: 76 BPM
BASOPHILS # BLD AUTO: 0 10E3/UL (ref 0–0.2)
BASOPHILS NFR BLD AUTO: 1 %
BILIRUB SERPL-MCNC: 0.7 MG/DL (ref 0.2–1.3)
BILIRUB UR QL STRIP: NEGATIVE
BUN SERPL-MCNC: 13 MG/DL (ref 7–30)
CALCIUM SERPL-MCNC: 11.1 MG/DL (ref 8.5–10.1)
CHLORIDE BLD-SCNC: 105 MMOL/L (ref 94–109)
CO2 SERPL-SCNC: 32 MMOL/L (ref 20–32)
COLOR UR AUTO: NORMAL
CREAT SERPL-MCNC: 0.69 MG/DL (ref 0.52–1.04)
DIASTOLIC BLOOD PRESSURE - MUSE: NORMAL MMHG
EOSINOPHIL # BLD AUTO: 0.1 10E3/UL (ref 0–0.7)
EOSINOPHIL NFR BLD AUTO: 1 %
ERYTHROCYTE [DISTWIDTH] IN BLOOD BY AUTOMATED COUNT: 13.3 % (ref 10–15)
GFR SERPL CREATININE-BSD FRML MDRD: 84 ML/MIN/1.73M2
GLUCOSE BLD-MCNC: 101 MG/DL (ref 70–99)
GLUCOSE UR STRIP-MCNC: NEGATIVE MG/DL
HCT VFR BLD AUTO: 44.6 % (ref 35–47)
HGB BLD-MCNC: 14.3 G/DL (ref 11.7–15.7)
HGB UR QL STRIP: NEGATIVE
HOLD SPECIMEN: NORMAL
IMM GRANULOCYTES # BLD: 0 10E3/UL
IMM GRANULOCYTES NFR BLD: 0 %
INR PPP: 2.57 (ref 0.85–1.15)
INTERPRETATION ECG - MUSE: NORMAL
KETONES UR STRIP-MCNC: NEGATIVE MG/DL
LEUKOCYTE ESTERASE UR QL STRIP: NEGATIVE
LYMPHOCYTES # BLD AUTO: 1.5 10E3/UL (ref 0.8–5.3)
LYMPHOCYTES NFR BLD AUTO: 23 %
MCH RBC QN AUTO: 31.4 PG (ref 26.5–33)
MCHC RBC AUTO-ENTMCNC: 32.1 G/DL (ref 31.5–36.5)
MCV RBC AUTO: 98 FL (ref 78–100)
MONOCYTES # BLD AUTO: 0.7 10E3/UL (ref 0–1.3)
MONOCYTES NFR BLD AUTO: 10 %
NEUTROPHILS # BLD AUTO: 4.1 10E3/UL (ref 1.6–8.3)
NEUTROPHILS NFR BLD AUTO: 65 %
NITRATE UR QL: NEGATIVE
NRBC # BLD AUTO: 0 10E3/UL
NRBC BLD AUTO-RTO: 0 /100
P AXIS - MUSE: 46 DEGREES
PH UR STRIP: 7 [PH] (ref 5–7)
PLATELET # BLD AUTO: 228 10E3/UL (ref 150–450)
POTASSIUM BLD-SCNC: 4.3 MMOL/L (ref 3.4–5.3)
PR INTERVAL - MUSE: 170 MS
PROT SERPL-MCNC: 7.8 G/DL (ref 6.8–8.8)
QRS DURATION - MUSE: 82 MS
QT - MUSE: 398 MS
QTC - MUSE: 447 MS
R AXIS - MUSE: 9 DEGREES
RBC # BLD AUTO: 4.55 10E6/UL (ref 3.8–5.2)
SODIUM SERPL-SCNC: 137 MMOL/L (ref 133–144)
SP GR UR STRIP: 1.01 (ref 1–1.03)
SYSTOLIC BLOOD PRESSURE - MUSE: NORMAL MMHG
T AXIS - MUSE: 32 DEGREES
TROPONIN I SERPL-MCNC: <0.015 UG/L (ref 0–0.04)
UROBILINOGEN UR STRIP-MCNC: NORMAL MG/DL
VENTRICULAR RATE- MUSE: 76 BPM
WBC # BLD AUTO: 6.3 10E3/UL (ref 4–11)

## 2021-08-25 PROCEDURE — 93005 ELECTROCARDIOGRAM TRACING: CPT

## 2021-08-25 PROCEDURE — 99285 EMERGENCY DEPT VISIT HI MDM: CPT | Mod: 25

## 2021-08-25 PROCEDURE — 96361 HYDRATE IV INFUSION ADD-ON: CPT

## 2021-08-25 PROCEDURE — 96360 HYDRATION IV INFUSION INIT: CPT | Mod: 59

## 2021-08-25 PROCEDURE — 81003 URINALYSIS AUTO W/O SCOPE: CPT | Performed by: EMERGENCY MEDICINE

## 2021-08-25 PROCEDURE — 250N000011 HC RX IP 250 OP 636: Performed by: EMERGENCY MEDICINE

## 2021-08-25 PROCEDURE — 85025 COMPLETE CBC W/AUTO DIFF WBC: CPT | Performed by: EMERGENCY MEDICINE

## 2021-08-25 PROCEDURE — 36415 COLL VENOUS BLD VENIPUNCTURE: CPT | Performed by: EMERGENCY MEDICINE

## 2021-08-25 PROCEDURE — 82040 ASSAY OF SERUM ALBUMIN: CPT | Performed by: EMERGENCY MEDICINE

## 2021-08-25 PROCEDURE — 85610 PROTHROMBIN TIME: CPT | Performed by: EMERGENCY MEDICINE

## 2021-08-25 PROCEDURE — 74177 CT ABD & PELVIS W/CONTRAST: CPT | Mod: ME

## 2021-08-25 PROCEDURE — 71046 X-RAY EXAM CHEST 2 VIEWS: CPT

## 2021-08-25 PROCEDURE — 250N000009 HC RX 250: Performed by: EMERGENCY MEDICINE

## 2021-08-25 PROCEDURE — 70450 CT HEAD/BRAIN W/O DYE: CPT | Mod: ME

## 2021-08-25 PROCEDURE — 258N000003 HC RX IP 258 OP 636: Performed by: EMERGENCY MEDICINE

## 2021-08-25 RX ORDER — IOPAMIDOL 755 MG/ML
90 INJECTION, SOLUTION INTRAVASCULAR ONCE
Status: COMPLETED | OUTPATIENT
Start: 2021-08-25 | End: 2021-08-25

## 2021-08-25 RX ADMIN — IOPAMIDOL 90 ML: 755 INJECTION, SOLUTION INTRAVENOUS at 17:02

## 2021-08-25 RX ADMIN — SODIUM CHLORIDE 66 ML: 9 INJECTION, SOLUTION INTRAVENOUS at 17:02

## 2021-08-25 RX ADMIN — SODIUM CHLORIDE 1000 ML: 9 INJECTION, SOLUTION INTRAVENOUS at 15:34

## 2021-08-25 ASSESSMENT — ENCOUNTER SYMPTOMS
LIGHT-HEADEDNESS: 1
DIZZINESS: 1
ABDOMINAL PAIN: 1
FEVER: 0
FATIGUE: 1
SHORTNESS OF BREATH: 0

## 2021-08-25 ASSESSMENT — MIFFLIN-ST. JEOR: SCORE: 1304.15

## 2021-08-25 NOTE — ED TRIAGE NOTES
Severe dizziness and feeling light headed over last few days. Patient also reports urinary frequency. Pt endorses headache, denies n/v, unsure if she has experienced blurred vision. She also reports lower abdominal discomfort.

## 2021-08-25 NOTE — TELEPHONE ENCOUNTER
Patient called in with dizziness that started yesterday is present anytime she is not laying down. Patient instructed to be seen today no visits available patient in agreement with going to urgent care    Burt Durán RN     Reason for Disposition    Patient wants to be seen    Additional Information    Negative: Shock suspected (e.g., cold/pale/clammy skin, too weak to stand, low BP, rapid pulse)    Negative: Difficult to awaken or acting confused (e.g., disoriented, slurred speech)    Negative: Fainted, and still feels dizzy afterwards    Negative: Severe difficulty breathing (e.g., struggling for each breath, speaks in single words)    Negative: Overdose (accidental or intentional) of medications    Negative: New neurologic deficit that is present now: * Weakness of the face, arm, or leg on one side of the body * Numbness of the face, arm, or leg on one side of the body * Loss of speech or garbled speech    Negative: Heart beating < 50 beats per minute OR > 140 beats per minute    Negative: Sounds like a life-threatening emergency to the triager    Negative: Chest pain    Negative: Rectal bleeding, bloody stool, or tarry-black stool    Negative: Vomiting is the main symptom    Negative: Diarrhea is the main symptom    Negative: Headache is the main symptom    Negative: Heat exhaustion suspected (i.e., dehydration from heat exposure)    Negative: Patient states that he/she is having an anxiety/panic attack    Negative: SEVERE dizziness (e.g., unable to stand, requires support to walk, feels like passing out now)    Negative: SEVERE headache or neck pain    Negative: Spinning or tilting sensation (vertigo) present now and one or more stroke risk factors (i.e., hypertension, diabetes, prior stroke/TIA, heart attack, age over 60) (Exception: prior physician evaluation for this AND no different/worse than usual)    Negative: Loss of vision or double vision    Negative: Extra heart beats OR irregular heart beating  "(i.e., 'palpitations')    Negative: Difficulty breathing    Negative: Drinking very little and has signs of dehydration (e.g., no urine > 12 hours, very dry mouth, very lightheaded)    Negative: Follows bleeding (e.g., stomach, rectum, vagina) (Exception: became dizzy from sight of small amount blood)    Negative: Patient sounds very sick or weak to the triager    Negative: Lightheadedness (dizziness) present now, after 2 hours of rest and fluids    Negative: Spinning or tilting sensation (vertigo) present now    Negative: Fever > 103 F (39.4 C)    Negative: Fever > 100.0 F (37.8 C) and has diabetes mellitus or a weak immune system (e.g., HIV positive, cancer chemotherapy, organ transplant, splenectomy, chronic steroids)    Answer Assessment - Initial Assessment Questions  2. LIGHTHEADED: \"Do you feel lightheaded?\" (e.g., somewhat faint, woozy, weak upon standing)      Feels unsteady  3. VERTIGO: \"Do you feel like either you or the room is spinning or tilting?\" (i.e. vertigo)      n/a  4. SEVERITY: \"How bad is it?\"  \"Do you feel like you are going to faint?\" \"Can you stand and walk?\"    - MILD - walking normally    - MODERATE - interferes with normal activities (e.g., work, school)     - SEVERE - unable to stand, requires support to walk, feels like passing out now.       Effects walking   5. ONSET:  \"When did the dizziness begin?\"      yesterday  6. AGGRAVATING FACTORS: \"Does anything make it worse?\" (e.g., standing, change in head position)      Fine when laying down     8. CAUSE: \"What do you think is causing the dizziness?\"      When I get up    Protocols used: DIZZINESS-A-OH      "

## 2021-08-25 NOTE — ED PROVIDER NOTES
History   Chief Complaint:  Dizziness       HPI   Geneva Shepherd is a 80 year old female on Coumadin with history of factor V Leiden mutation, DVT, hyperlipidemia who presents with dizziness/lightheadedness which began yesterday. She also notes difficulty with balance and she states that she feels unsteady on her feet. She states that this is abnormal as she tries to stay very active. She reports that she was seen in clinic for her increased fatigue which has been intermittent for approximately the past 10 months and was informed that her AST and ALT were elevated. She additionally notes some bilateral lower abdominal pain and states that she has a history of diverticulitis. She also briefly mentions experiencing recent loss of control of her bladder, especially at night. Geneva denies shortness of breath, fever.    Review of Systems   Constitutional: Positive for fatigue. Negative for fever.   Respiratory: Negative for shortness of breath.    Gastrointestinal: Positive for abdominal pain.   Genitourinary:        Loss of control +   Musculoskeletal: Positive for gait problem.   Neurological: Positive for dizziness and light-headedness.   All other systems reviewed and are negative.    Allergies:  Cephalexin    Medications:  N-acetyl-L-cysteine  Elavil  Aricept  Carnitor  Levothyroxine  Macrodantin  Paxil  Coumadin    Past Medical History:   Varicose veins  Depression  Diverticulitis  DJD  DVT  Factor V Leiden  GERD  Hypercalcemia  Hyperlipidemia  Insomnia  IBS  Obesity  Hypothyroidism  Osteoarthrosis     Past Surgical History:    Hip arthroplasty  Knee arthroplasty x2  Cholecystectomy  Colonoscopy  Tubal ligation  Endometrial biopsy  Coronary angiogram  Varicose vein stripping  Vitrectomy, left    Family History:    Mother: heart disease  Father: MI, CAD    Social History:  Patient presents to the ED alone.  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    Physical Exam     Physical Exam    Patient Vitals for the past  "24 hrs:   BP Temp Pulse Resp SpO2 Height Weight   08/25/21 1458 -- 97.5  F (36.4  C) 80 16 97 % 1.676 m (5' 6\") 80.7 kg (178 lb)   08/25/21 1456 (!) 152/68 -- -- -- -- -- --       General: Alert and Interactive.   Head: No signs of trauma.   Mouth/Throat: Oropharynx is clear. Dry mucous membranes.  Eyes: Conjunctivae are normal. Pupils are equal, round, and reactive to light.   Neck: Normal range of motion. No nuchal rigidity.   CV: Normal rate and regular rhythm.    Resp: Effort normal and breath sounds normal. No respiratory distress.   GI: Mild bilateral lower quadrant abdominal tenderness.  MSK: Normal range of motion. no edema.   Neuro: The patient is alert and oriented to person, place, and time.  PERRLA, EOMI, strength in upper/lower extremities normal and symmetrical.   Sensation normal. Speech normal.  GCS eye subscore is 4. GCS verbal subscore is 5. GCS motor subscore is 6.   Skin: Skin is warm and dry. No rash noted.   Psych: normal mood and affect. behavior is normal.     Emergency Department Course     ECG  ECG taken at 1502, ECG read at 1510  Normal sinus rhythm  Possible left atrial enlargement  Septal infarct, age undetermined  Septal infarct is now present as compared to prior, dated 6/2/2021  Abnormal ECG  Rate 76 bpm. AK interval 170 ms. QRS duration 82 ms. QT/QTc 398/447 ms. P-R-T axes 46 9 32.     Imaging:    CT Head w/o Contrast  No acute intracranial abnormality.  Reading per radiology    CT Abdomen Pelvis w Contrast  1.  Possible sigmoid wall thickening which could indicate mild  colitis, no definite diverticulitis.  2.  Extrahepatic biliary dilatation up to 2.3 cm increased from  previous, this could be related to an unusually prominent reservoir  Effect.  Reading per radiology    XR Chest 2 Views  Negative chest.  Reading per radiology     Laboratory:    CBC: WBC 6.3, HGB 14.3,      CMP: anion gap <1 (L), calcium 11.1 (H), glucose 101(H), AST 69 (H), ALT 86 (H) o/w WNL (Creatinine " 0.69)     Troponin (Collected 1634): <0.015    INR: 2.57 (H)    UA with microscopic and culture: all negative    Emergency Department Course:    Reviewed:  I reviewed nursing notes, vitals, past medical history, and care everywhere    Assessments:  1522 I obtained history and examined the patient as noted above.     1809 I rechecked the patient and explained findings.     Interventions:  1534 NS 1 L IV    Disposition:  The patient was discharged to home.     Impression & Plan     Medical Decision Making:  Geneva Shepherd is a 80 year old female who presents for evaluation of near-syncope.  The differential for near-syncope is broad and includes etiologies such as cardiac arrythmia, ACS, aortic stenosis, HOCM, PE, orthostatic hypotension, drugs, situational, carotid hypersensitivity, seizure, TIA, stroke, vasovagal.  There are no signs of a concerning etiology for near- syncope at this point.  In addition, there is no family history of sudden death, no chest pain, no seizure activity or post-ictal period, no murmur, and no signs of orthostasis in the ED, no focal neurologic symptoms, and no complaints of concerning headache.  The workup in the ED is negative and the physical exam is re-assurring.  Supportive outpatient management is therefore indicated.  She additionally notes some bilateral lower abdominal pain. I considered a broad differential including  diverticulitis, colitis, appendicitis, functional bowel disease, constipation, UTI, GIB, pyelonephritis, ureterolithiasis, hernia, etc.  Rare and serious causes were considered as well in this patient such as volvulus, abscess, aneurysmal disease, mesenteric ischemia, etc. The workup in the ED is consistent with colitis.  The differential of this includes ischemic, bacterial, idiopathic, autoimmune, etc.  Will send stool studies and C. Diff. The patient looks well and this point with a reassuring exam so I will not therefore admit for serial exams and further  workup.  Patient was counseled on natural history of colitis and possibility of progression to abscess and/or sepsis depending on reason for the colitis.  Patient is hemodynamically stable in ED at this time.   Return for fevers greater than 102, increasing pain, other new symptoms develop.  Colitis handout given.  Questions were answered.     Diagnosis:    ICD-10-CM    1. Colitis  K52.9    2. Near syncope  R55    3. Elevated liver enzymes  R74.8        Scribe Disclosure:  I, Adelaide Dinero, am serving as a scribe at 3:13 PM on 8/25/2021 to document services personally performed by Arnold Kellogg MD based on my observations and the provider's statements to me.       Arnold Kellogg MD  08/25/21 8995

## 2021-08-26 ENCOUNTER — TELEPHONE (OUTPATIENT)
Dept: INTERNAL MEDICINE | Facility: CLINIC | Age: 81
End: 2021-08-26

## 2021-08-26 ENCOUNTER — DOCUMENTATION ONLY (OUTPATIENT)
Dept: INTERNAL MEDICINE | Facility: CLINIC | Age: 81
End: 2021-08-26

## 2021-08-26 NOTE — TELEPHONE ENCOUNTER
Patient called was in ER yesterday and is needing to get in to see Gastroenterology but cant get in until November ER follow up stated to be seen sooner,     This nurse called to see if they can get patient in sooner. They are able to get her in on a cancellation     [place] 12:55 9/1 [address] [facility]     Called patient and let her know     Burt Durán RN

## 2021-08-26 NOTE — ED NOTES
Pt ambulated in hallway independently, states still doesn't feel 100% but much better than before. States she feels safe going home and son is going to stay the night tonight. Also, she uses a cane while at home.

## 2021-08-26 NOTE — PROGRESS NOTES
ANTICOAGULATION  MANAGEMENT: Discharge Review    Geneva Shepherd chart reviewed for anticoagulation continuity of care    Emergency room visit on 8/25 for colitis, dizziness.    Discharge disposition: Home    Results:    Recent labs: (last 7 days)     08/20/21  1508 08/25/21  1533   INR 2.4* 2.57*     Anticoagulation inpatient management:     not applicable     Anticoagulation discharge instructions:     Warfarin dosing: home regimen continued, has an upcoming INR appointment already scheduled for 9/3/21.   Bridging: No   INR goal change: No      Medication changes affecting anticoagulation: No    Additional factors affecting anticoagulation: No    Plan     No adjustment to anticoagulation plan needed    Patient not contacted    No adjustment to Anticoagulation Calendar was required    Camelia He RN

## 2021-09-01 ENCOUNTER — TRANSFERRED RECORDS (OUTPATIENT)
Dept: HEALTH INFORMATION MANAGEMENT | Facility: CLINIC | Age: 81
End: 2021-09-01

## 2021-09-01 LAB
ALT SERPL-CCNC: 72 IU/L (ref 0–32)
AST SERPL-CCNC: 73 IU/L (ref 0–40)
HEP C HIM: NORMAL

## 2021-09-04 ENCOUNTER — HEALTH MAINTENANCE LETTER (OUTPATIENT)
Age: 81
End: 2021-09-04

## 2021-09-07 ENCOUNTER — NURSE TRIAGE (OUTPATIENT)
Dept: INTERNAL MEDICINE | Facility: CLINIC | Age: 81
End: 2021-09-07

## 2021-09-07 ENCOUNTER — TELEPHONE (OUTPATIENT)
Dept: INTERNAL MEDICINE | Facility: CLINIC | Age: 81
End: 2021-09-07

## 2021-09-07 NOTE — TELEPHONE ENCOUNTER
Colonoscopy is 10/5. Needing hold orders for warfarin and if bridging needed. [facility] recommends 4 day hold.  Can we leave a detailed message on this number? YES  Phone number patient can be reached at: Other phone number:    [phone]  Shannon Mccurdy, RN  [facility] Triage

## 2021-09-07 NOTE — TELEPHONE ENCOUNTER
Routing to [facility] to advise on hold.    Camelia He RN  [facility]

## 2021-09-07 NOTE — TELEPHONE ENCOUNTER
Pt reports severe weakness since she left the hospital. Admits feeling dizzy to stand and weak. Advised she be seen at ED today for further evaluation of symptoms. Pt verbalized agreement with plan.     Reason for Disposition    SEVERE weakness (i.e., unable to walk or barely able to walk, requires support) and new onset or worsening    Protocols used: WEAKNESS (GENERALIZED) AND FATIGUE-A-OH

## 2021-09-08 ENCOUNTER — TELEPHONE (OUTPATIENT)
Dept: INTERNAL MEDICINE | Facility: CLINIC | Age: 81
End: 2021-09-08

## 2021-09-08 NOTE — TELEPHONE ENCOUNTER
Reason for Call: Request for an order or referral:    Order or referral being requested: Covid test    Date needed: Patient having procedure done on 10/12. Needs done before then. Patient has a Physical on 10/8/21    Has the patient been seen by the PCP for this problem? Not Applicable    Additional comments: none    Phone number Patient can be reached at:  Home number on file [phone] (home)    Best Time:  anytime    Can we leave a detailed message on this number?  Not Applicable    Call taken on 9/8/2021 at 1:01 PM by Marylou Nation

## 2021-09-10 NOTE — TELEPHONE ENCOUNTER
Spoke with Patient-Let her know that the Provider preforming the Procedure will order the COVID Test.  Patient stated she understood.

## 2021-09-15 ENCOUNTER — TELEPHONE (OUTPATIENT)
Dept: INTERNAL MEDICINE | Facility: CLINIC | Age: 81
End: 2021-09-15

## 2021-09-15 ENCOUNTER — TELEPHONE (OUTPATIENT)
Dept: ANTICOAGULATION | Facility: CLINIC | Age: 81
End: 2021-09-15

## 2021-09-15 NOTE — TELEPHONE ENCOUNTER
ANTICOAGULATION     Geneva Shepherd is overdue for INR check.      Left message for patient to call and schedule lab appointment as soon as possible. If returning call, please schedule.     Nellie Nguyen RN

## 2021-09-15 NOTE — TELEPHONE ENCOUNTER
Procedure:    ERCP & EUF on 10/12    [facility] [phone]    Please advise on bridge/hold plan.     Chanda Waller, RN, BSN, PHN  Anticoagulation Nurse  [phone]

## 2021-09-17 ENCOUNTER — TELEPHONE (OUTPATIENT)
Dept: ANTICOAGULATION | Facility: CLINIC | Age: 81
End: 2021-09-17

## 2021-09-17 ENCOUNTER — ANTICOAGULATION THERAPY VISIT (OUTPATIENT)
Dept: ANTICOAGULATION | Facility: CLINIC | Age: 81
End: 2021-09-17

## 2021-09-17 ENCOUNTER — LAB (OUTPATIENT)
Dept: LAB | Facility: CLINIC | Age: 81
End: 2021-09-17
Payer: MEDICARE

## 2021-09-17 DIAGNOSIS — Z86.718 PERSONAL HISTORY OF DVT (DEEP VEIN THROMBOSIS): ICD-10-CM

## 2021-09-17 DIAGNOSIS — Z79.01 LONG TERM CURRENT USE OF ANTICOAGULANT THERAPY: Primary | ICD-10-CM

## 2021-09-17 DIAGNOSIS — E83.52 HYPERCALCEMIA: ICD-10-CM

## 2021-09-17 DIAGNOSIS — D68.51 HETEROZYGOUS FACTOR V LEIDEN MUTATION (H): ICD-10-CM

## 2021-09-17 DIAGNOSIS — Z86.718 PERSONAL HISTORY OF DVT (DEEP VEIN THROMBOSIS): Primary | ICD-10-CM

## 2021-09-17 DIAGNOSIS — K75.9 HEPATITIS: ICD-10-CM

## 2021-09-17 LAB — INR BLD: 2.5 (ref 0.9–1.1)

## 2021-09-17 PROCEDURE — 36416 COLLJ CAPILLARY BLOOD SPEC: CPT

## 2021-09-17 PROCEDURE — 85610 PROTHROMBIN TIME: CPT

## 2021-09-17 NOTE — TELEPHONE ENCOUNTER
[facility] called back and states pt is also having an ERCP on 10/12 (4 day hold.) There is an alternate encounter from 09/15 stating the same.

## 2021-09-17 NOTE — PROGRESS NOTES
ANTICOAGULATION MANAGEMENT     Geneva Shepherd 80 year old female is on warfarin with therapeutic INR result. (Goal INR 2.0-3.0)    Recent labs: (last 7 days)     09/17/21  1456   INR 2.5*       ASSESSMENT     Source(s): Chart Review and Patient/Caregiver Call       Warfarin doses taken: Warfarin taken as instructed    Diet: No new diet changes identified    New illness, injury, or hospitalization: No    Medication/supplement changes: None noted    Signs or symptoms of bleeding or clotting: No    Previous INR: Therapeutic last 2(+) visits    Additional findings: Upcoming surgery/procedure October 5th Colonoscopy, ERCP on 10/12- bridge plan sent to [facility]     PLAN     Recommended plan for no diet, medication or health factor changes affecting INR     Dosing Instructions: Continue your current warfarin dose with next INR in 10 days, 1 week before surgery      Summary  As of 9/17/2021    Full warfarin instructions:  7.5 mg every Sun, Thu; 5 mg all other days   Next INR check:  9/28/2021             Telephone call with Geneva who verbalizes understanding and agrees to plan    Lab visit scheduled    Education provided: None required    Plan made per [facility] anticoagulation protocol    Ingrid Gary RN  Anticoagulation Clinic  9/17/2021    _______________________________________________________________________     Anticoagulation Episode Summary     Current INR goal:  2.0-3.0   TTR:  57.6 % (1 y)   Target end date:  Indefinite   Send INR reminders to:  [facility]    Indications    Long term current use of anticoagulant therapy [Z79.01]  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]  Embolism and thrombosis (H) (Resolved) [I74.9]  Personal history of RLE DVT (deep vein thrombosis)(2003) [Z86.718]           Comments:           Anticoagulation Care Providers     Provider Role Specialty Phone number    Jacoby Boo MD Referring Internal Medicine [phone]

## 2021-09-17 NOTE — TELEPHONE ENCOUNTER
Patient scheduled for a colonoscopy on 10/5/21 and an ERCP on 10/12/21.  Sent to Sera Menendez PharmD to review for bridge plan.     Ingrid Gary RN on 9/17/2021 at 5:00 PM

## 2021-09-24 NOTE — TELEPHONE ENCOUNTER
BRONWYN-PROCEDURAL ANTICOAGULATION  MANAGEMENT    ASSESSMENT     Warfarin interruption plan for colonoscopy on 10/5 and ERCP on 10/12/21.    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 Will seek clarification that this is only related to factor V Leiden is not a full protein C deficiency    VTE: 2018 American Society of Hematology recommends against bridging in low and moderate risk VTE patients holding warfarin     Due to prolonged duration of hold for back to back procedures, it would be reasonable to consider prophylactic enoxaparin; however neither procedure is highly thrombotic - 2012 Chest bronwyn-procedural guidelines suggest considering low-dose LMWH in patients with prior VTE to reduce the incidence of postoperative VTE in nonbridging settings     RECOMMENDATION       Pre-Procedure:  o Hold warfarin for 4 days, until after procedure starting: Friday, October 1   o No Bridge      Post-Procedure:  o Will await input from Dr. Boo prior to finalizing post procedure plan.    Option 1: hold warfarin from 10/1 through 10/11 with no enoxaparin bridge    Option 2: hold warfarin from 10/1 through 10/11 with a prophylactic enoxaparin bridge from 10/6 or 10/7 (24 to 48 hours post colonoscopy) until 10/11 (24 hours prior to ERCP    Option 3: hold warfarin from 10/1 through 10/11 with a prophylactic enoxaparin bridge from 10/6 or 10/7 (24 to 48 hours post colonoscopy) until 10/11 (24 hours prior to ERCP and resume prophylactic enoxaparin bridge 10/12 (24 hours after ERCP) until INR > 2 x 1    Plan routed to referring provider for approval  ?   Sera Menendez [facility]    SUBJECTIVE/OBJECTIVE     Geneva Shepherd, a 80 year old female    Goal INR Range: 2.0-3.0     Patient bridged in past: Not since 2013 s/p right knee replacement with prophylactic dose  "enoxaparin      Wt Readings from Last 3 Encounters:   08/25/21 80.7 kg (178 lb)   08/20/21 79.8 kg (176 lb)   06/04/21 81.2 kg (179 lb)      Ideal body weight: 59.3 kg (130 lb 11.7 oz)  Adjusted ideal body weight: 67.9 kg (149 lb 10.2 oz)     Estimated body mass index is 28.73 kg/m  as calculated from the following:    Height as of 8/25/21: 1.676 m (5' 6\").    Weight as of 8/25/21: 80.7 kg (178 lb).    Lab Results   Component Value Date    INR 2.5 (H) 09/17/2021    INR 2.57 (H) 08/25/2021    INR 2.4 (H) 08/20/2021     Lab Results   Component Value Date    HGB 14.3 08/25/2021    HGB 13.9 06/02/2021    HCT 44.6 08/25/2021    HCT 43.0 06/02/2021     08/25/2021     06/02/2021     Lab Results   Component Value Date    CR 0.69 08/25/2021    CR 0.64 08/20/2021    CR 0.62 06/02/2021     Estimated Creatinine Clearance: 72 mL/min (based on SCr of 0.69 mg/dL).    "

## 2021-09-28 ENCOUNTER — LAB (OUTPATIENT)
Dept: LAB | Facility: CLINIC | Age: 81
End: 2021-09-28
Payer: MEDICARE

## 2021-09-28 ENCOUNTER — ANTICOAGULATION THERAPY VISIT (OUTPATIENT)
Dept: ANTICOAGULATION | Facility: CLINIC | Age: 81
End: 2021-09-28

## 2021-09-28 DIAGNOSIS — Z86.718 PERSONAL HISTORY OF DVT (DEEP VEIN THROMBOSIS): ICD-10-CM

## 2021-09-28 DIAGNOSIS — Z79.01 LONG TERM CURRENT USE OF ANTICOAGULANT THERAPY: Primary | ICD-10-CM

## 2021-09-28 LAB — INR BLD: 2.7 (ref 0.9–1.1)

## 2021-09-28 PROCEDURE — 36416 COLLJ CAPILLARY BLOOD SPEC: CPT

## 2021-09-28 PROCEDURE — 85610 PROTHROMBIN TIME: CPT

## 2021-09-28 PROCEDURE — 99207 PR NO CHARGE NURSE ONLY: CPT

## 2021-09-28 NOTE — PROGRESS NOTES
ANTICOAGULATION MANAGEMENT     Geneva Shepherd 80 year old female is on warfarin with therapeutic INR result. (Goal INR 2.0-3.0)    Recent labs: (last 7 days)     09/28/21  1529   INR 2.7*       ASSESSMENT     Source(s): Chart Review and Patient/Caregiver Call       Warfarin doses taken: Warfarin taken as instructed    Diet: No new diet changes identified    New illness, injury, or hospitalization: No    Medication/supplement changes: None noted    Signs or symptoms of bleeding or clotting: No    Previous INR: Therapeutic last 2(+) visits    Additional findings: waiting on [place] for bridge orders from Dr Boo for Colonoscipy scheduled for 10/5     PLAN     Recommended plan for temporary change(s) affecting INR     Dosing Instructions: start hold 10/1 with next INR in 3 weeks       Summary  As of 9/28/2021    Full warfarin instructions:  10/1: Hold; 10/2: Hold; 10/3: Hold; 10/4: Hold; 10/5: Hold; 10/6: Hold; 10/7: Hold; 10/8: Hold; 10/9: Hold; 10/10: Hold; 10/11: Hold; 10/12: 10 mg; Otherwise 7.5 mg every Sun, Thu; 5 mg all other days   Next INR check:  10/19/2021             Telephone call with Geneva who verbalizes understanding and agrees to plan    Lab visit scheduled    Education provided: waiting on hold orders from Dr Boo    Plan made per [facility] anticoagulation protocol    Leigha Donaldson RN  Anticoagulation Clinic  9/28/2021    _______________________________________________________________________     Anticoagulation Episode Summary     Current INR goal:  2.0-3.0   TTR:  57.6 % (1 y)   Target end date:  Indefinite   Send INR reminders to:  [facility]    Indications    Long term current use of anticoagulant therapy [Z79.01]  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]  Embolism and thrombosis (H) (Resolved) [I74.9]  Personal history of RLE DVT (deep vein thrombosis)(2003) [Z86.718]           Comments:           Anticoagulation Care Providers     Provider Role Specialty Phone number    Veum,  Jacoby VILLEDA MD [facility] Internal Medicine [phone]

## 2021-09-28 NOTE — PROGRESS NOTES
ANTICOAGULATION FOLLOW-UP CLINIC VISIT    Patient Name:  Geneva Shepherd  Date:  2021  Contact Type:  Telephone    SUBJECTIVE:         OBJECTIVE    Recent labs: (last 7 days)     21  1529   INR 2.7*       ASSESSMENT / PLAN  INR assessment THER    Recheck INR In: 3 WEEKS    INR Location Clinic      Anticoagulation Summary  As of 2021    INR goal:  2.0-3.0   TTR:  57.6 % (1 y)   INR used for dosin.7 (2021)   Warfarin maintenance plan:  7.5 mg (5 mg x 1.5) every Sun, Thu; 5 mg (5 mg x 1) all other days   Full warfarin instructions:  10/1: Hold; 10/2: Hold; 10/3: Hold; 10/4: Hold; 10/5: Hold; 10/6: Hold; 10/7: Hold; 10/8: Hold; 10/9: Hold; 10/10: Hold; 10/11: Hold; 10/12: 10 mg; Otherwise 7.5 mg every Sun, Thu; 5 mg all other days   Weekly warfarin total:  40 mg   Plan last modified:  Nemo Reyes RN (2021)   Next INR check:  10/19/2021   Priority:  Maintenance   Target end date:  Indefinite    Indications    Long term current use of anticoagulant therapy [Z79.01]  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    Comments:        Anticoagulation Care Providers     Provider Role Specialty Phone number    Jacoby Boo MD Referring Internal Medicine [phone]            See the Encounter Report to view Anticoagulation Flowsheet and Dosing Calendar (Go to Encounters tab in chart review, and find the Anticoagulation Therapy Visit)        Leigha Donaldson, RN

## 2021-09-29 ENCOUNTER — TELEPHONE (OUTPATIENT)
Dept: INTERNAL MEDICINE | Facility: CLINIC | Age: 81
End: 2021-09-29

## 2021-09-29 NOTE — TELEPHONE ENCOUNTER
Left detailed message about pre- procedure on [facility].      RN to call back with post procedure once Dr. Boo response.     Andree Coello RN

## 2021-09-29 NOTE — TELEPHONE ENCOUNTER
Agree with plan (pre-procedure and post-procedure option 3) as follows:      Pre-Procedure:  ? Hold warfarin for 4 days, until after procedure starting: Friday, October 1   ? No Bridge       Post-Procedure:  ? Will await input from Dr. Boo prior to finalizing post procedure plan.      Option 3: hold warfarin from 10/1 through 10/11 with a prophylactic enoxaparin bridge from 10/6 or 10/7 (24 to 48 hours post colonoscopy) until 10/11 (24 hours prior to ERCP and resume prophylactic enoxaparin bridge 10/12 (24 hours after ERCP) until INR > 2 x 1

## 2021-09-29 NOTE — TELEPHONE ENCOUNTER
The following was approved by Dr. Echeverria.      CL    12:10 PM  Keira Echeverria MD routed this conversation to  Triage         Keira Echeverria MD     CL    12:10 PM  Note     Agree with plan (pre-procedure and post-procedure option 3) as follows:       Pre-Procedure:  ? Hold warfarin for 4 days, until after procedure starting: Friday, October 1   ? No Bridge       Post-Procedure:  ? Will await input from Dr. Boo prior to finalizing post procedure plan.      Option 3: hold warfarin from 10/1 through 10/11 with a prophylactic enoxaparin bridge from 10/6 or 10/7 (24 to 48 hours post colonoscopy) until 10/11 (24 hours prior to ERCP and resume prophylactic enoxaparin bridge 10/12 (24 hours after ERCP) until INR > 2 x 1

## 2021-09-29 NOTE — TELEPHONE ENCOUNTER
See 9/17 TE for complete mikal-procedural plan for back to back GI procedures.     Sera Menendez RPH

## 2021-09-29 NOTE — TELEPHONE ENCOUNTER
Providers,     PCP out of office, routing to provider pool-     [facility] needs a response by 4pm today ( 9/29/21    Andree Coello RN

## 2021-09-29 NOTE — TELEPHONE ENCOUNTER
Left voicemail for pt to call back    Discussed with Sera Menendez RP:    Pt is scheduled for two procedures. One on 10/5/21 and one on 10/12/21.    Pt will need to HOLD warfarin on 10/1/21,10/2/21,10/3/21 and 10/4/21. Pt will have colonoscopy on 10/5/21. Pt to hold warfarin that evening as well. If approved by GI, pt to start lovenox 40 mg 1 injection daily on 10/6/21. No warfarin but continue bridging 10/7/21, 10/8/21,10/9/21,10/10/21 and 10/11/21 injection in the AM. Second procedure 10/12/21. Restart warfarin that evening on 10/12/21 if approved by provider doing the procedure no lovenox. On 10/13/21, continue warfarin maintenance dose and daily lovenox injections until next INR check scheduled on 10/19/21.

## 2021-09-29 NOTE — TELEPHONE ENCOUNTER
Manisha KEARNEY from [facility] following up due to have not heard back from Dr. Boo.  First procedure will need to cancelled if no response by Thursday 9/30 4 pm. Needs orders for at least order for first procedure.     Call [facility] at: [phone], [place] to leave detailed message.   Shannon Mccurdy RN

## 2021-09-30 NOTE — TELEPHONE ENCOUNTER
Left a detailed voicemail message advising pt to HOLD warfarin on 10/1/21,10/2/21,10/3/21,10/4/21 procedure on 10/5/21 continue holding warfarin. If GI approves then can start one lovenox injection daily on 10/6/21 or 10/7/21 and continue holding warfarin. Lovenox prescription has been sent to her pharmacy.    Requested a call back to discuss in further detail    Transfer to [phone]

## 2021-10-01 ENCOUNTER — TELEPHONE (OUTPATIENT)
Dept: INTERNAL MEDICINE | Facility: CLINIC | Age: 81
End: 2021-10-01

## 2021-10-05 ENCOUNTER — TRANSFERRED RECORDS (OUTPATIENT)
Dept: HEALTH INFORMATION MANAGEMENT | Facility: CLINIC | Age: 81
End: 2021-10-05

## 2021-10-06 NOTE — TELEPHONE ENCOUNTER
[facility] called to see what the orders were, informed of below, they verbalized understanding    Burt Durán RN

## 2021-10-06 NOTE — TELEPHONE ENCOUNTER
Spoke with patient.  Has prescription for Lovenox 40 mg subcutaneous shot once a day.  Had colonoscopy done yesterday.  Has started the Lovenox today taking once a day.  Warfarin is on hold.  Patient will continue Lovenox once a day with the last dose being on Sunday, October 10th.  Patient will not take Lovenox dose on October 11th or 12th with a ERCP being done on Oct 12th.  Patient will then restart taking Lovenox and prior dose of warfarin on Oct 13th and stay on the Lovenox until she has an INR recheck on Oct 19th as scheduled.  Patient has appointment with me 2 days from now.  We will also have nonfasting labs done tomorrow so can review results when I see her to follow-up regarding her known mild hepatitis and also   hypercalcemia seen on most recent ER visit.  Patient will schedule lab appointment

## 2021-10-07 ENCOUNTER — LAB (OUTPATIENT)
Dept: LAB | Facility: CLINIC | Age: 81
End: 2021-10-07
Payer: MEDICARE

## 2021-10-07 DIAGNOSIS — K75.9 HEPATITIS: ICD-10-CM

## 2021-10-07 DIAGNOSIS — E83.52 HYPERCALCEMIA: ICD-10-CM

## 2021-10-07 LAB
DEPRECATED CALCIDIOL+CALCIFEROL SERPL-MC: 68 UG/L (ref 20–75)
PTH-INTACT SERPL-MCNC: 50 PG/ML (ref 18–80)
TOTAL PROTEIN SERUM FOR ELP: 7.3 G/DL (ref 6.8–8.8)

## 2021-10-07 PROCEDURE — 84155 ASSAY OF PROTEIN SERUM: CPT

## 2021-10-07 PROCEDURE — 84165 PROTEIN E-PHORESIS SERUM: CPT | Performed by: PATHOLOGY

## 2021-10-07 PROCEDURE — 80053 COMPREHEN METABOLIC PANEL: CPT

## 2021-10-07 PROCEDURE — 82306 VITAMIN D 25 HYDROXY: CPT

## 2021-10-07 PROCEDURE — 36415 COLL VENOUS BLD VENIPUNCTURE: CPT

## 2021-10-07 PROCEDURE — 83970 ASSAY OF PARATHORMONE: CPT

## 2021-10-08 ENCOUNTER — OFFICE VISIT (OUTPATIENT)
Dept: INTERNAL MEDICINE | Facility: CLINIC | Age: 81
End: 2021-10-08
Payer: MEDICARE

## 2021-10-08 VITALS
SYSTOLIC BLOOD PRESSURE: 122 MMHG | RESPIRATION RATE: 16 BRPM | OXYGEN SATURATION: 98 % | TEMPERATURE: 97.5 F | WEIGHT: 180 LBS | DIASTOLIC BLOOD PRESSURE: 80 MMHG | BODY MASS INDEX: 28.93 KG/M2 | HEART RATE: 71 BPM | HEIGHT: 66 IN

## 2021-10-08 DIAGNOSIS — N39.0 RECURRENT UTI: ICD-10-CM

## 2021-10-08 DIAGNOSIS — F32.0 MAJOR DEPRESSIVE DISORDER, SINGLE EPISODE, MILD (H): ICD-10-CM

## 2021-10-08 DIAGNOSIS — Z79.01 LONG TERM CURRENT USE OF ANTICOAGULANT THERAPY: ICD-10-CM

## 2021-10-08 DIAGNOSIS — G47.00 INSOMNIA, UNSPECIFIED TYPE: ICD-10-CM

## 2021-10-08 DIAGNOSIS — Z01.818 PREOPERATIVE EXAMINATION: Primary | ICD-10-CM

## 2021-10-08 DIAGNOSIS — R41.3 MEMORY LOSS: ICD-10-CM

## 2021-10-08 DIAGNOSIS — K83.8 DILATION OF BILIARY TRACT: ICD-10-CM

## 2021-10-08 DIAGNOSIS — E03.9 HYPOTHYROIDISM, UNSPECIFIED TYPE: ICD-10-CM

## 2021-10-08 LAB
ALBUMIN SERPL ELPH-MCNC: 3.9 G/DL (ref 3.7–5.1)
ALBUMIN SERPL-MCNC: 3.3 G/DL (ref 3.4–5)
ALP SERPL-CCNC: 152 U/L (ref 40–150)
ALPHA1 GLOB SERPL ELPH-MCNC: 0.3 G/DL (ref 0.2–0.4)
ALPHA2 GLOB SERPL ELPH-MCNC: 0.7 G/DL (ref 0.5–0.9)
ALT SERPL W P-5'-P-CCNC: 76 U/L (ref 0–50)
ANION GAP SERPL CALCULATED.3IONS-SCNC: 6 MMOL/L (ref 3–14)
AST SERPL W P-5'-P-CCNC: 80 U/L (ref 0–45)
B-GLOBULIN SERPL ELPH-MCNC: 0.9 G/DL (ref 0.6–1)
BILIRUB SERPL-MCNC: 0.9 MG/DL (ref 0.2–1.3)
BUN SERPL-MCNC: 11 MG/DL (ref 7–30)
CALCIUM SERPL-MCNC: 9.9 MG/DL (ref 8.5–10.1)
CHLORIDE BLD-SCNC: 104 MMOL/L (ref 94–109)
CO2 SERPL-SCNC: 24 MMOL/L (ref 20–32)
CREAT SERPL-MCNC: 0.61 MG/DL (ref 0.52–1.04)
GAMMA GLOB SERPL ELPH-MCNC: 1.5 G/DL (ref 0.7–1.6)
GFR SERPL CREATININE-BSD FRML MDRD: 87 ML/MIN/1.73M2
GLUCOSE BLD-MCNC: 99 MG/DL (ref 70–99)
M PROTEIN SERPL ELPH-MCNC: 0 G/DL
POTASSIUM BLD-SCNC: 4.8 MMOL/L (ref 3.4–5.3)
PROT PATTERN SERPL ELPH-IMP: NORMAL
PROT SERPL-MCNC: 7.5 G/DL (ref 6.8–8.8)
SODIUM SERPL-SCNC: 134 MMOL/L (ref 133–144)

## 2021-10-08 PROCEDURE — 99213 OFFICE O/P EST LOW 20 MIN: CPT | Performed by: INTERNAL MEDICINE

## 2021-10-08 ASSESSMENT — MIFFLIN-ST. JEOR: SCORE: 1313.22

## 2021-10-08 ASSESSMENT — PATIENT HEALTH QUESTIONNAIRE - PHQ9: SUM OF ALL RESPONSES TO PHQ QUESTIONS 1-9: 0

## 2021-10-08 NOTE — PROGRESS NOTES
[address] [address]  Phone: [phone]  Primary Provider: Jacoby Patel  Pre-op Performing Provider: JACOBY PATEL      PREOPERATIVE EVALUATION:  Today's date: 10/8/2021    Geneva Shepherd is a 80 year old female who presents for a preoperative evaluation.    Surgical Information:  Surgery/Procedure: ENDOSCOPIC RETROGRADE CHOLANGIOPANCREATOGRAPHY WITH ENDOSCOPIC ULTRASOUND  Surgery Location: [facility]  Surgeon: Kavon Grady  Surgery Date: 10/12/2021  Time of Surgery: 8:00am  Where patient plans to recover: At home with family  Fax number for surgical facility: [phone]    Type of Anesthesia Anticipated: Local with MAC    Assessment & Plan     The proposed surgical procedure is considered LOW risk.    Preoperative examination  Appears stable to proceed with procedure as scheduled    Dilation of biliary tract    Long term current use of anticoagulant therapy  On long-term warfarin for factor V Leiden, history of DVTs.  Currently off of warfarin since 10/1/2021 when underwent recent colonoscopy.  Currently on bridging with Lovenox 40 mg daily.  See patient instructions regarding further anticoagulation orders    Insomnia, unspecified type  Controlled with low-dose amitriptyline.  No issues of excess serotonin effect with low-dose amitriptyline and current paroxetine dosage    Hypothyroidism, unspecified type  TSH 1.98 in June 2021.  Continue levothyroxine    Major depressive disorder, single episode, mild (H)   PHQ = 0 today.  Continue paroxetine    Memory loss  Stable.  Continue donepezil    Recurrent UTI  On nitrofurantoin per urology.  No recent urinary symptoms.  Patient denies cough or shortness of breath issues to suggest pulmonary side effects from nitrofurantoin use           Risks and Recommendations:  The patient has the following additional risks and recommendations for perioperative complications:   - No identified  additional risk factors other than previously addressed    Patient instructions:  Nothing to eat/drink after midnight prior to procedure except take Levothyroxine  the AM of surgery when you first wake up with a small sip water.  Hold other usual medications the morning of the procedure  Hold supplements between now and surgery  Remain off of warfarin temporarily as you are doing now  Continue taking Lovenox once a day as you are for now  with the last dose being on Sunday, October 10th.  Do not take Lovenox dose on October 11th or 12th with a ERCP being done on Oct 12th.    Restart Warfarin in the PM  of Oct 12th. Then restart taking Lovenox  on Oct 13th and stay on the Lovenox until  you have an INR recheck on Oct 19th as scheduled.  Follow instructions as per [facility] re: Warfarin dosing starting  Oct 12th      RECOMMENDATION:  APPROVAL GIVEN to proceed with proposed procedure, without further diagnostic evaluation.         Subjective     HPI related to upcoming procedure:   Patient recently was found on abdominal CT to have possible sigmoid wall thickening which could indicate mild colitis and extrahepatic biliary dilation up to 2.3 cm.  Patient was referred to GI.  She recently underwent a colonoscopy which she states was negative except for a benign polyp.  No report available to review at this time.  Lab testing has shown an elevated CA 19-9 level of 65 (normal 0-35).  CEA was normal.  Hepatitis B and C studies were negative.  Liver tests show mild ALT, ALT and alkaline phosphatase with normal bilirubin.  Recent hypercalcemia has resolved with discontinuation of calcium supplementation.  Patient presents for clearance to undergo EUS/ERCP for further evaluation of the biliary tract and pancreas.  No pancreatic mass seen on CT.  Patient on long-term anticoagulation with warfarin for history of factor V Leiden and previous DVT.  She is currently holding her warfarin after recently undergoing a colonoscopy as above  and is on Lovenox bridging  Patient is not doing any specific  exercise regimen but denies chest pain or shortness of breath with usual activity such as running errands and shopping.    Preop Questions 10/8/2021   1. Have you ever had a heart attack or stroke? No   2. Have you ever had surgery on your heart or blood vessels, such as a stent placement, a coronary artery bypass, or surgery on an artery in your head, neck, heart, or legs? No   3. Do you have chest pain with activity? No   4. Do you have a history of  heart failure? No   5. Do you currently have a cold, bronchitis or symptoms of other infection? No   6. Do you have a cough, shortness of breath, or wheezing? No   7. Do you or anyone in your family have previous history of blood clots? YES -  RLE DVT. Factor 5 Leiden. On longterm AC with Warfarin   8. Do you or does anyone in your family have a serious bleeding problem such as prolonged bleeding following surgeries or cuts? No   9. Have you ever had problems with anemia or been told to take iron pills? No   10. Have you had any abnormal blood loss such as black, tarry or bloody stools, or abnormal vaginal bleeding? No   11. Have you ever had a blood transfusion? No   12. Are you willing to have a blood transfusion if it is medically needed before, during, or after your surgery? Yes   13. Have you or any of your relatives ever had problems with anesthesia? No   14. Do you have sleep apnea, excessive snoring or daytime drowsiness? No   15. Do you have any artifical heart valves or other implanted medical devices like a pacemaker, defibrillator, or continuous glucose monitor? No   16. Do you have artificial joints? YES -  BTKA and LTHA   17. Are you allergic to latex? No       Health Care Directive:  Patient does not have a Health Care Directive or Living Will: Discussed advance care planning with patient; information given to patient to review.    Preoperative Review of :   reviewed - no record of  controlled substances prescribed.      Status of Chronic Conditions:  HYPOTHYROIDISM - Patient has a longstanding history of chronic Hypothyroidism. Patient has been doing well, noting no tremor, insomnia, hair loss or changes in skin texture. Continues to take medications as directed, without adverse reactions or side effects. Last TSH   Lab Results   Component Value Date    TSH 1.98 06/02/2021   .     ANXIETY    RECURRENT UTI    MEMORY LOSS    INSOMNIA    VARICOSE VEINS BLE    Review of Systems  CONSTITUTIONAL: NEGATIVE for fever, chills,. Weight back to baseline after recent mild weight loss  INTEGUMENTARY/SKIN: NEGATIVE for worrisome rashes, moles or lesions  EYES: NEGATIVE for vision changes or irritation  ENT/MOUTH: NEGATIVE for ear, mouth and throat problems  RESP: NEGATIVE for significant cough or SOB  CV: NEGATIVE for chest pain, palpitations. Stable mild peripheral edema. Has varicose veins  GI: NEGATIVE for nausea, current abdominal pain, heartburn, or change in bowel habits.  See HPI in additrion  : NEGATIVE for frequency, dysuria, or hematuria.  On antibiotic prophylaxis for UTIs per urology   MUSCULOSKELETAL: NEGATIVE for significant arthralgias or myalgia that limit activity  NEURO: NEGATIVE for weakness, dizziness or paresthesias.  Stable mild memory loss.  On Aricept  ENDOCRINE: NEGATIVE for temperature intolerance. Prior hypercalcemia resolved. On thyroid replacement  HEME: NEGATIVE for bleeding problems in general with surgery. On chronic Warfarin that is on hold and on Lovenox now for bridging with hx hypercoag with F5L and prior DVT hx  PSYCHIATRIC: NEGATIVE for  current anxiety sx with medication and support of children    Patient Active Problem List    Diagnosis Date Noted     Dilation of biliary tract 10/10/2021     Priority: Medium     Recurrent UTI 10/10/2021     Priority: Medium     Memory loss 07/16/2020     Priority: Medium     Gastroesophageal reflux disease, esophagitis presence not  specified 11/23/2017     Priority: Medium     IMO Regulatory Load OCT 2020       Major depressive disorder, single episode, mild (H) 03/14/2017     Priority: Medium     Knee joint replacement status 01/16/2017     Priority: Medium     Insomnia 02/17/2016     Priority: Medium     Hypothyroidism 01/01/2016     Priority: Medium     Long term current use of anticoagulant therapy 12/15/2015     Priority: Medium     Asymptomatic varicose veins, bilateral 09/03/2015     Priority: Medium     Personal history of RLE DVT (deep vein thrombosis)(2003) 09/03/2015     Priority: Medium     Hip joint replacement status: Left 8/31/15 08/31/2015     Priority: Medium     Elevated antinuclear antibody (JUAN ANTONIO) level 07/04/2015     Priority: Medium     Osteoarthrosis involving lower leg 01/21/2013     Priority: Medium     Problem list name updated by automated process. Provider to review  Replacing diagnoses that were inactivated after the 10/1/2021 regulatory import.       Advanced directives, counseling/discussion 05/18/2012     Priority: Medium     Patient states has Advance Directive and will bring in a copy to clinic. 5/18/2012          FACTOR 5 LEIDEN DEFECT (HYPERCOAGULABLE) 07/30/2003     Priority: Medium     Irritable bowel syndrome 09/09/2002     Priority: Medium      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     FRACTURE OF RIGHT LATERAL PROXIMAL TIBIA 11/07     Gastro-oesophageal reflux disease     rare     Hypercalcemia      Hyperlipidemia LDL goal <160 10/31/2010     Insomnia      Irritable bowel syndrome       Obesity 9/11/2013     Pain in joint, lower leg      Phlebitis and thrombophlebitis of superficial vessels of lower extremities 7/03    right     Sprain of lumbar region      Unspecified hypothyroidism      Urinary tract infection, site not specified      Past Surgical History:   Procedure Laterality Date     ARTHROPLASTY HIP Left 8/31/2015    Procedure: ARTHROPLASTY HIP;  Surgeon: Benjamín Maddox MD;  Location:  OR     ARTHROPLASTY KNEE  1/17/2013    Procedure: ARTHROPLASTY KNEE;  RIGHT TOTAL KNEE ARTHROPLASTY (BIOMET)^ ;  Surgeon: Benjamín Maddox MD;  Location:  OR     ARTHROPLASTY KNEE Left 1/16/2017    Procedure: ARTHROPLASTY KNEE;  Surgeon: Benjamín Maddox MD;  Location:  OR     CHOLECYSTECTOMY       COLONOSCOPY N/A 4/26/2016    Procedure: COMBINED COLONOSCOPY, SINGLE OR MULTIPLE BIOPSY/POLYPECTOMY BY BIOPSY;  Surgeon: Mario Coe MD;  Location:  GI     GYN SURGERY      tubal     VASCULAR SURGERY       [facility] NONSPECIFIC PROCEDURE  7/00/01    Endometrial Biopsy.     [facility] NONSPECIFIC PROCEDURE      Tubal Ligation.     [facility] NONSPECIFIC PROCEDURE  6/02    negative coronary angio     [facility] NONSPECIFIC PROCEDURE  7/04    RLE varicose vein stripping     Current Outpatient Medications   Medication Sig Dispense Refill     acetaminophen (TYLENOL) 500 MG tablet Take 500 mg by mouth       Acetylcysteine (N-ACETYL-L-CYSTEINE PO) Take 1 capsule by mouth every morning       amitriptyline (ELAVIL) 25 MG tablet Take 1 tablet (25 mg) by mouth At Bedtime 90 tablet 3     Ascorbic Acid (VITAMIN C PO) Take 1,000 mg by mouth every morning (Patient takes 2 X 500 mg = 1,000 mg dose)       ascorbic acid 1000 MG TABS tablet Take 1,000 mg by mouth       BETA CAROTENE PO Take 25,000 Units by mouth daily.       betamethasone dipropionate (DIPROSONE) 0.05 % external ointment Apply topically daily 45 g 3     Biotin 1 MG CAPS Take 1 tablet by mouth       BIOTIN PO Take 1 tablet by mouth every morning        Chromium Picolinate 1000 MCG TABS Take 800 mcg by mouth       CHROMIUM PICOLINATE 800 MCG OR TABS 1 daily       COCONUT OIL PO Take 1 capsule by mouth every morning       donepezil (ARICEPT) 10 MG tablet TAKE ONE TABLET BY MOUTH EVERY NIGHT AT BEDTIME 90 tablet 3     enoxaparin ANTICOAGULANT (LOVENOX) 40 MG/0.4ML syringe Inject 0.4 mLs (40 mg) Subcutaneous every 24 hours 6.4 mL 0     FLAX SEED OIL 1000 MG OR CAPS 1 daily       FOLIC ACID PO Take 400 mcg by mouth daily        levOCARNitine (CARNITOR) 330 MG tablet Take 330 mg by mouth every morning       levothyroxine (SYNTHROID/LEVOTHROID) 50 MCG tablet Take 1 tablet (50 mcg) by mouth daily 90 tablet 3     magnesium 100 MG CAPS Take 400 mg by mouth       MAGNESIUM OXIDE PO Take 400 mg by mouth daily       Multiple Vitamin (MULTIVITAMIN ADULT PO) Take 1 tablet by mouth       MULTIVITAMIN/MULTIMINERAL TABS   OR 1 TABLET DAILY 30 0     nitroFURantoin macrocrystal (MACRODANTIN) 100 MG capsule TAKE 1 CAPSULE BY MOUTH EVERY DAY  1     PARoxetine (PAXIL) 20 MG tablet Take 2 tablets (40 mg) by mouth every morning 180 tablet 1     TURMERIC PO Take 1 capsule by mouth every morning       vitamin B complex with vitamin C (VITAMIN  B COMPLEX) tablet Take 1 tablet by mouth       ZINC 50 MG OR CAPS 1 daily       zinc gluconate 50 MG tablet Take 50 mg by mouth       FISH OIL 1000 MG OR CAPS 1 daily       warfarin ANTICOAGULANT (COUMADIN) 5 MG tablet TAKE ONE TABLET TUES/SAT AND 1 AND 1/2 TABLETS ALL OTHER DAYS AS DIRECTED BY [facility] (Patient not taking: Reported on 10/8/2021) 115 tablet 3       Allergies   Allergen Reactions     Cephalexin Diarrhea        Social History     Tobacco Use     Smoking status: Former Smoker     Quit date: 1968     Years since quittin.1     Smokeless tobacco: Never Used     Tobacco comment: quit in    Substance Use Topics     Alcohol use: Yes     Alcohol/week: 0.0 standard drinks     Comment: 1 glass of wine a month     Family History  "  Problem Relation Age of Onset     Cardiovascular Mother          aa age 76     Heart Disease Father          age 63 mi     Coronary Artery Disease Father      Circulatory Sister      History   Drug Use No         Objective     /80   Pulse 71   Temp 97.5  F (36.4  C) (Temporal)   Resp 16   Ht 1.676 m (5' 6\")   Wt 81.6 kg (180 lb)   SpO2 98%   BMI 29.05 kg/m      Physical Exam  Eye: PERRL, EOMI  HENT: ear canals and TM's normal except for minimal cerumen right noncontributing. Nose and mouth without ulcers or lesions   Neck: no adenopathy. Thyroid normal to palpation. No bruits  Pulm: Lungs clear to auscultation   CV: Regular rates and rhythm  GI: Soft, nontender, Normal active bowel sounds, No hepatosplenomegaly or masses palpable  Ext: Peripheral pulses intact. Mild BLE edema.  Neuro: Normal strength and tone, sensory exam grossly normal.  Answers questions appropriately.  Memory recall 2/3 at 5 minutes      Recent Labs   Lab Test 10/07/21  1539 21  1529 21  1456 21  1533 21  1508 21  1434 21  2136   HGB  --   --   --  14.3  --   --  13.9   PLT  --   --   --  228  --   --  236   INR  --  2.7* 2.5* 2.57*   < >   < > 2.36*     --   --  137  --    < >  --    POTASSIUM 4.8  --   --  4.3  --    < >  --    CR 0.61  --   --  0.69  --    < >  --     < > = values in this interval not displayed.        Diagnostics:  Component      Latest Ref Rng & Units 2021   WBC      4.0 - 11.0 10e3/uL 6.3   RBC Count      3.80 - 5.20 10e6/uL 4.55   Hemoglobin      11.7 - 15.7 g/dL 14.3   Hematocrit      35.0 - 47.0 % 44.6   MCV      78 - 100 fL 98   MCH      26.5 - 33.0 pg 31.4   MCHC      31.5 - 36.5 g/dL 32.1   RDW      10.0 - 15.0 % 13.3   Platelet Count      150 - 450 10e3/uL 228   % Neutrophils      % 65   % Lymphocytes      % 23   % Monocytes      % 10   % Eosinophils      % 1   % Basophils      % 1   % Immature Granulocytes      % 0   NRBCs per 100 WBC     "  <1 /100 0   Absolute Neutrophils      1.6 - 8.3 10e3/uL 4.1   Absolute Lymphocytes      0.8 - 5.3 10e3/uL 1.5   Absolute Monocytes      0.0 - 1.3 10e3/uL 0.7   Absolute Eosinophils      0.0 - 0.7 10e3/uL 0.1   Absolute Basophils      0.0 - 0.2 10e3/uL 0.0   Absolute Immature Granulocytes      <=0.0 10e3/uL 0.0   Absolute NRBCs      10e3/uL 0.0   Sodium      133 - 144 mmol/L 137   Potassium      3.4 - 5.3 mmol/L 4.3   Chloride      94 - 109 mmol/L 105   Carbon Dioxide      20 - 32 mmol/L 32   Anion Gap      3 - 14 mmol/L <1 (L)   Urea Nitrogen      7 - 30 mg/dL 13   Creatinine      0.52 - 1.04 mg/dL 0.69   Calcium      8.5 - 10.1 mg/dL 11.1 (H)   Glucose      70 - 99 mg/dL 101 (H)   Alkaline Phosphatase      40 - 150 U/L 147   AST      0 - 45 U/L 69 (H)   ALT      0 - 50 U/L 86 (H)   Protein Total      6.8 - 8.8 g/dL 7.8   Albumin      3.4 - 5.0 g/dL 3.6   Bilirubin Total      0.2 - 1.3 mg/dL 0.7   GFR Estimate      >60 mL/min/1.73m2 84   Albumin Fraction      3.7 - 5.1 g/dL    Alpha 1 Fraction      0.2 - 0.4 g/dL    Alpha 2 Fraction      0.5 - 0.9 g/dL    Beta Fraction      0.6 - 1.0 g/dL    Gamma Fraction      0.7 - 1.6 g/dL    Monoclonal Peak      <=0.0 g/dL    ELP Interpretation:          Vitamin D Deficiency screening      20 - 75 ug/L    Parathyroid Hormone Intact      18 - 80 pg/mL    Total Protein Serum for ELP      6.8 - 8.8 g/dL      Component      Latest Ref Rng & Units 10/7/2021   WBC      4.0 - 11.0 10e3/uL    RBC Count      3.80 - 5.20 10e6/uL    Hemoglobin      11.7 - 15.7 g/dL    Hematocrit      35.0 - 47.0 %    MCV      78 - 100 fL    MCH      26.5 - 33.0 pg    MCHC      31.5 - 36.5 g/dL    RDW      10.0 - 15.0 %    Platelet Count      150 - 450 10e3/uL    % Neutrophils      %    % Lymphocytes      %    % Monocytes      %    % Eosinophils      %    % Basophils      %    % Immature Granulocytes      %    NRBCs per 100 WBC      <1 /100    Absolute Neutrophils      1.6 - 8.3 10e3/uL    Absolute  "Lymphocytes      0.8 - 5.3 10e3/uL    Absolute Monocytes      0.0 - 1.3 10e3/uL    Absolute Eosinophils      0.0 - 0.7 10e3/uL    Absolute Basophils      0.0 - 0.2 10e3/uL    Absolute Immature Granulocytes      <=0.0 10e3/uL    Absolute NRBCs      10e3/uL    Sodium      133 - 144 mmol/L 134   Potassium      3.4 - 5.3 mmol/L 4.8   Chloride      94 - 109 mmol/L 104   Carbon Dioxide      20 - 32 mmol/L 24   Anion Gap      3 - 14 mmol/L 6   Urea Nitrogen      7 - 30 mg/dL 11   Creatinine      0.52 - 1.04 mg/dL 0.61   Calcium      8.5 - 10.1 mg/dL 9.9   Glucose      70 - 99 mg/dL 99   Alkaline Phosphatase      40 - 150 U/L 152 (H)   AST      0 - 45 U/L 80 (H)   ALT      0 - 50 U/L 76 (H)   Protein Total      6.8 - 8.8 g/dL 7.5   Albumin      3.4 - 5.0 g/dL 3.3 (L)   Bilirubin Total      0.2 - 1.3 mg/dL 0.9   GFR Estimate      >60 mL/min/1.73m2 87   Albumin Fraction      3.7 - 5.1 g/dL 3.9   Alpha 1 Fraction      0.2 - 0.4 g/dL 0.3   Alpha 2 Fraction      0.5 - 0.9 g/dL 0.7   Beta Fraction      0.6 - 1.0 g/dL 0.9   Gamma Fraction      0.7 - 1.6 g/dL 1.5   Monoclonal Peak      <=0.0 g/dL 0.0   ELP Interpretation:       Essentially normal electrophoretic pattern. No obvious monoclonal proteins seen. Pathologic significance requires clinical correlation. CATRACHITO Gramajo M.D., Ph.D., Pathologist ().   Vitamin D Deficiency screening      20 - 75 ug/L 68   Parathyroid Hormone Intact      18 - 80 pg/mL 50   Total Protein Serum for ELP      6.8 - 8.8 g/dL 7.3          EKG 8/25/21 showed normal sinus rhythm. Rate 76. Possible left atrial enlargement.  EKG reads  \"septal infarct\" but this is a chronic reading by the computer including on EKGs from July 2014 when patient underwent stress echo showing NO evidence for ischemia or infarct on the ECHO imaging    Revised Cardiac Risk Index (RCRI):  The patient has the following serious cardiovascular risks for perioperative complications:   - No serious cardiac risks = 0 " points     RCRI Interpretation: 0 points: Class I (very low risk - 0.4% complication rate)           Signed Electronically by: Jacoby Boo MD  Copy of this evaluation report is provided to requesting physician.

## 2021-10-08 NOTE — PATIENT INSTRUCTIONS
Nothing to eat/drink after midnight prior to procedure except take Levothyroxine  the AM of surgery when you first wake up with a small sip water.  Hold other usual medications the morning of the procedure  Hold supplements between now and surgery  Remain off of warfarin temporarily as you are doing now  Continue taking Lovenox once a day as you are for now  with the last dose being on Sunday, October 10th.  Do not take Lovenox dose on October 11th or 12th with a ERCP being done on Oct 12th.    Restart Warfarin in the PM  of Oct 12th. Then restart taking Lovenox  on Oct 13th and stay on the Lovenox until  you have an INR recheck on Oct 19th as scheduled.  Follow instructions as per [facility] re: Warfarin dosing starting  Oct 12th

## 2021-10-10 PROBLEM — K83.8 DILATION OF BILIARY TRACT: Status: ACTIVE | Noted: 2021-10-10

## 2021-10-10 PROBLEM — N39.0 RECURRENT UTI: Status: ACTIVE | Noted: 2021-10-10

## 2021-10-11 NOTE — PROGRESS NOTES
Pre op physical,ekg and labs manually faxed to [facility] at [phone].

## 2021-10-12 ENCOUNTER — MEDICAL CORRESPONDENCE (OUTPATIENT)
Dept: HEALTH INFORMATION MANAGEMENT | Facility: CLINIC | Age: 81
End: 2021-10-12

## 2021-10-19 ENCOUNTER — ANTICOAGULATION THERAPY VISIT (OUTPATIENT)
Dept: ANTICOAGULATION | Facility: CLINIC | Age: 81
End: 2021-10-19

## 2021-10-19 ENCOUNTER — LAB (OUTPATIENT)
Dept: LAB | Facility: CLINIC | Age: 81
End: 2021-10-19
Payer: MEDICARE

## 2021-10-19 ENCOUNTER — DOCUMENTATION ONLY (OUTPATIENT)
Dept: INTERNAL MEDICINE | Facility: CLINIC | Age: 81
End: 2021-10-19

## 2021-10-19 DIAGNOSIS — Z86.718 PERSONAL HISTORY OF DVT (DEEP VEIN THROMBOSIS): ICD-10-CM

## 2021-10-19 DIAGNOSIS — Z79.01 LONG TERM CURRENT USE OF ANTICOAGULANT THERAPY: Primary | ICD-10-CM

## 2021-10-19 DIAGNOSIS — Z86.718 PERSONAL HISTORY OF DVT (DEEP VEIN THROMBOSIS): Primary | ICD-10-CM

## 2021-10-19 LAB — INR BLD: 1.8 (ref 0.9–1.1)

## 2021-10-19 PROCEDURE — 36416 COLLJ CAPILLARY BLOOD SPEC: CPT

## 2021-10-19 PROCEDURE — 85610 PROTHROMBIN TIME: CPT

## 2021-10-19 NOTE — PROGRESS NOTES
ANTICOAGULATION MANAGEMENT     Geneva Shepherd 80 year old female is on warfarin with subtherapeutic INR result. (Goal INR 2.0-3.0)    Recent labs: (last 7 days)     10/19/21  1532   INR 1.8*       ASSESSMENT     Source(s): Patient/Caregiver Call       Warfarin doses taken: Warfarin recently held for colonoscopy and ERCP  which may be affecting INR    Diet: No new diet changes identified    New illness, injury, or hospitalization: No    Medication/supplement changes: None noted    Signs or symptoms of bleeding or clotting: No    Previous INR: Therapeutic last 2(+) visits    Additional findings: Bridging with Enoxaparin until INR >= 2.0     PLAN     Recommended plan for temporary change(s) affecting INR     Dosing Instructions: Continue your current warfarin dose with next INR in 3 days       Summary  As of 10/19/2021    Full warfarin instructions:  10/19: 7.5 mg; Otherwise 7.5 mg every Sun, Thu; 5 mg all other days   Next INR check:  10/22/2021             Telephone call with Geneva who verbalizes understanding and agrees to plan    Lab visit scheduled, patient cannot come back until Friday     Education provided: Goal range and significance of current result, Monitoring for bleeding signs and symptoms and Monitoring for clotting signs and symptoms    Plan made per [facility] anticoagulation protocol    Camelia He RN  Anticoagulation Clinic  10/19/2021    _______________________________________________________________________     Anticoagulation Episode Summary     Current INR goal:  2.0-3.0   TTR:  56.3 % (1 y)   Target end date:  Indefinite   Send INR reminders to:  [facility]    Indications    Long term current use of anticoagulant therapy [Z79.01]  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]  Embolism and thrombosis (H) (Resolved) [I74.9]  Personal history of RLE DVT (deep vein thrombosis)(2003) [Z86.718]           Comments:           Anticoagulation Care Providers     Provider Role  Specialty Phone number    Jacoby Boo MD Referring Internal Medicine [phone]

## 2021-10-19 NOTE — PROGRESS NOTES
ANTICOAGULATION MANAGEMENT      Geneva Shepherd due for annual renewal of referral to anticoagulation monitoring. Order pended for your review and signature.      ANTICOAGULATION SUMMARY      Warfarin indication(s)     DVT  Factor V     Heart valve present?  NO       Current goal range   INR: 2.0-3.0     Goal appropriate for indication? Yes, INR 2-3 appropriate for hx of DVT, PE, hypercoagulable state, Afib, LVAD, or bileaflet AVR without risk factors     Current duration of therapy Indefinite/long term therapy   Time in Therapeutic Range (TTR)  (Goal > 60%) 56%       Office visit with referring provider's group within last year yes on 10/8/21       Camelia He RN

## 2021-10-22 ENCOUNTER — ANTICOAGULATION THERAPY VISIT (OUTPATIENT)
Dept: ANTICOAGULATION | Facility: CLINIC | Age: 81
End: 2021-10-22

## 2021-10-22 ENCOUNTER — LAB (OUTPATIENT)
Dept: LAB | Facility: CLINIC | Age: 81
End: 2021-10-22
Payer: MEDICARE

## 2021-10-22 DIAGNOSIS — Z86.718 PERSONAL HISTORY OF DVT (DEEP VEIN THROMBOSIS): ICD-10-CM

## 2021-10-22 DIAGNOSIS — Z79.01 LONG TERM CURRENT USE OF ANTICOAGULANT THERAPY: Primary | ICD-10-CM

## 2021-10-22 LAB — INR BLD: 2.7 (ref 0.9–1.1)

## 2021-10-22 PROCEDURE — 99207 PR NO CHARGE NURSE ONLY: CPT

## 2021-10-22 PROCEDURE — 36416 COLLJ CAPILLARY BLOOD SPEC: CPT

## 2021-10-22 PROCEDURE — 85610 PROTHROMBIN TIME: CPT

## 2021-10-22 NOTE — PROGRESS NOTES
ANTICOAGULATION FOLLOW-UP CLINIC VISIT    Patient Name:  Geneva Shepherd  Date:  10/22/2021  Contact Type:  Telephone    SUBJECTIVE:         OBJECTIVE    Recent labs: (last 7 days)     10/22/21  1518   INR 2.7*       ASSESSMENT / PLAN  INR assessment THER    Recheck INR In: 2 WEEKS    INR Location Clinic      Anticoagulation Summary  As of 10/22/2021    INR goal:  2.0-3.0   TTR:  56.1 % (1 y)   INR used for dosin.7 (10/22/2021)   Warfarin maintenance plan:  7.5 mg (5 mg x 1.5) every Sun, Thu; 5 mg (5 mg x 1) all other days   Full warfarin instructions:  7.5 mg every Sun, Thu; 5 mg all other days   Weekly warfarin total:  40 mg   No change documented:  Leigha Donaldson RN   Plan last modified:  Nemo Reyes RN (2021)   Next INR check:  2021   Priority:  Maintenance   Target end date:  Indefinite    Indications    Long term current use of anticoagulant therapy [Z79.01]  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]  Embolism and thrombosis (H) (Resolved) [I74.9]  Personal history of RLE DVT (deep vein thrombosis)() [Z86.718]             Anticoagulation Episode Summary     INR check location:      Preferred lab:      Send INR reminders to:  [facility]    Comments:        Anticoagulation Care Providers     Provider Role Specialty Phone number    Jacoby Boo MD Referring Internal Medicine [phone]            See the Encounter Report to view Anticoagulation Flowsheet and Dosing Calendar (Go to Encounters tab in chart review, and find the Anticoagulation Therapy Visit)        Leigha Donaldson, RN

## 2021-10-22 NOTE — PROGRESS NOTES
ANTICOAGULATION MANAGEMENT     Geneva Shepherd 80 year old female is on warfarin with therapeutic INR result. (Goal INR 2.0-3.0)    Recent labs: (last 7 days)     10/22/21  1518   INR 2.7*       ASSESSMENT     Source(s): Patient/Caregiver Call       Warfarin doses taken: Warfarin taken as instructed    Diet: No new diet changes identified    New illness, injury, or hospitalization: Yes: 10/12 endoscopy    Medication/supplement changes: None noted    Signs or symptoms of bleeding or clotting: No    Previous INR: Subtherapeutic    Additional findings: None     PLAN     Recommended plan for no diet, medication or health factor changes affecting INR     Dosing Instructions: Continue your current warfarin dose with next INR in 2 weeks       Summary  As of 10/22/2021    Full warfarin instructions:  7.5 mg every Sun, Thu; 5 mg all other days   Next INR check:  11/5/2021             Telephone call with Geneva who verbalizes understanding and agrees to plan    Lab visit scheduled    Education provided: None required    Plan made per [facility] anticoagulation protocol    Leigha Donaldson RN  Anticoagulation Clinic  10/22/2021    _______________________________________________________________________     Anticoagulation Episode Summary     Current INR goal:  2.0-3.0   TTR:  56.1 % (1 y)   Target end date:  Indefinite   Send INR reminders to:  [facility]    Indications    Long term current use of anticoagulant therapy [Z79.01]  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

## 2021-11-05 ENCOUNTER — LAB (OUTPATIENT)
Dept: LAB | Facility: CLINIC | Age: 81
End: 2021-11-05
Payer: MEDICARE

## 2021-11-05 DIAGNOSIS — Z86.718 PERSONAL HISTORY OF DVT (DEEP VEIN THROMBOSIS): ICD-10-CM

## 2021-11-05 PROCEDURE — 85610 PROTHROMBIN TIME: CPT

## 2021-11-05 PROCEDURE — 36416 COLLJ CAPILLARY BLOOD SPEC: CPT

## 2021-11-08 ENCOUNTER — ANTICOAGULATION THERAPY VISIT (OUTPATIENT)
Dept: ANTICOAGULATION | Facility: CLINIC | Age: 81
End: 2021-11-08
Payer: MEDICARE

## 2021-11-08 DIAGNOSIS — Z79.01 LONG TERM CURRENT USE OF ANTICOAGULANT THERAPY: Primary | ICD-10-CM

## 2021-11-08 DIAGNOSIS — Z86.718 PERSONAL HISTORY OF DVT (DEEP VEIN THROMBOSIS): ICD-10-CM

## 2021-11-08 LAB — INR BLD: 2.5 (ref 0.9–1.1)

## 2021-11-08 PROCEDURE — 99207 PR NO CHARGE NURSE ONLY: CPT

## 2021-11-08 NOTE — PROGRESS NOTES
ANTICOAGULATION FOLLOW-UP CLINIC VISIT    Patient Name:  Geneva Shepherd  Date:  2021  Contact Type:  Telephone    SUBJECTIVE:         OBJECTIVE    Recent labs: (last 7 days)     21  1353   INR 2.5*       ASSESSMENT / PLAN  INR assessment THER    Recheck INR In: 3 WEEKS    INR Location Clinic      Anticoagulation Summary  As of 2021    INR goal:  2.0-3.0   TTR:  55.7 % (1 y)   INR used for dosin.5 (2021)   Warfarin maintenance plan:  7.5 mg (5 mg x 1.5) every Sun, Thu; 5 mg (5 mg x 1) all other days   Full warfarin instructions:  7.5 mg every Sun, Thu; 5 mg all other days   Weekly warfarin total:  40 mg   Plan last modified:  Nemo Reyes RN (2021)   Next INR check:  2021   Priority:  Maintenance   Target end date:  Indefinite    Indications    Long term current use of anticoagulant therapy [Z79.01]  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    Comments:        Anticoagulation Care Providers     Provider Role Specialty Phone number    Jacoby Boo MD Referring Internal Medicine [phone]            See the Encounter Report to view Anticoagulation Flowsheet and Dosing Calendar (Go to Encounters tab in chart review, and find the Anticoagulation Therapy Visit)        Leigha Donaldson RN

## 2021-11-08 NOTE — CONFIDENTIAL NOTE
ANTICOAGULATION MANAGEMENT     Geneva Shepherd 80 year old female is on warfarin with therapeutic INR result. (Goal INR 2.0-3.0)    Recent labs: (last 7 days)     11/05/21  1353   INR 2.5*       ASSESSMENT     Source(s): Chart Review and Patient/Caregiver Call       Warfarin doses taken: Warfarin taken as instructed    Diet: No new diet changes identified    New illness, injury, or hospitalization: No    Medication/supplement changes: None noted    Signs or symptoms of bleeding or clotting: No    Previous INR: Therapeutic last visit; previously outside of goal range    Additional findings: None     PLAN     Recommended plan for no diet, medication or health factor changes affecting INR     Dosing Instructions: Continue your current warfarin dose with next INR in 3 weeks       Summary  As of 11/8/2021    Full warfarin instructions:  7.5 mg every Sun, Thu; 5 mg all other days   Next INR check:  11/29/2021             Telephone call with Geneva who verbalizes understanding and agrees to plan    Lab visit scheduled    Education provided: Please call back if any changes to your diet, medications or how you've been taking warfarin    Plan made per [facility] anticoagulation protocol    Leigha Donaldson RN  Anticoagulation Clinic  11/8/2021    _______________________________________________________________________     Anticoagulation Episode Summary     Current INR goal:  2.0-3.0   TTR:  55.7 % (1 y)   Target end date:  Indefinite   Send INR reminders to:  [facility]    Indications    Long term current use of anticoagulant therapy [Z79.01]  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

## 2021-11-29 ENCOUNTER — ANTICOAGULATION THERAPY VISIT (OUTPATIENT)
Dept: ANTICOAGULATION | Facility: CLINIC | Age: 81
End: 2021-11-29

## 2021-11-29 ENCOUNTER — LAB (OUTPATIENT)
Dept: LAB | Facility: CLINIC | Age: 81
End: 2021-11-29
Payer: MEDICARE

## 2021-11-29 DIAGNOSIS — Z86.718 PERSONAL HISTORY OF DVT (DEEP VEIN THROMBOSIS): ICD-10-CM

## 2021-11-29 DIAGNOSIS — Z79.01 LONG TERM CURRENT USE OF ANTICOAGULANT THERAPY: Primary | ICD-10-CM

## 2021-11-29 LAB — INR BLD: 2.9 (ref 0.9–1.1)

## 2021-11-29 PROCEDURE — 36416 COLLJ CAPILLARY BLOOD SPEC: CPT

## 2021-11-29 PROCEDURE — 85610 PROTHROMBIN TIME: CPT

## 2021-11-29 NOTE — PROGRESS NOTES
ANTICOAGULATION MANAGEMENT     Geneva Shepherd 81 year old female is on warfarin with therapeutic INR result. (Goal INR 2.0-3.0)    Recent labs: (last 7 days)     11/29/21  1433   INR 2.9*       ASSESSMENT     Source(s): Chart Review and Patient/Caregiver Call       Warfarin doses taken: Warfarin taken as instructed    Diet: No new diet changes identified    New illness, injury, or hospitalization: No    Medication/supplement changes: None noted    Signs or symptoms of bleeding or clotting: No    Previous INR: Therapeutic last 2(+) visits    Additional findings: None     PLAN     Recommended plan for no diet, medication or health factor changes affecting INR     Dosing Instructions: Continue your current warfarin dose with next INR in 4 weeks       Summary  As of 11/29/2021    Full warfarin instructions:  7.5 mg every Sun, Thu; 5 mg all other days   Next INR check:  12/27/2021             Telephone call with Geneva who verbalizes understanding and agrees to plan    Lab visit scheduled    Education provided: Monitoring for bleeding signs and symptoms and Monitoring for clotting signs and symptoms    Plan made per [facility] anticoagulation protocol    Camelia He RN  Anticoagulation Clinic  11/29/2021    _______________________________________________________________________     Anticoagulation Episode Summary     Current INR goal:  2.0-3.0   TTR:  56.1 % (1 y)   Target end date:  Indefinite   Send INR reminders to:  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

## 2021-12-20 ENCOUNTER — TELEPHONE (OUTPATIENT)
Dept: VASCULAR SURGERY | Facility: CLINIC | Age: 81
End: 2021-12-20
Payer: MEDICARE

## 2021-12-20 NOTE — TELEPHONE ENCOUNTER
Per pt request, faxed their compression hose Rx to [facility] at [phone].   Pt would like 2 pair(s) of black, closed-toe, knee high, 20-30mmhg compression hose. Fax confirmed.    Pt aware they will be shipped to their home address.    LAURA Holt

## 2022-01-13 ENCOUNTER — TELEPHONE (OUTPATIENT)
Dept: INTERNAL MEDICINE | Facility: CLINIC | Age: 82
End: 2022-01-13
Payer: MEDICARE

## 2022-01-13 NOTE — TELEPHONE ENCOUNTER
ANTICOAGULATION     Geneva Shepherd is overdue for INR check.     No call made to pt - scheduled for INR appt on 1/19/22.    Kathy Warren RN

## 2022-01-17 ENCOUNTER — LAB (OUTPATIENT)
Dept: URGENT CARE | Facility: URGENT CARE | Age: 82
End: 2022-01-17
Payer: MEDICARE

## 2022-01-17 DIAGNOSIS — Z20.822 SUSPECTED COVID-19 VIRUS INFECTION: ICD-10-CM

## 2022-01-17 PROCEDURE — U0005 INFEC AGEN DETEC AMPLI PROBE: HCPCS

## 2022-01-17 PROCEDURE — U0003 INFECTIOUS AGENT DETECTION BY NUCLEIC ACID (DNA OR RNA); SEVERE ACUTE RESPIRATORY SYNDROME CORONAVIRUS 2 (SARS-COV-2) (CORONAVIRUS DISEASE [COVID-19]), AMPLIFIED PROBE TECHNIQUE, MAKING USE OF HIGH THROUGHPUT TECHNOLOGIES AS DESCRIBED BY CMS-2020-01-R: HCPCS

## 2022-01-18 LAB — SARS-COV-2 RNA RESP QL NAA+PROBE: NEGATIVE

## 2022-01-19 ENCOUNTER — LAB (OUTPATIENT)
Dept: LAB | Facility: CLINIC | Age: 82
End: 2022-01-19
Payer: MEDICARE

## 2022-01-19 ENCOUNTER — ANTICOAGULATION THERAPY VISIT (OUTPATIENT)
Dept: ANTICOAGULATION | Facility: CLINIC | Age: 82
End: 2022-01-19

## 2022-01-19 DIAGNOSIS — Z86.718 PERSONAL HISTORY OF DVT (DEEP VEIN THROMBOSIS): ICD-10-CM

## 2022-01-19 DIAGNOSIS — Z79.01 LONG TERM CURRENT USE OF ANTICOAGULANT THERAPY: Primary | ICD-10-CM

## 2022-01-19 LAB — INR BLD: 3.1 (ref 0.9–1.1)

## 2022-01-19 PROCEDURE — 85610 PROTHROMBIN TIME: CPT

## 2022-01-19 PROCEDURE — 36416 COLLJ CAPILLARY BLOOD SPEC: CPT

## 2022-01-19 NOTE — PROGRESS NOTES
ANTICOAGULATION MANAGEMENT     Geneva Shepherd 81 year old female is on warfarin with supratherapeutic INR result. (Goal INR 2.0-3.0)    Recent labs: (last 7 days)     01/19/22  1423   INR 3.1*       ASSESSMENT     Source(s): Chart Review and Patient/Caregiver Call       Warfarin doses taken: Warfarin taken as instructed    Diet: No new diet changes identified    New illness, injury, or hospitalization: Yes - patient believe she had omicron - sx started 1/10/22 and included HA, congestion, sneezing, sore throat and fatigue - likely just a URI as patient tested negative for COVID on 1/17/22.    Medication/supplement changes: None noted    Signs or symptoms of bleeding or clotting: No    Previous INR: Therapeutic last 2(+) visits    Additional findings: Lower activity level during recent illness     PLAN     Recommended plan for temporary change(s) affecting INR     Dosing Instructions: Continue your current warfarin dose and increase greens tonight with next INR in 2 weeks       Summary  As of 1/19/2022    Full warfarin instructions:  7.5 mg every Sun, Thu; 5 mg all other days   Next INR check:  2/2/2022             Telephone call with Geneva who verbalizes understanding and agrees to plan    Lab visit scheduled    Education provided: Please call back if any changes to your diet, medications or how you've been taking warfarin, Monitoring for bleeding signs and symptoms, Monitoring for clotting signs and symptoms and Importance of notifying clinic for changes in medications; a sooner lab recheck maybe needed.    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

## 2022-01-19 NOTE — PROGRESS NOTES
Anticoagulation Management    Unable to reach Geneva today.    Today's INR result of 3.1 is supratherapeutic (goal INR of 2.0-3.0).  Result received from: Clinic Lab    Follow up required to assess for changes  and discuss out of range INR     Left message to continue current dose of warfarin 5 mg tonight. Request call back for assessment.      Anticoagulation clinic to follow up    Kathy Warren RN

## 2022-01-22 ENCOUNTER — NURSE TRIAGE (OUTPATIENT)
Dept: NURSING | Facility: CLINIC | Age: 82
End: 2022-01-22
Payer: MEDICARE

## 2022-02-02 ENCOUNTER — LAB (OUTPATIENT)
Dept: LAB | Facility: CLINIC | Age: 82
End: 2022-02-02
Payer: MEDICARE

## 2022-02-02 ENCOUNTER — ANTICOAGULATION THERAPY VISIT (OUTPATIENT)
Dept: ANTICOAGULATION | Facility: CLINIC | Age: 82
End: 2022-02-02

## 2022-02-02 DIAGNOSIS — Z79.01 LONG TERM CURRENT USE OF ANTICOAGULANT THERAPY: Primary | ICD-10-CM

## 2022-02-02 DIAGNOSIS — Z86.718 PERSONAL HISTORY OF DVT (DEEP VEIN THROMBOSIS): ICD-10-CM

## 2022-02-02 LAB — INR BLD: 2.3 (ref 0.9–1.1)

## 2022-02-02 PROCEDURE — 85610 PROTHROMBIN TIME: CPT

## 2022-02-02 PROCEDURE — 36416 COLLJ CAPILLARY BLOOD SPEC: CPT

## 2022-02-02 NOTE — CONFIDENTIAL NOTE
Reason for Call:   returning call    Detailed comments: Patient is returning a call to [facility] regarding INR today 2/2. Please advise at the number provided.    Phone Number Patient can be reached at: Home number on file [phone] (home)    Best Time: as soon as possible    Can we leave a detailed message on this number? YES    Call taken on 2/2/2022 at 4:31 PM by Tennille Alonso

## 2022-02-02 NOTE — PROGRESS NOTES
LM returning call, no answer. Reiterated dosing below.     Dominga Fulton RN   [facility] Anticoagulation [facility]

## 2022-02-02 NOTE — PROGRESS NOTES
Anticoagulation Management    Unable to reach Geneva today.    Today's INR result of 2.3 is therapeutic (goal INR of 2.0-3.0).  Result received from: Clinic Lab    Follow up required to assess for changes     Left message to continue current dose of warfarin 5 mg tonight. Request call back for assessment.      Anticoagulation clinic to follow up    Robert Fulton RN

## 2022-02-03 NOTE — PROGRESS NOTES
ANTICOAGULATION MANAGEMENT     Geneva Shepherd 81 year old female is on warfarin with therapeutic INR result. (Goal INR 2.0-3.0)    Recent labs: (last 7 days)     02/02/22  1500   INR 2.3*       ASSESSMENT     Source(s): Chart Review and Patient/Caregiver Call       Warfarin doses taken: Warfarin taken as instructed    Diet: No new diet changes identified    New illness, injury, or hospitalization: No    Medication/supplement changes: None noted    Signs or symptoms of bleeding or clotting: No    Previous INR: Supratherapeutic    Additional findings: None     PLAN     Recommended plan for no diet, medication or health factor changes affecting INR     Dosing Instructions: Continue your current warfarin dose with next INR in 4 weeks       Summary  As of 2/2/2022    Full warfarin instructions:  7.5 mg every Sun, Thu; 5 mg all other days   Next INR check:  3/2/2022             Telephone call with Geneva who verbalizes understanding and agrees to plan    Lab visit scheduled    Education provided: Please call back if any changes to your diet, medications or how you've been taking warfarin and Contact [phone]  with any changes, questions or concerns.     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

## 2022-02-17 PROBLEM — K21.9 GASTROESOPHAGEAL REFLUX DISEASE: Status: ACTIVE | Noted: 2017-11-23

## 2022-03-02 ENCOUNTER — TRANSFERRED RECORDS (OUTPATIENT)
Dept: HEALTH INFORMATION MANAGEMENT | Facility: CLINIC | Age: 82
End: 2022-03-02
Payer: MEDICARE

## 2022-03-02 LAB
ALT SERPL-CCNC: 51 IU/L (ref 0–32)
AST SERPL-CCNC: 57 IU/L (ref 0–40)
CREATININE (EXTERNAL): 0.54 MG/DL (ref 0.57–1)
GFR ESTIMATED (EXTERNAL): 94 ML/MIN/1.73
GLUCOSE (EXTERNAL): 97 MG/DL (ref 65–99)
POTASSIUM (EXTERNAL): 4 MMOL/L (ref 3.5–5.2)

## 2022-03-03 ENCOUNTER — ANTICOAGULATION THERAPY VISIT (OUTPATIENT)
Dept: ANTICOAGULATION | Facility: CLINIC | Age: 82
End: 2022-03-03

## 2022-03-03 ENCOUNTER — LAB (OUTPATIENT)
Dept: LAB | Facility: CLINIC | Age: 82
End: 2022-03-03
Payer: MEDICARE

## 2022-03-03 DIAGNOSIS — Z86.718 PERSONAL HISTORY OF DVT (DEEP VEIN THROMBOSIS): ICD-10-CM

## 2022-03-03 DIAGNOSIS — Z86.718 PERSONAL HISTORY OF DVT (DEEP VEIN THROMBOSIS): Primary | ICD-10-CM

## 2022-03-03 DIAGNOSIS — Z79.01 LONG TERM CURRENT USE OF ANTICOAGULANT THERAPY: ICD-10-CM

## 2022-03-03 LAB — INR BLD: 1.9 (ref 0.9–1.1)

## 2022-03-03 PROCEDURE — 36416 COLLJ CAPILLARY BLOOD SPEC: CPT

## 2022-03-03 PROCEDURE — 85610 PROTHROMBIN TIME: CPT

## 2022-03-03 NOTE — PROGRESS NOTES
ANTICOAGULATION MANAGEMENT ANTICOAGULATION MANAGEMENT     Geneva Shepherd 81 year old female is on warfarin with subtherapeutic INR result. (Goal INR 2.0-3.0)    Recent labs: (last 7 days)     03/03/22  1446   INR 1.9*       ASSESSMENT       Source(s): Chart Review and Patient/Caregiver Call       Warfarin doses taken: Warfarin taken as instructed    Diet: No new diet changes identified    New illness, injury, or hospitalization: Yes: Having digestive issues and seeing [facility].     Medication/supplement changes: None noted    Signs or symptoms of bleeding or clotting: No    Previous INR: Therapeutic last 2(+) visits    Additional findings: None  See above       PLAN     Recommended plan for no diet, medication or health factor changes affecting INR  Working with [facility] about digestive issues    Dosing Instructions: Booster dose then continue your current warfarin dose with next INR in 3 weeks       Summary  As of 3/3/2022    Full warfarin instructions:  3/3: 10 mg; Otherwise 7.5 mg every Sun, Thu; 5 mg all other days   Next INR check:  3/24/2022             Telephone call with Geneva who verbalizes understanding and agrees to plan    Lab visit scheduled    Education provided: Please call back if any changes to your diet, medications or how you've been taking warfarin    Plan made per [facility] anticoagulation protocol    Leigha Donaldson RN  Anticoagulation Clinic  3/4/2022    _______________________________________________________________________     Anticoagulation Episode Summary     Current INR goal:  2.0-3.0   TTR:  57.2 % (1 y)   Target end date:  Indefinite   Send INR reminders to:  [facility]    Indications    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 (Goal INR 2.0-3.0)    Recent labs: (last 7 days)     03/03/22  1446   INR 1.9*       ASSESSMENT       Source(s): Chart Review    Previous INR was Therapeutic last visit; previously outside of goal range    Medication, diet, health changes since last INR chart reviewed; none identified           PLAN     Unable to reach Geneva today.    Left message to take a booster dose of warfarin,  10 mg tonight. Request call back for assessment.    Follow up required to confirm warfarin dose taken and assess for changes    Leigha Donaldson, RN  Anticoagulation Clinic  3/3/2022

## 2022-03-08 ENCOUNTER — TRANSFERRED RECORDS (OUTPATIENT)
Dept: HEALTH INFORMATION MANAGEMENT | Facility: CLINIC | Age: 82
End: 2022-03-08
Payer: MEDICARE

## 2022-03-08 LAB
CREATININE (EXTERNAL): 0.6 MG/DL (ref 0.57–1)
GFR ESTIMATED (EXTERNAL): 91 ML/MIN/1.73
GLUCOSE (EXTERNAL): 64 MG/DL (ref 65–99)
HEP C HIM: NORMAL
POTASSIUM (EXTERNAL): 4.8 MMOL/L (ref 3.5–5.2)

## 2022-03-10 ENCOUNTER — OFFICE VISIT (OUTPATIENT)
Dept: INTERNAL MEDICINE | Facility: CLINIC | Age: 82
End: 2022-03-10
Payer: MEDICARE

## 2022-03-10 VITALS
WEIGHT: 184 LBS | RESPIRATION RATE: 16 BRPM | TEMPERATURE: 98.3 F | HEIGHT: 66 IN | SYSTOLIC BLOOD PRESSURE: 124 MMHG | BODY MASS INDEX: 29.57 KG/M2 | OXYGEN SATURATION: 96 % | DIASTOLIC BLOOD PRESSURE: 76 MMHG | HEART RATE: 76 BPM

## 2022-03-10 DIAGNOSIS — J30.0 VASOMOTOR RHINITIS: ICD-10-CM

## 2022-03-10 DIAGNOSIS — E03.9 HYPOTHYROIDISM, UNSPECIFIED TYPE: ICD-10-CM

## 2022-03-10 DIAGNOSIS — E83.52 HYPERCALCEMIA: ICD-10-CM

## 2022-03-10 DIAGNOSIS — F32.0 MAJOR DEPRESSIVE DISORDER, SINGLE EPISODE, MILD (H): ICD-10-CM

## 2022-03-10 DIAGNOSIS — K75.9 HEPATITIS: ICD-10-CM

## 2022-03-10 PROCEDURE — 84590 ASSAY OF VITAMIN A: CPT | Mod: 90 | Performed by: INTERNAL MEDICINE

## 2022-03-10 PROCEDURE — 80053 COMPREHEN METABOLIC PANEL: CPT | Performed by: INTERNAL MEDICINE

## 2022-03-10 PROCEDURE — 84443 ASSAY THYROID STIM HORMONE: CPT | Performed by: INTERNAL MEDICINE

## 2022-03-10 PROCEDURE — 83970 ASSAY OF PARATHORMONE: CPT | Performed by: INTERNAL MEDICINE

## 2022-03-10 PROCEDURE — 36415 COLL VENOUS BLD VENIPUNCTURE: CPT | Performed by: INTERNAL MEDICINE

## 2022-03-10 PROCEDURE — 82306 VITAMIN D 25 HYDROXY: CPT | Performed by: INTERNAL MEDICINE

## 2022-03-10 PROCEDURE — 99214 OFFICE O/P EST MOD 30 MIN: CPT | Performed by: INTERNAL MEDICINE

## 2022-03-10 PROCEDURE — 99000 SPECIMEN HANDLING OFFICE-LAB: CPT | Performed by: INTERNAL MEDICINE

## 2022-03-10 RX ORDER — IPRATROPIUM BROMIDE 42 UG/1
2 SPRAY, METERED NASAL 2 TIMES DAILY
Qty: 15 ML | Refills: 11 | Status: SHIPPED | OUTPATIENT
Start: 2022-03-10 | End: 2023-03-07

## 2022-03-10 ASSESSMENT — ANXIETY QUESTIONNAIRES
5. BEING SO RESTLESS THAT IT IS HARD TO SIT STILL: NOT AT ALL
6. BECOMING EASILY ANNOYED OR IRRITABLE: NOT AT ALL
2. NOT BEING ABLE TO STOP OR CONTROL WORRYING: NOT AT ALL
IF YOU CHECKED OFF ANY PROBLEMS ON THIS QUESTIONNAIRE, HOW DIFFICULT HAVE THESE PROBLEMS MADE IT FOR YOU TO DO YOUR WORK, TAKE CARE OF THINGS AT HOME, OR GET ALONG WITH OTHER PEOPLE: NOT DIFFICULT AT ALL
1. FEELING NERVOUS, ANXIOUS, OR ON EDGE: SEVERAL DAYS
GAD7 TOTAL SCORE: 2
7. FEELING AFRAID AS IF SOMETHING AWFUL MIGHT HAPPEN: NOT AT ALL
3. WORRYING TOO MUCH ABOUT DIFFERENT THINGS: NOT AT ALL

## 2022-03-10 ASSESSMENT — PATIENT HEALTH QUESTIONNAIRE - PHQ9
5. POOR APPETITE OR OVEREATING: SEVERAL DAYS
SUM OF ALL RESPONSES TO PHQ QUESTIONS 1-9: 4

## 2022-03-10 NOTE — PROGRESS NOTES
ASSESSMENT:   1. Hepatitis  Previous LFTs improving.  Recheck lab today.  Continue other management per GI.  Has MRI of the liver scheduled for tomorrow through GI order  - Comprehensive metabolic panel; Future  - Comprehensive metabolic panel    2. Hypercalcemia  Calcium level at recent GI clinic much higher than previous.  Generally ranging from 10.0 to 10.5.  Previous  SPEP  And PTH normal.   Lab recheck as ordered.  If PTH normal and calcium remains quite elevated, will check 24-hour urine calcium for familial hypercalciuria.  We will also stop calcium and vitamin D supplementation  - Vitamin D Deficiency; Future  - Parathyroid Hormone Intact; Future  - Comprehensive metabolic panel; Future  - Vitamin A; Future  - Vitamin A  - Comprehensive metabolic panel  - Parathyroid Hormone Intact  - Vitamin D Deficiency    3. Vasomotor rhinitis  Has failed previous antihistamine therapy.  Likely vasomotor etiology.  Will treat with Atrovent nasal spray  - ipratropium (ATROVENT) 0.06 % nasal spray; Spray 2 sprays into both nostrils 2 times daily  Dispense: 15 mL; Refill: 11    4. Hypothyroidism, unspecified type  History some fatigue.  On levothyroxine.  Recheck TSH.     - TSH with free T4 reflex    5. Major depressive disorder, single episode, mild (H)  Controlled. PHQ9 = 4      PLAN:   Atrovent/ Ipratropium nasal spray, 1-2 spray each nostril twice a day for chronic nasal drainage   Labs as ordered  Remain off of calcium   Stop Vit D  Stop Multivitamin   MRI liver tomorrow with [facility] as scheduled  Continue other medications      (Chart documentation was completed, in part, with [employer] voice-recognition software. Even though reviewed, some grammatical, spelling, and word errors may remain.)    Jacoby Boo MD  Internal Medicine Department  [facility]      Jeff Bean is a 81 year old who presents for the following health issues     HPI     Chief Complaint   Patient presents with  "    Discuss     Patient wants to discuss heatlh concerns with Provider     Fatigue     Nasal Drip      Most recent lab results reviewed with pt.      Has been working with [facility].  Patient brings in lab results from that clinic showing improvement in AST going from 89-57 and ALT going from 76 now to 51.  Alkaline phosphatase remained elevated still at 154.  Calcium level quite elevated at 11.4 with GI labs and normal creatinine.  CBC normal except for MCV 98 5 normal.  Patient multiple supplements that could potentially affect LFTs.  Last CT abdomen from August 2021 showed   \"extrahepatic biliary dilatation up to 2.3 cm increased from previous, this could be related to an unusually prominent reservoir effect.\"  Denies chest pain, shortness of breath, abdominal pain currently.  Has some clear rhinorrhea.  Has not improved with antihistamine therapy.  No other URI symptoms.  Has some chronic general fatigue.  No known snoring.  Feels like she is getting enough hours of sleep.   PHQ9 = 4 and mostly related to fatigue question.  Denies feeling depressed at this time on long-term amitriptyline and denies a.m. hangover feeling.  Feels better rested in the morning but gets more tired as the day goes on      Additional ROS:   Constitutional, HEENT, Cardiovascular, Pulmonary, GI and , Neuro, MSK and Psych review of systems/symptoms are otherwise negative or unchanged from previous, except as noted above.      OBJECTIVE:  /76   Pulse 76   Temp 98.3  F (36.8  C) (Temporal)   Resp 16   Ht 1.676 m (5' 6\")   Wt 83.5 kg (184 lb)   SpO2 96%   BMI 29.70 kg/m     Estimated body mass index is 29.7 kg/m  as calculated from the following:    Height as of this encounter: 1.676 m (5' 6\").    Weight as of this encounter: 83.5 kg (184 lb).   HEENT: Nasal mucosa mildly edematous with some clear nasal discharge.  Sinuses nontender to palpation.  Neck: no adenopathy. Thyroid normal to palpation. No bruits  Pulm: Lungs " clear to auscultation   CV: Regular rates and rhythm  GI: Soft, nontender, Normal active bowel sounds, No hepatosplenomegaly or masses palpable  Ext: Peripheral pulses intact. No edema.   Gen: Normal-appearing affect

## 2022-03-10 NOTE — PATIENT INSTRUCTIONS
Atrovent/ Ipratropium nasal spray, 1-2 spray each nostril twice a day for chronic nasal drainage   Labs as ordered  Remain off of calcium   Stop Vit D  Stop Multivitamin   MRI liver tomorrow with [facility] as scheduled  Continue other medications

## 2022-03-11 LAB
ALBUMIN SERPL-MCNC: 3.5 G/DL (ref 3.4–5)
ALP SERPL-CCNC: 148 U/L (ref 40–150)
ALT SERPL W P-5'-P-CCNC: 60 U/L (ref 0–50)
ANION GAP SERPL CALCULATED.3IONS-SCNC: 7 MMOL/L (ref 3–14)
AST SERPL W P-5'-P-CCNC: 51 U/L (ref 0–45)
BILIRUB SERPL-MCNC: 0.4 MG/DL (ref 0.2–1.3)
BUN SERPL-MCNC: 8 MG/DL (ref 7–30)
CALCIUM SERPL-MCNC: 10.5 MG/DL (ref 8.5–10.1)
CHLORIDE BLD-SCNC: 105 MMOL/L (ref 94–109)
CO2 SERPL-SCNC: 25 MMOL/L (ref 20–32)
CREAT SERPL-MCNC: 0.65 MG/DL (ref 0.52–1.04)
DEPRECATED CALCIDIOL+CALCIFEROL SERPL-MC: 71 UG/L (ref 20–75)
GFR SERPL CREATININE-BSD FRML MDRD: 89 ML/MIN/1.73M2
GLUCOSE BLD-MCNC: 108 MG/DL (ref 70–99)
POTASSIUM BLD-SCNC: 4.1 MMOL/L (ref 3.4–5.3)
PROT SERPL-MCNC: 7.5 G/DL (ref 6.8–8.8)
PTH-INTACT SERPL-MCNC: 43 PG/ML (ref 18–80)
SODIUM SERPL-SCNC: 137 MMOL/L (ref 133–144)
TSH SERPL DL<=0.005 MIU/L-ACNC: 1.86 MU/L (ref 0.4–4)

## 2022-03-11 ASSESSMENT — ANXIETY QUESTIONNAIRES: GAD7 TOTAL SCORE: 2

## 2022-03-15 ENCOUNTER — TRANSFERRED RECORDS (OUTPATIENT)
Dept: HEALTH INFORMATION MANAGEMENT | Facility: CLINIC | Age: 82
End: 2022-03-15
Payer: MEDICARE

## 2022-03-17 ENCOUNTER — TELEPHONE (OUTPATIENT)
Dept: INTERNAL MEDICINE | Facility: CLINIC | Age: 82
End: 2022-03-17
Payer: MEDICARE

## 2022-03-17 ENCOUNTER — TRANSFERRED RECORDS (OUTPATIENT)
Dept: HEALTH INFORMATION MANAGEMENT | Facility: CLINIC | Age: 82
End: 2022-03-17
Payer: MEDICARE

## 2022-03-17 DIAGNOSIS — Z86.718 PERSONAL HISTORY OF DVT (DEEP VEIN THROMBOSIS): Primary | ICD-10-CM

## 2022-03-17 DIAGNOSIS — Z11.59 ENCOUNTER FOR SCREENING FOR OTHER VIRAL DISEASES: Primary | ICD-10-CM

## 2022-03-17 DIAGNOSIS — F32.0 MAJOR DEPRESSIVE DISORDER, SINGLE EPISODE, MILD (H): ICD-10-CM

## 2022-03-17 LAB
ALT SERPL-CCNC: 41 IU/L (ref 0–32)
ANNOTATION COMMENT IMP: NORMAL
AST SERPL-CCNC: 54 IU/L (ref 0–40)
RETINYL PALMITATE SERPL-MCNC: 0.08 MG/L
VIT A SERPL-MCNC: 0.34 MG/L

## 2022-03-17 NOTE — TELEPHONE ENCOUNTER
PROCEDURE HOLD/BRIDGE PLAN REQUEST    Date of Procedure: 3/30/22  Where: [facility]  Procedure: EUS, with Possible ERCP  Hold Request: 4 days and bridging if needed    Mame Way, RN  Anticoagulation Nurse - [facility]

## 2022-03-17 NOTE — TELEPHONE ENCOUNTER
[facility]-PROCEDURAL ANTICOAGULATION  MANAGEMENT    ASSESSMENT     Warfarin interruption plan for EUS on 2022.    Indication for Anticoagulation: DVT and Heterozygous Factor V Leiden      RLE DVT 2003    HX superficial phlebitis 1980s      [place]-Procedure Risk stratification for thromboembolism: moderate (2018 American Society of Hematology guideline)    VTE: 2018 American Society of Hematology recommends against bridging in low and moderate risk VTE patients holding warfarin     VTE: 2016 Anticoagulation Forum clinical guidance recommends no bridge therapy for most VTE patients interrupting warfarin with possible exceptions for VTE within previous month, prior hx recurrent VTE during anticoagulation therapy interruption, or undergoing a procedure with high for VTE  (joint replacement surgery or major abdominal cancer resection)     RECOMMENDATION       Pre-Procedure:  o Hold warfarin for 5 days, until after procedure startin2022  o No pre-procedure bridge      Post-Procedure:  o Resume warfarin dose if okay with provider doing procedure on night of procedure, 2022 PM: 10mg  o Start  enoxaparin (Lovenox) 40 mg subq Q 24 hrs (prophylaxis dose) ~ 24 hours post procedure when okay with provider doing procedure. Continue until INR >= 2.3 or INR >= 2.0 x 2 (ACC protocol goal INR 2-3)  o Recheck INR ~5-6 days after resuming warfarin         Plan routed to referring provider for approval  ?   Queenie Frey [facility]    SUBJECTIVE/OBJECTIVE     Geneva Shepherd, a 81 year old female    Goal INR Range: 2.0-3.0     Patient bridged in past: Yes: prophylactic dose after procedure 10/2021    Pertinent History: N/A    Wt Readings from Last 3 Encounters:   03/10/22 83.5 kg (184 lb)   10/08/21 81.6 kg (180 lb)   21 80.7 kg (178 lb)      Ideal body weight: 59.3 kg (130 lb 11.7 oz)  Adjusted ideal body weight: 69 kg (152 lb 0.6 oz)     Estimated body mass index is 29.7 kg/m  as calculated from the following:    " Height as of 3/10/22: 1.676 m (5' 6\").    Weight as of 3/10/22: 83.5 kg (184 lb).    Lab Results   Component Value Date    INR 1.9 (H) 03/03/2022    INR 2.3 (H) 02/02/2022    INR 3.1 (H) 01/19/2022     Lab Results   Component Value Date    HGB 14.3 08/25/2021    HGB 13.9 06/02/2021    HCT 44.6 08/25/2021    HCT 43.0 06/02/2021     08/25/2021     06/02/2021     Lab Results   Component Value Date    CR 0.65 03/10/2022    CR 0.61 10/07/2021    CR 0.69 08/25/2021     Estimated Creatinine Clearance: 76.4 mL/min (based on SCr of 0.65 mg/dL).  "

## 2022-03-17 NOTE — TELEPHONE ENCOUNTER
Agree with plan with  no bridging prior to procedure but bridging afterwards until INR at goal as written by Queenie Frey RP below

## 2022-03-18 RX ORDER — PAROXETINE 20 MG/1
40 TABLET, FILM COATED ORAL EVERY MORNING
Qty: 180 TABLET | Refills: 1 | Status: SHIPPED | OUTPATIENT
Start: 2022-03-18 | End: 2022-08-07

## 2022-03-18 NOTE — TELEPHONE ENCOUNTER
Routing refill request to provider for review/approval because:  Drug interaction warning    Mariela Saunders, RN

## 2022-03-18 NOTE — TELEPHONE ENCOUNTER
Phone message with hold orders and sent in mail.  Will call first of the week to find out where to sent enoxaparin RX

## 2022-03-18 NOTE — TELEPHONE ENCOUNTER
Lovenox 40mg pended to send to preferred pharmacy  Last warfarin dose: 03/24/2022 03/25/2022, NO warfarin  03/26/2022, NO warfarin  03/27/2022, NO warfarin  03/28/2022, NO warfarin  03/29/2022, NO warfarin  03/30/2022, DAY OF PROCEDURE, NO enoxaparin. Restart warfarin 10mg (2 tabs) in the evening, unless instructed otherwise by the physician.  03/31/2022, Restart enoxaparin injections into the abdomen every 24 hours (in the morning), unless instructed otherwise by the physician, and 7.5mg (1.5 tabs) warfarin in the evening.   04/01/2022, Enoxaparin injection into the abdomen in the morning and 5mg (1 tabs) warfarin in the evening.  04/02/2022, Enoxaparin injection into the abdomen in the morning and 7.5mg (1.5 tabs) warfarin in the evening.  04/03/2022, Enoxaparin injection into the abdomen in the morning and 5mg (1 tabs) warfarin in the evening.  04/04/2022, Enoxaparin injection into the abdomen in the morning and 5mg (1 tabs) warfarin in the evening.  04/05/2022, Enoxaparin injection into the abdomen in the morning. Recheck INR at the lab for further dosing instructions.   If you have any questions please call the Anticoagulation Clinic at [phone].

## 2022-03-21 ENCOUNTER — OFFICE VISIT (OUTPATIENT)
Dept: INTERNAL MEDICINE | Facility: CLINIC | Age: 82
End: 2022-03-21
Payer: MEDICARE

## 2022-03-21 VITALS
DIASTOLIC BLOOD PRESSURE: 68 MMHG | BODY MASS INDEX: 29.41 KG/M2 | HEART RATE: 77 BPM | HEIGHT: 66 IN | WEIGHT: 183 LBS | SYSTOLIC BLOOD PRESSURE: 116 MMHG | OXYGEN SATURATION: 97 % | TEMPERATURE: 97.4 F

## 2022-03-21 DIAGNOSIS — Z01.818 PREOP GENERAL PHYSICAL EXAM: Primary | ICD-10-CM

## 2022-03-21 DIAGNOSIS — Z86.718 PERSONAL HISTORY OF DVT (DEEP VEIN THROMBOSIS): ICD-10-CM

## 2022-03-21 DIAGNOSIS — K83.8 DILATION OF BILIARY TRACT: ICD-10-CM

## 2022-03-21 DIAGNOSIS — D68.51 HETEROZYGOUS FACTOR V LEIDEN MUTATION (H): ICD-10-CM

## 2022-03-21 PROCEDURE — 99214 OFFICE O/P EST MOD 30 MIN: CPT | Performed by: INTERNAL MEDICINE

## 2022-03-21 NOTE — PROGRESS NOTES
[address] [address]  Phone: [phone]  Primary Provider: Jacoby Boo  Pre-op Performing Provider: BETHANY ROUSE      PREOPERATIVE EVALUATION:  Today's date: 3/21/2022    Geneva Shepherd is a 81 year old female who presents for a preoperative evaluation.    Surgical Information:  Surgery/Procedure: ENDOSCOPIC RETROGRADE CHOLANGIOPANCREATOGRAPHY-ENDOSCOPIC ULTRASOUND  Surgery Location: [facility]  Surgeon: Sammie Christian MD  Surgery Date: 3/30/2022  Time of Surgery: 1:00 PM  Where patient plans to recover: At home with family  Fax number for surgical facility: Note does not need to be faxed, will be available electronically in Epic.    Type of Anesthesia Anticipated: Monitor Anesthesia Care    Assessment & Plan     The proposed surgical procedure is considered LOW risk.    Preop general physical exam  Dilation of biliary tract w/abnrml LFTs  Heterozygous factor V Leiden mutation (H)  Personal history of RLE DVT (deep vein thrombosis)(2003)    Risks and Recommendations:  The patient has the following additional risks and recommendations for perioperative complications:   - No identified additional risk factors other than previously addressed    Medication Instructions:  Patient is to take all scheduled medications on the day of surgery EXCEPT for modifications listed below:  May hold all AM medications on the day of her procedure   - warfarin: Thomboembolic risk is high (>10%/year). HOLD warfarin 5 days. - Bridging therapy ordered   -  start bridging 3 days prior to procedure. Hold 24 hrs prior to procedure.  Restart day after procedure . Continue until INR > 2    RECOMMENDATION:  APPROVAL GIVEN to proceed with proposed procedure, without further diagnostic evaluation.    Review of prior external note(s) from - ACC, GI notes  Review of the result(s) of each unique test - prior labs                  Subjective     HPI related  to upcoming procedure:  Continued elevated LFTs,    S/p ERCP fall 2021.        Preop Questions 3/21/2022   1. Have you ever had a heart attack or stroke? No   2. Have you ever had surgery on your heart or blood vessels, such as a stent placement, a coronary artery bypass, or surgery on an artery in your head, neck, heart, or legs? No   3. Do you have chest pain with activity? No   4. Do you have a history of  heart failure? No   5. Do you currently have a cold, bronchitis or symptoms of other infection? No   6. Do you have a cough, shortness of breath, or wheezing? No   7. Do you or anyone in your family have previous history of blood clots? YES - personally   8. Do you or does anyone in your family have a serious bleeding problem such as prolonged bleeding following surgeries or cuts? YES    9. Have you ever had problems with anemia or been told to take iron pills? No   10. Have you had any abnormal blood loss such as black, tarry or bloody stools, or abnormal vaginal bleeding? No   11. Have you ever had a blood transfusion? No   12. Are you willing to have a blood transfusion if it is medically needed before, during, or after your surgery? Yes   13. Have you or any of your relatives ever had problems with anesthesia? No   14. Do you have sleep apnea, excessive snoring or daytime drowsiness? No   15. Do you have any artifical heart valves or other implanted medical devices like a pacemaker, defibrillator, or continuous glucose monitor? No   16. Do you have artificial joints? YES    17. Are you allergic to latex? No     Health Care Directive:  Patient does not have a Health Care Directive or Living Will: Discussed advance care planning with patient; information given to patient to review.    Preoperative Review of :   reviewed - no record of controlled substances prescribed.    Review of Systems  CONSTITUTIONAL: NEGATIVE for fever, chills, change in weight  INTEGUMENTARY/SKIN: NEGATIVE for worrisome rashes,  moles or lesions  EYES: NEGATIVE for vision changes or irritation  ENT/MOUTH: NEGATIVE for ear, mouth and throat problems  RESP: NEGATIVE for significant cough or SOB  CV: NEGATIVE for chest pain, palpitations or peripheral edema  GI: NEGATIVE for nausea, abdominal pain, heartburn, or change in bowel habits  : NEGATIVE for frequency, dysuria, or hematuria  MUSCULOSKELETAL: NEGATIVE for significant arthralgias or myalgia  NEURO: NEGATIVE for weakness, dizziness or paresthesias  ENDOCRINE: NEGATIVE for temperature intolerance, skin/hair changes  HEME: NEGATIVE for bleeding problems  PSYCHIATRIC: NEGATIVE for changes in mood or affect    Patient Active Problem List    Diagnosis Date Noted     Dilation of biliary tract 10/10/2021     Priority: Medium     Recurrent UTI 10/10/2021     Priority: Medium     Memory loss 07/16/2020     Priority: Medium     Gastroesophageal reflux disease, esophagitis presence not specified 11/23/2017     Priority: Medium     IMO Regulatory Load OCT 2020       Major depressive disorder, single episode, mild (H) 03/14/2017     Priority: Medium     Knee joint replacement status 01/16/2017     Priority: Medium     Insomnia 02/17/2016     Priority: Medium     Hypothyroidism 01/01/2016     Priority: Medium     Long term current use of anticoagulant therapy 12/15/2015     Priority: Medium     Asymptomatic varicose veins, bilateral 09/03/2015     Priority: Medium     Personal history of RLE DVT (deep vein thrombosis)(2003) 09/03/2015     Priority: Medium     Hip joint replacement status: Left 8/31/15 08/31/2015     Priority: Medium     Elevated antinuclear antibody (JUAN ANTONIO) level 07/04/2015     Priority: Medium     Osteoarthrosis involving lower leg 01/21/2013     Priority: Medium     Problem list name updated by automated process. Provider to review  Replacing diagnoses that were inactivated after the 10/1/2021 regulatory import.       Advanced directives, counseling/discussion 05/18/2012      Priority: Medium     Patient states has Advance Directive and will bring in a copy to clinic. 5/18/2012          FACTOR 5 LEIDEN DEFECT (HYPERCOAGULABLE) 07/30/2003     Priority: Medium     Irritable bowel syndrome 09/09/2002     Priority: Medium      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     FRACTURE OF RIGHT LATERAL PROXIMAL TIBIA 11/07     Gastro-oesophageal reflux disease     rare     Hypercalcemia      Hyperlipidemia LDL goal <160 10/31/2010     Insomnia      Irritable bowel syndrome      Obesity 9/11/2013     Pain in joint, lower leg      Phlebitis and thrombophlebitis of superficial vessels of lower extremities 7/03    right     Sprain of lumbar region      Unspecified hypothyroidism      Urinary tract infection, site not specified      Past Surgical History:   Procedure Laterality Date     ARTHROPLASTY HIP Left 8/31/2015    Procedure: ARTHROPLASTY HIP;  Surgeon: Benjamín Maddox MD;  Location:  OR     ARTHROPLASTY KNEE  1/17/2013    Procedure: ARTHROPLASTY KNEE;  RIGHT TOTAL KNEE ARTHROPLASTY (BIOMET)^ ;  Surgeon: Benjamín Maddox MD;  Location:  OR     ARTHROPLASTY KNEE Left 1/16/2017    Procedure: ARTHROPLASTY KNEE;  Surgeon: Benjamín Maddox MD;  Location:  OR     CHOLECYSTECTOMY       COLONOSCOPY N/A 4/26/2016    Procedure: COMBINED COLONOSCOPY, SINGLE OR MULTIPLE BIOPSY/POLYPECTOMY BY BIOPSY;  Surgeon: Mario Coe MD;  Location:  GI     GYN SURGERY      tubal     VASCULAR SURGERY       [facility] NONSPECIFIC PROCEDURE  7/00/01    Endometrial  Biopsy.     [facility] [place] NONSPECIFIC PROCEDURE      Tubal Ligation.     [facility] [place] NONSPECIFIC PROCEDURE  6/02    negative coronary angio     [facility] [place] NONSPECIFIC PROCEDURE  7/04    RLE varicose vein stripping     Current Outpatient Medications   Medication Sig Dispense Refill     acetaminophen (TYLENOL) 500 MG tablet Take 500 mg by mouth       Acetylcysteine (N-ACETYL-L-CYSTEINE PO) Take 1 capsule by mouth every morning       amitriptyline (ELAVIL) 25 MG tablet Take 1 tablet (25 mg) by mouth At Bedtime 90 tablet 3     Ascorbic Acid (VITAMIN C PO) Take 1,000 mg by mouth every morning (Patient takes 2 X 500 mg = 1,000 mg dose)       BETA CAROTENE PO Take 25,000 Units by mouth daily.       Biotin 1 MG CAPS Take 1 tablet by mouth       CHROMIUM PICOLINATE 800 MCG OR TABS 1 daily       COCONUT OIL PO Take 1 capsule by mouth every morning       donepezil (ARICEPT) 10 MG tablet TAKE ONE TABLET BY MOUTH EVERY NIGHT AT BEDTIME 90 tablet 3     enoxaparin ANTICOAGULANT (LOVENOX) 40 MG/0.4ML syringe Inject 0.4 mLs (40 mg) Subcutaneous every 24 hours Per instructions pre/post procedurally 1.2 mL 0     FISH OIL 1000 MG OR CAPS 1 daily       FLAX SEED OIL 1000 MG OR CAPS 1 daily       FOLIC ACID PO Take 400 mcg by mouth daily        ipratropium (ATROVENT) 0.06 % nasal spray Spray 2 sprays into both nostrils 2 times daily 15 mL 11     levOCARNitine (CARNITOR) 330 MG tablet Take 330 mg by mouth every morning       levothyroxine (SYNTHROID/LEVOTHROID) 50 MCG tablet Take 1 tablet (50 mcg) by mouth daily 90 tablet 3     magnesium 100 MG CAPS Take 400 mg by mouth       nitroFURantoin macrocrystal (MACRODANTIN) 100 MG capsule TAKE 1 CAPSULE BY MOUTH EVERY DAY  1     PARoxetine (PAXIL) 20 MG tablet TAKE 2 TABLETS (40 MG) BY MOUTH EVERY MORNING (Patient taking differently: Take 40 mg by mouth At Bedtime ) 180 tablet 1     TURMERIC PO Take 1 capsule by mouth every morning       vitamin B complex with vitamin C (VITAMIN  B COMPLEX) tablet Take 1 tablet by mouth  "      warfarin ANTICOAGULANT (COUMADIN) 5 MG tablet TAKE ONE TABLET TUES/SAT AND 1 AND 1/2 TABLETS ALL OTHER DAYS AS DIRECTED BY  tablet 3     zinc gluconate 50 MG tablet Take 50 mg by mouth       betamethasone dipropionate (DIPROSONE) 0.05 % external ointment Apply topically daily (Patient not taking: Reported on 3/21/2022) 45 g 3       No Known Allergies     Social History     Tobacco Use     Smoking status: Former Smoker     Quit date: 1968     Years since quittin.5     Smokeless tobacco: Never Used     Tobacco comment: quit in    Substance Use Topics     Alcohol use: Yes     Alcohol/week: 0.0 standard drinks     Comment: 1 glass of wine a month       History   Drug Use No         Objective     /68   Pulse 77   Temp 97.4  F (36.3  C) (Tympanic)   Ht 1.676 m (5' 6\")   Wt 83 kg (183 lb)   LMP  (LMP Unknown)   SpO2 97%   Breastfeeding No   BMI 29.54 kg/m      Physical Exam    GENERAL APPEARANCE: healthy and no distress     EYES: EOMI, PERRL     HENT: normal cephalic/atraumatic     NECK: no adenopathy, no asymmetry, masses, or scars and thyroid normal to palpation     RESP: lungs clear to auscultation - no rales, rhonchi or wheezes     CV: regular rates and rhythm, normal S1 S2, no S3 or S4 and no murmur, click or rub     ABDOMEN:  soft, nontender, no HSM or masses and bowel sounds normal     MS: extremities normal- no gross deformities noted, no evidence of inflammation in joints, FROM in all extremities.     SKIN: no suspicious lesions or rashes     NEURO: Normal strength and tone, sensory exam grossly normal, mentation intact and speech normal     PSYCH: mentation appears normal. and affect normal/bright     LYMPHATICS: No cervical adenopathy    Recent Labs   Lab Test 03/10/22  1724 22  1446 22  1500 10/19/21  1532 10/07/21  1539 21  1456 21  1533 21  1434 21  2136   HGB  --   --   --   --   --   --  14.3  --  13.9   PLT  --   --   --   --   --  "  --  228  --  236   INR  --  1.9* 2.3*   < >  --    < > 2.57*   < > 2.36*     --   --   --  134  --  137   < >  --    POTASSIUM 4.1  --   --   --  4.8  --  4.3   < >  --    CR 0.65  --   --   --  0.61  --  0.69   < >  --     < > = values in this interval not displayed.        Diagnostics:  No labs were ordered during this visit.  Recent labs reviewed  No EKG required, no history of coronary heart disease, significant arrhythmia, peripheral arterial disease or other structural heart disease.    Revised Cardiac Risk Index (RCRI):  The patient has the following serious cardiovascular risks for perioperative complications:   - No serious cardiac risks = 0 points     RCRI Interpretation: 0 points: Class I (very low risk - 0.4% complication rate)           Signed Electronically by: Too Ortiz MD  Copy of this evaluation report is provided to requesting physician.

## 2022-03-21 NOTE — PATIENT INSTRUCTIONS
Preparing for Your Surgery  Getting started  A nurse will call you to review your health history and instructions. They will give you an arrival time based on your scheduled surgery time. Please be ready to share:    Your doctor's clinic name and phone number    Your medical, surgical and anesthesia history    A list of allergies and sensitivities    A list of medicines, including herbal treatments and over-the-counter drugs    Whether the patient has a legal guardian (ask how to send us the papers in advance)  Please tell us if you're pregnant--or if there's any chance you might be pregnant. Some surgeries may injure a fetus (unborn baby), so they require a pregnancy test. Surgeries that are safe for a fetus don't always need a test, and you can choose whether to have one.   If you have a child who's having surgery, please ask for a copy of Preparing for Your Child's Surgery.    Preparing for surgery    Within 30 days of surgery: Have a pre-op exam (sometimes called an H&P, or History and Physical). This can be done at a clinic or pre-operative center.  ? If you're having a , you may not need this exam. Talk to your care team.    At your pre-op exam, talk to your care team about all medicines you take. If you need to stop any medicines before surgery, ask when to start taking them again.  ? We do this for your safety. Many medicines can make you bleed too much during surgery. Some change how well surgery (anesthesia) drugs work.    Call your insurance company to let them know you're having surgery. (If you don't have insurance, call [phone].)    Call your clinic if there's any change in your health. This includes signs of a cold or flu (sore throat, runny nose, cough, rash, fever). It also includes a scrape or scratch near the surgery site.    If you have questions on the day of surgery, call your hospital or surgery center.  COVID testing  You may need to be tested for COVID-19 before having  surgery. If so, your surgical team will give you instructions for scheduling this test, separate from your preoperative history and physical.  Eating and drinking guidelines  For your safety: Unless your surgeon tells you otherwise, follow the guidelines below.    Eat and drink as usual until 8 hours before surgery. After that, no food or milk.    Drink clear liquids until 2 hours before surgery. These are liquids you can see through, like water, Gatorade and Propel Water. You may also have black coffee and tea (no cream or milk).    Nothing by mouth within 2 hours of surgery. This includes gum, candy and breath mints.    If you drink alcohol: Stop drinking it the night before surgery.    If your care team tells you to take medicine on the morning of surgery, it's okay to take it with a sip of water.  Preventing infection    Shower or bathe the night before and morning of your surgery. Follow the instructions your clinic gave you. (If no instructions, use regular soap.)    Don't shave or clip hair near your surgery site. We'll remove the hair if needed.    Don't smoke or vape the morning of surgery. You may chew nicotine gum up to 2 hours before surgery. A nicotine patch is okay.  ? Note: Some surgeries require you to completely quit smoking and nicotine. Check with your surgeon.    Your care team will make every effort to keep you safe from infection. We will:  ? Clean our hands often with soap and water (or an alcohol-based hand rub).  ? Clean the skin at your surgery site with a special soap that kills germs.  ? Give you a special gown to keep you warm. (Cold raises the risk of infection.)  ? Wear special hair covers, masks, gowns and gloves during surgery.  ? Give antibiotic medicine, if prescribed. Not all surgeries need antibiotics.  What to bring on the day of surgery    Photo ID and insurance card    Copy of your health care directive, if you have one    Glasses and hearing aides (bring cases)  ? You can't  wear contacts during surgery    Inhaler and eye drops, if you use them (tell us about these when you arrive)    CPAP machine or breathing device, if you use them    A few personal items, if spending the night    If you have . . .  ? A pacemaker, ICD (cardiac defibrillator) or other implant: Bring the ID card.  ? An implanted stimulator: Bring the remote control.  ? A legal guardian: Bring a copy of the certified (court-stamped) guardianship papers.  Please remove any jewelry, including body piercings. Leave jewelry and other valuables at home.  If you're going home the day of surgery    You must have a responsible adult drive you home. They should stay with you overnight as well.    If you don't have someone to stay with you, and you aren't safe to go home alone, we may keep you overnight. Insurance often won't pay for this.  After surgery  If it's hard to control your pain or you need more pain medicine, please call your surgeon's office.  Questions?   If you have any questions for your care team, list them here: _________________________________________________________________________________________________________________________________________________________________________ ____________________________________ ____________________________________ ____________________________________  For informational purposes only. Not to replace the advice of your health care provider. Copyright   2003, 2019 [facility]. All rights reserved. Clinically reviewed by Katerine Santamaria MD. My 1% [id number] - REV 07/21.    How to Take Your Medication Before Surgery  - Take all of your medications before surgery except as noted below  - Hold morning medications until after procedure  - Hold supplements between now and surgery  - Stop warfarin on 3/24/22. No doses after that until after procedure  - Start Lovenox once a day beginning on 3/27 with the last dose being on 3/28//2022.   Restart Warfarin the evening of  3/31/22.  Restart taking Lovenox also on 3/31/22 in the morning and stay on the Lovenox until instructed to stop by anticoagulation staff.

## 2022-03-22 ENCOUNTER — TELEPHONE (OUTPATIENT)
Dept: INTERNAL MEDICINE | Facility: CLINIC | Age: 82
End: 2022-03-22
Payer: MEDICARE

## 2022-03-22 NOTE — TELEPHONE ENCOUNTER
PCP, please advise.     Patient requesting PCP to review Pre-Op medication directions concerning Warfarin and Lovenox. Patient has high concerns she is to take Warfarin and Lovenox together 3/31 and to stay on until told to stop by Anticoag staff.     Patient reports previously has taken Lovenox 4 days prior to procedure only, Warfarin was started day of procedure in PM and was done taking Lovenox.        Can we leave a detailed message on this number? YES  Phone number patient can be reached at: Home number on file [phone] (home)    Lola Linder RN  [facility] Triage

## 2022-03-22 NOTE — TELEPHONE ENCOUNTER
Spoke to patient with providers response. RN explained the reasoning of bridging after procedure, to allow Warfarin levels to build back up. Patient verbalized understanding and agrees to medication plan. Patient will schedule INR appointment for 4/5.

## 2022-03-22 NOTE — TELEPHONE ENCOUNTER
Discussed with triage nurse.  Instructions regarding anticoagulation before and after surgery are those which were specifically written by the Pharm D in the anticoagulation clinic and I agree with those orders.  I am posting those orders again below and patient to follow those instructions.  Because INR will be drifting down with patient for stops warfarin, she does not need to have Lovenox bridging before the procedure.  After the procedure however, her INR will be normal and if she will have full clotting status.  Therefore she will need to start Lovenox after her procedure to thin out her blood immediately and then stay on the Lovenox until her INR is greater than 2 and the warfarin is having as full anticoagulation affect.  Patient to follow these instructions as below:    Last warfarin dose: 03/24/2022 03/25/2022, NO warfarin  03/26/2022, NO warfarin  03/27/2022, NO warfarin  03/28/2022, NO warfarin  03/29/2022, NO warfarin  03/30/2022, DAY OF PROCEDURE, NO enoxaparin. Restart warfarin 10mg (2 tabs) in the evening, unless instructed otherwise by the physician.  03/31/2022, Restart enoxaparin injections into the abdomen every 24 hours (in the morning), unless instructed otherwise by the physician, and 7.5mg (1.5 tabs) warfarin in the evening.   04/01/2022, Enoxaparin injection into the abdomen in the morning and 5mg (1 tabs) warfarin in the evening.  04/02/2022, Enoxaparin injection into the abdomen in the morning and 7.5mg (1.5 tabs) warfarin in the evening.  04/03/2022, Enoxaparin injection into the abdomen in the morning and 5mg (1 tabs) warfarin in the evening.  04/04/2022, Enoxaparin injection into the abdomen in the morning and 5mg (1 tabs) warfarin in the evening.  04/05/2022, Enoxaparin injection into the abdomen in the morning. Recheck INR at the lab for further dosing instructions.

## 2022-03-22 NOTE — TELEPHONE ENCOUNTER
Talked with Geneva and went over Hold orders.  I had mailed her a copy.  Wants enoxaparin sent to [facility]- done  Answered hold questions

## 2022-03-22 NOTE — TELEPHONE ENCOUNTER
[facility] called requesting hold/bridge orders. Relayed orders below for 5 day hold pre-procedure, and lovenox bridging post-op. It appears lovenox still needs to be ordered/patient needs to be contacted.

## 2022-03-26 ENCOUNTER — NURSE TRIAGE (OUTPATIENT)
Dept: NURSING | Facility: CLINIC | Age: 82
End: 2022-03-26
Payer: MEDICARE

## 2022-03-26 NOTE — TELEPHONE ENCOUNTER
S-(situation): Call from patient who says she is schedule for ERCP on 3/30/22.    Patient says she canceled her COVID-19 test today because she was feeling lightheaded/unwell.    Patient questions the new enoxaparin bridge. Patient says she has taken enoxaparinbefore the procedure in the past and does not understand why there's a change this time. Patient says she does not feel comfortable going through with the procedure at this time and says she will call the GI doctor.      B-(background): 3/22/22 encounter on instructions on anticoagulation before and after procedure      A-(assessment): FYI message routed to PCP      R-(recommendations): advised will inform PCP/clinic.    Caller verbalized understanding.          Lian Waller RN/[facility]    Reason for Disposition    [1] Follow-up call from patient regarding patient's clinical status AND [2] information NON-URGENT    Additional Information    Negative: ED call to PCP    Negative: Physician call to PCP    Negative: Call about patient who is currently hospitalized    Negative: Lab or radiology calling with CRITICAL test results    Negative: [1] Prescription not at pharmacy AND [2] was prescribed today by PCP    Negative: [1] Follow-up call from patient regarding patient's clinical status AND [2] information urgent    Negative: [1] Caller requests to speak ONLY to PCP AND [2] urgent question    Negative: [1] Caller requests to speak to PCP now AND [2] won't tell us reason for call  (Exception: if 10 pm to 6 am, caller must first discuss reason for the call)    Negative: Notification of hospital admission    Negative: Notification of death    Negative: Lab or radiology calling with test results    Negative: Caller requesting lab results    Protocols used: [facility]

## 2022-03-29 ENCOUNTER — APPOINTMENT (OUTPATIENT)
Dept: GENERAL RADIOLOGY | Facility: CLINIC | Age: 82
End: 2022-03-29
Attending: EMERGENCY MEDICINE
Payer: MEDICARE

## 2022-03-29 ENCOUNTER — APPOINTMENT (OUTPATIENT)
Dept: CT IMAGING | Facility: CLINIC | Age: 82
End: 2022-03-29
Attending: EMERGENCY MEDICINE
Payer: MEDICARE

## 2022-03-29 ENCOUNTER — HOSPITAL ENCOUNTER (EMERGENCY)
Facility: CLINIC | Age: 82
Discharge: HOME | End: 2022-03-29
Attending: EMERGENCY MEDICINE | Admitting: EMERGENCY MEDICINE
Payer: MEDICARE

## 2022-03-29 VITALS
RESPIRATION RATE: 18 BRPM | DIASTOLIC BLOOD PRESSURE: 68 MMHG | OXYGEN SATURATION: 95 % | HEIGHT: 66 IN | TEMPERATURE: 98.7 F | BODY MASS INDEX: 28.12 KG/M2 | SYSTOLIC BLOOD PRESSURE: 131 MMHG | WEIGHT: 175 LBS | HEART RATE: 79 BPM

## 2022-03-29 DIAGNOSIS — J32.9 SINUSITIS, UNSPECIFIED CHRONICITY, UNSPECIFIED LOCATION: ICD-10-CM

## 2022-03-29 DIAGNOSIS — R35.1 NOCTURIA: ICD-10-CM

## 2022-03-29 DIAGNOSIS — R53.83 OTHER FATIGUE: ICD-10-CM

## 2022-03-29 DIAGNOSIS — R07.9 CHEST PAIN, UNSPECIFIED TYPE: ICD-10-CM

## 2022-03-29 LAB
ALBUMIN SERPL-MCNC: 3.6 G/DL (ref 3.4–5)
ALBUMIN UR-MCNC: NEGATIVE MG/DL
ALP SERPL-CCNC: 177 U/L (ref 40–150)
ALT SERPL W P-5'-P-CCNC: 47 U/L (ref 0–50)
ANION GAP SERPL CALCULATED.3IONS-SCNC: 3 MMOL/L (ref 3–14)
APPEARANCE UR: CLEAR
AST SERPL W P-5'-P-CCNC: 43 U/L (ref 0–45)
ATRIAL RATE - MUSE: 82 BPM
BASOPHILS # BLD AUTO: 0.1 10E3/UL (ref 0–0.2)
BASOPHILS NFR BLD AUTO: 1 %
BILIRUB SERPL-MCNC: 0.4 MG/DL (ref 0.2–1.3)
BILIRUB UR QL STRIP: NEGATIVE
BUN SERPL-MCNC: 15 MG/DL (ref 7–30)
CALCIUM SERPL-MCNC: 10 MG/DL (ref 8.5–10.1)
CHLORIDE BLD-SCNC: 104 MMOL/L (ref 94–109)
CO2 SERPL-SCNC: 27 MMOL/L (ref 20–32)
COLOR UR AUTO: NORMAL
CREAT SERPL-MCNC: 0.66 MG/DL (ref 0.52–1.04)
D DIMER PPP FEU-MCNC: 0.4 UG/ML FEU (ref 0–0.5)
DIASTOLIC BLOOD PRESSURE - MUSE: NORMAL MMHG
EOSINOPHIL # BLD AUTO: 0.1 10E3/UL (ref 0–0.7)
EOSINOPHIL NFR BLD AUTO: 2 %
ERYTHROCYTE [DISTWIDTH] IN BLOOD BY AUTOMATED COUNT: 13.2 % (ref 10–15)
GFR SERPL CREATININE-BSD FRML MDRD: 89 ML/MIN/1.73M2
GLUCOSE BLD-MCNC: 116 MG/DL (ref 70–99)
GLUCOSE UR STRIP-MCNC: NEGATIVE MG/DL
HCT VFR BLD AUTO: 45 % (ref 35–47)
HGB BLD-MCNC: 14.8 G/DL (ref 11.7–15.7)
HGB UR QL STRIP: NEGATIVE
HOLD SPECIMEN: NORMAL
IMM GRANULOCYTES # BLD: 0 10E3/UL
IMM GRANULOCYTES NFR BLD: 0 %
INR PPP: 2.53 (ref 0.85–1.15)
INTERPRETATION ECG - MUSE: NORMAL
KETONES UR STRIP-MCNC: NEGATIVE MG/DL
LEUKOCYTE ESTERASE UR QL STRIP: NEGATIVE
LYMPHOCYTES # BLD AUTO: 1.6 10E3/UL (ref 0.8–5.3)
LYMPHOCYTES NFR BLD AUTO: 27 %
MCH RBC QN AUTO: 32.5 PG (ref 26.5–33)
MCHC RBC AUTO-ENTMCNC: 32.9 G/DL (ref 31.5–36.5)
MCV RBC AUTO: 99 FL (ref 78–100)
MONOCYTES # BLD AUTO: 0.6 10E3/UL (ref 0–1.3)
MONOCYTES NFR BLD AUTO: 9 %
NEUTROPHILS # BLD AUTO: 3.7 10E3/UL (ref 1.6–8.3)
NEUTROPHILS NFR BLD AUTO: 61 %
NITRATE UR QL: NEGATIVE
NRBC # BLD AUTO: 0 10E3/UL
NRBC BLD AUTO-RTO: 0 /100
P AXIS - MUSE: 46 DEGREES
PH UR STRIP: 6 [PH] (ref 5–7)
PLATELET # BLD AUTO: 225 10E3/UL (ref 150–450)
POTASSIUM BLD-SCNC: 4.3 MMOL/L (ref 3.4–5.3)
PR INTERVAL - MUSE: 170 MS
PROT SERPL-MCNC: 7.7 G/DL (ref 6.8–8.8)
QRS DURATION - MUSE: 86 MS
QT - MUSE: 374 MS
QTC - MUSE: 436 MS
R AXIS - MUSE: 40 DEGREES
RBC # BLD AUTO: 4.56 10E6/UL (ref 3.8–5.2)
RBC URINE: 1 /HPF
SODIUM SERPL-SCNC: 134 MMOL/L (ref 133–144)
SP GR UR STRIP: 1.02 (ref 1–1.03)
SQUAMOUS EPITHELIAL: <1 /HPF
SYSTOLIC BLOOD PRESSURE - MUSE: NORMAL MMHG
T AXIS - MUSE: 39 DEGREES
TROPONIN I SERPL HS-MCNC: 6 NG/L
TSH SERPL DL<=0.005 MIU/L-ACNC: 2.34 MU/L (ref 0.4–4)
UROBILINOGEN UR STRIP-MCNC: 2 MG/DL
VENTRICULAR RATE- MUSE: 82 BPM
WBC # BLD AUTO: 6 10E3/UL (ref 4–11)
WBC URINE: 1 /HPF

## 2022-03-29 PROCEDURE — 99285 EMERGENCY DEPT VISIT HI MDM: CPT | Mod: 25

## 2022-03-29 PROCEDURE — 93005 ELECTROCARDIOGRAM TRACING: CPT

## 2022-03-29 PROCEDURE — G1004 CDSM NDSC: HCPCS

## 2022-03-29 PROCEDURE — 80053 COMPREHEN METABOLIC PANEL: CPT | Performed by: EMERGENCY MEDICINE

## 2022-03-29 PROCEDURE — 71046 X-RAY EXAM CHEST 2 VIEWS: CPT

## 2022-03-29 PROCEDURE — 84443 ASSAY THYROID STIM HORMONE: CPT | Performed by: EMERGENCY MEDICINE

## 2022-03-29 PROCEDURE — 85025 COMPLETE CBC W/AUTO DIFF WBC: CPT | Performed by: EMERGENCY MEDICINE

## 2022-03-29 PROCEDURE — 96361 HYDRATE IV INFUSION ADD-ON: CPT

## 2022-03-29 PROCEDURE — 36415 COLL VENOUS BLD VENIPUNCTURE: CPT | Performed by: EMERGENCY MEDICINE

## 2022-03-29 PROCEDURE — 84484 ASSAY OF TROPONIN QUANT: CPT | Performed by: EMERGENCY MEDICINE

## 2022-03-29 PROCEDURE — 85610 PROTHROMBIN TIME: CPT | Performed by: EMERGENCY MEDICINE

## 2022-03-29 PROCEDURE — 93005 ELECTROCARDIOGRAM TRACING: CPT | Mod: 76

## 2022-03-29 PROCEDURE — 85004 AUTOMATED DIFF WBC COUNT: CPT | Performed by: EMERGENCY MEDICINE

## 2022-03-29 PROCEDURE — 96360 HYDRATION IV INFUSION INIT: CPT

## 2022-03-29 PROCEDURE — 85379 FIBRIN DEGRADATION QUANT: CPT | Performed by: EMERGENCY MEDICINE

## 2022-03-29 PROCEDURE — 81001 URINALYSIS AUTO W/SCOPE: CPT | Performed by: EMERGENCY MEDICINE

## 2022-03-29 PROCEDURE — 258N000003 HC RX IP 258 OP 636: Performed by: EMERGENCY MEDICINE

## 2022-03-29 RX ADMIN — SODIUM CHLORIDE 1000 ML: 9 INJECTION, SOLUTION INTRAVENOUS at 20:35

## 2022-03-29 ASSESSMENT — ENCOUNTER SYMPTOMS
FATIGUE: 1
APPETITE CHANGE: 1
COUGH: 0
CONSTIPATION: 1
FEVER: 0
SHORTNESS OF BREATH: 1
DIZZINESS: 0
LIGHT-HEADEDNESS: 1
CHEST TIGHTNESS: 1
FREQUENCY: 1
HEADACHES: 0
WEAKNESS: 1

## 2022-03-30 ENCOUNTER — TELEPHONE (OUTPATIENT)
Dept: INTERNAL MEDICINE | Facility: CLINIC | Age: 82
End: 2022-03-30
Payer: MEDICARE

## 2022-03-30 DIAGNOSIS — Z86.718 PERSONAL HISTORY OF DVT (DEEP VEIN THROMBOSIS): Primary | ICD-10-CM

## 2022-03-30 DIAGNOSIS — Z79.01 LONG TERM CURRENT USE OF ANTICOAGULANT THERAPY: ICD-10-CM

## 2022-03-30 LAB
ATRIAL RATE - MUSE: 82 BPM
DIASTOLIC BLOOD PRESSURE - MUSE: NORMAL MMHG
INTERPRETATION ECG - MUSE: NORMAL
P AXIS - MUSE: 57 DEGREES
PR INTERVAL - MUSE: 184 MS
QRS DURATION - MUSE: 92 MS
QT - MUSE: 402 MS
QTC - MUSE: 469 MS
R AXIS - MUSE: 49 DEGREES
SYSTOLIC BLOOD PRESSURE - MUSE: NORMAL MMHG
T AXIS - MUSE: 67 DEGREES
VENTRICULAR RATE- MUSE: 82 BPM

## 2022-03-30 NOTE — ED PROVIDER NOTES
History   Chief Complaint:  Chest Pain       The history is provided by the patient.      Geneva Shepherd is a 81 year old female on warfarin with history of Factor V Leiden mutation, DVT and hyperlipidemia who presents with chest pain. Symptoms have been intermittent for one week with associated lightheadedness, fatigue, weakness, shortness of breath while at rest, and chest tightness that radiates into her arm. She also mentions nighttime urinary frequency and constipation, so she takes miralax. No recent falls, dizziness, fever, cough, or headaches. She has not been eating or drinking. She no longer has her gallbladder.     Review of Systems   Constitutional: Positive for appetite change and fatigue. Negative for fever.        Negative for falls   Respiratory: Positive for chest tightness and shortness of breath. Negative for cough.    Cardiovascular: Positive for chest pain.   Gastrointestinal: Positive for constipation.   Genitourinary: Positive for frequency.   Neurological: Positive for weakness and light-headedness. Negative for dizziness and headaches.   All other systems reviewed and are negative.    Allergies:  Cephalexin     Medications:  N-acetyl-L-cysteine  Elavil  Aricept  Carnitor  Levothyroxine  Macrodantin  Paxil  Coumadin  Beta carotene   Biotin   Folic acid   Levocarnitine   Zinc gluconate     Past Medical History:   Varicose veins  Depression  Diverticulitis  DJD  DVT  Factor V Leiden  GERD  Hypercalcemia  Hyperlipidemia  Insomnia  IBS  Obesity  Hypothyroidism  Osteoarthrosis   UTI (urinary tract infection)   Dilation of bilary tract  Memory loss   Varicose veins      Past Surgical History:    Hip arthroplasty  Knee arthroplasty x2  Cholecystectomy  Tubal ligation  Endometrial biopsy  Coronary angiogram  Varicose vein stripping  Vitrectomy, left     Family History:    Mother: heart disease  Father: MI, CAD     Social History:  Patient presents to the ED alone.  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    Physical Exam     Patient Vitals for the past 24 hrs:   BP Temp Temp src Pulse Resp SpO2 Height Weight   03/29/22 1930 131/68 98.7  F (37.1  C) Temporal 79 18 95 % 1.676 m (5' 6\") 79.4 kg (175 lb)       Physical Exam  VS: Reviewed per above  HENT: normal speech  EYES: sclera anicteric  CV: Rate as noted, regular rhythm.   RESP: Effort normal. Breath sounds are normal bilaterally.  GI: no tenderness/rebound/guarding, not distended.  NEURO: Alert, moving all extremities  MSK: No deformity of the extremities  SKIN: Warm and dry    Emergency Department Course   ECG #1:  ECG taken at 1940  Normal sinus rhythm  Anterolateral infarct, age undetermined   Abnormal ECG  No significant change when compared to EKG dated 8/25/21.  Comparison limited by wavy baseline  Rate 82 bpm. NC interval 170 ms. QRS duration 86 ms. QT/QTc 374/436 ms. P-R-T axes 46 40 39.    ECG #2:  ECG taken at 2120  Normal sinus rhythm  Possible left atrial enlargement   Septal infarct, age undetermined   Abnormal ECG   No significant change when compared to EKG dated 3/29/21.  Rate 82 bpm. NC interval 184 ms. QRS duration 92 ms. QT/QTc 402/469 ms. P-R-T axes 57 49 67.      Imaging:  Head CT w/o contrast   Final Result   IMPRESSION:   1.  No CT evidence for acute intracranial process.   2.  Brain atrophy and presumed chronic microvascular ischemic changes as above.   3.  Right maxillary sinus disease, as above, concerning for acute sinusitis.      Chest XR,  PA & LAT   Final Result   IMPRESSION: There are no acute cardiopulmonary findings. Chest is stable since 08/25/2021.      NM Lexiscan stress test    (Results Pending)     Report per radiology    Laboratory:  Labs Ordered and Resulted from Time of ED Arrival to Time of ED Departure   COMPREHENSIVE METABOLIC PANEL - Abnormal       Result Value    Sodium 134      Potassium 4.3      Chloride 104      Carbon Dioxide (CO2) 27      Anion Gap 3      Urea Nitrogen 15      Creatinine 0.66      Calcium " 10.0      Glucose 116 (*)     Alkaline Phosphatase 177 (*)     AST 43      ALT 47      Protein Total 7.7      Albumin 3.6      Bilirubin Total 0.4      GFR Estimate 89     INR - Abnormal    INR 2.53 (*)    TROPONIN I - Normal    Troponin I High Sensitivity 6     D DIMER QUANTITATIVE - Normal    D-Dimer Quantitative 0.40     ROUTINE UA WITH MICROSCOPIC REFLEX TO CULTURE - Normal    Color Urine Light Yellow      Appearance Urine Clear      Glucose Urine Negative      Bilirubin Urine Negative      Ketones Urine Negative      Specific Gravity Urine 1.019      Blood Urine Negative      pH Urine 6.0      Protein Albumin Urine Negative      Urobilinogen Urine 2.0      Nitrite Urine Negative      Leukocyte Esterase Urine Negative      RBC Urine 1      WBC Urine 1      Squamous Epithelials Urine <1     TSH WITH FREE T4 REFLEX - Normal    TSH 2.34     CBC WITH PLATELETS AND DIFFERENTIAL    WBC Count 6.0      RBC Count 4.56      Hemoglobin 14.8      Hematocrit 45.0      MCV 99      MCH 32.5      MCHC 32.9      RDW 13.2      Platelet Count 225      % Neutrophils 61      % Lymphocytes 27      % Monocytes 9      % Eosinophils 2      % Basophils 1      % Immature Granulocytes 0      NRBCs per 100 WBC 0      Absolute Neutrophils 3.7      Absolute Lymphocytes 1.6      Absolute Monocytes 0.6      Absolute Eosinophils 0.1      Absolute Basophils 0.1      Absolute Immature Granulocytes 0.0      Absolute NRBCs 0.0          Emergency Department Course:         Reviewed:  I reviewed nursing notes, vitals, past medical history and Care Everywhere    Assessments:  2020 I obtained history and examined the patient as noted above.   2250 I rechecked the patient and explained findings.     Interventions:  2035 NS, 1 L, IV     Disposition:  The patient was discharged to home.     Impression & Plan     Medical Decision Making:  Patient presents to the ER for evaluation of generalized fatigue, intermittent episodes of chest tightness, shortness of  breath, urinary frequency at nighttime.  On arrival vital signs are reassuring.  EKG does not show specific ischemic change.  A single troponin is negative.  No signs of ACS at this time.  Normal D-dimer and therapeutic INR speak against occult PE.  Chest x-ray was clear.  CT of the head obtained due to lightheadedness anticoagulation was unremarkable aside from possible sinusitis.  No fevers or leukocytosis or other infectious symptoms.  No signs of UTI.  Patient has ported history of biliary tract disease but LFTs are stable today.  Abdominal exam is benign.  Plan to treat for sinusitis seen on CT with Augmentin.  Outpatient stress test ordered.  Encouraged close primary care follow-up and return precautions discussed prior to discharge.    Diagnosis:    ICD-10-CM    1. Chest pain, unspecified type  R07.9 NM Lexiscan stress test   2. Other fatigue  R53.83    3. Nocturia  R35.1    4. Sinusitis, unspecified chronicity, unspecified location  J32.9        Discharge Medications:  New Prescriptions    AMOXICILLIN-CLAVULANATE (AUGMENTIN) 875-125 MG TABLET    Take 1 tablet by mouth 2 times daily for 7 days       Scribe Disclosure:  ED, Pippa Dover, am serving as a scribe at 8:17 PM on 3/29/2022 to document services personally performed by Nikko Lynn MD based on my observations and the provider's statements to me.            Nikko Lynn MD  03/29/22 6300

## 2022-03-30 NOTE — TELEPHONE ENCOUNTER
ANTICOAGULATION  MANAGEMENT: Discharge Review    Geneva Shepherd chart reviewed for anticoagulation continuity of care    Emergency room visit on 3/29/22 for chest pain.    Discharge disposition: Home    Results:    Recent labs: (last 7 days)     03/29/22  1949   INR 2.53*     Anticoagulation inpatient management:     not applicable     Anticoagulation discharge instructions:     Warfarin dosing: home regimen continued   Bridging: No   INR goal change: No      Medication changes affecting anticoagulation: No    Additional factors affecting anticoagulation: No    Plan     Looks like procedure got pushed back to 4/5/22. I have changed the hold dates on the [facility] calendar.    Left message for Geneva to review changed dates for hold/ bridge plan as well as schedule for INR recheck 1 week post procedure.     Anticoagulation Calendar updated    Robert Fulton RN

## 2022-03-30 NOTE — H&P (VIEW-ONLY)
History   Chief Complaint:  Chest Pain       The history is provided by the patient.      Geneva Shepherd is a 81 year old female on warfarin with history of Factor V Leiden mutation, DVT and hyperlipidemia who presents with chest pain. Symptoms have been intermittent for one week with associated lightheadedness, fatigue, weakness, shortness of breath while at rest, and chest tightness that radiates into her arm. She also mentions nighttime urinary frequency and constipation, so she takes miralax. No recent falls, dizziness, fever, cough, or headaches. She has not been eating or drinking. She no longer has her gallbladder.     Review of Systems   Constitutional: Positive for appetite change and fatigue. Negative for fever.        Negative for falls   Respiratory: Positive for chest tightness and shortness of breath. Negative for cough.    Cardiovascular: Positive for chest pain.   Gastrointestinal: Positive for constipation.   Genitourinary: Positive for frequency.   Neurological: Positive for weakness and light-headedness. Negative for dizziness and headaches.   All other systems reviewed and are negative.    Allergies:  Cephalexin     Medications:  N-acetyl-L-cysteine  Elavil  Aricept  Carnitor  Levothyroxine  Macrodantin  Paxil  Coumadin  Beta carotene   Biotin   Folic acid   Levocarnitine   Zinc gluconate     Past Medical History:   Varicose veins  Depression  Diverticulitis  DJD  DVT  Factor V Leiden  GERD  Hypercalcemia  Hyperlipidemia  Insomnia  IBS  Obesity  Hypothyroidism  Osteoarthrosis   UTI (urinary tract infection)   Dilation of bilary tract  Memory loss   Varicose veins      Past Surgical History:    Hip arthroplasty  Knee arthroplasty x2  Cholecystectomy  Tubal ligation  Endometrial biopsy  Coronary angiogram  Varicose vein stripping  Vitrectomy, left     Family History:    Mother: heart disease  Father: MI, CAD     Social History:  Patient presents to the ED alone.  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    Physical Exam     Patient Vitals for the past 24 hrs:   BP Temp Temp src Pulse Resp SpO2 Height Weight   03/29/22 1930 131/68 98.7  F (37.1  C) Temporal 79 18 95 % 1.676 m (5' 6\") 79.4 kg (175 lb)       Physical Exam  VS: Reviewed per above  HENT: normal speech  EYES: sclera anicteric  CV: Rate as noted, regular rhythm.   RESP: Effort normal. Breath sounds are normal bilaterally.  GI: no tenderness/rebound/guarding, not distended.  NEURO: Alert, moving all extremities  MSK: No deformity of the extremities  SKIN: Warm and dry    Emergency Department Course   ECG #1:  ECG taken at 1940  Normal sinus rhythm  Anterolateral infarct, age undetermined   Abnormal ECG  No significant change when compared to EKG dated 8/25/21.  Comparison limited by wavy baseline  Rate 82 bpm. UT interval 170 ms. QRS duration 86 ms. QT/QTc 374/436 ms. P-R-T axes 46 40 39.    ECG #2:  ECG taken at 2120  Normal sinus rhythm  Possible left atrial enlargement   Septal infarct, age undetermined   Abnormal ECG   No significant change when compared to EKG dated 3/29/21.  Rate 82 bpm. UT interval 184 ms. QRS duration 92 ms. QT/QTc 402/469 ms. P-R-T axes 57 49 67.      Imaging:  Head CT w/o contrast   Final Result   IMPRESSION:   1.  No CT evidence for acute intracranial process.   2.  Brain atrophy and presumed chronic microvascular ischemic changes as above.   3.  Right maxillary sinus disease, as above, concerning for acute sinusitis.      Chest XR,  PA & LAT   Final Result   IMPRESSION: There are no acute cardiopulmonary findings. Chest is stable since 08/25/2021.      NM Lexiscan stress test    (Results Pending)     Report per radiology    Laboratory:  Labs Ordered and Resulted from Time of ED Arrival to Time of ED Departure   COMPREHENSIVE METABOLIC PANEL - Abnormal       Result Value    Sodium 134      Potassium 4.3      Chloride 104      Carbon Dioxide (CO2) 27      Anion Gap 3      Urea Nitrogen 15      Creatinine 0.66      Calcium " 10.0      Glucose 116 (*)     Alkaline Phosphatase 177 (*)     AST 43      ALT 47      Protein Total 7.7      Albumin 3.6      Bilirubin Total 0.4      GFR Estimate 89     INR - Abnormal    INR 2.53 (*)    TROPONIN I - Normal    Troponin I High Sensitivity 6     D DIMER QUANTITATIVE - Normal    D-Dimer Quantitative 0.40     ROUTINE UA WITH MICROSCOPIC REFLEX TO CULTURE - Normal    Color Urine Light Yellow      Appearance Urine Clear      Glucose Urine Negative      Bilirubin Urine Negative      Ketones Urine Negative      Specific Gravity Urine 1.019      Blood Urine Negative      pH Urine 6.0      Protein Albumin Urine Negative      Urobilinogen Urine 2.0      Nitrite Urine Negative      Leukocyte Esterase Urine Negative      RBC Urine 1      WBC Urine 1      Squamous Epithelials Urine <1     TSH WITH FREE T4 REFLEX - Normal    TSH 2.34     CBC WITH PLATELETS AND DIFFERENTIAL    WBC Count 6.0      RBC Count 4.56      Hemoglobin 14.8      Hematocrit 45.0      MCV 99      MCH 32.5      MCHC 32.9      RDW 13.2      Platelet Count 225      % Neutrophils 61      % Lymphocytes 27      % Monocytes 9      % Eosinophils 2      % Basophils 1      % Immature Granulocytes 0      NRBCs per 100 WBC 0      Absolute Neutrophils 3.7      Absolute Lymphocytes 1.6      Absolute Monocytes 0.6      Absolute Eosinophils 0.1      Absolute Basophils 0.1      Absolute Immature Granulocytes 0.0      Absolute NRBCs 0.0          Emergency Department Course:         Reviewed:  I reviewed nursing notes, vitals, past medical history and Care Everywhere    Assessments:  2020 I obtained history and examined the patient as noted above.   2250 I rechecked the patient and explained findings.     Interventions:  2035 NS, 1 L, IV     Disposition:  The patient was discharged to home.     Impression & Plan     Medical Decision Making:  Patient presents to the ER for evaluation of generalized fatigue, intermittent episodes of chest tightness, shortness of  breath, urinary frequency at nighttime.  On arrival vital signs are reassuring.  EKG does not show specific ischemic change.  A single troponin is negative.  No signs of ACS at this time.  Normal D-dimer and therapeutic INR speak against occult PE.  Chest x-ray was clear.  CT of the head obtained due to lightheadedness anticoagulation was unremarkable aside from possible sinusitis.  No fevers or leukocytosis or other infectious symptoms.  No signs of UTI.  Patient has ported history of biliary tract disease but LFTs are stable today.  Abdominal exam is benign.  Plan to treat for sinusitis seen on CT with Augmentin.  Outpatient stress test ordered.  Encouraged close primary care follow-up and return precautions discussed prior to discharge.    Diagnosis:    ICD-10-CM    1. Chest pain, unspecified type  R07.9 NM Lexiscan stress test   2. Other fatigue  R53.83    3. Nocturia  R35.1    4. Sinusitis, unspecified chronicity, unspecified location  J32.9        Discharge Medications:  New Prescriptions    AMOXICILLIN-CLAVULANATE (AUGMENTIN) 875-125 MG TABLET    Take 1 tablet by mouth 2 times daily for 7 days       Scribe Disclosure:  ED, Pippa Dover, am serving as a scribe at 8:17 PM on 3/29/2022 to document services personally performed by Nikko Lynn MD based on my observations and the provider's statements to me.            Nikko Lynn MD  03/29/22 1629

## 2022-03-30 NOTE — ED TRIAGE NOTES
Intermittant CP=tightness with rad. to upper back and left arm with concurrent lightheaded feeling. Symptoms for the last 5 days.

## 2022-03-30 NOTE — DISCHARGE INSTRUCTIONS
Discharge Instructions  Sinus Infection    You have acute sinusitis, or an infection of the sinuses. The sinuses are the hollow areas within the facial bones that are connected to the nasal opening. The most common cause of acute sinusitis is a virus infection associated with the common cold. Bacterial sinusitis occurs much less commonly, usually as a complication of viral sinusitis. Experts say that most sinusitis is caused by a virus within the first 7-10 days of illness. Antibiotics do nothing to help with virus infections, so most people do not need antibiotics for acute sinusitis.     Generally, every Emergency Department visit should have a follow-up clinic visit with either a primary or a specialty clinic/provider. Please follow-up as instructed by your emergency provider today.    Return to the Emergency Department if:  Your vision changes.  You are confused or have difficulty thinking clearly.  You have swelling around your eye.  You develop a severe headache or neck stiffness.  Your symptoms get worse and you are unable to see your primary provider.      Treatment:  Pain relief -- Non-prescription pain medications, such as Tylenol  (acetaminophen) or Motrin  or Advil  (ibuprofen) are recommended for pain.  Do not use a medicine that you are allergic to, or if your provider has told you not to use it.     Nasal irrigation -- Flushing the nose and sinuses with a saline solution several times per day can help to decrease pain caused by congestion.  Nasal decongestants -- Nasal decongestant sprays, including Afrin  (oxymetazoline) and Myron-Synephrine  (phenylephrine) can be used to temporarily treat congestion. However, these sprays should not be used for more than two to three days due to the risk of rebound congestion (when the nose is congested constantly unless the medication is used repeatedly).  Nasal glucocorticoids -- These are prescription steroids delivered by a nasal spray that can help to reduce  swelling inside the nose, usually within two to three days. These drugs have few side effects and dramatically relieve symptoms in most people.  If you use these in conjunction with Afrin  you will need to use at least 15 minutes prior to the nasal decongestant.    Antibiotic? -- Rarely antibiotics are used along with the above treatments.    If you were given a prescription for medicine here today, be sure to read all of the information (including the package insert) that comes with your prescription.  This will include important information about the medicine, its side effects, and any warnings that you need to know about.  The pharmacist who fills the prescription can provide more information and answer questions you may have about the medicine.  If you have questions or concerns that the pharmacist cannot address, please call or return to the Emergency Department.   Remember that you can always come back to the Emergency Department if you are not able to see your regular provider in the amount of time listed above, if you get any new symptoms, or if there is anything that worries you.    Discharge Instructions  Chest Pain    You have been seen today for chest pain or discomfort.  At this time, your provider has found no signs that your chest pain is due to a serious or life-threatening condition, (or you have declined more testing and/or admission to the hospital). However, sometimes there is a serious problem that does not show up right away. Your evaluation today may not be complete and you may need further testing and evaluation.     Generally, every Emergency Department visit should have a follow-up clinic visit with either a primary or a specialty clinic/provider. Please follow-up as instructed by your emergency provider today.  Return to the Emergency Department if:  Your chest pain changes, gets worse, starts to happen more often, or comes with less activity.  You are newly short of breath.  You get very  weak or tired.  You pass out or faint.  You have any new symptoms, like fever, cough, numb legs, or you cough up blood.  You have anything else that worries you.    Until you follow-up with your regular provider, please do the following:  Take one aspirin daily unless you have an allergy or are told not to by your provider.  If a stress test appointment has been made, go to the appointment.  If you have questions, contact your regular provider.  Follow-up with your regular provider/clinic as directed; this is very important.    If you were given a prescription for medicine here today, be sure to read all of the information (including the package insert) that comes with your prescription.  This will include important information about the medicine, its side effects, and any warnings that you need to know about.  The pharmacist who fills the prescription can provide more information and answer questions you may have about the medicine.  If you have questions or concerns that the pharmacist cannot address, please call or return to the Emergency Department.       Remember that you can always come back to the Emergency Department if you are not able to see your regular provider in the amount of time listed above, if you get any new symptoms, or if there is anything that worries you.

## 2022-03-31 ENCOUNTER — PATIENT OUTREACH (OUTPATIENT)
Dept: INTERNAL MEDICINE | Facility: CLINIC | Age: 82
End: 2022-03-31
Payer: MEDICARE

## 2022-03-31 NOTE — TELEPHONE ENCOUNTER
ED / Discharge Outreach Protocol    Patient Contact    Attempt # 1    Was call answered?  No.    Left message on voicemail with information to call me back.

## 2022-03-31 NOTE — TELEPHONE ENCOUNTER
What type of discharge? Emergency Department  Risk of Hospital admission or ED visit: 60%  Is a TCM episode required? No  When should the patient follow up with PCP? within 30 days of discharge.    Lola Linder RN  [facility]

## 2022-03-31 NOTE — TELEPHONE ENCOUNTER
"Patient scheduled for 4/19 as this was next hospital follow up with Dr. Boo patient does not want to see anyone else    Would Dr. Boo like to see patient sooner? Okay to use next day or one week slot?      ED for acute condition Discharge Protocol    \"Hi, my name is Burt Durán RN, a registered nurse, and I am calling from [facility].  I am calling to follow up and see how things are going for you after your recent emergency visit.\"    Tell me how you are doing now that you are home?\" patient states she Is still tired and has some testing 4/5       Discharge Instructions    \"Let's review your discharge instructions.  What is/are the follow-up recommendations?  Pt. Response: see Dr. Boo    \"Has an appointment with your primary care provider been scheduled?\"  No (needed - schedule appointment and remind to bring meds)    Medications    \"Tell me what changed about your medicines when you discharged?\"    amoxicillin    \"What questions do you have about your medications?\"   None        Call Summary    \"What questions or concerns do you have about your recent visit and your follow-up care?\"     none    \"If you have questions or things don't continue to improve, we encourage you contact us through the main clinic number (give number).  Even if the clinic is not open, triage nurses are available 24/7 to help you.     We would like you to know that our clinic has extended hours (provide information).  We also have urgent care (provide details on closest location and hours/contact info)\"    \"Thank you for your time and take care!\"                "

## 2022-03-31 NOTE — TELEPHONE ENCOUNTER
Spoke with patient, reviewed hold/ bridge plan. Rescheduled INR for 4/11/22. Patient/ parent verbalized understanding and agrees with plan.      Dominga Fulton RN   [facility]

## 2022-04-01 ENCOUNTER — TELEPHONE (OUTPATIENT)
Dept: VASCULAR SURGERY | Facility: CLINIC | Age: 82
End: 2022-04-01

## 2022-04-01 ENCOUNTER — LAB (OUTPATIENT)
Dept: URGENT CARE | Facility: URGENT CARE | Age: 82
End: 2022-04-01
Attending: INTERNAL MEDICINE
Payer: MEDICARE

## 2022-04-01 DIAGNOSIS — Z11.59 ENCOUNTER FOR SCREENING FOR OTHER VIRAL DISEASES: ICD-10-CM

## 2022-04-01 DIAGNOSIS — I83.893 SYMPTOMATIC VARICOSE VEINS OF BOTH LOWER EXTREMITIES: Primary | ICD-10-CM

## 2022-04-01 PROCEDURE — U0003 INFECTIOUS AGENT DETECTION BY NUCLEIC ACID (DNA OR RNA); SEVERE ACUTE RESPIRATORY SYNDROME CORONAVIRUS 2 (SARS-COV-2) (CORONAVIRUS DISEASE [COVID-19]), AMPLIFIED PROBE TECHNIQUE, MAKING USE OF HIGH THROUGHPUT TECHNOLOGIES AS DESCRIBED BY CMS-2020-01-R: HCPCS

## 2022-04-01 PROCEDURE — U0005 INFEC AGEN DETEC AMPLI PROBE: HCPCS

## 2022-04-01 NOTE — TELEPHONE ENCOUNTER
Pt called stated she needs a new compression stockings RX put in and sent to [facility]. [facility] has her order, they just need a new RX.    Beth Rubin, Surgery Scheduler  [facility]

## 2022-04-02 LAB — SARS-COV-2 RNA RESP QL NAA+PROBE: NEGATIVE

## 2022-04-04 ENCOUNTER — ANESTHESIA EVENT (OUTPATIENT)
Dept: SURGERY | Facility: CLINIC | Age: 82
End: 2022-04-04
Payer: MEDICARE

## 2022-04-04 NOTE — TELEPHONE ENCOUNTER
Call patient.  Patient to see me in one of the same-day appointment slots this  Friday, 4/8/2022.  Please schedule appointment and cancel other appointment currently scheduled for 4/19/2022.

## 2022-04-05 ENCOUNTER — HOSPITAL ENCOUNTER (OUTPATIENT)
Facility: CLINIC | Age: 82
Discharge: HOME | End: 2022-04-05
Attending: INTERNAL MEDICINE | Admitting: INTERNAL MEDICINE
Payer: MEDICARE

## 2022-04-05 ENCOUNTER — ANESTHESIA (OUTPATIENT)
Dept: SURGERY | Facility: CLINIC | Age: 82
End: 2022-04-05
Payer: MEDICARE

## 2022-04-05 ENCOUNTER — TELEPHONE (OUTPATIENT)
Dept: INTERNAL MEDICINE | Facility: CLINIC | Age: 82
End: 2022-04-05

## 2022-04-05 VITALS
DIASTOLIC BLOOD PRESSURE: 80 MMHG | HEIGHT: 66 IN | WEIGHT: 183.3 LBS | OXYGEN SATURATION: 94 % | BODY MASS INDEX: 29.46 KG/M2 | RESPIRATION RATE: 16 BRPM | SYSTOLIC BLOOD PRESSURE: 144 MMHG | HEART RATE: 79 BPM | TEMPERATURE: 97.9 F

## 2022-04-05 DIAGNOSIS — K83.8 DILATION OF BILIARY TRACT: Primary | ICD-10-CM

## 2022-04-05 LAB
ALBUMIN SERPL-MCNC: 3.8 G/DL (ref 3.4–5)
ALP SERPL-CCNC: 140 U/L (ref 40–150)
ALT SERPL W P-5'-P-CCNC: 38 U/L (ref 0–50)
ANION GAP SERPL CALCULATED.3IONS-SCNC: 2 MMOL/L (ref 3–14)
AST SERPL W P-5'-P-CCNC: 36 U/L (ref 0–45)
BILIRUB SERPL-MCNC: 0.7 MG/DL (ref 0.2–1.3)
BUN SERPL-MCNC: 7 MG/DL (ref 7–30)
CALCIUM SERPL-MCNC: 10.5 MG/DL (ref 8.5–10.1)
CHLORIDE BLD-SCNC: 105 MMOL/L (ref 94–109)
CO2 SERPL-SCNC: 28 MMOL/L (ref 20–32)
CREAT SERPL-MCNC: 0.6 MG/DL (ref 0.52–1.04)
ERYTHROCYTE [DISTWIDTH] IN BLOOD BY AUTOMATED COUNT: 13.1 % (ref 10–15)
GFR SERPL CREATININE-BSD FRML MDRD: >90 ML/MIN/1.73M2
GLUCOSE BLD-MCNC: 97 MG/DL (ref 70–99)
HCT VFR BLD AUTO: 46.7 % (ref 35–47)
HGB BLD-MCNC: 14.9 G/DL (ref 11.7–15.7)
INR PPP: 1.05 (ref 0.85–1.15)
MCH RBC QN AUTO: 32.4 PG (ref 26.5–33)
MCHC RBC AUTO-ENTMCNC: 31.9 G/DL (ref 31.5–36.5)
MCV RBC AUTO: 102 FL (ref 78–100)
PLATELET # BLD AUTO: 218 10E3/UL (ref 150–450)
POTASSIUM BLD-SCNC: 4 MMOL/L (ref 3.4–5.3)
PROT SERPL-MCNC: 7.9 G/DL (ref 6.8–8.8)
RBC # BLD AUTO: 4.6 10E6/UL (ref 3.8–5.2)
SODIUM SERPL-SCNC: 135 MMOL/L (ref 133–144)
UPPER EUS: NORMAL
WBC # BLD AUTO: 6.9 10E3/UL (ref 4–11)

## 2022-04-05 PROCEDURE — 85027 COMPLETE CBC AUTOMATED: CPT | Performed by: INTERNAL MEDICINE

## 2022-04-05 PROCEDURE — 250N000011 HC RX IP 250 OP 636: Performed by: NURSE ANESTHETIST, CERTIFIED REGISTERED

## 2022-04-05 PROCEDURE — 250N000009 HC RX 250: Performed by: NURSE ANESTHETIST, CERTIFIED REGISTERED

## 2022-04-05 PROCEDURE — 85610 PROTHROMBIN TIME: CPT | Performed by: INTERNAL MEDICINE

## 2022-04-05 PROCEDURE — 710N000012 HC RECOVERY PHASE 2, PER MINUTE: Performed by: INTERNAL MEDICINE

## 2022-04-05 PROCEDURE — 360N000075 HC SURGERY LEVEL 2, PER MIN: Performed by: INTERNAL MEDICINE

## 2022-04-05 PROCEDURE — 36415 COLL VENOUS BLD VENIPUNCTURE: CPT | Performed by: INTERNAL MEDICINE

## 2022-04-05 PROCEDURE — 80053 COMPREHEN METABOLIC PANEL: CPT | Performed by: INTERNAL MEDICINE

## 2022-04-05 PROCEDURE — 370N000017 HC ANESTHESIA TECHNICAL FEE, PER MIN: Performed by: INTERNAL MEDICINE

## 2022-04-05 PROCEDURE — 88305 TISSUE EXAM BY PATHOLOGIST: CPT | Mod: TC | Performed by: INTERNAL MEDICINE

## 2022-04-05 PROCEDURE — 710N000009 HC RECOVERY PHASE 1, LEVEL 1, PER MIN: Performed by: INTERNAL MEDICINE

## 2022-04-05 PROCEDURE — 999N000141 HC STATISTIC PRE-PROCEDURE NURSING ASSESSMENT: Performed by: INTERNAL MEDICINE

## 2022-04-05 PROCEDURE — 250N000025 HC SEVOFLURANE, PER MIN: Performed by: INTERNAL MEDICINE

## 2022-04-05 PROCEDURE — 258N000003 HC RX IP 258 OP 636: Performed by: ANESTHESIOLOGY

## 2022-04-05 RX ORDER — INDOMETHACIN 50 MG/1
100 SUPPOSITORY RECTAL
Status: DISCONTINUED | OUTPATIENT
Start: 2022-04-05 | End: 2022-04-05 | Stop reason: HOSPADM

## 2022-04-05 RX ORDER — ONDANSETRON 2 MG/ML
INJECTION INTRAMUSCULAR; INTRAVENOUS PRN
Status: DISCONTINUED | OUTPATIENT
Start: 2022-04-05 | End: 2022-04-05

## 2022-04-05 RX ORDER — PROPOFOL 10 MG/ML
INJECTION, EMULSION INTRAVENOUS PRN
Status: DISCONTINUED | OUTPATIENT
Start: 2022-04-05 | End: 2022-04-05

## 2022-04-05 RX ORDER — HYDROMORPHONE HCL IN WATER/PF 6 MG/30 ML
0.2 PATIENT CONTROLLED ANALGESIA SYRINGE INTRAVENOUS EVERY 5 MIN PRN
Status: DISCONTINUED | OUTPATIENT
Start: 2022-04-05 | End: 2022-04-05 | Stop reason: HOSPADM

## 2022-04-05 RX ORDER — NALOXONE HYDROCHLORIDE 0.4 MG/ML
0.2 INJECTION, SOLUTION INTRAMUSCULAR; INTRAVENOUS; SUBCUTANEOUS
Status: DISCONTINUED | OUTPATIENT
Start: 2022-04-05 | End: 2022-04-05 | Stop reason: HOSPADM

## 2022-04-05 RX ORDER — NALOXONE HYDROCHLORIDE 0.4 MG/ML
0.4 INJECTION, SOLUTION INTRAMUSCULAR; INTRAVENOUS; SUBCUTANEOUS
Status: DISCONTINUED | OUTPATIENT
Start: 2022-04-05 | End: 2022-04-05 | Stop reason: HOSPADM

## 2022-04-05 RX ORDER — FLUMAZENIL 0.1 MG/ML
0.2 INJECTION, SOLUTION INTRAVENOUS
Status: DISCONTINUED | OUTPATIENT
Start: 2022-04-05 | End: 2022-04-05 | Stop reason: HOSPADM

## 2022-04-05 RX ORDER — LABETALOL HYDROCHLORIDE 5 MG/ML
10 INJECTION, SOLUTION INTRAVENOUS
Status: DISCONTINUED | OUTPATIENT
Start: 2022-04-05 | End: 2022-04-05 | Stop reason: HOSPADM

## 2022-04-05 RX ORDER — FENTANYL CITRATE 50 UG/ML
25 INJECTION, SOLUTION INTRAMUSCULAR; INTRAVENOUS EVERY 5 MIN PRN
Status: DISCONTINUED | OUTPATIENT
Start: 2022-04-05 | End: 2022-04-05 | Stop reason: HOSPADM

## 2022-04-05 RX ORDER — ONDANSETRON 4 MG/1
4 TABLET, ORALLY DISINTEGRATING ORAL EVERY 6 HOURS PRN
Status: DISCONTINUED | OUTPATIENT
Start: 2022-04-05 | End: 2022-04-05 | Stop reason: HOSPADM

## 2022-04-05 RX ORDER — FENTANYL CITRATE 50 UG/ML
INJECTION, SOLUTION INTRAMUSCULAR; INTRAVENOUS PRN
Status: DISCONTINUED | OUTPATIENT
Start: 2022-04-05 | End: 2022-04-05

## 2022-04-05 RX ORDER — OXYCODONE HYDROCHLORIDE 5 MG/1
5 TABLET ORAL EVERY 4 HOURS PRN
Status: DISCONTINUED | OUTPATIENT
Start: 2022-04-05 | End: 2022-04-05 | Stop reason: HOSPADM

## 2022-04-05 RX ORDER — SODIUM CHLORIDE, SODIUM LACTATE, POTASSIUM CHLORIDE, CALCIUM CHLORIDE 600; 310; 30; 20 MG/100ML; MG/100ML; MG/100ML; MG/100ML
INJECTION, SOLUTION INTRAVENOUS CONTINUOUS
Status: DISCONTINUED | OUTPATIENT
Start: 2022-04-05 | End: 2022-04-05 | Stop reason: HOSPADM

## 2022-04-05 RX ORDER — LIDOCAINE 40 MG/G
CREAM TOPICAL
Status: DISCONTINUED | OUTPATIENT
Start: 2022-04-05 | End: 2022-04-05 | Stop reason: HOSPADM

## 2022-04-05 RX ORDER — ONDANSETRON 2 MG/ML
4 INJECTION INTRAMUSCULAR; INTRAVENOUS EVERY 6 HOURS PRN
Status: DISCONTINUED | OUTPATIENT
Start: 2022-04-05 | End: 2022-04-05 | Stop reason: HOSPADM

## 2022-04-05 RX ORDER — ONDANSETRON 2 MG/ML
4 INJECTION INTRAMUSCULAR; INTRAVENOUS EVERY 30 MIN PRN
Status: DISCONTINUED | OUTPATIENT
Start: 2022-04-05 | End: 2022-04-05 | Stop reason: HOSPADM

## 2022-04-05 RX ORDER — ONDANSETRON 4 MG/1
4 TABLET, ORALLY DISINTEGRATING ORAL EVERY 30 MIN PRN
Status: DISCONTINUED | OUTPATIENT
Start: 2022-04-05 | End: 2022-04-05 | Stop reason: HOSPADM

## 2022-04-05 RX ORDER — DEXAMETHASONE SODIUM PHOSPHATE 4 MG/ML
INJECTION, SOLUTION INTRA-ARTICULAR; INTRALESIONAL; INTRAMUSCULAR; INTRAVENOUS; SOFT TISSUE PRN
Status: DISCONTINUED | OUTPATIENT
Start: 2022-04-05 | End: 2022-04-05

## 2022-04-05 RX ORDER — MEPERIDINE HYDROCHLORIDE 25 MG/ML
12.5 INJECTION INTRAMUSCULAR; INTRAVENOUS; SUBCUTANEOUS
Status: DISCONTINUED | OUTPATIENT
Start: 2022-04-05 | End: 2022-04-05 | Stop reason: HOSPADM

## 2022-04-05 RX ORDER — HYDRALAZINE HYDROCHLORIDE 20 MG/ML
2.5-5 INJECTION INTRAMUSCULAR; INTRAVENOUS EVERY 10 MIN PRN
Status: DISCONTINUED | OUTPATIENT
Start: 2022-04-05 | End: 2022-04-05 | Stop reason: HOSPADM

## 2022-04-05 RX ORDER — FENTANYL CITRATE 50 UG/ML
25 INJECTION, SOLUTION INTRAMUSCULAR; INTRAVENOUS
Status: DISCONTINUED | OUTPATIENT
Start: 2022-04-05 | End: 2022-04-05 | Stop reason: HOSPADM

## 2022-04-05 RX ORDER — LIDOCAINE HYDROCHLORIDE 20 MG/ML
INJECTION, SOLUTION INFILTRATION; PERINEURAL PRN
Status: DISCONTINUED | OUTPATIENT
Start: 2022-04-05 | End: 2022-04-05

## 2022-04-05 RX ADMIN — FENTANYL CITRATE 50 MCG: 50 INJECTION, SOLUTION INTRAMUSCULAR; INTRAVENOUS at 13:13

## 2022-04-05 RX ADMIN — ONDANSETRON 4 MG: 2 INJECTION INTRAMUSCULAR; INTRAVENOUS at 13:26

## 2022-04-05 RX ADMIN — LIDOCAINE HYDROCHLORIDE 100 MG: 20 INJECTION, SOLUTION INFILTRATION; PERINEURAL at 13:13

## 2022-04-05 RX ADMIN — SUCCINYLCHOLINE CHLORIDE 100 MG: 20 INJECTION, SOLUTION INTRAMUSCULAR; INTRAVENOUS; PARENTERAL at 13:13

## 2022-04-05 RX ADMIN — FENTANYL CITRATE 50 MCG: 50 INJECTION, SOLUTION INTRAMUSCULAR; INTRAVENOUS at 13:23

## 2022-04-05 RX ADMIN — PROPOFOL 130 MG: 10 INJECTION, EMULSION INTRAVENOUS at 13:13

## 2022-04-05 RX ADMIN — SODIUM CHLORIDE, POTASSIUM CHLORIDE, SODIUM LACTATE AND CALCIUM CHLORIDE: 600; 310; 30; 20 INJECTION, SOLUTION INTRAVENOUS at 12:14

## 2022-04-05 RX ADMIN — DEXAMETHASONE SODIUM PHOSPHATE 4 MG: 4 INJECTION, SOLUTION INTRA-ARTICULAR; INTRALESIONAL; INTRAMUSCULAR; INTRAVENOUS; SOFT TISSUE at 13:26

## 2022-04-05 ASSESSMENT — LIFESTYLE VARIABLES: TOBACCO_USE: 1

## 2022-04-05 NOTE — INTERVAL H&P NOTE
"I have reviewed the surgical (or preoperative) H&P that is linked to this encounter, and examined the patient. There are no significant changes    Clinical Conditions Present on Arrival:  Clinically Significant Risk Factors Present on Admission                 # Coagulation Defect: home medication list includes an anticoagulant medication    # Overweight: Estimated body mass index is 28.25 kg/m  as calculated from the following:    Height as of 3/29/22: 1.676 m (5' 6\").    Weight as of 3/29/22: 79.4 kg (175 lb).       "

## 2022-04-05 NOTE — ANESTHESIA POSTPROCEDURE EVALUATION
Patient: Geneva Shepherd    Procedure: Procedure(s):  ENDOSCOPIC ULTRASOUND, WITH BIOPSY       Anesthesia Type:  General    Note:  Disposition: Outpatient   Postop Pain Control: Uneventful            Sign Out: Well controlled pain   PONV: No   Neuro/Psych: Uneventful            Sign Out: Acceptable/Baseline neuro status   Airway/Respiratory: Uneventful            Sign Out: Acceptable/Baseline resp. status   CV/Hemodynamics: Uneventful            Sign Out: Acceptable CV status; No obvious hypovolemia; No obvious fluid overload   Other NRE: NONE   DID A NON-ROUTINE EVENT OCCUR? No           Last vitals:  Vitals Value Taken Time   /82 04/05/22 1400   Temp 36.3  C (97.4  F) 04/05/22 1347   Pulse 85 04/05/22 1405   Resp 22 04/05/22 1405   SpO2 93 % 04/05/22 1405   Vitals shown include unvalidated device data.    Electronically Signed By: Sunita Harrison MD  April 5, 2022  2:06 PM

## 2022-04-05 NOTE — DISCHARGE INSTRUCTIONS
Same Day Surgery Discharge Instructions for  Sedation and General Anesthesia       It's not unusual to feel dizzy, light-headed or faint for up to 24 hours after surgery or while taking pain medication.  If you have these symptoms: sit for a few minutes before standing and have someone assist you when you get up to walk or use the bathroom.      You should rest and relax for the next 24 hours. We recommend you make arrangements to have an adult stay with you for at least 24 hours after your discharge.  Avoid hazardous and strenuous activity.      DO NOT DRIVE any vehicle or operate mechanical equipment for 24 hours following the end of your surgery.  Even though you may feel normal, your reactions may be affected by the medication you have received.      Do not drink alcoholic beverages for 24 hours following surgery.       Slowly progress to your regular diet as you feel able. It's not unusual to feel nauseated and/or vomit after receiving anesthesia.  If you develop these symptoms, drink clear liquids (apple juice, ginger ale, broth, 7-up, etc. ) until you feel better.  If your nausea and vomiting persists for 24 hours, please notify your surgeon.        All narcotic pain medications, along with inactivity and anesthesia, can cause constipation. Drinking plenty of liquids and increasing fiber intake will help.      For any questions of a medical nature, call your surgeon.      Do not make important decisions for 24 hours.      If you had general anesthesia, you may have a sore throat for a couple of days related to the breathing tube used during surgery.  You may use Cepacol lozenges to help with this discomfort.  If it worsens or if you develop a fever, contact your surgeon.       If you feel your pain is not well managed with the pain medications prescribed by your surgeon, please contact your surgeon's office to let them know so they can address your concerns.       CoVid 19 Information    We want to give you  information regarding Covid. Please consult your primary care provider with any questions you might have.     Patient who have symptoms (cough, fever, or shortness of breath), need to isolate for 7 days from when symptoms started OR 72 hours after fever resolves (without fever reducing medications) AND improvement of respiratory symptoms (whichever is longer).      Isolate yourself at home (in own room/own bathroom if possible)    Do Not allow any visitors    Do Not go to work or school    Do Not go to [employer],  centers, shopping, or other public places.    Do Not shake hands.    Avoid close and intimate contact with others (hugging, kissing).    Follow CDC recommendations for household cleaning of frequently touched services.     After the initial 7 days, continue to isolate yourself from household members as much as possible. To continue decrease the risk of community spread and exposure, you and any members of your household should limit activities in public for 14 days after starting home isolation.     You can reference the following CDC link for helpful home isolation/care tips:  https://www.cdc.gov/coronavirus/2019-ncov/downloads/10Things.pdf    Protect Others:    Cover Your Mouth and Nose with a mask, disposable tissue or wash cloth to avoid spreading germs to others.    Wash your hands and face frequently with soap and water    Call Your Primary Doctor If: Breathing difficulty develops or you become worse.    For more information about COVID19 and options for caring for yourself at home, please visit the CDC website at https://www.cdc.gov/coronavirus/2019-ncov/about/steps-when-sick.html  For more options for care at [facility], please visit our website at [email or web address]        ******See endoscopy report******        **If you have questions or concerns about your procedure,   call Dr. Christian at [phone]**

## 2022-04-05 NOTE — ANESTHESIA PREPROCEDURE EVALUATION
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     FACTOR 5 LEIDEN DEFECT (HYPERCOAGULABLE)      Heterozygote     FRACTURE OF RIGHT LATERAL PROXIMAL TIBIA      Gastro-oesophageal reflux disease     rare     Hypercalcemia      Hyperlipidemia LDL goal <160 10/31/2010     Insomnia      Irritable bowel syndrome      Obesity 2013     Pain in joint, lower leg      Phlebitis and thrombophlebitis of superficial vessels of lower extremities     right     Sprain of lumbar region      Unspecified hypothyroidism      Urinary tract infection, site not specified       Past Surgical History:   Procedure Laterality Date     ARTHROPLASTY HIP Left 2015    Procedure: ARTHROPLASTY HIP;  Surgeon: Benjamín Maddox MD;  Location:  OR     ARTHROPLASTY KNEE  2013    Procedure: ARTHROPLASTY KNEE;  RIGHT TOTAL KNEE ARTHROPLASTY (BIOMET)^ ;  Surgeon: Benjamín Maddox MD;  Location:  OR     ARTHROPLASTY KNEE Left 2017    Procedure: ARTHROPLASTY KNEE;  Surgeon: Benjamín Maddox MD;  Location:  OR     CHOLECYSTECTOMY       COLONOSCOPY N/A 2016    Procedure: COMBINED COLONOSCOPY, SINGLE OR MULTIPLE BIOPSY/POLYPECTOMY BY BIOPSY;  Surgeon: Mario Coe MD;  Location:  GI     GYN SURGERY      tubal     VASCULAR SURGERY       [facility] NONSPECIFIC PROCEDURE      Endometrial Biopsy.     [facility] NONSPECIFIC PROCEDURE       Tubal Ligation.     [facility] NONSPECIFIC PROCEDURE      negative coronary angio     [facility] NONSPECIFIC PROCEDURE      RLE varicose vein stripping      No Known Allergies   Social History     Tobacco Use     Smoking status: Former Smoker     Quit date: 1968     Years since quittin.6     Smokeless tobacco: Never Used     Tobacco comment: quit in    Substance Use Topics     Alcohol use: Yes     Alcohol/week: 0.0 standard drinks     Comment: 1 glass of wine a month      Wt Readings from Last 1 Encounters:   22 79.4 kg (175 lb)        Anesthesia Evaluation   Pt has had prior anesthetic.     No history of anesthetic complications       ROS/MED HX  ENT/Pulmonary:     (+) tobacco use, Past use,  (-) sleep apnea   Neurologic: Comment: MEMORY LOSS      Cardiovascular:     (+) Dyslipidemia -----Taking blood thinners     METS/Exercise Tolerance:     Hematologic:     (+) History of blood clots, pt is anticoagulated,     Musculoskeletal:   (+) arthritis,     GI/Hepatic:    (-) GERD   Renal/Genitourinary:       Endo:     (+) thyroid problem, hypothyroidism,  (-) Type II DM   Psychiatric/Substance Use:     (+) psychiatric history depression     Infectious Disease:       Malignancy:       Other:            Physical Exam    Airway        Mallampati: II   TM distance: > 3 FB   Neck ROM: full   Mouth opening: > 3 cm    Respiratory Devices and Support         Dental       (+) upper dentures and lower dentures      Cardiovascular   cardiovascular exam normal          Pulmonary   pulmonary exam normal                OUTSIDE LABS:  CBC:   Lab Results   Component Value Date    WBC 6.0 2022    WBC 6.3 2021    HGB 14.8 2022    HGB 14.3 2021    HCT 45.0 2022    HCT 44.6 2021     2022     2021     BMP:   Lab Results   Component Value Date     2022     03/10/2022    POTASSIUM 4.3 2022    POTASSIUM 4.1 03/10/2022    CHLORIDE 104 2022     CHLORIDE 105 03/10/2022    CO2 27 03/29/2022    CO2 25 03/10/2022    BUN 15 03/29/2022    BUN 8 03/10/2022    CR 0.66 03/29/2022    CR 0.65 03/10/2022     (H) 03/29/2022     (H) 03/10/2022     COAGS:   Lab Results   Component Value Date    PTT 29 01/17/2013    INR 2.53 (H) 03/29/2022     POC:   Lab Results   Component Value Date     (H) 09/01/2015     HEPATIC:   Lab Results   Component Value Date    ALBUMIN 3.6 03/29/2022    PROTTOTAL 7.7 03/29/2022    ALT 47 03/29/2022    AST 43 03/29/2022    ALKPHOS 177 (H) 03/29/2022    BILITOTAL 0.4 03/29/2022     OTHER:   Lab Results   Component Value Date    TUAN 10.0 03/29/2022    MAG 2.0 05/17/2004    LIPASE 69 10/11/2012    AMYLASE 48 03/02/2014    TSH 2.34 03/29/2022    T4 1.00 05/24/2005    CRP <2.9 05/05/2015    SED 7 05/05/2015       Anesthesia Plan    ASA Status:  2   NPO Status:  NPO Appropriate    Anesthesia Type: General.     - Airway: ETT   Induction: Intravenous, Propofol.   Maintenance: Balanced.        Consents    Anesthesia Plan(s) and associated risks, benefits, and realistic alternatives discussed. Questions answered and patient/representative(s) expressed understanding.    - Discussed:     - Discussed with:  Patient         Postoperative Care    Pain management: Multi-modal analgesia.   PONV prophylaxis: Ondansetron (or other 5HT-3), Background Propofol Infusion     Comments:                Sunita Harrison MD

## 2022-04-05 NOTE — TELEPHONE ENCOUNTER
ANTICOAGULATION  MANAGEMENT: Discharge Review    Geneva Shepherd chart reviewed for anticoagulation continuity of care    Outpatient surgery/procedure on 4/5/22 for EGD with bx.    Discharge disposition: Home    Results:    Recent labs: (last 7 days)     03/29/22  1949 04/05/22  1143   INR 2.53* 1.05     Anticoagulation inpatient management:     not applicable     Anticoagulation discharge instructions:     Warfarin dosing: home regimen continued   Bridging: bridging with enoxaparin (Lovenox)   INR goal change: No      Medication changes affecting anticoagulation: No    Additional factors affecting anticoagulation: No    Plan     No adjustment to anticoagulation plan needed    Patient not contacted    No adjustment to Anticoagulation Calendar was required    Robert Fulton RN

## 2022-04-05 NOTE — ANESTHESIA PROCEDURE NOTES
Airway       Patient location during procedure: OR       Procedure Start/Stop Times: 4/5/2022 1:15 PM  Staff -        Anesthesiologist:  Sunita Harrison MD       CRNA: Bill Johnston APRN CRNA       Performed By: CRNA  Consent for Airway        Urgency: elective  Indications and Patient Condition       Indications for airway management: mikal-procedural       Induction type:intravenous       Mask difficulty assessment: 0 - not attempted    Final Airway Details       Final airway type: endotracheal airway       Successful airway: ETT - single and Oral  Endotracheal Airway Details        ETT size (mm): 7.0       Cuffed: yes       Successful intubation technique: video laryngoscopy       VL Blade Size: Miguel 3       Grade View of Cords: 1       Adjucts: stylet       Position: Right       Measured from: lips       Secured at (cm): 21       Bite block used: None    Post intubation assessment        Placement verified by: capnometry, equal breath sounds and chest rise        Number of attempts at approach: 1       Number of other approaches attempted: 0       Secured with: pink tape       Ease of procedure: easy       Dentition: Unchanged

## 2022-04-05 NOTE — ANESTHESIA CARE TRANSFER NOTE
Patient: Geneva Shepherd    Procedure: Procedure(s):  ENDOSCOPIC ULTRASOUND, WITH BIOPSY       Diagnosis: Abnormal blood chemistry [R79.9]  Diagnosis Additional Information: No value filed.    Anesthesia Type:   General     Note:    Oropharynx: oropharynx clear of all foreign objects and spontaneously breathing  Level of Consciousness: drowsy  Oxygen Supplementation: face mask  Level of Supplemental Oxygen (L/min / FiO2): 6  Independent Airway: airway patency satisfactory and stable  Dentition: dentition unchanged  Vital Signs Stable: post-procedure vital signs reviewed and stable  Report to RN Given: handoff report given  Patient transferred to: PACU    Handoff Report: Identifed the Patient, Identified the Reponsible Provider, Reviewed the pertinent medical history, Discussed the surgical course, Reviewed Intra-OP anesthesia mangement and issues during anesthesia, Set expectations for post-procedure period and Allowed opportunity for questions and acknowledgement of understanding      Vitals:  Vitals Value Taken Time   /117 04/05/22 1347   Temp     Pulse 86 04/05/22 1351   Resp 17 04/05/22 1351   SpO2 98 % 04/05/22 1351       Electronically Signed By: MONY Cash CRNA  April 5, 2022  1:52 PM

## 2022-04-05 NOTE — TELEPHONE ENCOUNTER
Spoke to patient to reschedule hospital f/u per provider.    No same-day availability on 4/8, scheduled patient for next available opening 4/11 in same-day slot. Cancelled 4/19 hospital f/u.

## 2022-04-06 LAB
PATH REPORT.COMMENTS IMP SPEC: NORMAL
PATH REPORT.COMMENTS IMP SPEC: NORMAL
PATH REPORT.FINAL DX SPEC: NORMAL
PATH REPORT.GROSS SPEC: NORMAL
PATH REPORT.MICROSCOPIC SPEC OTHER STN: NORMAL
PHOTO IMAGE: NORMAL

## 2022-04-06 PROCEDURE — 88305 TISSUE EXAM BY PATHOLOGIST: CPT | Mod: 26 | Performed by: PATHOLOGY

## 2022-04-11 ENCOUNTER — ANTICOAGULATION THERAPY VISIT (OUTPATIENT)
Dept: ANTICOAGULATION | Facility: CLINIC | Age: 82
End: 2022-04-11

## 2022-04-11 ENCOUNTER — LAB (OUTPATIENT)
Dept: LAB | Facility: CLINIC | Age: 82
End: 2022-04-11
Payer: MEDICARE

## 2022-04-11 ENCOUNTER — OFFICE VISIT (OUTPATIENT)
Dept: INTERNAL MEDICINE | Facility: CLINIC | Age: 82
End: 2022-04-11

## 2022-04-11 VITALS
WEIGHT: 183 LBS | RESPIRATION RATE: 16 BRPM | BODY MASS INDEX: 29.41 KG/M2 | HEART RATE: 82 BPM | SYSTOLIC BLOOD PRESSURE: 136 MMHG | OXYGEN SATURATION: 93 % | DIASTOLIC BLOOD PRESSURE: 70 MMHG | TEMPERATURE: 98.2 F | HEIGHT: 66 IN

## 2022-04-11 DIAGNOSIS — Z86.718 PERSONAL HISTORY OF DVT (DEEP VEIN THROMBOSIS): ICD-10-CM

## 2022-04-11 DIAGNOSIS — G47.00 INSOMNIA, UNSPECIFIED TYPE: ICD-10-CM

## 2022-04-11 DIAGNOSIS — R41.3 MEMORY LOSS: ICD-10-CM

## 2022-04-11 DIAGNOSIS — Z79.01 LONG TERM CURRENT USE OF ANTICOAGULANT THERAPY: ICD-10-CM

## 2022-04-11 DIAGNOSIS — Z86.718 PERSONAL HISTORY OF DVT (DEEP VEIN THROMBOSIS): Primary | ICD-10-CM

## 2022-04-11 DIAGNOSIS — E83.52 HYPERCALCEMIA: Primary | ICD-10-CM

## 2022-04-11 LAB
CA-I BLD-MCNC: 5.4 MG/DL (ref 4.4–5.2)
INR BLD: 1.7 (ref 0.9–1.1)

## 2022-04-11 PROCEDURE — 82330 ASSAY OF CALCIUM: CPT | Performed by: INTERNAL MEDICINE

## 2022-04-11 PROCEDURE — 36415 COLL VENOUS BLD VENIPUNCTURE: CPT | Performed by: INTERNAL MEDICINE

## 2022-04-11 PROCEDURE — 85610 PROTHROMBIN TIME: CPT

## 2022-04-11 PROCEDURE — 36416 COLLJ CAPILLARY BLOOD SPEC: CPT

## 2022-04-11 PROCEDURE — 99214 OFFICE O/P EST MOD 30 MIN: CPT | Performed by: INTERNAL MEDICINE

## 2022-04-11 RX ORDER — DONEPEZIL HYDROCHLORIDE 10 MG/1
TABLET, FILM COATED ORAL
Qty: 90 TABLET | Refills: 3
Start: 2022-04-11 | End: 2022-06-01

## 2022-04-11 RX ORDER — ESZOPICLONE 2 MG/1
TABLET, FILM COATED ORAL
Qty: 30 TABLET | Refills: 0 | Status: SHIPPED | OUTPATIENT
Start: 2022-04-11 | End: 2023-03-07

## 2022-04-11 ASSESSMENT — PATIENT HEALTH QUESTIONNAIRE - PHQ9
SUM OF ALL RESPONSES TO PHQ QUESTIONS 1-9: 3
5. POOR APPETITE OR OVEREATING: NOT AT ALL

## 2022-04-11 ASSESSMENT — ANXIETY QUESTIONNAIRES
1. FEELING NERVOUS, ANXIOUS, OR ON EDGE: NOT AT ALL
GAD7 TOTAL SCORE: 0
5. BEING SO RESTLESS THAT IT IS HARD TO SIT STILL: NOT AT ALL
IF YOU CHECKED OFF ANY PROBLEMS ON THIS QUESTIONNAIRE, HOW DIFFICULT HAVE THESE PROBLEMS MADE IT FOR YOU TO DO YOUR WORK, TAKE CARE OF THINGS AT HOME, OR GET ALONG WITH OTHER PEOPLE: NOT DIFFICULT AT ALL
7. FEELING AFRAID AS IF SOMETHING AWFUL MIGHT HAPPEN: NOT AT ALL
6. BECOMING EASILY ANNOYED OR IRRITABLE: NOT AT ALL
3. WORRYING TOO MUCH ABOUT DIFFERENT THINGS: NOT AT ALL
2. NOT BEING ABLE TO STOP OR CONTROL WORRYING: NOT AT ALL

## 2022-04-11 NOTE — PATIENT INSTRUCTIONS
Reduce Donepizil to 10mg tab, 1/2 tab daily at bedtime for memory loss   Stop Amitriptyline   Start Ezepiclone 2mg tab, 1/2 tab -1  tab at 10:30pm for insomnia and set down books and other  stimulation such as TV by 11:30 pm at latest and going to sleep   Try stopping caffeine (coffee) intake for now   Have some fluids with dinner but then try to hold off on drinking fluids afterwards unless to take medication with amount of water intake  Try to avoid all naps during the day. If feeling tired, then  try to get up and move around to try and push through so that you will be moire tired at night and help with sleep patter   Update me in Mychart in  2 weeks re: energy. If not improved, will refer to the  Sleep clinic for a sleep study  Labs today re: elevated calcium

## 2022-04-11 NOTE — PROGRESS NOTES
ANTICOAGULATION MANAGEMENT     Geneva Shepherd 81 year old female is on warfarin with subtherapeutic INR result. (Goal INR 2.0-3.0)    Recent labs: (last 7 days)     04/11/22  1411   INR 1.7*       ASSESSMENT       Source(s): Chart Review and Patient/Caregiver Call       Warfarin doses taken: Warfarin taken as instructed - recent warfarin hold for procedure    Diet: No new diet changes identified    New illness, injury, or hospitalization: No    Medication/supplement changes: None noted    Signs or symptoms of bleeding or clotting: No    Previous INR: Subtherapeutic    Additional findings: Bridging with Enoxaparin until INR >= 2.3 or INR >= 2.0 x 2 ([facility] protocol goal INR 2-3)       PLAN     Recommended plan for temporary change(s) affecting INR     Dosing Instructions: booster dose then continue your current warfarin dose with next INR in 3 days       Summary  As of 4/11/2022    Full warfarin instructions:  4/11: 10 mg; Otherwise 7.5 mg every Sun, Thu; 5 mg all other days   Next INR check:  4/14/2022             Telephone call with Geneva who verbalizes understanding and agrees to plan    Lab visit scheduled    Education provided: Please call back if any changes to your diet, medications or how you've been taking warfarin    Plan made per [facility] anticoagulation protocol    Robert Fulton RN  Anticoagulation Clinic  4/11/2022    _______________________________________________________________________     Anticoagulation Episode Summary     Current INR goal:  2.0-3.0   TTR:  62.8 % (1 y)   Target end date:  Indefinite   Send INR reminders to:  [facility]    Indications    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

## 2022-04-11 NOTE — PROGRESS NOTES
ASSESSMENT:   1. Hypercalcemia  Mild elevation.    Previous PTH normal.  Lab recheck  - Ionized Calcium; Future  - Ionized Calcium    2. Insomnia, unspecified type  Insomnia issues despite amitriptyline.  We will have patient try using generic Lunesta and discontinue caffeine.  If not improving, possible referral to sleep clinic  - eszopiclone (LUNESTA) 2 MG tablet; Take 1/2 - 1 tab at bedtime as needed for insomnia  Dispense: 30 tablet; Refill: 0    3. Memory loss  Patient has some daytime fatigue.  Occurs whether sleeps better or not.  Will reduce donepezil dose to 5 mg daily and monitor.  donepezil (ARICEPT) 10 MG tablet , TAKE 1/2 TABLET BY MOUTH EVERY NIGHT AT BEDTIME, Disp-90 tablet, R-3,      PLAN:   Reduce Donepizil to 10mg tab, 1/2 tab daily at bedtime for memory loss   Stop Amitriptyline   Start Ezepiclone 2mg tab, 1/2 tab -1  tab at 10:30pm for insomnia and set down books and other  stimulation such as TV by 11:30 pm at latest and going to sleep   Try stopping caffeine (coffee) intake for now   Have some fluids with dinner but then try to hold off on drinking fluids afterwards unless to take medication with amount of water intake  Try to avoid all naps during the day. If feeling tired, then  try to get up and move around to try and push through so that you will be moire tired at night and help with sleep patter   Update me in Mychart in  2 weeks re: energy. If not improved, will refer to the  Sleep clinic for a sleep study  Labs today re: elevated calcium      (Chart documentation was completed, in part, with its learning voice-recognition software. Even though reviewed, some grammatical, spelling, and word errors may remain.)    Jacoby Boo MD  Internal Medicine Department  [facility]      Jeff eBan is a 81 year old who presents for the following health issues     History of Present Illness       Reason for visit:  F/U with Primary Doctor  Symptom onset:  More  than a month    She eats 4 or more servings of fruits and vegetables daily.She exercises with enough effort to increase her heart rate 10 to 19 minutes per day.  She exercises with enough effort to increase her heart rate 4 days per week.   She is taking medications regularly.       ED/UC Followup:    Facility:  [facility] ED  Date of visit: 03/29/2022  Reason for visit: Chest Pain, Fatigue  Current Status: Patient c/o feeling Lightheaded and Fatigued        Most recent lab results reviewed with pt.      Component      Latest Ref Rng & Units 3/10/2022 3/29/2022 4/5/2022   Sodium      133 - 144 mmol/L 137 134 135   Potassium      3.4 - 5.3 mmol/L 4.1 4.3 4.0   Chloride      94 - 109 mmol/L 105 104 105   Carbon Dioxide      20 - 32 mmol/L 25 27 28   Anion Gap      3 - 14 mmol/L 7 3 2 (L)   Urea Nitrogen      7 - 30 mg/dL 8 15 7   Creatinine      0.52 - 1.04 mg/dL 0.65 0.66 0.60   Calcium      8.5 - 10.1 mg/dL 10.5 (H) 10.0 10.5 (H)   Glucose      70 - 99 mg/dL 108 (H) 116 (H) 97   Alkaline Phosphatase      40 - 150 U/L 148 177 (H) 140   AST      0 - 45 U/L 51 (H) 43 36   ALT      0 - 50 U/L 60 (H) 47 38   Protein Total      6.8 - 8.8 g/dL 7.5 7.7 7.9   Albumin      3.4 - 5.0 g/dL 3.5 3.6 3.8   Bilirubin Total      0.2 - 1.3 mg/dL 0.4 0.4 0.7   GFR Estimate      >60 mL/min/1.73m2 89 89 >90   WBC      4.0 - 11.0 10e3/uL   6.9   RBC Count      3.80 - 5.20 10e6/uL   4.60   Hemoglobin      11.7 - 15.7 g/dL   14.9   Hematocrit      35.0 - 47.0 %   46.7   MCV      78 - 100 fL   102 (H)   MCH      26.5 - 33.0 pg   32.4   MCHC      31.5 - 36.5 g/dL   31.9   RDW      10.0 - 15.0 %   13.1   Platelet Count      150 - 450 10e3/uL   218   Parathyroid Hormone Intact      18 - 80 pg/mL 43     TSH      0.40 - 4.00 mU/L  2.34        Nocturia x3. That  Is chronic. No dysuria. 1 coffee/day but has a lot of water  In the day  Rested in AM when first gets up but then gets tired by early afternoon and light headed   Was  "treated for sinusitis in ER after CT with Amoxicillin and better.  Occ   clear rhinorrhea still but better with Atrovent BID   Lives by herself. Unknown if snores   Tried to go to bed at 11:30PM but then up until 1 am reading and not very tired then  Often taking a nap in the daytime around 3-4pm and sleep for 90 minutes and then refreshed afterwards   On Aricept for memory loss. NO GI upset. Taking in AM. ? Causing fatigue    Additional ROS:   Constitutional, HEENT, Cardiovascular, Pulmonary, GI and , Neuro, MSK and Psych review of systems/symptoms are otherwise negative or unchanged from previous, except as noted above.      OBJECTIVE:  /70   Pulse 82   Temp 98.2  F (36.8  C) (Temporal)   Resp 16   Ht 1.676 m (5' 6\")   Wt 83 kg (183 lb)   LMP  (LMP Unknown)   SpO2 93%   BMI 29.54 kg/m     Estimated body mass index is 29.54 kg/m  as calculated from the following:    Height as of this encounter: 1.676 m (5' 6\").    Weight as of this encounter: 83 kg (183 lb).   HEENT: Sinuses NT  Neck: no adenopathy. Thyroid normal to palpation. No bruits  Pulm: Lungs clear to auscultation   CV: Regular rates and rhythm  GI: Soft, nontender, Normal active bowel sounds, No hepatosplenomegaly or masses palpable  Ext: Peripheral pulses intact. No edema.  Neuro: Normal strength and tone, sensory exam grossly normal. 2/3 recall 5 min                "

## 2022-04-12 ASSESSMENT — ANXIETY QUESTIONNAIRES: GAD7 TOTAL SCORE: 0

## 2022-04-15 ENCOUNTER — ANTICOAGULATION THERAPY VISIT (OUTPATIENT)
Dept: ANTICOAGULATION | Facility: CLINIC | Age: 82
End: 2022-04-15

## 2022-04-15 ENCOUNTER — LAB (OUTPATIENT)
Dept: LAB | Facility: CLINIC | Age: 82
End: 2022-04-15
Payer: MEDICARE

## 2022-04-15 DIAGNOSIS — Z79.01 LONG TERM CURRENT USE OF ANTICOAGULANT THERAPY: ICD-10-CM

## 2022-04-15 DIAGNOSIS — Z86.718 PERSONAL HISTORY OF DVT (DEEP VEIN THROMBOSIS): ICD-10-CM

## 2022-04-15 DIAGNOSIS — Z86.718 PERSONAL HISTORY OF DVT (DEEP VEIN THROMBOSIS): Primary | ICD-10-CM

## 2022-04-15 LAB — INR BLD: 2.1 (ref 0.9–1.1)

## 2022-04-15 PROCEDURE — 36416 COLLJ CAPILLARY BLOOD SPEC: CPT

## 2022-04-15 PROCEDURE — 85610 PROTHROMBIN TIME: CPT

## 2022-04-15 NOTE — PROGRESS NOTES
ANTICOAGULATION MANAGEMENT     Geneva Shepherd 81 year old female is on warfarin with therapeutic INR result. (Goal INR 2.0-3.0)    Recent labs: (last 7 days)     04/15/22  1512   INR 2.1*       ASSESSMENT       Source(s): Chart Review    Previous INR was Subtherapeutic    Medication, diet, health changes since last INR Had endoscoppy 4/5 amd was on hold for that           PLAN     Unable to reach Geneva today.    Left message to continue current dose of warfarin 5 mg tonight. Request call back for assessment. 5 mg sat and 7.5 mg on Sunday.    Follow up required to confirm warfarin dose taken and assess for changes    Leigha Donaldson, RN  Anticoagulation Clinic  4/15/2022

## 2022-04-18 NOTE — PROGRESS NOTES
ANTICOAGULATION MANAGEMENT therapeutic  Geneva Shepherd 81 year old female is on warfarin with therapeutic INR result. (Goal INR 2.0-3.0)    Recent labs: (last 7 days)     04/15/22  1512   INR 2.1*       ASSESSMENT       Source(s): Chart Review and Patient/Caregiver Call       Warfarin doses taken: Warfarin taken as instructed    Diet: No new diet changes identified    New illness, injury, or hospitalization: Yes: 4/5 endoscopy    Medication/supplement changes: None noted    Signs or symptoms of bleeding or clotting: No    Previous INR: Subtherapeutic    Additional findings: None       PLAN     Recommended plan for no diet, medication or health factor changes affecting INR     Dosing Instructions: continue your current warfarin dose with next INR in 2 weeks       Summary  As of 4/15/2022    Full warfarin instructions:  7.5 mg every Sun, Thu; 5 mg all other days   Next INR check:  4/29/2022             Telephone call with Geneva who verbalizes understanding and agrees to plan    Lab visit scheduled    Education provided: Please call back if any changes to your diet, medications or how you've been taking warfarin    Plan made per [facility] anticoagulation protocol    Leigha Donaldson, RN  Anticoagulation Clinic  4/18/2022    _______________________________________________________________________     Anticoagulation Episode Summary     Current INR goal:  2.0-3.0   TTR:  61.7 % (1 y)   Target end date:  Indefinite   Send INR reminders to:  [facility]    Indications    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

## 2022-05-04 ENCOUNTER — ANTICOAGULATION THERAPY VISIT (OUTPATIENT)
Dept: ANTICOAGULATION | Facility: CLINIC | Age: 82
End: 2022-05-04

## 2022-05-04 ENCOUNTER — LAB (OUTPATIENT)
Dept: LAB | Facility: CLINIC | Age: 82
End: 2022-05-04
Payer: MEDICARE

## 2022-05-04 DIAGNOSIS — Z86.718 PERSONAL HISTORY OF DVT (DEEP VEIN THROMBOSIS): Primary | ICD-10-CM

## 2022-05-04 DIAGNOSIS — Z79.01 LONG TERM CURRENT USE OF ANTICOAGULANT THERAPY: ICD-10-CM

## 2022-05-04 DIAGNOSIS — Z86.718 PERSONAL HISTORY OF DVT (DEEP VEIN THROMBOSIS): ICD-10-CM

## 2022-05-04 LAB — INR BLD: 3 (ref 0.9–1.1)

## 2022-05-04 PROCEDURE — 36416 COLLJ CAPILLARY BLOOD SPEC: CPT

## 2022-05-04 PROCEDURE — 85610 PROTHROMBIN TIME: CPT

## 2022-05-04 NOTE — PROGRESS NOTES
ANTICOAGULATION MANAGEMENT     Geneva Shepherd 81 year old female is on warfarin with therapeutic INR result. (Goal INR 2.0-3.0)    Recent labs: (last 7 days)     05/04/22  1443   INR 3.0*       ASSESSMENT       Source(s): Chart Review and Patient/Caregiver Call       Warfarin doses taken: Warfarin taken as instructed    Diet: Decreased greens/vitamin K in diet; plans to resume previous intake    New illness, injury, or hospitalization: No    Medication/supplement changes: None noted    Signs or symptoms of bleeding or clotting: No    Previous INR: Therapeutic last visit; previously outside of goal range    Additional findings: None       PLAN     Recommended plan for temporary change(s) affecting INR     Dosing Instructions: continue your current warfarin dose with next INR in 3-4 weeks       Summary  As of 5/4/2022    Full warfarin instructions:  7.5 mg every Sun, Thu; 5 mg all other days   Next INR check:  6/1/2022             Telephone call with Geneva who verbalizes understanding and agrees to plan    Lab visit scheduled    Education provided: Please call back if any changes to your diet, medications or how you've been taking warfarin, Monitoring for bleeding signs and symptoms, Monitoring for clotting signs and symptoms and Importance of notifying clinic for changes in medications; a sooner lab recheck maybe needed.    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

## 2022-05-04 NOTE — PROGRESS NOTES
ANTICOAGULATION MANAGEMENT     Geneva Shepherd 81 year old female is on warfarin with therapeutic INR result. (Goal INR 2.0-3.0)    Recent labs: (last 7 days)     05/04/22  1443   INR 3.0*       ASSESSMENT       Source(s): Chart Review    Previous INR was Therapeutic last visit; previously outside of goal range    Medication, diet, health changes since last INR chart reviewed; none identified       PLAN     Unable to reach Geneva today.    Left message requesting callback to [facility] team for assessment - note, large climb in INR level (2.1 - 3.0)    Follow up required to confirm warfarin dose taken and assess for changes    Kathy Warren, RN  Anticoagulation Clinic  5/4/2022

## 2022-05-17 ENCOUNTER — OFFICE VISIT (OUTPATIENT)
Dept: VASCULAR SURGERY | Facility: CLINIC | Age: 82
End: 2022-05-17
Payer: MEDICARE

## 2022-05-17 DIAGNOSIS — I87.2 CHRONIC VENOUS INSUFFICIENCY: Primary | ICD-10-CM

## 2022-05-17 PROCEDURE — 99213 OFFICE O/P EST LOW 20 MIN: CPT | Performed by: SURGERY

## 2022-05-17 NOTE — PROGRESS NOTES
[facility] [place] office note  Strict returns in follow-up of her right lower extremity stasis disease.  I last saw her about 1 year ago.  She had nonhealing, right lateral ankle ulcer which was traumatic in origin.  This healed after nearly 7 months.  She returns to see if any treatment of her venous disease is indicated.    Exam  Scar on her distal lateral right leg/proximal ankle from healed venous stasis ulcer.  The skin is somewhat dry in this area.  There is no significant edema.    A few scattered varicose veins are noted.    Impression  Healed, traumatic right lateral ankle ulcer.  As this was traumatic in origin and has healed, I do not see any need to treat her venous disease at this time.  This was discussed frankly with her.    She shared that she is trying to motivate herself to exercise more.  She states that prior to having the ulcer on her right lateral ankle, she was riding her stationary, and home bicycle on a regular basis and was walking in her yard.  She has been inactive since the ulcer formed.  She states that she has climbed on the stationary bike on a few occasions but has not been consistent and committed.    She also inquired about exercises to help with her balance.  We discussed this briefly but I feel the most important thing is for her to be active and to strengthen her legs which will help with her balance.  She voiced understanding and will try to get started again with exercise.    Planned  Conservative management with increasing activities.  She will return on an as-needed basis.    LIBBY Durham MD    Dictated using Dragon voice recognition software which may result in transcription errors

## 2022-05-17 NOTE — LETTER
5/17/2022         RE: Geneva Shepherd  [address]  [place] [address]        Dear Colleague,    Thank you for referring your patient, Geneva Shepherd, to the [facility] [place]. Please see a copy of my visit note below.    [facility] [place] office note  Strict returns in follow-up of her right lower extremity stasis disease.  I last saw her about 1 year ago.  She had nonhealing, right lateral ankle ulcer which was traumatic in origin.  This healed after nearly 7 months.  She returns to see if any treatment of her venous disease is indicated.    Exam  Scar on her distal lateral right leg/proximal ankle from healed venous stasis ulcer.  The skin is somewhat dry in this area.  There is no significant edema.    A few scattered varicose veins are noted.    Impression  Healed, traumatic right lateral ankle ulcer.  As this was traumatic in origin and has healed, I do not see any need to treat her venous disease at this time.  This was discussed frankly with her.    She shared that she is trying to motivate herself to exercise more.  She states that prior to having the ulcer on her right lateral ankle, she was riding her stationary, and home bicycle on a regular basis and was walking in her yard.  She has been inactive since the ulcer formed.  She states that she has climbed on the stationary bike on a few occasions but has not been consistent and committed.    She also inquired about exercises to help with her balance.  We discussed this briefly but I feel the most important thing is for her to be active and to strengthen her legs which will help with her balance.  She voiced understanding and will try to get started again with exercise.    Planned  Conservative management with increasing activities.  She will return on an as-needed basis.    LIBBY Durham MD    Dictated using Dragon voice recognition software which may result in transcription errors      Again, thank  you for allowing me to participate in the care of your patient.        Sincerely,        Cisco Durham MD

## 2022-05-31 DIAGNOSIS — R41.3 MEMORY LOSS: ICD-10-CM

## 2022-06-01 RX ORDER — DONEPEZIL HYDROCHLORIDE 10 MG/1
TABLET, FILM COATED ORAL
Qty: 90 TABLET | Refills: 2 | Status: SHIPPED | OUTPATIENT
Start: 2022-04-11 | End: 2023-03-07

## 2022-06-02 ENCOUNTER — ANTICOAGULATION THERAPY VISIT (OUTPATIENT)
Dept: ANTICOAGULATION | Facility: CLINIC | Age: 82
End: 2022-06-02

## 2022-06-02 ENCOUNTER — LAB (OUTPATIENT)
Dept: LAB | Facility: CLINIC | Age: 82
End: 2022-06-02
Payer: MEDICARE

## 2022-06-02 DIAGNOSIS — Z86.718 PERSONAL HISTORY OF DVT (DEEP VEIN THROMBOSIS): ICD-10-CM

## 2022-06-02 DIAGNOSIS — Z86.718 PERSONAL HISTORY OF DVT (DEEP VEIN THROMBOSIS): Primary | ICD-10-CM

## 2022-06-02 DIAGNOSIS — Z79.01 LONG TERM CURRENT USE OF ANTICOAGULANT THERAPY: ICD-10-CM

## 2022-06-02 LAB — INR BLD: 3.2 (ref 0.9–1.1)

## 2022-06-02 PROCEDURE — 85610 PROTHROMBIN TIME: CPT

## 2022-06-02 PROCEDURE — 36416 COLLJ CAPILLARY BLOOD SPEC: CPT

## 2022-06-02 NOTE — PROGRESS NOTES
ANTICOAGULATION MANAGEMENT     Geneva Shepherd 81 year old female is on warfarin with supratherapeutic INR result. (Goal INR 2.0-3.0)    Recent labs: (last 7 days)     06/02/22  1526   INR 3.2*       ASSESSMENT       Source(s): Chart Review and Patient/Caregiver Call       Warfarin doses taken: Warfarin taken as instructed    Diet: No new diet changes identified    New illness, injury, or hospitalization: No    Medication/supplement changes: None noted    Signs or symptoms of bleeding or clotting: Yes: intermittent bruising    Previous INR: Therapeutic last 2(+) visits    Additional findings: Increase in activity level       PLAN     Recommended plan for ongoing change(s) affecting INR     Dosing Instructions: partial hold then continue your current warfarin dose with next INR in 2 weeks       Summary  As of 6/2/2022    Full warfarin instructions:  6/2: 5 mg; Otherwise 7.5 mg every Sun, Thu; 5 mg all other days   Next INR check:  6/16/2022             Telephone call with Geneva who verbalizes understanding and agrees to plan    Lab visit scheduled    Education provided: Please call back if any changes to your diet, medications or how you've been taking warfarin, Monitoring for bleeding signs and symptoms, Monitoring for clotting signs and symptoms and Importance of notifying clinic for changes in medications; a sooner lab recheck maybe needed.    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

## 2022-06-15 DIAGNOSIS — G47.00 INSOMNIA, UNSPECIFIED TYPE: ICD-10-CM

## 2022-06-16 NOTE — TELEPHONE ENCOUNTER
Routing refill request to provider for review/approval because:  Drug interaction warning    Margo Cabezas RN

## 2022-06-24 ENCOUNTER — LAB (OUTPATIENT)
Dept: LAB | Facility: CLINIC | Age: 82
End: 2022-06-24
Payer: MEDICARE

## 2022-06-24 ENCOUNTER — ANTICOAGULATION THERAPY VISIT (OUTPATIENT)
Dept: ANTICOAGULATION | Facility: CLINIC | Age: 82
End: 2022-06-24

## 2022-06-24 DIAGNOSIS — Z79.01 LONG TERM CURRENT USE OF ANTICOAGULANT THERAPY: ICD-10-CM

## 2022-06-24 DIAGNOSIS — Z86.718 PERSONAL HISTORY OF DVT (DEEP VEIN THROMBOSIS): Primary | ICD-10-CM

## 2022-06-24 DIAGNOSIS — Z86.718 PERSONAL HISTORY OF DVT (DEEP VEIN THROMBOSIS): ICD-10-CM

## 2022-06-24 LAB — INR BLD: 3.2 (ref 0.9–1.1)

## 2022-06-24 PROCEDURE — 36416 COLLJ CAPILLARY BLOOD SPEC: CPT

## 2022-06-24 PROCEDURE — 85610 PROTHROMBIN TIME: CPT

## 2022-06-24 NOTE — PROGRESS NOTES
ANTICOAGULATION MANAGEMENT     Geneva Shepherd 81 year old female is on warfarin with supratherapeutic INR result. (Goal INR 2.0-3.0)    Recent labs: (last 7 days)     06/24/22  1541   INR 3.2*       ASSESSMENT       Source(s): Chart Review and Patient/Caregiver Call       Warfarin doses taken: Warfarin taken as instructed    Diet: No new diet changes identified    New illness, injury, or hospitalization: No    Medication/supplement changes: None noted    Signs or symptoms of bleeding or clotting: No    Previous INR: Supratherapeutic    Additional findings: None       PLAN     Recommended plan for no diet, medication or health factor changes affecting INR     Dosing Instructions: decrease your warfarin dose (6% change) with next INR in 2 weeks       Summary  As of 6/24/2022    Full warfarin instructions:  7.5 mg every Thu; 5 mg all other days   Next INR check:  7/8/2022             Telephone call with Geneva who agrees to plan and repeated back plan correctly    Lab visit scheduled    Education provided: Please call back if any changes to your diet, medications or how you've been taking warfarin, Importance of consistent vitamin K intake, Monitoring for bleeding signs and symptoms, Monitoring for clotting signs and symptoms and Contact [phone]  with any changes, questions or concerns.     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

## 2022-07-14 ENCOUNTER — DOCUMENTATION ONLY (OUTPATIENT)
Dept: ANTICOAGULATION | Facility: CLINIC | Age: 82
End: 2022-07-14

## 2022-07-14 ENCOUNTER — ANTICOAGULATION THERAPY VISIT (OUTPATIENT)
Dept: ANTICOAGULATION | Facility: CLINIC | Age: 82
End: 2022-07-14

## 2022-07-14 ENCOUNTER — LAB (OUTPATIENT)
Dept: LAB | Facility: CLINIC | Age: 82
End: 2022-07-14
Payer: MEDICARE

## 2022-07-14 DIAGNOSIS — D68.51 HETEROZYGOUS FACTOR V LEIDEN MUTATION (H): ICD-10-CM

## 2022-07-14 DIAGNOSIS — Z86.718 PERSONAL HISTORY OF DVT (DEEP VEIN THROMBOSIS): ICD-10-CM

## 2022-07-14 DIAGNOSIS — Z79.01 LONG TERM CURRENT USE OF ANTICOAGULANT THERAPY: ICD-10-CM

## 2022-07-14 DIAGNOSIS — Z86.718 PERSONAL HISTORY OF DVT (DEEP VEIN THROMBOSIS): Primary | ICD-10-CM

## 2022-07-14 LAB — INR BLD: 2.3 (ref 0.9–1.1)

## 2022-07-14 PROCEDURE — 36416 COLLJ CAPILLARY BLOOD SPEC: CPT

## 2022-07-14 PROCEDURE — 85610 PROTHROMBIN TIME: CPT

## 2022-07-14 NOTE — PROGRESS NOTES
ANTICOAGULATION MANAGEMENT     Geneva Shepherd 81 year old female is on warfarin with therapeutic INR result. (Goal INR 2.0-3.0)    Recent labs: (last 7 days)     07/14/22  1514   INR 2.3*       ASSESSMENT       Source(s): Chart Review and Patient/Caregiver Call       Warfarin doses taken: Warfarin taken as instructed    Diet: No new diet changes identified    New illness, injury, or hospitalization: No    Medication/supplement changes: None noted    Signs or symptoms of bleeding or clotting: No    Previous INR: Supratherapeutic    Additional findings: None       PLAN     Recommended plan for no diet, medication or health factor changes affecting INR     Dosing Instructions: continue your current warfarin dose with next INR in 4 weeks       Summary  As of 7/14/2022    Full warfarin instructions:  7.5 mg every Thu; 5 mg all other days   Next INR check:  8/11/2022             Telephone call with Geneva who verbalizes understanding and agrees to plan    Lab visit scheduled    Education provided: Please call back if any changes to your diet, medications or how you've been taking warfarin    Plan made per [facility] anticoagulation protocol    Leigha Donaldson RN  Anticoagulation Clinic  7/14/2022    _______________________________________________________________________     Anticoagulation Episode Summary     Current INR goal:  2.0-3.0   TTR:  60.2 % (1 y)   Target end date:  Indefinite   Send INR reminders to:  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

## 2022-08-06 DIAGNOSIS — F32.0 MAJOR DEPRESSIVE DISORDER, SINGLE EPISODE, MILD (H): ICD-10-CM

## 2022-08-07 RX ORDER — PAROXETINE 20 MG/1
40 TABLET, FILM COATED ORAL EVERY MORNING
Qty: 180 TABLET | Refills: 3 | Status: SHIPPED | OUTPATIENT
Start: 2022-08-07 | End: 2023-03-07

## 2022-08-15 ENCOUNTER — NURSE TRIAGE (OUTPATIENT)
Dept: INTERNAL MEDICINE | Facility: CLINIC | Age: 82
End: 2022-08-15

## 2022-08-15 NOTE — TELEPHONE ENCOUNTER
"Nurse Triage SBAR    Is this a 2nd Level Triage? NO    Situation: Patient has light headedness off and on. Some days not at all. Other days can hardly be up and needs assistance to walk.    Background: States that she has been\"worked up for this, but found nothing.     Assessment: Light headed and woozy without know cause.     Protocol Recommended Disposition:   See in Office Within 2 Weeks    Recommendation: Please advise if OK to use same day slot.      Routed to provider    Does the patient meet one of the following criteria for ADS visit consideration? 16+ years old, with an [facility] PCP     TIP  Providers, please consider if this condition is appropriate for management at one of our Acute and Diagnostic Services sites.     If patient is a good candidate, please use dotphrase <dot>triageresponse and select Refer to ADS to document.    Reason for Disposition    Dizziness not present now, but is a chronic symptom (recurrent or ongoing AND lasting > 4 weeks)    Additional Information    Negative: SEVERE difficulty breathing (e.g., struggling for each breath, speaks in single words)    Negative: Shock suspected (e.g., cold/pale/clammy skin, too weak to stand, low BP, rapid pulse)    Negative: Difficult to awaken or acting confused (e.g., disoriented, slurred speech)    Negative: Fainted, and still feels dizzy afterwards    Negative: Overdose (accidental or intentional) of medications    Negative: New neurologic deficit that is present now: * Weakness of the face, arm, or leg on one side of the body * Numbness of the face, arm, or leg on one side of the body * Loss of speech or garbled speech    Negative: Heart beating < 50 beats per minute OR > 140 beats per minute    Negative: Sounds like a life-threatening emergency to the triager    Negative: Chest pain    Negative: Rectal bleeding, bloody stool, or tarry-black stool    Negative: Vomiting is main symptom    Negative: Diarrhea is main symptom    Negative: Headache is " main symptom    Negative: Heat exhaustion suspected (i.e., dehydration from heat exposure)    Negative: Patient states that they are having an anxiety or panic attack    Negative: Dizziness from low blood sugar (i.e., < 60 mg/dl or 3.5 mmol/l)    Negative: SEVERE dizziness (e.g., unable to stand, requires support to walk, feels like passing out now)    Negative: SEVERE headache or neck pain    Negative: Spinning or tilting sensation (vertigo) present now and one or more stroke risk factors (i.e., hypertension, diabetes mellitus, prior stroke/TIA, heart attack, age over 60) (Exception: prior physician evaluation for this AND no different/worse than usual)    Negative: Neurologic deficit that was brief (now gone), ANY of the following:* Weakness of the face, arm, or leg on one side of the body* Numbness of the face, arm, or leg on one side of the body* Loss of speech or garbled speech    Negative: Loss of vision or double vision  (Exception: Similar to previous migraines.)    Negative: Extra heart beats OR irregular heart beating (i.e., 'palpitations')    Negative: Difficulty breathing    Negative: Drinking very little and has signs of dehydration (e.g., no urine > 12 hours, very dry mouth, very lightheaded)    Negative: Follows bleeding (e.g., stomach, rectum, vagina)  (Exception: Became dizzy from sight of small amount blood.)    Negative: Patient sounds very sick or weak to the triager    Negative: Lightheadedness (dizziness) present now, after 2 hours of rest and fluids    Negative: Spinning or tilting sensation (vertigo) present now    Negative: Fever > 103 F (39.4 C)    Negative: Fever > 100.0 F (37.8 C) and has diabetes mellitus or a weak immune system (e.g., HIV positive, cancer chemotherapy, organ transplant, splenectomy, chronic steroids)    Negative: MODERATE dizziness (e.g., interferes with normal activities) (Exception: dizziness caused by heat exposure, sudden standing, or poor fluid intake)    Negative:  "Vomiting occurs with dizziness    Negative: Patient wants to be seen    Negative: Taking a medicine that could cause dizziness (e.g., blood pressure medications, diuretics)    Negative: MILD dizziness (e.g., walking normally) and has NOT been evaluated by physician for this (Exception: dizziness caused by heat exposure, sudden standing, or poor fluid intake)    Negative: Substance use (drug use) or unhealthy alcohol use, known or suspected    Answer Assessment - Initial Assessment Questions  1. DESCRIPTION: \"Describe your dizziness.\"      Light headed.  2. LIGHTHEADED: \"Do you feel lightheaded?\" (e.g., somewhat faint, woozy, weak upon standing)      Sometimes woozy. Sometimes my balance is not the best.   3. VERTIGO: \"Do you feel like either you or the room is spinning or tilting?\" (i.e. vertigo)      No spinning.  4. SEVERITY: \"How bad is it?\"  \"Do you feel like you are going to faint?\" \"Can you stand and walk?\"    - MILD: Feels slightly dizzy, but walking normally.    - MODERATE: Feels unsteady when walking, but not falling; interferes with normal activities (e.g., school, work).    - SEVERE: Unable to walk without falling, or requires assistance to walk without falling; feels like passing out now.       Depends on the day. Worst is when she is tired and has to lay down.   5. ONSET:  \"When did the dizziness begin?\"      Started quite a long time ago. Already seen for it.   6. AGGRAVATING FACTORS: \"Does anything make it worse?\" (e.g., standing, change in head position)      Patient reports a lot of runny nose.  7. HEART RATE: \"Can you tell me your heart rate?\" \"How many beats in 15 seconds?\"  (Note: not all patients can do this)        NSR 78-82  8. CAUSE: \"What do you think is causing the dizziness?\"      unknown  9. RECURRENT SYMPTOM: \"Have you had dizziness before?\" If Yes, ask: \"When was the last time?\" \"What happened that time?\"      In the past was due to sinusitis.   10. OTHER SYMPTOMS: \"Do you have any " "other symptoms?\" (e.g., fever, chest pain, vomiting, diarrhea, bleeding)        none  11. PREGNANCY: \"Is there any chance you are pregnant?\" \"When was your last menstrual period?\"        NA    Protocols used: DIZZINESS-A-OSVALDO Mccurdy RN    "

## 2022-08-23 ENCOUNTER — ANTICOAGULATION THERAPY VISIT (OUTPATIENT)
Dept: ANTICOAGULATION | Facility: CLINIC | Age: 82
End: 2022-08-23

## 2022-08-23 ENCOUNTER — OFFICE VISIT (OUTPATIENT)
Dept: INTERNAL MEDICINE | Facility: CLINIC | Age: 82
End: 2022-08-23
Payer: MEDICARE

## 2022-08-23 VITALS
DIASTOLIC BLOOD PRESSURE: 75 MMHG | HEIGHT: 64 IN | BODY MASS INDEX: 31.41 KG/M2 | OXYGEN SATURATION: 99 % | TEMPERATURE: 97.9 F | SYSTOLIC BLOOD PRESSURE: 118 MMHG | WEIGHT: 184 LBS | HEART RATE: 73 BPM

## 2022-08-23 DIAGNOSIS — E83.52 HYPERCALCEMIA: ICD-10-CM

## 2022-08-23 DIAGNOSIS — R42 LIGHTHEADEDNESS: ICD-10-CM

## 2022-08-23 DIAGNOSIS — Z86.718 PERSONAL HISTORY OF DVT (DEEP VEIN THROMBOSIS): ICD-10-CM

## 2022-08-23 DIAGNOSIS — R00.2 PALPITATIONS: ICD-10-CM

## 2022-08-23 DIAGNOSIS — Z86.718 PERSONAL HISTORY OF DVT (DEEP VEIN THROMBOSIS): Primary | ICD-10-CM

## 2022-08-23 DIAGNOSIS — E03.9 HYPOTHYROIDISM, UNSPECIFIED TYPE: ICD-10-CM

## 2022-08-23 DIAGNOSIS — Z79.01 LONG TERM CURRENT USE OF ANTICOAGULANT THERAPY: ICD-10-CM

## 2022-08-23 LAB
ALBUMIN SERPL-MCNC: 3.6 G/DL (ref 3.4–5)
ALP SERPL-CCNC: 146 U/L (ref 40–150)
ALT SERPL W P-5'-P-CCNC: 32 U/L (ref 0–50)
ANION GAP SERPL CALCULATED.3IONS-SCNC: 4 MMOL/L (ref 3–14)
AST SERPL W P-5'-P-CCNC: 33 U/L (ref 0–45)
BILIRUB SERPL-MCNC: 0.7 MG/DL (ref 0.2–1.3)
BUN SERPL-MCNC: 8 MG/DL (ref 7–30)
CALCIUM SERPL-MCNC: 10.2 MG/DL (ref 8.5–10.1)
CHLORIDE BLD-SCNC: 102 MMOL/L (ref 94–109)
CO2 SERPL-SCNC: 27 MMOL/L (ref 20–32)
CREAT SERPL-MCNC: 0.6 MG/DL (ref 0.52–1.04)
GFR SERPL CREATININE-BSD FRML MDRD: >90 ML/MIN/1.73M2
GLUCOSE BLD-MCNC: 97 MG/DL (ref 70–99)
INR PPP: 1.98 (ref 0.85–1.15)
POTASSIUM BLD-SCNC: 4.1 MMOL/L (ref 3.4–5.3)
PROT SERPL-MCNC: 7.4 G/DL (ref 6.8–8.8)
SODIUM SERPL-SCNC: 133 MMOL/L (ref 133–144)
TSH SERPL DL<=0.005 MIU/L-ACNC: 3.06 MU/L (ref 0.4–4)

## 2022-08-23 PROCEDURE — 99214 OFFICE O/P EST MOD 30 MIN: CPT | Performed by: INTERNAL MEDICINE

## 2022-08-23 PROCEDURE — 85610 PROTHROMBIN TIME: CPT | Performed by: INTERNAL MEDICINE

## 2022-08-23 PROCEDURE — 82306 VITAMIN D 25 HYDROXY: CPT | Performed by: INTERNAL MEDICINE

## 2022-08-23 PROCEDURE — 36415 COLL VENOUS BLD VENIPUNCTURE: CPT | Performed by: INTERNAL MEDICINE

## 2022-08-23 PROCEDURE — 80053 COMPREHEN METABOLIC PANEL: CPT | Performed by: INTERNAL MEDICINE

## 2022-08-23 PROCEDURE — 84443 ASSAY THYROID STIM HORMONE: CPT | Performed by: INTERNAL MEDICINE

## 2022-08-23 NOTE — PROGRESS NOTES
ANTICOAGULATION MANAGEMENT     Geneva Shepherd 81 year old female is on warfarin with therapeutic INR result. (Goal INR 2.0-3.0)    Recent labs: (last 7 days)     08/23/22  0942   INR 1.98*       ASSESSMENT       Source(s): Chart Review and Patient/Caregiver Call       Warfarin doses taken: Warfarin taken as instructed    Diet: No new diet changes identified    New illness, injury, or hospitalization: No continiues to have some headaches and occasional light headed but see by Dr Boo today and they are going to do some cardio testing    Medication/supplement changes: None noted    Signs or symptoms of bleeding or clotting: No    Previous INR: Therapeutic last visit; previously outside of goal range    Additional findings: None       PLAN     Recommended plan for no diet, medication or health factor changes affecting INR     Dosing Instructions: Continue your current warfarin dose with next INR in 5 weeks       Summary  As of 8/23/2022    Full warfarin instructions:  7.5 mg every Thu; 5 mg all other days   Next INR check:  9/27/2022             Telephone call with Geneva who verbalizes understanding and agrees to plan    Lab visit scheduled    Education provided: Please call back if any changes to your diet, medications or how you've been taking warfarin    Plan made per [facility] anticoagulation protocol    Leigha Donaldson RN  Anticoagulation Clinic  8/23/2022    _______________________________________________________________________     Anticoagulation Episode Summary     Current INR goal:  2.0-3.0   TTR:  68.4 % (1 y)   Target end date:  Indefinite   Send INR reminders to:  [facility]    Indications    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

## 2022-08-23 NOTE — PROGRESS NOTES
ASSESSMENT:   1. Lightheadedness  Currently asymptomatic.  Not able to induce.  No vertigo symptoms.  Hydrating well and blood pressure normal today.  No symptoms with exercise concern for possible heart arrhythmia. Will get Ziopatch 14-day heart monitor  - Adult Leadless EKG Monitor 8 to 14 Days; Future    2. Palpitations  See #1  - Adult Leadless EKG Monitor 8 to 14 Days; Future    3. Hypercalcemia  Previous vitamin D high normal.  Not on supplement.  History of mild calcium elevations with normal PTH.  Needs lab recheck.  Not taking calcium at this time  - Comprehensive metabolic panel; Future  - Vitamin D Deficiency; Future    4. Personal history of RLE DVT (deep vein thrombosis)(2003)  On long-term warfarin.  Due for INR recheck  - INR; Future    5. Hypothyroidism, unspecified type  On levothyroxine.  Weight up a few pounds.  With possible arrhythmia as above, recheck thyroid function  - TSH with free T4 reflex; Future      PLAN:   Labs as ordered  Ziopatch heart monitor for 14 days to assess for arrhythmia  As cause of lightheadedness/acute fatigue. Call [phone] to get appt to have placed at [facility] cardiology clinic  Patient declines vaccinations for influenza/COVID    (Chart documentation was completed, in part, with Shut Down voice-recognition software. Even though reviewed, some grammatical, spelling, and word errors may remain.)    Jacoby Boo MD  Internal Medicine Department  [facility]      Jeff Bean is a 81 year old, presenting for the following health issues:  Headache (Would like blood work done) and Consult (On and off tiredness started about a year ago. )      History of Present Illness       Headaches:   Since the patient's last clinic visit, headaches are: worsened  The patient is getting headaches:  Once a week  She is not able to do normal daily activities when she has a migraine.  The patient is taking the following rescue/relief medications:  " Tylenol   Patient states \"I get some relief\" from the rescue/relief medications.   The patient is taking the following medications to prevent migraines:  No medications to prevent migraines  In the past 4 weeks, the patient has gone to an Urgent Care or Emergency Room 0 times times due to headaches.    She eats 2-3 servings of fruits and vegetables daily.She consumes 0 sweetened beverage(s) daily.She exercises with enough effort to increase her heart rate 9 or less minutes per day.  She exercises with enough effort to increase her heart rate 3 or less days per week.   She is taking medications regularly.        Most recent lab results reviewed with pt.      Exercising on a treadmill for 30 minutes 2 or 3 times per week.  Denies chest pain, shortness of breath or lightheadedness when exercising.  Will get intermittent feeling of lightheadedness and occasional sense of palpitation that will make her lie down.  Symptoms resolved after a while.  Denies symptoms currently.  Denies any vertigo sensation.  Vision okay.  Patient states she is sleeping well.  Stays well-hydrated per patient.  Rare caffeine use  History mild hypercalcemia with normal parathyroid levels and needs lab recheck  Patient declines COVID or influenza vaccinations  Mood good with use of paroxetine.  Most recent PHQ 9 reviewed       Additional ROS:   Constitutional, HEENT, Cardiovascular, Pulmonary, GI and , Neuro, MSK and Psych review of systems/symptoms are otherwise negative or unchanged from previous, except as noted above.      OBJECTIVE:  /75   Pulse 73   Temp 97.9  F (36.6  C) (Oral)   Ht 1.626 m (5' 4\")   Wt 83.5 kg (184 lb)   LMP  (LMP Unknown)   SpO2 99%   BMI 31.58 kg/m     Estimated body mass index is 31.58 kg/m  as calculated from the following:    Height as of this encounter: 1.626 m (5' 4\").    Weight as of this encounter: 83.5 kg (184 lb).  Eye: PERRL, EOMI  Neck: no adenopathy. Thyroid normal to palpation. No " bruits  Pulm: Lungs clear to auscultation   CV: Regular rates and rhythm.  No current ectopy  GI: Soft, nontender, Normal active bowel sounds   Ext: Peripheral pulses intact.  Minimal  BLE edema.  Nontender varicose veins  Neuro: Normal strength and tone, sensory exam grossly normal.  Normal gait.  No lightheadedness induced with acute standing

## 2022-08-23 NOTE — PATIENT INSTRUCTIONS
Labs as ordered  Ziopatch heart monitor for 14 days to assess for arrhythmia  As cause of lightheadedness/acute fatigue. Call [phone] to get appt to have placed at [facility] cardiology [facility]

## 2022-08-24 LAB — DEPRECATED CALCIDIOL+CALCIFEROL SERPL-MC: 34 UG/L (ref 20–75)

## 2022-09-01 ENCOUNTER — HOSPITAL ENCOUNTER (OUTPATIENT)
Dept: CARDIOLOGY | Facility: CLINIC | Age: 82
Discharge: HOME | End: 2022-09-01
Attending: INTERNAL MEDICINE | Admitting: INTERNAL MEDICINE
Payer: MEDICARE

## 2022-09-01 DIAGNOSIS — R00.2 PALPITATIONS: ICD-10-CM

## 2022-09-01 DIAGNOSIS — R42 LIGHTHEADEDNESS: ICD-10-CM

## 2022-09-01 DIAGNOSIS — E03.9 HYPOTHYROIDISM, UNSPECIFIED TYPE: ICD-10-CM

## 2022-09-01 PROCEDURE — 93248 EXT ECG>7D<15D REV&INTERPJ: CPT | Performed by: INTERNAL MEDICINE

## 2022-09-01 PROCEDURE — 93246 EXT ECG>7D<15D RECORDING: CPT

## 2022-09-01 RX ORDER — LEVOTHYROXINE SODIUM 50 UG/1
50 TABLET ORAL DAILY
Qty: 90 TABLET | Refills: 3 | Status: SHIPPED | OUTPATIENT
Start: 2022-09-01 | End: 2023-03-07

## 2022-10-04 ENCOUNTER — LAB (OUTPATIENT)
Dept: LAB | Facility: CLINIC | Age: 82
End: 2022-10-04
Payer: MEDICARE

## 2022-10-04 ENCOUNTER — ANTICOAGULATION THERAPY VISIT (OUTPATIENT)
Dept: ANTICOAGULATION | Facility: CLINIC | Age: 82
End: 2022-10-04

## 2022-10-04 DIAGNOSIS — Z86.718 PERSONAL HISTORY OF DVT (DEEP VEIN THROMBOSIS): ICD-10-CM

## 2022-10-04 DIAGNOSIS — Z79.01 LONG TERM CURRENT USE OF ANTICOAGULANT THERAPY: ICD-10-CM

## 2022-10-04 DIAGNOSIS — D68.51 HETEROZYGOUS FACTOR V LEIDEN MUTATION (H): ICD-10-CM

## 2022-10-04 DIAGNOSIS — Z86.718 PERSONAL HISTORY OF DVT (DEEP VEIN THROMBOSIS): Primary | ICD-10-CM

## 2022-10-04 LAB — INR BLD: 1.8 (ref 0.9–1.1)

## 2022-10-04 PROCEDURE — 36416 COLLJ CAPILLARY BLOOD SPEC: CPT

## 2022-10-04 PROCEDURE — 85610 PROTHROMBIN TIME: CPT

## 2022-10-04 NOTE — PROGRESS NOTES
ANTICOAGULATION MANAGEMENT     Geneva Shepherd 81 year old female is on warfarin with subtherapeutic INR result. (Goal INR 2.0-3.0)    Recent labs: (last 7 days)     10/04/22  1523   INR 1.8*       ASSESSMENT       Source(s): Chart Review    Previous INR was Subtherapeutic    Medication, diet, health changes since last INR chart reviewed; none identified    Second sub INR in a row, pended a 6.7% MD increase       PLAN     Unable to reach Geneva today.    Left message to continue current dose of warfarin 5 mg tonight. Request call back for assessment.    Follow up required to discuss out of range result     Robert Fulton RN  Anticoagulation Clinic  10/4/2022

## 2022-10-05 ENCOUNTER — TELEPHONE (OUTPATIENT)
Dept: INTERNAL MEDICINE | Facility: CLINIC | Age: 82
End: 2022-10-05

## 2022-10-05 NOTE — PROGRESS NOTES
PLAN     Unable to reach Geneva today.    LM to call [facility]    Follow up required to discuss out of range result     Robert Fulton RN  Anticoagulation Clinic  10/5/2022

## 2022-10-05 NOTE — PROGRESS NOTES
Patient called back and reviewed plan with her. Patient/ parent verbalized understanding and agrees with plan.     Dominga Fulton RN   [facility] Anticoagulation [facility]

## 2022-10-05 NOTE — PROGRESS NOTES
ANTICOAGULATION MANAGEMENT     Geneva Shepherd 81 year old female is on warfarin with subtherapeutic INR result. (Goal INR 2.0-3.0)    Recent labs: (last 7 days)     10/04/22  1523   INR 1.8*       ASSESSMENT       Source(s): Chart Review    Previous INR was Subtherapeutic    Medication, diet, health changes since last INR chart reviewed; none identified           PLAN     Recommended plan for ongoing change(s) affecting INR     Dosing Instructions: Increase your warfarin dose (6.7% change) with next INR in 2 weeks       Summary  As of 10/4/2022    Full warfarin instructions:  7.5 mg every Sun, Thu; 5 mg all other days   Next INR check:  10/18/2022             Detailed voice message left for Geneva with dosing instructions and follow up date.     Contact [phone]  to schedule and with any changes, questions or concerns.     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

## 2022-10-05 NOTE — TELEPHONE ENCOUNTER
Pt was trying to reach Dominga Fulton, regarding anticoag    She can be reached at  [phone]      Thanks      Curtis

## 2022-10-14 ENCOUNTER — MYC MEDICAL ADVICE (OUTPATIENT)
Dept: INTERNAL MEDICINE | Facility: CLINIC | Age: 82
End: 2022-10-14

## 2022-10-16 ENCOUNTER — HEALTH MAINTENANCE LETTER (OUTPATIENT)
Age: 82
End: 2022-10-16

## 2022-10-18 ENCOUNTER — ANTICOAGULATION THERAPY VISIT (OUTPATIENT)
Dept: ANTICOAGULATION | Facility: CLINIC | Age: 82
End: 2022-10-18

## 2022-10-18 ENCOUNTER — LAB (OUTPATIENT)
Dept: LAB | Facility: CLINIC | Age: 82
End: 2022-10-18
Payer: MEDICARE

## 2022-10-18 DIAGNOSIS — Z86.718 PERSONAL HISTORY OF DVT (DEEP VEIN THROMBOSIS): Primary | ICD-10-CM

## 2022-10-18 DIAGNOSIS — Z79.01 LONG TERM CURRENT USE OF ANTICOAGULANT THERAPY: ICD-10-CM

## 2022-10-18 DIAGNOSIS — D68.51 HETEROZYGOUS FACTOR V LEIDEN MUTATION (H): ICD-10-CM

## 2022-10-18 DIAGNOSIS — Z86.718 PERSONAL HISTORY OF DVT (DEEP VEIN THROMBOSIS): ICD-10-CM

## 2022-10-18 LAB — INR BLD: 2.6 (ref 0.9–1.1)

## 2022-10-18 PROCEDURE — 36416 COLLJ CAPILLARY BLOOD SPEC: CPT

## 2022-10-18 PROCEDURE — 85610 PROTHROMBIN TIME: CPT

## 2022-10-19 ENCOUNTER — TELEPHONE (OUTPATIENT)
Dept: INTERNAL MEDICINE | Facility: CLINIC | Age: 82
End: 2022-10-19

## 2022-10-19 DIAGNOSIS — D68.51 ACTIVATED PROTEIN C RESISTANCE (H): ICD-10-CM

## 2022-10-19 DIAGNOSIS — D68.51 ACTIVATED PROTEIN C RESISTANCE (H): Primary | ICD-10-CM

## 2022-10-19 DIAGNOSIS — Z86.718 PERSONAL HISTORY OF DVT (DEEP VEIN THROMBOSIS): ICD-10-CM

## 2022-10-19 DIAGNOSIS — Z79.01 LONG TERM CURRENT USE OF ANTICOAGULANT THERAPY: ICD-10-CM

## 2022-10-19 DIAGNOSIS — D68.51 HETEROZYGOUS FACTOR V LEIDEN MUTATION (H): ICD-10-CM

## 2022-10-19 NOTE — PROGRESS NOTES
PLAN     Unable to reach Geneva today.    LM to call [facility]    Follow up required to assess for changes     Robert Fulton RN  Anticoagulation Clinic  10/19/2022

## 2022-10-19 NOTE — TELEPHONE ENCOUNTER
Reason for Call:  INR    Who is calling?  patient    Phone number:  [phone]    Fax number:  N/A    Name of caller: BERTA MCCORMICK    INR Value:  UNKNOWN    Are there any other concerns:  Yes: PLEASE CALL PATIENT    Can we leave a detailed message on this number? YES    Phone number patient can be reached at: Home number on file [phone] (home)      Call taken on 10/19/2022 at 1:20 PM by Sanaz Pena

## 2022-10-19 NOTE — PROGRESS NOTES
ANTICOAGULATION MANAGEMENT     Geneva Shepherd 81 year old female is on warfarin with therapeutic INR result. (Goal INR 2.0-3.0)    Recent labs: (last 7 days)     10/18/22  1302   INR 2.6*       ASSESSMENT       Source(s): Chart Review and Patient/Caregiver Call       Warfarin doses taken: Warfarin taken as instructed    Diet: No new diet changes identified    New illness, injury, or hospitalization: No    Medication/supplement changes: None noted    Signs or symptoms of bleeding or clotting: No    Previous INR: Subtherapeutic    Additional findings: None       PLAN     Recommended plan for no diet, medication or health factor changes affecting INR     Dosing Instructions: Continue your current warfarin dose with next INR in 3 weeks       Summary  As of 10/18/2022    Full warfarin instructions:  7.5 mg every Sun, Thu; 5 mg all other days   Next INR check:  11/8/2022             Telephone call with Geneva who verbalizes understanding and agrees to plan    Lab visit scheduled    Education provided: Please call back if any changes to your diet, medications or how you've been taking warfarin    Plan made per [facility] anticoagulation protocol    Robert Fulton RN  Anticoagulation Clinic  10/19/2022    _______________________________________________________________________     Anticoagulation Episode Summary     Current INR goal:  2.0-3.0   TTR:  57.1 % (1 y)   Target end date:  Indefinite   Send INR reminders to:  [facility]    Indications    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

## 2022-10-19 NOTE — TELEPHONE ENCOUNTER
ANTICOAGULATION CLINIC REFERRAL RENEWAL REQUEST       An annual renewal order is required for all patients referred to [facility].?  Please review and sign the pended referral order for Geneva Shepherd.       ANTICOAGULATION SUMMARY      Warfarin indication(s)   DVT, Protein C Deficiency and Factor V    Mechanical heart valve present?  NO       Current goal range   INR: 2.0-3.0     Goal appropriate for indication? Goal INR 2-3, standard for indication(s) above     Time in Therapeutic Range (TTR)  (Goal > 60%) 57.1%       Office visit with referring provider's group within last year yes on 8/23/22       Robert Fulton RN  [facility]ticoagulation [facility]

## 2022-10-20 RX ORDER — WARFARIN SODIUM 5 MG/1
TABLET ORAL
Qty: 100 TABLET | Refills: 1 | Status: SHIPPED | OUTPATIENT
Start: 2022-10-20 | End: 2023-02-16

## 2022-10-20 NOTE — TELEPHONE ENCOUNTER
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 Rx instructions and quantity supplied updated to match patient's current dosing plan. Warfarin 90 day supply with 1 refill granted per [facility] protocol     Robert Fulton RN  Anticoagulation Clinic

## 2022-10-28 NOTE — TELEPHONE ENCOUNTER
Tried to call pt. NA on home phone and LM. Also tried cell phone and also NA and LM that will try calling again tomorrow

## 2022-11-08 ENCOUNTER — ANTICOAGULATION THERAPY VISIT (OUTPATIENT)
Dept: ANTICOAGULATION | Facility: CLINIC | Age: 82
End: 2022-11-08

## 2022-11-08 ENCOUNTER — LAB (OUTPATIENT)
Dept: LAB | Facility: CLINIC | Age: 82
End: 2022-11-08
Payer: MEDICARE

## 2022-11-08 DIAGNOSIS — D68.51 HETEROZYGOUS FACTOR V LEIDEN MUTATION (H): ICD-10-CM

## 2022-11-08 DIAGNOSIS — Z86.718 PERSONAL HISTORY OF DVT (DEEP VEIN THROMBOSIS): Primary | ICD-10-CM

## 2022-11-08 DIAGNOSIS — D68.51 ACTIVATED PROTEIN C RESISTANCE (H): ICD-10-CM

## 2022-11-08 DIAGNOSIS — Z79.01 LONG TERM CURRENT USE OF ANTICOAGULANT THERAPY: ICD-10-CM

## 2022-11-08 DIAGNOSIS — Z86.718 PERSONAL HISTORY OF DVT (DEEP VEIN THROMBOSIS): ICD-10-CM

## 2022-11-08 LAB — INR BLD: 3 (ref 0.9–1.1)

## 2022-11-08 PROCEDURE — 36416 COLLJ CAPILLARY BLOOD SPEC: CPT

## 2022-11-08 PROCEDURE — 85610 PROTHROMBIN TIME: CPT

## 2022-11-08 NOTE — PROGRESS NOTES
ANTICOAGULATION MANAGEMENT     Geneva Shepherd 81 year old female is on warfarin with therapeutic INR result. (Goal INR 2.0-3.0)    Recent labs: (last 7 days)     11/08/22  1532   INR 3.0*       ASSESSMENT       Source(s): Chart Review and Patient/Caregiver Call       Warfarin doses taken: Warfarin taken as instructed    Diet: No new diet changes identified    New illness, injury, or hospitalization: No    Medication/supplement changes: None noted    Signs or symptoms of bleeding or clotting: No    Previous INR: Therapeutic last visit; previously outside of goal range    Additional findings: None       PLAN     Recommended plan for no diet, medication or health factor changes affecting INR     Dosing Instructions: Continue your current warfarin dose with next INR in 4 weeks       Summary  As of 11/8/2022    Full warfarin instructions:  7.5 mg every Sun, Thu; 5 mg all other days; Starting 11/8/2022   Next INR check:  12/6/2022             Telephone call with Geneva who verbalizes understanding and agrees to plan    Lab visit scheduled    Education provided:     Please call back if any changes to your diet, medications or how you've been taking warfarin    Plan made per [facility] anticoagulation protocol    Robert Fulton RN  Anticoagulation Clinic  11/8/2022    _______________________________________________________________________     Anticoagulation Episode Summary     Current INR goal:  2.0-3.0   TTR:  57.4 % (1 y)   Target end date:  Indefinite   Send INR reminders to:  [facility]    Indications    Personal history of RLE DVT (deep vein thrombosis)(2003) [Z86.718]  Long term current use of anticoagulant therapy [Z79.01]  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

## 2022-12-06 ENCOUNTER — LAB (OUTPATIENT)
Dept: LAB | Facility: CLINIC | Age: 82
End: 2022-12-06
Payer: MEDICARE

## 2022-12-06 ENCOUNTER — ANTICOAGULATION THERAPY VISIT (OUTPATIENT)
Dept: ANTICOAGULATION | Facility: CLINIC | Age: 82
End: 2022-12-06

## 2022-12-06 DIAGNOSIS — Z79.01 LONG TERM CURRENT USE OF ANTICOAGULANT THERAPY: ICD-10-CM

## 2022-12-06 DIAGNOSIS — D68.51 HETEROZYGOUS FACTOR V LEIDEN MUTATION (H): ICD-10-CM

## 2022-12-06 DIAGNOSIS — Z86.718 PERSONAL HISTORY OF DVT (DEEP VEIN THROMBOSIS): Primary | ICD-10-CM

## 2022-12-06 DIAGNOSIS — Z86.718 PERSONAL HISTORY OF DVT (DEEP VEIN THROMBOSIS): ICD-10-CM

## 2022-12-06 DIAGNOSIS — D68.51 ACTIVATED PROTEIN C RESISTANCE (H): ICD-10-CM

## 2022-12-06 LAB — INR BLD: 2.6 (ref 0.9–1.1)

## 2022-12-06 PROCEDURE — 36416 COLLJ CAPILLARY BLOOD SPEC: CPT

## 2022-12-06 PROCEDURE — 85610 PROTHROMBIN TIME: CPT

## 2022-12-06 NOTE — PROGRESS NOTES
ANTICOAGULATION MANAGEMENT     Geneva Shepherd 82 year old female is on warfarin with therapeutic INR result. (Goal INR 2.0-3.0)    Recent labs: (last 7 days)     12/06/22  1614   INR 2.6*       ASSESSMENT       Source(s): Chart Review and Patient/Caregiver Call       Warfarin doses taken: Warfarin taken as instructed    Diet: No new diet changes identified    New illness, injury, or hospitalization: No    Medication/supplement changes: None noted    Signs or symptoms of bleeding or clotting: No    Previous INR: Therapeutic last 2(+) visits    Additional findings: None       PLAN     Recommended plan for no diet, medication or health factor changes affecting INR     Dosing Instructions: Continue your current warfarin dose with next INR in 4 weeks       Summary  As of 12/6/2022    Full warfarin instructions:  7.5 mg every Sun, Thu; 5 mg all other days; Starting 12/6/2022   Next INR check:  1/3/2023             Telephone call with Geneva who verbalizes understanding and agrees to plan    Lab visit scheduled    Education provided:     Please call back if any changes to your diet, medications or how you've been taking warfarin    Symptom monitoring: monitoring for bleeding signs and symptoms and monitoring for clotting signs and symptoms    Plan made per [facility] anticoagulation protocol    Kathy Warren RN  Anticoagulation Clinic  12/6/2022    _______________________________________________________________________     Anticoagulation Episode Summary     Current INR goal:  2.0-3.0   TTR:  57.4 % (1 y)   Target end date:  Indefinite   Send INR reminders to:  [facility]    Indications    Personal history of RLE DVT (deep vein thrombosis)(2003) [Z86.718]  Long term current use of anticoagulant therapy [Z79.01]  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 Internal Medicine [phone]

## 2023-01-10 ENCOUNTER — LAB (OUTPATIENT)
Dept: LAB | Facility: CLINIC | Age: 83
End: 2023-01-10
Payer: MEDICARE

## 2023-01-10 ENCOUNTER — ANTICOAGULATION THERAPY VISIT (OUTPATIENT)
Dept: ANTICOAGULATION | Facility: CLINIC | Age: 83
End: 2023-01-10

## 2023-01-10 DIAGNOSIS — Z86.718 PERSONAL HISTORY OF DVT (DEEP VEIN THROMBOSIS): Primary | ICD-10-CM

## 2023-01-10 DIAGNOSIS — D68.51 ACTIVATED PROTEIN C RESISTANCE (H): ICD-10-CM

## 2023-01-10 DIAGNOSIS — Z79.01 LONG TERM CURRENT USE OF ANTICOAGULANT THERAPY: ICD-10-CM

## 2023-01-10 DIAGNOSIS — D68.51 HETEROZYGOUS FACTOR V LEIDEN MUTATION (H): ICD-10-CM

## 2023-01-10 DIAGNOSIS — Z86.718 PERSONAL HISTORY OF DVT (DEEP VEIN THROMBOSIS): ICD-10-CM

## 2023-01-10 LAB — INR BLD: 2.2 (ref 0.9–1.1)

## 2023-01-10 PROCEDURE — 85610 PROTHROMBIN TIME: CPT

## 2023-01-10 PROCEDURE — 36416 COLLJ CAPILLARY BLOOD SPEC: CPT

## 2023-01-10 NOTE — PROGRESS NOTES
ANTICOAGULATION MANAGEMENT     Geneva Shepherd 82 year old female is on warfarin with therapeutic INR result. (Goal INR 2.0-3.0)    Recent labs: (last 7 days)     01/10/23  1514   INR 2.2*       ASSESSMENT       Source(s): Chart Review and Patient/Caregiver Call       Warfarin doses taken: Warfarin taken as instructed    Diet: No new diet changes identified    New illness, injury, or hospitalization: No    Medication/supplement changes: None noted    Signs or symptoms of bleeding or clotting: No    Previous INR: Therapeutic last 2(+) visits    Additional findings: None       PLAN     Recommended plan for no diet, medication or health factor changes affecting INR     Dosing Instructions: Continue your current warfarin dose with next INR in 5 weeks       Summary  As of 1/10/2023    Full warfarin instructions:  7.5 mg every Sun, Thu; 5 mg all other days   Next INR check:  2/14/2023             Telephone call with Geneva who verbalizes understanding and agrees to plan    Lab visit scheduled    Education provided:     Please call back if any changes to your diet, medications or how you've been taking warfarin    Plan made per [facility] anticoagulation protocol    Leigha Donaldson RN  Anticoagulation Clinic  1/10/2023    _______________________________________________________________________     Anticoagulation Episode Summary     Current INR goal:  2.0-3.0   TTR:  61.8 % (1 y)   Target end date:  Indefinite   Send INR reminders to:  [facility]    Indications    Personal history of RLE DVT (deep vein thrombosis)(2003) [Z86.718]  Long term current use of anticoagulant therapy [Z79.01]  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

## 2023-02-15 DIAGNOSIS — D68.51 ACTIVATED PROTEIN C RESISTANCE (H): ICD-10-CM

## 2023-02-16 RX ORDER — WARFARIN SODIUM 5 MG/1
TABLET ORAL
Qty: 100 TABLET | Refills: 1 | Status: SHIPPED | OUTPATIENT
Start: 2023-02-16 | End: 2023-09-26

## 2023-02-16 NOTE — TELEPHONE ENCOUNTER
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 Rx instructions and quantity supplied updated to match patient's current dosing plan. Warfarin 90 day supply with 1 refill granted per [facility] protocol     Robert Fulton RN  Anticoagulation Clinic

## 2023-02-22 ENCOUNTER — TELEPHONE (OUTPATIENT)
Dept: INTERNAL MEDICINE | Facility: CLINIC | Age: 83
End: 2023-02-22
Payer: MEDICARE

## 2023-02-22 NOTE — TELEPHONE ENCOUNTER
ANTICOAGULATION     Geneva Shepherd is overdue for INR check.     Left message for patient to call and schedule lab appointment as soon as possible. If returning call, please schedule.     Kathy Warren RN

## 2023-03-03 ENCOUNTER — ANTICOAGULATION THERAPY VISIT (OUTPATIENT)
Dept: ANTICOAGULATION | Facility: CLINIC | Age: 83
End: 2023-03-03

## 2023-03-03 ENCOUNTER — LAB (OUTPATIENT)
Dept: LAB | Facility: CLINIC | Age: 83
End: 2023-03-03
Payer: MEDICARE

## 2023-03-03 DIAGNOSIS — D68.51 HETEROZYGOUS FACTOR V LEIDEN MUTATION (H): ICD-10-CM

## 2023-03-03 DIAGNOSIS — Z86.718 PERSONAL HISTORY OF DVT (DEEP VEIN THROMBOSIS): ICD-10-CM

## 2023-03-03 DIAGNOSIS — Z79.01 LONG TERM CURRENT USE OF ANTICOAGULANT THERAPY: ICD-10-CM

## 2023-03-03 DIAGNOSIS — D68.51 ACTIVATED PROTEIN C RESISTANCE (H): ICD-10-CM

## 2023-03-03 DIAGNOSIS — Z86.718 PERSONAL HISTORY OF DVT (DEEP VEIN THROMBOSIS): Primary | ICD-10-CM

## 2023-03-03 LAB — INR BLD: 2.4 (ref 0.9–1.1)

## 2023-03-03 PROCEDURE — 36416 COLLJ CAPILLARY BLOOD SPEC: CPT

## 2023-03-03 PROCEDURE — 85610 PROTHROMBIN TIME: CPT

## 2023-03-03 NOTE — PROGRESS NOTES
ANTICOAGULATION MANAGEMENT     Geneva Shepherd 82 year old female is on warfarin with therapeutic INR result. (Goal INR 2.0-3.0)    Recent labs: (last 7 days)     03/03/23  1609   INR 2.4*       ASSESSMENT       Source(s): Chart Review    Previous INR was Therapeutic last 2(+) visits    Medication, diet, health changes since last INR chart reviewed; none identified             PLAN     Recommended plan for no diet, medication or health factor changes affecting INR     Dosing Instructions: Continue your current warfarin dose with next INR in 6 weeks       Summary  As of 3/3/2023    Full warfarin instructions:  7.5 mg every Sun, Thu; 5 mg all other days   Next INR check:  4/14/2023             Detailed voice message left for Geneva with dosing instructions and follow up date. Also sent [employer] message.    Contact [phone]  to schedule and with any changes, questions or concerns.     Education provided:     Please call back if any changes to your diet, medications or how you've been taking warfarin    Plan made per [facility] anticoagulation protocol    Sammie Frey RN  Anticoagulation Clinic  3/3/2023    _______________________________________________________________________     Anticoagulation Episode Summary     Current INR goal:  2.0-3.0   TTR:  66.9 % (1 y)   Target end date:  Indefinite   Send INR reminders to:  [facility]    Indications    Personal history of RLE DVT (deep vein thrombosis)(2003) [Z86.718]  Long term current use of anticoagulant therapy [Z79.01]  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

## 2023-03-07 ENCOUNTER — OFFICE VISIT (OUTPATIENT)
Dept: INTERNAL MEDICINE | Facility: CLINIC | Age: 83
End: 2023-03-07
Payer: MEDICARE

## 2023-03-07 VITALS
BODY MASS INDEX: 28.37 KG/M2 | SYSTOLIC BLOOD PRESSURE: 134 MMHG | RESPIRATION RATE: 16 BRPM | OXYGEN SATURATION: 95 % | DIASTOLIC BLOOD PRESSURE: 76 MMHG | HEIGHT: 66 IN | HEART RATE: 93 BPM | TEMPERATURE: 97.6 F | WEIGHT: 176.5 LBS

## 2023-03-07 DIAGNOSIS — I47.10 SVT (SUPRAVENTRICULAR TACHYCARDIA) (H): ICD-10-CM

## 2023-03-07 DIAGNOSIS — E83.52 HYPERCALCEMIA: ICD-10-CM

## 2023-03-07 DIAGNOSIS — G47.00 INSOMNIA, UNSPECIFIED TYPE: ICD-10-CM

## 2023-03-07 DIAGNOSIS — F32.0 MAJOR DEPRESSIVE DISORDER, SINGLE EPISODE, MILD (H): ICD-10-CM

## 2023-03-07 DIAGNOSIS — D68.51 HETEROZYGOUS FACTOR V LEIDEN MUTATION (H): ICD-10-CM

## 2023-03-07 DIAGNOSIS — J06.9 UPPER RESPIRATORY TRACT INFECTION, UNSPECIFIED TYPE: ICD-10-CM

## 2023-03-07 DIAGNOSIS — E03.9 HYPOTHYROIDISM, UNSPECIFIED TYPE: ICD-10-CM

## 2023-03-07 DIAGNOSIS — Z13.6 CARDIOVASCULAR SCREENING; LDL GOAL LESS THAN 100: ICD-10-CM

## 2023-03-07 DIAGNOSIS — G31.84 MILD COGNITIVE IMPAIRMENT: ICD-10-CM

## 2023-03-07 LAB
ALBUMIN SERPL BCG-MCNC: 4.1 G/DL (ref 3.5–5.2)
ALP SERPL-CCNC: 159 U/L (ref 35–104)
ALT SERPL W P-5'-P-CCNC: 45 U/L (ref 10–35)
ANION GAP SERPL CALCULATED.3IONS-SCNC: 10 MMOL/L (ref 7–15)
AST SERPL W P-5'-P-CCNC: 55 U/L (ref 10–35)
BILIRUB SERPL-MCNC: 0.6 MG/DL
BUN SERPL-MCNC: 8.5 MG/DL (ref 8–23)
CALCIUM SERPL-MCNC: 10.7 MG/DL (ref 8.8–10.2)
CHLORIDE SERPL-SCNC: 102 MMOL/L (ref 98–107)
CHOLEST SERPL-MCNC: 142 MG/DL
CREAT SERPL-MCNC: 0.59 MG/DL (ref 0.51–0.95)
DEPRECATED HCO3 PLAS-SCNC: 26 MMOL/L (ref 22–29)
GFR SERPL CREATININE-BSD FRML MDRD: >90 ML/MIN/1.73M2
GLUCOSE SERPL-MCNC: 101 MG/DL (ref 70–99)
HDLC SERPL-MCNC: 49 MG/DL
LDLC SERPL CALC-MCNC: 78 MG/DL
NONHDLC SERPL-MCNC: 93 MG/DL
POTASSIUM SERPL-SCNC: 4.7 MMOL/L (ref 3.4–5.3)
PROT SERPL-MCNC: 7.4 G/DL (ref 6.4–8.3)
PTH-INTACT SERPL-MCNC: 57 PG/ML (ref 15–65)
SODIUM SERPL-SCNC: 138 MMOL/L (ref 136–145)
TRIGL SERPL-MCNC: 76 MG/DL
TSH SERPL DL<=0.005 MIU/L-ACNC: 2.86 UIU/ML (ref 0.3–4.2)

## 2023-03-07 PROCEDURE — 99214 OFFICE O/P EST MOD 30 MIN: CPT | Performed by: INTERNAL MEDICINE

## 2023-03-07 PROCEDURE — 84443 ASSAY THYROID STIM HORMONE: CPT | Performed by: INTERNAL MEDICINE

## 2023-03-07 PROCEDURE — 96127 BRIEF EMOTIONAL/BEHAV ASSMT: CPT | Performed by: INTERNAL MEDICINE

## 2023-03-07 PROCEDURE — 36415 COLL VENOUS BLD VENIPUNCTURE: CPT | Performed by: INTERNAL MEDICINE

## 2023-03-07 PROCEDURE — 83970 ASSAY OF PARATHORMONE: CPT | Performed by: INTERNAL MEDICINE

## 2023-03-07 PROCEDURE — 80061 LIPID PANEL: CPT | Performed by: INTERNAL MEDICINE

## 2023-03-07 PROCEDURE — 80053 COMPREHEN METABOLIC PANEL: CPT | Performed by: INTERNAL MEDICINE

## 2023-03-07 RX ORDER — LEVOTHYROXINE SODIUM 50 UG/1
50 TABLET ORAL DAILY
Qty: 90 TABLET | Refills: 3 | Status: SHIPPED | OUTPATIENT
Start: 2023-03-07 | End: 2023-06-23

## 2023-03-07 RX ORDER — DONEPEZIL HYDROCHLORIDE 10 MG/1
TABLET, FILM COATED ORAL
Qty: 90 TABLET | Refills: 2 | Status: SHIPPED | OUTPATIENT
Start: 2023-03-07 | End: 2023-06-23

## 2023-03-07 RX ORDER — PAROXETINE 20 MG/1
40 TABLET, FILM COATED ORAL EVERY MORNING
Qty: 180 TABLET | Refills: 3 | Status: SHIPPED | OUTPATIENT
Start: 2023-03-07 | End: 2023-06-23

## 2023-03-07 RX ORDER — BENZONATATE 200 MG/1
200 CAPSULE ORAL 3 TIMES DAILY PRN
Qty: 30 CAPSULE | Refills: 0 | Status: SHIPPED | OUTPATIENT
Start: 2023-03-07 | End: 2023-11-23

## 2023-03-07 ASSESSMENT — PATIENT HEALTH QUESTIONNAIRE - PHQ9
SUM OF ALL RESPONSES TO PHQ QUESTIONS 1-9: 4
SUM OF ALL RESPONSES TO PHQ QUESTIONS 1-9: 4
10. IF YOU CHECKED OFF ANY PROBLEMS, HOW DIFFICULT HAVE THESE PROBLEMS MADE IT FOR YOU TO DO YOUR WORK, TAKE CARE OF THINGS AT HOME, OR GET ALONG WITH OTHER PEOPLE: NOT DIFFICULT AT ALL

## 2023-03-07 ASSESSMENT — PAIN SCALES - GENERAL: PAINLEVEL: NO PAIN (0)

## 2023-03-07 NOTE — PATIENT INSTRUCTIONS
Benzonatate 200 mg capsule, 1 capsule every 8 hours as needed for cough  Continue other medications.  Refills on file  Labs today as ordered  If recurrent palpitation issues in the future, then let me know

## 2023-03-07 NOTE — PROGRESS NOTES
ASSESSMENT:   1. Upper respiratory tract infection, unspecified type  Symptoms present for over 5 days and nearly resolved.  Occasional nonproductive cough which is improving.  Patient request mild cough suppressant to use as needed and will use benzonatate  - benzonatate (TESSALON) 200 MG capsule; Take 1 capsule (200 mg) by mouth 3 times daily as needed for cough  Dispense: 30 capsule; Refill: 0    2. Hypothyroidism, unspecified type  Mild weight loss.  Otherwise clinically euthyroid.  Thyroid lab updated today.  Continue current medication pending lab results  - TSH with free T4 reflex; Future  - levothyroxine (SYNTHROID/LEVOTHROID) 50 MCG tablet; Take 1 tablet (50 mcg) by mouth daily  Dispense: 90 tablet; Refill: 3    3. Heterozygous factor V Leiden mutation (H)  On warfarin therapy.  INR up-to-date    4. SVT (supraventricular tachycardia) (H)  Per previous Zio patch.  Patient denies recent palpitation symptoms.  No need for treatment at this time    5. Major depressive disorder, single episode, mild (H)  Controlled.  PHQ-9 = 4.  Continue current medication  - PARoxetine (PAXIL) 20 MG tablet; Take 2 tablets (40 mg) by mouth every morning  Dispense: 180 tablet; Refill: 3    6. Mild cognitive impairment  Controlled. 6CIT score = 0.  Continue current medication  - donepezil (ARICEPT) 10 MG tablet; TAKE 1/2  TABLET BY MOUTH EVERY NIGHT AT BEDTIME  Dispense: 90 tablet; Refill: 2    7. Hypercalcemia  Previous calcium 10.2.  Needs lab follow-up  - Comprehensive metabolic panel; Future  - Parathyroid Hormone Intact; Future    8. Insomnia, unspecified type  Patient sleeping well per report.  Denies a.m. hangover.  Continue current medication  - amitriptyline (ELAVIL) 25 MG tablet; Take 1 tablet (25 mg) by mouth At Bedtime  Dispense: 90 tablet; Refill: 3    9. CARDIOVASCULAR SCREENING; LDL GOAL LESS THAN 100  Requests screening  - Lipid panel reflex to direct LDL Fasting; Future        PLAN:  Benzonatate 200 mg capsule, 1  "capsule every 8 hours as needed for cough  Continue other medications.  Refills on file  Labs today as ordered  If recurrent palpitation issues in the future, then let me know      (Chart documentation was completed, in part, with [employer] voice-recognition software. Even though reviewed, some grammatical, spelling, and word errors may remain.)    Jacoby Boo MD  Internal Medicine Department  [facility] JULIA Bean is a 82 year old, presenting for the following health issues:  No chief complaint on file.      History of Present Illness       Headaches:   Since the patient's last clinic visit, headaches are: no change  The patient is getting headaches:  Occasionally  She is able to do normal daily activities when she has a migraine.  The patient is taking the following rescue/relief medications:  Tylenol   Patient states \"I get total relief\" from the rescue/relief medications.   The patient is taking the following medications to prevent migraines:  No medications to prevent migraines  In the past 4 weeks, the patient has gone to an Urgent Care or Emergency Room 0 times times due to headaches.    Reason for visit:  Routine check    She eats 0-1 servings of fruits and vegetables daily.She consumes 0 sweetened beverage(s) daily.She exercises with enough effort to increase her heart rate 9 or less minutes per day.  She exercises with enough effort to increase her heart rate 4 days per week.   She is taking medications regularly.    Today's PHQ-9         PHQ-9 Total Score: 4    PHQ-9 Q9 Thoughts of better off dead/self-harm past 2 weeks :   Not at all    How difficult have these problems made it for you to do your work, take care of things at home, or get along with other people: Not difficult at all     fatigue  Six Item Cognitive Impairment Test   (6CIT):      What year is it?                               Correct - 0 points    What month is it?                             "   Correct - 0 points      Give the patient an address to remember with five components:   [place] ( first and last name - 2 components)   [address]  (number and name of street - 2 components)   [place] ( city - 1 component)      About what time is it (within the hour)? Correct - 0 points    Count backwards from 20 to 1:   Correct - 0 points    Say the months of the year in reverse: Correct - 0 points    Repeat the address phrase:   Correct - 0 points    Total 6CIT Score:      0/28    Interpretation: The 6CIT uses an inverse score and questions are weighted to produce a total out of 28. Scores of 0-7 are considered normal and 8 or more significant.    Advantages The test has high sensitivity without compromising specificity even in mild dementia. It is easy to translate linguistically and culturally.  Disadvantages The main disadvantage is in the scoring and weighting of the test, which is initially confusing, however computer models have simplified this greatly.    Probability Statistics: At the 7/8 cut off: Overall figures sensitivity 90% specificity 100%, in mild dementia sensitivity = 78% , specificity = 100%    Copyright 2000 [facility], [facility]. Courtesy of Dr. All Palma       Most recent lab results reviewed with pt.    Previous Zio patch from September 2020 was reviewed with patient.  Occasional SVT.  Single brief episode of VT.  Patient denies recent palpitation symptoms.   Mild URI started 1 week ago. Rare cough now.  No F/C now. No shortness of breath. Overall better. Slept well last night and eating OK  Rare caffeine intake.  Likes to drink water so has moderate amount through the  day.  Has wearing a pad at night and up 1-2 times a night for urination.  Urine clear.  Occ urgency. Declines medication treatment for this  Denies chest pain, abdominal pain.  History mild hypercalcemia.  Needs lab follow-up.  Mood good. PHQ9 = 4 with meds  No fall issues  Enjoys going  "to grandchildren's hockey games      Additional ROS:   Constitutional, HEENT, Cardiovascular, Pulmonary, GI and , Neuro, MSK and Psych review of systems/symptoms are otherwise negative or unchanged from previous, except as noted above.      OBJECTIVE:  /76   Pulse 93   Temp 97.6  F (36.4  C) (Oral)   Resp 16   Ht 1.676 m (5' 6\")   Wt 80.1 kg (176 lb 8 oz)   LMP  (LMP Unknown)   SpO2 95%   Breastfeeding No   BMI 28.49 kg/m     Estimated body mass index is 28.49 kg/m  as calculated from the following:    Height as of this encounter: 1.676 m (5' 6\").    Weight as of this encounter: 80.1 kg (176 lb 8 oz).     HENT: ear canals and TM's normal and nose and mouth without ulcers or lesions   Neck: no adenopathy. Thyroid normal to palpation. No bruits  Pulm: Lungs clear to auscultation posteriorly.  No wheezes.  Minimal rhonchi anterior midline which clears with cough  CV: Regular rates and rhythm  GI: Soft, nontender, Normal active bowel sounds, No hepatosplenomegaly or masses palpable  Ext: Peripheral pulses intact. No edema.  Neuro: Normal strength and tone, sensory exam grossly normal   Gen: Normal-appearing affect              "

## 2023-03-31 ENCOUNTER — TRANSFERRED RECORDS (OUTPATIENT)
Dept: HEALTH INFORMATION MANAGEMENT | Facility: CLINIC | Age: 83
End: 2023-03-31
Payer: MEDICARE

## 2023-03-31 LAB
ALT SERPL-CCNC: 65 IU/L (ref 0–32)
AST SERPL-CCNC: 72 IU/L (ref 0–40)

## 2023-05-04 NOTE — PROGRESS NOTES
ANTICOAGULATION FOLLOW-UP CLINIC VISIT    Patient Name:  Geneva Shepherd  Date:  2020  Contact Type:  Telephone    SUBJECTIVE:  Patient Findings     Comments:   The patient was assessed for diet, medication, and activity level changes, missed or extra doses, bruising or bleeding, with no problem findings.          Clinical Outcomes     Comments:   The patient was assessed for diet, medication, and activity level changes, missed or extra doses, bruising or bleeding, with no problem findings.             OBJECTIVE    Recent labs: (last 7 days)     20  1416   INR 2.60*       ASSESSMENT / PLAN  INR assessment THER    Recheck INR In: 6 WEEKS    INR Location Clinic      Anticoagulation Summary  As of 2020    INR goal:   2.0-3.0   TTR:   79.9 % (1 y)   INR used for dosin.60 (2020)   Warfarin maintenance plan:   5 mg (5 mg x 1) every Tue, Sat; 7.5 mg (5 mg x 1.5) all other days   Full warfarin instructions:   5 mg every Tue, Sat; 7.5 mg all other days   Weekly warfarin total:   47.5 mg   No change documented:   Leigha Donaldson RN   Plan last modified:   Ofelia Cavazos RN (2019)   Next INR check:   2020   Priority:   Maintenance   Target end date:   Indefinite    Indications    Long term current use of anticoagulant therapy [Z79.01]  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]  Embolism and thrombosis (H) (Resolved) [I74.9]  Personal history of RLE DVT (deep vein thrombosis)() [Z86.718]             Anticoagulation Episode Summary     INR check location:       Preferred lab:       Send INR reminders to:   [facility]    Comments:         Anticoagulation Care Providers     Provider Role Specialty Phone number    Jacoby Boo MD Referring Internal Medicine [phone]            See the Encounter Report to view Anticoagulation Flowsheet and Dosing Calendar (Go to Encounters tab in chart review, and find the Anticoagulation Therapy Visit)        Leigha ROTHMAN  CHRISTEL Donaldson                  normal

## 2023-05-05 ENCOUNTER — ANTICOAGULATION THERAPY VISIT (OUTPATIENT)
Dept: ANTICOAGULATION | Facility: CLINIC | Age: 83
End: 2023-05-05

## 2023-05-05 ENCOUNTER — LAB (OUTPATIENT)
Dept: LAB | Facility: CLINIC | Age: 83
End: 2023-05-05
Payer: MEDICARE

## 2023-05-05 DIAGNOSIS — Z86.718 PERSONAL HISTORY OF DVT (DEEP VEIN THROMBOSIS): Primary | ICD-10-CM

## 2023-05-05 DIAGNOSIS — Z86.718 PERSONAL HISTORY OF DVT (DEEP VEIN THROMBOSIS): ICD-10-CM

## 2023-05-05 DIAGNOSIS — D68.51 ACTIVATED PROTEIN C RESISTANCE (H): ICD-10-CM

## 2023-05-05 DIAGNOSIS — D68.51 HETEROZYGOUS FACTOR V LEIDEN MUTATION (H): ICD-10-CM

## 2023-05-05 DIAGNOSIS — Z79.01 LONG TERM CURRENT USE OF ANTICOAGULANT THERAPY: ICD-10-CM

## 2023-05-05 LAB — INR BLD: 2.3 (ref 0.9–1.1)

## 2023-05-05 PROCEDURE — 36416 COLLJ CAPILLARY BLOOD SPEC: CPT

## 2023-05-05 PROCEDURE — 85610 PROTHROMBIN TIME: CPT

## 2023-05-05 NOTE — PROGRESS NOTES
ANTICOAGULATION MANAGEMENT     Geneva Shepherd 82 year old female is on warfarin with therapeutic INR result. (Goal INR 2.0-3.0)    Recent labs: (last 7 days)     05/05/23  1527   INR 2.3*       ASSESSMENT       Source(s): Chart Review    Previous INR was Therapeutic last 2(+) visits    Medication, diet, health changes since last INR chart reviewed; none identified except URI noted during 3/7/23 ov + noncompliance, last INR 3/3/23         PLAN     Unable to reach Geneva today.    Left message to continue 5 mg Fri/Sat & 7.5 mg Sun this weekend. Request call back for assessment.    Follow up required to confirm warfarin dose taken and assess for changes    Kathy Warren RN  Anticoagulation Clinic  5/5/2023

## 2023-05-08 ENCOUNTER — TELEPHONE (OUTPATIENT)
Dept: INTERNAL MEDICINE | Facility: CLINIC | Age: 83
End: 2023-05-08
Payer: MEDICARE

## 2023-05-08 NOTE — TELEPHONE ENCOUNTER
General Call      Reason for Call:  INR    What are your questions or concerns:  Patient returning a call to INR nurse    Date of last appointment with provider: 5-5    Could we send this information to you in [facility] or would you prefer to receive a phone call?:   Patient would prefer a phone call   Okay to leave a detailed message?: Yes at Home number on file [phone] (home)

## 2023-05-08 NOTE — TELEPHONE ENCOUNTER
See [facility] encounter  Dominga Fulton RN   [facility] Anticoagulation [facility]

## 2023-05-08 NOTE — PROGRESS NOTES
ANTICOAGULATION MANAGEMENT     Geneva Shepherd 82 year old female is on warfarin with therapeutic INR result. (Goal INR 2.0-3.0)    Recent labs: (last 7 days)     05/05/23  1527   INR 2.3*       ASSESSMENT       Source(s): Chart Review and Patient/Caregiver Call       Warfarin doses taken: Warfarin taken as instructed    Diet: No new diet changes identified    Medication/supplement changes: None noted    New illness, injury, or hospitalization: No    Signs or symptoms of bleeding or clotting: No    Previous result: Therapeutic last 2(+) visits    Additional findings: None         PLAN     Recommended plan for no diet, medication or health factor changes affecting INR     Dosing Instructions: Continue your current warfarin dose with next INR in 6 weeks       Summary  As of 5/5/2023    Full warfarin instructions:  7.5 mg every Sun, Thu; 5 mg all other days   Next INR check:  6/16/2023             Telephone call with Geneva who verbalizes understanding and agrees to plan    Lab visit scheduled    Education provided:     Please call back if any changes to your diet, medications or how you've been taking warfarin    Plan made per [facility] anticoagulation protocol    Robert Fulton RN  Anticoagulation Clinic  5/8/2023    _______________________________________________________________________     Anticoagulation Episode Summary     Current INR goal:  2.0-3.0   TTR:  72.3 % (1 y)   Target end date:  Indefinite   Send INR reminders to:  [facility]    Indications    Personal history of RLE DVT (deep vein thrombosis)(2003) [Z86.718]  Long term current use of anticoagulant therapy [Z79.01]  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

## 2023-06-02 ENCOUNTER — TRANSFERRED RECORDS (OUTPATIENT)
Dept: HEALTH INFORMATION MANAGEMENT | Facility: CLINIC | Age: 83
End: 2023-06-02
Payer: MEDICARE

## 2023-06-11 ENCOUNTER — HOSPITAL ENCOUNTER (EMERGENCY)
Facility: CLINIC | Age: 83
Discharge: HOME | End: 2023-06-11
Attending: EMERGENCY MEDICINE | Admitting: EMERGENCY MEDICINE
Payer: MEDICARE

## 2023-06-11 ENCOUNTER — APPOINTMENT (OUTPATIENT)
Dept: GENERAL RADIOLOGY | Facility: CLINIC | Age: 83
End: 2023-06-11
Attending: EMERGENCY MEDICINE
Payer: MEDICARE

## 2023-06-11 VITALS
BODY MASS INDEX: 28.12 KG/M2 | TEMPERATURE: 97.9 F | SYSTOLIC BLOOD PRESSURE: 142 MMHG | WEIGHT: 175 LBS | OXYGEN SATURATION: 95 % | RESPIRATION RATE: 16 BRPM | HEART RATE: 98 BPM | DIASTOLIC BLOOD PRESSURE: 86 MMHG | HEIGHT: 66 IN

## 2023-06-11 DIAGNOSIS — S22.42XA CLOSED FRACTURE OF MULTIPLE RIBS OF LEFT SIDE, INITIAL ENCOUNTER: ICD-10-CM

## 2023-06-11 LAB
ALBUMIN SERPL BCG-MCNC: 3.8 G/DL (ref 3.5–5.2)
ALP SERPL-CCNC: 152 U/L (ref 35–104)
ALT SERPL W P-5'-P-CCNC: 65 U/L (ref 10–35)
ANION GAP SERPL CALCULATED.3IONS-SCNC: 9 MMOL/L (ref 7–15)
AST SERPL W P-5'-P-CCNC: 69 U/L (ref 10–35)
BASOPHILS # BLD AUTO: 0 10E3/UL (ref 0–0.2)
BASOPHILS NFR BLD AUTO: 1 %
BILIRUB SERPL-MCNC: 0.6 MG/DL
BUN SERPL-MCNC: 10 MG/DL (ref 8–23)
CALCIUM SERPL-MCNC: 10.6 MG/DL (ref 8.8–10.2)
CHLORIDE SERPL-SCNC: 101 MMOL/L (ref 98–107)
CREAT SERPL-MCNC: 0.52 MG/DL (ref 0.51–0.95)
DEPRECATED HCO3 PLAS-SCNC: 26 MMOL/L (ref 22–29)
EOSINOPHIL # BLD AUTO: 0.1 10E3/UL (ref 0–0.7)
EOSINOPHIL NFR BLD AUTO: 2 %
ERYTHROCYTE [DISTWIDTH] IN BLOOD BY AUTOMATED COUNT: 13.4 % (ref 10–15)
GFR SERPL CREATININE-BSD FRML MDRD: >90 ML/MIN/1.73M2
GLUCOSE SERPL-MCNC: 153 MG/DL (ref 70–99)
HCT VFR BLD AUTO: 46.4 % (ref 35–47)
HGB BLD-MCNC: 15 G/DL (ref 11.7–15.7)
HOLD SPECIMEN: NORMAL
IMM GRANULOCYTES # BLD: 0 10E3/UL
IMM GRANULOCYTES NFR BLD: 1 %
INR PPP: 1.98 (ref 0.85–1.15)
LYMPHOCYTES # BLD AUTO: 1.1 10E3/UL (ref 0.8–5.3)
LYMPHOCYTES NFR BLD AUTO: 18 %
MCH RBC QN AUTO: 32.3 PG (ref 26.5–33)
MCHC RBC AUTO-ENTMCNC: 32.3 G/DL (ref 31.5–36.5)
MCV RBC AUTO: 100 FL (ref 78–100)
MONOCYTES # BLD AUTO: 0.4 10E3/UL (ref 0–1.3)
MONOCYTES NFR BLD AUTO: 7 %
NEUTROPHILS # BLD AUTO: 4.5 10E3/UL (ref 1.6–8.3)
NEUTROPHILS NFR BLD AUTO: 71 %
NRBC # BLD AUTO: 0 10E3/UL
NRBC BLD AUTO-RTO: 0 /100
PLATELET # BLD AUTO: 185 10E3/UL (ref 150–450)
POTASSIUM SERPL-SCNC: 4.5 MMOL/L (ref 3.4–5.3)
PROT SERPL-MCNC: 7.1 G/DL (ref 6.4–8.3)
RBC # BLD AUTO: 4.65 10E6/UL (ref 3.8–5.2)
SODIUM SERPL-SCNC: 136 MMOL/L (ref 136–145)
WBC # BLD AUTO: 6.2 10E3/UL (ref 4–11)

## 2023-06-11 PROCEDURE — 85025 COMPLETE CBC W/AUTO DIFF WBC: CPT | Performed by: EMERGENCY MEDICINE

## 2023-06-11 PROCEDURE — 250N000013 HC RX MED GY IP 250 OP 250 PS 637: Performed by: EMERGENCY MEDICINE

## 2023-06-11 PROCEDURE — 85610 PROTHROMBIN TIME: CPT | Performed by: EMERGENCY MEDICINE

## 2023-06-11 PROCEDURE — 73030 X-RAY EXAM OF SHOULDER: CPT | Mod: LT

## 2023-06-11 PROCEDURE — 80053 COMPREHEN METABOLIC PANEL: CPT | Performed by: EMERGENCY MEDICINE

## 2023-06-11 PROCEDURE — 99285 EMERGENCY DEPT VISIT HI MDM: CPT

## 2023-06-11 PROCEDURE — 71101 X-RAY EXAM UNILAT RIBS/CHEST: CPT | Mod: LT

## 2023-06-11 PROCEDURE — 36415 COLL VENOUS BLD VENIPUNCTURE: CPT | Performed by: EMERGENCY MEDICINE

## 2023-06-11 RX ORDER — HYDROCODONE BITARTRATE AND ACETAMINOPHEN 5; 325 MG/1; MG/1
1 TABLET ORAL ONCE
Status: COMPLETED | OUTPATIENT
Start: 2023-06-11 | End: 2023-06-11

## 2023-06-11 RX ORDER — FENTANYL CITRATE 50 UG/ML
50 INJECTION, SOLUTION INTRAMUSCULAR; INTRAVENOUS ONCE
Status: DISCONTINUED | OUTPATIENT
Start: 2023-06-11 | End: 2023-06-11

## 2023-06-11 RX ORDER — OXYCODONE HYDROCHLORIDE 5 MG/1
5 TABLET ORAL EVERY 6 HOURS PRN
Qty: 30 TABLET | Refills: 0 | Status: SHIPPED | OUTPATIENT
Start: 2023-06-11 | End: 2023-11-23

## 2023-06-11 RX ADMIN — HYDROCODONE BITARTRATE AND ACETAMINOPHEN 1 TABLET: 5; 325 TABLET ORAL at 19:12

## 2023-06-11 RX ADMIN — HYDROCODONE BITARTRATE AND ACETAMINOPHEN 1 TABLET: 5; 325 TABLET ORAL at 20:10

## 2023-06-11 ASSESSMENT — ACTIVITIES OF DAILY LIVING (ADL): ADLS_ACUITY_SCORE: 39

## 2023-06-11 NOTE — ED PROVIDER NOTES
History   Chief Complaint:  Fall    The history is provided by the patient.      Geneva Shepherd is a 82 year old female on Coumadin with a history of factor 5 Leiden defect who presents after a fall. The patient reports that she fell 7 days ago, and again 6 days ago onto the left side. She has since had building upper back, left sided rib pain, and left shoulder pain. Deep breathes make the pain somewhat worse. She fell due to her chronic balance issues. She did not hit her head. She denies headache, neck pain, chest pain, dizziness, or arm pain. She is also concerned about her liver enzymes but denies any abdominal pain. No hip pain.     Independent Historian:   The patient's son is present and provides additional history in regards to the severity of her symptoms.    Review of External Notes:   Primary care office visit note reviewed from March 7, 2023.  The patient is on warfarin for heterozygous factor V Leiden mutation.    Medications:    Coumadin   Zinc gluconate   Turmeric   Paxil   Macrodantin   Levothyroxine   Carnitor   Folic acid   Aricept   Elavil     Past Medical History:    Ankle fracture  Asymptomatic varicose veins   Depression   Diverticulitis of colon   DJD   Embolism and thrombosis of unspecified site   Factor 5 Leiden defect  GERD   Hypercalcemia   Hyperlipidemia   Insomnia   Irritable bowel syndrome   Obesity   Phlebitis and thrombophlebitis of superficial vessels of lower extremities   Sprain of lumbar region   Hypothyroidism   UTI   Activated protein C resistance     Past Surgical History:    RLE varicose vein stripping   Negative coronary angio   Tubal ligation   Endometrial biopsy   Vascular surgery   Endoscopic ultrasound upper gastrointestinal tract   Colonoscopy   Cholecystectomy   Arthroplasty knee left   Arthroplasty hip left     Physical Exam     Patient Vitals for the past 24 hrs:   BP Temp Temp src Pulse Resp SpO2 Height Weight   06/11/23 1845 (!) 142/86 -- -- -- -- 95 % -- --  "  06/11/23 1844 (!) 142/86 97.9  F (36.6  C) Oral 98 16 98 % 1.676 m (5' 6\") 79.4 kg (175 lb)        Physical Exam  Constitutional:       General: She is not in acute distress.     Appearance: Normal appearance. She is not diaphoretic.   HENT:      Head: Atraumatic.      Right Ear: External ear normal.      Left Ear: External ear normal.      Mouth/Throat:      Mouth: Mucous membranes are moist.   Eyes:      General: No scleral icterus.     Conjunctiva/sclera: Conjunctivae normal.   Neck:      Comments: No midline C-spine tenderness without step-off  Cardiovascular:      Rate and Rhythm: Normal rate and regular rhythm.      Heart sounds: Normal heart sounds.   Pulmonary:      Effort: No respiratory distress.      Breath sounds: Normal breath sounds.   Abdominal:      General: Abdomen is flat. There is no distension.      Tenderness: There is no abdominal tenderness.   Musculoskeletal:      Cervical back: Neck supple.      Comments: No effusion or bony tenderness of the left glenohumeral joint.  No tenderness of the elbow or wrist.  There is tenderness of the posterior, lateral, and anterior left chest wall.  No crepitance or ecchymosis.  Perfusion of the hand is normal.  No midline tenderness of the thoracic or lumbar spine.  No pelvic tenderness.   Skin:     General: Skin is warm.      Capillary Refill: Capillary refill takes less than 2 seconds.      Findings: No rash.   Neurological:      General: No focal deficit present.      Mental Status: She is alert and oriented to person, place, and time.      Comments: Strength and sensation to the left hand normal.   Psychiatric:         Mood and Affect: Mood normal.         Behavior: Behavior normal.           Emergency Department Course   Imaging:  XR Shoulder Left 3 Views   Final Result   IMPRESSION:   1.  Nondisplaced fractures of the left lateral fourth and fifth ribs.   2.  Advanced left glenohumeral degenerative arthrosis.   3.  Mild acromioclavicular arthrosis. "   4.  Moderate bone demineralization.   5.  Degenerative changes in the cervical spine noted.      Ribs XR, unilat 3 views + PA chest,  left   Final Result   IMPRESSION:    1.  Nondisplaced fractures of the left lateral fourth and fifth ribs.   2.  Negative for pneumothorax.   3.  Small left pleural effusion.   4.  Moderate bone demineralization.          Report per radiology    Laboratory:  Labs Ordered and Resulted from Time of ED Arrival to Time of ED Departure   COMPREHENSIVE METABOLIC PANEL - Abnormal       Result Value    Sodium 136      Potassium 4.5      Chloride 101      Carbon Dioxide (CO2) 26      Anion Gap 9      Urea Nitrogen 10.0      Creatinine 0.52      Calcium 10.6 (*)     Glucose 153 (*)     Alkaline Phosphatase 152 (*)     AST 69 (*)     ALT 65 (*)     Protein Total 7.1      Albumin 3.8      Bilirubin Total 0.6      GFR Estimate >90     CBC WITH PLATELETS AND DIFFERENTIAL    WBC Count 6.2      RBC Count 4.65      Hemoglobin 15.0      Hematocrit 46.4            MCH 32.3      MCHC 32.3      RDW 13.4      Platelet Count 185      % Neutrophils 71      % Lymphocytes 18      % Monocytes 7      % Eosinophils 2      % Basophils 1      % Immature Granulocytes 1      NRBCs per 100 WBC 0      Absolute Neutrophils 4.5      Absolute Lymphocytes 1.1      Absolute Monocytes 0.4      Absolute Eosinophils 0.1      Absolute Basophils 0.0      Absolute Immature Granulocytes 0.0      Absolute NRBCs 0.0     INR      Emergency Department Course & Assessments:  Interventions:  Medications   HYDROcodone-acetaminophen (NORCO) 5-325 MG per tablet 1 tablet (has no administration in time range)   HYDROcodone-acetaminophen (NORCO) 5-325 MG per tablet 1 tablet (1 tablet Oral $Given 6/11/23 1912)      Assessments:  1840 I obtained history and examined the patient as reported above.   1947 I rechecked the patient and explained findings.     Independent Interpretation (X-rays, CTs, rhythm strip):  Rib x-ray reviewed.   There are 2 fractures.    Disposition:  The patient was discharged to home.     Impression & Plan      Medical Decision Making:  This patient is a pleasant 82-year-old woman who presents to the emergency department with left shoulder and left chest wall pain following a fall.  She had actually 2 falls last weekend 6 and 7 days ago.  She says she has baseline poor balance and this is not uncommon.  However, she has pain of left chest wall especially with moving her shoulder.  X-ray with no pneumothorax but she does have 2 rib fractures.  She was given a sling for comfort.  Her pain will be treated.  She was provided an incentive spirometer.  She will follow-up with her physician going forward.    Diagnosis:    ICD-10-CM    1. Closed fracture of multiple ribs of left side, initial encounter  S22.42XA            Discharge Medications:  New Prescriptions    OXYCODONE (ROXICODONE) 5 MG TABLET    Take 1 tablet (5 mg) by mouth every 6 hours as needed for pain      Scribe Disclosure:  I, Arjun Costa, am serving as a scribe at 6:40 PM on 6/11/2023 to document services personally performed by Dino Medrano MD based on my observations and the provider's statements to me.      6/11/2023   Dino Medrano MD McRoberts, Sean Edward, MD  06/11/23 2004

## 2023-06-11 NOTE — ED TRIAGE NOTES
Patient fell on Monday on to the left side, having left upper back pain and left sided rib pain. Pt denies numbness and tingling. Ambulatory into triage with cane.

## 2023-06-12 ENCOUNTER — TELEPHONE (OUTPATIENT)
Dept: ANTICOAGULATION | Facility: CLINIC | Age: 83
End: 2023-06-12
Payer: MEDICARE

## 2023-06-12 ENCOUNTER — PATIENT OUTREACH (OUTPATIENT)
Dept: INTERNAL MEDICINE | Facility: CLINIC | Age: 83
End: 2023-06-12
Payer: MEDICARE

## 2023-06-12 NOTE — TELEPHONE ENCOUNTER
What type of discharge? Emergency Department  Risk of Hospital admission or ED visit: 60%  Is a TCM episode required? No  When should the patient follow up with PCP? within 30 days of discharge.    Justice L. Phoenix, RN  [facility]

## 2023-06-12 NOTE — TELEPHONE ENCOUNTER
ANTICOAGULATION  MANAGEMENT: Discharge Review    Geneva Shepherd chart reviewed for anticoagulation continuity of care    Emergency room visit on 6/11/23 for multiple recent falls, no head trauma. X-ray negative for pneumothorax, but found multiple rib fractures.     Discharge disposition: Home    Results:    Recent labs: (last 7 days)     06/11/23  1856   INR 1.98*     Anticoagulation inpatient management:     not applicable     Anticoagulation discharge instructions:     Warfarin dosing: home regimen continued   Bridging: No   INR goal change: No      Medication changes affecting anticoagulation: No    Additional factors affecting anticoagulation: No     PLAN     No adjustment to anticoagulation plan needed - next INR appt is scheduled for 6/16/23    Patient not contacted    No adjustment to Anticoagulation Calendar was required    Kathy Warren RN

## 2023-06-12 NOTE — DISCHARGE INSTRUCTIONS
Please use the incentive spirometer at least 10 times per day to adequately inflate your lungs.    Return to the ER for worsening pain, fever or cough, or any new concerns.    Do not drive, use machinery, use alcohol, or use other sedating medications while taking oxycodone.

## 2023-06-14 NOTE — TELEPHONE ENCOUNTER
ED / Discharge Outreach Protocol    Patient Contact    Attempt # 2    Was call answered?  No.  Left message on voicemail with information to call me back.    On call back, please follow up with patient on recent ED/Hosp visit.

## 2023-06-15 NOTE — TELEPHONE ENCOUNTER
Since 2 attempts were made at contacting pt but were unsuccessful, routing to PCP to advise if further action is needed.

## 2023-06-22 DIAGNOSIS — G47.00 INSOMNIA, UNSPECIFIED TYPE: ICD-10-CM

## 2023-06-22 DIAGNOSIS — F32.0 MAJOR DEPRESSIVE DISORDER, SINGLE EPISODE, MILD (H): ICD-10-CM

## 2023-06-22 DIAGNOSIS — E03.9 HYPOTHYROIDISM, UNSPECIFIED TYPE: ICD-10-CM

## 2023-06-22 DIAGNOSIS — G31.84 MILD COGNITIVE IMPAIRMENT: ICD-10-CM

## 2023-06-23 ENCOUNTER — TELEPHONE (OUTPATIENT)
Dept: INTERNAL MEDICINE | Facility: CLINIC | Age: 83
End: 2023-06-23
Payer: MEDICARE

## 2023-06-23 RX ORDER — LEVOTHYROXINE SODIUM 50 UG/1
50 TABLET ORAL DAILY
Qty: 90 TABLET | Refills: 3 | Status: SHIPPED | OUTPATIENT
Start: 2023-06-23 | End: 2024-05-20

## 2023-06-23 RX ORDER — DONEPEZIL HYDROCHLORIDE 10 MG/1
TABLET, FILM COATED ORAL
Qty: 45 TABLET | Refills: 5 | OUTPATIENT
Start: 2023-06-23

## 2023-06-23 RX ORDER — LEVOTHYROXINE SODIUM 50 UG/1
TABLET ORAL
Qty: 90 TABLET | Refills: 3 | OUTPATIENT
Start: 2023-06-23

## 2023-06-23 RX ORDER — DONEPEZIL HYDROCHLORIDE 10 MG/1
TABLET, FILM COATED ORAL
Qty: 90 TABLET | Refills: 3 | Status: SHIPPED | OUTPATIENT
Start: 2023-06-23 | End: 2024-07-30

## 2023-06-23 RX ORDER — PAROXETINE 20 MG/1
40 TABLET, FILM COATED ORAL EVERY MORNING
Qty: 180 TABLET | Refills: 3 | Status: SHIPPED | OUTPATIENT
Start: 2023-06-23 | End: 2023-12-07

## 2023-06-23 NOTE — TELEPHONE ENCOUNTER
ANTICOAGULATION     Geneva Shepherd is overdue for INR check.      Left message for patient to call and schedule lab appointment as soon as possible. If returning call, please schedule.     Leigha Donaldson RN

## 2023-07-11 ENCOUNTER — TELEPHONE (OUTPATIENT)
Dept: ANTICOAGULATION | Facility: CLINIC | Age: 83
End: 2023-07-11
Payer: MEDICARE

## 2023-07-11 NOTE — TELEPHONE ENCOUNTER
ANTICOAGULATION     Geneva Shepherd is overdue for INR check.      Reminder letter sent    Robert Fulton RN

## 2023-07-20 ENCOUNTER — LAB (OUTPATIENT)
Dept: LAB | Facility: CLINIC | Age: 83
End: 2023-07-20
Payer: MEDICARE

## 2023-07-20 ENCOUNTER — ANTICOAGULATION THERAPY VISIT (OUTPATIENT)
Dept: ANTICOAGULATION | Facility: CLINIC | Age: 83
End: 2023-07-20

## 2023-07-20 DIAGNOSIS — Z86.718 PERSONAL HISTORY OF DVT (DEEP VEIN THROMBOSIS): ICD-10-CM

## 2023-07-20 DIAGNOSIS — D68.51 ACTIVATED PROTEIN C RESISTANCE (H): ICD-10-CM

## 2023-07-20 DIAGNOSIS — Z79.01 LONG TERM CURRENT USE OF ANTICOAGULANT THERAPY: ICD-10-CM

## 2023-07-20 DIAGNOSIS — Z86.718 PERSONAL HISTORY OF DVT (DEEP VEIN THROMBOSIS): Primary | ICD-10-CM

## 2023-07-20 DIAGNOSIS — D68.51 HETEROZYGOUS FACTOR V LEIDEN MUTATION (H): ICD-10-CM

## 2023-07-20 LAB — INR BLD: 1.9 (ref 0.9–1.1)

## 2023-07-20 PROCEDURE — 85610 PROTHROMBIN TIME: CPT

## 2023-07-20 PROCEDURE — 36416 COLLJ CAPILLARY BLOOD SPEC: CPT

## 2023-07-20 NOTE — PROGRESS NOTES
ANTICOAGULATION MANAGEMENT     Geneva Shepherd 82 year old female is on warfarin with subtherapeutic INR result. (Goal INR 2.0-3.0)    Recent labs: (last 7 days)     07/20/23  1609   INR 1.9*       ASSESSMENT       Source(s): Chart Review and Patient/Caregiver Call       Warfarin doses taken: Warfarin taken as instructed    Diet: No new diet changes identified    Medication/supplement changes: stopped aricept  No affect expected    New illness, injury, or hospitalization: Yes: Anya feel and fractured ribs.  Also legs seem to get tired and she has fallen a couple of times    Signs or symptoms of bleeding or clotting: No    Previous result: Therapeutic last 2(+) visits    Additional findings: None         PLAN     Recommended plan for ongoing change(s) affecting INR     Dosing Instructions: Increase your warfarin dose (6% change) with next INR in 2 weeks       Summary  As of 7/20/2023    Full warfarin instructions:  7.5 mg every Sun, Tue, Thu; 5 mg all other days   Next INR check:  8/3/2023             Telephone call with Geneva who verbalizes understanding and agrees to plan    Lab visit scheduled    Education provided:     Please call back if any changes to your diet, medications or how you've been taking warfarin    Plan made per [facility] anticoagulation protocol    Leigha Donaldson RN  Anticoagulation Clinic  7/20/2023    _______________________________________________________________________     Anticoagulation Episode Summary     Current INR goal:  2.0-3.0   TTR:  75.6 % (1 y)   Target end date:  Indefinite   Send INR reminders to:  [facility]    Indications    Personal history of RLE DVT (deep vein thrombosis)(2003) [Z86.718]  Long term current use of anticoagulant therapy [Z79.01]  Activated protein C resistance (H) [D68.51]  Heterozygous factor V Leiden mutation (H) [D68.51]           Comments:           Anticoagulation Care Providers     Provider Role Specialty Phone number    Jacoby Boo MD  Referring Internal Medicine [phone]

## 2023-08-03 ENCOUNTER — ANTICOAGULATION THERAPY VISIT (OUTPATIENT)
Dept: ANTICOAGULATION | Facility: CLINIC | Age: 83
End: 2023-08-03

## 2023-08-03 ENCOUNTER — LAB (OUTPATIENT)
Dept: LAB | Facility: CLINIC | Age: 83
End: 2023-08-03
Payer: MEDICARE

## 2023-08-03 DIAGNOSIS — Z79.01 LONG TERM CURRENT USE OF ANTICOAGULANT THERAPY: ICD-10-CM

## 2023-08-03 DIAGNOSIS — D68.51 ACTIVATED PROTEIN C RESISTANCE (H): ICD-10-CM

## 2023-08-03 DIAGNOSIS — Z86.718 PERSONAL HISTORY OF DVT (DEEP VEIN THROMBOSIS): ICD-10-CM

## 2023-08-03 DIAGNOSIS — Z86.718 PERSONAL HISTORY OF DVT (DEEP VEIN THROMBOSIS): Primary | ICD-10-CM

## 2023-08-03 DIAGNOSIS — D68.51 HETEROZYGOUS FACTOR V LEIDEN MUTATION (H): ICD-10-CM

## 2023-08-03 LAB — INR BLD: 2.4 (ref 0.9–1.1)

## 2023-08-03 PROCEDURE — 85610 PROTHROMBIN TIME: CPT

## 2023-08-03 PROCEDURE — 36416 COLLJ CAPILLARY BLOOD SPEC: CPT

## 2023-08-03 NOTE — PROGRESS NOTES
ANTICOAGULATION MANAGEMENT     Geneva Shepherd 82 year old female is on warfarin with therapeutic INR result. (Goal INR 2.0-3.0)    Recent labs: (last 7 days)     08/03/23  1435   INR 2.4*       ASSESSMENT     Source(s): Chart Review and Patient/Caregiver Call     Warfarin doses taken: Warfarin taken as instructed  Diet: No new diet changes identified  Medication/supplement changes: None noted  New illness, injury, or hospitalization: No  Signs or symptoms of bleeding or clotting: No  Previous result: Subtherapeutic  Additional findings: None       PLAN     Recommended plan for no diet, medication or health factor changes affecting INR     Dosing Instructions: Continue your current warfarin dose with next INR in 3 weeks       Summary  As of 8/3/2023      Full warfarin instructions:  7.5 mg every Sun, Tue, Thu; 5 mg all other days   Next INR check:  8/24/2023               Telephone call with Geneva who verbalizes understanding and agrees to plan    Lab visit scheduled    Education provided:   Please call back if any changes to your diet, medications or how you've been taking warfarin    Plan made per [facility] anticoagulation protocol    Robert Fulton RN  Anticoagulation Clinic  8/3/2023    _______________________________________________________________________     Anticoagulation Episode Summary       Current INR goal:  2.0-3.0   TTR:  74.8 % (1 y)   Target end date:  Indefinite   Send INR reminders to:  [facility]    Indications    Personal history of RLE DVT (deep vein thrombosis)(2003) [Z86.718]  Long term current use of anticoagulant therapy [Z79.01]  Activated protein C resistance (H) [D68.51]  Heterozygous factor V Leiden mutation (H) [D68.51]             Comments:               Anticoagulation Care Providers       Provider Role Specialty Phone number    Jacoby Boo MD Referring Internal Medicine [phone]

## 2023-09-08 ENCOUNTER — LAB (OUTPATIENT)
Dept: LAB | Facility: CLINIC | Age: 83
End: 2023-09-08
Payer: MEDICARE

## 2023-09-08 ENCOUNTER — ANTICOAGULATION THERAPY VISIT (OUTPATIENT)
Dept: ANTICOAGULATION | Facility: CLINIC | Age: 83
End: 2023-09-08

## 2023-09-08 DIAGNOSIS — Z86.718 PERSONAL HISTORY OF DVT (DEEP VEIN THROMBOSIS): Primary | ICD-10-CM

## 2023-09-08 DIAGNOSIS — D68.51 HETEROZYGOUS FACTOR V LEIDEN MUTATION (H): ICD-10-CM

## 2023-09-08 DIAGNOSIS — D68.51 ACTIVATED PROTEIN C RESISTANCE (H): ICD-10-CM

## 2023-09-08 DIAGNOSIS — Z79.01 LONG TERM CURRENT USE OF ANTICOAGULANT THERAPY: ICD-10-CM

## 2023-09-08 DIAGNOSIS — Z86.718 PERSONAL HISTORY OF DVT (DEEP VEIN THROMBOSIS): ICD-10-CM

## 2023-09-08 LAB — INR BLD: 2.4 (ref 0.9–1.1)

## 2023-09-08 PROCEDURE — 85610 PROTHROMBIN TIME: CPT

## 2023-09-08 PROCEDURE — 36416 COLLJ CAPILLARY BLOOD SPEC: CPT

## 2023-09-08 NOTE — PROGRESS NOTES
ANTICOAGULATION MANAGEMENT     Geneva Shepherd 82 year old female is on warfarin with therapeutic INR result. (Goal INR 2.0-3.0)    Recent labs: (last 7 days)     09/08/23  1602   INR 2.4*       ASSESSMENT     Source(s): Chart Review and Patient/Caregiver Call     Warfarin doses taken: Warfarin taken as instructed  Diet: No new diet changes identified  Medication/supplement changes: None noted  New illness, injury, or hospitalization: No  Signs or symptoms of bleeding or clotting: No  Previous result: Therapeutic last visit; previously outside of goal range  Additional findings: None       PLAN     Recommended plan for no diet, medication or health factor changes affecting INR     Dosing Instructions: Continue your current warfarin dose with next INR in 4 weeks       Summary  As of 9/8/2023      Full warfarin instructions:  7.5 mg every Sun, Tue, Thu; 5 mg all other days   Next INR check:  10/6/2023               Telephone call with Geneva who verbalizes understanding and agrees to plan    Lab visit scheduled    Education provided:   Please call back if any changes to your diet, medications or how you've been taking warfarin    Plan made per [facility] anticoagulation protocol    Robert Fulton RN  Anticoagulation Clinic  9/8/2023    _______________________________________________________________________     Anticoagulation Episode Summary       Current INR goal:  2.0-3.0   TTR:  79.7 % (1 y)   Target end date:  Indefinite   Send INR reminders to:  [facility]    Indications    Personal history of RLE DVT (deep vein thrombosis)(2003) [Z86.718]  Long term current use of anticoagulant therapy [Z79.01]  Activated protein C resistance (H) [D68.51]  Heterozygous factor V Leiden mutation (H) [D68.51]             Comments:               Anticoagulation Care Providers       Provider Role Specialty Phone number    Jacoby Boo MD Referring Internal Medicine [phone]

## 2023-09-12 ENCOUNTER — TELEPHONE (OUTPATIENT)
Dept: ANTICOAGULATION | Facility: CLINIC | Age: 83
End: 2023-09-12
Payer: MEDICARE

## 2023-09-12 DIAGNOSIS — D68.51 ACTIVATED PROTEIN C RESISTANCE (H): ICD-10-CM

## 2023-09-12 DIAGNOSIS — D68.51 HETEROZYGOUS FACTOR V LEIDEN MUTATION (H): ICD-10-CM

## 2023-09-12 DIAGNOSIS — Z86.718 PERSONAL HISTORY OF DVT (DEEP VEIN THROMBOSIS): Primary | ICD-10-CM

## 2023-09-12 NOTE — TELEPHONE ENCOUNTER
ANTICOAGULATION CLINIC REFERRAL RENEWAL REQUEST       An annual renewal order is required for all patients referred to [facility].?  Please review and sign the pended referral order for Geneva Shepherd.       ANTICOAGULATION SUMMARY      Warfarin indication(s)   DVT, Protein C Deficiency, and Heterozygous Factor V Leiden    Mechanical heart valve present?  NO       Current goal range   INR: 2.0-3.0     Goal appropriate for indication? Goal INR 2-3, standard for indication(s) above     Time in Therapeutic Range (TTR)  (Goal > 60%) 79.7%       Office visit with referring provider's group within last year yes on 3/7/23       Robert Fulton RN  [facility]

## 2023-09-25 DIAGNOSIS — D68.51 ACTIVATED PROTEIN C RESISTANCE (H): ICD-10-CM

## 2023-09-26 RX ORDER — WARFARIN SODIUM 5 MG/1
TABLET ORAL
Qty: 100 TABLET | Refills: 1 | Status: SHIPPED | OUTPATIENT
Start: 2023-09-26 | End: 2023-12-04 | Stop reason: DRUGHIGH

## 2023-09-26 NOTE — TELEPHONE ENCOUNTER
ANTICOAGULATION MANAGEMENT:  Medication Refill    Anticoagulation Summary  As of 9/8/2023      Warfarin maintenance plan:  7.5 mg (5 mg x 1.5) every Sun, Tue, Thu; 5 mg (5 mg x 1) all other days   Next INR check:  10/6/2023   Target end date:  Indefinite    Indications    Personal history of RLE DVT (deep vein thrombosis)(2003) [Z86.718]  Long term current use of anticoagulant therapy [Z79.01]  Activated protein C resistance (H) [D68.51]  Heterozygous factor V Leiden mutation (H) [D68.51]                 Anticoagulation Care Providers       Provider Role Specialty Phone number    Jacoby Boo MD Referring Internal Medicine [phone]            Refill Criteria    Visit with referring provider/group: Meets criteria: office visit within referring provider group in the last 1 year on 3/7/23    [facility] referral signed last signed: 09/12/2023; within last year: Yes    Lab monitoring not exceeding 2 weeks overdue: Yes    Geneva meets all criteria for refill. Rx instructions and quantity supplied updated to match patient's current dosing plan. Warfarin 90 day supply with 1 refill granted per [facility] protocol     Robert Fulton RN  Anticoagulation Clinic

## 2023-10-23 ENCOUNTER — TELEPHONE (OUTPATIENT)
Dept: ANTICOAGULATION | Facility: CLINIC | Age: 83
End: 2023-10-23
Payer: MEDICARE

## 2023-10-23 NOTE — TELEPHONE ENCOUNTER
ANTICOAGULATION     Geneva Shepherd is overdue for an INR check.      Spoke with Geneva and scheduled lab appointment on 10/27/23    Robert Fulton RN

## 2023-10-27 ENCOUNTER — LAB (OUTPATIENT)
Dept: LAB | Facility: CLINIC | Age: 83
End: 2023-10-27
Payer: MEDICARE

## 2023-10-27 ENCOUNTER — ANTICOAGULATION THERAPY VISIT (OUTPATIENT)
Dept: ANTICOAGULATION | Facility: CLINIC | Age: 83
End: 2023-10-27

## 2023-10-27 DIAGNOSIS — Z86.718 PERSONAL HISTORY OF DVT (DEEP VEIN THROMBOSIS): Primary | ICD-10-CM

## 2023-10-27 DIAGNOSIS — Z79.01 LONG TERM CURRENT USE OF ANTICOAGULANT THERAPY: ICD-10-CM

## 2023-10-27 DIAGNOSIS — D68.51 HETEROZYGOUS FACTOR V LEIDEN MUTATION (H): ICD-10-CM

## 2023-10-27 DIAGNOSIS — Z86.718 PERSONAL HISTORY OF DVT (DEEP VEIN THROMBOSIS): ICD-10-CM

## 2023-10-27 DIAGNOSIS — D68.51 ACTIVATED PROTEIN C RESISTANCE (H): ICD-10-CM

## 2023-10-27 LAB — INR BLD: 2.5 (ref 0.9–1.1)

## 2023-10-27 PROCEDURE — 36416 COLLJ CAPILLARY BLOOD SPEC: CPT

## 2023-10-27 PROCEDURE — 85610 PROTHROMBIN TIME: CPT

## 2023-10-27 NOTE — PROGRESS NOTES
ANTICOAGULATION MANAGEMENT     Geneva Shepherd 82 year old female is on warfarin with therapeutic INR result. (Goal INR 2.0-3.0)    Recent labs: (last 7 days)     10/27/23  1540   INR 2.5*       ASSESSMENT     Source(s): Chart Review and Patient/Caregiver Call     Warfarin doses taken: Warfarin taken as instructed  Diet: No new diet changes identified  Medication/supplement changes: None noted  New illness, injury, or hospitalization: No  Signs or symptoms of bleeding or clotting: No  Previous result: Therapeutic last 2(+) visits  Additional findings: None       PLAN     Recommended plan for no diet, medication or health factor changes affecting INR     Dosing Instructions: Continue your current warfarin dose with next INR in 5 weeks       Summary  As of 10/27/2023      Full warfarin instructions:  7.5 mg every Sun, Tue, Thu; 5 mg all other days   Next INR check:  12/1/2023               Telephone call with Geneva who verbalizes understanding and agrees to plan    Lab visit scheduled    Education provided:   Please call back if any changes to your diet, medications or how you've been taking warfarin    Plan made per [facility] anticoagulation protocol    Leigha Donaldson RN  Anticoagulation Clinic  10/27/2023    _______________________________________________________________________     Anticoagulation Episode Summary       Current INR goal:  2.0-3.0   TTR:  88.0% (1 y)   Target end date:  Indefinite   Send INR reminders to:  [facility]    Indications    Personal history of RLE DVT (deep vein thrombosis)(2003) [Z86.718]  Long term current use of anticoagulant therapy [Z79.01]  Activated protein C resistance (H24) [D68.51]  Heterozygous factor V Leiden mutation (H24) [D68.51]             Comments:               Anticoagulation Care Providers       Provider Role Specialty Phone number    Jacoby Boo MD Referring Internal Medicine [phone]

## 2023-11-04 ENCOUNTER — HEALTH MAINTENANCE LETTER (OUTPATIENT)
Age: 83
End: 2023-11-04

## 2023-11-07 ENCOUNTER — TRANSFERRED RECORDS (OUTPATIENT)
Dept: HEALTH INFORMATION MANAGEMENT | Facility: CLINIC | Age: 83
End: 2023-11-07
Payer: MEDICARE

## 2023-11-07 LAB
ALT SERPL-CCNC: 133 IU/L (ref 0–32)
AST SERPL-CCNC: 163 IU/L (ref 0–40)

## 2023-11-14 ENCOUNTER — ANTICOAGULATION THERAPY VISIT (OUTPATIENT)
Dept: ANTICOAGULATION | Facility: CLINIC | Age: 83
End: 2023-11-14

## 2023-11-14 ENCOUNTER — OFFICE VISIT (OUTPATIENT)
Dept: INTERNAL MEDICINE | Facility: CLINIC | Age: 83
End: 2023-11-14
Payer: MEDICARE

## 2023-11-14 VITALS
WEIGHT: 176 LBS | DIASTOLIC BLOOD PRESSURE: 71 MMHG | SYSTOLIC BLOOD PRESSURE: 109 MMHG | TEMPERATURE: 98.7 F | BODY MASS INDEX: 28.28 KG/M2 | HEIGHT: 66 IN | HEART RATE: 83 BPM | OXYGEN SATURATION: 98 %

## 2023-11-14 DIAGNOSIS — Z79.01 LONG TERM CURRENT USE OF ANTICOAGULANT THERAPY: ICD-10-CM

## 2023-11-14 DIAGNOSIS — F32.0 MAJOR DEPRESSIVE DISORDER, SINGLE EPISODE, MILD (H): ICD-10-CM

## 2023-11-14 DIAGNOSIS — E83.52 HYPERCALCEMIA: ICD-10-CM

## 2023-11-14 DIAGNOSIS — D68.51 ACTIVATED PROTEIN C RESISTANCE (H): ICD-10-CM

## 2023-11-14 DIAGNOSIS — Z86.718 PERSONAL HISTORY OF DVT (DEEP VEIN THROMBOSIS): ICD-10-CM

## 2023-11-14 DIAGNOSIS — Z00.00 MEDICARE ANNUAL WELLNESS VISIT, SUBSEQUENT: ICD-10-CM

## 2023-11-14 DIAGNOSIS — E03.9 HYPOTHYROIDISM, UNSPECIFIED TYPE: ICD-10-CM

## 2023-11-14 DIAGNOSIS — D68.51 HETEROZYGOUS FACTOR V LEIDEN MUTATION (H): ICD-10-CM

## 2023-11-14 DIAGNOSIS — K75.9 HEPATITIS: ICD-10-CM

## 2023-11-14 DIAGNOSIS — N39.0 RECURRENT UTI: ICD-10-CM

## 2023-11-14 DIAGNOSIS — Z86.718 PERSONAL HISTORY OF DVT (DEEP VEIN THROMBOSIS): Primary | ICD-10-CM

## 2023-11-14 LAB
ALBUMIN SERPL BCG-MCNC: 4 G/DL (ref 3.5–5.2)
ALP SERPL-CCNC: 162 U/L (ref 40–150)
ALT SERPL W P-5'-P-CCNC: 123 U/L (ref 0–50)
ANION GAP SERPL CALCULATED.3IONS-SCNC: 11 MMOL/L (ref 7–15)
AST SERPL W P-5'-P-CCNC: 140 U/L (ref 0–45)
BILIRUB SERPL-MCNC: 0.8 MG/DL
BUN SERPL-MCNC: 10.5 MG/DL (ref 8–23)
CALCIUM SERPL-MCNC: 10.6 MG/DL (ref 8.8–10.2)
CHLORIDE SERPL-SCNC: 103 MMOL/L (ref 98–107)
CREAT SERPL-MCNC: 0.55 MG/DL (ref 0.51–0.95)
DEPRECATED HCO3 PLAS-SCNC: 23 MMOL/L (ref 22–29)
EGFRCR SERPLBLD CKD-EPI 2021: >90 ML/MIN/1.73M2
ERYTHROCYTE [SEDIMENTATION RATE] IN BLOOD BY WESTERGREN METHOD: 14 MM/HR (ref 0–30)
GLUCOSE SERPL-MCNC: 96 MG/DL (ref 70–99)
INR BLD: 1.9 (ref 0.9–1.1)
POTASSIUM SERPL-SCNC: 4.5 MMOL/L (ref 3.4–5.3)
PROT SERPL-MCNC: 7.4 G/DL (ref 6.4–8.3)
SODIUM SERPL-SCNC: 137 MMOL/L (ref 135–145)

## 2023-11-14 PROCEDURE — 85652 RBC SED RATE AUTOMATED: CPT | Performed by: INTERNAL MEDICINE

## 2023-11-14 PROCEDURE — 83550 IRON BINDING TEST: CPT | Performed by: INTERNAL MEDICINE

## 2023-11-14 PROCEDURE — 85610 PROTHROMBIN TIME: CPT | Performed by: INTERNAL MEDICINE

## 2023-11-14 PROCEDURE — 36415 COLL VENOUS BLD VENIPUNCTURE: CPT | Performed by: INTERNAL MEDICINE

## 2023-11-14 PROCEDURE — 86381 MITOCHONDRIAL ANTIBODY EACH: CPT | Performed by: INTERNAL MEDICINE

## 2023-11-14 PROCEDURE — 80053 COMPREHEN METABOLIC PANEL: CPT | Performed by: INTERNAL MEDICINE

## 2023-11-14 PROCEDURE — 86803 HEPATITIS C AB TEST: CPT | Performed by: INTERNAL MEDICINE

## 2023-11-14 PROCEDURE — 83970 ASSAY OF PARATHORMONE: CPT | Performed by: INTERNAL MEDICINE

## 2023-11-14 PROCEDURE — 83540 ASSAY OF IRON: CPT | Performed by: INTERNAL MEDICINE

## 2023-11-14 PROCEDURE — G0439 PPPS, SUBSEQ VISIT: HCPCS | Performed by: INTERNAL MEDICINE

## 2023-11-14 PROCEDURE — 99214 OFFICE O/P EST MOD 30 MIN: CPT | Mod: 25 | Performed by: INTERNAL MEDICINE

## 2023-11-14 ASSESSMENT — PATIENT HEALTH QUESTIONNAIRE - PHQ9
10. IF YOU CHECKED OFF ANY PROBLEMS, HOW DIFFICULT HAVE THESE PROBLEMS MADE IT FOR YOU TO DO YOUR WORK, TAKE CARE OF THINGS AT HOME, OR GET ALONG WITH OTHER PEOPLE: NOT DIFFICULT AT ALL
SUM OF ALL RESPONSES TO PHQ QUESTIONS 1-9: 4
SUM OF ALL RESPONSES TO PHQ QUESTIONS 1-9: 4

## 2023-11-14 ASSESSMENT — ENCOUNTER SYMPTOMS
WEAKNESS: 0
HEADACHES: 0
ARTHRALGIAS: 1
EYE PAIN: 0
NAUSEA: 1
HEARTBURN: 0
CONSTIPATION: 0
FEVER: 0
COUGH: 0
DYSURIA: 0
BREAST MASS: 0
DIZZINESS: 1
ABDOMINAL PAIN: 1
SORE THROAT: 0
FREQUENCY: 1
HEMATURIA: 0
PARESTHESIAS: 0
NERVOUS/ANXIOUS: 0
CHILLS: 0
DIARRHEA: 0
HEMATOCHEZIA: 0
PALPITATIONS: 0
MYALGIAS: 0

## 2023-11-14 ASSESSMENT — ACTIVITIES OF DAILY LIVING (ADL): CURRENT_FUNCTION: HOUSEWORK REQUIRES ASSISTANCE

## 2023-11-14 NOTE — PROGRESS NOTES
"SUBJECTIVE:   Geneva is a 82 year old who presents for Preventive Visit and follow-up issues as below.      Are you in the first 12 months of your Medicare coverage?  No    Healthy Habits:     In general, how would you rate your overall health?  Good    Frequency of exercise:  2-3 days/week    Duration of exercise:  Less than 15 minutes    Do you usually eat at least 4 servings of fruit and vegetables a day, include whole grains    & fiber and avoid regularly eating high fat or \"junk\" foods?  Yes    Taking medications regularly:  Yes    Medication side effects:  Muscle aches and Lightheadedness    Ability to successfully perform activities of daily living:  Housework requires assistance    Home Safety:  No safety concerns identified    Hearing Impairment:  Need to ask people to speak up or repeat themselves and difficulty understanding soft or whispered speech    In the past 6 months, have you been bothered by leaking of urine? Yes    In general, how would you rate your overall mental or emotional health?  Good    Additional concerns today:  Yes    Additional concerns: elevated liver enzymes, [facility] suggests pt to stop taking nitrofurantoin     Today's PHQ-9 Score:       11/14/2023     3:21 PM   PHQ-9 SCORE   PHQ-9 Total Score MyChart 4 (Minimal depression)   PHQ-9 Total Score 4       Have you ever done Advance Care Planning? (For example, a Health Directive, POLST, or a discussion with a medical provider or your loved ones about your wishes): No, advance care planning information given to patient to review.  Patient plans to discuss their wishes with loved ones or provider.         Fall risk  Fallen 2 or more times in the past year?: Yes  Any fall with injury in the past year?: No    Cognitive Screening   1) Repeat 3 items (Leader, Season, Table)    2) Clock draw: NORMAL  3) 3 item recall: Recalls 2 objects   Results: NORMAL clock, 1-2 items recalled: COGNITIVE IMPAIRMENT LESS LIKELY    Mini-CogTM Copyright S Marisabel. " Licensed by the author for use in [facility]; reprinted with permission (hetal@[facility]). All rights reserved.      Do you have sleep apnea, excessive snoring or daytime drowsiness? : no    Reviewed and updated as needed this visit by clinical staff                  Reviewed and updated as needed this visit by Provider                 Social History     Tobacco Use    Smoking status: Former     Types: Cigarettes     Quit date: 1968     Years since quittin.2    Smokeless tobacco: Never    Tobacco comments:     quit in    Substance Use Topics    Alcohol use: Yes     Alcohol/week: 0.0 standard drinks of alcohol     Comment: 1 glass of wine a month            2023     3:28 PM   Alcohol Use   Prescreen: >3 drinks/day or >7 drinks/week? Not Applicable     Do you have a current opioid prescription? No  Do you use any other controlled substances or medications that are not prescribed by a provider? None              Current providers sharing in care for this patient include:   Patient Care Team:  Jacoby Boo MD as PCP - General  Jacoby Boo MD as Assigned PCP  Cisco Durham MD as Assigned Heart and Vascular Provider    The following health maintenance items are reviewed in Epic and correct as of today:  Health Maintenance   Topic Date Due    COVID-19 Vaccine (1) Never done    ZOSTER IMMUNIZATION (1 of 2) Never done    RSV VACCINE (Pregnancy & 60+) (1 - 1-dose 60+ series) Never done    MEDICARE ANNUAL WELLNESS VISIT  2022    ANNUAL REVIEW OF HM ORDERS  2023    INFLUENZA VACCINE (1) 2023    TSH W/FREE T4 REFLEX  2024    PHQ-9  2024    FALL RISK ASSESSMENT  2024    ADVANCE CARE PLANNING  2026    LIPID  2028    DTAP/TDAP/TD IMMUNIZATION (2 - Td or Tdap) 2028    COLORECTAL CANCER SCREENING  10/05/2031    DEXA  2034    DEPRESSION ACTION PLAN  Completed    Pneumococcal Vaccine: 65+ Years  Completed    IPV IMMUNIZATION  Aged  "Out    HPV IMMUNIZATION  Aged Out    MENINGITIS IMMUNIZATION  Aged Out    RSV MONOCLONAL ANTIBODY  Aged Out    MAMMO SCREENING  Discontinued     Labs reviewed in EPIC      Mammogram Screening - Patient over age 75, has elected to discontinue screenings.  Pertinent mammograms are reviewed under the imaging tab.    Review of Systems   Constitutional:  Negative for chills and fever.   HENT:  Positive for hearing loss. Negative for congestion, ear pain and sore throat.    Eyes:  Negative for pain and visual disturbance.   Respiratory:  Negative for cough.    Cardiovascular:  Negative for chest pain, palpitations and peripheral edema.   Gastrointestinal:  Positive for abdominal pain and nausea. Negative for constipation, diarrhea, heartburn and hematochezia.   Breasts:  Negative for tenderness, breast mass and discharge.   Genitourinary:  Positive for frequency and urgency. Negative for dysuria, genital sores, hematuria, pelvic pain, vaginal bleeding and vaginal discharge.   Musculoskeletal:  Positive for arthralgias. Negative for myalgias.   Skin:  Negative for rash.   Neurological:  Positive for dizziness. Negative for weakness, headaches and paresthesias.   Psychiatric/Behavioral:  Negative for mood changes. The patient is not nervous/anxious.       Wally hearing aids and prescription glasses but generally not using either   Rare epigastric pain after eating.  Using Prunes for BMs and regular/. Very rare nausea. No vomiting    Has urine urge incontinence. Has been on Nitrofurantoin for UTI prevention through Urology. Stopped med 1 week ago. No dysuria, 1 coffee and 1 tea/day. Has 8 glasses of water/day    Mild stable arthritis. . Mild osteoarthritis     Mild \"fogginess\". Sleep OK  Slight tremor hands occ.    Stable mild edema.. Using 8-15mm Hg compression stockings   GI did labs 11/8/23 with   and  and  and  TB 0.8.  They have ordered EUS with  liver bx but not scheduled yet    OBJECTIVE:   BP " "109/71   Pulse 83   Temp 98.7  F (37.1  C) (Temporal)   Ht 1.676 m (5' 6\")   Wt 79.8 kg (176 lb)   LMP  (LMP Unknown)   SpO2 98%   BMI 28.41 kg/m   Estimated body mass index is 28.25 kg/m  as calculated from the following:    Height as of 6/11/23: 1.676 m (5' 6\").    Weight as of 6/11/23: 79.4 kg (175 lb).  Physical Exam  General appearance -  alert, no distress  Skin - No rashes or lesions.  Head - normocephalic, atraumatic  Eyes - JASSI, EOMI, fundi exam with nondilated pupils negative.  Ears - External ears normal. Canals clear. TM's normal.  Nose/Sinuses - Nares normal. Septum midline. Mucosa normal. No drainage or sinus tenderness.  Oropharynx - No erythema, no adenopathy, no exudates.  Neck - Supple without adenopathy or thyromegaly. No bruits.  Lungs - Clear to auscultation without wheezes/rhonchi.  Heart - Regular rate and rhythm without murmurs, clicks, or gallops.  Nodes - No supraclavicular, axillary, or inguinal adenopathy palpable.  Breasts - deferred  Abdomen - Abdomen soft, non-tender. BS normal. No masses or hepatosplenomegaly palpable. No bruits.  Extremities -No cyanosis, clubbing. Mild BLE distal edema.    Musculoskeletal - Spine ROM normal. Muscular strength intact.  DIP and PIP joints hands with osteoarthritis thickening without erythema  Peripheral pulses - radial=4/4, femoral=4/4, posterior tibial=4/4, dorsalis pedis=4/4,  Neuro - Gait normal. Reflexes normal and symmetric. Sensation grossly WNL. NO current tremor UEs with outstretched hands. No cogwheel rigidity  Genital/Rectal - deferred          ASSESSMENT / PLAN:   1. Medicare annual wellness visit, subsequent     Pt declines covid, flu, Shingrix and RSV vaccines. Declines further breast cancer screening with mammograms. Independent in ADLs    2. Hepatitis  GI suggesting discontinuation of nitrofurantoin which patient did 1 week ago.  Has been on paroxetine, donepezil, amitriptyline for years so unlikely to be causing acute " "hepatitis.  Patient with multiple supplements and instructed to stop these also.  Will await EUS and bx results from upcoming GI procedure.  Labs as ordered.  Continue other work-up per GI  - Comprehensive metabolic panel; Future  - Iron & Iron Binding Capacity; Future  - Erythrocyte sedimentation rate auto; Future  - Hepatitis C Screen Reflex to HCV RNA Quant and Genotype; Future  - Mitochondrial M2 Antibody IgG; Future  - Comprehensive metabolic panel  - Iron & Iron Binding Capacity  - Erythrocyte sedimentation rate auto  - Hepatitis C Screen Reflex to HCV RNA Quant and Genotype  - Mitochondrial M2 Antibody IgG    3. Hypercalcemia  Last calcium level 10.6.  Needs lab recheck  - Comprehensive metabolic panel; Future  - Parathyroid Hormone Intact; Future  - Comprehensive metabolic panel  - Parathyroid Hormone Intact    4. Personal history of DVT (deep vein thrombosis)  On long-term anticoagulation with warfarin.  Due for INR  - INR point of care    5. Activated protein C resistance (H24)  See #4  - INR point of care    6. Heterozygous factor V Leiden mutation (H24)  See #4  - INR point of care    7. Major depressive disorder, single episode, mild (H24)  Controlled. PHQ9 = 4.  Continue paroxetine    8. Hypothyroidism, unspecified type  Levothyroxine.  Most recent TSH normal.  Continue current medication    9. Recurrent UTI  History recurrent UTIs.  Denies any dysuria currently.  Has been on nitrofurantoin through urology for prevention but medication discontinued 1 week ago as above.  Will monitor      Patient has been advised of split billing requirements and indicates understanding: Yes      COUNSELING:  Reviewed preventive health counseling, as reflected in patient instructions      BMI:   Estimated body mass index is 28.25 kg/m  as calculated from the following:    Height as of 6/11/23: 1.676 m (5' 6\").    Weight as of 6/11/23: 79.4 kg (175 lb).         She reports that she quit smoking about 55 years ago. She " has never used smokeless tobacco.      Appropriate preventive services were discussed with this patient, including applicable screening as appropriate for fall prevention, nutrition, physical activity, Tobacco-use cessation, weight loss and cognition.  Checklist reviewing preventive services available has been given to the patient.    Reviewed patients plan of care and provided an AVS. The Basic Care Plan (routine screening as documented in Health Maintenance) for Geneva meets the Care Plan requirement. This Care Plan has been established and reviewed with the Patient.     PLAN:   Remain off of Nitrofurantoin   Continue Amitriptyline/ Elavil, Donepizil/ Aricept,  Paroxetine/ Paxil and Warfarin/Coumadin   Stop ALL other med/supplements at this time    Labs as ordered re: liver and calcium,   EUS-guided biopsy of the liver per [facility] scheduling  Patient declines breast cancer screening with mammogram.  Declines vaccinations  Pt was informed regarding extra E&M billing for management of new or established medical issues not related to today's wellness/screening visit      Jacoby Boo MD  [facility]    Identified Health Risks:  I have reviewed Opioid Use Disorder and Substance Use Disorder risk factors and  NA   The patient reports that she has difficulty with activities of daily living. Declines need for assistance at this time.    The patient was provided with written information regarding signs of hearing loss.  Information on urinary incontinence and treatment options given to patient.  She is at risk for falling and has been provided with information to reduce the risk of falling at home.

## 2023-11-14 NOTE — PROGRESS NOTES
ANTICOAGULATION MANAGEMENT     Geneva Shepherd 82 year old female is on warfarin with subtherapeutic INR result. (Goal INR 2.0-3.0)    Recent labs: (last 7 days)     11/14/23  1651   INR 1.9*       ASSESSMENT     Source(s): Chart Review and Patient/Caregiver Call     Warfarin doses taken: Less warfarin taken than planned which may be affecting INR  Diet: No new diet changes identified  Medication/supplement changes:  nitrofurantoin stopped on 11/7/23 No interaction anticipated  Patient reports she is stopping all her supplements per visit with PCP today, patient states she takes a lot of supplements  New illness, injury, or hospitalization: No  Signs or symptoms of bleeding or clotting: No  Previous result: Therapeutic last 2(+) visits  Additional findings: Upcoming surgery/procedure possible  EUS-guided biopsy of the liver per [facility] scheduling       PLAN     Recommended plan for temporary change(s) affecting INR     Dosing Instructions: Continue your current warfarin dose with next INR in 2 weeks       Summary  As of 11/14/2023      Full warfarin instructions:  7.5 mg every Sun, Tue, Thu; 5 mg all other days   Next INR check:  12/1/2023               Telephone call with Geneva who verbalizes understanding and agrees to plan    Lab visit scheduled    Education provided:   Please call back if any changes to your diet, medications or how you've been taking warfarin  Contact [phone]  with any changes, questions or concerns.     Plan made per [facility] anticoagulation protocol    Sheryl Frey RN  Anticoagulation Clinic  11/14/2023    _______________________________________________________________________     Anticoagulation Episode Summary       Current INR goal:  2.0-3.0   TTR:  87.1% (1 y)   Target end date:  Indefinite   Send INR reminders to:  [facility]    Indications    Personal history of RLE DVT (deep vein thrombosis)(2003) [Z86.718]  Long term current use of anticoagulant therapy  [Z79.01]  Activated protein C resistance (H24) [D68.51]  Heterozygous factor V Leiden mutation (H24) [D68.51]             Comments:               Anticoagulation Care Providers       Provider Role Specialty Phone number    Jacoby Boo MD Referring Internal Medicine [phone]

## 2023-11-14 NOTE — PATIENT INSTRUCTIONS
Remain off of Nitrofurantoin   Continue Amitriptyline/ Elavil, Donepizil/ Aricept,  Paroxetine/ Paxil and Warfarin/Coumadin   Stop ALL other med/supplements at this time    Labs as ordered re: liver and calcium,   EUS-guided biopsy of the liver per [facility] scheduling  Pt was informed regarding extra E&M billing for management of new or established medical issues not related to today's wellness/screening visit          Patient Education   Personalized Prevention Plan  You are due for the preventive services outlined below.  Your care team is available to assist you in scheduling these services.  If you have already completed any of these items, please share that information with your care team to update in your medical record.  Health Maintenance Due   Topic Date Due    COVID-19 Vaccine (1) Never done    Zoster (Shingles) Vaccine (1 of 2) Never done    RSV VACCINE (Pregnancy & 60+) (1 - 1-dose 60+ series) Never done    Annual Wellness Visit  08/20/2022    ANNUAL REVIEW OF HM ORDERS  08/23/2023    Flu Vaccine (1) 09/01/2023     Activities of Daily Living    Your Health Risk Assessment indicates you have difficulties with activities of daily living such as housework, bathing, preparing meals, taking medication, etc. Please make a follow up appointment for us to address this issue in more detail.  Hearing Loss: Care Instructions  Overview     Hearing loss is a sudden or slow decrease in how well you hear. It can range from slight to profound. Permanent hearing loss can occur with aging. It also can happen when you are exposed long-term to loud noise. Examples include listening to loud music, riding motorcycles, or being around other loud machines.  Hearing loss can affect your work and home life. It can make you feel lonely or depressed. You may feel that you have lost your independence. But hearing aids and other devices can help you hear better and feel connected to others.  Follow-up care is a key part of your  treatment and safety. Be sure to make and go to all appointments, and call your doctor if you are having problems. It's also a good idea to know your test results and keep a list of the medicines you take.  How can you care for yourself at home?  Avoid loud noises whenever possible. This helps keep your hearing from getting worse.  Always wear hearing protection around loud noises.  Wear a hearing aid as directed.  A professional can help you pick a hearing aid that will work best for you.  You can also get hearing aids over the counter for mild to moderate hearing loss.  Have hearing tests as your doctor suggests. They can show whether your hearing has changed. Your hearing aid may need to be adjusted.  Use other devices as needed. These may include:  Telephone amplifiers and hearing aids that can connect to a television, stereo, radio, or microphone.  Devices that use lights or vibrations. These alert you to the doorbell, a ringing telephone, or a baby monitor.  Television closed-captioning. This shows the words at the bottom of the screen. Most new TVs can do this.  TTY (text telephone). This lets you type messages back and forth on the telephone instead of talking or listening. These devices are also called TDD. When messages are typed on the keyboard, they are sent over the phone line to a receiving TTY. The message is shown on a monitor.  Use text messaging, social media, and email if it is hard for you to communicate by telephone.  Try to learn a listening technique called speechreading. It is not lipreading. You pay attention to people's gestures, expressions, posture, and tone of voice. These clues can help you understand what a person is saying. Face the person you are talking to, and have them face you. Make sure the lighting is good. You need to see the other person's face clearly.  Think about counseling if you need help to adjust to your hearing loss.  When should you call for help?  Watch closely for  "changes in your health, and be sure to contact your doctor if:    You think your hearing is getting worse.     You have new symptoms, such as dizziness or nausea.   Where can you learn more?  Go to https://www.Tamtron.net/patiented  Enter R798 in the search box to learn more about \"Hearing Loss: Care Instructions.\"  Current as of: February 28, 2023               Content Version: 13.8    5896-4371 [employer].   Care instructions adapted under license by your healthcare professional. If you have questions about a medical condition or this instruction, always ask your healthcare professional. [employer] disclaims any warranty or liability for your use of this information.      Bladder Training: Care Instructions  Your Care Instructions     Bladder training is used to treat urge incontinence and stress incontinence. Urge incontinence means that the need to urinate comes on so fast that you can't get to a toilet in time. Stress incontinence means that you leak urine because of pressure on your bladder. For example, it may happen when you laugh, cough, or lift something heavy.  Bladder training can increase how long you can wait before you have to urinate. It can also help your bladder hold more urine. And it can give you better control over the urge to urinate.  It is important to remember that bladder training takes a few weeks to a few months to make a difference. You may not see results right away, but don't give up.  Follow-up care is a key part of your treatment and safety. Be sure to make and go to all appointments, and call your doctor if you are having problems. It's also a good idea to know your test results and keep a list of the medicines you take.  How can you care for yourself at home?  Work with your doctor to come up with a bladder training program that is right for you. You may use one or more of the following methods.  Delayed urination  In the beginning, try to keep from " "urinating for 5 minutes after you first feel the need to go.  While you wait, take deep, slow breaths to relax. Kegel exercises can also help you delay the need to go to the bathroom.  After some practice, when you can easily wait 5 minutes to urinate, try to wait 10 minutes before you urinate.  Slowly increase the waiting period until you are able to control when you have to urinate.  Scheduled urination  Empty your bladder when you first wake up in the morning.  Schedule times throughout the day when you will urinate.  Start by going to the bathroom every hour, even if you don't need to go.  Slowly increase the time between trips to the bathroom.  When you have found a schedule that works well for you, keep doing it.  If you wake up during the night and have to urinate, do it. Apply your schedule to waking hours only.  Kegel exercises  These tighten and strengthen pelvic muscles, which can help you control the flow of urine. (If doing these exercises causes pain, stop doing them and talk with your doctor.) To do Kegel exercises:  Squeeze your muscles as if you were trying not to pass gas. Or squeeze your muscles as if you were stopping the flow of urine. Your belly, legs, and buttocks shouldn't move.  Hold the squeeze for 3 seconds, then relax for 5 to 10 seconds.  Start with 3 seconds, then add 1 second each week until you are able to squeeze for 10 seconds.  Repeat the exercise 10 times a session. Do 3 to 8 sessions a day.  When should you call for help?  Watch closely for changes in your health, and be sure to contact your doctor if:    Your incontinence is getting worse.     You do not get better as expected.   Where can you learn more?  Go to https://www.healthMyBuys.net/patiented  Enter V684 in the search box to learn more about \"Bladder Training: Care Instructions.\"  Current as of: February 28, 2023               Content Version: 13.8    6061-3851 [employer].   Care instructions adapted under " license by your healthcare professional. If you have questions about a medical condition or this instruction, always ask your healthcare professional. Healthwise, [facility] disclaims any warranty or liability for your use of this information.      Preventing Falls: Care Instructions  Injuries and health problems such as trouble walking or poor eyesight can increase your risk of falling. So can some medicines. But there are things you can do to help prevent falls. You can exercise to get stronger. You can also arrange your home to make it safer.    Talk to your doctor about the medicines you take. Ask if any of them increase the risk of falls and whether they can be changed or stopped.   Try to exercise regularly. It can help improve your strength and balance. This can help lower your risk of falling.     Practice fall safety and prevention.    Wear low-heeled shoes that fit well and give your feet good support. Talk to your doctor if you have foot problems that make this hard.  Carry a cellphone or wear a medical alert device that you can use to call for help.  Use stepladders instead of chairs to reach high objects. Don't climb if you're at risk for falls. Ask for help, if needed.  Wear the correct eyeglasses, if you need them.    Make your home safer.    Remove rugs, cords, clutter, and furniture from walkways.  Keep your house well lit. Use night-lights in hallways and bathrooms.  Install and use sturdy handrails on stairways.  Wear nonskid footwear, even inside. Don't walk barefoot or in socks without shoes.    Be safe outside.    Use handrails, curb cuts, and ramps whenever possible.  Keep your hands free by using a shoulder bag or backpack.  Try to walk in well-lit areas. Watch out for uneven ground, changes in pavement, and debris.  Be careful in the winter. Walk on the grass or gravel when sidewalks are slippery. Use de-icer on steps and walkways. Add non-slip devices to shoes.    Put grab bars and  "nonskid mats in your shower or tub and near the toilet. Try to use a shower chair or bath bench when bathing.   Get into a tub or shower by putting in your weaker leg first. Get out with your strong side first. Have a phone or medical alert device in the bathroom with you.   Where can you learn more?  Go to https://www.UpTo.Photo Rankr/patiented  Enter G117 in the search box to learn more about \"Preventing Falls: Care Instructions.\"  Current as of: July 18, 2023               Content Version: 13.8    4892-5513 [employer].   Care instructions adapted under license by your healthcare professional. If you have questions about a medical condition or this instruction, always ask your healthcare professional. [employer] disclaims any warranty or liability for your use of this information.      How to Get Up Safely After a Fall: Care Instructions  Overview     If you have injuries, health problems, or other reasons that may make it easy for you to fall at home, it is a good idea to learn how to get up safely after a fall. Learning how to get up correctly can help you avoid making an injury worse.  Also, knowing what to do if you cannot get up can help you stay safe until help arrives.  Follow-up care is a key part of your treatment and safety. Be sure to make and go to all appointments, and call your doctor if you are having problems. It's also a good idea to know your test results and keep a list of the medicines you take.  How can you care for yourself after a fall?  If you think you can get up  First lie still for a few minutes and think about how you feel. If your body feels okay and you think you can get up safely, follow the rest of the steps below:  Look for a chair or other piece of furniture that is close to you.  Roll onto your side and rest. Roll by turning your head in the direction you want to roll, move your shoulder and arm, then hip and leg in the same direction.  Lie still for a " "moment to let your blood pressure adjust.  Slowly push your upper body up, lift your head, and take a moment to rest.  Slowly get up on your hands and knees, and crawl to the chair or other stable piece of furniture.  Put your hands on the chair.  Move one foot forward, and place it flat on the floor. Your other leg should be bent with the knee on the floor.  Rise slowly, turn your body, and sit in the chair. Stay seated for a bit and think about how you feel. Call for help. Even if you feel okay, let someone know what happened to you. You might not know that you have a serious injury.  If you cannot get up  If you think you are injured after a fall or you cannot get up, try not to panic.  Call out for help.  If you have a phone within reach or you have an emergency call device, use it to call for help.  If you do not have a phone within reach, try to slide yourself toward it. If you cannot get to the phone, try to slide toward a door or window or a place where you think you can be heard.  [place] or use an object to make noise so someone might hear you.  If you can reach something that you can use for a pillow, place it under your head. Try to stay warm by covering yourself with a blanket or clothing while you wait for help.  When should you call for help?   Call 911 anytime you think you may need emergency care. For example, call if:    You passed out (lost consciousness).     You cannot get up after a fall.     You have severe pain.   Call your doctor now or seek immediate medical care if:    You have new or worse pain.     You are dizzy or lightheaded.     You hit your head.   Watch closely for changes in your health, and be sure to contact your doctor if:    You do not get better as expected.   Where can you learn more?  Go to https://www.healthwise.net/patiented  Enter G513 in the search box to learn more about \"How to Get Up Safely After a Fall: Care Instructions.\"  Current as of: November 13, 2022               " Content Version: 13.8    7061-0113 [employer].   Care instructions adapted under license by your healthcare professional. If you have questions about a medical condition or this instruction, always ask your healthcare professional. [employer] disclaims any warranty or liability for your use of this information.

## 2023-11-15 LAB
HCV AB SERPL QL IA: NONREACTIVE
IRON BINDING CAPACITY (ROCHE): 393 UG/DL (ref 240–430)
IRON SATN MFR SERPL: 37 % (ref 15–46)
IRON SERPL-MCNC: 146 UG/DL (ref 37–145)
PTH-INTACT SERPL-MCNC: 46 PG/ML (ref 15–65)

## 2023-11-16 ENCOUNTER — MEDICAL CORRESPONDENCE (OUTPATIENT)
Dept: HEALTH INFORMATION MANAGEMENT | Facility: CLINIC | Age: 83
End: 2023-11-16

## 2023-11-17 ENCOUNTER — TELEPHONE (OUTPATIENT)
Dept: ANTICOAGULATION | Facility: CLINIC | Age: 83
End: 2023-11-17
Payer: MEDICARE

## 2023-11-17 DIAGNOSIS — D68.51 ACTIVATED PROTEIN C RESISTANCE (H): ICD-10-CM

## 2023-11-17 DIAGNOSIS — D68.51 HETEROZYGOUS FACTOR V LEIDEN MUTATION (H): ICD-10-CM

## 2023-11-17 DIAGNOSIS — Z86.718 PERSONAL HISTORY OF DVT (DEEP VEIN THROMBOSIS): Primary | ICD-10-CM

## 2023-11-17 DIAGNOSIS — Z79.01 LONG TERM CURRENT USE OF ANTICOAGULANT THERAPY: ICD-10-CM

## 2023-11-17 LAB — MITOCHONDRIA M2 IGG SER-ACNC: 1.8 U/ML

## 2023-11-17 NOTE — TELEPHONE ENCOUNTER
[facility] calling to get orders for a 4 day hold for ERCP/EUS procedure on 12/12/23.     There is a procedure plan in 3/17/22 TE - OK to follow same plan?     Please advise.     [facility] - [phone] - VM ok    Ronda Kerr, RN

## 2023-11-20 ENCOUNTER — TELEPHONE (OUTPATIENT)
Dept: INTERNAL MEDICINE | Facility: CLINIC | Age: 83
End: 2023-11-20
Payer: MEDICARE

## 2023-11-20 DIAGNOSIS — E83.52 HYPERCALCEMIA: ICD-10-CM

## 2023-11-20 DIAGNOSIS — E03.9 HYPOTHYROIDISM, UNSPECIFIED TYPE: ICD-10-CM

## 2023-11-20 DIAGNOSIS — Z01.818 PREOPERATIVE EXAMINATION: Primary | ICD-10-CM

## 2023-11-20 DIAGNOSIS — K75.9 HEPATITIS: ICD-10-CM

## 2023-11-20 DIAGNOSIS — E83.19 IRON EXCESS: ICD-10-CM

## 2023-11-20 NOTE — TELEPHONE ENCOUNTER
Order/Referral Request    Who is requesting: patient    Orders being requested: labs, liver enzymes, etc., any preop labs    Reason service is needed/diagnosis: ERCT procedure 12/12 at [facility]    When are orders needed by: ASAP, would like to have them completed 12/1 when she comes in for her INR, has preop scheduled 12/7    Has this been discussed with Provider: No    Does patient have a preference on a Group/Provider/Facility? [facility]    Does patient have an appointment scheduled?: Yes: 12/1/23    Where to send orders: Place orders within Epic    Could we send this information to you in [facility] or would you prefer to receive a phone call?:   Patient would prefer a phone call   Okay to leave a detailed message?: Yes at Cell number on file:    Telephone Information:   Mobile [phone]

## 2023-11-22 NOTE — TELEPHONE ENCOUNTER
Left message informing patient that orders have been placed also sent my chart message to inform.

## 2023-11-23 ENCOUNTER — APPOINTMENT (OUTPATIENT)
Dept: CT IMAGING | Facility: CLINIC | Age: 83
DRG: 690 | End: 2023-11-23
Attending: EMERGENCY MEDICINE
Payer: MEDICARE

## 2023-11-23 ENCOUNTER — HOSPITAL ENCOUNTER (INPATIENT)
Facility: CLINIC | Age: 83
LOS: 3 days | Discharge: NURSING, REHAB OR LONG-TERM CARE | DRG: 690 | End: 2023-11-27
Attending: EMERGENCY MEDICINE | Admitting: INTERNAL MEDICINE
Payer: MEDICARE

## 2023-11-23 DIAGNOSIS — N39.0 RECURRENT UTI: Primary | ICD-10-CM

## 2023-11-23 DIAGNOSIS — E87.1 HYPONATREMIA: ICD-10-CM

## 2023-11-23 DIAGNOSIS — N10 ACUTE PYELONEPHRITIS: ICD-10-CM

## 2023-11-23 DIAGNOSIS — R53.1 GENERALIZED WEAKNESS: ICD-10-CM

## 2023-11-23 LAB
ALBUMIN SERPL BCG-MCNC: 3.6 G/DL (ref 3.5–5.2)
ALBUMIN UR-MCNC: 30 MG/DL
ALP SERPL-CCNC: 113 U/L (ref 40–150)
ALT SERPL W P-5'-P-CCNC: 41 U/L (ref 0–50)
ANION GAP SERPL CALCULATED.3IONS-SCNC: 10 MMOL/L (ref 7–15)
APPEARANCE UR: ABNORMAL
AST SERPL W P-5'-P-CCNC: 51 U/L (ref 0–45)
BACTERIA #/AREA URNS HPF: ABNORMAL /HPF
BASOPHILS # BLD AUTO: 0 10E3/UL (ref 0–0.2)
BASOPHILS NFR BLD AUTO: 0 %
BILIRUB SERPL-MCNC: 0.7 MG/DL
BILIRUB UR QL STRIP: NEGATIVE
BUN SERPL-MCNC: 10.9 MG/DL (ref 8–23)
CALCIUM SERPL-MCNC: 10.1 MG/DL (ref 8.8–10.2)
CHLORIDE SERPL-SCNC: 94 MMOL/L (ref 98–107)
COLOR UR AUTO: YELLOW
CREAT SERPL-MCNC: 0.64 MG/DL (ref 0.51–0.95)
DEPRECATED HCO3 PLAS-SCNC: 24 MMOL/L (ref 22–29)
EGFRCR SERPLBLD CKD-EPI 2021: 89 ML/MIN/1.73M2
EOSINOPHIL # BLD AUTO: 0 10E3/UL (ref 0–0.7)
EOSINOPHIL NFR BLD AUTO: 0 %
ERYTHROCYTE [DISTWIDTH] IN BLOOD BY AUTOMATED COUNT: 13.4 % (ref 10–15)
GLUCOSE SERPL-MCNC: 134 MG/DL (ref 70–99)
GLUCOSE UR STRIP-MCNC: NEGATIVE MG/DL
HCT VFR BLD AUTO: 41.3 % (ref 35–47)
HGB BLD-MCNC: 14 G/DL (ref 11.7–15.7)
HGB UR QL STRIP: NEGATIVE
HYALINE CASTS: 5 /LPF
IMM GRANULOCYTES # BLD: 0 10E3/UL
IMM GRANULOCYTES NFR BLD: 0 %
INR PPP: 2.87 (ref 0.85–1.15)
KETONES UR STRIP-MCNC: NEGATIVE MG/DL
LEUKOCYTE ESTERASE UR QL STRIP: ABNORMAL
LYMPHOCYTES # BLD AUTO: 0.8 10E3/UL (ref 0.8–5.3)
LYMPHOCYTES NFR BLD AUTO: 11 %
MCH RBC QN AUTO: 33.3 PG (ref 26.5–33)
MCHC RBC AUTO-ENTMCNC: 33.9 G/DL (ref 31.5–36.5)
MCV RBC AUTO: 98 FL (ref 78–100)
MONOCYTES # BLD AUTO: 0.9 10E3/UL (ref 0–1.3)
MONOCYTES NFR BLD AUTO: 12 %
MUCOUS THREADS #/AREA URNS LPF: PRESENT /LPF
NEUTROPHILS # BLD AUTO: 5.5 10E3/UL (ref 1.6–8.3)
NEUTROPHILS NFR BLD AUTO: 77 %
NITRATE UR QL: NEGATIVE
NRBC # BLD AUTO: 0 10E3/UL
NRBC BLD AUTO-RTO: 0 /100
PH UR STRIP: 6 [PH] (ref 5–7)
PLATELET # BLD AUTO: 153 10E3/UL (ref 150–450)
POTASSIUM SERPL-SCNC: 4 MMOL/L (ref 3.4–5.3)
PROT SERPL-MCNC: 7 G/DL (ref 6.4–8.3)
RBC # BLD AUTO: 4.21 10E6/UL (ref 3.8–5.2)
RBC URINE: 0 /HPF
SODIUM SERPL-SCNC: 128 MMOL/L (ref 135–145)
SODIUM SERPL-SCNC: 129 MMOL/L (ref 135–145)
SP GR UR STRIP: 1.01 (ref 1–1.03)
UROBILINOGEN UR STRIP-MCNC: NORMAL MG/DL
WBC # BLD AUTO: 7.2 10E3/UL (ref 4–11)
WBC CLUMPS #/AREA URNS HPF: PRESENT /HPF
WBC URINE: 88 /HPF

## 2023-11-23 PROCEDURE — 84295 ASSAY OF SERUM SODIUM: CPT | Performed by: PHYSICIAN ASSISTANT

## 2023-11-23 PROCEDURE — 258N000003 HC RX IP 258 OP 636: Performed by: EMERGENCY MEDICINE

## 2023-11-23 PROCEDURE — 96365 THER/PROPH/DIAG IV INF INIT: CPT

## 2023-11-23 PROCEDURE — 250N000011 HC RX IP 250 OP 636: Performed by: EMERGENCY MEDICINE

## 2023-11-23 PROCEDURE — 96366 THER/PROPH/DIAG IV INF ADDON: CPT

## 2023-11-23 PROCEDURE — 96361 HYDRATE IV INFUSION ADD-ON: CPT

## 2023-11-23 PROCEDURE — 85025 COMPLETE CBC W/AUTO DIFF WBC: CPT | Performed by: EMERGENCY MEDICINE

## 2023-11-23 PROCEDURE — 99285 EMERGENCY DEPT VISIT HI MDM: CPT | Mod: 25

## 2023-11-23 PROCEDURE — 36415 COLL VENOUS BLD VENIPUNCTURE: CPT | Performed by: EMERGENCY MEDICINE

## 2023-11-23 PROCEDURE — 250N000009 HC RX 250: Performed by: EMERGENCY MEDICINE

## 2023-11-23 PROCEDURE — 36415 COLL VENOUS BLD VENIPUNCTURE: CPT | Performed by: PHYSICIAN ASSISTANT

## 2023-11-23 PROCEDURE — 250N000011 HC RX IP 250 OP 636: Mod: JZ | Performed by: EMERGENCY MEDICINE

## 2023-11-23 PROCEDURE — 87086 URINE CULTURE/COLONY COUNT: CPT | Performed by: EMERGENCY MEDICINE

## 2023-11-23 PROCEDURE — G0378 HOSPITAL OBSERVATION PER HR: HCPCS

## 2023-11-23 PROCEDURE — 74177 CT ABD & PELVIS W/CONTRAST: CPT | Mod: MA

## 2023-11-23 PROCEDURE — 250N000013 HC RX MED GY IP 250 OP 250 PS 637: Performed by: PHYSICIAN ASSISTANT

## 2023-11-23 PROCEDURE — 85610 PROTHROMBIN TIME: CPT | Performed by: EMERGENCY MEDICINE

## 2023-11-23 PROCEDURE — 99222 1ST HOSP IP/OBS MODERATE 55: CPT | Mod: AI | Performed by: PHYSICIAN ASSISTANT

## 2023-11-23 PROCEDURE — 81001 URINALYSIS AUTO W/SCOPE: CPT | Performed by: EMERGENCY MEDICINE

## 2023-11-23 PROCEDURE — 80053 COMPREHEN METABOLIC PANEL: CPT | Performed by: EMERGENCY MEDICINE

## 2023-11-23 PROCEDURE — 258N000003 HC RX IP 258 OP 636: Performed by: PHYSICIAN ASSISTANT

## 2023-11-23 RX ORDER — DONEPEZIL HYDROCHLORIDE 5 MG/1
5 TABLET, FILM COATED ORAL AT BEDTIME
Status: DISCONTINUED | OUTPATIENT
Start: 2023-11-23 | End: 2023-11-27 | Stop reason: HOSPADM

## 2023-11-23 RX ORDER — ACETAMINOPHEN 325 MG/1
650 TABLET ORAL EVERY 4 HOURS PRN
Status: DISCONTINUED | OUTPATIENT
Start: 2023-11-23 | End: 2023-11-27 | Stop reason: HOSPADM

## 2023-11-23 RX ORDER — HYDROMORPHONE HCL IN WATER/PF 6 MG/30 ML
0.2 PATIENT CONTROLLED ANALGESIA SYRINGE INTRAVENOUS
Status: DISCONTINUED | OUTPATIENT
Start: 2023-11-23 | End: 2023-11-27 | Stop reason: HOSPADM

## 2023-11-23 RX ORDER — CEFTRIAXONE 1 G/1
1 INJECTION, POWDER, FOR SOLUTION INTRAMUSCULAR; INTRAVENOUS ONCE
Status: COMPLETED | OUTPATIENT
Start: 2023-11-23 | End: 2023-11-23

## 2023-11-23 RX ORDER — HYDRALAZINE HYDROCHLORIDE 10 MG/1
10 TABLET, FILM COATED ORAL EVERY 4 HOURS PRN
Status: DISCONTINUED | OUTPATIENT
Start: 2023-11-23 | End: 2023-11-27 | Stop reason: HOSPADM

## 2023-11-23 RX ORDER — PROCHLORPERAZINE MALEATE 5 MG
5 TABLET ORAL EVERY 6 HOURS PRN
Status: DISCONTINUED | OUTPATIENT
Start: 2023-11-23 | End: 2023-11-27 | Stop reason: HOSPADM

## 2023-11-23 RX ORDER — HYDROMORPHONE HCL IN WATER/PF 6 MG/30 ML
0.4 PATIENT CONTROLLED ANALGESIA SYRINGE INTRAVENOUS
Status: DISCONTINUED | OUTPATIENT
Start: 2023-11-23 | End: 2023-11-27 | Stop reason: HOSPADM

## 2023-11-23 RX ORDER — BISACODYL 10 MG
10 SUPPOSITORY, RECTAL RECTAL DAILY PRN
Status: DISCONTINUED | OUTPATIENT
Start: 2023-11-23 | End: 2023-11-27 | Stop reason: HOSPADM

## 2023-11-23 RX ORDER — HYDRALAZINE HYDROCHLORIDE 20 MG/ML
10 INJECTION INTRAMUSCULAR; INTRAVENOUS EVERY 4 HOURS PRN
Status: DISCONTINUED | OUTPATIENT
Start: 2023-11-23 | End: 2023-11-27 | Stop reason: HOSPADM

## 2023-11-23 RX ORDER — WARFARIN SODIUM 7.5 MG/1
7.5 TABLET ORAL
Status: COMPLETED | OUTPATIENT
Start: 2023-11-23 | End: 2023-11-23

## 2023-11-23 RX ORDER — ONDANSETRON 4 MG/1
4 TABLET, ORALLY DISINTEGRATING ORAL EVERY 6 HOURS PRN
Status: DISCONTINUED | OUTPATIENT
Start: 2023-11-23 | End: 2023-11-27 | Stop reason: HOSPADM

## 2023-11-23 RX ORDER — OXYCODONE HYDROCHLORIDE 5 MG/1
5 TABLET ORAL EVERY 4 HOURS PRN
Status: DISCONTINUED | OUTPATIENT
Start: 2023-11-23 | End: 2023-11-27 | Stop reason: HOSPADM

## 2023-11-23 RX ORDER — AMOXICILLIN 250 MG
2 CAPSULE ORAL 2 TIMES DAILY PRN
Status: DISCONTINUED | OUTPATIENT
Start: 2023-11-23 | End: 2023-11-27 | Stop reason: HOSPADM

## 2023-11-23 RX ORDER — ACETAMINOPHEN 650 MG/1
650 SUPPOSITORY RECTAL EVERY 4 HOURS PRN
Status: DISCONTINUED | OUTPATIENT
Start: 2023-11-23 | End: 2023-11-27 | Stop reason: HOSPADM

## 2023-11-23 RX ORDER — AMOXICILLIN 250 MG
1 CAPSULE ORAL 2 TIMES DAILY PRN
Status: DISCONTINUED | OUTPATIENT
Start: 2023-11-23 | End: 2023-11-27 | Stop reason: HOSPADM

## 2023-11-23 RX ORDER — PAROXETINE 20 MG/1
40 TABLET, FILM COATED ORAL AT BEDTIME
Status: DISCONTINUED | OUTPATIENT
Start: 2023-11-23 | End: 2023-11-27 | Stop reason: HOSPADM

## 2023-11-23 RX ORDER — CEFTRIAXONE 2 G/1
2 INJECTION, POWDER, FOR SOLUTION INTRAMUSCULAR; INTRAVENOUS EVERY 24 HOURS
Status: DISCONTINUED | OUTPATIENT
Start: 2023-11-24 | End: 2023-11-27

## 2023-11-23 RX ORDER — LEVOTHYROXINE SODIUM 50 UG/1
50 TABLET ORAL
Status: DISCONTINUED | OUTPATIENT
Start: 2023-11-24 | End: 2023-11-27 | Stop reason: HOSPADM

## 2023-11-23 RX ORDER — POLYETHYLENE GLYCOL 3350 17 G/17G
17 POWDER, FOR SOLUTION ORAL 2 TIMES DAILY PRN
Status: DISCONTINUED | OUTPATIENT
Start: 2023-11-23 | End: 2023-11-27 | Stop reason: HOSPADM

## 2023-11-23 RX ORDER — AMOXICILLIN 250 MG
1 CAPSULE ORAL 2 TIMES DAILY
Status: DISCONTINUED | OUTPATIENT
Start: 2023-11-23 | End: 2023-11-27 | Stop reason: HOSPADM

## 2023-11-23 RX ORDER — SODIUM CHLORIDE, SODIUM LACTATE, POTASSIUM CHLORIDE, CALCIUM CHLORIDE 600; 310; 30; 20 MG/100ML; MG/100ML; MG/100ML; MG/100ML
INJECTION, SOLUTION INTRAVENOUS CONTINUOUS
Status: DISCONTINUED | OUTPATIENT
Start: 2023-11-23 | End: 2023-11-24

## 2023-11-23 RX ORDER — IOPAMIDOL 755 MG/ML
88 INJECTION, SOLUTION INTRAVASCULAR ONCE
Status: COMPLETED | OUTPATIENT
Start: 2023-11-23 | End: 2023-11-23

## 2023-11-23 RX ORDER — AMOXICILLIN 250 MG
2 CAPSULE ORAL 2 TIMES DAILY
Status: DISCONTINUED | OUTPATIENT
Start: 2023-11-23 | End: 2023-11-27 | Stop reason: HOSPADM

## 2023-11-23 RX ORDER — LIDOCAINE 40 MG/G
CREAM TOPICAL
Status: DISCONTINUED | OUTPATIENT
Start: 2023-11-23 | End: 2023-11-27 | Stop reason: HOSPADM

## 2023-11-23 RX ORDER — PROCHLORPERAZINE 25 MG
12.5 SUPPOSITORY, RECTAL RECTAL EVERY 12 HOURS PRN
Status: DISCONTINUED | OUTPATIENT
Start: 2023-11-23 | End: 2023-11-27 | Stop reason: HOSPADM

## 2023-11-23 RX ORDER — ONDANSETRON 2 MG/ML
4 INJECTION INTRAMUSCULAR; INTRAVENOUS EVERY 6 HOURS PRN
Status: DISCONTINUED | OUTPATIENT
Start: 2023-11-23 | End: 2023-11-27 | Stop reason: HOSPADM

## 2023-11-23 RX ADMIN — SODIUM CHLORIDE, POTASSIUM CHLORIDE, SODIUM LACTATE AND CALCIUM CHLORIDE: 600; 310; 30; 20 INJECTION, SOLUTION INTRAVENOUS at 22:41

## 2023-11-23 RX ADMIN — SODIUM CHLORIDE 65 ML: 9 INJECTION, SOLUTION INTRAVENOUS at 17:03

## 2023-11-23 RX ADMIN — WARFARIN SODIUM 7.5 MG: 7.5 TABLET ORAL at 22:38

## 2023-11-23 RX ADMIN — IOPAMIDOL 88 ML: 755 INJECTION, SOLUTION INTRAVENOUS at 17:04

## 2023-11-23 RX ADMIN — CEFTRIAXONE SODIUM 1 G: 1 INJECTION, POWDER, FOR SOLUTION INTRAMUSCULAR; INTRAVENOUS at 18:07

## 2023-11-23 RX ADMIN — SODIUM CHLORIDE 1000 ML: 9 INJECTION, SOLUTION INTRAVENOUS at 16:08

## 2023-11-23 RX ADMIN — CEFTRIAXONE SODIUM 1 G: 1 INJECTION, POWDER, FOR SOLUTION INTRAMUSCULAR; INTRAVENOUS at 17:32

## 2023-11-23 ASSESSMENT — ACTIVITIES OF DAILY LIVING (ADL)
ADLS_ACUITY_SCORE: 39
ADLS_ACUITY_SCORE: 37

## 2023-11-23 NOTE — H&P
[facility]    History and Physical - Hospitalist Service       Date of Admission:  11/23/2023    Assessment & Plan   Geneva Shepherd is a 82 year old female with past medical history significant for factor V Leiden with history of right lower extremity DVT, long-term use of warfarin, hyperlipidemia, GERD, IBS, hypothyroidism, depression, and prior history of recurrent UTIs on prophylactic Macrobid which was recently discontinued in the setting of elevated liver enzymes who was registered to observation on 11/23/2023 with acute pyelonephritis, generalized weakness, and mild hyponatremia.    Acute pyelonephritis  Hx recurrent UTIs  Previously followed with [facility], previously on macrobid for UTI prevention, but stopped recently due to rising liver enzymes as below. Last UTI several years ago per patient as she was on ppx.  Presented with 3 days of dysuria, urinary urgency and frequency, intermittent hypogastric discomfort.  Denies fevers or chills but feels quite fatigued and very generally weak.  Walks with a cane at home and lives alone.  In the ED she is hemodynamically stable and does not meet sepsis criteria.  Labs significant for sodium 128, chloride 94, normal potassium and creatinine.  Liver enzymes trending down from previous.  No leukocytosis.  Stable hemoglobin.  INR therapeutic.  UA appears infected with large leukocyte esterase, 88 white blood cells, negative nitrate, negative blood.  Urine culture sent.  CT of abdomen and pelvis with contrast revealed signs of cystitis as well as pyelitis.  Multiple colonic diverticula without evidence of diverticulitis.  Known common bile duct dilatation, she does follow with [facility].  Mild splenomegaly and calcified fibroid.  She was given 1 L of normal saline and 2 g of ceftriaxone in the ED.  - Observation status  - Follow-up urine culture  - Ceftriaxone 2 g every 24 hours  - Analgesia, antiemetics  - Monitor vitals and  temp  - Repeat labs in a.m.    Generalized weakness and lightheadedness  Likely secondary to pyelonephritis as above. Also appears dry on exam, as below low sodium and chloride levels. Received 1L NS in the ED.   - PT and social work/CC consultations  - If she feels improved, sodium improved, and is able to ambulate safely she may be able to discharge on 11/24 however is continues to be weak likely she would stay a second night in observation    Mild hypochloremic hyponatremia  128 on admission, received 1 bag of normal saline in the ED.  Suspect dehydration.  - Recheck sodium this evening  - Give another 1 L of IV fluids over 10 hours pending next sodium check  - Regular diet  - Repeat BMP in a.m.    Elevation of liver enzymes, improving  Stopped macrobid as well as previous nutrition supplements due to this. Following with [facility], planning for EUS guided liver bx 12/12.  - Follow up with [facility] on discharge  - Avoid hepatotoxins, limit Tylenol to 2 g daily    Factor V Leiden  Hx RLE DVT  Long term use of anticoagulation  - Pharmacy to dose warfarin  - Daily INR    Chronic stable diagnoses and other pertinent medical history: Appropriate PTA medications will be resumed. Nonessential medications will be held if patient is observation status.   Hypothyroidism: Continue PTA TRT when verified  Mild osteoarthritis  Mild tremor of hands  Stable mild edema: Continue PTA compression stockings        Diet: Regular Diet Adult    DVT Prophylaxis: Warfarin  Edward Catheter: Not present  Lines: None     Cardiac Monitoring: None  Code Status:  Full code, confirmed with patient on admission    Clinically Significant Risk Factors Present on Admission         # Hyponatremia: Lowest Na = 128 mmol/L in last 2 days, will monitor as appropriate         # Drug Induced Coagulation Defect: home medication list includes an anticoagulant medication         # Overweight: Estimated body mass index is 28.25 kg/m  as calculated from the following:    " Height as of this encounter: 1.676 m (5' 6\").    Weight as of this encounter: 79.4 kg (175 lb).              Disposition Plan      Expected Discharge Date: 11/24/2023              The patient's care was discussed with the Attending Physician, Dr. Kay, Patient, and Patient's Family.    Meg Kirk PA-C  Hospitalist Service  [facility]  Securely message with [email or web address] (more info)  Text page via [facility] Paging/Directory     ______________________________________________________________________    Chief Complaint   Urinary symptoms, weakness    History is obtained from the patient, electronic health record, emergency department physician, and patient's son    History of Present Illness   Geneva Shepherd is a 82 year old female with past medical history significant for factor V Leiden with history of right lower extremity DVT, long-term use of warfarin, hyperlipidemia, GERD, IBS, hypothyroidism, depression, and prior history of recurrent UTIs on prophylactic Macrobid which was recently discontinued in the setting of elevated liver enzymes who was admitted on 11/23/2023 with 3 days of urinary symptoms. Previously followed with [facility], previously on macrobid for UTI prevention, but stopped recently due to rising liver enzymes as below. Last UTI several years ago per patient as she was on ppx.  Presented with 3 days of dysuria, urinary urgency and frequency, intermittent hypogastric discomfort.  Denies fevers or chills but feels quite fatigued and very generally weak.  Walks with a cane at home and lives alone.      In the ED she is hemodynamically stable and does not meet sepsis criteria.  Labs significant for sodium 128, chloride 94, normal potassium and creatinine.  Liver enzymes trending down from previous.  No leukocytosis.  Stable hemoglobin.  INR therapeutic.  UA appears infected with large leukocyte esterase, 88 white blood cells, negative nitrate, negative blood.  Urine " culture sent.  CT of abdomen and pelvis with contrast revealed signs of cystitis as well as pyelitis.  Multiple colonic diverticula without evidence of diverticulitis.  Known common bile duct dilatation, she does follow with [facility].  Mild splenomegaly and calcified fibroid. She was given 2 g of Rocephin and 1 L of normal saline in the ED and hospitalist was called for registration to observation.    Past Medical History    Past Medical History:   Diagnosis Date    Ankle fracture, lateral malleolus, closed  March 2012    right ankle    Asymptomatic varicose veins     Depression, major     Diverticulitis of colon (without mention of hemorrhage)(562.11)     DJD (degenerative joint disease)     Elevated antinuclear antibody (JUAN ANTONIO) level 7/4/2015    minimal    Embolism and thrombosis of unspecified site 3/03    RLE    FACTOR 5 LEIDEN DEFECT (HYPERCOAGULABLE) 7/03     Heterozygote    FRACTURE OF RIGHT LATERAL PROXIMAL TIBIA 11/07    Gastro-oesophageal reflux disease     rare    Hypercalcemia     Hyperlipidemia LDL goal <160 10/31/2010    Insomnia     Irritable bowel syndrome     Obesity 9/11/2013    Pain in joint, lower leg     Phlebitis and thrombophlebitis of superficial vessels of lower extremities 7/03    right    Sprain of lumbar region     Unspecified hypothyroidism     Urinary tract infection, site not specified        Past Surgical History   Past Surgical History:   Procedure Laterality Date    ARTHROPLASTY HIP Left 8/31/2015    Procedure: ARTHROPLASTY HIP;  Surgeon: Benjamín Maddox MD;  Location:  OR    ARTHROPLASTY KNEE  1/17/2013    Procedure: ARTHROPLASTY KNEE;  RIGHT TOTAL KNEE ARTHROPLASTY (BIOMET)^ ;  Surgeon: Benjamín Maddox MD;  Location:  OR    ARTHROPLASTY KNEE Left 1/16/2017    Procedure: ARTHROPLASTY KNEE;  Surgeon: Benjamín Maddox MD;  Location:  OR    CHOLECYSTECTOMY      COLONOSCOPY N/A 4/26/2016    Procedure: COMBINED COLONOSCOPY, SINGLE OR MULTIPLE  BIOPSY/POLYPECTOMY BY BIOPSY;  Surgeon: Mario Coe MD;  Location:  GI    ENDOSCOPIC ULTRASOUND UPPER GASTROINTESTINAL TRACT (GI) N/A 4/5/2022    Procedure: ENDOSCOPIC ULTRASOUND, WITH BIOPSY;  Surgeon: Sammie Christian MD;  Location:  OR    GYN SURGERY      tubal    VASCULAR SURGERY      [facility] NONSPECIFIC PROCEDURE  7/00/01    Endometrial Biopsy.    [facility] NONSPECIFIC PROCEDURE      Tubal Ligation.    [facility] NONSPECIFIC PROCEDURE  6/02    negative coronary angio    [facility] NONSPECIFIC PROCEDURE  7/04    RLE varicose vein stripping       Prior to Admission Medications   Prior to Admission Medications   Prescriptions Last Dose Informant Patient Reported? Taking?   Ascorbic Acid (VITAMIN C PO) Past Week Self Yes Yes   Sig: Take 1,000 mg by mouth every morning (Patient takes 2 X 500 mg = 1,000 mg dose)   BETA CAROTENE PO Past Week Self Yes Yes   Sig: Take 25,000 Units by mouth daily.   Biotin 1 MG CAPS Past Week  Yes Yes   Sig: Take 1 tablet by mouth daily   CHROMIUM PICOLINATE 800 MCG OR TABS Past Week Self Yes Yes   Sig: Take 1 capsule by mouth daily   COCONUT OIL PO Past Week Self Yes Yes   Sig: Take 1 capsule by mouth every morning   Cyanocobalamin 1000 MCG CAPS Past Month  Yes Yes   Sig: Take 1 tablet by mouth daily   FISH OIL 1000 MG OR CAPS Past Week Self Yes Yes   Sig: Take 1 g by mouth daily   FLAX SEED OIL 1000 MG OR CAPS Past Week Self Yes Yes   Sig: Take 1 capsule by mouth daily   FOLIC ACID PO Past Week Self Yes Yes   Sig: Take 400 mcg by mouth daily    PARoxetine (PAXIL) 20 MG tablet 11/22/2023 at hs  No Yes   Sig: TAKE 2 TABLETS (40 MG) BY MOUTH EVERY MORNING   Patient taking differently: Take 40 mg by mouth at bedtime   TURMERIC PO Past Week Self Yes Yes   Sig: Take 1 capsule by mouth every morning   acetaminophen (TYLENOL) 500 MG tablet prn at prn  Yes Yes   Sig: Take 500 mg by mouth every 4 hours as needed   amitriptyline (ELAVIL) 25 MG tablet 11/22/2023 at pm  No Yes   Sig: TAKE 1 TABLET BY  MOUTH EVERYDAY AT BEDTIME   donepezil (ARICEPT) 10 MG tablet 2023 at hs  No Yes   Sig: TAKE 1/2  TABLET BY MOUTH EVERY NIGHT AT BEDTIME   levOCARNitine (CARNITOR) 330 MG tablet Past Week Self Yes Yes   Sig: Take 330 mg by mouth every morning   levothyroxine (SYNTHROID/LEVOTHROID) 50 MCG tablet 2023 at am  No Yes   Sig: Take 1 tablet (50 mcg) by mouth daily   magnesium 100 MG CAPS Past Week  Yes Yes   Sig: Take 400 mg by mouth daily   warfarin ANTICOAGULANT (COUMADIN) 5 MG tablet 2023 at pm  No Yes   Sig: TAKE 7.5 MG SUN, TUE, THURS 5 MG ALL OTHER DAYS OR AS DIRECTED BY INR CLINIC   zinc gluconate 50 MG tablet Past Week  Yes Yes   Sig: Take 50 mg by mouth daily      Facility-Administered Medications: None        Review of Systems    The 10 point Review of Systems is negative other than noted in the HPI or here.     Social History   I have reviewed this patient's social history and updated it with pertinent information if needed.  Social History     Tobacco Use    Smoking status: Former     Types: Cigarettes     Quit date: 1968     Years since quittin.2    Smokeless tobacco: Never    Tobacco comments:     quit in    Substance Use Topics    Alcohol use: Not Currently     Comment: 1 glass of wine a month    Drug use: No         Allergies   No Known Allergies     Physical Exam   Vital Signs: Temp: 97  F (36.1  C)   BP: 124/50 Pulse: 84   Resp: 18 SpO2: 97 %      Weight: 175 lbs 0 oz    Physical Exam    General: Awake, alert, very pleasant elderly woman who appears stated age. Looks comfortable sitting up in bed. No acute distress.  HEENT: Normocephalic, atraumatic. Extraocular movements intact.   Respiratory: Clear to auscultation bilaterally, no rales, wheezing, or rhonchi.  Cardiovascular: Regular rate and rhythm, +S1 and S2, no murmur auscultated. No peripheral edema.   Gastrointestinal: Soft, non-tender, non-distended.  Bowel sounds present.  Skin: Warm, dry. No obvious rashes or  lesions on exposed skin. Dorsalis pedis pulses palpable bilaterally.  Musculoskeletal: No joint swelling, erythema or tenderness. Moves all extremities equally.  Neurologic: AAO x3.  Follows commands and converses normally.  Psychiatric: Appropriate mood and affect. No obvious anxiety or depression.      Medical Decision Making       60 MINUTES SPENT BY ME on the date of service doing chart review, history, exam, documentation & further activities per the note.      Data     I have personally reviewed the following data over the past 24 hrs:    7.2  \   14.0   / 153     128 (L) 94 (L) 10.9 /  134 (H)   4.0 24 0.64 \     ALT: 41 AST: 51 (H) AP: 113 TBILI: 0.7   ALB: 3.6 TOT PROTEIN: 7.0 LIPASE: N/A     INR:  2.87 (H) PTT:  N/A   D-dimer:  N/A Fibrinogen:  N/A       Imaging results reviewed over the past 24 hrs:   Recent Results (from the past 24 hour(s))   CT Abdomen Pelvis w Contrast    Narrative    EXAM: CT ABDOMEN PELVIS W CONTRAST  LOCATION: [facility]  DATE: 11/23/2023    INDICATION: Urinary frequency and lower abdominal pain. Check for diverticulitis, appendicitis, cystitis, pyelonephritis.  COMPARISON: None.  TECHNIQUE: CT scan of the abdomen and pelvis was performed following injection of IV contrast. Multiplanar reformats were obtained. Dose reduction techniques were used.  CONTRAST: 88 mL Isovue-370.    FINDINGS:   LOWER CHEST: Tiny right pleural effusion.    HEPATOBILIARY: Significant dilatation of the common bile duct measuring 1.8 cm. Cholecystectomy.    PANCREAS: Atrophic.    SPLEEN: Mild splenomegaly.    ADRENAL GLANDS: Normal.    KIDNEYS/BLADDER: Urothelial thickening enhancement left ureter and renal pelvis compatible with pyelitis.    BOWEL: Normal.    LYMPH NODES: Normal.    VASCULATURE: Unremarkable.    PELVIC ORGANS: Urinary bladder wall thickening compatible with cystitis. Calcified fibroid.    MUSCULOSKELETAL: Left hip arthroplasty with lumbar degenerative change.  Moderate compression of L3 with mild-to-moderate compression of L1.      Impression    IMPRESSION:   1.  Urinary bladder wall thickening compatible with cystitis. Urothelial thickening and enhancement proximal left ureter and renal pelvis compatible with pyelitis. Normal renal enhancement.    2.  Multiple colonic diverticula without CT evidence for diverticulitis.    3.  Cholecystectomy. There is significant common bile duct dilatation measuring 1.8 cm. If there is lab correlation, MRCP could be performed.    4.  Mild splenomegaly.    5.  Calcified fibroid.

## 2023-11-23 NOTE — ED PROVIDER NOTES
"    History     Chief Complaint:  Urinary Frequency and Generalized Weakness       HPI   Geneva Shepherd is a 82 year old female who has a history of factor 5 leiden, anticoagulated on Xarelto and presents with urinary frequency and generalized weakness. For the past three days she has increased urinary urgency and frequency, along with lower abdominal pain. She has urinary incontinence along with the urinary frequency. She also has some dysuria. Today she began feeling generally weak and lightheaded, prompting her visit here to the ED. The patient follows with [facility] for elevated liver enzymes. She stopped taking gabapentin and Nitrofuantoin three weeks ago due to possible links to her elevated liver enzymes.  Then 1.5 weeks ago she was told to stop the supplements she takes in case those were the cause of her lever enzyme elevation.  She requests that her liver enzymes be rechecked today.   She denies nausea, vomiting, diarrhea, fever, chills, or hematuria. She was on Macrobid for years due to urine self-cath.     Independent Historian:    None    Review of External Notes:  none    Medications:    amitriptyline  Benzonatate  donepezil   levocarnitine   levothyroxine   nitrofurantoin macrocrystal  oxycodone  Paroxetine  warfarin     Past Medical History:    Ankle fracture, lateral malleolus  Asymptomatic varicose veins  Colon diverticulitis  DJD  Embolism and thrombosis of unspecified site  GERD  Hyperlipidemia  IBS  Hypothyroidism  UTI  Hypercalcemia  IBS  Factor 5 leiden   OA  Activated protien C resistance  Depression       Past Surgical History:    Hip arthroplasty  Knee arthroplasty  Cholecystectomy  Tubal ligation  Coronary angiogram  RLE varicose vein stripping  Colonoscopy    Physical Exam   Patient Vitals for the past 24 hrs:   BP Temp Pulse Resp SpO2 Height Weight   11/23/23 1457 124/50 97  F (36.1  C) 84 18 97 % 1.676 m (5' 6\") 79.4 kg (175 lb)      Physical Exam  Nursing note and vitals " reviewed.  Constitutional:  Appears well-developed and well-nourished.   HENT:   Head:    Atraumatic.   Mouth/Throat:   Oropharynx is clear and moist. No oropharyngeal exudate.   Eyes:    Pupils are equal, round, and reactive to light.   Neck:    Normal range of motion. Neck supple.      No tracheal deviation present. No thyromegaly present.   Cardiovascular:  Normal rate, regular rhythm, no murmur   Pulmonary/Chest: Breath sounds are clear and equal without wheezes or crackles.  Abdominal:   Soft. Bowel sounds are normal. Exhibits no distension and      no mass. Minimal tenderness in the superpubic region of the lower abdomen.      There is no rebound and no guarding.   Musculoskeletal:  Exhibits no edema. Chronic venous stasis changes in both legs, without any redness, tenderness, or swelling.  Lymphadenopathy:  No cervical adenopathy.   Neurological:   Alert and oriented to person, place, and time. GCS 15.  CN 2-12 intact.  and proximal upper extremity strength strong and equal.  Bilateral lower extremity strength strong and equal, including strong dorsiflexion and plantarflexion strength.  Sensation intact and equal to the face, arms and legs.  No facial droop or weakness. Normal speech.  Follows commands and answers questions normally.    Skin:    Skin is warm and dry. No rash noted. No pallor.     Emergency Department Course   Imaging:  CT Abdomen Pelvis w Contrast   Final Result   IMPRESSION:    1.  Urinary bladder wall thickening compatible with cystitis. Urothelial thickening and enhancement proximal left ureter and renal pelvis compatible with pyelitis. Normal renal enhancement.      2.  Multiple colonic diverticula without CT evidence for diverticulitis.      3.  Cholecystectomy. There is significant common bile duct dilatation measuring 1.8 cm. If there is lab correlation, MRCP could be performed.      4.  Mild splenomegaly.      5.  Calcified fibroid.        Report per  radiology    Laboratory:  Labs Ordered and Resulted from Time of ED Arrival to Time of ED Departure   ROUTINE UA WITH MICROSCOPIC REFLEX TO CULTURE - Abnormal       Result Value    Color Urine Yellow      Appearance Urine Slightly Cloudy (*)     Glucose Urine Negative      Bilirubin Urine Negative      Ketones Urine Negative      Specific Gravity Urine 1.009      Blood Urine Negative      pH Urine 6.0      Protein Albumin Urine 30 (*)     Urobilinogen Urine Normal      Nitrite Urine Negative      Leukocyte Esterase Urine Large (*)     Bacteria Urine Moderate (*)     WBC Clumps Urine Present (*)     Mucus Urine Present (*)     RBC Urine 0      WBC Urine 88 (*)     Hyaline Casts Urine 5 (*)    COMPREHENSIVE METABOLIC PANEL - Abnormal    Sodium 128 (*)     Potassium 4.0      Carbon Dioxide (CO2) 24      Anion Gap 10      Urea Nitrogen 10.9      Creatinine 0.64      GFR Estimate 89      Calcium 10.1      Chloride 94 (*)     Glucose 134 (*)     Alkaline Phosphatase 113      AST 51 (*)     ALT 41      Protein Total 7.0      Albumin 3.6      Bilirubin Total 0.7     CBC WITH PLATELETS AND DIFFERENTIAL - Abnormal    WBC Count 7.2      RBC Count 4.21      Hemoglobin 14.0      Hematocrit 41.3      MCV 98      MCH 33.3 (*)     MCHC 33.9      RDW 13.4      Platelet Count 153      % Neutrophils 77      % Lymphocytes 11      % Monocytes 12      % Eosinophils 0      % Basophils 0      % Immature Granulocytes 0      NRBCs per 100 WBC 0      Absolute Neutrophils 5.5      Absolute Lymphocytes 0.8      Absolute Monocytes 0.9      Absolute Eosinophils 0.0      Absolute Basophils 0.0      Absolute Immature Granulocytes 0.0      Absolute NRBCs 0.0     INR   URINE CULTURE      Emergency Department Course & Assessments:    Interventions:  Medications   sodium chloride 0.9% BOLUS 1,000 mL (1,000 mLs Intravenous $New Bag 11/23/23 4667)   Saline (has no administration in time range)   iopamidol (ISOVUE-370) solution 88 mL (has no  administration in time range)   cefTRIAXone (ROCEPHIN) 1 g vial to attach to  mL bag for ADULTS or NS 50 mL bag for PEDS (has no administration in time range)      Assessments:  1535 I obtained history and examined patient.   1750:   I spoke with the patient regarding admission    Independent Interpretation (X-rays, CTs, rhythm strip):  None    Consultations/Discussion of Management or Tests:  I spoke with Meg VALLEJO regarding admission to the hospital    Social Determinants of Health affecting care:  None     Disposition:  The patient was admitted to the hospital under the care of Meg VALLEJO, and Dr. Kay  Impression & Plan    Medical Decision Making:  This patient to have acute pyelonephritis, hyponatremia and generalized weakness.  CT scan of her abdomen pelvis did not show any sign of diverticulitis or appendicitis but did reveal acute pyelonephritis.  She was given ceftriaxone IV and admitted to the hospital for IV hydration and further treatment due to her generalized weakness.  She does not have any other signs of sepsis.    Diagnosis:    ICD-10-CM    1. Acute pyelonephritis  N10       2. Hyponatremia  E87.1       3. Generalized weakness  R53.1            Discharge Medications:  New Prescriptions    No medications on file      Scribe Disclosure:  Una WARD, am serving as a scribe at 3:26 PM on 11/23/2023 to document services personally performed by Sabi Velez MD based on my observations and the provider's statements to me.  11/23/2023   Sabi Velez MD [place], Cheri Lee, MD  11/23/23 4756

## 2023-11-23 NOTE — ED NOTES
[facility]  ED Nurse Handoff Report    ED Chief complaint: Urinary Frequency and Generalized Weakness      ED Diagnosis:   Final diagnoses:   Acute pyelonephritis   Hyponatremia   Generalized weakness       Code Status: To be determined by admitting provider.     Allergies: No Known Allergies    Patient Story:  Patient has been having urinary frequency, urgency and incontinence for the last 3 days. States that they took her off some of her medications since her liver enzymes were elevated.     Focused Assessment:    Patient is A&Ox4. Denies cough or SOB. Breathing rate, rhythm and SPO2 WDL. Denies chest pain or cardiac concern. HR WDL. States frequency, urgency and incontinence for 3 days.     Labs Ordered and Resulted from Time of ED Arrival to Time of ED Departure   ROUTINE UA WITH MICROSCOPIC REFLEX TO CULTURE - Abnormal       Result Value    Color Urine Yellow      Appearance Urine Slightly Cloudy (*)     Glucose Urine Negative      Bilirubin Urine Negative      Ketones Urine Negative      Specific Gravity Urine 1.009      Blood Urine Negative      pH Urine 6.0      Protein Albumin Urine 30 (*)     Urobilinogen Urine Normal      Nitrite Urine Negative      Leukocyte Esterase Urine Large (*)     Bacteria Urine Moderate (*)     WBC Clumps Urine Present (*)     Mucus Urine Present (*)     RBC Urine 0      WBC Urine 88 (*)     Hyaline Casts Urine 5 (*)    COMPREHENSIVE METABOLIC PANEL - Abnormal    Sodium 128 (*)     Potassium 4.0      Carbon Dioxide (CO2) 24      Anion Gap 10      Urea Nitrogen 10.9      Creatinine 0.64      GFR Estimate 89      Calcium 10.1      Chloride 94 (*)     Glucose 134 (*)     Alkaline Phosphatase 113      AST 51 (*)     ALT 41      Protein Total 7.0      Albumin 3.6      Bilirubin Total 0.7     CBC WITH PLATELETS AND DIFFERENTIAL - Abnormal    WBC Count 7.2      RBC Count 4.21      Hemoglobin 14.0      Hematocrit 41.3      MCV 98      MCH 33.3 (*)     MCHC 33.9      RDW  13.4      Platelet Count 153      % Neutrophils 77      % Lymphocytes 11      % Monocytes 12      % Eosinophils 0      % Basophils 0      % Immature Granulocytes 0      NRBCs per 100 WBC 0      Absolute Neutrophils 5.5      Absolute Lymphocytes 0.8      Absolute Monocytes 0.9      Absolute Eosinophils 0.0      Absolute Basophils 0.0      Absolute Immature Granulocytes 0.0      Absolute NRBCs 0.0     INR - Abnormal    INR 2.87 (*)    URINE CULTURE       CT Abdomen Pelvis w Contrast   Final Result   IMPRESSION:    1.  Urinary bladder wall thickening compatible with cystitis. Urothelial thickening and enhancement proximal left ureter and renal pelvis compatible with pyelitis. Normal renal enhancement.      2.  Multiple colonic diverticula without CT evidence for diverticulitis.      3.  Cholecystectomy. There is significant common bile duct dilatation measuring 1.8 cm. If there is lab correlation, MRCP could be performed.      4.  Mild splenomegaly.      5.  Calcified fibroid.            Treatments and/or interventions provided:  Medications   cefTRIAXone (ROCEPHIN) 1 g vial to attach to  mL bag for ADULTS or NS 50 mL bag for PEDS (1 g Intravenous $New Bag 11/23/23 1732)   cefTRIAXone (ROCEPHIN) 1 g vial to attach to  mL bag for ADULTS or NS 50 mL bag for PEDS (has no administration in time range)   sodium chloride 0.9% BOLUS 1,000 mL (0 mLs Intravenous Stopped 11/23/23 1721)   Saline (65 mLs As instructed $Given 11/23/23 1703)   iopamidol (ISOVUE-370) solution 88 mL (88 mLs Intravenous $Given 11/23/23 1704)       Patient's response to treatments and/or interventions:  Patient remains stable.     To be done/followed up on inpatient unit:   See any in-patient orders. IV abx.     Does this patient have any cognitive concerns?:  N/A    Activity level - Baseline/Home:    Cane    Activity Level - Current:    Stand with Assist    Patient's Preferred language: English     Needed?: No    Isolation:  "None  Infection: Not Applicable  Patient tested for COVID 19 prior to admission: NO    Bariatric?: No    Vital Signs:   Vitals:    11/23/23 1457   BP: 124/50   Pulse: 84   Resp: 18   Temp: 97  F (36.1  C)   SpO2: 97%   Weight: 79.4 kg (175 lb)   Height: 1.676 m (5' 6\")       Cardiac Rhythm:     Was the PSS-3 completed:   Yes  What interventions are required if any?                 Family Comments: N/A    OBS brochure/video discussed/provided to patient/family: No              Name of person given brochure if not patient: N/A              Relationship to patient: N/A    For the majority of the shift this patient's behavior was Green.  Behavioral interventions performed were N/A.    ED NURSE PHONE NUMBER: *[phone]   "

## 2023-11-23 NOTE — ED TRIAGE NOTES
Pt reports the last 3 days she is constantly having urinary incontinence, feeling weak.,     Triage Assessment (Adult)       Row Name 11/23/23 4513          Triage Assessment    Airway WDL WDL        Respiratory WDL    Respiratory WDL WDL        Skin Circulation/Temperature WDL    Skin Circulation/Temperature WDL WDL        Cardiac WDL    Cardiac WDL WDL        Peripheral/Neurovascular WDL    Peripheral Neurovascular WDL WDL        Cognitive/Neuro/Behavioral WDL    Cognitive/Neuro/Behavioral WDL WDL

## 2023-11-24 ENCOUNTER — APPOINTMENT (OUTPATIENT)
Dept: GENERAL RADIOLOGY | Facility: CLINIC | Age: 83
DRG: 690 | End: 2023-11-24
Attending: HOSPITALIST
Payer: MEDICARE

## 2023-11-24 ENCOUNTER — APPOINTMENT (OUTPATIENT)
Dept: PHYSICAL THERAPY | Facility: CLINIC | Age: 83
DRG: 690 | End: 2023-11-24
Attending: PHYSICIAN ASSISTANT
Payer: MEDICARE

## 2023-11-24 LAB
ALBUMIN SERPL BCG-MCNC: 3 G/DL (ref 3.5–5.2)
ALP SERPL-CCNC: 100 U/L (ref 40–150)
ALT SERPL W P-5'-P-CCNC: 27 U/L (ref 0–50)
ANION GAP SERPL CALCULATED.3IONS-SCNC: 10 MMOL/L (ref 7–15)
AST SERPL W P-5'-P-CCNC: 43 U/L (ref 0–45)
BILIRUB SERPL-MCNC: 0.6 MG/DL
BUN SERPL-MCNC: 7.9 MG/DL (ref 8–23)
CALCIUM SERPL-MCNC: 9.5 MG/DL (ref 8.8–10.2)
CHLORIDE SERPL-SCNC: 101 MMOL/L (ref 98–107)
CREAT SERPL-MCNC: 0.59 MG/DL (ref 0.51–0.95)
DEPRECATED HCO3 PLAS-SCNC: 21 MMOL/L (ref 22–29)
EGFRCR SERPLBLD CKD-EPI 2021: >90 ML/MIN/1.73M2
ERYTHROCYTE [DISTWIDTH] IN BLOOD BY AUTOMATED COUNT: 13.2 % (ref 10–15)
FLUAV RNA SPEC QL NAA+PROBE: NEGATIVE
FLUBV RNA RESP QL NAA+PROBE: NEGATIVE
GLUCOSE SERPL-MCNC: 96 MG/DL (ref 70–99)
HCT VFR BLD AUTO: 38.6 % (ref 35–47)
HGB BLD-MCNC: 13 G/DL (ref 11.7–15.7)
INR PPP: 2.41 (ref 0.85–1.15)
INR PPP: 2.43 (ref 0.85–1.15)
MCH RBC QN AUTO: 33.1 PG (ref 26.5–33)
MCHC RBC AUTO-ENTMCNC: 33.7 G/DL (ref 31.5–36.5)
MCV RBC AUTO: 98 FL (ref 78–100)
NT-PROBNP SERPL-MCNC: 899 PG/ML (ref 0–1800)
PLATELET # BLD AUTO: 135 10E3/UL (ref 150–450)
POTASSIUM SERPL-SCNC: 3.4 MMOL/L (ref 3.4–5.3)
POTASSIUM SERPL-SCNC: 4.2 MMOL/L (ref 3.4–5.3)
PROT SERPL-MCNC: 5.8 G/DL (ref 6.4–8.3)
RBC # BLD AUTO: 3.93 10E6/UL (ref 3.8–5.2)
RSV RNA SPEC NAA+PROBE: NEGATIVE
SARS-COV-2 RNA RESP QL NAA+PROBE: NEGATIVE
SODIUM SERPL-SCNC: 132 MMOL/L (ref 135–145)
TSH SERPL DL<=0.005 MIU/L-ACNC: 1.99 UIU/ML (ref 0.3–4.2)
VIT B12 SERPL-MCNC: 1147 PG/ML (ref 232–1245)
WBC # BLD AUTO: 7.6 10E3/UL (ref 4–11)

## 2023-11-24 PROCEDURE — G0378 HOSPITAL OBSERVATION PER HR: HCPCS

## 2023-11-24 PROCEDURE — 85027 COMPLETE CBC AUTOMATED: CPT | Performed by: PHYSICIAN ASSISTANT

## 2023-11-24 PROCEDURE — 71046 X-RAY EXAM CHEST 2 VIEWS: CPT

## 2023-11-24 PROCEDURE — 36415 COLL VENOUS BLD VENIPUNCTURE: CPT | Performed by: PHYSICIAN ASSISTANT

## 2023-11-24 PROCEDURE — 250N000013 HC RX MED GY IP 250 OP 250 PS 637: Performed by: INTERNAL MEDICINE

## 2023-11-24 PROCEDURE — 96361 HYDRATE IV INFUSION ADD-ON: CPT

## 2023-11-24 PROCEDURE — 97161 PT EVAL LOW COMPLEX 20 MIN: CPT | Mod: GP

## 2023-11-24 PROCEDURE — 120N000001 HC R&B MED SURG/OB

## 2023-11-24 PROCEDURE — 82607 VITAMIN B-12: CPT | Performed by: HOSPITALIST

## 2023-11-24 PROCEDURE — 84132 ASSAY OF SERUM POTASSIUM: CPT | Performed by: HOSPITALIST

## 2023-11-24 PROCEDURE — 80053 COMPREHEN METABOLIC PANEL: CPT | Performed by: PHYSICIAN ASSISTANT

## 2023-11-24 PROCEDURE — 250N000011 HC RX IP 250 OP 636: Mod: JZ | Performed by: PHYSICIAN ASSISTANT

## 2023-11-24 PROCEDURE — 97116 GAIT TRAINING THERAPY: CPT | Mod: GP

## 2023-11-24 PROCEDURE — 99233 SBSQ HOSP IP/OBS HIGH 50: CPT | Performed by: HOSPITALIST

## 2023-11-24 PROCEDURE — 83880 ASSAY OF NATRIURETIC PEPTIDE: CPT | Performed by: HOSPITALIST

## 2023-11-24 PROCEDURE — 85610 PROTHROMBIN TIME: CPT | Performed by: PHYSICIAN ASSISTANT

## 2023-11-24 PROCEDURE — 97530 THERAPEUTIC ACTIVITIES: CPT | Mod: GP

## 2023-11-24 PROCEDURE — 36415 COLL VENOUS BLD VENIPUNCTURE: CPT | Performed by: HOSPITALIST

## 2023-11-24 PROCEDURE — 84443 ASSAY THYROID STIM HORMONE: CPT | Performed by: HOSPITALIST

## 2023-11-24 PROCEDURE — 250N000013 HC RX MED GY IP 250 OP 250 PS 637: Performed by: PHYSICIAN ASSISTANT

## 2023-11-24 PROCEDURE — 87637 SARSCOV2&INF A&B&RSV AMP PRB: CPT | Performed by: HOSPITALIST

## 2023-11-24 PROCEDURE — 250N000013 HC RX MED GY IP 250 OP 250 PS 637: Performed by: HOSPITALIST

## 2023-11-24 RX ORDER — NALOXONE HYDROCHLORIDE 0.4 MG/ML
0.4 INJECTION, SOLUTION INTRAMUSCULAR; INTRAVENOUS; SUBCUTANEOUS
Status: DISCONTINUED | OUTPATIENT
Start: 2023-11-24 | End: 2023-11-27 | Stop reason: HOSPADM

## 2023-11-24 RX ORDER — POTASSIUM CHLORIDE 1500 MG/1
40 TABLET, EXTENDED RELEASE ORAL ONCE
Status: COMPLETED | OUTPATIENT
Start: 2023-11-24 | End: 2023-11-24

## 2023-11-24 RX ORDER — WARFARIN SODIUM 5 MG/1
5 TABLET ORAL
Status: COMPLETED | OUTPATIENT
Start: 2023-11-24 | End: 2023-11-24

## 2023-11-24 RX ORDER — NALOXONE HYDROCHLORIDE 0.4 MG/ML
0.2 INJECTION, SOLUTION INTRAMUSCULAR; INTRAVENOUS; SUBCUTANEOUS
Status: DISCONTINUED | OUTPATIENT
Start: 2023-11-24 | End: 2023-11-27 | Stop reason: HOSPADM

## 2023-11-24 RX ADMIN — POTASSIUM CHLORIDE 40 MEQ: 1500 TABLET, EXTENDED RELEASE ORAL at 13:15

## 2023-11-24 RX ADMIN — ACETAMINOPHEN 650 MG: 325 TABLET, FILM COATED ORAL at 13:18

## 2023-11-24 RX ADMIN — CEFTRIAXONE SODIUM 2 G: 2 INJECTION, POWDER, FOR SOLUTION INTRAMUSCULAR; INTRAVENOUS at 18:05

## 2023-11-24 RX ADMIN — DONEPEZIL HYDROCHLORIDE 5 MG: 5 TABLET, FILM COATED ORAL at 21:27

## 2023-11-24 RX ADMIN — PAROXETINE HYDROCHLORIDE 40 MG: 20 TABLET, FILM COATED ORAL at 00:07

## 2023-11-24 RX ADMIN — DONEPEZIL HYDROCHLORIDE 5 MG: 5 TABLET, FILM COATED ORAL at 00:07

## 2023-11-24 RX ADMIN — AMITRIPTYLINE HYDROCHLORIDE 25 MG: 25 TABLET, FILM COATED ORAL at 00:06

## 2023-11-24 RX ADMIN — LEVOTHYROXINE SODIUM 50 MCG: 50 TABLET ORAL at 06:53

## 2023-11-24 RX ADMIN — WARFARIN SODIUM 5 MG: 5 TABLET ORAL at 18:05

## 2023-11-24 RX ADMIN — PAROXETINE HYDROCHLORIDE 40 MG: 20 TABLET, FILM COATED ORAL at 21:27

## 2023-11-24 RX ADMIN — DOCUSATE SODIUM 50 MG AND SENNOSIDES 8.6 MG 2 TABLET: 8.6; 5 TABLET, FILM COATED ORAL at 09:50

## 2023-11-24 RX ADMIN — AMITRIPTYLINE HYDROCHLORIDE 25 MG: 25 TABLET, FILM COATED ORAL at 21:27

## 2023-11-24 RX ADMIN — DOCUSATE SODIUM 50 MG AND SENNOSIDES 8.6 MG 2 TABLET: 8.6; 5 TABLET, FILM COATED ORAL at 19:30

## 2023-11-24 ASSESSMENT — ACTIVITIES OF DAILY LIVING (ADL)
ADLS_ACUITY_SCORE: 39
ADLS_ACUITY_SCORE: 32
ADLS_ACUITY_SCORE: 45
ADLS_ACUITY_SCORE: 45
ADLS_ACUITY_SCORE: 32
ADLS_ACUITY_SCORE: 45
ADLS_ACUITY_SCORE: 29
ADLS_ACUITY_SCORE: 28
ADLS_ACUITY_SCORE: 29
ADLS_ACUITY_SCORE: 45
ADLS_ACUITY_SCORE: 32
ADLS_ACUITY_SCORE: 28

## 2023-11-24 NOTE — PROGRESS NOTES
RECEIVING UNIT ED HANDOFF REVIEW    ED Nurse Handoff Report was reviewed by: Vitor Motta RN on November 24, 2023 at 12:13 AM

## 2023-11-24 NOTE — PHARMACY-ADMISSION MEDICATION HISTORY
Pharmacist Admission Medication History    Admission medication history is complete. The information provided in this note is only as accurate as the sources available at the time of the update.    Information Source(s): Patient and CareEverywhere/SureScripts via in-person    Pertinent Information:   - Warfarin: 7.5mg Sun, Tues, Thurs and 5mg ROW, confirmed with [facility] note  - Supplements: PCP had pt hold for the past week.    Changes made to PTA medication list:  Added:   Vitamin B-12  Deleted:   Benzonatate, Macrobid (per patient isn't taking any antibiotic, did not seem to recognize the name nor the indication), Oxycodone, B-complex  Changed: None    Medication Affordability:       Allergies reviewed with patient and updates made in EHR: yes    Medication History Completed By: Shagufta Vieyra [facility] 11/23/2023 6:30 PM    PTA Med List   Medication Sig Last Dose    acetaminophen (TYLENOL) 500 MG tablet Take 500 mg by mouth every 4 hours as needed prn at prn    amitriptyline (ELAVIL) 25 MG tablet TAKE 1 TABLET BY MOUTH EVERYDAY AT BEDTIME 11/22/2023 at pm    Ascorbic Acid (VITAMIN C PO) Take 1,000 mg by mouth every morning (Patient takes 2 X 500 mg = 1,000 mg dose) Past Week    BETA CAROTENE PO Take 25,000 Units by mouth daily. Past Week    Biotin 1 MG CAPS Take 1 tablet by mouth daily Past Week    CHROMIUM PICOLINATE 800 MCG OR TABS Take 1 capsule by mouth daily Past Week    COCONUT OIL PO Take 1 capsule by mouth every morning Past Week    Cyanocobalamin 1000 MCG CAPS Take 1 tablet by mouth daily Past Month    donepezil (ARICEPT) 10 MG tablet TAKE 1/2  TABLET BY MOUTH EVERY NIGHT AT BEDTIME 11/22/2023 at hs    FISH OIL 1000 MG OR CAPS Take 1 g by mouth daily Past Week    FLAX SEED OIL 1000 MG OR CAPS Take 1 capsule by mouth daily Past Week    FOLIC ACID PO Take 400 mcg by mouth daily  Past Week    levOCARNitine (CARNITOR) 330 MG tablet Take 330 mg by mouth every morning Past Week    levothyroxine (SYNTHROID/LEVOTHROID)  50 MCG tablet Take 1 tablet (50 mcg) by mouth daily 11/23/2023 at am    magnesium 100 MG CAPS Take 400 mg by mouth daily Past Week    PARoxetine (PAXIL) 20 MG tablet TAKE 2 TABLETS (40 MG) BY MOUTH EVERY MORNING (Patient taking differently: Take 40 mg by mouth at bedtime) 11/22/2023 at hs    TURMERIC PO Take 1 capsule by mouth every morning Past Week    warfarin ANTICOAGULANT (COUMADIN) 5 MG tablet TAKE 7.5 MG SUN, TUE, THURS 5 MG ALL OTHER DAYS OR AS DIRECTED BY INR CLINIC 11/22/2023 at pm    zinc gluconate 50 MG tablet Take 50 mg by mouth daily Past Week

## 2023-11-24 NOTE — PLAN OF CARE
PRIMARY Concern: weakness   SAFETY RISK Concerns (fall risk, behaviors, etc.): fall      Isolation/Type: none  Tests/Procedures for NEXT shift: none  Consults? (Pending/following, signed-off?) PT/SW  Where is patient from? (Home, TCU, etc.): Home  Other Important info for NEXT shift:   Anticipated DC date & active delays:     SUMMARY NOTE:            Orientation/Cognitive: A&OX4  Observation Goals (Met/ Not Met): Not Met  Mobility Level/Assist Equipment: A2/GB/Walker  Antibiotics & Plan (IV/po, length of tx left): IV Rocephin   Pain Management: Denies  Tele/VS/O2: VSS/RA  ABNL Lab/BG: See Chart  Diet: Regular Diet  Bowel/Bladder: Incontinent  Skin Concerns: None  Drains/Devices:   Patient Stated Goal for Today: Sleep         Observation goals  PRIOR TO DISCHARGE       Comments: -diagnostic tests and consults completed and resulted  -vital signs normal or at patient baseline-Met  -tolerating oral intake to maintain hydration -Met  -tolerating oral antibiotics or has plans for home infusion setup - Not Met  -infection is improving- Not Met  -returns to baseline functional status - Not Met  -safe disposition plan has been identified - Not Met  Nurse to notify provider when observation goals have been met and patient is ready for discharge.

## 2023-11-24 NOTE — PHARMACY-ANTICOAGULATION SERVICE
Clinical Pharmacy - Warfarin Dosing Consult     Pharmacy has been consulted to manage this patient s warfarin therapy.  Indication: DVT/PE Prophylaxis  Therapy Goal: INR 2-3  OP Anticoag Clinic: [facility]  Warfarin Prior to Admission: Yes  Warfarin PTA Regimen: 7.5mg Sun, Tues, Thurs and 5mg ROW,  Significant drug interactions:   Increased bleeding risk: amitriptyline, paroxetine    INR   Date Value Ref Range Status   11/23/2023 2.87 (H) 0.85 - 1.15 Final   11/14/2023 1.9 (H) 0.9 - 1.1 Final       Recommend warfarin 7.5 mg today.  Pharmacy will monitor Geneva Shepherd daily and order warfarin doses to achieve specified goal.      Please contact pharmacy as soon as possible if the warfarin needs to be held for a procedure or if the warfarin goals change.

## 2023-11-24 NOTE — PROGRESS NOTES
[facility]  Hospitalist Progress Note   11/24/2023          Assessment and Plan:       Geneva Shepherd is a 82 year old female with factor V Leiden with history of right lower extremity DVT, long-term use of warfarin, hyperlipidemia, GERD, IBS, hypothyroidism, depression, and prior history of recurrent UTIs on prophylactic Macrobid which was recently discontinued in the setting of elevated liver enzymes admitted on 11/23/2020 with generalized weakness.     Acute complicated pyelonephritis  Hx recurrent UTIs  Previously followed with [facility], previously on macrobid for UTI prevention, but stopped recently due to rising liver enzymes as below. Last UTI several years ago per patient as she was on ppx.  Presented with 3 days of dysuria, urinary urgency and frequency, intermittent hypogastric discomfort.   -- Afebrile, no leukocytosis.  UA appears infected with large leukocyte esterase, 88 white blood cells, negative nitrate, negative blood.    CT of abdomen and pelvis with contrast revealed signs of cystitis as well as pyelitis.  Multiple colonic diverticula without evidence of diverticulitis.    -- Continue IV ceftriaxone.  Follow urine cultures.  Trend WBC count, fever curve.    Physical deconditioning from medical illness, senile fragility.  Lightheadedness likely from poor intake, electrolyte abnormalities improving.  Patient reports lives alone at home, uses cane to ambulate.  Reports generalized weakness, poor intake, loss of appetite for the last few days.    -- Address medical issues.  Follow TSH, vitamin B12, CK levels.  Telemetry monitoring ordered.  PT evaluation, OT evaluation requested.  Social work assistance with transition plans.     Hypochloremic hyponatremia likely from dehydration, excess free water intake.  Patient reports poor intake over the last few days, has been drinking a lot of water.  Sodium 128, chloride 94 on admission.  Received IV fluids with which sodium  improved to 132  Discontinue IV fluids, encourage oral fluid intake.  2000 mL fluid restriction ordered.  Follow TSH, proBNP.  Telemetry monitoring ordered.    Elevation of liver enzymes, improving  CBD dilatation.  Mild splenomegaly.  Stopped macrobid as well as previous nutrition supplements due to this.   Following with [facility], planning for EUS guided liver bx 12/12.  CT on admission Cholecystectomy. There is significant common bile duct dilatation measuring 1.8 cm.  Mild splenomegaly  - Follow up with [facility] on discharge  - Avoid hepatotoxins, limit Tylenol to 2 g daily  Monitor liver enzymes closely.    Acute hypoxia likely from atelectasis, rule out infiltrates.  Per report O2 sats dropped to 85% on room air.  Requiring 2 to 3 L nasal oxygen this morning.  Denies any new cough or wheezing.  Given generalized weakness, decreased appetite will order respiratory swab for COVID-19/influenza.  Chest x-ray ordered.  Wean off oxygen as able to.  Acapella, incentive spirometry.    Mild thrombocytopenia noted.  Follow platelets in AM.  No active bleeding.    Incidental calcified fibroid on imaging.  Noted.  Follow-up as outpatient if symptomatic.     Factor V Leiden  Hx RLE DVT  Long term use of anticoagulation  - Pharmacy to dose warfarin  - Daily INR    Hypothyroidism:  Continue PTA thyroid replacement.    Mild osteoarthritis  As needed Tylenol.    Mild tremor of hands  Noted.    Stable mild edema:   Continue PTA compression stockings     Orders Placed This Encounter      Regular Diet Adult      DVT Prophylaxis: SCDs, ambulate  Code Status: Full Code  Disposition: Expected discharge in 2 days pending clinical improvement.    Discussed with patient, bedside RN  >52 minutes spent by me on the date of service doing chart review, history, exam, documentation & further activities per the note.        Galindo Kelsey MD        Interval History:        Patient lying in bed.  Reporting feeling cold, covered up in  blankets.  Denies any chest pain.  Denies any headache.  Denies any nausea or vomiting.  Complaining of generalized weakness.  Reports has not ambulated out of bed yet.  Denies any new tingling or numbness.  Since admission Tmax 100.3.  On IV antibiotics.  Per report O2 sats dropped to low 85% on room air.  This morning on 2 to 3 L nasal oxygen.         Physical Exam:        Physical Exam   Temp:  [98.2  F (36.8  C)-100.3  F (37.9  C)] 99.7  F (37.6  C)  Pulse:  [62-95] 95  Resp:  [16-17] 16  BP: (116-131)/(57-81) 121/64  SpO2:  [85 %-97 %] 92 %    Intake/Output Summary (Last 24 hours) at 11/24/2023 1551  Last data filed at 11/24/2023 1137  Gross per 24 hour   Intake 1440 ml   Output 1750 ml   Net -310 ml       Admission Weight: 79.4 kg (175 lb)  Current Weight: 81.3 kg (179 lb 3.7 oz)    PHYSICAL EXAM  GENERAL: Patient lying in bed.  Drowsy but arousable.  HEENT: Oropharynx pink.  HEART: Regular rate and rhythm. S1S2. No murmurs  LUNGS: Bilateral slightly decreased breath sounds, no wheezing or crackles.  Respirations unlabored  ABDOMEN: Soft, no abdominal tenderness, bowel sounds heard   No suprapubic tenderness.  NEURO: Moving all extremities.  EXTREMITIES: No pedal edema.   SKIN: Warm, dry. No rash  PSYCHIATRY Cooperative       Medications:         amitriptyline  25 mg Oral At Bedtime    cefTRIAXone  2 g Intravenous Q24H    donepezil  5 mg Oral At Bedtime    levothyroxine  50 mcg Oral QAM AC    PARoxetine  40 mg Oral At Bedtime    senna-docusate  1 tablet Oral BID    Or    senna-docusate  2 tablet Oral BID    sodium chloride (PF)  3 mL Intracatheter Q8H    warfarin ANTICOAGULANT  5 mg Oral ONCE at 18:00    Warfarin Therapy Reminder  1 each Oral See Admin Instructions     acetaminophen **OR** acetaminophen, bisacodyl, hydrALAZINE **OR** hydrALAZINE, HYDROmorphone, HYDROmorphone, lidocaine 4%, lidocaine (buffered or not buffered), naloxone **OR** naloxone **OR** naloxone **OR** naloxone, ondansetron **OR**  ondansetron, oxyCODONE, oxyCODONE IR, - MEDICATION INSTRUCTIONS -, polyethylene glycol, prochlorperazine **OR** prochlorperazine **OR** prochlorperazine, senna-docusate **OR** senna-docusate, sodium chloride (PF)         Data:      All new lab and imaging data was reviewed.

## 2023-11-24 NOTE — PROVIDER NOTIFICATION
MD Notification    Notified Person: MD    Notified Person Name: Dr. Kelsey    Notification Date/Time: 1718 11/24/23    Notification Interaction: [employer] messaging    Purpose of Notification: FYI- Nasal swab was negative. Chest X ray resulted as well.    Orders Received: FYI.    Comments:

## 2023-11-24 NOTE — PLAN OF CARE
"  PRIMARY Concern: weakness   SAFETY RISK Concerns (fall risk, behaviors, etc.): fall      Isolation/Type: none  Tests/Procedures for NEXT shift: none  Consults? (Pending/following, signed-off?) PT/SW  Where is patient from? (Home, TCU, etc.): Home  Other Important info for NEXT shift:   Anticipated DC date & active delays:     SUMMARY NOTE:            Orientation/Cognitive: A&OX4  Observation Goals (Met/ Not Met): Not Met  Mobility Level/Assist Equipment: A2/GB/Walker  Antibiotics & Plan (IV/po, length of tx left): IV Rocephin   Pain Management: Denies  Tele/VS/O2: VSS/RA  ABNL Lab/BG: See Chart  Diet: Regular Diet, low appetite   Bowel/Bladder: Incontinent  Skin Concerns: None  Drains/Devices: PIV SL  Patient Stated Goal for Today: Sleep    Pt reports of cold and hot flashes after being found in room with gown and blankets off, slight temp of 99.7 and clammy, tylenol given, pt back to sleep, MD aware. Pt sleeping in bed entirety of shift, when asked if she wants to sit in chair or get cleaned up she states \"maybe in a little bit.\" Continue plan of care.       "

## 2023-11-24 NOTE — PROGRESS NOTES
11/24/23 7308   Appointment Info   Signing Clinician's Name / Credentials (PT) Eliseo Ruiz DPT   Living Environment   People in Home alone   Current Living Arrangements house   Home Accessibility stairs to enter home;stairs within home   Number of Stairs, Main Entrance 3   Stair Railings, Main Entrance railings on both sides of stairs   Number of Stairs, Within Home, Primary greater than 10 stairs   Stair Railings, Within Home, Primary railing on right side (ascending)   Transportation Anticipated family or friend will provide   Living Environment Comments Pt reports living alone in a house. Reports 3 MELISSA w/ bilateral rails. Reports bedroom with a flight of stairs and railing on R side.   Self-Care   Usual Activity Tolerance good   Current Activity Tolerance moderate   Equipment Currently Used at Home cane, straight   Fall history within last six months no   Activity/Exercise/Self-Care Comment Reports at baseline independent w/ ADLs and mobility. States that she uses a SEC when outside of the house and no AD inside the house.   General Information   Onset of Illness/Injury or Date of Surgery 11/23/23   Referring Physician Meg Kirk PA-C   Patient/Family Therapy Goals Statement (PT) Return to home   Pertinent History of Current Problem (include personal factors and/or comorbidities that impact the POC) Geneva Shepherd is a 82 year old female with past medical history significant for factor V Leiden with history of right lower extremity DVT, long-term use of warfarin, hyperlipidemia, GERD, IBS, hypothyroidism, depression, and prior history of recurrent UTIs on prophylactic Macrobid which was recently discontinued in the setting of elevated liver enzymes who was registered to observation on 11/23/2023 with acute pyelonephritis, generalized weakness, and mild hyponatremia.   Existing Precautions/Restrictions fall   General Observations Looks generally diaphoretic   Cognition   Affect/Mental Status  (Cognition) WFL   Orientation Status (Cognition) oriented x 4   Follows Commands (Cognition) WFL   Pain Assessment   Patient Currently in Pain No   Range of Motion (ROM)   ROM Comment WFLs for mobility and transfers no formal testing completed   Strength (Manual Muscle Testing)   Strength Comments Generalized weakness noted w/ mobility and transfers   Bed Mobility   Comment, (Bed Mobility) Supine to sitting EOB w/ SBA   Transfers   Comment, (Transfers) Sit to stand w/ FWW and Mod A x1   Gait/Stairs (Locomotion)   Comment, (Gait/Stairs) 10 ft w/ FWW and Min A x1   Balance   Balance Comments No overt LOB noted   Clinical Impression   Criteria for Skilled Therapeutic Intervention Yes, treatment indicated   PT Diagnosis (PT) Impaired ambulation   Influenced by the following impairments Impaired strength, balance and activity tolerance   Functional limitations due to impairments Impaired ADLs, IADLs and functional mobility   Clinical Presentation (PT Evaluation Complexity) stable   Clinical Presentation Rationale Clinical judgment   Clinical Decision Making (Complexity) low complexity   Planned Therapy Interventions (PT) balance training;bed mobility training;gait training;home exercise program;patient/family education;stair training;strengthening;transfer training;progressive activity/exercise   Risk & Benefits of therapy have been explained evaluation/treatment results reviewed;care plan/treatment goals reviewed;risks/benefits reviewed;current/potential barriers reviewed;participants voiced agreement with care plan;participants included;patient   PT Total Evaluation Time   PT Eval, Low Complexity Minutes (58697) 10   Physical Therapy Goals   PT Frequency Daily   PT Predicted Duration/Target Date for Goal Attainment 12/04/23   PT Goals Bed Mobility;Transfers;Gait;Stairs   PT: Bed Mobility Independent;Supine to/from sit   PT: Transfers Modified independent;Sit to/from stand;Assistive device   PT: Gait Modified  independent;150 feet;Rolling walker   PT: Stairs Independent;Greater than 10 stairs;Rail on right   Interventions   Interventions Quick Adds Gait Training;Therapeutic Activity   Therapeutic Activity   Therapeutic Activities: dynamic activities to improve functional performance Minutes (94019) 10   Symptoms Noted During/After Treatment Fatigue   Treatment Detail/Skilled Intervention Pt supine in bed at start of session. Agreeable to PT. Once seated EOB good sitting balance. On 3 L of O2 via NC. O2 sats in the mid 90s. On first x2 attempts to get to standing unable to standing. Cues for UE hand placement. Needing Mod A to finally get to standing. Once in standing good balance noted. Completing sit to stand x3 from bed height throughout session w/ FWW and CGA. x10 sit to stands in a row w/ FWW and CGA. Pt weaned to 1 L of O2 via NC. O2 sats maintaining in the low to mid 90s. Sit to supine w/ SBA. All needs met at end of session w/ call light in place and bed alarm on.   Gait Training   Gait Training Minutes (11855) 15   Symptoms Noted During/After Treatment (Gait Training) fatigue   Treatment Detail/Skilled Intervention Pt ambulating ~10 ft x1, 100 ft x1 and 150 ft x1 w/ FWW and Min A x1 progressing to CGA. Slow brendon noted. No overt LOB noted. Small step length throughout. Downward gaze. On 3 L of O2 via NC first x2 walks and able to progress to 1 L of O2 via NC.   PT Discharge Planning   PT Plan Activity tolerance, strengthening, bed mobility, repeat STS   PT Discharge Recommendation (DC Rec) Transitional Care Facility;home with assist;home with home care physical therapy   PT Rationale for DC Rec Pt currently below baseline mobility. Lives in a house alone w/ stairs. Usually uses a SEC vs no AD for mobility. Currently an A x1 w/ FWW for mobility. At this time would need TCU at time of DC if Pt were to progress to Mod I w/ FWW could DC home w/ HHPT.   PT Brief overview of current status CGA to Min A w/ FWW   Total  Session Time   Timed Code Treatment Minutes 25   Total Session Time (sum of timed and untimed services) 35

## 2023-11-24 NOTE — UTILIZATION REVIEW
Admission Status; Secondary Review Determination    Under the authority of the Utilization Management Committee, the utilization review process indicated a secondary review on the above patient. The review outcome is based on review of the medical records, discussions with staff, and applying clinical experience noted on the date of the review.    (x) Inpatient Status Appropriate - This patient's medical care is consistent with medical management for inpatient care and reasonable inpatient medical practice.    RATIONALE FOR DETERMINATION: 82-year-old female with history of factor V Leiden deficiency with a right lower extremity DVT on long-term anticoagulation, IBS, recurrent urinary tract infections previously on prophylactic Macrobid but recently discontinued in the setting of persistent elevated transaminases.  Patient now presents with 3-day history of dysuria, urinary frequency and urgency with lower abdominal discomfort associated with profound fatigue and weakness.  Patient found to have new hyponatremia with marked pyuria and CT imaging consistent with cystitis as well as pyelonephritis.  Patient started on intravenous antibiotics with urine culture positive for gram negative bacilli.  Patient further complicated by new progressive hypoxemia on hospital day 2 thus requiring a more prolonged hospitalization appropriate for inpatient care.    At the time of admission with the information available to the attending physician more than 2 nights Hospital complex care was anticipated, based on patient risk of adverse outcome if treated as outpatient and complex care required. Inpatient admission is appropriate based on the Medicare guidelines.    This document was produced using voice recognition software    The information on this document is developed by the utilization review team in order for the business office to ensure compliance. This only denotes the appropriateness of proper admission status and does  not reflect the quality of care rendered.    The definitions of Inpatient Status and Observation Status used in making the determination above are those provided in the CMS Coverage Manual, Chapter 1 and Chapter 6, section 70.4.    Sincerely,    Justus Faust MD  Utilization Review  Physician Advisor  [facility].

## 2023-11-25 ENCOUNTER — APPOINTMENT (OUTPATIENT)
Dept: PHYSICAL THERAPY | Facility: CLINIC | Age: 83
DRG: 690 | End: 2023-11-25
Payer: MEDICARE

## 2023-11-25 ENCOUNTER — APPOINTMENT (OUTPATIENT)
Dept: OCCUPATIONAL THERAPY | Facility: CLINIC | Age: 83
DRG: 690 | End: 2023-11-25
Attending: HOSPITALIST
Payer: MEDICARE

## 2023-11-25 LAB
ACANTHOCYTES BLD QL SMEAR: NORMAL
ALBUMIN SERPL BCG-MCNC: 2.9 G/DL (ref 3.5–5.2)
ANION GAP SERPL CALCULATED.3IONS-SCNC: 9 MMOL/L (ref 7–15)
AUER BODIES BLD QL SMEAR: NORMAL
BACTERIA UR CULT: ABNORMAL
BASO STIPL BLD QL SMEAR: NORMAL
BITE CELLS BLD QL SMEAR: NORMAL
BLISTER CELLS BLD QL SMEAR: NORMAL
BUN SERPL-MCNC: 8.3 MG/DL (ref 8–23)
BURR CELLS BLD QL SMEAR: NORMAL
CALCIUM SERPL-MCNC: 10.2 MG/DL (ref 8.8–10.2)
CHLORIDE SERPL-SCNC: 103 MMOL/L (ref 98–107)
CREAT SERPL-MCNC: 0.52 MG/DL (ref 0.51–0.95)
DACRYOCYTES BLD QL SMEAR: NORMAL
DEPRECATED HCO3 PLAS-SCNC: 23 MMOL/L (ref 22–29)
EGFRCR SERPLBLD CKD-EPI 2021: >90 ML/MIN/1.73M2
ELLIPTOCYTES BLD QL SMEAR: NORMAL
FRAGMENTS BLD QL SMEAR: NORMAL
GLUCOSE SERPL-MCNC: 97 MG/DL (ref 70–99)
HGB C CRYSTALS: NORMAL
HOWELL-JOLLY BOD BLD QL SMEAR: NORMAL
INR PPP: 2.64 (ref 0.85–1.15)
MAGNESIUM SERPL-MCNC: 1.9 MG/DL (ref 1.7–2.3)
NEUTS HYPERSEG BLD QL SMEAR: NORMAL
PLAT MORPH BLD: NORMAL
PLATELET # BLD AUTO: 103 10E3/UL (ref 150–450)
POLYCHROMASIA BLD QL SMEAR: NORMAL
POTASSIUM SERPL-SCNC: 4 MMOL/L (ref 3.4–5.3)
RBC AGGLUT BLD QL: NORMAL
RBC MORPH BLD: NORMAL
ROULEAUX BLD QL SMEAR: NORMAL
SICKLE CELLS BLD QL SMEAR: NORMAL
SMUDGE CELLS BLD QL SMEAR: NORMAL
SODIUM SERPL-SCNC: 135 MMOL/L (ref 135–145)
SPHEROCYTES BLD QL SMEAR: NORMAL
STOMATOCYTES BLD QL SMEAR: NORMAL
TARGETS BLD QL SMEAR: NORMAL
TOXIC GRANULES BLD QL SMEAR: NORMAL
VARIANT LYMPHS BLD QL SMEAR: NORMAL

## 2023-11-25 PROCEDURE — 97165 OT EVAL LOW COMPLEX 30 MIN: CPT | Mod: GO | Performed by: OCCUPATIONAL THERAPIST

## 2023-11-25 PROCEDURE — 85610 PROTHROMBIN TIME: CPT | Performed by: PHYSICIAN ASSISTANT

## 2023-11-25 PROCEDURE — 80048 BASIC METABOLIC PNL TOTAL CA: CPT | Performed by: HOSPITALIST

## 2023-11-25 PROCEDURE — 97116 GAIT TRAINING THERAPY: CPT | Mod: GP

## 2023-11-25 PROCEDURE — 99233 SBSQ HOSP IP/OBS HIGH 50: CPT | Performed by: HOSPITALIST

## 2023-11-25 PROCEDURE — 120N000001 HC R&B MED SURG/OB

## 2023-11-25 PROCEDURE — 250N000011 HC RX IP 250 OP 636: Mod: JZ | Performed by: PHYSICIAN ASSISTANT

## 2023-11-25 PROCEDURE — 97535 SELF CARE MNGMENT TRAINING: CPT | Mod: GO | Performed by: OCCUPATIONAL THERAPIST

## 2023-11-25 PROCEDURE — 82040 ASSAY OF SERUM ALBUMIN: CPT | Performed by: HOSPITALIST

## 2023-11-25 PROCEDURE — 83735 ASSAY OF MAGNESIUM: CPT | Performed by: HOSPITALIST

## 2023-11-25 PROCEDURE — 97530 THERAPEUTIC ACTIVITIES: CPT | Mod: GP

## 2023-11-25 PROCEDURE — 250N000013 HC RX MED GY IP 250 OP 250 PS 637: Performed by: HOSPITALIST

## 2023-11-25 PROCEDURE — 250N000013 HC RX MED GY IP 250 OP 250 PS 637: Performed by: PHYSICIAN ASSISTANT

## 2023-11-25 PROCEDURE — 36415 COLL VENOUS BLD VENIPUNCTURE: CPT | Performed by: HOSPITALIST

## 2023-11-25 PROCEDURE — 85049 AUTOMATED PLATELET COUNT: CPT | Performed by: HOSPITALIST

## 2023-11-25 RX ORDER — WARFARIN SODIUM 5 MG/1
5 TABLET ORAL
Status: COMPLETED | OUTPATIENT
Start: 2023-11-25 | End: 2023-11-25

## 2023-11-25 RX ADMIN — LEVOTHYROXINE SODIUM 50 MCG: 50 TABLET ORAL at 06:46

## 2023-11-25 RX ADMIN — CEFTRIAXONE SODIUM 2 G: 2 INJECTION, POWDER, FOR SOLUTION INTRAMUSCULAR; INTRAVENOUS at 16:48

## 2023-11-25 RX ADMIN — ACETAMINOPHEN 650 MG: 325 TABLET, FILM COATED ORAL at 04:27

## 2023-11-25 RX ADMIN — DOCUSATE SODIUM 50 MG AND SENNOSIDES 8.6 MG 2 TABLET: 8.6; 5 TABLET, FILM COATED ORAL at 08:52

## 2023-11-25 RX ADMIN — WARFARIN SODIUM 5 MG: 5 TABLET ORAL at 18:07

## 2023-11-25 RX ADMIN — DONEPEZIL HYDROCHLORIDE 5 MG: 5 TABLET, FILM COATED ORAL at 21:52

## 2023-11-25 RX ADMIN — PAROXETINE HYDROCHLORIDE 40 MG: 20 TABLET, FILM COATED ORAL at 21:52

## 2023-11-25 RX ADMIN — AMITRIPTYLINE HYDROCHLORIDE 25 MG: 25 TABLET, FILM COATED ORAL at 21:52

## 2023-11-25 RX ADMIN — ACETAMINOPHEN 650 MG: 325 TABLET, FILM COATED ORAL at 19:40

## 2023-11-25 RX ADMIN — SENNOSIDES AND DOCUSATE SODIUM 1 TABLET: 50; 8.6 TABLET ORAL at 19:40

## 2023-11-25 ASSESSMENT — ACTIVITIES OF DAILY LIVING (ADL)
ADLS_ACUITY_SCORE: 32

## 2023-11-25 NOTE — PROGRESS NOTES
PRIMARY Concern: UTI/Hypoxia  SAFETY RISK Concerns (fall risk, behaviors, etc.): Fall Risk      Isolation/Type: n/a- covid r/o negative- MD notified  Tests/Procedures for NEXT shift: n/a  Consults? (Pending/following, signed-off?) Hospitalist  Where is patient from? (Home, TCU, etc.): Home  Other Important info for NEXT shift: n/a  Anticipated DC date & active delays: TBD  _____________________________________________________________________________  SUMMARY NOTE:    Orientation/Cognitive: A&Ox4- mentation fluctuates  Observation Goals (Met/ Not Met): Inpatient  Mobility Level/Assist Equipment: A-1 GB/walker  Antibiotics & Plan (IV/po, length of tx left): IV ceftriaxone Q24H for UTI  Pain Management: denies  Tele/VS/O2: VSS on 3L NC  ABNL Lab/BG: Na- 132, Plt- 135  Diet: Reg  Bowel/Bladder: Incont. Bladder  Skin Concerns: scattered bruising  Drains/Devices: IV SL  Patient Stated Goal for Today: none

## 2023-11-25 NOTE — PLAN OF CARE
PRIMARY Concern: UTI/Hypoxia  SAFETY RISK Concerns: Fall Risk      Isolation/Type: None  Tests/Procedures for NEXT shift: n/a  Consults? None  Where is patient from? Home  Other Important info for NEXT shift: 2L Fluid restriction  Anticipated DC date & active delays: TBD  _____________________________________________________________________________  SUMMARY NOTE: Pt resting comfortable in room this shift, supportive son at bedside visiting most of morning  Orientation/Cognitive: A&Ox4, int confusion  Inpatient  Mobility Level/Assist Equipment: A1 GB/W  Antibiotics & Plan: IV ceftriaxone Q24H for UTI  Pain Management: PRN tylenol available   Tele/VS/O2: VSS on 1L NC, ween attempted, IS instruction provided and tolerating well. Tele was discontinued.  ABNL Lab/BG: K+ recheck 4.2  Diet: Reg, 2L fluid restriction  Bowel/Bladder: Incont. Bladder,   Skin Concerns: scattered bruising, appropriate for age- no abnormal bruising   Drains/Devices: IV SL  Patient Stated Goal for Today: rest and recovery.

## 2023-11-25 NOTE — PROGRESS NOTES
11/25/23 1414   Appointment Info   Signing Clinician's Name / Credentials (OT) Brett Alcocer, Lenore, OTR/L   Living Environment   People in Home alone   Current Living Arrangements house   Home Accessibility stairs to enter home;stairs within home   Number of Stairs, Main Entrance 3   Stair Railings, Main Entrance railings on both sides of stairs   Number of Stairs, Within Home, Primary greater than 10 stairs  (to second floor and basement)   Stair Railings, Within Home, Primary railing on right side (ascending)   Transportation Anticipated family or friend will provide   Living Environment Comments pt reports getting some help from sons.  Bedroom on second floor with a half bath.  Walk in shower and laundry in basement.  Main bath with tub on first floor   Self-Care   Usual Activity Tolerance good   Current Activity Tolerance moderate   Equipment Currently Used at Home cane, straight;walker, rolling   Fall history within last six months yes   Number of times patient has fallen within last six months 3   Activity/Exercise/Self-Care Comment Reports being independent with ADLs at baseline.  Will use cane for assist over walker.  However, also mentioned she has balance issues at times.   General Information   Onset of Illness/Injury or Date of Surgery 11/24/23   Referring Physician Galindo Kelsey   Patient/Family Therapy Goal Statement (OT) return home, does not feel back to her baseline yet   Additional Occupational Profile Info/Pertinent History of Current Problem Pt is an 82 year old female admitted with acute polynephritis, weakness, and mild hyponatremia.  PMH includes factor V leiden, hx of right LE DVT on warfarin, HLD, GERD, hypothyroidism, depression, recent UTIs   Existing Precautions/Restrictions fall;oxygen therapy device and L/min  (1L O2)   General Observations and Info pt in bed, willing to participate   Cognitive Status Examination   Orientation Status orientation to person, place and time   Cognitive  Status Comments some inconsistencies in reporting, try cognitive screen next session   Visual Perception   Visual Impairment/Limitations WFL   Pain Assessment   Patient Currently in Pain No   Range of Motion Comprehensive   General Range of Motion no range of motion deficits identified   Comment, General Range of Motion B UEs   Strength Comprehensive (MMT)   General Manual Muscle Testing (MMT) Assessment upper extremity strength deficits identified   Comment, General Manual Muscle Testing (MMT) Assessment general weakness and deconditioning   Coordination   Upper Extremity Coordination No deficits were identified   Bed Mobility   Bed Mobility supine-sit;sit-supine   Supine-Sit [place] (Bed Mobility) verbal cues;contact guard   Sit-Supine [place] (Bed Mobility) verbal cues;contact guard   Assistive Device (Bed Mobility) bed rails   Transfers   Transfers toilet transfer   Toilet Transfer   Type (Toilet Transfer) sit-stand;stand-sit   [place] Level (Toilet Transfer) contact guard;verbal cues   Assistive Device (Toilet Transfer) grab bars/safety frame   Toilet Transfer Comments pt reports having high toilets at home   Balance   Balance Assessment standing dynamic balance   Standing Balance: Dynamic verbal cues;contact guard   Position/Device Used, Standing Balance walker, rolling   Balance Comments some shakiness, taking extra attempts to stand   Clinical Impression   Criteria for Skilled Therapeutic Interventions Met (OT) Yes, treatment indicated   OT Diagnosis decreased endurance and possible safety issues for ADLS   OT Problem List-Impairments impacting ADL problems related to;activity tolerance impaired;balance;strength;fear & anxiety   Assessment of Occupational Performance 3-5 Performance Deficits   Identified Performance Deficits decreased independence with dressing, bathing, functional mobility, household chores   Planned Therapy Interventions (OT) ADL retraining;cognition;strengthening;home  program guidelines;progressive activity/exercise   Clinical Decision Making Complexity (OT) problem focused assessment/low complexity   Risk & Benefits of therapy have been explained evaluation/treatment results reviewed;care plan/treatment goals reviewed;patient   OT Total Evaluation Time   OT Eval, Low Complexity Minutes (97715) 18   OT Goals   Therapy Frequency (OT) 5 times/week   OT Predicted Duration/Target Date for Goal Attainment 12/01/23   OT Goals Hygiene/Grooming;Upper Body Dressing;Lower Body Dressing;Toilet Transfer/Toileting;Cognition;OT Goal 1   OT: Hygiene/Grooming modified independent;while standing   OT: Upper Body Dressing Modified independent;using adaptive equipment;within precautions;including set-up/clothing retrieval   OT: Lower Body Dressing Modified independent;using adaptive equipment;within precautions;including set-up/clothing retrieval   OT: Toilet Transfer/Toileting Modified independent;toilet transfer;cleaning and garment management;using adaptive equipment   OT: Cognitive Patient/caregiver will verbalize understanding of cognitive assessment results/recommendations as needed for safe discharge planning   OT: Goal 1 Pt will complete 10+ minutes B UE HEP to increase endurance and independence in ADLS   Interventions   Interventions Quick Adds Self-Care/Home Management;Therapeutic Procedures/Exercise   Self-Care/Home Management   Self-Care/Home Mgmt/ADL, Compensatory, Meal Prep Minutes (39584) 40   Symptoms Noted During/After Treatment (Meal Preparation/Planning Training) fatigue   Treatment Detail/Skilled Intervention OT: Pt in bed, willing to participate.  On 1L O2, able to tolerate up around room for 5-10 mins without O2.  SBA to come to EOB.  Pt able to doff socks, had more difficulty getting them back on.  This was worse for right sock compared to left.  Used walker to ambulate length of room and bathroom.  2 attempts needed with Min A to stand.  Some occassional shakiness standing.   Pt set up to brush teeth at sink.  Moved slow but was able to complete with an occassional verbal cue.  Pt stated she still feels weak, is willing to consider TCU at DC.   OT Discharge Planning   OT Plan endurance ADLS for dressing, grooming at the sink, bathroom transfers.  Monitor and try SLUMS as needed   OT Discharge Recommendation (DC Rec) Transitional Care Facility   OT Rationale for DC Rec Pt is below her stated baseline with decreased strength and endurance, some unsteadiness while up.  Pt also had a couple of instances where she contradicted herself, will check and screen as needed.  Lives alone and has at least one flight of stairs up to bedroom and one flight down to laundry and walk-in shower.   OT Brief overview of current status Pt slow wiht dressing EOB but able to complete it.  Some unsteadiness while up with walker.   Total Session Time   Timed Code Treatment Minutes 40   Total Session Time (sum of timed and untimed services) 58

## 2023-11-25 NOTE — PLAN OF CARE
Goal Outcome Evaluation:  PRIMARY Concern: UTI/Hypoxia  SAFETY RISK Concerns (fall risk, behaviors, etc.): Fall Risk      Isolation/Type: None  Tests/Procedures for NEXT shift: n/a  Consults? (Pending/following, signed-off?) None  Where is patient from? (Home, TCU, etc.): Home  Other Important info for NEXT shift: 2L Fluid restriction  Anticipated DC date & active delays: TBD  ____________________________________________________________________    SUMMARY NOTE:  Orientation/Cognitive: A&Ox4, int confusion  Observation Goals (Met/ Not Met): Inpatient  Mobility Level/Assist Equipment: A1 Gb/W  Antibiotics & Plan (IV/po, length of tx left): IV ceftriaxone Q24H for UTI  Pain Management: PRN Tyl x1 for headache  Tele/VS/O2: VSS on 1L NC  ABNL Lab/BG: K+ recheck 4.2  Diet: Reg, 2L fluid restriction  Bowel/Bladder: Incont. Bladder  Skin Concerns: scattered bruising  Drains/Devices: IV SL  Patient Stated Goal for Today: none

## 2023-11-25 NOTE — PROGRESS NOTES
[facility]  Hospitalist Progress Note   11/25/2023          Assessment and Plan:       Geneva Shepherd is a 82 year old female with factor V Leiden with history of right lower extremity DVT, long-term use of warfarin, hyperlipidemia, GERD, IBS, hypothyroidism, depression, and prior history of recurrent UTIs on prophylactic Macrobid which was recently discontinued in the setting of elevated liver enzymes admitted on 11/23/2020 with generalized weakness.     Acute complicated pyelonephritis  Hx recurrent UTIs  Previously followed with [facility], previously on macrobid for UTI prevention, but stopped recently due to rising liver enzymes as below. Last UTI several years ago per patient as she was on ppx.  Presented with 3 days of dysuria, urinary urgency and frequency, intermittent hypogastric discomfort.   -- Afebrile, no leukocytosis.  UA appears infected with large leukocyte esterase, 88 white blood cells, negative nitrate, negative blood.    CT of abdomen and pelvis with contrast revealed signs of cystitis as well as pyelitis.  Multiple colonic diverticula without evidence of diverticulitis.    -- Continue IV ceftriaxone ( 11/23).  Follow urine cultures.  Trend WBC count, fever curve.    Physical deconditioning from medical illness, senile fragility.  Lightheadedness likely from poor intake, electrolyte abnormalities improving.  Patient reports lives alone at home, uses cane to ambulate.  Reports generalized weakness, poor intake, loss of appetite for the last few days.    -- Address medical issues.   Follow TSH, vitamin B12, magnesium, potassium within normal limits.  Telemetry monitoring -no acute events per report  Rehab team following, will likely need TCU.    Acute hypoxia likely from small pleural effusion, possible JULIO.  Interstitial infiltrates along with small pleural effusion.  Possible obstructive sleep apnea.  Patient snoring/mouth breather. O2 sats dropped to 85% on room air.   Was requiring 2 to 3 L oxygen.  Denies any cough or wheezing.    Afebrile, no leukocytosis.  COVID-19/influenza A/B/RSV PCR negative.    CXR 11/24 -  mild interstitial infiltrates in the lungs with small pleural effusions.   proBNP 899.  Received IV fluids on admission. Already on antibiotics as above.  Aggressive incentive spirometry, wean off oxygen as able to  Recommend sleep studies as outpatient.  Repeat imaging of chest in few weeks for resolution.    Hypochloremic hyponatremia likely from poor intake, excess free water intake.  Patient reports poor intake over the last few days PTA, has been drinking a lot of water.  Sodium 128, chloride 94 on admission.    TSH, proBNP within normal limits.  Magnesium 1.9.  Albumin 3.6  Received IV fluids, fluid restriction with which sodium improved to 135  2000 mL fluid restriction    Elevation of liver enzymes, improved  CBD dilatation.  Mild splenomegaly.  Stopped macrobid as well as previous nutrition supplements due to this.   Following with [facility], planning for EUS guided liver bx 12/12.  AST 43, ALT 27.  Alkaline phosphatase 100, bilirubin 0.6.  CT on admission Cholecystectomy. There is significant common bile duct dilatation measuring 1.8 cm.  Mild splenomegaly.  Follow-up with [facility] in clinic per schedule.  Avoid hepatotoxic drugs, limit Tylenol to 2 g/day.  Monitor liver enzymes periodically.    Mild thrombocytopenia noted.  Follow platelets in AM.  No active bleeding.    Incidental calcified fibroid on imaging.  Noted.  Follow-up as outpatient if symptomatic.     Factor V Leiden  Hx RLE DVT on Coumadin.  Pharmacy to dose Coumadin.  Goal INR between 2-3.  Daily INR.    History of mild cognitive impairment.    Per son's report having memory issues for the last few months.  Continue PTA donepezil.  OT for cognitive screening as outpatient.    Hypothyroidism:  Continue PTA thyroid replacement.    Mild osteoarthritis  As needed Tylenol.    Mild  tremor of hands  Noted.    Stable mild edema:   Continue PTA compression stockings     Orders Placed This Encounter      Regular Diet Adult      DVT Prophylaxis: SCDs, ambulate  Code Status: Full Code  Disposition: Expected discharge in 1-2 days pending clinical improvement.    Discussed with patient, son by the bedside, bedside RN  >52 minutes spent by me on the date of service doing chart review, history, exam, documentation & further activities per the note.      Galindo Kelsey MD        Interval History:        Patient lying in bed.  Reporting feeling cold, covered up in blankets.  Denies any chest pain.  Denies any headache.  Denies any nausea or vomiting.  Complaining of generalized weakness.  Reports has not ambulated out of bed yet.  Denies any new tingling or numbness.  Since admission Tmax 100.3.  On IV antibiotics.  Per report O2 sats dropped to low 85% on room air.  This morning on 2 to 3 L nasal oxygen.         Physical Exam:        Physical Exam   Temp:  [97.4  F (36.3  C)-99.7  F (37.6  C)] 97.8  F (36.6  C)  Pulse:  [64-95] 73  Resp:  [16-18] 18  BP: ()/(50-83) 131/71  SpO2:  [85 %-97 %] 97 %    Intake/Output Summary (Last 24 hours) at 11/24/2023 1551  Last data filed at 11/24/2023 1137  Gross per 24 hour   Intake 1440 ml   Output 1750 ml   Net -310 ml       Admission Weight: 79.4 kg (175 lb)  Current Weight: 81.3 kg (179 lb 3.7 oz)    PHYSICAL EXAM  GENERAL: Patient lying in bed.  Drowsy but arousable.  HEENT: Oropharynx pink.  HEART: Regular rate and rhythm. S1S2. No murmurs  LUNGS: Bilateral slightly decreased breath sounds, no wheezing or crackles.  Respirations unlabored  ABDOMEN: Soft, no abdominal tenderness, bowel sounds heard   No suprapubic tenderness.  NEURO: Moving all extremities.  EXTREMITIES: No pedal edema.   SKIN: Warm, dry. No rash  PSYCHIATRY Cooperative       Medications:         amitriptyline  25 mg Oral At Bedtime    cefTRIAXone  2 g Intravenous Q24H    donepezil  5 mg  Oral At Bedtime    levothyroxine  50 mcg Oral QAM AC    PARoxetine  40 mg Oral At Bedtime    senna-docusate  1 tablet Oral BID    Or    senna-docusate  2 tablet Oral BID    sodium chloride (PF)  3 mL Intracatheter Q8H    warfarin ANTICOAGULANT  5 mg Oral ONCE at 18:00    Warfarin Therapy Reminder  1 each Oral See Admin Instructions     acetaminophen **OR** acetaminophen, bisacodyl, hydrALAZINE **OR** hydrALAZINE, HYDROmorphone, HYDROmorphone, lidocaine 4%, lidocaine (buffered or not buffered), naloxone **OR** naloxone **OR** naloxone **OR** naloxone, ondansetron **OR** ondansetron, oxyCODONE, oxyCODONE IR, - MEDICATION INSTRUCTIONS -, polyethylene glycol, prochlorperazine **OR** prochlorperazine **OR** prochlorperazine, senna-docusate **OR** senna-docusate, sodium chloride (PF)         Data:      All new lab and imaging data was reviewed.

## 2023-11-26 ENCOUNTER — APPOINTMENT (OUTPATIENT)
Dept: PHYSICAL THERAPY | Facility: CLINIC | Age: 83
DRG: 690 | End: 2023-11-26
Payer: MEDICARE

## 2023-11-26 ENCOUNTER — APPOINTMENT (OUTPATIENT)
Dept: OCCUPATIONAL THERAPY | Facility: CLINIC | Age: 83
DRG: 690 | End: 2023-11-26
Payer: MEDICARE

## 2023-11-26 LAB
ACANTHOCYTES BLD QL SMEAR: NORMAL
AUER BODIES BLD QL SMEAR: NORMAL
BASO STIPL BLD QL SMEAR: NORMAL
BITE CELLS BLD QL SMEAR: NORMAL
BLISTER CELLS BLD QL SMEAR: NORMAL
BURR CELLS BLD QL SMEAR: NORMAL
DACRYOCYTES BLD QL SMEAR: NORMAL
ELLIPTOCYTES BLD QL SMEAR: NORMAL
FRAGMENTS BLD QL SMEAR: NORMAL
HGB C CRYSTALS: NORMAL
HOWELL-JOLLY BOD BLD QL SMEAR: NORMAL
INR PPP: 2.19 (ref 0.85–1.15)
NEUTS HYPERSEG BLD QL SMEAR: NORMAL
PLAT MORPH BLD: NORMAL
PLATELET # BLD AUTO: 126 10E3/UL (ref 150–450)
POLYCHROMASIA BLD QL SMEAR: NORMAL
POTASSIUM SERPL-SCNC: 4.5 MMOL/L (ref 3.4–5.3)
RBC AGGLUT BLD QL: NORMAL
RBC MORPH BLD: NORMAL
ROULEAUX BLD QL SMEAR: NORMAL
SICKLE CELLS BLD QL SMEAR: NORMAL
SMUDGE CELLS BLD QL SMEAR: NORMAL
SPHEROCYTES BLD QL SMEAR: NORMAL
STOMATOCYTES BLD QL SMEAR: NORMAL
TARGETS BLD QL SMEAR: NORMAL
TOXIC GRANULES BLD QL SMEAR: NORMAL
VARIANT LYMPHS BLD QL SMEAR: NORMAL

## 2023-11-26 PROCEDURE — 250N000011 HC RX IP 250 OP 636: Mod: JZ | Performed by: PHYSICIAN ASSISTANT

## 2023-11-26 PROCEDURE — 97535 SELF CARE MNGMENT TRAINING: CPT | Mod: GO | Performed by: OCCUPATIONAL THERAPIST

## 2023-11-26 PROCEDURE — 85610 PROTHROMBIN TIME: CPT | Performed by: HOSPITALIST

## 2023-11-26 PROCEDURE — 250N000013 HC RX MED GY IP 250 OP 250 PS 637: Performed by: HOSPITALIST

## 2023-11-26 PROCEDURE — 84132 ASSAY OF SERUM POTASSIUM: CPT | Performed by: HOSPITALIST

## 2023-11-26 PROCEDURE — 97116 GAIT TRAINING THERAPY: CPT | Mod: GP

## 2023-11-26 PROCEDURE — 120N000001 HC R&B MED SURG/OB

## 2023-11-26 PROCEDURE — 85049 AUTOMATED PLATELET COUNT: CPT | Performed by: HOSPITALIST

## 2023-11-26 PROCEDURE — 250N000013 HC RX MED GY IP 250 OP 250 PS 637: Performed by: PHYSICIAN ASSISTANT

## 2023-11-26 PROCEDURE — 99232 SBSQ HOSP IP/OBS MODERATE 35: CPT | Performed by: HOSPITALIST

## 2023-11-26 PROCEDURE — 36415 COLL VENOUS BLD VENIPUNCTURE: CPT | Performed by: HOSPITALIST

## 2023-11-26 RX ORDER — WARFARIN SODIUM 7.5 MG/1
7.5 TABLET ORAL ONCE
Status: COMPLETED | OUTPATIENT
Start: 2023-11-26 | End: 2023-11-26

## 2023-11-26 RX ADMIN — SENNOSIDES AND DOCUSATE SODIUM 1 TABLET: 50; 8.6 TABLET ORAL at 08:56

## 2023-11-26 RX ADMIN — ACETAMINOPHEN 650 MG: 325 TABLET, FILM COATED ORAL at 05:11

## 2023-11-26 RX ADMIN — CEFTRIAXONE SODIUM 2 G: 2 INJECTION, POWDER, FOR SOLUTION INTRAMUSCULAR; INTRAVENOUS at 17:05

## 2023-11-26 RX ADMIN — WARFARIN SODIUM 7.5 MG: 7.5 TABLET ORAL at 20:05

## 2023-11-26 RX ADMIN — LEVOTHYROXINE SODIUM 50 MCG: 50 TABLET ORAL at 06:38

## 2023-11-26 RX ADMIN — AMITRIPTYLINE HYDROCHLORIDE 25 MG: 25 TABLET, FILM COATED ORAL at 21:45

## 2023-11-26 RX ADMIN — SENNOSIDES AND DOCUSATE SODIUM 1 TABLET: 50; 8.6 TABLET ORAL at 20:05

## 2023-11-26 RX ADMIN — DONEPEZIL HYDROCHLORIDE 5 MG: 5 TABLET, FILM COATED ORAL at 21:45

## 2023-11-26 RX ADMIN — PAROXETINE HYDROCHLORIDE 40 MG: 20 TABLET, FILM COATED ORAL at 21:45

## 2023-11-26 ASSESSMENT — ACTIVITIES OF DAILY LIVING (ADL)
ADLS_ACUITY_SCORE: 32
ADLS_ACUITY_SCORE: 35
ADLS_ACUITY_SCORE: 32
ADLS_ACUITY_SCORE: 35
ADLS_ACUITY_SCORE: 32
ADLS_ACUITY_SCORE: 32

## 2023-11-26 NOTE — CONSULTS
Care Management Initial Consult    General Information  Assessment completed with: Patient, Patient  Type of CM/SW Visit: Initial Assessment    Primary Care Provider verified and updated as needed: Yes   Readmission within the last 30 days:        Reason for Consult: discharge planning  Advance Care Planning: Advance Care Planning Reviewed:  (no ACP documents)          Communication Assessment  Patient's communication style: spoken language (English or Bilingual)    Hearing Difficulty or Deaf: no   Wear Glasses or Blind: no    Cognitive  Cognitive/Neuro/Behavioral: WDL  Level of Consciousness: alert  Arousal Level: opens eyes spontaneously                Living Environment:   People in home: alone     Current living Arrangements: house      Able to return to prior arrangements: no       Family/Social Support:  Care provided by: self  Provides care for: no one  Marital Status:   Children          Description of Support System: Supportive, Involved    Support Assessment: Adequate family and caregiver support, Adequate social supports    Current Resources:   Patient receiving home care services: No     Community Resources: None  Equipment currently used at home: cane, straight, walker, rolling  Supplies currently used at home: None    Employment/Financial:  Employment Status: retired        Financial Concerns: none   Referral to Financial Worker: No       Does the patient's insurance plan have a 3 day qualifying hospital stay waiver?  No    Lifestyle & Psychosocial Needs:  Social Determinants of Health     Food Insecurity: Low Risk  (11/14/2023)    Food Insecurity     Within the past 12 months, did you worry that your food would run out before you got money to buy more?: No     Within the past 12 months, did the food you bought just not last and you didn t have money to get more?: No   Depression: Not at risk (11/14/2023)    PHQ-2     PHQ-2 Score: 2   Housing Stability: Low Risk  (11/14/2023)    Housing  Stability     Do you have housing? : Yes     Are you worried about losing your housing?: No   Tobacco Use: Medium Risk (11/14/2023)    Patient History     Smoking Tobacco Use: Former     Smokeless Tobacco Use: Never     Passive Exposure: Not on file   Financial Resource Strain: Low Risk  (11/14/2023)    Financial Resource Strain     Within the past 12 months, have you or your family members you live with been unable to get utilities (heat, electricity) when it was really needed?: No   Alcohol Use: Not on file   Transportation Needs: Low Risk  (11/14/2023)    Transportation Needs     Within the past 12 months, has lack of transportation kept you from medical appointments, getting your medicines, non-medical meetings or appointments, work, or from getting things that you need?: No   Physical Activity: Not on file   Interpersonal Safety: Low Risk  (11/14/2023)    Interpersonal Safety     Do you feel physically and emotionally safe where you currently live?: Yes     Within the past 12 months, have you been hit, slapped, kicked or otherwise physically hurt by someone?: No     Within the past 12 months, have you been humiliated or emotionally abused in other ways by your partner or ex-partner?: No   Stress: Not on file   Social Connections: Not on file       Functional Status:  Prior to admission patient needed assistance:              Mental Health Status:          Chemical Dependency Status:                Values/Beliefs:  Spiritual, Cultural Beliefs, Catholic Practices, Values that affect care: no               Additional Information:  Per care management/social work consult for discharge planning and TCU placement on discharge.  Patient was admitted on 11-23-23 with acute pyelonephritis, generalized weakness, and mild hyponatremia.  The tentative date of discharge is 11-27-23.  Patient was initially admitted under observation status and transitioned to inpatient status on 11-24-23.  Reviewed chart and spoke with  patient regarding discharge plans.  Per patient report, she lives alone in a house with 3 steps to enter.  Once inside there are steps up to the second floor and down to the basement.  Patient uses a straight cane and a rolling walker at baseline depending on the distance.  Patient is independent at baseline, but her son does assist as needed.  Reviewed the therapy discharge recommendations of TCU placement on discharge and patient is in agreement.  Patient states she has been to [facility] in the past and this would be her first choice.  Other potential options include [facility].  Patient states she is not interested in [facility] as she has been there in the past and did not have a good experience there.  Referrals sent, via the discharge navigator, to check bed availability.    Will continue to follow.      MARILIA George, [facility]    [phone]  [facility]

## 2023-11-26 NOTE — PROGRESS NOTES
Oriented to room.ambulated to bathroom with SBA and walker. Steady gait. Skin intact. Alert and oriented. Clothes and belongings, cell phone with patient.

## 2023-11-26 NOTE — PLAN OF CARE
Goal Outcome Evaluation:      Plan of Care Reviewed With: patient    Overall Patient Progress: improvingOverall Patient Progress: improving       Date/Time 11/26 [id number]    Trauma/Ortho/Medical (Choose one)  Medical     Diagnosis: UTI/hypoxia   Mental Status: A&Ox4 can be forgetful   Activity/dangle Assist 1 w/walker   Diet: reg 2L fluid restrict   Pain: tylenol given for headache   Edward/Voiding: bedside commode, incontinent at times   Tele/Restraints/Iso: NA  02/LDA: SADIA GARCIA  D/C Date: TCU pend   Other Info: K+ protocol, transfer from short stay

## 2023-11-26 NOTE — PROGRESS NOTES
[facility]  Hospitalist Progress Note   11/26/2023          Assessment and Plan:       Geneva Shepherd is a 82 year old female with factor V Leiden with history of right lower extremity DVT, long-term use of warfarin, hyperlipidemia, GERD, IBS, hypothyroidism, depression, and prior history of recurrent UTIs on prophylactic Macrobid which was recently discontinued in the setting of elevated liver enzymes admitted on 11/23/2020 with generalized weakness.     Acute complicated pyelonephritis  Hx recurrent UTIs  Previously followed with [facility], previously on macrobid for UTI prevention, but stopped recently due to rising liver enzymes as below. Last UTI several years ago per patient as she was on ppx.  Presented with 3 days of dysuria, urinary urgency and frequency, intermittent hypogastric discomfort.   -- Afebrile, no leukocytosis.  UA appears infected with large leukocyte esterase, 88 white blood cells, negative nitrate, negative blood.    Urine cultures with E. coli  CT of abdomen and pelvis with contrast revealed signs of cystitis as well as pyelitis.  Multiple colonic diverticula without evidence of diverticulitis.    -- Continue IV ceftriaxone ( 11/23).  Follow final sensitivities.  Switch to oral antibiotics on discharge  Trend WBC count, fever curve. Timed Voiding.    Physical deconditioning from medical illness, senile fragility.  Lightheadedness likely from poor intake, electrolyte abnormalities improving.  Patient reports lives alone at home, uses cane to ambulate.  Reports generalized weakness, poor intake, loss of appetite for the last few days.    -- Address medical issues.   Follow TSH, vitamin B12, magnesium, potassium within normal limits.  Telemetry monitoring -no acute events per report  Rehab team following, recommend TCU for rehabilitation.  Patient seems open to TCU.  Care management/social work assistance with transition requested    Acute hypoxia likely from small  pleural effusion, possible JULIO.  Interstitial infiltrates along with small pleural effusion.  Possible obstructive sleep apnea.  Patient snoring/mouth breather. O2 sats dropped to 85% on room air.  Was requiring 2 to 3 L oxygen.  Denies any cough or wheezing.    Afebrile, no leukocytosis. ProBNP 899.  COVID-19/influenza A/B/RSV PCR negative.    CXR 11/24 -  mild interstitial infiltrates in the lungs with small pleural effusions.   Received IV fluids on admission. Already on antibiotics as above.  Aggressive incentive spirometry, wean off oxygen as able to  Recommend sleep studies as outpatient.  Repeat imaging of chest in few weeks for resolution.    Hypochloremic hyponatremia likely from poor intake, excess free water intake.  Patient reports poor intake over the last few days PTA, has been drinking a lot of water.  Sodium 128, chloride 94 on admission.    TSH, proBNP within normal limits.  Magnesium 1.9.  Albumin 3.6  Received IV fluids, fluid restriction with which sodium improved to 135  2000 mL fluid restriction    Elevation of liver enzymes, improved  CBD dilatation.  Mild splenomegaly.  Stopped macrobid as well as previous nutrition supplements due to this.   Following with [facility], planning for EUS guided liver bx 12/12.  AST 43, ALT 27.  Alkaline phosphatase 100, bilirubin 0.6.  CT on admission Cholecystectomy. There is significant common bile duct dilatation measuring 1.8 cm.  Mild splenomegaly.  Follow-up with [facility] in clinic per schedule.  Avoid hepatotoxic drugs, limit Tylenol to 2 g/day.  Monitor liver enzymes periodically.    Mild thrombocytopenia noted.  Follow platelets in AM.  No active bleeding.    Incidental calcified fibroid on imaging.  Noted.  Follow-up as outpatient if symptomatic.     Factor V Leiden  Hx RLE DVT on Coumadin.  Pharmacy to dose Coumadin.  Goal INR between 2-3.      History of mild cognitive impairment.    Per son's report having memory issues for the last few  months.  Continue PTA donepezil.  OT for cognitive screening as outpatient.    Hypothyroidism:  Continue PTA thyroid replacement.    Mild osteoarthritis  As needed Tylenol.    Mild tremor of hands  Noted.    Stable mild edema:   Continue PTA compression stockings     Orders Placed This Encounter      Regular Diet Adult      DVT Prophylaxis: SCDs, ambulate  Code Status: Full Code  Disposition: Expected discharge pending safe discharge plan in place    Discussed with patient, floor RN.  Patient's son updated 11/25  >35 minutes spent by me on the date of service doing chart review, history, exam, documentation & further activities per the note.      Galindo Kelsey MD        Interval History:        Patient lying in bed.  Denies any chest pain or palpitations.  Denies any shortness of breath.  Denies any headache or dizziness.  Denies any nausea or vomiting.  Complaining of generalized weakness.  Denies any new tingling or numbness.  Able to wean off supplemental nasal oxygen this morning.  Afebrile         Physical Exam:        Physical Exam   Temp:  [97.2  F (36.2  C)-98.6  F (37  C)] 98.3  F (36.8  C)  Pulse:  [72-79] 72  Resp:  [18] 18  BP: (113-160)/(58-82) 129/77  SpO2:  [92 %-99 %] 93 %    Intake/Output Summary (Last 24 hours) at 11/24/2023 1551  Last data filed at 11/24/2023 1137  Gross per 24 hour   Intake 1440 ml   Output 1750 ml   Net -310 ml       Admission Weight: 79.4 kg (175 lb)  Current Weight: 81.3 kg (179 lb 3.7 oz)    PHYSICAL EXAM  GENERAL: Patient lying in bed.   HEART: Regular rate and rhythm. S1S2. No murmurs  LUNGS: Bilateral slightly decreased breath sounds, no wheezing or crackles.  Respirations unlabored  ABDOMEN: Soft, no abdominal tenderness, bowel sounds heard   No suprapubic tenderness.  NEURO: Moving all extremities.  EXTREMITIES: No pedal edema.   SKIN: Warm, dry. No rash  PSYCHIATRY Cooperative       Medications:         amitriptyline  25 mg Oral At Bedtime    cefTRIAXone  2 g  Intravenous Q24H    donepezil  5 mg Oral At Bedtime    levothyroxine  50 mcg Oral QAM AC    PARoxetine  40 mg Oral At Bedtime    senna-docusate  1 tablet Oral BID    Or    senna-docusate  2 tablet Oral BID    sodium chloride (PF)  3 mL Intracatheter Q8H    Warfarin Therapy Reminder  1 each Oral See Admin Instructions     acetaminophen **OR** acetaminophen, bisacodyl, hydrALAZINE **OR** hydrALAZINE, HYDROmorphone, HYDROmorphone, lidocaine 4%, lidocaine (buffered or not buffered), naloxone **OR** naloxone **OR** naloxone **OR** naloxone, ondansetron **OR** ondansetron, oxyCODONE, oxyCODONE IR, - MEDICATION INSTRUCTIONS -, polyethylene glycol, prochlorperazine **OR** prochlorperazine **OR** prochlorperazine, senna-docusate **OR** senna-docusate, sodium chloride (PF)         Data:      All new lab and imaging data was reviewed.

## 2023-11-26 NOTE — PLAN OF CARE
Goal Outcome Evaluation:  Trauma/Ortho/Medical (Choose one)  medical  Diagnosis: pyelonephritis with UTI   Mental Status: A/Ox4  Activity/dangle up with one assist/GB/walker  Diet: regular  Pain: denies  Edward/Voiding: bathroom - incontinence at times  02/LDA: RA now - patient required O2 post therapy for sats in the 80%'s/SL  D/C Date: possibly 11/27 to TCU  Other Info: 2000mL fluid restrictions - 610cc this shift

## 2023-11-27 ENCOUNTER — APPOINTMENT (OUTPATIENT)
Dept: OCCUPATIONAL THERAPY | Facility: CLINIC | Age: 83
DRG: 690 | End: 2023-11-27
Payer: MEDICARE

## 2023-11-27 ENCOUNTER — MEDICAL CORRESPONDENCE (OUTPATIENT)
Dept: HEALTH INFORMATION MANAGEMENT | Facility: CLINIC | Age: 83
End: 2023-11-27

## 2023-11-27 ENCOUNTER — APPOINTMENT (OUTPATIENT)
Dept: PHYSICAL THERAPY | Facility: CLINIC | Age: 83
DRG: 690 | End: 2023-11-27
Payer: MEDICARE

## 2023-11-27 ENCOUNTER — DOCUMENTATION ONLY (OUTPATIENT)
Dept: ANTICOAGULATION | Facility: CLINIC | Age: 83
End: 2023-11-27
Payer: MEDICARE

## 2023-11-27 VITALS
SYSTOLIC BLOOD PRESSURE: 139 MMHG | WEIGHT: 181.22 LBS | HEIGHT: 66 IN | OXYGEN SATURATION: 94 % | BODY MASS INDEX: 29.12 KG/M2 | TEMPERATURE: 98.8 F | DIASTOLIC BLOOD PRESSURE: 82 MMHG | HEART RATE: 75 BPM | RESPIRATION RATE: 18 BRPM

## 2023-11-27 LAB
ACANTHOCYTES BLD QL SMEAR: NORMAL
AUER BODIES BLD QL SMEAR: NORMAL
BASO STIPL BLD QL SMEAR: NORMAL
BITE CELLS BLD QL SMEAR: NORMAL
BLISTER CELLS BLD QL SMEAR: NORMAL
BURR CELLS BLD QL SMEAR: NORMAL
DACRYOCYTES BLD QL SMEAR: NORMAL
ELLIPTOCYTES BLD QL SMEAR: NORMAL
FRAGMENTS BLD QL SMEAR: NORMAL
HGB C CRYSTALS: NORMAL
HOWELL-JOLLY BOD BLD QL SMEAR: NORMAL
INR PPP: 2.3 (ref 0.85–1.15)
NEUTS HYPERSEG BLD QL SMEAR: NORMAL
PLAT MORPH BLD: NORMAL
PLATELET # BLD AUTO: 184 10E3/UL (ref 150–450)
POLYCHROMASIA BLD QL SMEAR: NORMAL
POTASSIUM SERPL-SCNC: 4.2 MMOL/L (ref 3.4–5.3)
RBC AGGLUT BLD QL: NORMAL
RBC MORPH BLD: NORMAL
ROULEAUX BLD QL SMEAR: NORMAL
SICKLE CELLS BLD QL SMEAR: NORMAL
SMUDGE CELLS BLD QL SMEAR: NORMAL
SODIUM SERPL-SCNC: 138 MMOL/L (ref 135–145)
SPHEROCYTES BLD QL SMEAR: NORMAL
STOMATOCYTES BLD QL SMEAR: NORMAL
TARGETS BLD QL SMEAR: NORMAL
TOXIC GRANULES BLD QL SMEAR: NORMAL
VARIANT LYMPHS BLD QL SMEAR: NORMAL

## 2023-11-27 PROCEDURE — 85049 AUTOMATED PLATELET COUNT: CPT | Performed by: HOSPITALIST

## 2023-11-27 PROCEDURE — 84295 ASSAY OF SERUM SODIUM: CPT | Performed by: HOSPITALIST

## 2023-11-27 PROCEDURE — 250N000011 HC RX IP 250 OP 636: Mod: JZ | Performed by: INTERNAL MEDICINE

## 2023-11-27 PROCEDURE — 36415 COLL VENOUS BLD VENIPUNCTURE: CPT | Performed by: PHYSICIAN ASSISTANT

## 2023-11-27 PROCEDURE — 97535 SELF CARE MNGMENT TRAINING: CPT | Mod: GO

## 2023-11-27 PROCEDURE — 97116 GAIT TRAINING THERAPY: CPT | Mod: GP

## 2023-11-27 PROCEDURE — 250N000013 HC RX MED GY IP 250 OP 250 PS 637: Performed by: PHYSICIAN ASSISTANT

## 2023-11-27 PROCEDURE — 99239 HOSP IP/OBS DSCHRG MGMT >30: CPT | Performed by: INTERNAL MEDICINE

## 2023-11-27 PROCEDURE — 97530 THERAPEUTIC ACTIVITIES: CPT | Mod: GP

## 2023-11-27 PROCEDURE — 85610 PROTHROMBIN TIME: CPT | Performed by: PHYSICIAN ASSISTANT

## 2023-11-27 PROCEDURE — 84132 ASSAY OF SERUM POTASSIUM: CPT | Performed by: INTERNAL MEDICINE

## 2023-11-27 RX ORDER — WARFARIN SODIUM 5 MG/1
5 TABLET ORAL
Status: DISCONTINUED | OUTPATIENT
Start: 2023-11-27 | End: 2023-11-27 | Stop reason: HOSPADM

## 2023-11-27 RX ORDER — CEFTRIAXONE 2 G/1
2 INJECTION, POWDER, FOR SOLUTION INTRAMUSCULAR; INTRAVENOUS ONCE
Status: COMPLETED | OUTPATIENT
Start: 2023-11-27 | End: 2023-11-27

## 2023-11-27 RX ORDER — CEFPODOXIME PROXETIL 200 MG/1
200 TABLET, FILM COATED ORAL 2 TIMES DAILY
DISCHARGE
Start: 2023-11-28 | End: 2023-12-03

## 2023-11-27 RX ADMIN — CEFTRIAXONE SODIUM 2 G: 2 INJECTION, POWDER, FOR SOLUTION INTRAMUSCULAR; INTRAVENOUS at 13:20

## 2023-11-27 RX ADMIN — SENNOSIDES AND DOCUSATE SODIUM 1 TABLET: 50; 8.6 TABLET ORAL at 08:55

## 2023-11-27 RX ADMIN — LEVOTHYROXINE SODIUM 50 MCG: 50 TABLET ORAL at 06:12

## 2023-11-27 ASSESSMENT — ACTIVITIES OF DAILY LIVING (ADL)
ADLS_ACUITY_SCORE: 35

## 2023-11-27 NOTE — TELEPHONE ENCOUNTER
"STEFANIE-PROCEDURAL ANTICOAGULATION  MANAGEMENT    ASSESSMENT     Warfarin interruption plan for ERCP/EUS on 2023.    Indication for Anticoagulation: DVT and Heterozygous Factor V Leiden    RLE DVT 2003  HX superficial phlebitis 1980s    Stefanie-Procedure Risk stratification for thromboembolism: moderate ( Chest guidelines)    VTE:  CHEST Perioperative Management guidelines suggest against bridging for patients with Hx of VTE as sole clinical indication for warfarin except in high risk stratification patients.    RECOMMENDATION     Pre-Procedure:  Hold warfarin for 4 days, until after procedure startin2023   No Bridge    Post-Procedure:  Resume warfarin dose if okay with provider doing procedure on night of procedure, 2023 PM: 10mg  Recheck INR ~ 7 days after resuming warfarin     Plan routed to referring provider for approval  ?   Queenie Frey [facility]    SUBJECTIVE/OBJECTIVE     Marsha Joao, a 82 year old female    Goal INR Range: 2.0-3.0     Patient bridged in past: Yes: last prophylactic post procedure 2022, 2021 with longer hold      Wt Readings from Last 3 Encounters:   23 82.2 kg (181 lb 3.5 oz)   23 79.8 kg (176 lb)   23 79.4 kg (175 lb)      Ideal body weight: 59.3 kg (130 lb 10.4 oz)  Adjusted ideal body weight: 68.4 kg (150 lb 14.1 oz)     Estimated body mass index is 29.26 kg/m  as calculated from the following:    Height as of 23: 1.676 m (5' 5.98\").    Weight as of 23: 82.2 kg (181 lb 3.5 oz).    Lab Results   Component Value Date    INR 2.30 (H) 2023    INR 2.19 (H) 2023    INR 2.64 (H) 2023     Lab Results   Component Value Date    HGB 13.0 2023    HCT 38.6 2023     2023     Lab Results   Component Value Date    CR 0.52 2023    CR 0.59 2023    CR 0.64 2023     Estimated Creatinine Clearance: 93.3 mL/min (based on SCr of 0.52 mg/dL).    "

## 2023-11-27 NOTE — PLAN OF CARE
Goal Outcome Evaluation:    11/26 [id number]  Goal Outcome Evaluation:  Trauma/Ortho/Medical (Choose one)  medical  Diagnosis: pyelonephritis with UTI   Mental Status: A/Ox4  Activity/dangle up with one assist/GB/walker  Diet: regular  Pain: denies  Edward/Voiding: bathroom - incontinence at times  02/LDA: TRICIA GARCIA  D/C Date: possibly 11/27 to TCU  Other Info: 2000mL fluid restrictions

## 2023-11-27 NOTE — PLAN OF CARE
Physical Therapy Discharge Summary    Reason for therapy discharge:    Discharged to transitional care facility.    Progress towards therapy goal(s). See goals on Care Plan in [facility] electronic health record for goal details.  Goals not met.  Barriers to achieving goals:   discharge from facility.    Therapy recommendation(s):    Continued therapy is recommended.  Rationale/Recommendations:  Pt limited with mobility on RA, requires O2 in order to stay above 90%, attempted ambulation ~100' with FWW on RA and pt still dropping below 90% but recovering quickly with seated rest. PLOF on RA, recommend TCU to improve functional mobility and activity tolerance.

## 2023-11-27 NOTE — DISCHARGE SUMMARY
"[facility]  Hospitalist Discharge Summary      Date of Admission:  11/23/2023  Date of Discharge:  11/27/2023  Discharging Provider: Robby Neff MD  Discharge Service: Hospitalist Service    Discharge Diagnoses   See below    Clinically Significant Risk Factors     # Overweight: Estimated body mass index is 29.26 kg/m  as calculated from the following:    Height as of this encounter: 1.676 m (5' 5.98\").    Weight as of this encounter: 82.2 kg (181 lb 3.5 oz).       Follow-ups Needed After Discharge   Follow-up Appointments     Follow Up and recommended labs and tests      Follow up with group home physician.  The following labs/tests are   recommended: Monitor INR in 3 to 5 days, monitor closely while on   antibiotics  Monitor CBC, BMP, liver enzymes in 7 to 10 days.  Repeat imaging of chest in few weeks for resolution.      Noted incidental calcified fibroid on imaging, follow-up as outpatient if   symptomatic.    Recommend cognitive screening as outpatient    Recommend sleep studies as outpatient.      Follow-up with [facility] in clinic per schedule.    .            Unresulted Labs Ordered in the Past 30 Days of this Admission       No orders found from 10/24/2023 to 11/24/2023.            Discharge Disposition   Discharged to short-term care facility  Condition at discharge: Stable    Hospital Course   Geneva Shepherd is a 82 year old female with factor V Leiden with history of right lower extremity DVT, long-term use of warfarin, hyperlipidemia, GERD, IBS, hypothyroidism, depression, and prior history of recurrent UTIs on prophylactic Macrobid which was recently discontinued in the setting of elevated liver enzymes admitted on 11/23/2020 with generalized weakness. Hospital course as follows     Acute complicated pyelonephritis  Hx recurrent UTIs  Previously followed with [facility], previously on macrobid for UTI prevention, but stopped recently due " to rising liver enzymes as below. Last UTI several years ago per patient as she was on ppx.  Presented with 3 days of dysuria, urinary urgency and frequency, intermittent hypogastric discomfort.   *Afebrile, no leukocytosis.  *UA  abnormal with large leukocyte esterase, 88 white blood cells, negative nitrate, negative blood.    *Urine cultures with E. Coli resistant to fluoroquinolones, bactrim  *CT of abdomen and pelvis with contrast revealed signs of cystitis as well as pyelitis.  Multiple colonic diverticula without evidence of diverticulitis.    *received ceftriaxone x 5 days in the hospital, will discharge on Vantin 200mg BID x 5 more days to complete 10 days course       Physical deconditioning from medical illness, senile frailty.  Lightheadedness likely from poor intake, electrolyte abnormalities improving.  Patient reports lives alone at home, uses cane to ambulate.  Reports generalized weakness, poor intake, loss of appetite for the last few days.    -- Address medical issues.   Follow TSH, vitamin B12, magnesium, potassium within normal limits.  Telemetry monitoring -no acute events per report  Rehab team following, recommend TCU for rehabilitation.  Patient seems open to TCU.  Care management/social work assistance with transition requested     Acute hypoxia likely from small pleural effusion, possible JULIO.resolved  Interstitial infiltrates along with small pleural effusion.  Possible obstructive sleep apnea.  Patient snoring/mouth breather. O2 sats dropped to 85% on room air.  Was requiring 2 to 3 L oxygen.  Denies any cough or wheezing.    Afebrile, no leukocytosis. ProBNP 899.  COVID-19/influenza A/B/RSV PCR negative.    CXR 11/24 -  mild interstitial infiltrates in the lungs with small pleural effusions.   Received IV fluids on admission. Already on antibiotics as above.  Aggressive incentive spirometry, weaned off oxygen and currently on RA  Recommend sleep studies as outpatient.  Repeat imaging of  chest in few weeks for resolution.     Hypochloremic hyponatremia likely from poor intake, excess free water intake.  Patient reports poor intake over the last few days PTA, has been drinking a lot of water.  Sodium 128, chloride 94 on admission.    TSH, proBNP within normal limits.  Magnesium 1.9.  Albumin 3.6  Received IV fluids, fluid restriction with which sodium improved to 135       Elevation of liver enzymes, improved  CBD dilatation.  Mild splenomegaly.  Stopped macrobid as well as previous nutrition supplements due to this.   Following with [facility], planning for EUS guided liver bx 12/12.  AST 43, ALT 27.  Alkaline phosphatase 100, bilirubin 0.6.  CT on admission Cholecystectomy. There is significant common bile duct dilatation measuring 1.8 cm.  Mild splenomegaly.  Follow-up with [facility] in clinic per schedule.  Avoid hepatotoxic drugs, limit Tylenol to 2 g/day.     Mild thrombocytopenia noted. resolved  Plat mauro 103, Follow platelets 184K on 11/27     Incidental calcified fibroid on imaging.  Noted.  Follow-up as outpatient if symptomatic.     Factor V Leiden  Hx RLE DVT on Coumadin.  Continue warfarin per PTA regimen. INR 2.3  F/up INR in 3-5 days at TCU     History of mild cognitive impairment.    Per son's report having memory issues for the last few months.  Continue PTA donepezil.  OT for cognitive screening as outpatient.     Hypothyroidism:  Continue PTA thyroid replacement.     Mild osteoarthritis  As needed Tylenol.     Mild tremor of hands  Noted.     Stable mild edema:   Continue PTA compression stockings    Consultations This Hospital Stay   PHARMACY TO DOSE WARFARIN  CARE MANAGEMENT / SOCIAL WORK IP CONSULT  PHYSICAL THERAPY ADULT IP CONSULT  OCCUPATIONAL THERAPY ADULT IP CONSULT  VASCULAR ACCESS ADULT IP CONSULT  CARE MANAGEMENT / SOCIAL WORK IP CONSULT  PHYSICAL THERAPY ADULT IP CONSULT  OCCUPATIONAL THERAPY ADULT IP CONSULT    Code Status   Full Code    Time Spent on this  Encounter   I, Robby Neff MD, personally saw the patient today and spent 45 minutes discharging this patient.       Robby Neff MD  [facility]  [address] [place] [facility] [address]  Phone: [phone]  Fax: [phone]  ______________________________________________________________________    Physical Exam   Vital Signs: Temp: 98.8  F (37.1  C) Temp src: Oral BP: 139/82 Pulse: 75   Resp: 18 SpO2: 94 % O2 Device: None (Room air)    Weight: 181 lbs 3.49 oz  General Appearance: Alert,awake and no apparent distress  Respiratory: clear to auscultation bilaterally, no wheezing  Cardiovascular: regular rate and rhythm  GI: soft and non-tender  Skin: warm and dry         Primary Care Physician   Jacoby Boo    Discharge Orders      General info for SNF    Length of Stay Estimate: Short Term Care: Estimated # of Days <30  Condition at Discharge: Stable  Level of care:skilled   Rehabilitation Potential: Good  Admission H&P remains valid and up-to-date: Yes  Recent Chemotherapy: N/A  Use Nursing Home Standing Orders: Yes     Mantoux instructions    Give two-step Mantoux (PPD) Per Facility Policy Yes     Follow Up and recommended labs and tests    Follow up with intermediate physician.  The following labs/tests are recommended: Monitor INR in 3 to 5 days, monitor closely while on antibiotics  Monitor CBC, BMP, liver enzymes in 7 to 10 days.  Repeat imaging of chest in few weeks for resolution.      Noted incidental calcified fibroid on imaging, follow-up as outpatient if symptomatic.    Recommend cognitive screening as outpatient    Recommend sleep studies as outpatient.      Follow-up with [facility] in clinic per schedule.    .     Reason for your hospital stay    Admitted to the hospital with generalized weakness, urinary tract infection/pyelonephritis.  Treated with IV antibiotics, symptoms improving and plan for discharge to TCU for rehabilitation      Additional Discharge Instructions    Timed Voiding.  Aggressive incentive spirometry.  2000 mL fluid restriction.  Avoid hepatotoxic drugs, limit Tylenol to 2 g/day.     Activity - Up with nursing assistance     Physical Therapy Adult Consult    Evaluate and treat as clinically indicated.    Reason: Physical deconditioning     Occupational Therapy Adult Consult    Evaluate and treat as clinically indicated.    Reason: Physical deconditioning     Fall precautions     Diet    Follow this diet upon discharge: Orders Placed This Encounter      Fluid restriction 2000 ML FLUID      Regular Diet Adult       Significant Results and Procedures   Most Recent 3 CBC's:  Recent Labs   Lab Test 11/27/23 0825 11/26/23 1810 11/25/23  0646 11/24/23  0654 11/23/23  1607 06/11/23  1856   WBC  --   --   --  7.6 7.2 6.2   HGB  --   --   --  13.0 14.0 15.0   MCV  --   --   --  98 98 100    126* 103* 135* 153 185     Most Recent 3 BMP's:  Recent Labs   Lab Test 11/27/23 0825 11/26/23 1810 11/25/23 0646 11/24/23  1837 11/24/23  0654 11/23/23  2108 11/23/23  1607     --  135  --  132*   < > 128*   POTASSIUM 4.2 4.5 4.0   < > 3.4  --  4.0   CHLORIDE  --   --  103  --  101  --  94*   CO2  --   --  23  --  21*  --  24   BUN  --   --  8.3  --  7.9*  --  10.9   CR  --   --  0.52  --  0.59  --  0.64   ANIONGAP  --   --  9  --  10  --  10   TUAN  --   --  10.2  --  9.5  --  10.1   GLC  --   --  97  --  96  --  134*    < > = values in this interval not displayed.   ,   Results for orders placed or performed during the hospital encounter of 11/23/23   CT Abdomen Pelvis w Contrast    Narrative    EXAM: CT ABDOMEN PELVIS W CONTRAST  LOCATION: [facility]  DATE: 11/23/2023    INDICATION: Urinary frequency and lower abdominal pain. Check for diverticulitis, appendicitis, cystitis, pyelonephritis.  COMPARISON: None.  TECHNIQUE: CT scan of the abdomen and pelvis was performed following injection of IV  contrast. Multiplanar reformats were obtained. Dose reduction techniques were used.  CONTRAST: 88 mL Isovue-370.    FINDINGS:   LOWER CHEST: Tiny right pleural effusion.    HEPATOBILIARY: Significant dilatation of the common bile duct measuring 1.8 cm. Cholecystectomy.    PANCREAS: Atrophic.    SPLEEN: Mild splenomegaly.    ADRENAL GLANDS: Normal.    KIDNEYS/BLADDER: Urothelial thickening enhancement left ureter and renal pelvis compatible with pyelitis.    BOWEL: Normal.    LYMPH NODES: Normal.    VASCULATURE: Unremarkable.    PELVIC ORGANS: Urinary bladder wall thickening compatible with cystitis. Calcified fibroid.    MUSCULOSKELETAL: Left hip arthroplasty with lumbar degenerative change. Moderate compression of L3 with mild-to-moderate compression of L1.      Impression    IMPRESSION:   1.  Urinary bladder wall thickening compatible with cystitis. Urothelial thickening and enhancement proximal left ureter and renal pelvis compatible with pyelitis. Normal renal enhancement.    2.  Multiple colonic diverticula without CT evidence for diverticulitis.    3.  Cholecystectomy. There is significant common bile duct dilatation measuring 1.8 cm. If there is lab correlation, MRCP could be performed.    4.  Mild splenomegaly.    5.  Calcified fibroid.   XR Chest 2 Views    Narrative    EXAM: XR CHEST 2 VIEWS  LOCATION: [facility]  DATE: 11/24/2023    INDICATION: Acute hypoxia with generalized weakness, poor appetite.  COMPARISON: 06/11/2023.      Impression    IMPRESSION: Normal heart size. There are mild interstitial infiltrates in the lungs with small pleural effusions. Old left rib fractures.     *Note: Due to a large number of results and/or encounters for the requested time period, some results have not been displayed. A complete set of results can be found in Results Review.       Discharge Medications   Current Discharge Medication List        START taking these medications    Details    cefpodoxime (VANTIN) 200 MG tablet Take 1 tablet (200 mg) by mouth 2 times daily for 5 days    Associated Diagnoses: Recurrent UTI           CONTINUE these medications which have NOT CHANGED    Details   acetaminophen (TYLENOL) 500 MG tablet Take 500 mg by mouth every 4 hours as needed      amitriptyline (ELAVIL) 25 MG tablet TAKE 1 TABLET BY MOUTH EVERYDAY AT BEDTIME  Qty: 90 tablet, Refills: 3    Associated Diagnoses: Insomnia, unspecified type      Ascorbic Acid (VITAMIN C PO) Take 1,000 mg by mouth every morning (Patient takes 2 X 500 mg = 1,000 mg dose)      BETA CAROTENE PO Take 25,000 Units by mouth daily.      Biotin 1 MG CAPS Take 1 tablet by mouth daily      CHROMIUM PICOLINATE 800 MCG OR TABS Take 1 capsule by mouth daily      COCONUT OIL PO Take 1 capsule by mouth every morning      Cyanocobalamin 1000 MCG CAPS Take 1 tablet by mouth daily      donepezil (ARICEPT) 10 MG tablet TAKE 1/2  TABLET BY MOUTH EVERY NIGHT AT BEDTIME  Qty: 90 tablet, Refills: 3    Associated Diagnoses: Mild cognitive impairment      FISH OIL 1000 MG OR CAPS Take 1 g by mouth daily      FLAX SEED OIL 1000 MG OR CAPS Take 1 capsule by mouth daily      FOLIC ACID PO Take 400 mcg by mouth daily       levOCARNitine (CARNITOR) 330 MG tablet Take 330 mg by mouth every morning      levothyroxine (SYNTHROID/LEVOTHROID) 50 MCG tablet Take 1 tablet (50 mcg) by mouth daily  Qty: 90 tablet, Refills: 3    Associated Diagnoses: Hypothyroidism, unspecified type      magnesium 100 MG CAPS Take 400 mg by mouth daily      PARoxetine (PAXIL) 20 MG tablet TAKE 2 TABLETS (40 MG) BY MOUTH EVERY MORNING  Qty: 180 tablet, Refills: 3    Associated Diagnoses: Major depressive disorder, single episode, mild (H24)      TURMERIC PO Take 1 capsule by mouth every morning      warfarin ANTICOAGULANT (COUMADIN) 5 MG tablet TAKE 7.5 MG SUN, TUE, THURS 5 MG ALL OTHER DAYS OR AS DIRECTED BY INR CLINIC  Qty: 100 tablet, Refills: 1    Associated Diagnoses: Activated  protein C resistance (H24)      zinc gluconate 50 MG tablet Take 50 mg by mouth daily           Allergies   No Known Allergies

## 2023-11-27 NOTE — PLAN OF CARE
Goal Outcome Evaluation:    Trauma/Ortho/Medical (Choose one)  medical  Diagnosis: pyelonephritis with UTI   Mental Status: A/Ox4  Activity/dangle up with one assist/GB/walker  Diet:Regular  Pain: Denies  Edward/Voiding: bathroom -Incontinent at a time  02/LDA: TRICIA GARCIA  D/C Date: pt discharged to TCU.

## 2023-11-27 NOTE — PLAN OF CARE
Patient up with SBA and walker/belt. Steady gait doing IS with encouragement. Incontinent of some urine but is able to know she needs to void. Will dribble when ambulating to bathroom. Anticipating discharge to TCU for further rehab.

## 2023-11-27 NOTE — PROGRESS NOTES
Care Management Discharge Note    Discharge Date: 11/27/2023       Discharge Disposition: Transitional Care    Discharge Services:      Discharge DME:      Discharge Transportation: family or friend will provide    Private pay costs discussed: transportation costs, private room fees    Does the patient's insurance plan have a 3 day qualifying hospital stay waiver?  No    PAS Confirmation Code: [id number]  Patient/family educated on Medicare website which has current facility and service quality ratings:      Education Provided on the Discharge Plan:    Persons Notified of Discharge Plans: yes  Patient/Family in Agreement with the Plan: yes    Handoff Referral Completed: No    Additional Information:  Patient accepted into a semi private room at [facility] today. Writer spoke to patient to update. She prefers a private room. [facility] noted that if this was the case, they can put her on the list for a private next and patient agreed to this. Writer discussed ride for patient and she would like to see if her son is able to transport. She will call him. Writer confirmed that for a likely out of pocket cost, writer can set up transport for her. She will update her bedside nurse or SW when time is determined. PAS completed.    PAS-RR    D: Per DHS regulation, SW completed and submitted PAS-RR to [place] [employer] Direct [employer] via the Senior LinkAge Line.  PAS-RR confirmation # is : [id number]    I: SW spoke with patient and they are aware a PAS-RR has been submitted.  SW reviewed with patient that they may be contacted for a follow up appointment within 10 days of hospital discharge if their SNF stay is < 30 days.  Contact information for [facility] LinkAge Line was also provided.    A: patient verbalized understanding.    P: Further questions may be directed to [facility] LinkAge Line at #[phone], option #4 for PAS-RR staff.    Call to [facility] to update and they have a private room available. After talking about private room  fee, patient decided to stay in the semi private. Writer updated [facility] about preference. Orders requested.    Writer spoke to patient after she spoke to her son and she would like a ride set up as his truck is too high for her to get into. Writer discussed out of pocket cost of $80 and $5/mile and she agreed. Call to [facility] and wheelchair ride scheduled for today between [id number]. Patient and bedside nurse updated. Orders faxed to facility.     MICHELA Mckeon

## 2023-11-27 NOTE — PROGRESS NOTES
ANTICOAGULATION  MANAGEMENT: Discharge Review    Geneva Shepherd chart reviewed for anticoagulation continuity of care    Hospital Admission on 11/23/23 for Urinary Frequency and Generalized Weakness .    Discharge disposition: TCU    Results:    Recent labs: (last 7 days)     11/23/23  1732 11/24/23  0105 11/24/23  0654 11/25/23  0646 11/26/23  1810 11/27/23  0825   INR 2.87* 2.41* 2.43* 2.64* 2.19* 2.30*     Anticoagulation inpatient management:     home regimen continued    Anticoagulation discharge instructions:     Warfarin dosing: home regimen continued   Bridging: No   INR goal change: No      Medication changes affecting anticoagulation: Yes: discharge with cefpodoxime, per [employer]: Concurrent use of CEFPODOXIME and WARFARIN may result in an increased risk of bleeding. Patient also had ceftriaxone inpatient and this may also increase risk of bleeding.     Additional factors affecting anticoagulation: Yes: UTI     PLAN     No adjustment to anticoagulation plan needed, per discharge, TCU to check INR in 3-5 days.    Patient not contacted  [facility] will resume monitoring upon discharge from TCU    No adjustment to Anticoagulation Calendar was required    Sheryl Frey RN

## 2023-11-28 ENCOUNTER — TRANSITIONAL CARE UNIT VISIT (OUTPATIENT)
Dept: GERIATRICS | Facility: CLINIC | Age: 83
End: 2023-11-28
Payer: MEDICARE

## 2023-11-28 ENCOUNTER — TELEPHONE (OUTPATIENT)
Dept: GERIATRICS | Facility: CLINIC | Age: 83
End: 2023-11-28

## 2023-11-28 ENCOUNTER — PATIENT OUTREACH (OUTPATIENT)
Dept: INTERNAL MEDICINE | Facility: CLINIC | Age: 83
End: 2023-11-28

## 2023-11-28 VITALS
SYSTOLIC BLOOD PRESSURE: 127 MMHG | HEIGHT: 66 IN | BODY MASS INDEX: 29.09 KG/M2 | DIASTOLIC BLOOD PRESSURE: 77 MMHG | WEIGHT: 181 LBS | OXYGEN SATURATION: 92 % | HEART RATE: 73 BPM | TEMPERATURE: 97.9 F | RESPIRATION RATE: 17 BRPM

## 2023-11-28 DIAGNOSIS — Z79.01 LONG TERM CURRENT USE OF ANTICOAGULANT THERAPY: ICD-10-CM

## 2023-11-28 DIAGNOSIS — N12 PYELONEPHRITIS: Primary | ICD-10-CM

## 2023-11-28 DIAGNOSIS — R74.01 TRANSAMINITIS: ICD-10-CM

## 2023-11-28 DIAGNOSIS — Z86.718 PERSONAL HISTORY OF DVT (DEEP VEIN THROMBOSIS): ICD-10-CM

## 2023-11-28 DIAGNOSIS — J96.01 ACUTE RESPIRATORY FAILURE WITH HYPOXIA (H): ICD-10-CM

## 2023-11-28 DIAGNOSIS — R53.81 PHYSICAL DECONDITIONING: ICD-10-CM

## 2023-11-28 DIAGNOSIS — N39.0 FREQUENT UTI: ICD-10-CM

## 2023-11-28 DIAGNOSIS — D68.51 HETEROZYGOUS FACTOR V LEIDEN MUTATION (H): ICD-10-CM

## 2023-11-28 PROCEDURE — 99310 SBSQ NF CARE HIGH MDM 45: CPT | Performed by: NURSE PRACTITIONER

## 2023-11-28 NOTE — TELEPHONE ENCOUNTER
What type of discharge? Inpatient  Risk of Hospital admission or ED visit: 79.2%  Is a TCM episode required? Yes    When should the patient follow up with PCP? 7 days of discharge.    Tonya Patel RN  [facility]

## 2023-11-28 NOTE — PROGRESS NOTES
"[facility] GERIATRICS    PRIMARY CARE PROVIDER AND CLINIC:  Jacoby Boo MD, [address] [place] [address] / [facility] [address]  Chief Complaint   Patient presents with    Hospital F/U      [place] Medical Record Number:  [id number]  Place of Service where encounter took place:  [facility] ([facility]) [25]    Geneva Shepherd  is a 82 year old  (1940), admitted to the above facility from  [facility]. Hospital stay 11/23/23 through 11/27/23..   HPI:    PMH of factor V leiden, RLE DVT longterm AC, GERD, IBS, hypothyroidism, depression, recurrent UTI's who presents with generalized weakness.   Acute complicated pyelonephritis  Hx of recurrent UTIs  Followed by Urology associates, previously on Macrobid for prophylaxis but stopped recently d/t rising liver enzymes. UC shows E coli resistant to fluoroquinolones, bactrim, CT of abdomen and pelvis shows pyelitis. IV ceftriaxone for 5 days to oral vantin for 5 days.   Acute hypoxia  Found to have small pleural effusion and interstitial infiltrates, Abx as above and aggressive IS. Recommend sleep study as OP.   Elevated liver enzymes  Improved with DC of macrobid, followed by [facility], planning for EUS guided liver Bx 12/12  On exam today: patient sitting up in w/c, alert, pleasant, states she is feeling \"pretty good\" today, denies fever, chills, pain, states she has slight burning with urination, denies CP, palpitations, SOB, N/V/D or constipation.       CODE STATUS/ADVANCE DIRECTIVES DISCUSSION:  Prior  CPR/Full code   ALLERGIES: No Known Allergies   PAST MEDICAL HISTORY:   Past Medical History:   Diagnosis Date    Ankle fracture, lateral malleolus, closed  March 2012    right ankle    Asymptomatic varicose veins     Depression, major     Diverticulitis of colon (without mention of hemorrhage)(562.11)     DJD (degenerative joint disease)     Elevated antinuclear antibody (JUAN ANTONIO) level 7/4/2015    minimal    Embolism and thrombosis of unspecified site " 3/03    RLE    FACTOR 5 LEIDEN DEFECT (HYPERCOAGULABLE) 7/03     Heterozygote    FRACTURE OF RIGHT LATERAL PROXIMAL TIBIA 11/07    Gastro-oesophageal reflux disease     rare    Hypercalcemia     Hyperlipidemia LDL goal <160 10/31/2010    Insomnia     Irritable bowel syndrome     Obesity 9/11/2013    Pain in joint, lower leg     Phlebitis and thrombophlebitis of superficial vessels of lower extremities 7/03    right    Sprain of lumbar region     Unspecified hypothyroidism     Urinary tract infection, site not specified       PAST SURGICAL HISTORY:   has a past surgical history that includes NONSPECIFIC PROCEDURE (7/00/01); NONSPECIFIC PROCEDURE; NONSPECIFIC PROCEDURE (6/02); NONSPECIFIC PROCEDURE (7/04); Cholecystectomy; vascular surgery; Arthroplasty knee (1/17/2013); Arthroplasty hip (Left, 8/31/2015); GYN surgery; Colonoscopy (N/A, 4/26/2016); Arthroplasty knee (Left, 1/16/2017); and Endoscopic ultrasound upper gastrointestinal tract (GI) (N/A, 4/5/2022).  FAMILY HISTORY: family history includes Cardiovascular in her mother; Circulatory in her sister; Coronary Artery Disease in her father; Heart Disease in her father.  SOCIAL HISTORY:   reports that she quit smoking about 55 years ago. Her smoking use included cigarettes. She has never used smokeless tobacco. She reports that she does not currently use alcohol. She reports that she does not use drugs.  Patient's living condition: lives alone    Post Discharge Medication Reconciliation Status:   MED REC REQUIRED  Post Medication Reconciliation Status: medication reconcilation previously completed during another office visit       Current Outpatient Medications   Medication Sig    acetaminophen (TYLENOL) 500 MG tablet Take 500 mg by mouth every 4 hours as needed    amitriptyline (ELAVIL) 25 MG tablet TAKE 1 TABLET BY MOUTH EVERYDAY AT BEDTIME    Ascorbic Acid (VITAMIN C PO) Take 1,000 mg by mouth every morning (Patient takes 2 X 500 mg = 1,000 mg dose)    BETA  "CAROTENE PO Take 25,000 Units by mouth daily.    Biotin 1 MG CAPS Take 1 tablet by mouth daily    cefpodoxime (VANTIN) 200 MG tablet Take 1 tablet (200 mg) by mouth 2 times daily for 5 days    CHROMIUM PICOLINATE 800 MCG OR TABS Take 1 capsule by mouth daily    COCONUT OIL PO Take 1 capsule by mouth every morning    Cyanocobalamin 1000 MCG CAPS Take 1 tablet by mouth daily    donepezil (ARICEPT) 10 MG tablet TAKE 1/2  TABLET BY MOUTH EVERY NIGHT AT BEDTIME    FISH OIL 1000 MG OR CAPS Take 1 g by mouth daily    FLAX SEED OIL 1000 MG OR CAPS Take 1 capsule by mouth daily    FOLIC ACID PO Take 400 mcg by mouth daily     levOCARNitine (CARNITOR) 330 MG tablet Take 330 mg by mouth every morning    levothyroxine (SYNTHROID/LEVOTHROID) 50 MCG tablet Take 1 tablet (50 mcg) by mouth daily    magnesium 100 MG CAPS Take 400 mg by mouth daily    PARoxetine (PAXIL) 20 MG tablet TAKE 2 TABLETS (40 MG) BY MOUTH EVERY MORNING (Patient taking differently: Take 40 mg by mouth at bedtime)    TURMERIC PO Take 1 capsule by mouth every morning    warfarin ANTICOAGULANT (COUMADIN) 5 MG tablet TAKE 7.5 MG SUN, TUE, THURS 5 MG ALL OTHER DAYS OR AS DIRECTED BY INR CLINIC    zinc gluconate 50 MG tablet Take 50 mg by mouth daily     No current facility-administered medications for this visit.       ROS:  10 point ROS of systems including Constitutional, Eyes, Respiratory, Cardiovascular, Gastroenterology, Genitourinary, Integumentary, Musculoskeletal, Psychiatric were all negative except for pertinent positives noted in my HPI.    Vitals:  Ht 1.676 m (5' 6\")   Wt 82.1 kg (181 lb)   LMP  (LMP Unknown)   BMI 29.21 kg/m    Exam:  GENERAL APPEARANCE:  Alert, in no distress  ENT:  Mouth and posterior oropharynx normal, moist mucous membranes, normal hearing acuity  EYES:  EOM, conjunctivae, lids, pupils and irises normal, PERRL  RESP:  respiratory effort and palpation of chest normal, lungs clear to auscultation , no respiratory distress  CV:  " Palpation and auscultation of heart done , regular rate and rhythm, no murmur, rub, or gallop, peripheral edema trace+ in LE bilaterally  ABDOMEN:  normal bowel sounds, soft, nontender, no hepatosplenomegaly or other masses  M/S:   sitting up in w/c  SKIN:  Inspection of skin and subcutaneous tissue baseline  NEURO:   speech wnl  PSYCH:  affect and mood normal    Lab/Diagnostic data:  Recent labs in [facility] reviewed by me today.  and Most Recent 3 CBC's:  Recent Labs   Lab Test 11/27/23 0825 11/26/23 1810 11/25/23  0646 11/24/23  0654 11/23/23  1607 06/11/23  1856   WBC  --   --   --  7.6 7.2 6.2   HGB  --   --   --  13.0 14.0 15.0   MCV  --   --   --  98 98 100    126* 103* 135* 153 185     Most Recent 3 BMP's:  Recent Labs   Lab Test 11/27/23 0825 11/26/23 1810 11/25/23 0646 11/24/23  1837 11/24/23  0654 11/23/23  2108 11/23/23  1607     --  135  --  132*   < > 128*   POTASSIUM 4.2 4.5 4.0   < > 3.4  --  4.0   CHLORIDE  --   --  103  --  101  --  94*   CO2  --   --  23  --  21*  --  24   BUN  --   --  8.3  --  7.9*  --  10.9   CR  --   --  0.52  --  0.59  --  0.64   ANIONGAP  --   --  9  --  10  --  10   TUAN  --   --  10.2  --  9.5  --  10.1   GLC  --   --  97  --  96  --  134*    < > = values in this interval not displayed.       ASSESSMENT/PLAN:    (N12) Pyelonephritis  (primary encounter diagnosis)  (N39.0) Frequent UTI  (R53.81) Physical deconditioning  Comment: acute/ongoing  Plan: f/u with urology associates, continue cefpodoxime 200mg BID for 5 days ( 10 days total of Abx)     (D68.51) Heterozygous factor V Leiden mutation (H24)  (Z86.718) Personal history of RLE DVT (deep vein thrombosis)(2003)  (Z79.01) Long term current use of anticoagulant therapy  Comment: ongoing  Plan: continues on coumadin INR goal of 2-3    (J96.01) Acute respiratory failure with hypoxia (H)  Comment: acute/ongoing  Found interstitial infiltrates  Plan: monitor Sao2 at rest and with activity, Abx as above, IS QID and  prn  Repeat CXR on Monday    (R74.01) Transaminitis  Comment: acute/ongoing  Plan: follows with [facility] has EUS biopsy scheduled for 12/12/23      Orders:  CBC and CMP on monay  F/u CXR on Monday        Total time spent with patient visit at the [facility] was 45 min including patient visit and review of past records. Reviewed internal medicine and urology progress notes, reviewed lab and imaging results, discussed hospitalization and medications with patient at bedside    Electronically signed by:  Tonya Lynn Haase, APRN CNP

## 2023-11-28 NOTE — TELEPHONE ENCOUNTER
[facility] Geriatrics Triage Nurse INR     Provider: Tonya Haase, APRN CNP  Facility: [facility]  Facility Type:  TCU    Caller: Stanislaw  Call Back Number: [phone]  Reason for call: INR  Diagnosis/Goal: Factor V and DVT    *No INR today; facility calling for Coumadin orders  Last INR 2.3 on 11/27  Home dose: 7.5 mg Tue/Thur/Sun and 5 mg AOD    Heparin/Lovenox:  No  Currently on ABX?: No  Other interacting medication:  None  Missed or refused doses: No    Verbal Order/Direction given by Provider: Coumadin 7.5 mg PO tonight and 5 mg PO tomorrow. Check INR on Thursday 11/30/23    Provider Giving Order:  Tonya Haase, APRN CNP    Verbal Order given to: Dio Barnes RN

## 2023-11-28 NOTE — LETTER
"    11/28/2023        RE: Geneva Shepherd  [address]  [facility] [address]        [facility] GERIATRICS    PRIMARY CARE PROVIDER AND CLINIC:  Jacoby Boo MD, [address] [place] [address] / [facility] [address]  Chief Complaint   Patient presents with     Hospital F/U      [place] Medical Record Number:  [id number]  Place of Service where encounter took place:  [facility] ([facility]) [25]    Geneva Shepherd  is a 82 year old  (1940), admitted to the above facility from  [facility]. Hospital stay 11/23/23 through 11/27/23..   HPI:    PMH of factor V leiden, RLE DVT longterm AC, GERD, IBS, hypothyroidism, depression, recurrent UTI's who presents with generalized weakness.   Acute complicated pyelonephritis  Hx of recurrent UTIs  Followed by Urology associates, previously on Macrobid for prophylaxis but stopped recently d/t rising liver enzymes. UC shows E coli resistant to fluoroquinolones, bactrim, CT of abdomen and pelvis shows pyelitis. IV ceftriaxone for 5 days to oral vantin for 5 days.   Acute hypoxia  Found to have small pleural effusion and interstitial infiltrates, Abx as above and aggressive IS. Recommend sleep study as OP.   Elevated liver enzymes  Improved with DC of macrobid, followed by [facility], planning for EUS guided liver Bx 12/12  On exam today: patient sitting up in w/c, alert, pleasant, states she is feeling \"pretty good\" today, denies fever, chills, pain, states she has slight burning with urination, denies CP, palpitations, SOB, N/V/D or constipation.       CODE STATUS/ADVANCE DIRECTIVES DISCUSSION:  Prior  CPR/Full code   ALLERGIES: No Known Allergies   PAST MEDICAL HISTORY:   Past Medical History:   Diagnosis Date     Ankle fracture, lateral malleolus, closed  March 2012    right ankle     Asymptomatic varicose veins      Depression, major      Diverticulitis of colon (without mention of hemorrhage)(562.11)      DJD (degenerative joint disease)      " Elevated antinuclear antibody (JUAN ANTONIO) level 7/4/2015    minimal     Embolism and thrombosis of unspecified site 3/03    RLE     FACTOR 5 LEIDEN DEFECT (HYPERCOAGULABLE) 7/03     Heterozygote     FRACTURE OF RIGHT LATERAL PROXIMAL TIBIA 11/07     Gastro-oesophageal reflux disease     rare     Hypercalcemia      Hyperlipidemia LDL goal <160 10/31/2010     Insomnia      Irritable bowel syndrome      Obesity 9/11/2013     Pain in joint, lower leg      Phlebitis and thrombophlebitis of superficial vessels of lower extremities 7/03    right     Sprain of lumbar region      Unspecified hypothyroidism      Urinary tract infection, site not specified       PAST SURGICAL HISTORY:   has a past surgical history that includes NONSPECIFIC PROCEDURE (7/00/01); NONSPECIFIC PROCEDURE; NONSPECIFIC PROCEDURE (6/02); NONSPECIFIC PROCEDURE (7/04); Cholecystectomy; vascular surgery; Arthroplasty knee (1/17/2013); Arthroplasty hip (Left, 8/31/2015); GYN surgery; Colonoscopy (N/A, 4/26/2016); Arthroplasty knee (Left, 1/16/2017); and Endoscopic ultrasound upper gastrointestinal tract (GI) (N/A, 4/5/2022).  FAMILY HISTORY: family history includes Cardiovascular in her mother; Circulatory in her sister; Coronary Artery Disease in her father; Heart Disease in her father.  SOCIAL HISTORY:   reports that she quit smoking about 55 years ago. Her smoking use included cigarettes. She has never used smokeless tobacco. She reports that she does not currently use alcohol. She reports that she does not use drugs.  Patient's living condition: lives alone    Post Discharge Medication Reconciliation Status:   MED REC REQUIRED  Post Medication Reconciliation Status: medication reconcilation previously completed during another office visit       Current Outpatient Medications   Medication Sig     acetaminophen (TYLENOL) 500 MG tablet Take 500 mg by mouth every 4 hours as needed     amitriptyline (ELAVIL) 25 MG tablet TAKE 1 TABLET BY MOUTH EVERYDAY AT BEDTIME  "    Ascorbic Acid (VITAMIN C PO) Take 1,000 mg by mouth every morning (Patient takes 2 X 500 mg = 1,000 mg dose)     BETA CAROTENE PO Take 25,000 Units by mouth daily.     Biotin 1 MG CAPS Take 1 tablet by mouth daily     cefpodoxime (VANTIN) 200 MG tablet Take 1 tablet (200 mg) by mouth 2 times daily for 5 days     CHROMIUM PICOLINATE 800 MCG OR TABS Take 1 capsule by mouth daily     COCONUT OIL PO Take 1 capsule by mouth every morning     Cyanocobalamin 1000 MCG CAPS Take 1 tablet by mouth daily     donepezil (ARICEPT) 10 MG tablet TAKE 1/2  TABLET BY MOUTH EVERY NIGHT AT BEDTIME     FISH OIL 1000 MG OR CAPS Take 1 g by mouth daily     FLAX SEED OIL 1000 MG OR CAPS Take 1 capsule by mouth daily     FOLIC ACID PO Take 400 mcg by mouth daily      levOCARNitine (CARNITOR) 330 MG tablet Take 330 mg by mouth every morning     levothyroxine (SYNTHROID/LEVOTHROID) 50 MCG tablet Take 1 tablet (50 mcg) by mouth daily     magnesium 100 MG CAPS Take 400 mg by mouth daily     PARoxetine (PAXIL) 20 MG tablet TAKE 2 TABLETS (40 MG) BY MOUTH EVERY MORNING (Patient taking differently: Take 40 mg by mouth at bedtime)     TURMERIC PO Take 1 capsule by mouth every morning     warfarin ANTICOAGULANT (COUMADIN) 5 MG tablet TAKE 7.5 MG SUN, TUE, THURS 5 MG ALL OTHER DAYS OR AS DIRECTED BY INR CLINIC     zinc gluconate 50 MG tablet Take 50 mg by mouth daily     No current facility-administered medications for this visit.       ROS:  10 point ROS of systems including Constitutional, Eyes, Respiratory, Cardiovascular, Gastroenterology, Genitourinary, Integumentary, Musculoskeletal, Psychiatric were all negative except for pertinent positives noted in my HPI.    Vitals:  Ht 1.676 m (5' 6\")   Wt 82.1 kg (181 lb)   LMP  (LMP Unknown)   BMI 29.21 kg/m    Exam:  GENERAL APPEARANCE:  Alert, in no distress  ENT:  Mouth and posterior oropharynx normal, moist mucous membranes, normal hearing acuity  EYES:  EOM, conjunctivae, lids, pupils and " irises normal, PERRL  RESP:  respiratory effort and palpation of chest normal, lungs clear to auscultation , no respiratory distress  CV:  Palpation and auscultation of heart done , regular rate and rhythm, no murmur, rub, or gallop, peripheral edema trace+ in LE bilaterally  ABDOMEN:  normal bowel sounds, soft, nontender, no hepatosplenomegaly or other masses  M/S:   sitting up in w/c  SKIN:  Inspection of skin and subcutaneous tissue baseline  NEURO:   speech wnl  PSYCH:  affect and mood normal    Lab/Diagnostic data:  Recent labs in [facility] reviewed by me today.  and Most Recent 3 CBC's:  Recent Labs   Lab Test 11/27/23  0825 11/26/23  1810 11/25/23  0646 11/24/23  0654 11/23/23  1607 06/11/23  1856   WBC  --   --   --  7.6 7.2 6.2   HGB  --   --   --  13.0 14.0 15.0   MCV  --   --   --  98 98 100    126* 103* 135* 153 185     Most Recent 3 BMP's:  Recent Labs   Lab Test 11/27/23  0825 11/26/23 1810 11/25/23  0646 11/24/23  1837 11/24/23  0654 11/23/23  2108 11/23/23  1607     --  135  --  132*   < > 128*   POTASSIUM 4.2 4.5 4.0   < > 3.4  --  4.0   CHLORIDE  --   --  103  --  101  --  94*   CO2  --   --  23  --  21*  --  24   BUN  --   --  8.3  --  7.9*  --  10.9   CR  --   --  0.52  --  0.59  --  0.64   ANIONGAP  --   --  9  --  10  --  10   TUAN  --   --  10.2  --  9.5  --  10.1   GLC  --   --  97  --  96  --  134*    < > = values in this interval not displayed.       ASSESSMENT/PLAN:    (N12) Pyelonephritis  (primary encounter diagnosis)  (N39.0) Frequent UTI  (R53.81) Physical deconditioning  Comment: acute/ongoing  Plan: f/u with urology associates, continue cefpodoxime 200mg BID for 5 days ( 10 days total of Abx)     (D68.51) Heterozygous factor V Leiden mutation (H24)  (Z86.718) Personal history of RLE DVT (deep vein thrombosis)(2003)  (Z79.01) Long term current use of anticoagulant therapy  Comment: ongoing  Plan: continues on coumadin INR goal of 2-3    (J96.01) Acute respiratory failure with  hypoxia (H)  Comment: acute/ongoing  Found interstitial infiltrates  Plan: monitor Sao2 at rest and with activity, Abx as above, IS QID and prn  Repeat CXR on Monday    (R74.01) Transaminitis  Comment: acute/ongoing  Plan: follows with [facility] has EUS biopsy scheduled for 12/12/23      Orders:  CBC and CMP on monay  F/u CXR on Monday        Total time spent with patient visit at the [facility] was 45 min including patient visit and review of past records. Reviewed internal medicine and urology progress notes, reviewed lab and imaging results, discussed hospitalization and medications with patient at bedside    Electronically signed by:  Tonya Lynn Haase, APRN CNP                   Sincerely,        Tonya Lynn Haase, APRN CNP

## 2023-11-30 ENCOUNTER — TELEPHONE (OUTPATIENT)
Dept: GERIATRICS | Facility: CLINIC | Age: 83
End: 2023-11-30
Payer: MEDICARE

## 2023-11-30 NOTE — TELEPHONE ENCOUNTER
[facility] Geriatrics Triage Nurse INR     Provider: Nichole Lara NP  Facility: [facility]  Facility Type:  TCU    Caller: Dio  Call Back Number: [phone]  Reason for call: INR  Diagnosis/Goal: DVT and Factor V Leiden    Todays INR: 2.8  Last INR 2.3 on 11/27 - 7.5 mg x 1 then 5 mg    Heparin/Lovenox:  No  Currently on ABX?: No  Other interacting medication:  None  Missed or refused doses: No    Verbal Order/Direction given by Provider: Coumadin 7.5 mg  PO Thur/Sun and 5 mg AOD. Check INR on 12/4/23.    Provider Giving Order:  MONY De Leon CNP    Verbal Order given to: Dio Barnes RN

## 2023-12-04 ENCOUNTER — TRANSFERRED RECORDS (OUTPATIENT)
Dept: HEALTH INFORMATION MANAGEMENT | Facility: CLINIC | Age: 83
End: 2023-12-04

## 2023-12-04 ENCOUNTER — TELEPHONE (OUTPATIENT)
Dept: GERIATRICS | Facility: CLINIC | Age: 83
End: 2023-12-04

## 2023-12-04 ENCOUNTER — TRANSITIONAL CARE UNIT VISIT (OUTPATIENT)
Dept: GERIATRICS | Facility: CLINIC | Age: 83
End: 2023-12-04
Payer: MEDICARE

## 2023-12-04 VITALS
DIASTOLIC BLOOD PRESSURE: 70 MMHG | HEART RATE: 76 BPM | BODY MASS INDEX: 27.64 KG/M2 | TEMPERATURE: 97.4 F | RESPIRATION RATE: 16 BRPM | SYSTOLIC BLOOD PRESSURE: 136 MMHG | OXYGEN SATURATION: 90 % | WEIGHT: 172 LBS | HEIGHT: 66 IN

## 2023-12-04 DIAGNOSIS — N39.0 FREQUENT UTI: ICD-10-CM

## 2023-12-04 DIAGNOSIS — R53.81 PHYSICAL DECONDITIONING: ICD-10-CM

## 2023-12-04 DIAGNOSIS — Z79.01 LONG TERM CURRENT USE OF ANTICOAGULANT THERAPY: ICD-10-CM

## 2023-12-04 DIAGNOSIS — D68.51 HETEROZYGOUS FACTOR V LEIDEN MUTATION (H): ICD-10-CM

## 2023-12-04 DIAGNOSIS — N12 PYELONEPHRITIS: Primary | ICD-10-CM

## 2023-12-04 DIAGNOSIS — J96.01 ACUTE RESPIRATORY FAILURE WITH HYPOXIA (H): ICD-10-CM

## 2023-12-04 DIAGNOSIS — Z86.718 PERSONAL HISTORY OF DVT (DEEP VEIN THROMBOSIS): ICD-10-CM

## 2023-12-04 DIAGNOSIS — R74.01 TRANSAMINITIS: ICD-10-CM

## 2023-12-04 PROCEDURE — 99310 SBSQ NF CARE HIGH MDM 45: CPT | Performed by: NURSE PRACTITIONER

## 2023-12-04 NOTE — TELEPHONE ENCOUNTER
[facility] Geriatrics Triage Nurse INR     Provider: Tonya Haase, APRN CNP  Facility: [facility]  Facility Type:  TCU    Caller: Stanislaw  Call Back Number: [phone]  Reason for call: INR  Diagnosis/Goal: DVT/PE    Todays INR: 2.2  Last INR 11/30 2.8(7.5mg Thurs and Sun and 5mg AOD).  Home dose:  7.5mg Sun-Tues-Thurs and 5mg AOD.      Heparin/Lovenox:  No  Currently on ABX?: No  Other interacting medication:  None  Missed or refused doses: No      Nurse is also reporting Heme 1 and CMP results.          Verbal Order/Direction given by Provider: Warfarin 7.5mg M-W-F and 5mg all other days.  Next INR 12/11/23.      Provider Giving Order:  Tonya Haase, APRN CNP    Verbal Order given to: Katlin Li RN

## 2023-12-04 NOTE — PROGRESS NOTES
"[facility] GERIATRICS    Chief Complaint   Patient presents with    RECHECK     HPI:  Geneva Shepherd is a 83 year old  (1940), who is being seen today for an episodic care visit at: [facility]) [25]. Today's concern is:   Pyelonephritis/frequent UTI: patient denies fever, chills, dysuria, abdominal or back pain, states she still feels weak but is making progress in therapy. She is walking up to 200 feet using a RW with SBA.   Respiratory Failure: denies SOB, cough, congestion SaO2 is 90-95%  Transaminitis: patient has an appt with [facility] 12/12/23 for endoscopy    Allergies, and PMH/PSH reviewed in [facility] today.  REVIEW OF SYSTEMS:  10 point ROS of systems including Constitutional, Eyes, Respiratory, Cardiovascular, Gastroenterology, Genitourinary, Integumentary, Musculoskeletal, Psychiatric were all negative except for pertinent positives noted in my HPI.    Objective:   /70   Pulse 76   Temp 97.4  F (36.3  C)   Resp 16   Ht 1.676 m (5' 6\")   Wt 78 kg (172 lb)   LMP  (LMP Unknown)   SpO2 90%   BMI 27.76 kg/m    GENERAL APPEARANCE:  Alert, in no distress  ENT:  Mouth and posterior oropharynx normal, moist mucous membranes, normal hearing acuity  EYES:  EOM, conjunctivae, lids, pupils and irises normal, PERRL  RESP:  respiratory effort and palpation of chest normal, lungs clear to auscultation , no respiratory distress  CV:  Palpation and auscultation of heart done , regular rate and rhythm, no murmur, rub, or gallop, peripheral edema trace+ in LE bilaterally  ABDOMEN:  normal bowel sounds, soft, nontender, no hepatosplenomegaly or other masses  M/S:   patient sitting up in w/c  SKIN:  Inspection of skin and subcutaneous tissue baseline  NEURO:   speech wnl  PSYCH:  affect and mood normal    Recent labs in [facility] reviewed by me today.  and Most Recent 3 CBC's:  Recent Labs   Lab Test 11/27/23  0825 11/26/23  1810 11/25/23  0646 11/24/23  0654 11/23/23  1607 06/11/23  1856   WBC  --   " --   --  7.6 7.2 6.2   HGB  --   --   --  13.0 14.0 15.0   MCV  --   --   --  98 98 100    126* 103* 135* 153 185     Most Recent 3 BMP's:  Recent Labs   Lab Test 11/27/23  0825 11/26/23  1810 11/25/23  0646 11/24/23  1837 11/24/23  0654 11/23/23  2108 11/23/23  1607     --  135  --  132*   < > 128*   POTASSIUM 4.2 4.5 4.0   < > 3.4  --  4.0   CHLORIDE  --   --  103  --  101  --  94*   CO2  --   --  23  --  21*  --  24   BUN  --   --  8.3  --  7.9*  --  10.9   CR  --   --  0.52  --  0.59  --  0.64   ANIONGAP  --   --  9  --  10  --  10   TUAN  --   --  10.2  --  9.5  --  10.1   GLC  --   --  97  --  96  --  134*    < > = values in this interval not displayed.       Assessment/Plan:  (N12) Pyelonephritis  (primary encounter diagnosis)  (N39.0) Frequent UTI  (R53.81) Physical deconditioning  Comment: acute/ongoing, no change  Plan: f/u with urology associates, finished  cefpodoxime (completed 10 day coarse)      (D68.51) Heterozygous factor V Leiden mutation (H24)  (Z86.718) Personal history of RLE DVT (deep vein thrombosis)(2003)  (Z79.01) Long term current use of anticoagulant therapy  Comment: ongoing, no change  Plan: continues on coumadin INR goal of 2-3     (J96.01) Acute respiratory failure with hypoxia (H)  Comment: acute/ongoing, no change  Found interstitial infiltrates  Plan: monitor Sao2 at rest and with activity, Abx as above, IS QID and prn  Repeat CXR pending     (R74.01) Transaminitis  Comment: acute/ongoing  Plan: follows with [facility] has EUS biopsy scheduled for 12/12/23          MED REC REQUIRED  Post Medication Reconciliation Status: medication reconcilation previously completed during another office visit      Orders:  No new orders      Electronically signed by: Tonya Lynn Haase, APRN CNP

## 2023-12-04 NOTE — LETTER
"    12/4/2023        RE: Geneva Shepherd  [address] [facility] [address]        [facility]    Chief Complaint   Patient presents with     RECHECK     HPI:  Geneva Shepherd is a 83 year old  (1940), who is being seen today for an episodic care visit at: [facility]) [25]. Today's concern is:   Pyelonephritis/frequent UTI: patient denies fever, chills, dysuria, abdominal or back pain, states she still feels weak but is making progress in therapy. She is walking up to 200 feet using a RW with SBA.   Respiratory Failure: denies SOB, cough, congestion SaO2 is 90-95%  Transaminitis: patient has an appt with [facility] 12/12/23 for endoscopy    Allergies, and PMH/PSH reviewed in [facility] today.  REVIEW OF SYSTEMS:  10 point ROS of systems including Constitutional, Eyes, Respiratory, Cardiovascular, Gastroenterology, Genitourinary, Integumentary, Musculoskeletal, Psychiatric were all negative except for pertinent positives noted in my HPI.    Objective:   /70   Pulse 76   Temp 97.4  F (36.3  C)   Resp 16   Ht 1.676 m (5' 6\")   Wt 78 kg (172 lb)   LMP  (LMP Unknown)   SpO2 90%   BMI 27.76 kg/m    GENERAL APPEARANCE:  Alert, in no distress  ENT:  Mouth and posterior oropharynx normal, moist mucous membranes, normal hearing acuity  EYES:  EOM, conjunctivae, lids, pupils and irises normal, PERRL  RESP:  respiratory effort and palpation of chest normal, lungs clear to auscultation , no respiratory distress  CV:  Palpation and auscultation of heart done , regular rate and rhythm, no murmur, rub, or gallop, peripheral edema trace+ in LE bilaterally  ABDOMEN:  normal bowel sounds, soft, nontender, no hepatosplenomegaly or other masses  M/S:   patient sitting up in w/c  SKIN:  Inspection of skin and subcutaneous tissue baseline  NEURO:   speech wnl  PSYCH:  affect and mood normal    Recent labs in [facility] reviewed by me today.  and Most Recent 3 CBC's:  Recent Labs   Lab Test " 11/27/23  0825 11/26/23  1810 11/25/23  0646 11/24/23  0654 11/23/23  1607 06/11/23  1856   WBC  --   --   --  7.6 7.2 6.2   HGB  --   --   --  13.0 14.0 15.0   MCV  --   --   --  98 98 100    126* 103* 135* 153 185     Most Recent 3 BMP's:  Recent Labs   Lab Test 11/27/23  0825 11/26/23 1810 11/25/23  0646 11/24/23  1837 11/24/23  0654 11/23/23  2108 11/23/23  1607     --  135  --  132*   < > 128*   POTASSIUM 4.2 4.5 4.0   < > 3.4  --  4.0   CHLORIDE  --   --  103  --  101  --  94*   CO2  --   --  23  --  21*  --  24   BUN  --   --  8.3  --  7.9*  --  10.9   CR  --   --  0.52  --  0.59  --  0.64   ANIONGAP  --   --  9  --  10  --  10   TUAN  --   --  10.2  --  9.5  --  10.1   GLC  --   --  97  --  96  --  134*    < > = values in this interval not displayed.       Assessment/Plan:  (N12) Pyelonephritis  (primary encounter diagnosis)  (N39.0) Frequent UTI  (R53.81) Physical deconditioning  Comment: acute/ongoing, no change  Plan: f/u with urology associates, finished  cefpodoxime (completed 10 day coarse)      (D68.51) Heterozygous factor V Leiden mutation (H24)  (Z86.718) Personal history of RLE DVT (deep vein thrombosis)(2003)  (Z79.01) Long term current use of anticoagulant therapy  Comment: ongoing, no change  Plan: continues on coumadin INR goal of 2-3     (J96.01) Acute respiratory failure with hypoxia (H)  Comment: acute/ongoing, no change  Found interstitial infiltrates  Plan: monitor Sao2 at rest and with activity, Abx as above, IS QID and prn  Repeat CXR pending     (R74.01) Transaminitis  Comment: acute/ongoing  Plan: follows with [facility] has EUS biopsy scheduled for 12/12/23          MED REC REQUIRED  Post Medication Reconciliation Status: medication reconcilation previously completed during another office visit      Orders:  No new orders      Electronically signed by: Tonya Lynn Haase, APRN CNP         Sincerely,        Tonya Lynn Haase, APRN CNP

## 2023-12-05 NOTE — TELEPHONE ENCOUNTER
[facility] called and they need the procedure plan call to them by 12/7/2023.    Sarah Redd RN    [facility]

## 2023-12-06 NOTE — TELEPHONE ENCOUNTER
Left detailed message for [facility] with hold orders. Patient is currently in TCU at [facility] [phone]. I called and spoke with CHRISTEL Dover and relayed below incase in-house provider would like to follow that plan. He asked me to fax to [facility] TCU at [phone] which was done.       12/7/23,  Take last dose of warfarin    12/8 to 12/11, NO warfarin    12/12, DAY OF PROCEDURE, Restart warfarin in the evening, if okay with provider doing the procedure.    Make sure to ask the provider doing your procedure if it is okay to begin your warfarin as planned     On 12/12 take warfarin booster dose of 10 mg then resume current warfarin dose, 7.5 mg Sun, Tue, Thu; 5 mg all other days    Recheck INR on 12/19/23 to ensure INR returning to goal range.        Robert Fulton RN

## 2023-12-07 ENCOUNTER — TRANSITIONAL CARE UNIT VISIT (OUTPATIENT)
Dept: GERIATRICS | Facility: CLINIC | Age: 83
End: 2023-12-07
Payer: MEDICARE

## 2023-12-07 ENCOUNTER — OFFICE VISIT (OUTPATIENT)
Dept: INTERNAL MEDICINE | Facility: CLINIC | Age: 83
End: 2023-12-07
Payer: MEDICARE

## 2023-12-07 VITALS
OXYGEN SATURATION: 96 % | HEIGHT: 66 IN | WEIGHT: 172 LBS | SYSTOLIC BLOOD PRESSURE: 130 MMHG | HEART RATE: 82 BPM | TEMPERATURE: 98 F | RESPIRATION RATE: 17 BRPM | BODY MASS INDEX: 27.64 KG/M2 | DIASTOLIC BLOOD PRESSURE: 79 MMHG

## 2023-12-07 VITALS
BODY MASS INDEX: 27.69 KG/M2 | SYSTOLIC BLOOD PRESSURE: 110 MMHG | HEART RATE: 75 BPM | OXYGEN SATURATION: 97 % | HEIGHT: 66 IN | DIASTOLIC BLOOD PRESSURE: 72 MMHG | TEMPERATURE: 97.6 F | WEIGHT: 172.3 LBS

## 2023-12-07 DIAGNOSIS — R41.3 MEMORY LOSS: ICD-10-CM

## 2023-12-07 DIAGNOSIS — K75.9 HEPATITIS: ICD-10-CM

## 2023-12-07 DIAGNOSIS — N39.0 FREQUENT UTI: ICD-10-CM

## 2023-12-07 DIAGNOSIS — F32.0 MAJOR DEPRESSIVE DISORDER, SINGLE EPISODE, MILD (H): ICD-10-CM

## 2023-12-07 DIAGNOSIS — E87.1 HYPONATREMIA: ICD-10-CM

## 2023-12-07 DIAGNOSIS — D69.6 THROMBOCYTOPENIA (H): ICD-10-CM

## 2023-12-07 DIAGNOSIS — Z79.01 LONG TERM CURRENT USE OF ANTICOAGULANT THERAPY: ICD-10-CM

## 2023-12-07 DIAGNOSIS — Z01.818 PREOPERATIVE EXAMINATION: Primary | ICD-10-CM

## 2023-12-07 DIAGNOSIS — D68.51 HETEROZYGOUS FACTOR V LEIDEN MUTATION (H): ICD-10-CM

## 2023-12-07 DIAGNOSIS — E03.9 HYPOTHYROIDISM, UNSPECIFIED TYPE: ICD-10-CM

## 2023-12-07 DIAGNOSIS — R53.81 PHYSICAL DECONDITIONING: ICD-10-CM

## 2023-12-07 DIAGNOSIS — G47.00 INSOMNIA, UNSPECIFIED TYPE: ICD-10-CM

## 2023-12-07 DIAGNOSIS — Z86.718 PERSONAL HISTORY OF DVT (DEEP VEIN THROMBOSIS): ICD-10-CM

## 2023-12-07 DIAGNOSIS — N12 PYELONEPHRITIS: Primary | ICD-10-CM

## 2023-12-07 DIAGNOSIS — R74.01 TRANSAMINITIS: ICD-10-CM

## 2023-12-07 PROBLEM — N10 ACUTE PYELONEPHRITIS: Status: RESOLVED | Noted: 2023-11-23 | Resolved: 2023-12-07

## 2023-12-07 PROCEDURE — 99316 NF DSCHRG MGMT 30 MIN+: CPT | Performed by: NURSE PRACTITIONER

## 2023-12-07 PROCEDURE — 99214 OFFICE O/P EST MOD 30 MIN: CPT | Performed by: INTERNAL MEDICINE

## 2023-12-07 RX ORDER — PAROXETINE 20 MG/1
40 TABLET, FILM COATED ORAL AT BEDTIME
Start: 2023-12-07 | End: 2024-03-13

## 2023-12-07 RX ORDER — ENOXAPARIN SODIUM 100 MG/ML
40 INJECTION SUBCUTANEOUS DAILY
Qty: 2.4 ML | Refills: 0 | Status: SHIPPED | OUTPATIENT
Start: 2023-12-07 | End: 2023-12-08

## 2023-12-07 NOTE — PROGRESS NOTES
[address] [address]  Phone: [phone]  Primary Provider: Jacoby Patel  Pre-op Performing Provider: JACOBY PATEL      PREOPERATIVE EVALUATION:  Today's date: 12/7/2023    Geneva is a 83 year old, presenting for the following:  Pre-Op Exam      Surgical Information:  Surgery/Procedure: Endoscopic retrograde cholangiopancreatography   Surgery Location:  OR  Surgeon: Dr. Christian  Surgery Date: 12/12/2023  Time of Surgery: 1PM  Where patient plans to recover: At home with family  Fax number for surgical facility: Note does not need to be faxed, will be available electronically in Epic.    Assessment & Plan     The proposed surgical procedure is considered LOW risk.    Preoperative examination  Appears medically stable to proceed with ERCP as  scheduled    Hepatitis  Significant improvement since stopping nitrofurantoin.  ALT now normal and AST 44    Major depressive disorder, single episode, mild (H24)  Controlled with paroxetine.  PHQ-9 = 4  - PARoxetine (PAXIL) 20 MG tablet; Take 2 tablets (40 mg) by mouth at bedtime    Heterozygous factor V Leiden mutation (H24)  Contributing to  previous history DVT.  On long-term anticoagulation    Hyponatremia  Stable with most recent sodium 134    Thrombocytopenia (H24)  Recent platelet count minimally below normal at 145.  No symptoms    Hypothyroidism, unspecified type  On levothyroxine.  Clinically euthyroid.  Recent TSH normal    Insomnia, unspecified type  Well-controlled with use of amitriptyline    Memory loss  Stable, on Aricept            Risks and Recommendations:  The patient has the following additional risks and recommendations for perioperative complications:   - No identified additional risk factors other than previously addressed    Patient instructions   Approval given to proceed with proposed procedure, without further diagnostic evaluation.  No solid food after midnight prior to your   procedure. May have clear liquids up to 2 hours prior to the  procedure (11:00am)   Stop fish oil, Turmeric and Warfarin starting tomorrow  Friday 12/8/23      May use Tylenol for pain control  between now and your procedure  Take  other usual medications  the morning of your procedure with water prior to 11:00am   Restart Warfarin 12/12/23 and take 10mg (2 tabs)   12/13/23: Take Warfarin 5mg ( one tab)   12/14/23: Take Warfarin 7.5mg (1.5 tabs )  12/15/23: Take Warfarin 5mg ( one tab)  12/16/23: Take Warfarin 5mg ( one tab)  12/17/23: Take Warfarin 7.5mg (1.5 tabs )  12/18/23: Take Warfarin 5mg ( one tab)  12/19/23: Get INR recheck with anticoagulation clinic   Call [phone] to schedule INR appt at clinic    Start Lovenox 40mg injection once a day starting  12/13/23 (the AM after the procedure) and stay on Lovenox daily for 6 days for now. You will have an INR done 12/19/23 to see if you need to continue Lovenox even longer or not    May restart Turmeric and fish oil the day after the procedure    RECOMMENDATION:  APPROVAL GIVEN to proceed with proposed procedure, without further diagnostic evaluation.         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 There was improvement seen after sphincterotomy in Sept 2021, but began having increased AST and ALT results earlier in 2023.  Underwent extensive serologic workup that was negative.  Patient had been taking long-term prophylactic nitrofurantoin through urology for history of UTIs.  Since going off of that antibiotic. LFTs have significantly improved with normalization of the ALT and AST 44.  Following consultation with GI, it is recommended patient still undergo repeat ERCP presents for clearance to undergo procedure.  Patient currently at TCU after prior admission for pyelonephritis with deconditioning but has completed antibiotic therapy . Denies current  dysuria or F/C. Strength improving with physical therapy.  Gets tired still walking 200 feet  but denies chest pain or shortness of breath that activity        12/7/2023     2:10 PM   Preop Questions   1. Have you ever had a heart attack or stroke? No   2. Have you ever had surgery on your heart or blood vessels, such as a stent placement, a coronary artery bypass, or surgery on an artery in your head, neck, heart, or legs? No   3. Do you have chest pain with activity? No   4. Do you have a history of  heart failure? No   5. Do you currently have a cold, bronchitis or symptoms of other infection? No   6. Do you have a cough, shortness of breath, or wheezing? No   7. Do you or anyone in your family have previous history of blood clots? YES -    RLE DVT. Factor 5 Leiden. On longterm AC with Warfarin    8. Do you or does anyone in your family have a serious bleeding problem such as prolonged bleeding following surgeries or cuts? No   9. Have you ever had problems with anemia or been told to take iron pills? No   10. Have you had any abnormal blood loss such as black, tarry or bloody stools, or abnormal vaginal bleeding? No   11. Have you ever had a blood transfusion? No   12. Are you willing to have a blood transfusion if it is medically needed before, during, or after your surgery? Yes   13. Have you or any of your relatives ever had problems with anesthesia? No   14. Do you have sleep apnea, excessive snoring or daytime drowsiness? No   15. Do you have any artifical heart valves or other implanted medical devices like a pacemaker, defibrillator, or continuous glucose monitor? No   16. Do you have artificial joints? YES - BTKA and LTHA    17. Are you allergic to latex? No       Health Care Directive:  Patient does have a Health Care Directive or Living Will: Patient states has Advance Directive and will bring in a copy to clinic.    Preoperative Review of :   reviewed - Oxycodone 5mg tab, #28 tabs prescribed  6/11/23. None since that time and not taking that med now       Status of Chronic Conditions:  See assessment/plan section above for active medical problems.  Problems all longstanding and stable, except as noted/documented.  See ROS in addition for pertinent symptoms related to these conditions.      Review of Systems  CONSTITUTIONAL: NEGATIVE for fever, chills. Weight  down 12 pounds in 1 year  INTEGUMENTARY/SKIN: NEGATIVE for worrisome rashes, moles or lesions  EYES: NEGATIVE for vision changes or irritation. Has glasses  ENT/MOUTH: NEGATIVE for ear, mouth and throat problems  RESP: NEGATIVE for significant cough or SOB  CV: NEGATIVE for chest pain, palpitations.  Minimal RLE peripheral edema old from prior fx hx  GI: NEGATIVE for nausea, abdominal pain, heartburn, or change in bowel habits. Prior LFTS much improved  : NEGATIVE for frequency, dysuria, or hematuria. Recent pyelonephritis resolved  MUSCULOSKELETAL: NEGATIVE for significant arthralgias or myalgia that are limiting activity  NEURO: NEGATIVE for  dizziness or paresthesias. HAs some general weakness after recent hosptial stay. Using a walker and going 200+ feet now  ENDOCRINE: NEGATIVE for temperature intolerance, skin/hair changes  HEME: NEGATIVE for bleeding problems with prior surgery. On longterm Warfarin. Needs some  bridging after surgery  PSYCHIATRIC: NEGATIVE for changes in mood or affect with Paxil. [facility]    Patient Active Problem List    Diagnosis Date Noted    Hyponatremia 11/23/2023     Priority: Medium    Acute pyelonephritis 11/23/2023     Priority: Medium    Generalized weakness 11/23/2023     Priority: Medium    Activated protein C resistance (H24) 10/19/2022     Priority: Medium    Heterozygous factor V Leiden mutation (H24) 10/19/2022     Priority: Medium    Dilation of biliary tract 10/10/2021     Priority: Medium    Recurrent UTI 10/10/2021     Priority: Medium    Memory loss 07/16/2020      Priority: Medium    Gastroesophageal reflux disease, esophagitis presence not specified 11/23/2017     Priority: Medium     IMO Regulatory Load OCT 2020      Major depressive disorder, single episode, mild (H24) 03/14/2017     Priority: Medium    Knee joint replacement status 01/16/2017     Priority: Medium    Insomnia 02/17/2016     Priority: Medium    Hypothyroidism 01/01/2016     Priority: Medium    Long term current use of anticoagulant therapy 12/15/2015     Priority: Medium    Asymptomatic varicose veins, bilateral 09/03/2015     Priority: Medium    Personal history of RLE DVT (deep vein thrombosis)(2003) 09/03/2015     Priority: Medium    Hip joint replacement status: Left 8/31/15 08/31/2015     Priority: Medium    Elevated antinuclear antibody (JUAN ANTONIO) level 07/04/2015     Priority: Medium    Osteoarthrosis involving lower leg 01/21/2013     Priority: Medium     Problem list name updated by automated process. Provider to review  Replacing diagnoses that were inactivated after the 10/1/2021 regulatory import.      Advanced directives, counseling/discussion 05/18/2012     Priority: Medium     Patient states has Advance Directive and will bring in a copy to clinic. 5/18/2012         FACTOR 5 LEIDEN DEFECT (HYPERCOAGULABLE) 07/30/2003     Priority: Medium    Irritable bowel syndrome 09/09/2002     Priority: Medium      Past Medical History:   Diagnosis Date    Ankle fracture, lateral malleolus, closed  March 2012    right ankle    Asymptomatic varicose veins     Depression, major     Diverticulitis of colon (without mention of hemorrhage)(562.11)     DJD (degenerative joint disease)     Elevated antinuclear antibody (JUAN ANTONIO) level 7/4/2015    minimal    Embolism and thrombosis of unspecified site 3/03    RLE    FACTOR 5 LEIDEN DEFECT (HYPERCOAGULABLE) 7/03     Heterozygote    FRACTURE OF RIGHT LATERAL PROXIMAL TIBIA 11/07    Gastro-oesophageal reflux disease     rare    Hypercalcemia     Hyperlipidemia LDL goal <160  10/31/2010    Insomnia     Irritable bowel syndrome     Obesity 9/11/2013    Pain in joint, lower leg     Phlebitis and thrombophlebitis of superficial vessels of lower extremities 7/03    right    Sprain of lumbar region     Unspecified hypothyroidism     Urinary tract infection, site not specified      Past Surgical History:   Procedure Laterality Date    ARTHROPLASTY HIP Left 8/31/2015    Procedure: ARTHROPLASTY HIP;  Surgeon: Benjamín Maddox MD;  Location:  OR    ARTHROPLASTY KNEE  1/17/2013    Procedure: ARTHROPLASTY KNEE;  RIGHT TOTAL KNEE ARTHROPLASTY (BIOMET)^ ;  Surgeon: Benjamín Maddox MD;  Location:  OR    ARTHROPLASTY KNEE Left 1/16/2017    Procedure: ARTHROPLASTY KNEE;  Surgeon: Benjamín Maddox MD;  Location:  OR    CHOLECYSTECTOMY      COLONOSCOPY N/A 4/26/2016    Procedure: COMBINED COLONOSCOPY, SINGLE OR MULTIPLE BIOPSY/POLYPECTOMY BY BIOPSY;  Surgeon: Mario Coe MD;  Location:  GI    ENDOSCOPIC ULTRASOUND UPPER GASTROINTESTINAL TRACT (GI) N/A 4/5/2022    Procedure: ENDOSCOPIC ULTRASOUND, WITH BIOPSY;  Surgeon: Sammie Christian MD;  Location:  OR    GYN SURGERY      tubal    VASCULAR SURGERY      Z NONSPECIFIC PROCEDURE  7/00/01    Endometrial Biopsy.    ZZC NONSPECIFIC PROCEDURE      Tubal Ligation.    ZZC NONSPECIFIC PROCEDURE  6/02    negative coronary angio    ZZC NONSPECIFIC PROCEDURE  7/04    RLE varicose vein stripping     Current Outpatient Medications   Medication Sig Dispense Refill    acetaminophen (TYLENOL) 500 MG tablet Take 500 mg by mouth every 4 hours as needed      amitriptyline (ELAVIL) 25 MG tablet TAKE 1 TABLET BY MOUTH EVERYDAY AT BEDTIME 90 tablet 3    Ascorbic Acid (VITAMIN C PO) Take 1,000 mg by mouth every morning (Patient takes 2 X 500 mg = 1,000 mg dose)      BETA CAROTENE PO Take 25,000 Units by mouth daily.      Biotin 1 MG CAPS Take 1 tablet by mouth daily      CHROMIUM PICOLINATE 800 MCG OR TABS Take 1 capsule  "by mouth daily      COCONUT OIL PO Take 1 capsule by mouth every morning      Cyanocobalamin 1000 MCG CAPS Take 1 tablet by mouth daily      donepezil (ARICEPT) 10 MG tablet TAKE 1/2  TABLET BY MOUTH EVERY NIGHT AT BEDTIME 90 tablet 3    FISH OIL 1000 MG OR CAPS Take 1 g by mouth daily      FLAX SEED OIL 1000 MG OR CAPS Take 1 capsule by mouth daily      FOLIC ACID PO Take 400 mcg by mouth daily       levOCARNitine (CARNITOR) 330 MG tablet Take 330 mg by mouth every morning      levothyroxine (SYNTHROID/LEVOTHROID) 50 MCG tablet Take 1 tablet (50 mcg) by mouth daily 90 tablet 3    magnesium 100 MG CAPS Take 400 mg by mouth daily      PARoxetine (PAXIL) 20 MG tablet TAKE 2 TABLETS (40 MG) BY MOUTH EVERY MORNING (Patient taking differently: Take 40 mg by mouth at bedtime) 180 tablet 3    TURMERIC PO Take 1 capsule by mouth every morning      WARFARIN SODIUM PO 23 INR 2.2  Take 7.5mg M-W-F and 5mg AOD.  Next INR 23.      zinc gluconate 50 MG tablet Take 50 mg by mouth daily         No Known Allergies     Social History     Tobacco Use    Smoking status: Former     Types: Cigarettes     Quit date: 1968     Years since quittin.3    Smokeless tobacco: Never    Tobacco comments:     quit in    Substance Use Topics    Alcohol use: Not Currently     Comment: 1 glass of wine a month     Family History   Problem Relation Age of Onset    Cardiovascular Mother          aa age 76    Heart Disease Father          age 63 mi    Coronary Artery Disease Father     Circulatory Sister      History   Drug Use No         Objective     /72   Pulse 75   Temp 97.6  F (36.4  C) (Temporal)   Ht 1.676 m (5' 6\")   Wt 78.2 kg (172 lb 4.8 oz)   LMP  (LMP Unknown)   SpO2 97%   BMI 27.81 kg/m      Physical Exam  Eye: PERRL, EOMI  HENT: ear canals and TM's normal and nose and mouth without ulcers or lesions   Neck: no adenopathy. Thyroid normal to palpation. No bruits  Pulm: Lungs clear to " auscultation   CV: Regular rates and rhythm  GI: Soft, nontender, Normal active bowel sounds, No hepatosplenomegaly or masses palpable  Ext: Peripheral pulses intact.  Minimal BLE edema.  Neuro: Normal strength and tone, sensory exam grossly normal      Recent Labs   Lab Test 11/27/23  0825 11/26/23  1810 11/25/23  0646 11/24/23  1837 11/24/23  0654 11/23/23  1732 11/23/23  1607   HGB  --   --   --   --  13.0  --  14.0    126* 103*  --  135*  --  153   INR 2.30* 2.19* 2.64*  --  2.43*   < >  --      --  135  --  132*   < > 128*   POTASSIUM 4.2 4.5 4.0   < > 3.4  --  4.0   CR  --   --  0.52  --  0.59  --  0.64    < > = values in this interval not displayed.      Component  Ref Range & Units 7 d ago   Sodium  136 - 145 mmol/L 134 Low    Potassium  3.5 - 5.1 mmol/L 4.0   Chloride  98 - 109 mmol/L 103   CO2  20 - 29 mmol/L 24   Anion Gap  7 - 16 mmol/L 7   Calcium  8.4 - 10.4 mg/dL 10.4   BUN  7 - 26 mg/dL 8   Creatinine  0.55 - 1.02 mg/dL 0.70   Alkaline Phosphatase  40 - 150 U/L 127   AST (SGOT)  10 - 40 U/L 44 High    ALT (SGPT)  <=55 U/L 31   Bilirubin, Total  0.2 - 1.2 mg/dL 0.6   Protein, Total  6.4 - 8.3 g/dL 7.1   Albumin  3.5 - 5.0 g/dL 3.2 Low    Glucose  70 - 100 mg/dL 85   Comment: The given reference range is for the fasting state. Non-fasting reference range for glucose is 70 - 180 mg/dL.   GFR, Estimated  >60 mL/min/1.73m2 >60   Hours Fasting  8 - 12 Hours 0.1   Comment: Lab unable to obtain patient's fasting status at time of specimen collection.   Resulting Agency [facility]     Specimen Collected: 12/04/23 12:01 PM       Component  Ref Range & Units 7 d ago   WBC  3.5 - 10.5 x10(9)/L 7.1   RBC  3.90 - 5.03 x10(12)/L 4.17   Hemoglobin  12.0 - 15.5 g/dL 13.7   HCT  34.9 - 44.5 % 41.2   MCV  80.0 - 100.0 fL 98.8   MCH  27.6 - 33.3 pg 32.9   MCHC  31.5 - 35.2 g/dL 33.3   RDW  11.9 - 15.5 % 13.8   Platelets  150 - 450 x10(9)/L 145 Low    Automated NRBC  <=0 /100 WBC 0   Neutrophil  Absolute  1.7 - 7.0 10(9)/L 4.5   Lymphocyte Absolute  1.0 - 4.8 10(9)/L 1.6   Monocyte Absolute  0.2 - 0.9 10(9)/L 0.7   Eosinophil Absolute  0.0 - 0.5 10(9)/L 0.1   Basophil Absolute  0.0 - 0.3 10(9)/L 0.0   Immature Granulocyte %  0.0 - 0.5 % 0.8 High    Resulting Agency [facility]     Specimen Collected: 12/04/23 12:01 PM     Ref Range & Units 7 d ago   Protime  11.8 - 14.6 Seconds 24.4 High    INR  0.9 - 1.1 2.2 High    Resulting Agency [facility]   Narrative  Performed by [facility]  Therapeutic range determined by protocol established by anticoagulation provider.    Specimen Collected: 12/04/23 12:01 PM         Diagnostics:   See above    No EKG required for low risk surgery (cataract, skin procedure, breast biopsy, etc).    Revised Cardiac Risk Index (RCRI):  The patient has the following serious cardiovascular risks for perioperative complications:   - No serious cardiac risks = 0 points     RCRI Interpretation: 0 points: Class I (very low risk - 0.4% complication rate)         Signed Electronically by: Jacoby Boo MD  Copy of this evaluation report is provided to requesting physician.

## 2023-12-07 NOTE — H&P (VIEW-ONLY)
[address] [address]  Phone: [phone]  Primary Provider: Jacoby Patel  Pre-op Performing Provider: JACOBY PATEL      PREOPERATIVE EVALUATION:  Today's date: 12/7/2023    Geneva is a 83 year old, presenting for the following:  Pre-Op Exam      Surgical Information:  Surgery/Procedure: Endoscopic retrograde cholangiopancreatography   Surgery Location:  OR  Surgeon: Dr. Christian  Surgery Date: 12/12/2023  Time of Surgery: 1PM  Where patient plans to recover: At home with family  Fax number for surgical facility: Note does not need to be faxed, will be available electronically in Epic.    Assessment & Plan     The proposed surgical procedure is considered LOW risk.    Preoperative examination  Appears medically stable to proceed with ERCP as  scheduled    Hepatitis  Significant improvement since stopping nitrofurantoin.  ALT now normal and AST 44    Major depressive disorder, single episode, mild (H24)  Controlled with paroxetine.  PHQ-9 = 4  - PARoxetine (PAXIL) 20 MG tablet; Take 2 tablets (40 mg) by mouth at bedtime    Heterozygous factor V Leiden mutation (H24)  Contributing to  previous history DVT.  On long-term anticoagulation    Hyponatremia  Stable with most recent sodium 134    Thrombocytopenia (H24)  Recent platelet count minimally below normal at 145.  No symptoms    Hypothyroidism, unspecified type  On levothyroxine.  Clinically euthyroid.  Recent TSH normal    Insomnia, unspecified type  Well-controlled with use of amitriptyline    Memory loss  Stable, on Aricept            Risks and Recommendations:  The patient has the following additional risks and recommendations for perioperative complications:   - No identified additional risk factors other than previously addressed    Patient instructions   Approval given to proceed with proposed procedure, without further diagnostic evaluation.  No solid food after midnight prior to your   procedure. May have clear liquids up to 2 hours prior to the  procedure (11:00am)   Stop fish oil, Turmeric and Warfarin starting tomorrow  Friday 12/8/23      May use Tylenol for pain control  between now and your procedure  Take  other usual medications  the morning of your procedure with water prior to 11:00am   Restart Warfarin 12/12/23 and take 10mg (2 tabs)   12/13/23: Take Warfarin 5mg ( one tab)   12/14/23: Take Warfarin 7.5mg (1.5 tabs )  12/15/23: Take Warfarin 5mg ( one tab)  12/16/23: Take Warfarin 5mg ( one tab)  12/17/23: Take Warfarin 7.5mg (1.5 tabs )  12/18/23: Take Warfarin 5mg ( one tab)  12/19/23: Get INR recheck with anticoagulation clinic   Call [phone] to schedule INR appt at clinic    Start Lovenox 40mg injection once a day starting  12/13/23 (the AM after the procedure) and stay on Lovenox daily for 6 days for now. You will have an INR done 12/19/23 to see if you need to continue Lovenox even longer or not    May restart Turmeric and fish oil the day after the procedure    RECOMMENDATION:  APPROVAL GIVEN to proceed with proposed procedure, without further diagnostic evaluation.         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 There was improvement seen after sphincterotomy in Sept 2021, but began having increased AST and ALT results earlier in 2023.  Underwent extensive serologic workup that was negative.  Patient had been taking long-term prophylactic nitrofurantoin through urology for history of UTIs.  Since going off of that antibiotic. LFTs have significantly improved with normalization of the ALT and AST 44.  Following consultation with GI, it is recommended patient still undergo repeat ERCP presents for clearance to undergo procedure.  Patient currently at TCU after prior admission for pyelonephritis with deconditioning but has completed antibiotic therapy . Denies current  dysuria or F/C. Strength improving with physical therapy.  Gets tired still walking 200 feet  but denies chest pain or shortness of breath that activity        12/7/2023     2:10 PM   Preop Questions   1. Have you ever had a heart attack or stroke? No   2. Have you ever had surgery on your heart or blood vessels, such as a stent placement, a coronary artery bypass, or surgery on an artery in your head, neck, heart, or legs? No   3. Do you have chest pain with activity? No   4. Do you have a history of  heart failure? No   5. Do you currently have a cold, bronchitis or symptoms of other infection? No   6. Do you have a cough, shortness of breath, or wheezing? No   7. Do you or anyone in your family have previous history of blood clots? YES -    RLE DVT. Factor 5 Leiden. On longterm AC with Warfarin    8. Do you or does anyone in your family have a serious bleeding problem such as prolonged bleeding following surgeries or cuts? No   9. Have you ever had problems with anemia or been told to take iron pills? No   10. Have you had any abnormal blood loss such as black, tarry or bloody stools, or abnormal vaginal bleeding? No   11. Have you ever had a blood transfusion? No   12. Are you willing to have a blood transfusion if it is medically needed before, during, or after your surgery? Yes   13. Have you or any of your relatives ever had problems with anesthesia? No   14. Do you have sleep apnea, excessive snoring or daytime drowsiness? No   15. Do you have any artifical heart valves or other implanted medical devices like a pacemaker, defibrillator, or continuous glucose monitor? No   16. Do you have artificial joints? YES - BTKA and LTHA    17. Are you allergic to latex? No       Health Care Directive:  Patient does have a Health Care Directive or Living Will: Patient states has Advance Directive and will bring in a copy to clinic.    Preoperative Review of :   reviewed - Oxycodone 5mg tab, #28 tabs prescribed  6/11/23. None since that time and not taking that med now       Status of Chronic Conditions:  See assessment/plan section above for active medical problems.  Problems all longstanding and stable, except as noted/documented.  See ROS in addition for pertinent symptoms related to these conditions.      Review of Systems  CONSTITUTIONAL: NEGATIVE for fever, chills. Weight  down 12 pounds in 1 year  INTEGUMENTARY/SKIN: NEGATIVE for worrisome rashes, moles or lesions  EYES: NEGATIVE for vision changes or irritation. Has glasses  ENT/MOUTH: NEGATIVE for ear, mouth and throat problems  RESP: NEGATIVE for significant cough or SOB  CV: NEGATIVE for chest pain, palpitations.  Minimal RLE peripheral edema old from prior fx hx  GI: NEGATIVE for nausea, abdominal pain, heartburn, or change in bowel habits. Prior LFTS much improved  : NEGATIVE for frequency, dysuria, or hematuria. Recent pyelonephritis resolved  MUSCULOSKELETAL: NEGATIVE for significant arthralgias or myalgia that are limiting activity  NEURO: NEGATIVE for  dizziness or paresthesias. HAs some general weakness after recent hosptial stay. Using a walker and going 200+ feet now  ENDOCRINE: NEGATIVE for temperature intolerance, skin/hair changes  HEME: NEGATIVE for bleeding problems with prior surgery. On longterm Warfarin. Needs some  bridging after surgery  PSYCHIATRIC: NEGATIVE for changes in mood or affect with Paxil. Pt very happy with [facility] care currently    Patient Active Problem List    Diagnosis Date Noted    Hyponatremia 11/23/2023     Priority: Medium    Acute pyelonephritis 11/23/2023     Priority: Medium    Generalized weakness 11/23/2023     Priority: Medium    Activated protein C resistance (H24) 10/19/2022     Priority: Medium    Heterozygous factor V Leiden mutation (H24) 10/19/2022     Priority: Medium    Dilation of biliary tract 10/10/2021     Priority: Medium    Recurrent UTI 10/10/2021     Priority: Medium    Memory loss 07/16/2020      Priority: Medium    Gastroesophageal reflux disease, esophagitis presence not specified 11/23/2017     Priority: Medium     IMO Regulatory Load OCT 2020      Major depressive disorder, single episode, mild (H24) 03/14/2017     Priority: Medium    Knee joint replacement status 01/16/2017     Priority: Medium    Insomnia 02/17/2016     Priority: Medium    Hypothyroidism 01/01/2016     Priority: Medium    Long term current use of anticoagulant therapy 12/15/2015     Priority: Medium    Asymptomatic varicose veins, bilateral 09/03/2015     Priority: Medium    Personal history of RLE DVT (deep vein thrombosis)(2003) 09/03/2015     Priority: Medium    Hip joint replacement status: Left 8/31/15 08/31/2015     Priority: Medium    Elevated antinuclear antibody (JUAN ANTONIO) level 07/04/2015     Priority: Medium    Osteoarthrosis involving lower leg 01/21/2013     Priority: Medium     Problem list name updated by automated process. Provider to review  Replacing diagnoses that were inactivated after the 10/1/2021 regulatory import.      Advanced directives, counseling/discussion 05/18/2012     Priority: Medium     Patient states has Advance Directive and will bring in a copy to clinic. 5/18/2012         FACTOR 5 LEIDEN DEFECT (HYPERCOAGULABLE) 07/30/2003     Priority: Medium    Irritable bowel syndrome 09/09/2002     Priority: Medium      Past Medical History:   Diagnosis Date    Ankle fracture, lateral malleolus, closed  March 2012    right ankle    Asymptomatic varicose veins     Depression, major     Diverticulitis of colon (without mention of hemorrhage)(562.11)     DJD (degenerative joint disease)     Elevated antinuclear antibody (JUAN ANTONIO) level 7/4/2015    minimal    Embolism and thrombosis of unspecified site 3/03    RLE    FACTOR 5 LEIDEN DEFECT (HYPERCOAGULABLE) 7/03     Heterozygote    FRACTURE OF RIGHT LATERAL PROXIMAL TIBIA 11/07    Gastro-oesophageal reflux disease     rare    Hypercalcemia     Hyperlipidemia LDL goal <160  10/31/2010    Insomnia     Irritable bowel syndrome     Obesity 9/11/2013    Pain in joint, lower leg     Phlebitis and thrombophlebitis of superficial vessels of lower extremities 7/03    right    Sprain of lumbar region     Unspecified hypothyroidism     Urinary tract infection, site not specified      Past Surgical History:   Procedure Laterality Date    ARTHROPLASTY HIP Left 8/31/2015    Procedure: ARTHROPLASTY HIP;  Surgeon: Benjamín Maddox MD;  Location:  OR    ARTHROPLASTY KNEE  1/17/2013    Procedure: ARTHROPLASTY KNEE;  RIGHT TOTAL KNEE ARTHROPLASTY (BIOMET)^ ;  Surgeon: Benjamín Maddox MD;  Location:  OR    ARTHROPLASTY KNEE Left 1/16/2017    Procedure: ARTHROPLASTY KNEE;  Surgeon: Benjamín Maddox MD;  Location:  OR    CHOLECYSTECTOMY      COLONOSCOPY N/A 4/26/2016    Procedure: COMBINED COLONOSCOPY, SINGLE OR MULTIPLE BIOPSY/POLYPECTOMY BY BIOPSY;  Surgeon: Mario Coe MD;  Location:  GI    ENDOSCOPIC ULTRASOUND UPPER GASTROINTESTINAL TRACT (GI) N/A 4/5/2022    Procedure: ENDOSCOPIC ULTRASOUND, WITH BIOPSY;  Surgeon: Sammie Christian MD;  Location:  OR    GYN SURGERY      tubal    VASCULAR SURGERY      Z NONSPECIFIC PROCEDURE  7/00/01    Endometrial Biopsy.    ZZC NONSPECIFIC PROCEDURE      Tubal Ligation.    ZZC NONSPECIFIC PROCEDURE  6/02    negative coronary angio    ZZC NONSPECIFIC PROCEDURE  7/04    RLE varicose vein stripping     Current Outpatient Medications   Medication Sig Dispense Refill    acetaminophen (TYLENOL) 500 MG tablet Take 500 mg by mouth every 4 hours as needed      amitriptyline (ELAVIL) 25 MG tablet TAKE 1 TABLET BY MOUTH EVERYDAY AT BEDTIME 90 tablet 3    Ascorbic Acid (VITAMIN C PO) Take 1,000 mg by mouth every morning (Patient takes 2 X 500 mg = 1,000 mg dose)      BETA CAROTENE PO Take 25,000 Units by mouth daily.      Biotin 1 MG CAPS Take 1 tablet by mouth daily      CHROMIUM PICOLINATE 800 MCG OR TABS Take 1 capsule  "by mouth daily      COCONUT OIL PO Take 1 capsule by mouth every morning      Cyanocobalamin 1000 MCG CAPS Take 1 tablet by mouth daily      donepezil (ARICEPT) 10 MG tablet TAKE 1/2  TABLET BY MOUTH EVERY NIGHT AT BEDTIME 90 tablet 3    FISH OIL 1000 MG OR CAPS Take 1 g by mouth daily      FLAX SEED OIL 1000 MG OR CAPS Take 1 capsule by mouth daily      FOLIC ACID PO Take 400 mcg by mouth daily       levOCARNitine (CARNITOR) 330 MG tablet Take 330 mg by mouth every morning      levothyroxine (SYNTHROID/LEVOTHROID) 50 MCG tablet Take 1 tablet (50 mcg) by mouth daily 90 tablet 3    magnesium 100 MG CAPS Take 400 mg by mouth daily      PARoxetine (PAXIL) 20 MG tablet TAKE 2 TABLETS (40 MG) BY MOUTH EVERY MORNING (Patient taking differently: Take 40 mg by mouth at bedtime) 180 tablet 3    TURMERIC PO Take 1 capsule by mouth every morning      WARFARIN SODIUM PO 23 INR 2.2  Take 7.5mg M-W-F and 5mg AOD.  Next INR 23.      zinc gluconate 50 MG tablet Take 50 mg by mouth daily         No Known Allergies     Social History     Tobacco Use    Smoking status: Former     Types: Cigarettes     Quit date: 1968     Years since quittin.3    Smokeless tobacco: Never    Tobacco comments:     quit in    Substance Use Topics    Alcohol use: Not Currently     Comment: 1 glass of wine a month     Family History   Problem Relation Age of Onset    Cardiovascular Mother          aa age 76    Heart Disease Father          age 63 mi    Coronary Artery Disease Father     Circulatory Sister      History   Drug Use No         Objective     /72   Pulse 75   Temp 97.6  F (36.4  C) (Temporal)   Ht 1.676 m (5' 6\")   Wt 78.2 kg (172 lb 4.8 oz)   LMP  (LMP Unknown)   SpO2 97%   BMI 27.81 kg/m      Physical Exam  Eye: PERRL, EOMI  HENT: ear canals and TM's normal and nose and mouth without ulcers or lesions   Neck: no adenopathy. Thyroid normal to palpation. No bruits  Pulm: Lungs clear to " auscultation   CV: Regular rates and rhythm  GI: Soft, nontender, Normal active bowel sounds, No hepatosplenomegaly or masses palpable  Ext: Peripheral pulses intact.  Minimal BLE edema.  Neuro: Normal strength and tone, sensory exam grossly normal      Recent Labs   Lab Test 11/27/23  0825 11/26/23  1810 11/25/23  0646 11/24/23  1837 11/24/23  0654 11/23/23  1732 11/23/23  1607   HGB  --   --   --   --  13.0  --  14.0    126* 103*  --  135*  --  153   INR 2.30* 2.19* 2.64*  --  2.43*   < >  --      --  135  --  132*   < > 128*   POTASSIUM 4.2 4.5 4.0   < > 3.4  --  4.0   CR  --   --  0.52  --  0.59  --  0.64    < > = values in this interval not displayed.      Component  Ref Range & Units 7 d ago   Sodium  136 - 145 mmol/L 134 Low    Potassium  3.5 - 5.1 mmol/L 4.0   Chloride  98 - 109 mmol/L 103   CO2  20 - 29 mmol/L 24   Anion Gap  7 - 16 mmol/L 7   Calcium  8.4 - 10.4 mg/dL 10.4   BUN  7 - 26 mg/dL 8   Creatinine  0.55 - 1.02 mg/dL 0.70   Alkaline Phosphatase  40 - 150 U/L 127   AST (SGOT)  10 - 40 U/L 44 High    ALT (SGPT)  <=55 U/L 31   Bilirubin, Total  0.2 - 1.2 mg/dL 0.6   Protein, Total  6.4 - 8.3 g/dL 7.1   Albumin  3.5 - 5.0 g/dL 3.2 Low    Glucose  70 - 100 mg/dL 85   Comment: The given reference range is for the fasting state. Non-fasting reference range for glucose is 70 - 180 mg/dL.   GFR, Estimated  >60 mL/min/1.73m2 >60   Hours Fasting  8 - 12 Hours 0.1   Comment: Lab unable to obtain patient's fasting status at time of specimen collection.   Resulting Agency [facility]     Specimen Collected: 12/04/23 12:01 PM       Component  Ref Range & Units 7 d ago   WBC  3.5 - 10.5 x10(9)/L 7.1   RBC  3.90 - 5.03 x10(12)/L 4.17   Hemoglobin  12.0 - 15.5 g/dL 13.7   HCT  34.9 - 44.5 % 41.2   MCV  80.0 - 100.0 fL 98.8   MCH  27.6 - 33.3 pg 32.9   MCHC  31.5 - 35.2 g/dL 33.3   RDW  11.9 - 15.5 % 13.8   Platelets  150 - 450 x10(9)/L 145 Low    Automated NRBC  <=0 /100 WBC 0   Neutrophil  Absolute  1.7 - 7.0 10(9)/L 4.5   Lymphocyte Absolute  1.0 - 4.8 10(9)/L 1.6   Monocyte Absolute  0.2 - 0.9 10(9)/L 0.7   Eosinophil Absolute  0.0 - 0.5 10(9)/L 0.1   Basophil Absolute  0.0 - 0.3 10(9)/L 0.0   Immature Granulocyte %  0.0 - 0.5 % 0.8 High    Resulting Agency [facility]     Specimen Collected: 12/04/23 12:01 PM     Ref Range & Units 7 d ago   Protime  11.8 - 14.6 Seconds 24.4 High    INR  0.9 - 1.1 2.2 High    Resulting Agency [facility]   Narrative  Performed by [facility]  Therapeutic range determined by protocol established by anticoagulation provider.    Specimen Collected: 12/04/23 12:01 PM         Diagnostics:   See above    No EKG required for low risk surgery (cataract, skin procedure, breast biopsy, etc).    Revised Cardiac Risk Index (RCRI):  The patient has the following serious cardiovascular risks for perioperative complications:   - No serious cardiac risks = 0 points     RCRI Interpretation: 0 points: Class I (very low risk - 0.4% complication rate)         Signed Electronically by: Jacoby Boo MD  Copy of this evaluation report is provided to requesting physician.

## 2023-12-07 NOTE — LETTER
12/7/2023        RE: Geneva Shepherd  [address]  [facility] [address]        [facility] GERIATRICS DISCHARGE SUMMARY  PATIENT'S NAME: Geneva Shepherd  YOB: 1940  MEDICAL RECORD NUMBER:  [id number]  Place of Service where encounter took place:  [facility] ([facility]) [25]    PRIMARY CARE PROVIDER AND CLINIC RESPONSIBLE AFTER TRANSFER:   Jacoby Boo MD, [address]  / [facility] [id number]    [facility] [place] [place] Provider     Transferring providers: Tonya Lynn Haase, APRN CNP, Polo Henriquez MD  Recent Hospitalization/ED:  [facility] Hospital stay 11/23/23 to 11/27/23.  Date of SNF Admission: November 27, 2023  Date of SNF (anticipated) Discharge: December 08, 2023  Discharged to: previous independent home  Cognitive Scores: CPT: 4.7/5.6  Physical Function: Ambulating 200 ft with RW  DME: Walker    CODE STATUS/ADVANCE DIRECTIVES DISCUSSION:  Prior   ALLERGIES: Patient has no known allergies.    NURSING FACILITY COURSE   Medication Changes/Rationale:   See assessment and plan    Summary of nursing facility stay:   Patient progressed to walking up to 200 feet using a RW, indep with ADL's will Discharge to home with Sharon  RN, PT and HHA    Discharge Medications:  MED REC REQUIRED  Post Medication Reconciliation Status: discharge medications reconciled and changed, per note/orders       Current Outpatient Medications   Medication Sig Dispense Refill     acetaminophen (TYLENOL) 500 MG tablet Take 500 mg by mouth every 4 hours as needed       amitriptyline (ELAVIL) 25 MG tablet TAKE 1 TABLET BY MOUTH EVERYDAY AT BEDTIME 90 tablet 3     Ascorbic Acid (VITAMIN C PO) Take 1,000 mg by mouth every morning (Patient takes 2 X 500 mg = 1,000 mg dose)       BETA CAROTENE PO Take 25,000 Units by mouth daily.       Biotin 1 MG CAPS Take 1 tablet by mouth daily       CHROMIUM PICOLINATE 800 MCG OR TABS Take 1 capsule by mouth daily       Cyanocobalamin 1000 MCG CAPS Take 1  "tablet by mouth daily       donepezil (ARICEPT) 10 MG tablet TAKE 1/2  TABLET BY MOUTH EVERY NIGHT AT BEDTIME 90 tablet 3     FISH OIL 1000 MG OR CAPS Take 1 g by mouth daily       FLAX SEED OIL 1000 MG OR CAPS Take 1 capsule by mouth daily       FOLIC ACID PO Take 400 mcg by mouth daily        levOCARNitine (CARNITOR) 330 MG tablet Take 330 mg by mouth every morning       levothyroxine (SYNTHROID/LEVOTHROID) 50 MCG tablet Take 1 tablet (50 mcg) by mouth daily 90 tablet 3     magnesium 100 MG CAPS Take 400 mg by mouth daily       PARoxetine (PAXIL) 20 MG tablet Take 2 tablets (40 mg) by mouth at bedtime       TURMERIC PO Take 1 capsule by mouth every morning       WARFARIN SODIUM PO 12/4/23 INR 2.2  Take 7.5mg M-W-F and 5mg AOD.  Next INR 12/11/23.       zinc gluconate 50 MG tablet Take 50 mg by mouth daily            Controlled medications:   not applicable/none     Past Medical History:   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     FRACTURE OF RIGHT LATERAL PROXIMAL TIBIA 11/07     Gastro-oesophageal reflux disease     rare     Hypercalcemia      Hyperlipidemia LDL goal <160 10/31/2010     Insomnia      Irritable bowel syndrome      Obesity 9/11/2013     Pain in joint, lower leg      Phlebitis and thrombophlebitis of superficial vessels of lower extremities 7/03    right     Sprain of lumbar region      Unspecified hypothyroidism      Urinary tract infection, site not specified      Physical Exam:   Vitals: /79   Pulse 82   Temp 98  F (36.7  C)   Resp 17   Ht 1.676 m (5' 6\")   Wt 78 " kg (172 lb)   LMP  (LMP Unknown)   SpO2 96%   BMI 27.76 kg/m    BMI: Body mass index is 27.76 kg/m .  GENERAL APPEARANCE:  Alert, in no distress  ENT:  Mouth and posterior oropharynx normal, moist mucous membranes, normal hearing acuity  EYES:  EOM, conjunctivae, lids, pupils and irises normal, PERRL  RESP:  respiratory effort and palpation of chest normal, lungs clear to auscultation , no respiratory distress  CV:  Palpation and auscultation of heart done , regular rate and rhythm, no murmur, rub, or gallop, peripheral edema trace+ in LE bilaterally  ABDOMEN:  normal bowel sounds, soft, nontender, no hepatosplenomegaly or other masses  M/S:   patient sitting up in w/c  SKIN:  Inspection of skin and subcutaneous tissue baseline  NEURO:   speech wnl  PSYCH:  affect and mood normal     SNF labs: Recent labs in [facility] reviewed by me today.  and Most Recent 3 CBC's:  Recent Labs   Lab Test 11/27/23  0825 11/26/23  1810 11/25/23  0646 11/24/23  0654 11/23/23  1607 06/11/23  1856   WBC  --   --   --  7.6 7.2 6.2   HGB  --   --   --  13.0 14.0 15.0   MCV  --   --   --  98 98 100    126* 103* 135* 153 185     Most Recent 3 BMP's:  Recent Labs   Lab Test 11/27/23  0825 11/26/23  1810 11/25/23  0646 11/24/23  1837 11/24/23  0654 11/23/23  2108 11/23/23  1607     --  135  --  132*   < > 128*   POTASSIUM 4.2 4.5 4.0   < > 3.4  --  4.0   CHLORIDE  --   --  103  --  101  --  94*   CO2  --   --  23  --  21*  --  24   BUN  --   --  8.3  --  7.9*  --  10.9   CR  --   --  0.52  --  0.59  --  0.64   ANIONGAP  --   --  9  --  10  --  10   TUAN  --   --  10.2  --  9.5  --  10.1   GLC  --   --  97  --  96  --  134*    < > = values in this interval not displayed.     Assessment/Plan:  (N12) Pyelonephritis  (primary encounter diagnosis)  (N39.0) Frequent UTI  (R53.81) Physical deconditioning  Comment: acute/ongoing  Plan: f/u with urology associates, finished  cefpodoxime (completed 10 day coarse)   Discharge to home with home  PT, RN and HHA through [facility]     (D68.51) Heterozygous factor V Leiden mutation (H24)  (Z86.718) Personal history of RLE DVT (deep vein thrombosis)(2003)  (Z79.01) Long term current use of anticoagulant therapy  Comment: ongoing  Plan: continues on coumadin INR goal of 2-3     (J96.01) Acute respiratory failure with hypoxia (H)  Comment: improved, stable  Found interstitial infiltrates  Plan: monitor Sao2 at rest and with activity, Abx as above, IS QID and prn  Repeat CXR shows no active cardiopulmonary disease 12/4/23     (R74.01) Transaminitis  Comment: acute/ongoing  Plan: follows with [facility] has EUS biopsy scheduled for 12/12/23  Labs 12/4/23 stable AST sl elevated at 44 otherwise wnl       DISCHARGE PLAN:  Follow up labs: No labs orders/due  Medical Follow Up:      Follow up with primary care provider in 1-2 weeks  Current [place] scheduled appointments:  Next 5 appointments (look out 90 days)      Dec 07, 2023  3:00 PM  (Arrive by 2:40 PM)  Pre-Operative Physical with Jacoby Boo MD  [facility] ([facility] ) [address] [address] [phone]           Discharge Services: Home Care:  Physical Therapy, Registered Nurse, and Home Health Aide  Discharge Instructions Verbalized to Patient at Discharge:   None    TOTAL DISCHARGE TIME:   Greater than 30 minutes  Electronically signed by:  Tonya Lynn Haase, APRN CNP     Home care Face to Face documentation done in [facility] attached to Home care orders for [facility]. , Documentation of Face to Face and Certification for Home Health Services    I certify that patient: Geneva Shepherd is under my care and that I, or a nurse practitioner or physician's assistant working with me, had a face-to-face encounter that meets the physician face-to-face encounter requirements with this patient on: 12/7/2023.    This encounter with the patient was in whole, or in part, for  the following medical condition, which is the primary reason for home health care: pyelonephritis, deconditioning.    I certify that, based on my findings, the following services are medically necessary home health services: Nursing and Physical Therapy.    My clinical findings support the need for the above services because: Nurse is needed: To assess vitals, medication management after changes in medications or other medical regimen.. and Physical Therapy Services are needed to assess and treat the following functional impairments: strengthening, endurance, home safety.    Further, I certify that my clinical findings support that this patient is homebound (i.e. absences from home require considerable and taxing effort and are for medical reasons or Mu-ism services or infrequently or of short duration when for other reasons) because: Requires assistance of another person or specialized equipment to access medical services because patient: Requires supervision of another for safe transfer...    Based on the above findings. I certify that this patient is confined to the home and needs intermittent skilled nursing care, physical therapy and/or speech therapy.  The patient is under my care, and I have initiated the establishment of the plan of care.  This patient will be followed by a physician who will periodically review the plan of care.  Physician/Provider to provide follow up care: Jacoby Boo    Attending hospital physician (the Medicare certified PECOS provider): Tonya Lynn Haase, APRN CNP  Physician Signature: See electronic signature associated with these discharge orders.  Date: 12/8/2023              Sincerely,        Tonya Lynn Haase, APRN CNP

## 2023-12-07 NOTE — PROGRESS NOTES
[facility] GERIATRICS DISCHARGE SUMMARY  PATIENT'S NAME: Geneva Shepherd  YOB: 1940  MEDICAL RECORD NUMBER:  [id number]  Place of Service where encounter took place:  [facility]) [25]    PRIMARY CARE PROVIDER AND CLINIC RESPONSIBLE AFTER TRANSFER:   Jacoby Boo MD, [address] [place] [address] / [facility] [address]    [facility] [place] Provider     Transferring providers: Tonya Lynn Haase, APRN CNP, Polo Henriquez MD  Recent Hospitalization/ED:  [facility] Hospital stay 11/23/23 to 11/27/23.  Date of SNF Admission: November 27, 2023  Date of SNF (anticipated) Discharge: December 08, 2023  Discharged to: previous independent home  Cognitive Scores: CPT: 4.7/5.6  Physical Function: Ambulating 200 ft with RW  DME: Walker    CODE STATUS/ADVANCE DIRECTIVES DISCUSSION:  Prior   ALLERGIES: Patient has no known allergies.    NURSING FACILITY COURSE   Medication Changes/Rationale:   See assessment and plan    Summary of nursing facility stay:   Patient progressed to walking up to 200 feet using a RW, indep with ADL's will Discharge to home with Sharon MANCUSO RN, PT and HHA    Discharge Medications:  MED REC REQUIRED  Post Medication Reconciliation Status: discharge medications reconciled and changed, per note/orders       Current Outpatient Medications   Medication Sig Dispense Refill    acetaminophen (TYLENOL) 500 MG tablet Take 500 mg by mouth every 4 hours as needed      amitriptyline (ELAVIL) 25 MG tablet TAKE 1 TABLET BY MOUTH EVERYDAY AT BEDTIME 90 tablet 3    Ascorbic Acid (VITAMIN C PO) Take 1,000 mg by mouth every morning (Patient takes 2 X 500 mg = 1,000 mg dose)      BETA CAROTENE PO Take 25,000 Units by mouth daily.      Biotin 1 MG CAPS Take 1 tablet by mouth daily      CHROMIUM PICOLINATE 800 MCG OR TABS Take 1 capsule by mouth daily      Cyanocobalamin 1000 MCG CAPS Take 1 tablet by mouth daily      donepezil (ARICEPT) 10 MG tablet TAKE 1/2  TABLET BY MOUTH EVERY NIGHT AT  "BEDTIME 90 tablet 3    FISH OIL 1000 MG OR CAPS Take 1 g by mouth daily      FLAX SEED OIL 1000 MG OR CAPS Take 1 capsule by mouth daily      FOLIC ACID PO Take 400 mcg by mouth daily       levOCARNitine (CARNITOR) 330 MG tablet Take 330 mg by mouth every morning      levothyroxine (SYNTHROID/LEVOTHROID) 50 MCG tablet Take 1 tablet (50 mcg) by mouth daily 90 tablet 3    magnesium 100 MG CAPS Take 400 mg by mouth daily      PARoxetine (PAXIL) 20 MG tablet Take 2 tablets (40 mg) by mouth at bedtime      TURMERIC PO Take 1 capsule by mouth every morning      WARFARIN SODIUM PO 12/4/23 INR 2.2  Take 7.5mg M-W-F and 5mg AOD.  Next INR 12/11/23.      zinc gluconate 50 MG tablet Take 50 mg by mouth daily            Controlled medications:   not applicable/none     Past Medical History:   Past Medical History:   Diagnosis Date    Ankle fracture, lateral malleolus, closed  March 2012    right ankle    Asymptomatic varicose veins     Depression, major     Diverticulitis of colon (without mention of hemorrhage)(562.11)     DJD (degenerative joint disease)     Elevated antinuclear antibody (JUAN ANTONIO) level 7/4/2015    minimal    Embolism and thrombosis of unspecified site 3/03    RLE    FACTOR 5 LEIDEN DEFECT (HYPERCOAGULABLE) 7/03     Heterozygote    FRACTURE OF RIGHT LATERAL PROXIMAL TIBIA 11/07    Gastro-oesophageal reflux disease     rare    Hypercalcemia     Hyperlipidemia LDL goal <160 10/31/2010    Insomnia     Irritable bowel syndrome     Obesity 9/11/2013    Pain in joint, lower leg     Phlebitis and thrombophlebitis of superficial vessels of lower extremities 7/03    right    Sprain of lumbar region     Unspecified hypothyroidism     Urinary tract infection, site not specified      Physical Exam:   Vitals: /79   Pulse 82   Temp 98  F (36.7  C)   Resp 17   Ht 1.676 m (5' 6\")   Wt 78 kg (172 lb)   LMP  (LMP Unknown)   SpO2 96%   BMI 27.76 kg/m    BMI: Body mass index is 27.76 kg/m .  GENERAL APPEARANCE:  Alert, " in no distress  ENT:  Mouth and posterior oropharynx normal, moist mucous membranes, normal hearing acuity  EYES:  EOM, conjunctivae, lids, pupils and irises normal, PERRL  RESP:  respiratory effort and palpation of chest normal, lungs clear to auscultation , no respiratory distress  CV:  Palpation and auscultation of heart done , regular rate and rhythm, no murmur, rub, or gallop, peripheral edema trace+ in LE bilaterally  ABDOMEN:  normal bowel sounds, soft, nontender, no hepatosplenomegaly or other masses  M/S:   patient sitting up in w/c  SKIN:  Inspection of skin and subcutaneous tissue baseline  NEURO:   speech wnl  PSYCH:  affect and mood normal     SNF labs: Recent labs in [facility] reviewed by me today.  and Most Recent 3 CBC's:  Recent Labs   Lab Test 11/27/23 0825 11/26/23 1810 11/25/23  0646 11/24/23  0654 11/23/23  1607 06/11/23  1856   WBC  --   --   --  7.6 7.2 6.2   HGB  --   --   --  13.0 14.0 15.0   MCV  --   --   --  98 98 100    126* 103* 135* 153 185     Most Recent 3 BMP's:  Recent Labs   Lab Test 11/27/23  0825 11/26/23 1810 11/25/23  0646 11/24/23  1837 11/24/23  0654 11/23/23  2108 11/23/23  1607     --  135  --  132*   < > 128*   POTASSIUM 4.2 4.5 4.0   < > 3.4  --  4.0   CHLORIDE  --   --  103  --  101  --  94*   CO2  --   --  23  --  21*  --  24   BUN  --   --  8.3  --  7.9*  --  10.9   CR  --   --  0.52  --  0.59  --  0.64   ANIONGAP  --   --  9  --  10  --  10   TUAN  --   --  10.2  --  9.5  --  10.1   GLC  --   --  97  --  96  --  134*    < > = values in this interval not displayed.     Assessment/Plan:  (N12) Pyelonephritis  (primary encounter diagnosis)  (N39.0) Frequent UTI  (R53.81) Physical deconditioning  Comment: acute/ongoing  Plan: f/u with urology associates, finished  cefpodoxime (completed 10 day coarse)   Discharge to home with home PT, RN and HHA through [facility]     (D68.51) Heterozygous factor V Leiden mutation (H24)  (Z86.718) Personal history of RLE DVT  (deep vein thrombosis)(2003)  (Z79.01) Long term current use of anticoagulant therapy  Comment: ongoing  Plan: continues on coumadin INR goal of 2-3     (J96.01) Acute respiratory failure with hypoxia (H)  Comment: improved, stable  Found interstitial infiltrates  Plan: monitor Sao2 at rest and with activity, Abx as above, IS QID and prn  Repeat CXR shows no active cardiopulmonary disease 12/4/23     (R74.01) Transaminitis  Comment: acute/ongoing  Plan: follows with [facility] has EUS biopsy scheduled for 12/12/23  Labs 12/4/23 stable AST sl elevated at 44 otherwise wnl       DISCHARGE PLAN:  Follow up labs: No labs orders/due  Medical Follow Up:      Follow up with primary care provider in 1-2 weeks  Current [place] scheduled appointments:  Next 5 appointments (look out 90 days)      Dec 07, 2023  3:00 PM  (Arrive by 2:40 PM)  Pre-Operative Physical with Jacoby Boo MD  [facility] ([facility] ) [address] [id number] [phone]           Discharge Services: Home Care:  Physical Therapy, Registered Nurse, and Home Health Aide  Discharge Instructions Verbalized to Patient at Discharge:   None    TOTAL DISCHARGE TIME:   Greater than 30 minutes  Electronically signed by:  Tonya Lynn Haase, APRN CNP     Home care Face to Face documentation done in [facility] attached to Home care orders for [facility]. , Documentation of Face to Face and Certification for Home Health Services    I certify that patient: Geneva Shepherd is under my care and that I, or a nurse practitioner or physician's assistant working with me, had a face-to-face encounter that meets the physician face-to-face encounter requirements with this patient on: 12/7/2023.    This encounter with the patient was in whole, or in part, for the following medical condition, which is the primary reason for home health care: pyelonephritis, deconditioning.    I certify that,  based on my findings, the following services are medically necessary home health services: Nursing and Physical Therapy.    My clinical findings support the need for the above services because: Nurse is needed: To assess vitals, medication management after changes in medications or other medical regimen.. and Physical Therapy Services are needed to assess and treat the following functional impairments: strengthening, endurance, home safety.    Further, I certify that my clinical findings support that this patient is homebound (i.e. absences from home require considerable and taxing effort and are for medical reasons or Confucianist services or infrequently or of short duration when for other reasons) because: Requires assistance of another person or specialized equipment to access medical services because patient: Requires supervision of another for safe transfer...    Based on the above findings. I certify that this patient is confined to the home and needs intermittent skilled nursing care, physical therapy and/or speech therapy.  The patient is under my care, and I have initiated the establishment of the plan of care.  This patient will be followed by a physician who will periodically review the plan of care.  Physician/Provider to provide follow up care: Jacoby Boo    Attending hospital physician (the Medicare certified PECOS provider): Tonya Lynn Haase, APRN CNP  Physician Signature: See electronic signature associated with these discharge orders.  Date: 12/8/2023

## 2023-12-07 NOTE — PATIENT INSTRUCTIONS
Approval given to proceed with proposed procedure, without further diagnostic evaluation.  No solid food after midnight prior to your  procedure. May have clear liquids up to 2 hours prior to the  procedure (11:00am)   Stop fish oil, Turmeric and Warfarin starting tomorrow  Friday 12/8/23      May use Tylenol for pain control  between now and your procedure  Take  other usual medications  the morning of your procedure with water prior to 11:00am   Restart Warfarin 12/12/23 and take 10mg (2 tabs)   12/13/23: Take Warfarin 5mg ( one tab)   12/14/23: Take Warfarin 7.5mg (1.5 tabs )  12/15/23: Take Warfarin 5mg ( one tab)  12/16/23: Take Warfarin 5mg ( one tab)  12/17/23: Take Warfarin 7.5mg (1.5 tabs )  12/18/23: Take Warfarin 5mg ( one tab)  12/19/23: Get INR recheck with anticoagulation clinic   Call [phone] to schedule INR appt at clinic    Start Lovenox 40mg injection once a day starting  12/13/23 (the AM after the procedure) and stay on Lovenox daily for 6 days. You will have an INR done 12/19/23 to see if you need to continue Lovenox longer or not    May restart Turmeric and fish oil the day after the procedure

## 2023-12-11 ENCOUNTER — TELEPHONE (OUTPATIENT)
Dept: INTERNAL MEDICINE | Facility: CLINIC | Age: 83
End: 2023-12-11
Payer: MEDICARE

## 2023-12-11 ENCOUNTER — ANESTHESIA EVENT (OUTPATIENT)
Dept: SURGERY | Facility: CLINIC | Age: 83
End: 2023-12-11
Payer: MEDICARE

## 2023-12-11 DIAGNOSIS — Z79.01 LONG TERM CURRENT USE OF ANTICOAGULANT THERAPY: ICD-10-CM

## 2023-12-11 DIAGNOSIS — D68.51 HETEROZYGOUS FACTOR V LEIDEN MUTATION (H): ICD-10-CM

## 2023-12-11 DIAGNOSIS — Z86.718 PERSONAL HISTORY OF DVT (DEEP VEIN THROMBOSIS): Primary | ICD-10-CM

## 2023-12-11 NOTE — TELEPHONE ENCOUNTER
General Call      Reason for Call:  INR    What are your questions or concerns:  patient was just opened to home care and they are wondering when to have her INR checked.    Date of last appointment with provider: 12-7-23    [facility] [phone]

## 2023-12-11 NOTE — TELEPHONE ENCOUNTER
"ANTICOAGULATION  MANAGEMENT: Discharge Review    Geneva Shepherd chart reviewed for anticoagulation continuity of care    [facility] (Managed by [facility] Geriatrics Team)  on 12/8/23 for Pyelonephritis, frequent UTIs, physical deconditioning.    Discharge disposition: Home with Home Care, RN through [facility] (as well as PT& HHA)    Results:    No results for input(s): \"INR\", \"[id number]\", \"F2\", \"ALMWH\", \"AAUFH\" in the last 168 hours.  Anticoagulation inpatient management:     home regimen continued    Anticoagulation discharge instructions:     Warfarin dosing:  patient to begin holding warfarin upon discharge, 12/8/23, for 4 days   Bridging:  begin bridging the day after procedure, on 12/13/23   INR goal change: No      Medication changes affecting anticoagulation: No    Additional factors affecting anticoagulation: Yes: transaminitis, acute/ongoing. Follows with [facility], has endoscopic retrograde cholangiopancreatography biopsy scheduled for 12/12/23.     At Pre-op visit, pt advised to stop fish oil, Turmeric and Warfarin starting Friday 12/8/23.   Advised to take a booster dose of warfarin (10 mg) on evening of 12/12/23, then continue with maintenance dose of warfarin and recheck INR 12/19/23. This was already changed in the dosing calendar. Patient also advised to start Lovenox, 40 mg injection once daily starting 12/13/23 (the AM after procedure and stay on Lovenox daily until INR check. Will need to continue bridging until INR back to therapeutic.      PLAN     Recommend to check INR on 12/19/23 by Homecare RN    Left a detailed message for Dominga at [facility] with requested date of next INR (12/19/23) , but did ask that she return my phone call to convey other info above.    No adjustment to Anticoagulation Calendar was required    Nemo Reyes RN  "

## 2023-12-11 NOTE — TELEPHONE ENCOUNTER
Home Care is calling regarding an established patient with [facility].       Requesting orders from: Jacoby Boo  Provider is following patient: No       Orders Requested    Skilled Nursing  Request for initial certification (first set of orders)   Frequency:  3w1, 2w1, 1w7       Information was gathered and will be sent to provider for review.  RN will contact Home Care with information after provider review.  Confirmed ok to leave a detailed message with call back.  Contact information confirmed and updated as needed.    Trinh Patel RN

## 2023-12-12 ENCOUNTER — ANESTHESIA (OUTPATIENT)
Dept: SURGERY | Facility: CLINIC | Age: 83
End: 2023-12-12
Payer: MEDICARE

## 2023-12-12 ENCOUNTER — TELEPHONE (OUTPATIENT)
Dept: ANTICOAGULATION | Facility: CLINIC | Age: 83
End: 2023-12-12

## 2023-12-12 ENCOUNTER — HOSPITAL ENCOUNTER (OUTPATIENT)
Facility: CLINIC | Age: 83
Discharge: HOME | End: 2023-12-12
Attending: INTERNAL MEDICINE | Admitting: INTERNAL MEDICINE
Payer: MEDICARE

## 2023-12-12 VITALS
HEART RATE: 78 BPM | SYSTOLIC BLOOD PRESSURE: 122 MMHG | BODY MASS INDEX: 28.3 KG/M2 | DIASTOLIC BLOOD PRESSURE: 62 MMHG | RESPIRATION RATE: 16 BRPM | TEMPERATURE: 98 F | HEIGHT: 66 IN | WEIGHT: 176.1 LBS | OXYGEN SATURATION: 97 %

## 2023-12-12 DIAGNOSIS — D68.51 HETEROZYGOUS FACTOR V LEIDEN MUTATION (H): ICD-10-CM

## 2023-12-12 DIAGNOSIS — Z86.718 PERSONAL HISTORY OF DVT (DEEP VEIN THROMBOSIS): Primary | ICD-10-CM

## 2023-12-12 DIAGNOSIS — Z79.01 LONG TERM CURRENT USE OF ANTICOAGULANT THERAPY: ICD-10-CM

## 2023-12-12 LAB
ALBUMIN SERPL BCG-MCNC: 3.7 G/DL (ref 3.5–5.2)
ALP SERPL-CCNC: 143 U/L (ref 40–150)
ALT SERPL W P-5'-P-CCNC: 36 U/L (ref 0–50)
ANION GAP SERPL CALCULATED.3IONS-SCNC: 7 MMOL/L (ref 7–15)
AST SERPL W P-5'-P-CCNC: 53 U/L (ref 0–45)
BILIRUB SERPL-MCNC: 0.7 MG/DL
BUN SERPL-MCNC: 7.2 MG/DL (ref 8–23)
CALCIUM SERPL-MCNC: 10.3 MG/DL (ref 8.8–10.2)
CHLORIDE SERPL-SCNC: 103 MMOL/L (ref 98–107)
CREAT SERPL-MCNC: 0.6 MG/DL (ref 0.51–0.95)
DEPRECATED HCO3 PLAS-SCNC: 28 MMOL/L (ref 22–29)
EGFRCR SERPLBLD CKD-EPI 2021: 90 ML/MIN/1.73M2
ERYTHROCYTE [DISTWIDTH] IN BLOOD BY AUTOMATED COUNT: 14 % (ref 10–15)
GLUCOSE SERPL-MCNC: 92 MG/DL (ref 70–99)
HCT VFR BLD AUTO: 41.7 % (ref 35–47)
HGB BLD-MCNC: 13.5 G/DL (ref 11.7–15.7)
INR PPP: 1.41 (ref 0.85–1.15)
MCH RBC QN AUTO: 32.6 PG (ref 26.5–33)
MCHC RBC AUTO-ENTMCNC: 32.4 G/DL (ref 31.5–36.5)
MCV RBC AUTO: 101 FL (ref 78–100)
PLATELET # BLD AUTO: 148 10E3/UL (ref 150–450)
POTASSIUM SERPL-SCNC: 4 MMOL/L (ref 3.4–5.3)
PROT SERPL-MCNC: 7 G/DL (ref 6.4–8.3)
RBC # BLD AUTO: 4.14 10E6/UL (ref 3.8–5.2)
SODIUM SERPL-SCNC: 138 MMOL/L (ref 135–145)
UPPER EUS: NORMAL
WBC # BLD AUTO: 5 10E3/UL (ref 4–11)

## 2023-12-12 PROCEDURE — 36415 COLL VENOUS BLD VENIPUNCTURE: CPT | Performed by: INTERNAL MEDICINE

## 2023-12-12 PROCEDURE — 710N000009 HC RECOVERY PHASE 1, LEVEL 1, PER MIN: Performed by: INTERNAL MEDICINE

## 2023-12-12 PROCEDURE — 85027 COMPLETE CBC AUTOMATED: CPT | Performed by: INTERNAL MEDICINE

## 2023-12-12 PROCEDURE — 360N000075 HC SURGERY LEVEL 2, PER MIN: Performed by: INTERNAL MEDICINE

## 2023-12-12 PROCEDURE — 85610 PROTHROMBIN TIME: CPT | Performed by: INTERNAL MEDICINE

## 2023-12-12 PROCEDURE — 999N000141 HC STATISTIC PRE-PROCEDURE NURSING ASSESSMENT: Performed by: INTERNAL MEDICINE

## 2023-12-12 PROCEDURE — 250N000009 HC RX 250: Performed by: ANESTHESIOLOGY

## 2023-12-12 PROCEDURE — 258N000003 HC RX IP 258 OP 636: Performed by: ANESTHESIOLOGY

## 2023-12-12 PROCEDURE — 80053 COMPREHEN METABOLIC PANEL: CPT | Performed by: INTERNAL MEDICINE

## 2023-12-12 PROCEDURE — 250N000009 HC RX 250: Performed by: INTERNAL MEDICINE

## 2023-12-12 PROCEDURE — 250N000025 HC SEVOFLURANE, PER MIN: Performed by: INTERNAL MEDICINE

## 2023-12-12 PROCEDURE — 370N000017 HC ANESTHESIA TECHNICAL FEE, PER MIN: Performed by: INTERNAL MEDICINE

## 2023-12-12 PROCEDURE — 250N000011 HC RX IP 250 OP 636: Mod: JZ | Performed by: ANESTHESIOLOGY

## 2023-12-12 PROCEDURE — 710N000012 HC RECOVERY PHASE 2, PER MINUTE: Performed by: INTERNAL MEDICINE

## 2023-12-12 RX ORDER — FLUMAZENIL 0.1 MG/ML
0.2 INJECTION, SOLUTION INTRAVENOUS
Status: DISCONTINUED | OUTPATIENT
Start: 2023-12-12 | End: 2023-12-12 | Stop reason: HOSPADM

## 2023-12-12 RX ORDER — FENTANYL CITRATE 0.05 MG/ML
25 INJECTION, SOLUTION INTRAMUSCULAR; INTRAVENOUS EVERY 5 MIN PRN
Status: DISCONTINUED | OUTPATIENT
Start: 2023-12-12 | End: 2023-12-12 | Stop reason: HOSPADM

## 2023-12-12 RX ORDER — HYDRALAZINE HYDROCHLORIDE 20 MG/ML
2.5-5 INJECTION INTRAMUSCULAR; INTRAVENOUS EVERY 10 MIN PRN
Status: DISCONTINUED | OUTPATIENT
Start: 2023-12-12 | End: 2023-12-12 | Stop reason: HOSPADM

## 2023-12-12 RX ORDER — DEXAMETHASONE SODIUM PHOSPHATE 4 MG/ML
INJECTION, SOLUTION INTRA-ARTICULAR; INTRALESIONAL; INTRAMUSCULAR; INTRAVENOUS; SOFT TISSUE PRN
Status: DISCONTINUED | OUTPATIENT
Start: 2023-12-12 | End: 2023-12-12

## 2023-12-12 RX ORDER — PROPOFOL 10 MG/ML
INJECTION, EMULSION INTRAVENOUS PRN
Status: DISCONTINUED | OUTPATIENT
Start: 2023-12-12 | End: 2023-12-12

## 2023-12-12 RX ORDER — LIDOCAINE HYDROCHLORIDE 20 MG/ML
INJECTION, SOLUTION INFILTRATION; PERINEURAL PRN
Status: DISCONTINUED | OUTPATIENT
Start: 2023-12-12 | End: 2023-12-12

## 2023-12-12 RX ORDER — NALOXONE HYDROCHLORIDE 0.4 MG/ML
0.4 INJECTION, SOLUTION INTRAMUSCULAR; INTRAVENOUS; SUBCUTANEOUS
Status: DISCONTINUED | OUTPATIENT
Start: 2023-12-12 | End: 2023-12-12 | Stop reason: HOSPADM

## 2023-12-12 RX ORDER — INDOMETHACIN 50 MG/1
100 SUPPOSITORY RECTAL
Status: DISCONTINUED | OUTPATIENT
Start: 2023-12-12 | End: 2023-12-12 | Stop reason: HOSPADM

## 2023-12-12 RX ORDER — ONDANSETRON 4 MG/1
4 TABLET, ORALLY DISINTEGRATING ORAL EVERY 30 MIN PRN
Status: DISCONTINUED | OUTPATIENT
Start: 2023-12-12 | End: 2023-12-12 | Stop reason: HOSPADM

## 2023-12-12 RX ORDER — HYDROMORPHONE HCL IN WATER/PF 6 MG/30 ML
0.2 PATIENT CONTROLLED ANALGESIA SYRINGE INTRAVENOUS EVERY 5 MIN PRN
Status: DISCONTINUED | OUTPATIENT
Start: 2023-12-12 | End: 2023-12-12 | Stop reason: HOSPADM

## 2023-12-12 RX ORDER — LIDOCAINE 40 MG/G
CREAM TOPICAL
Status: DISCONTINUED | OUTPATIENT
Start: 2023-12-12 | End: 2023-12-12 | Stop reason: HOSPADM

## 2023-12-12 RX ORDER — OXYCODONE HYDROCHLORIDE 5 MG/1
5 TABLET ORAL
Status: CANCELLED | OUTPATIENT
Start: 2023-12-12

## 2023-12-12 RX ORDER — ONDANSETRON 2 MG/ML
INJECTION INTRAMUSCULAR; INTRAVENOUS PRN
Status: DISCONTINUED | OUTPATIENT
Start: 2023-12-12 | End: 2023-12-12

## 2023-12-12 RX ORDER — ONDANSETRON 2 MG/ML
4 INJECTION INTRAMUSCULAR; INTRAVENOUS EVERY 30 MIN PRN
Status: CANCELLED | OUTPATIENT
Start: 2023-12-12

## 2023-12-12 RX ORDER — ONDANSETRON 4 MG/1
4 TABLET, ORALLY DISINTEGRATING ORAL EVERY 30 MIN PRN
Status: CANCELLED | OUTPATIENT
Start: 2023-12-12

## 2023-12-12 RX ORDER — ONDANSETRON 4 MG/1
4 TABLET, ORALLY DISINTEGRATING ORAL EVERY 6 HOURS PRN
Status: DISCONTINUED | OUTPATIENT
Start: 2023-12-12 | End: 2023-12-12 | Stop reason: HOSPADM

## 2023-12-12 RX ORDER — ALBUTEROL SULFATE 0.83 MG/ML
2.5 SOLUTION RESPIRATORY (INHALATION) EVERY 4 HOURS PRN
Status: DISCONTINUED | OUTPATIENT
Start: 2023-12-12 | End: 2023-12-12 | Stop reason: HOSPADM

## 2023-12-12 RX ORDER — ONDANSETRON 2 MG/ML
4 INJECTION INTRAMUSCULAR; INTRAVENOUS EVERY 6 HOURS PRN
Status: DISCONTINUED | OUTPATIENT
Start: 2023-12-12 | End: 2023-12-12 | Stop reason: HOSPADM

## 2023-12-12 RX ORDER — MEPERIDINE HYDROCHLORIDE 25 MG/ML
12.5 INJECTION INTRAMUSCULAR; INTRAVENOUS; SUBCUTANEOUS EVERY 5 MIN PRN
Status: DISCONTINUED | OUTPATIENT
Start: 2023-12-12 | End: 2023-12-12 | Stop reason: HOSPADM

## 2023-12-12 RX ORDER — FENTANYL CITRATE 0.05 MG/ML
50 INJECTION, SOLUTION INTRAMUSCULAR; INTRAVENOUS EVERY 5 MIN PRN
Status: DISCONTINUED | OUTPATIENT
Start: 2023-12-12 | End: 2023-12-12 | Stop reason: HOSPADM

## 2023-12-12 RX ORDER — NALOXONE HYDROCHLORIDE 0.4 MG/ML
0.2 INJECTION, SOLUTION INTRAMUSCULAR; INTRAVENOUS; SUBCUTANEOUS
Status: DISCONTINUED | OUTPATIENT
Start: 2023-12-12 | End: 2023-12-12 | Stop reason: HOSPADM

## 2023-12-12 RX ORDER — SODIUM CHLORIDE, SODIUM LACTATE, POTASSIUM CHLORIDE, CALCIUM CHLORIDE 600; 310; 30; 20 MG/100ML; MG/100ML; MG/100ML; MG/100ML
INJECTION, SOLUTION INTRAVENOUS CONTINUOUS
Status: DISCONTINUED | OUTPATIENT
Start: 2023-12-12 | End: 2023-12-12 | Stop reason: HOSPADM

## 2023-12-12 RX ORDER — ONDANSETRON 2 MG/ML
4 INJECTION INTRAMUSCULAR; INTRAVENOUS EVERY 30 MIN PRN
Status: DISCONTINUED | OUTPATIENT
Start: 2023-12-12 | End: 2023-12-12 | Stop reason: HOSPADM

## 2023-12-12 RX ORDER — SODIUM CHLORIDE, SODIUM LACTATE, POTASSIUM CHLORIDE, CALCIUM CHLORIDE 600; 310; 30; 20 MG/100ML; MG/100ML; MG/100ML; MG/100ML
INJECTION, SOLUTION INTRAVENOUS CONTINUOUS PRN
Status: DISCONTINUED | OUTPATIENT
Start: 2023-12-12 | End: 2023-12-12

## 2023-12-12 RX ORDER — HYDROMORPHONE HCL IN WATER/PF 6 MG/30 ML
0.4 PATIENT CONTROLLED ANALGESIA SYRINGE INTRAVENOUS EVERY 5 MIN PRN
Status: DISCONTINUED | OUTPATIENT
Start: 2023-12-12 | End: 2023-12-12 | Stop reason: HOSPADM

## 2023-12-12 RX ORDER — FENTANYL CITRATE 50 UG/ML
INJECTION, SOLUTION INTRAMUSCULAR; INTRAVENOUS PRN
Status: DISCONTINUED | OUTPATIENT
Start: 2023-12-12 | End: 2023-12-12

## 2023-12-12 RX ORDER — FENTANYL CITRATE 0.05 MG/ML
25 INJECTION, SOLUTION INTRAMUSCULAR; INTRAVENOUS
Status: CANCELLED | OUTPATIENT
Start: 2023-12-12

## 2023-12-12 RX ORDER — LABETALOL HYDROCHLORIDE 5 MG/ML
10 INJECTION, SOLUTION INTRAVENOUS
Status: DISCONTINUED | OUTPATIENT
Start: 2023-12-12 | End: 2023-12-12 | Stop reason: HOSPADM

## 2023-12-12 RX ORDER — OXYCODONE HYDROCHLORIDE 5 MG/1
10 TABLET ORAL
Status: CANCELLED | OUTPATIENT
Start: 2023-12-12

## 2023-12-12 RX ADMIN — DEXAMETHASONE SODIUM PHOSPHATE 4 MG: 4 INJECTION, SOLUTION INTRA-ARTICULAR; INTRALESIONAL; INTRAMUSCULAR; INTRAVENOUS; SOFT TISSUE at 13:26

## 2023-12-12 RX ADMIN — SUCCINYLCHOLINE CHLORIDE 100 MG: 20 INJECTION, SOLUTION INTRAMUSCULAR; INTRAVENOUS; PARENTERAL at 13:17

## 2023-12-12 RX ADMIN — ONDANSETRON 4 MG: 2 INJECTION INTRAMUSCULAR; INTRAVENOUS at 13:16

## 2023-12-12 RX ADMIN — FENTANYL CITRATE 50 MCG: 50 INJECTION INTRAMUSCULAR; INTRAVENOUS at 13:17

## 2023-12-12 RX ADMIN — PROPOFOL 150 MG: 10 INJECTION, EMULSION INTRAVENOUS at 13:08

## 2023-12-12 RX ADMIN — SODIUM CHLORIDE, POTASSIUM CHLORIDE, SODIUM LACTATE AND CALCIUM CHLORIDE: 600; 310; 30; 20 INJECTION, SOLUTION INTRAVENOUS at 13:08

## 2023-12-12 RX ADMIN — LIDOCAINE HYDROCHLORIDE 80 MG: 20 INJECTION, SOLUTION INFILTRATION; PERINEURAL at 13:17

## 2023-12-12 ASSESSMENT — ACTIVITIES OF DAILY LIVING (ADL)
ADLS_ACUITY_SCORE: 39
ADLS_ACUITY_SCORE: 39
ADLS_ACUITY_SCORE: 37

## 2023-12-12 NOTE — ANESTHESIA PREPROCEDURE EVALUATION
Anesthesia Pre-Procedure Evaluation    Patient: Geneva Shepherd   MRN: [id number] : 1940        Procedure : Procedure(s):  Endoscopic retrograde cholangiopancreatography  Endoscopic Ultrasound          Past Medical History:   Diagnosis Date    Ankle fracture, lateral malleolus, closed  2012    right ankle    Asymptomatic varicose veins     Depression, major     Diverticulitis of colon (without mention of hemorrhage)(562.11)     DJD (degenerative joint disease)     Elevated antinuclear antibody (JUAN ANTONIO) level 2015    minimal    Embolism and thrombosis of unspecified site 3/03    RLE    FACTOR 5 LEIDEN DEFECT (HYPERCOAGULABLE)      Heterozygote    FRACTURE OF RIGHT LATERAL PROXIMAL TIBIA     Gastro-oesophageal reflux disease     rare    Hypercalcemia     Hyperlipidemia LDL goal <160 10/31/2010    Insomnia     Irritable bowel syndrome     Obesity 2013    Pain in joint, lower leg     Phlebitis and thrombophlebitis of superficial vessels of lower extremities     right    Sprain of lumbar region     Unspecified hypothyroidism     Urinary tract infection, site not specified       Past Surgical History:   Procedure Laterality Date    ARTHROPLASTY HIP Left 2015    Procedure: ARTHROPLASTY HIP;  Surgeon: Benjamín Maddox MD;  Location:  OR    ARTHROPLASTY KNEE  2013    Procedure: ARTHROPLASTY KNEE;  RIGHT TOTAL KNEE ARTHROPLASTY (BIOMET)^ ;  Surgeon: Benjamín Maddox MD;  Location:  OR    ARTHROPLASTY KNEE Left 2017    Procedure: ARTHROPLASTY KNEE;  Surgeon: Benjamín Maddox MD;  Location:  OR    CHOLECYSTECTOMY      COLONOSCOPY N/A 2016    Procedure: COMBINED COLONOSCOPY, SINGLE OR MULTIPLE BIOPSY/POLYPECTOMY BY BIOPSY;  Surgeon: Mario Coe MD;  Location:  GI    ENDOSCOPIC ULTRASOUND UPPER GASTROINTESTINAL TRACT (GI) N/A 2022    Procedure: ENDOSCOPIC ULTRASOUND, WITH BIOPSY;  Surgeon: Sammie Christian MD;  Location:  [facility] OR    GYN SURGERY      tubal    VASCULAR SURGERY      [facility] NONSPECIFIC PROCEDURE      Endometrial Biopsy.    [facility] NONSPECIFIC PROCEDURE      Tubal Ligation.    [facility] NONSPECIFIC PROCEDURE      negative coronary angio    [facility] NONSPECIFIC PROCEDURE      RLE varicose vein stripping      No Known Allergies   Social History     Tobacco Use    Smoking status: Former     Types: Cigarettes     Quit date: 1968     Years since quittin.3    Smokeless tobacco: Never    Tobacco comments:     quit in    Substance Use Topics    Alcohol use: Not Currently     Comment: 1 glass of wine a month      Wt Readings from Last 1 Encounters:   23 79.9 kg (176 lb 1.6 oz)        Anesthesia Evaluation   Pt has had prior anesthetic.     No history of anesthetic complications       ROS/MED HX  ENT/Pulmonary:    (-) sleep apnea   Neurologic:    (-) no CVA   Cardiovascular:    (-) CAD   METS/Exercise Tolerance:     Hematologic: Comments: Factor V Leiden      Musculoskeletal:       GI/Hepatic:     (+) GERD,          hepatitis  liver disease,       Renal/Genitourinary:       Endo:     (+)          thyroid problem,     Obesity,    (-) Type II DM   Psychiatric/Substance Use:     (+) psychiatric history depression       Infectious Disease:       Malignancy:       Other:            Physical Exam    Airway        Mallampati: II   TM distance: > 3 FB   Neck ROM: full   Mouth opening: > 3 cm    Respiratory Devices and Support         Dental  no notable dental history     (+) Modest Abnormalities - crowns, retainers, 1 or 2 missing teeth      Cardiovascular   cardiovascular exam normal          Pulmonary   pulmonary exam normal                OUTSIDE LABS:  CBC:   Lab Results   Component Value Date    WBC 5.0 2023    WBC 7.6 2023    HGB 13.5 2023    HGB 13.0 2023    HCT 41.7 2023    HCT 38.6 2023     (L) 2023     2023     BMP:   Lab Results   Component Value Date      12/12/2023     11/27/2023    POTASSIUM 4.0 12/12/2023    POTASSIUM 4.2 11/27/2023    CHLORIDE 103 12/12/2023    CHLORIDE 103 11/25/2023    CO2 28 12/12/2023    CO2 23 11/25/2023    BUN 7.2 (L) 12/12/2023    BUN 8.3 11/25/2023    CR 0.60 12/12/2023    CR 0.52 11/25/2023    GLC 92 12/12/2023    GLC 97 11/25/2023     COAGS:   Lab Results   Component Value Date    PTT 29 01/17/2013    INR 1.41 (H) 12/12/2023     POC:   Lab Results   Component Value Date     (H) 09/01/2015     HEPATIC:   Lab Results   Component Value Date    ALBUMIN 3.7 12/12/2023    PROTTOTAL 7.0 12/12/2023    ALT 36 12/12/2023    AST 53 (H) 12/12/2023    ALKPHOS 143 12/12/2023    BILITOTAL 0.7 12/12/2023     OTHER:   Lab Results   Component Value Date    TUAN 10.3 (H) 12/12/2023    MAG 1.9 11/25/2023    LIPASE 69 10/11/2012    AMYLASE 48 03/02/2014    TSH 1.99 11/24/2023    T4 1.00 05/24/2005    CRP <2.9 05/05/2015    SED 14 11/14/2023       Anesthesia Plan    ASA Status:  3    NPO Status:  NPO Appropriate    Anesthesia Type: MAC.     - Reason for MAC: straight local not clinically adequate              Consents    Anesthesia Plan(s) and associated risks, benefits, and realistic alternatives discussed. Questions answered and patient/representative(s) expressed understanding.     - Discussed:     - Discussed with:  Patient            Postoperative Care    Pain management: Multi-modal analgesia.   PONV prophylaxis: Ondansetron (or other 5HT-3), Background Propofol Infusion     Comments:               Aylin Payton MD, MD    I have reviewed the pertinent notes and labs in the chart from the past 30 days and (re)examined the patient.  Any updates or changes from those notes are reflected in this note.     # Hyponatremia: Lowest Na = 128 mmol/L in last 30 days, will monitor as appropriate  # Hypercalcemia: Highest Ca = 10.3 mg/dL in last 2 days, will monitor as appropriate     # Hypoalbuminemia: Lowest albumin = 2.9 g/dL in the  "past 30 days , will monitor as appropriate   # Drug Induced Coagulation Defect: home medication list includes an anticoagulant medication  # Thrombocytopenia: Lowest platelets = 103 in last 30 days, will monitor for bleeding  # Overweight: Estimated body mass index is 28.42 kg/m  as calculated from the following:    Height as of this encounter: 1.676 m (5' 6\").    Weight as of this encounter: 79.9 kg (176 lb 1.6 oz).      "

## 2023-12-12 NOTE — ANESTHESIA CARE TRANSFER NOTE
Patient: Geneva Shepherd    Procedure: Procedure(s):  Endoscopic Ultrasound       Diagnosis: Elevated liver enzymes [R74.8]  Disease of biliary tract [K83.9]  Diagnosis Additional Information: No value filed.    Anesthesia Type:   General     Note:    Oropharynx: oropharynx clear of all foreign objects and spontaneously breathing  Level of Consciousness: drowsy  Oxygen Supplementation: face mask  Level of Supplemental Oxygen (L/min / FiO2): 6  Independent Airway: airway patency satisfactory and stable  Dentition: dentition unchanged  Vital Signs Stable: post-procedure vital signs reviewed and stable  Report to RN Given: handoff report given  Patient transferred to: Phase II    Handoff Report: Identifed the Patient, Identified the Reponsible Provider, Reviewed the pertinent medical history, Discussed the surgical course, Reviewed Intra-OP anesthesia mangement and issues during anesthesia, Set expectations for post-procedure period and Allowed opportunity for questions and acknowledgement of understanding      Vitals:  Vitals Value Taken Time   /73    Temp     Pulse 77 12/12/23 1351   Resp 17 12/12/23 1351   SpO2 100 % 12/12/23 1351   Vitals shown include unfiled device data.    Electronically Signed By: MONY Mao CRNA  December 12, 2023  1:51 PM

## 2023-12-12 NOTE — ANESTHESIA POSTPROCEDURE EVALUATION
Patient: Geneva Shepherd    Procedure: Procedure(s):  Endoscopic Ultrasound       Anesthesia Type:  General    Note:     Postop Pain Control: Uneventful            Sign Out: Well controlled pain   PONV: No   Neuro/Psych: Uneventful            Sign Out: Acceptable/Baseline neuro status   Airway/Respiratory: Uneventful            Sign Out: Acceptable/Baseline resp. status   CV/Hemodynamics: Uneventful            Sign Out: Acceptable CV status; No obvious hypovolemia; No obvious fluid overload   Other NRE:    DID A NON-ROUTINE EVENT OCCUR? No           Last vitals:  Vitals Value Taken Time   /67 12/12/23 1415   Temp     Pulse 77 12/12/23 1419   Resp 15 12/12/23 1419   SpO2 100 % 12/12/23 1419   Vitals shown include unfiled device data.    Electronically Signed By: Aylin Payton MD, MD  December 12, 2023  2:19 PM

## 2023-12-12 NOTE — ANESTHESIA PROCEDURE NOTES
Airway       Patient location during procedure: OR       Procedure Start/Stop Times: 12/12/2023 1:19 PM  Staff -        CRNA: Michelle Gonsalez APRN CRNA       Performed By: CRNA  Consent for Airway        Urgency: elective  Indications and Patient Condition       Indications for airway management: mikal-procedural       Induction type:intravenous       Mask difficulty assessment: 0 - not attempted    Final Airway Details       Final airway type: endotracheal airway       Successful airway: ETT - single  Endotracheal Airway Details        ETT size (mm): 7.0       Cuffed: yes       Successful intubation technique: video laryngoscopy       VL Blade Size: Miguel 3       Grade View of Cords: 1       Adjucts: stylet       Position: Right       Measured from: gums/teeth       Secured at (cm): 22       Bite block used: None    Post intubation assessment        Placement verified by: capnometry, equal breath sounds and chest rise        Number of attempts at approach: 1       Number of other approaches attempted: 0       Secured with: tape       Ease of procedure: easy       Dentition: Unchanged    Medication(s) Administered   Medication Administration Time: 12/12/2023 1:19 PM

## 2023-12-12 NOTE — DISCHARGE INSTRUCTIONS
** See Endoscopy Report for additional information and instructions. **        Same Day Surgery Discharge Instructions for  Sedation and General Anesthesia     It's not unusual to feel dizzy, light-headed or faint for up to 24 hours after surgery or while taking pain medication.  If you have these symptoms: sit for a few minutes before standing and have someone assist you when you get up to walk or use the bathroom.    You should rest and relax for the next 24 hours. We recommend you make arrangements to have an adult stay with you for at least 24 hours after your discharge.  Avoid hazardous and strenuous activity.    DO NOT DRIVE any vehicle or operate mechanical equipment for 24 hours following the end of your surgery.  Even though you may feel normal, your reactions may be affected by the medication you have received.    Do not drink alcoholic beverages for 24 hours following surgery.     Slowly progress to your regular diet as you feel able. It's not unusual to feel nauseated and/or vomit after receiving anesthesia.  If you develop these symptoms, drink clear liquids (apple juice, ginger ale, broth, 7-up, etc. ) until you feel better.  If your nausea and vomiting persists for 24 hours, please notify your surgeon.      All narcotic pain medications, along with inactivity and anesthesia, can cause constipation. Drinking plenty of liquids and increasing fiber intake will help.    For any questions of a medical nature, call your surgeon.    Do not make important decisions for 24 hours.    If you had general anesthesia, you may have a sore throat for a couple of days related to the breathing tube used during surgery.  You may use Cepacol lozenges to help with this discomfort.  If it worsens or if you develop a fever, contact your surgeon.     If you feel your pain is not well managed with the pain medications prescribed by your surgeon, please contact your surgeon's office to let them know so they can address your  concerns.           ** If you have questions or concerns about your procedure,   call Dr. Christian at [phone] **

## 2023-12-12 NOTE — TELEPHONE ENCOUNTER
Called CHRISTEL Banks at [facility]. Left detailed vm to return call to [facility] RN.    Nemo CRUZ RN  Anticoagulation Team

## 2023-12-12 NOTE — TELEPHONE ENCOUNTER
ANTICOAGULATION  MANAGEMENT: Discharge Review    Geneva Shepherd chart reviewed for anticoagulation continuity of care    Outpatient surgery/procedure on 12/12/23 for Endoscopic retrograde cholangiopancreatography, Endoscopic Ultrasound (Esophagus), previously discharged from TCU 12/8/23 with homecare. [facility] RN has been trying to return call from [facility] RN, Dominga, ph:[phone] for the past 2 days but no return call so far.     Discharge disposition: Home with Home Care    Results:    Recent labs: (last 7 days)     12/12/23  1141   INR 1.41*     Anticoagulation inpatient management:     not applicable     Anticoagulation discharge instructions:     Warfarin dosing: home regimen continued, patient to take 10 booster dose of warfarin tonight   Bridging: bridging with enoxaparin (Lovenox)   INR goal change: No      Medication changes affecting anticoagulation: No    Additional factors affecting anticoagulation: No     PLAN     No adjustment to anticoagulation plan needed    Left a detailed message for Geneva, advised she take the 10 mg booster dose of warfarin tonight and restart Lovenox injections in the morning. [facility] RN will follow up again in the morning.    No adjustment to Anticoagulation Calendar was required    Nemo Reyes, RN

## 2023-12-13 NOTE — TELEPHONE ENCOUNTER
[facility] RN returned phone call and saw the patient today. They are scheduled to see each other again on 12/19/23 at which time she will check Geneva's INR and all to [facility] Homecare RNs.    Nemo CRUZ RN  Anticoagulation Team

## 2023-12-13 NOTE — TELEPHONE ENCOUNTER
Called Dominga again today but once more, call just went to , so called [facility] Main office at [phone] and left a message with staff there to have Dominga call [facility] RN.

## 2023-12-13 NOTE — TELEPHONE ENCOUNTER
Called Geneva the next day, she is taking warfarin as advised and will start bridging with Lovenox this morning. Otherwise, doing well. Just waiting now to hear back from homecare RN to confirm date of next visit and INR check (planned around 12/18 or 12/19).    Nemo CRUZ RN  Anticoagulation Team

## 2023-12-18 DIAGNOSIS — Z53.9 DIAGNOSIS NOT YET DEFINED: Primary | ICD-10-CM

## 2023-12-18 PROCEDURE — G0180 MD CERTIFICATION HHA PATIENT: HCPCS | Performed by: INTERNAL MEDICINE

## 2023-12-19 ENCOUNTER — ANTICOAGULATION THERAPY VISIT (OUTPATIENT)
Dept: ANTICOAGULATION | Facility: CLINIC | Age: 83
End: 2023-12-19
Payer: MEDICARE

## 2023-12-19 DIAGNOSIS — Z79.01 LONG TERM CURRENT USE OF ANTICOAGULANT THERAPY: ICD-10-CM

## 2023-12-19 DIAGNOSIS — D68.51 HETEROZYGOUS FACTOR V LEIDEN MUTATION (H): ICD-10-CM

## 2023-12-19 DIAGNOSIS — Z86.718 PERSONAL HISTORY OF DVT (DEEP VEIN THROMBOSIS): Primary | ICD-10-CM

## 2023-12-19 LAB — INR (EXTERNAL): 2.9 (ref 0.9–1.1)

## 2023-12-19 NOTE — PROGRESS NOTES
ANTICOAGULATION MANAGEMENT     Geneva Shepherd 83 year old female is on warfarin with therapeutic INR result. (Goal INR 2.0-3.0)    Recent labs: (last 7 days)     12/19/23  1105   INR 2.9*       ASSESSMENT     Source(s): Chart Review, Patient/Caregiver Call, and Home Care/Facility Nurse     Warfarin doses taken: Warfarin taken as instructed  Diet: No new diet changes identified  Medication/supplement changes: None noted  New illness, injury, or hospitalization: Yes: 12/12/2023 ERCP was done.  Signs or symptoms of bleeding or clotting: No  Previous result: Subtherapeutic  Additional findings: None and patient has already stopped the lovenox.       PLAN     Recommended plan for no diet, medication or health factor changes affecting INR     Dosing Instructions: Continue your current warfarin dose with next INR in 1 week       Summary  As of 12/19/2023      Full warfarin instructions:  7.5 mg every Sun, Tue, Thu; 5 mg all other days   Next INR check:  12/26/2023               Telephone call with Tricia Orr [facility] home care nurse who agrees to plan and repeated back plan correctly    Orders given to  Homecare nurse/facility to recheck    Education provided:   None required    Plan made per [facility] anticoagulation protocol    Sarah Redd, RN  Anticoagulation Clinic  12/19/2023    _______________________________________________________________________     Anticoagulation Episode Summary       Current INR goal:  2.0-3.0   TTR:  85.4% (11.6 mo)   Target end date:  Indefinite   Send INR reminders to:  ANTICOAG HOME MONITORING    Indications    Personal history of RLE DVT (deep vein thrombosis)(2003) [Z86.718]  Long term current use of anticoagulant therapy [Z79.01]  Activated protein C resistance (H24) (Resolved) [D68.51]  Heterozygous factor V Leiden mutation (H24) [D68.51]             Comments:               Anticoagulation Care Providers       Provider Role Specialty Phone number    Jacoby Boo MD Referring  Internal Medicine [phone]

## 2023-12-21 ENCOUNTER — TELEPHONE (OUTPATIENT)
Dept: INTERNAL MEDICINE | Facility: CLINIC | Age: 83
End: 2023-12-21

## 2023-12-21 ENCOUNTER — MEDICAL CORRESPONDENCE (OUTPATIENT)
Dept: HEALTH INFORMATION MANAGEMENT | Facility: CLINIC | Age: 83
End: 2023-12-21

## 2023-12-21 ENCOUNTER — OFFICE VISIT (OUTPATIENT)
Dept: INTERNAL MEDICINE | Facility: CLINIC | Age: 83
End: 2023-12-21
Payer: MEDICARE

## 2023-12-21 VITALS
TEMPERATURE: 99 F | OXYGEN SATURATION: 99 % | HEIGHT: 66 IN | DIASTOLIC BLOOD PRESSURE: 75 MMHG | SYSTOLIC BLOOD PRESSURE: 114 MMHG | WEIGHT: 173.8 LBS | BODY MASS INDEX: 27.93 KG/M2 | HEART RATE: 82 BPM

## 2023-12-21 DIAGNOSIS — E83.52 HYPERCALCEMIA: ICD-10-CM

## 2023-12-21 DIAGNOSIS — E03.9 HYPOTHYROIDISM, UNSPECIFIED TYPE: ICD-10-CM

## 2023-12-21 DIAGNOSIS — K75.9 HEPATITIS: Primary | ICD-10-CM

## 2023-12-21 DIAGNOSIS — D69.6 THROMBOCYTOPENIA (H): ICD-10-CM

## 2023-12-21 DIAGNOSIS — R53.83 OTHER FATIGUE: ICD-10-CM

## 2023-12-21 DIAGNOSIS — E83.19 IRON EXCESS: ICD-10-CM

## 2023-12-21 LAB
ERYTHROCYTE [DISTWIDTH] IN BLOOD BY AUTOMATED COUNT: 13.7 % (ref 10–15)
HCT VFR BLD AUTO: 44.1 % (ref 35–47)
HGB BLD-MCNC: 14.2 G/DL (ref 11.7–15.7)
MCH RBC QN AUTO: 33.1 PG (ref 26.5–33)
MCHC RBC AUTO-ENTMCNC: 32.2 G/DL (ref 31.5–36.5)
MCV RBC AUTO: 103 FL (ref 78–100)
PLATELET # BLD AUTO: 154 10E3/UL (ref 150–450)
RBC # BLD AUTO: 4.29 10E6/UL (ref 3.8–5.2)
WBC # BLD AUTO: 9.2 10E3/UL (ref 4–11)

## 2023-12-21 PROCEDURE — 82533 TOTAL CORTISOL: CPT | Performed by: INTERNAL MEDICINE

## 2023-12-21 PROCEDURE — 84443 ASSAY THYROID STIM HORMONE: CPT | Performed by: INTERNAL MEDICINE

## 2023-12-21 PROCEDURE — 85027 COMPLETE CBC AUTOMATED: CPT | Performed by: INTERNAL MEDICINE

## 2023-12-21 PROCEDURE — 83970 ASSAY OF PARATHORMONE: CPT | Performed by: INTERNAL MEDICINE

## 2023-12-21 PROCEDURE — 82728 ASSAY OF FERRITIN: CPT | Performed by: INTERNAL MEDICINE

## 2023-12-21 PROCEDURE — 83550 IRON BINDING TEST: CPT | Performed by: INTERNAL MEDICINE

## 2023-12-21 PROCEDURE — 36415 COLL VENOUS BLD VENIPUNCTURE: CPT | Performed by: INTERNAL MEDICINE

## 2023-12-21 PROCEDURE — 99214 OFFICE O/P EST MOD 30 MIN: CPT | Performed by: INTERNAL MEDICINE

## 2023-12-21 PROCEDURE — 80053 COMPREHEN METABOLIC PANEL: CPT | Performed by: INTERNAL MEDICINE

## 2023-12-21 PROCEDURE — 83540 ASSAY OF IRON: CPT | Performed by: INTERNAL MEDICINE

## 2023-12-21 NOTE — PROGRESS NOTES
ASSESSMENT:    1. Hepatitis  Nearly resolved.  Presumed secondary to nitrofurantoin.  Recent ERCP without evidence of obstruction or mass.  Follow-up as ordered   - Comprehensive metabolic panel; Future  - Comprehensive metabolic panel       2. Other fatigue  Persistent.  Patient sleeping okay.   Labs as ordered.  Also will refer for sleep study   - Cortisol; Future  - Adult Sleep Eval & Management  Referral; Future  - CBC with platelets  - TSH with free T4 reflex  - Cortisol    3. Hypercalcemia  Previous calcium 10.3.  Needs lab recheck  - Parathyroid Hormone Intact; Future  - Comprehensive metabolic panel; Future  - Comprehensive metabolic panel  - Parathyroid Hormone Intact    4. Thrombocytopenia (H24)  Previous platelet count 148.  Needs lab recheck.  No symptoms  - CBC with platelets    5. Hypothyroidism, unspecified type  On levothyroxine.  With fatigue symptoms.  Recheck thyroid function.  Previously normal with currently thyroxine dosage  - TSH with free T4 reflex    6. Iron excess  Previous serum iron level 146 with normal iron saturation.  Recheck labs as ordered  - CBC with platelets  - Ferritin  - Iron & Iron Binding Capacity        PLAN:   Labs  as ordered   Continue OT/PT for strengthening  Continue current medications   Appt with [facility] re: possible obstructive sleep apnea as cause for fatigue. [facility] will call you to coordinate your  appointment. If you don't hear from a representative within 2 business days, please call [phone].    INR recheck 12/26/23    MED REC REQUIRED  Post Medication Reconciliation Status:  Discharge medications reconciled, continue medications without change       (Chart documentation was completed, in part, with [employer] voice-recognition software. Even though reviewed, some grammatical, spelling, and word errors may remain.)    Jacoby Boo MD  Internal Medicine Department  [facility]  JULIA Bean is a 83 year old, presenting for the following health issues:  Hospital F/U      [facility]         Hospital Follow-up Visit:    Hospital/Nursing Home/IP Rehab Facility:  [facility]  Date of Admission: 11/27/2023  Date of Discharge: 12/8/2023  Reason(s) for Admission: generalized weakness and kidney inffection    Was your hospitalization related to COVID-19? No   Problems taking medications regularly:  None  Medication changes since discharge: None  Problems adhering to non-medication therapy:  None    Summary of hospitalization:  [facility] discharge summary reviewed  Diagnostic Tests/Treatments reviewed.  Follow up needed: labs  Other Healthcare Providers Involved in Patient s Care:          GI, PT, OT  Update since discharge: improved.         Plan of care communicated with patient       Most recent lab results reviewed with pt.      Discharge summary reviewed. Part of the summary as below:    [facility] DISCHARGE SUMMARY  PATIENT'S NAME: Geneva Shepherd  YOB: 1940  MEDICAL RECORD NUMBER:  [id number]  Place of Service where encounter took place:  [facility]) [25]     PRIMARY CARE PROVIDER AND CLINIC RESPONSIBLE AFTER TRANSFER:   Jacoby Boo MD, [address] [place] [address] / [facility] [id number]    [facility] Provider      Transferring providers: Tonya Lynn Haase, APRN CNP, Polo Henriquez MD  Recent Hospitalization/ED:  [facility] Hospital stay 11/23/23 to 11/27/23.  Date of SNF Admission: November 27, 2023  Date of SNF (anticipated) Discharge: December 08, 2023  Discharged to: previous independent home  Cognitive Scores: CPT: 4.7/5.6  Physical Function: Ambulating 200 ft with RW  DME: Walker     CODE STATUS/ADVANCE DIRECTIVES DISCUSSION:  Prior   ALLERGIES: Patient has no known allergies.     NURSING FACILITY COURSE   Medication Changes/Rationale:   See assessment and plan     Summary of nursing facility stay:    Patient progressed to walking up to 200 feet using a RW, indep with ADL's will Discharge to home with [facility] RN, PT and HHA     Assessment/Plan:  (N12) Pyelonephritis  (primary encounter diagnosis)  (N39.0) Frequent UTI  (R53.81) Physical deconditioning  Comment: acute/ongoing  Plan: f/u with urology associates, finished  cefpodoxime (completed 10 day coarse)   Discharge to home with home PT, RN and HHA through [facility]     (D68.51) Heterozygous factor V Leiden mutation (H24)  (Z86.718) Personal history of RLE DVT (deep vein thrombosis)(2003)  (Z79.01) Long term current use of anticoagulant therapy  Comment: ongoing  Plan: continues on coumadin INR goal of 2-3     (J96.01) Acute respiratory failure with hypoxia (H)  Comment: improved, stable  Found interstitial infiltrates  Plan: monitor Sao2 at rest and with activity, Abx as above, IS QID and prn  Repeat CXR shows no active cardiopulmonary disease 12/4/23     (R74.01) Transaminitis  Comment: acute/ongoing  Plan: follows with [facility] has EUS biopsy scheduled for 12/12/23  Labs 12/4/23 stable AST sl elevated at 44 otherwise wnl        DISCHARGE PLAN:  Follow up labs: No labs orders/due  Medical Follow Up:                 Follow up with primary care provider in 1-2 weeks  [facility] scheduled appointments:  Next 5 appointments (look out 90 days)       Dec 07, 2023  3:00 PM  (Arrive by 2:40 PM)  Pre-Operative Physical with Jacoby Boo MD  [facility] [address] [id number] [phone]             Discharge Services: Home Care:  Physical Therapy, Registered Nurse, and Home Health Aide  Discharge Instructions Verbalized to Patient at Discharge:   None      Had ERCP 12/12/23 with findings  as below    Impression:               - Marked CBD dilation without evidence of                             obstruction. No significant change since 2021.         "                     Liver tests now normal with the exception of AST 53.   Recommendation:           - Discharge patient to home (ambulatory).                             - Resume regular diet today.                             - Observe patient's clinical course.                             - I anticipate no further need for intervention.                             - Follow up with Micaela Gibbons as scheduled     Most recent labs reviewed with pt     Doing PT/OT for balance and strengthening. Improving. No falls since home.  Using a cane with ambulation   Denies shortness of breath or chest pain . No cough  No abd pain. Normal BMs with  occ prune. Appetite OK  Has some fatigue. Sleeping well. Hgb, TSH and GFR normal   Slight low plts and elevated AST, calcium  Slight edema controlled with  compression stockings. No orthopnea, PND  Mood good with Paxil at night. On Elavil. Waking up AM with  energy but more tired as the  day goes on. Occ nap   Declines flu and covid vaccines       Additional ROS:   Constitutional, HEENT, Cardiovascular, Pulmonary, GI and , Neuro, MSK and Psych review of systems/symptoms are otherwise negative or unchanged from previous, except as noted above.      OBJECTIVE:  /75   Pulse 82   Temp 99  F (37.2  C) (Temporal)   Ht 1.676 m (5' 6\")   Wt 78.8 kg (173 lb 12.8 oz)   LMP  (LMP Unknown)   SpO2 99%   BMI 28.05 kg/m     Estimated body mass index is 28.05 kg/m  as calculated from the following:    Height as of this encounter: 1.676 m (5' 6\").    Weight as of this encounter: 78.8 kg (173 lb 12.8 oz).     HENT: ear canals and TM's normal and nose and mouth without ulcers or lesions   Neck: no adenopathy. Thyroid normal to palpation. No bruits  Pulm: Lungs clear to auscultation   CV: Regular rates and rhythm  GI: Soft, nontender, Normal active bowel sounds, No hepatosplenomegaly or masses palpable  Ext: Peripheral pulses intact.  Mild BLE ankle  edema.                     "

## 2023-12-21 NOTE — TELEPHONE ENCOUNTER
Please give ok for homecare orders if appropriate.       Home Care is calling regarding an established patient with [facility].       Requesting orders from: Jacoby Boo  Provider is following patient: No       Orders Requested    Physical Therapy  Request for initial certification (first set of orders)   Frequency:  2w3, 1w5       Information was gathered and will be sent to provider for review.  RN will contact Home Care with information after provider review.  Confirmed ok to leave a detailed message with call back.  Contact information confirmed and updated as needed.    Trinh Patel RN

## 2023-12-21 NOTE — PATIENT INSTRUCTIONS
Labs  as ordered   Continue OT/PT for strengthening  Continue current medications   Appt with [facility] re: possible obstructive sleep apnea as cause for fatigue. [facility] will call you to coordinate your  appointment. If you don't hear from a representative within 2 business days, please call [phone].    INR recheck 12/26/23

## 2023-12-22 ENCOUNTER — MEDICAL CORRESPONDENCE (OUTPATIENT)
Dept: HEALTH INFORMATION MANAGEMENT | Facility: CLINIC | Age: 83
End: 2023-12-22
Payer: MEDICARE

## 2023-12-22 LAB
ALBUMIN SERPL BCG-MCNC: 4 G/DL (ref 3.5–5.2)
ALP SERPL-CCNC: 141 U/L (ref 40–150)
ALT SERPL W P-5'-P-CCNC: 26 U/L (ref 0–50)
ANION GAP SERPL CALCULATED.3IONS-SCNC: 8 MMOL/L (ref 7–15)
AST SERPL W P-5'-P-CCNC: 44 U/L (ref 0–45)
BILIRUB SERPL-MCNC: 0.6 MG/DL
BUN SERPL-MCNC: 7.7 MG/DL (ref 8–23)
CALCIUM SERPL-MCNC: 10.5 MG/DL (ref 8.8–10.2)
CHLORIDE SERPL-SCNC: 99 MMOL/L (ref 98–107)
CORTIS SERPL-MCNC: 7.3 UG/DL
CREAT SERPL-MCNC: 0.58 MG/DL (ref 0.51–0.95)
DEPRECATED HCO3 PLAS-SCNC: 27 MMOL/L (ref 22–29)
EGFRCR SERPLBLD CKD-EPI 2021: 90 ML/MIN/1.73M2
FERRITIN SERPL-MCNC: 116 NG/ML (ref 11–328)
GLUCOSE SERPL-MCNC: 94 MG/DL (ref 70–99)
IRON BINDING CAPACITY (ROCHE): 354 UG/DL (ref 240–430)
IRON SATN MFR SERPL: 43 % (ref 15–46)
IRON SERPL-MCNC: 151 UG/DL (ref 37–145)
POTASSIUM SERPL-SCNC: 4.3 MMOL/L (ref 3.4–5.3)
PROT SERPL-MCNC: 7.2 G/DL (ref 6.4–8.3)
PTH-INTACT SERPL-MCNC: 38 PG/ML (ref 15–65)
SODIUM SERPL-SCNC: 134 MMOL/L (ref 135–145)
TSH SERPL DL<=0.005 MIU/L-ACNC: 1.69 UIU/ML (ref 0.3–4.2)

## 2023-12-26 ENCOUNTER — ANTICOAGULATION THERAPY VISIT (OUTPATIENT)
Dept: ANTICOAGULATION | Facility: CLINIC | Age: 83
End: 2023-12-26
Payer: MEDICARE

## 2023-12-26 DIAGNOSIS — Z86.718 PERSONAL HISTORY OF DVT (DEEP VEIN THROMBOSIS): Primary | ICD-10-CM

## 2023-12-26 DIAGNOSIS — Z79.01 LONG TERM CURRENT USE OF ANTICOAGULANT THERAPY: ICD-10-CM

## 2023-12-26 DIAGNOSIS — D68.51 HETEROZYGOUS FACTOR V LEIDEN MUTATION (H): ICD-10-CM

## 2023-12-26 LAB — INR (EXTERNAL): 2.3 (ref 0.9–1.1)

## 2023-12-26 NOTE — PROGRESS NOTES
ANTICOAGULATION MANAGEMENT     Geneva Shepherd 83 year old female is on warfarin with therapeutic INR result. (Goal INR 2.0-3.0)    Recent labs: (last 7 days)     12/26/23  1218   INR 2.3*       ASSESSMENT     Source(s): Chart Review and Patient/Caregiver Call     Warfarin doses taken: Warfarin taken as instructed  Diet: No new diet changes identified  Medication/supplement changes: None noted  New illness, injury, or hospitalization: No  Signs or symptoms of bleeding or clotting: No  Previous result: Therapeutic last visit; previously outside of goal range  Additional findings: None       PLAN     Recommended plan for no diet, medication or health factor changes affecting INR     Dosing Instructions: Continue your current warfarin dose with next INR in 2 weeks       Summary  As of 12/26/2023      Full warfarin instructions:  7.5 mg every Sun, Tue, Thu; 5 mg all other days   Next INR check:  1/9/2024               Telephone call with [facility] home care nurse who verbalizes understanding and agrees to plan    Orders given to  Homecare nurse/facility to recheck    Education provided:   Contact [phone]  with any changes, questions or concerns.     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

## 2023-12-27 ENCOUNTER — MEDICAL CORRESPONDENCE (OUTPATIENT)
Dept: HEALTH INFORMATION MANAGEMENT | Facility: CLINIC | Age: 83
End: 2023-12-27
Payer: MEDICARE

## 2024-01-09 ENCOUNTER — ANTICOAGULATION THERAPY VISIT (OUTPATIENT)
Dept: ANTICOAGULATION | Facility: CLINIC | Age: 84
End: 2024-01-09
Payer: MEDICARE

## 2024-01-09 DIAGNOSIS — Z86.718 PERSONAL HISTORY OF DVT (DEEP VEIN THROMBOSIS): Primary | ICD-10-CM

## 2024-01-09 DIAGNOSIS — Z79.01 LONG TERM CURRENT USE OF ANTICOAGULANT THERAPY: ICD-10-CM

## 2024-01-09 DIAGNOSIS — D68.51 HETEROZYGOUS FACTOR V LEIDEN MUTATION (H): ICD-10-CM

## 2024-01-09 LAB — INR (EXTERNAL): 1.9 (ref 0.9–1.1)

## 2024-01-09 NOTE — PROGRESS NOTES
ANTICOAGULATION MANAGEMENT     Geneva Shepherd 83 year old female is on warfarin with subtherapeutic INR result. (Goal INR 2.0-3.0)    Recent labs: (last 7 days)     01/09/24  1133   INR 1.9*       ASSESSMENT     Source(s): Chart Review     Warfarin doses taken: Warfarin taken as instructed  Diet: No new diet changes identified  Medication/supplement changes: None noted  New illness, injury, or hospitalization: No  Signs or symptoms of bleeding or clotting: No  Previous result: Therapeutic last 2(+) visits  Additional findings: None       PLAN     Recommended plan for no diet, medication or health factor changes affecting INR     Dosing Instructions: Continue your current warfarin dose with next INR in 2 weeks . Patient seen by PT often, if patient becomes ill or has changes over the next 2 weeks she will update PT who can advise HCRN to check INR sooner. Patent is in agreement with plan.    Summary  As of 1/9/2024      Full warfarin instructions:  7.5 mg every Sun, Tue, Thu; 5 mg all other days   Next INR check:  1/23/2024               Telephone call with [facility] home care nurse who agrees to plan and repeated back plan correctly. Patient on speaker phone during assessment.    Orders given to  Homecare nurse/facility to recheck    Education provided:   Please call back if any changes to your diet, medications or how you've been taking warfarin  Goal range and lab monitoring: goal range and significance of current result    Plan made per [facility] anticoagulation protocol    Mere Ynig RN  Anticoagulation Clinic  1/9/2024    _______________________________________________________________________     Anticoagulation Episode Summary       Current INR goal:  2.0-3.0   TTR:  84.4% (11.6 mo)   Target end date:  Indefinite   Send INR reminders to:  [facility]    Indications    Personal history of RLE DVT (deep vein thrombosis)(2003) [Z86.718]  Long term current use of anticoagulant therapy  [Z79.01]  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

## 2024-01-10 ENCOUNTER — MEDICAL CORRESPONDENCE (OUTPATIENT)
Dept: HEALTH INFORMATION MANAGEMENT | Facility: CLINIC | Age: 84
End: 2024-01-10
Payer: MEDICARE

## 2024-01-11 ENCOUNTER — MEDICAL CORRESPONDENCE (OUTPATIENT)
Dept: HEALTH INFORMATION MANAGEMENT | Facility: CLINIC | Age: 84
End: 2024-01-11
Payer: MEDICARE

## 2024-01-19 ENCOUNTER — TELEPHONE (OUTPATIENT)
Dept: INTERNAL MEDICINE | Facility: CLINIC | Age: 84
End: 2024-01-19
Payer: MEDICARE

## 2024-01-19 DIAGNOSIS — Z86.718 PERSONAL HISTORY OF DVT (DEEP VEIN THROMBOSIS): ICD-10-CM

## 2024-01-19 DIAGNOSIS — D68.51 HETEROZYGOUS FACTOR V LEIDEN MUTATION (H): ICD-10-CM

## 2024-01-19 DIAGNOSIS — Z79.01 LONG TERM CURRENT USE OF ANTICOAGULANT THERAPY: Primary | ICD-10-CM

## 2024-01-19 RX ORDER — WARFARIN SODIUM 5 MG/1
TABLET ORAL
Qty: 100 TABLET | Refills: 1 | Status: SHIPPED | OUTPATIENT
Start: 2024-01-19 | End: 2024-05-21

## 2024-01-19 NOTE — TELEPHONE ENCOUNTER
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 Rx instructions and quantity supplied updated to match patient's current dosing plan. Warfarin 90 day supply with 1 refill granted per [facility] protocol     Mame Way, RN  Anticoagulation Clinic

## 2024-01-19 NOTE — TELEPHONE ENCOUNTER
Medication Question or Refill    Contacts         Type Contact Phone/Fax    01/19/2024 02:17 PM CST Phone (Incoming) Geneva Shepherd (Self) [phone] (H)            What medication are you calling about (include dose and sig)?:   Warfrain 5 mg    Preferred Pharmacy:  [facility] [id number] [place] [address]  [address] [facility] [address]  Phone: [phone] Fax: [phone]        Controlled Substance Agreement on file:   CSA -- Patient Level:    CSA: None found at the patient level.       Who prescribed the medication?: Dr. Boo    Do you need a refill? Yes    When did you use the medication last? 1/18/2024    Patient offered an appointment? No    Do you have any questions or concerns?  Yes: only have 2 days      Could we send this information to you in [facility] or would you prefer to receive a phone call?:   Patient would prefer a phone call   Okay to leave a detailed message?: Yes at [phone]

## 2024-01-23 ENCOUNTER — ANTICOAGULATION THERAPY VISIT (OUTPATIENT)
Dept: ANTICOAGULATION | Facility: CLINIC | Age: 84
End: 2024-01-23
Payer: MEDICARE

## 2024-01-23 DIAGNOSIS — Z79.01 LONG TERM CURRENT USE OF ANTICOAGULANT THERAPY: ICD-10-CM

## 2024-01-23 DIAGNOSIS — D68.51 HETEROZYGOUS FACTOR V LEIDEN MUTATION (H): ICD-10-CM

## 2024-01-23 DIAGNOSIS — Z86.718 PERSONAL HISTORY OF DVT (DEEP VEIN THROMBOSIS): Primary | ICD-10-CM

## 2024-01-23 LAB — INR (EXTERNAL): 2.1 (ref 0.9–1.1)

## 2024-01-23 NOTE — PROGRESS NOTES
ANTICOAGULATION MANAGEMENT     Geneva Shepherd 83 year old female is on warfarin with therapeutic INR result. (Goal INR 2.0-3.0)    Recent labs: (last 7 days)     01/23/24  1413   INR 2.1*       ASSESSMENT     Source(s): Chart Review and Home Care/Facility Nurse     Warfarin doses taken: Warfarin taken as instructed  Diet: No new diet changes identified  Medication/supplement changes: None noted  New illness, injury, or hospitalization: No  Signs or symptoms of bleeding or clotting: No  Previous result: Subtherapeutic  Additional findings: None  Patient will discharge at next home care visit.       PLAN     Recommended plan for no diet, medication or health factor changes affecting INR     Dosing Instructions: Continue your current warfarin dose with next INR in 2 weeks       Summary  As of 1/23/2024      Full warfarin instructions:  7.5 mg every Sun, Tue, Thu; 5 mg all other days   Next INR check:  2/6/2024               Telephone call with [facility] home care nurse who agrees to plan and repeated back plan correctly    Orders given to  Homecare nurse/facility to recheck    Education provided:   Please call back if any changes to your diet, medications or how you've been taking warfarin    Plan made per [facility] anticoagulation protocol    Alla Escobar, RN  Anticoagulation Clinic  1/23/2024    _______________________________________________________________________     Anticoagulation Episode Summary       Current INR goal:  2.0-3.0   TTR:  82.4% (11.6 mo)   Target end date:  Indefinite   Send INR reminders to:  [facility]    Indications    Personal history of RLE DVT (deep vein thrombosis)(2003) [Z86.718]  Long term current use of anticoagulant therapy [Z79.01]  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

## 2024-01-30 ENCOUNTER — MEDICAL CORRESPONDENCE (OUTPATIENT)
Dept: HEALTH INFORMATION MANAGEMENT | Facility: CLINIC | Age: 84
End: 2024-01-30
Payer: MEDICARE

## 2024-02-06 ENCOUNTER — ANTICOAGULATION THERAPY VISIT (OUTPATIENT)
Dept: ANTICOAGULATION | Facility: CLINIC | Age: 84
End: 2024-02-06
Payer: MEDICARE

## 2024-02-06 ENCOUNTER — TELEPHONE (OUTPATIENT)
Dept: INTERNAL MEDICINE | Facility: CLINIC | Age: 84
End: 2024-02-06
Payer: MEDICARE

## 2024-02-06 DIAGNOSIS — D68.51 HETEROZYGOUS FACTOR V LEIDEN MUTATION (H): ICD-10-CM

## 2024-02-06 DIAGNOSIS — Z79.01 LONG TERM CURRENT USE OF ANTICOAGULANT THERAPY: ICD-10-CM

## 2024-02-06 DIAGNOSIS — Z86.718 PERSONAL HISTORY OF DVT (DEEP VEIN THROMBOSIS): Primary | ICD-10-CM

## 2024-02-06 LAB — INR (EXTERNAL): 1.7 (ref 0.9–1.1)

## 2024-02-06 NOTE — TELEPHONE ENCOUNTER
Home Care is calling regarding an established patient with [facility].       Requesting orders from: Jacoby Boo  Provider is following patient: Yes  Is this a 60-day recertification request?  No    Orders Requested    Physical Therapy  Request for continuation of care with no increase or decrease in frequency  Frequency:  1x/wk for 5 wks        Verbal orders given.  Home Care will send orders for provider to sign.  Confirmed ok to leave a detailed message with call back.  Contact information confirmed and updated as needed.    Delfina Chicas RN

## 2024-02-06 NOTE — PROGRESS NOTES
ANTICOAGULATION MANAGEMENT     Geneva Shepherd 83 year old female is on warfarin with subtherapeutic INR result. (Goal INR 2.0-3.0)    Recent labs: (last 7 days)     02/06/24  1134   INR 1.7*       ASSESSMENT     Source(s): Chart Review and Home Care/Facility Nurse     Warfarin doses taken: Warfarin taken as instructed  Diet: Increased greens/vitamin K in diet; ongoing change. Increase in amount of blueberries  Medication/supplement changes: None noted  New illness, injury, or hospitalization: No  Signs or symptoms of bleeding or clotting: No  Previous result: Therapeutic last visit; previously outside of goal range  Additional findings: None       PLAN     Recommended plan for ongoing change(s) affecting INR     Dosing Instructions: Increase your warfarin dose (5.9% change) with next INR in 1 week       Summary  As of 2/6/2024      Full warfarin instructions:  5 mg every Mon, Wed, Fri; 7.5 mg all other days   Next INR check:  2/13/2024               Telephone call with [facility] home care nurse who agrees to plan and repeated back plan correctly    Orders given to  Homecare nurse/facility to recheck    Education provided:   Dietary considerations: importance of consistent vitamin K intake    Plan made per [facility] anticoagulation protocol    Sarah Redd, RN  Anticoagulation Clinic  2/6/2024    _______________________________________________________________________     Anticoagulation Episode Summary       Current INR goal:  2.0-3.0   TTR:  79.4% (11.6 mo)   Target end date:  Indefinite   Send INR reminders to:  [facility]    Indications    Personal history of RLE DVT (deep vein thrombosis)(2003) [Z86.718]  Long term current use of anticoagulant therapy [Z79.01]  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

## 2024-02-06 NOTE — TELEPHONE ENCOUNTER
Home Care is calling regarding an established patient with [facility].       Requesting orders from: Jacoby Boo  Provider is following patient: Yes  Is this a 60-day recertification request?  Yes    Orders Requested    Skilled Nursing  Request for recertification   Frequency:  1x/wk for 4 wks    Information was gathered and will be sent to provider for review.  RN will contact Home Care with information after provider review.  Confirmed ok to leave a detailed message with call back.  Contact information confirmed and updated as needed.    Karoline Aragon RN

## 2024-02-13 ENCOUNTER — ANTICOAGULATION THERAPY VISIT (OUTPATIENT)
Dept: ANTICOAGULATION | Facility: CLINIC | Age: 84
End: 2024-02-13
Payer: MEDICARE

## 2024-02-13 DIAGNOSIS — Z86.718 PERSONAL HISTORY OF DVT (DEEP VEIN THROMBOSIS): Primary | ICD-10-CM

## 2024-02-13 DIAGNOSIS — Z79.01 LONG TERM CURRENT USE OF ANTICOAGULANT THERAPY: ICD-10-CM

## 2024-02-13 DIAGNOSIS — D68.51 HETEROZYGOUS FACTOR V LEIDEN MUTATION (H): ICD-10-CM

## 2024-02-13 LAB — INR (EXTERNAL): 2 (ref 0.9–1.1)

## 2024-02-13 NOTE — PROGRESS NOTES
ANTICOAGULATION MANAGEMENT     Geneva Shepherd 83 year old female is on warfarin with therapeutic INR result. (Goal INR 2.0-3.0)    Recent labs: (last 7 days)     02/13/24  1143   INR 2.0*       ASSESSMENT     Source(s): Chart Review and Home Care/Facility Nurse     Warfarin doses taken: Warfarin taken as instructed  Diet: No new diet changes identified  Medication/supplement changes: None noted  New illness, injury, or hospitalization: No  Signs or symptoms of bleeding or clotting: No  Previous result: Subtherapeutic  Additional findings: None       PLAN     Recommended plan for no diet, medication or health factor changes affecting INR     Dosing Instructions: Continue your current warfarin dose with next INR in 1 week       Summary  As of 2/13/2024      Full warfarin instructions:  5 mg every Mon, Wed, Fri; 7.5 mg all other days   Next INR check:  2/20/2024               Telephone call with [facility] home care nurse who agrees to plan and repeated back plan correctly    Orders given to  Homecare nurse/facility to recheck    Education provided:   None required    Plan made per [facility] anticoagulation protocol    Sarah Redd RN  Anticoagulation Clinic  2/13/2024    _______________________________________________________________________     Anticoagulation Episode Summary       Current INR goal:  2.0-3.0   TTR:  77.3% (11.6 mo)   Target end date:  Indefinite   Send INR reminders to:  [facility]    Indications    Personal history of RLE DVT (deep vein thrombosis)(2003) [Z86.718]  Long term current use of anticoagulant therapy [Z79.01]  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

## 2024-02-14 ENCOUNTER — VIRTUAL VISIT (OUTPATIENT)
Dept: INTERNAL MEDICINE | Facility: CLINIC | Age: 84
End: 2024-02-14
Payer: MEDICARE

## 2024-02-14 ENCOUNTER — TELEPHONE (OUTPATIENT)
Dept: INTERNAL MEDICINE | Facility: CLINIC | Age: 84
End: 2024-02-14

## 2024-02-14 DIAGNOSIS — N39.0 RECURRENT UTI: Primary | ICD-10-CM

## 2024-02-14 PROCEDURE — 99442 PR PHYSICIAN TELEPHONE EVALUATION 11-20 MIN: CPT | Mod: 93 | Performed by: INTERNAL MEDICINE

## 2024-02-14 RX ORDER — CEPHALEXIN 500 MG/1
500 CAPSULE ORAL 2 TIMES DAILY
Qty: 14 CAPSULE | Refills: 0 | Status: SHIPPED | OUTPATIENT
Start: 2024-02-14 | End: 2024-02-21

## 2024-02-14 NOTE — PROGRESS NOTES
"Geneva is a 83 year old who is being evaluated via a billable telephone visit.      What phone number would you like to be contacted at? [phone]  How would you like to obtain your AVS? Sai  Distant Location (provider location):  Off-site     Assessment & Plan   Recurrent UTI  Patient with a fairly complicated urologic history. Previously under the care of urology who had her self-cath BID and on Macrobid daily for UTI prevention, and it appears to have worked well. She then stopped seeing urology \"because I'm stubborn and didn't want to do that anymore\" and thus stopped cathing. She also stopped taking Macrobid due to concerns of hepatotoxicity. She was then hospitalized with pyelonephritis 3 months ago and reports UTI symptoms again today. I had a tara discussion with Geneva that it's unlikely her urinary retention issues have resolved, and that she is at high risk to resume getting recurrent UTIs. This is even more of an issue given her urine culture is now growing E coli that is resistant to oral antibiotic options except cephalosporins and Macrobid (which she likely cannot use due to hepatic concerns). After some discussion, she accepted a referral to urology to discuss this again. I will treat her empirically with Keflex. ER precautions reviewed. Patient is largely homebound, and I discussed signs/symptoms that ought to prompt her to call 911.  - Adult Uro/Gyn  Referral; Future  - cephALEXin (KEFLEX) 500 MG capsule; Take 1 capsule (500 mg) by mouth 2 times daily for 7 days    F/u with regular PCP at regular interval or sooner PRN    Jason Oseguera MD, MPH  [facility]  Internal Medicine    Subjective   Geneva is a 83 year old who presents for a same day acute care virtual telephone visit with chief concern of:  UTI  This is the first time I have met Geneva.    HPI   Genitourinary - Female  Onset/Duration: this afternoon   Description:   Painful urination (Dysuria): " YES           Frequency: YES  Blood in urine (Hematuria): No  Delay in urine (Hesitency): No  Intensity: mild  Progression of Symptoms:  same  Accompanying Signs & Symptoms:  Fever/chills: No  Flank pain: No  Nausea and vomiting: No  Vaginal symptoms: none  Abdominal/Pelvic Pain: No  History:   History of frequent UTI s: YES  History of kidney stones: No  Sexually Active: No  Possibility of pregnancy: No  Precipitating or alleviating factors: None  Therapies tried and outcome:           Objective       Vitals: No vitals were obtained today due to virtual visit.    Physical Exam   General: Alert and no distress //Respiratory: No audible wheeze, cough, or shortness of breath // Psychiatric:  Appropriate affect, tone, and pace of words    Phone call duration: 13 minutes

## 2024-02-15 NOTE — TELEPHONE ENCOUNTER
Called and spoke with pharmacy to give them the verbal okay to dispense tablets instead of capsules.   They verbalized understanding and have no further questions at this time.     Thank you,  Delfina Chicas RN

## 2024-02-15 NOTE — TELEPHONE ENCOUNTER
Attempted to contact pharmacy to relay the provider note below:     Jason Holliday MD  Ox Triage Im3 minutes ago (7:52 AM)     BL  Yes of course     However, pharmacy is closed until 9am.   Will plan to call pharmacy later today, and inform them that it is okay to dispense tablets instead of capsules.    Thank you,  Delfina Chicas RN

## 2024-02-15 NOTE — TELEPHONE ENCOUNTER
Medication Question or Refill    Contacts         Type Contact Phone/Fax    02/14/2024 06:13 PM CST Phone (Incoming) [id number] - [place], [place] - [address] (Pharmacy) [phone]            What medication are you calling about (include dose and sig)?: cephALEXin (KEFLEX) 500 MG capsule     Preferred Pharmacy:     [id number] [place], [place] - [address] [address]  Phone: [phone] Fax: [phone]      Controlled Substance Agreement on file:   CSA -- Patient Level:    CSA: None found at the patient level.       Who prescribed the medication?: Jason Holliday    Do you need a refill? No    When did you use the medication last? N/A    Patient offered an appointment? No    Do you have any questions or concerns?  Yes: Pharmacy has tablets not capsules wants to know if that's okay       Could we send this information to you in [facility] or would you prefer to receive a phone call?:   No preference  Okay to leave a detailed message?: No at Other phone number:  [phone]

## 2024-02-19 ENCOUNTER — MEDICAL CORRESPONDENCE (OUTPATIENT)
Dept: HEALTH INFORMATION MANAGEMENT | Facility: CLINIC | Age: 84
End: 2024-02-19

## 2024-02-19 DIAGNOSIS — Z53.9 DIAGNOSIS NOT YET DEFINED: Primary | ICD-10-CM

## 2024-02-19 PROCEDURE — G0179 MD RECERTIFICATION HHA PT: HCPCS | Performed by: INTERNAL MEDICINE

## 2024-02-20 ENCOUNTER — ANTICOAGULATION THERAPY VISIT (OUTPATIENT)
Dept: ANTICOAGULATION | Facility: CLINIC | Age: 84
End: 2024-02-20
Payer: MEDICARE

## 2024-02-20 ENCOUNTER — TELEPHONE (OUTPATIENT)
Dept: INTERNAL MEDICINE | Facility: CLINIC | Age: 84
End: 2024-02-20
Payer: MEDICARE

## 2024-02-20 DIAGNOSIS — Z79.01 LONG TERM CURRENT USE OF ANTICOAGULANT THERAPY: ICD-10-CM

## 2024-02-20 DIAGNOSIS — D68.51 HETEROZYGOUS FACTOR V LEIDEN MUTATION (H): ICD-10-CM

## 2024-02-20 DIAGNOSIS — Z86.718 PERSONAL HISTORY OF DVT (DEEP VEIN THROMBOSIS): Primary | ICD-10-CM

## 2024-02-20 LAB — INR (EXTERNAL): 1.7 (ref 0.9–1.1)

## 2024-02-20 NOTE — PROGRESS NOTES
ANTICOAGULATION MANAGEMENT     Geneva Shepherd 83 year old female is on warfarin with subtherapeutic INR result. (Goal INR 2.0-3.0)    Recent labs: (last 7 days)     02/20/24  1156   INR 1.7*       ASSESSMENT     Source(s): Chart Review, Patient/Caregiver Call, and Home Care/Facility Nurse     Warfarin doses taken: Warfarin taken as instructed  Diet: No new diet changes identified  Medication/supplement changes:  finished cephalexin yesterday  New illness, injury, or hospitalization: No, but recently treated for a UTI  Signs or symptoms of bleeding or clotting: No  Previous result: Therapeutic last visit; previously outside of goal range  Additional findings: None       PLAN     Recommended plan for no diet, medication or health factor changes affecting INR     Dosing Instructions: Increase your warfarin dose (5.6% change) with next INR in 1 week       Summary  As of 2/20/2024      Full warfarin instructions:  5 mg every Mon, Fri; 7.5 mg all other days   Next INR check:  2/27/2024               Telephone call with [facility] home care nurse who agrees to plan and repeated back plan correctly  Sent [employer] message with dosing and follow up instructions    Orders given to  Homecare nurse/facility to recheck    Education provided:   Please call back if any changes to your diet, medications or how you've been taking warfarin    Plan made per [facility] anticoagulation protocol    Alla Escobar, RN  Anticoagulation Clinic  2/20/2024    _______________________________________________________________________     Anticoagulation Episode Summary       Current INR goal:  2.0-3.0   TTR:  75.3% (11.6 mo)   Target end date:  Indefinite   Send INR reminders to:  [facility]    Indications    Personal history of RLE DVT (deep vein thrombosis)(2003) [Z86.718]  Long term current use of anticoagulant therapy [Z79.01]  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

## 2024-02-27 ENCOUNTER — OFFICE VISIT (OUTPATIENT)
Dept: PEDIATRICS | Facility: CLINIC | Age: 84
End: 2024-02-27
Payer: MEDICARE

## 2024-02-27 ENCOUNTER — ANTICOAGULATION THERAPY VISIT (OUTPATIENT)
Dept: ANTICOAGULATION | Facility: CLINIC | Age: 84
End: 2024-02-27
Payer: MEDICARE

## 2024-02-27 ENCOUNTER — DOCUMENTATION ONLY (OUTPATIENT)
Dept: ANTICOAGULATION | Facility: CLINIC | Age: 84
End: 2024-02-27

## 2024-02-27 ENCOUNTER — MEDICAL CORRESPONDENCE (OUTPATIENT)
Dept: HEALTH INFORMATION MANAGEMENT | Facility: CLINIC | Age: 84
End: 2024-02-27

## 2024-02-27 ENCOUNTER — HOSPITAL ENCOUNTER (OUTPATIENT)
Dept: CT IMAGING | Facility: CLINIC | Age: 84
Discharge: HOME | End: 2024-02-27
Attending: PHYSICIAN ASSISTANT | Admitting: PHYSICIAN ASSISTANT
Payer: MEDICARE

## 2024-02-27 ENCOUNTER — OFFICE VISIT (OUTPATIENT)
Dept: URGENT CARE | Facility: URGENT CARE | Age: 84
End: 2024-02-27
Payer: MEDICARE

## 2024-02-27 VITALS
RESPIRATION RATE: 18 BRPM | DIASTOLIC BLOOD PRESSURE: 85 MMHG | SYSTOLIC BLOOD PRESSURE: 131 MMHG | OXYGEN SATURATION: 95 % | HEART RATE: 72 BPM | TEMPERATURE: 97.7 F | WEIGHT: 175 LBS | BODY MASS INDEX: 28.25 KG/M2

## 2024-02-27 VITALS
SYSTOLIC BLOOD PRESSURE: 147 MMHG | DIASTOLIC BLOOD PRESSURE: 82 MMHG | OXYGEN SATURATION: 97 % | HEART RATE: 71 BPM | TEMPERATURE: 97.5 F | RESPIRATION RATE: 16 BRPM

## 2024-02-27 DIAGNOSIS — Z79.01 LONG TERM CURRENT USE OF ANTICOAGULANT THERAPY: ICD-10-CM

## 2024-02-27 DIAGNOSIS — R10.30 LOWER ABDOMINAL PAIN: ICD-10-CM

## 2024-02-27 DIAGNOSIS — R10.30 LOWER ABDOMINAL PAIN: Primary | ICD-10-CM

## 2024-02-27 DIAGNOSIS — D68.51 HETEROZYGOUS FACTOR V LEIDEN MUTATION (H): ICD-10-CM

## 2024-02-27 DIAGNOSIS — K57.32 DIVERTICULITIS OF COLON: Primary | ICD-10-CM

## 2024-02-27 DIAGNOSIS — Z87.19 HISTORY OF DIVERTICULITIS: ICD-10-CM

## 2024-02-27 DIAGNOSIS — Z86.718 PERSONAL HISTORY OF DVT (DEEP VEIN THROMBOSIS): Primary | ICD-10-CM

## 2024-02-27 DIAGNOSIS — R30.0 DYSURIA: ICD-10-CM

## 2024-02-27 LAB
ALBUMIN SERPL BCG-MCNC: 4.3 G/DL (ref 3.5–5.2)
ALBUMIN UR-MCNC: NEGATIVE MG/DL
ALP SERPL-CCNC: 139 U/L (ref 40–150)
ALT SERPL W P-5'-P-CCNC: 23 U/L (ref 0–50)
ANION GAP SERPL CALCULATED.3IONS-SCNC: 9 MMOL/L (ref 7–15)
APPEARANCE UR: CLEAR
AST SERPL W P-5'-P-CCNC: 32 U/L (ref 0–45)
BASOPHILS # BLD AUTO: 0.1 10E3/UL (ref 0–0.2)
BASOPHILS NFR BLD AUTO: 1 %
BILIRUB SERPL-MCNC: 0.4 MG/DL
BILIRUB UR QL STRIP: NEGATIVE
BUN SERPL-MCNC: 8.4 MG/DL (ref 8–23)
CALCIUM SERPL-MCNC: 10.6 MG/DL (ref 8.8–10.2)
CHLORIDE SERPL-SCNC: 101 MMOL/L (ref 98–107)
COLOR UR AUTO: YELLOW
CREAT BLD-MCNC: 0.5 MG/DL (ref 0.5–1)
CREAT SERPL-MCNC: 0.6 MG/DL (ref 0.51–0.95)
DEPRECATED HCO3 PLAS-SCNC: 28 MMOL/L (ref 22–29)
EGFRCR SERPLBLD CKD-EPI 2021: 90 ML/MIN/1.73M2
EGFRCR SERPLBLD CKD-EPI 2021: >60 ML/MIN/1.73M2
EOSINOPHIL # BLD AUTO: 0.1 10E3/UL (ref 0–0.7)
EOSINOPHIL NFR BLD AUTO: 1 %
ERYTHROCYTE [DISTWIDTH] IN BLOOD BY AUTOMATED COUNT: 13 % (ref 10–15)
GLUCOSE SERPL-MCNC: 89 MG/DL (ref 70–99)
GLUCOSE UR STRIP-MCNC: NEGATIVE MG/DL
HCT VFR BLD AUTO: 45.2 % (ref 35–47)
HGB BLD-MCNC: 14.9 G/DL (ref 11.7–15.7)
HGB UR QL STRIP: NEGATIVE
IMM GRANULOCYTES # BLD: 0 10E3/UL
IMM GRANULOCYTES NFR BLD: 0 %
INR (EXTERNAL): 2.5 (ref 0.9–1.1)
KETONES UR STRIP-MCNC: NEGATIVE MG/DL
LEUKOCYTE ESTERASE UR QL STRIP: NEGATIVE
LYMPHOCYTES # BLD AUTO: 1.8 10E3/UL (ref 0.8–5.3)
LYMPHOCYTES NFR BLD AUTO: 23 %
MCH RBC QN AUTO: 32.5 PG (ref 26.5–33)
MCHC RBC AUTO-ENTMCNC: 33 G/DL (ref 31.5–36.5)
MCV RBC AUTO: 99 FL (ref 78–100)
MONOCYTES # BLD AUTO: 0.6 10E3/UL (ref 0–1.3)
MONOCYTES NFR BLD AUTO: 7 %
NEUTROPHILS # BLD AUTO: 5.3 10E3/UL (ref 1.6–8.3)
NEUTROPHILS NFR BLD AUTO: 68 %
NITRATE UR QL: NEGATIVE
NRBC # BLD AUTO: 0 10E3/UL
NRBC BLD AUTO-RTO: 0 /100
PH UR STRIP: 6 [PH] (ref 5–7)
PLATELET # BLD AUTO: 158 10E3/UL (ref 150–450)
POTASSIUM SERPL-SCNC: 4.2 MMOL/L (ref 3.4–5.3)
PROT SERPL-MCNC: 7.6 G/DL (ref 6.4–8.3)
RBC # BLD AUTO: 4.59 10E6/UL (ref 3.8–5.2)
SODIUM SERPL-SCNC: 138 MMOL/L (ref 135–145)
SP GR UR STRIP: <=1.005 (ref 1–1.03)
UROBILINOGEN UR STRIP-ACNC: 0.2 E.U./DL
WBC # BLD AUTO: 7.8 10E3/UL (ref 4–11)

## 2024-02-27 PROCEDURE — 85025 COMPLETE CBC W/AUTO DIFF WBC: CPT | Performed by: PHYSICIAN ASSISTANT

## 2024-02-27 PROCEDURE — 99215 OFFICE O/P EST HI 40 MIN: CPT | Performed by: PHYSICIAN ASSISTANT

## 2024-02-27 PROCEDURE — 82565 ASSAY OF CREATININE: CPT | Performed by: PHYSICIAN ASSISTANT

## 2024-02-27 PROCEDURE — 81003 URINALYSIS AUTO W/O SCOPE: CPT | Performed by: FAMILY MEDICINE

## 2024-02-27 PROCEDURE — 74177 CT ABD & PELVIS W/CONTRAST: CPT | Mod: MG

## 2024-02-27 PROCEDURE — 250N000009 HC RX 250: Performed by: PHYSICIAN ASSISTANT

## 2024-02-27 PROCEDURE — 250N000011 HC RX IP 250 OP 636: Performed by: PHYSICIAN ASSISTANT

## 2024-02-27 PROCEDURE — 36415 COLL VENOUS BLD VENIPUNCTURE: CPT | Performed by: PHYSICIAN ASSISTANT

## 2024-02-27 PROCEDURE — 99207 REFERRAL TO ACUTE AND DIAGNOSTIC SERVICES: CPT | Performed by: FAMILY MEDICINE

## 2024-02-27 PROCEDURE — 82565 ASSAY OF CREATININE: CPT

## 2024-02-27 RX ORDER — IOPAMIDOL 755 MG/ML
500 INJECTION, SOLUTION INTRAVASCULAR ONCE
Status: COMPLETED | OUTPATIENT
Start: 2024-02-27 | End: 2024-02-27

## 2024-02-27 RX ORDER — METRONIDAZOLE 500 MG/1
500 TABLET ORAL 3 TIMES DAILY
Qty: 30 TABLET | Refills: 0 | Status: SHIPPED | OUTPATIENT
Start: 2024-02-27 | End: 2024-03-01

## 2024-02-27 RX ORDER — CIPROFLOXACIN 500 MG/1
500 TABLET, FILM COATED ORAL 2 TIMES DAILY
Qty: 20 TABLET | Refills: 0 | Status: SHIPPED | OUTPATIENT
Start: 2024-02-27 | End: 2024-03-01

## 2024-02-27 RX ADMIN — SODIUM CHLORIDE 62 ML: 9 INJECTION, SOLUTION INTRAVENOUS at 16:11

## 2024-02-27 RX ADMIN — IOPAMIDOL 88 ML: 755 INJECTION, SOLUTION INTRAVENOUS at 16:11

## 2024-02-27 NOTE — PROGRESS NOTES
"SUBJECTIVE  HPI: Geneva Shepherd is a 83 year old female  who presents with the CC of abdominal/pelvic pain.   Pain is located in the suprapubic area, with radiation to None    The pain is characterized as \"pain\".    Pain has been present for today     EXACERBATING FACTORS: NEGATIVE.   RELIEVING FACTORS: NEGATIVE.    ASSOCIATED SX: urinary freq.     Past Medical History:   Diagnosis Date    Ankle fracture, lateral malleolus, closed  2012    right ankle    Asymptomatic varicose veins     Depression, major     Diverticulitis of colon (without mention of hemorrhage)(562.11)     DJD (degenerative joint disease)     Elevated antinuclear antibody (JUAN ANTONIO) level 2015    minimal    Embolism and thrombosis of unspecified site 3/03    RLE    FACTOR 5 LEIDEN DEFECT (HYPERCOAGULABLE)      Heterozygote    FRACTURE OF RIGHT LATERAL PROXIMAL TIBIA     Gastro-oesophageal reflux disease     rare    Hypercalcemia     Hyperlipidemia LDL goal <160 10/31/2010    Insomnia     Irritable bowel syndrome     Obesity 2013    Pain in joint, lower leg     Phlebitis and thrombophlebitis of superficial vessels of lower extremities     right    Sprain of lumbar region     Unspecified hypothyroidism     Urinary tract infection, site not specified      No Known Allergies  Social History     Tobacco Use    Smoking status: Former     Types: Cigarettes     Quit date: 1968     Years since quittin.5    Smokeless tobacco: Never    Tobacco comments:     quit in    Substance Use Topics    Alcohol use: Not Currently     Comment: 1 glass of wine a month       ROS:CONSTITUTIONAL:NEGATIVE for fever, chills, change in weight    EXAMINATION:  BP (!) 147/82   Pulse 71   Temp 97.5  F (36.4  C)   Resp 16   LMP  (LMP Unknown)   SpO2 97% GENERAL APPEARANCE: healthy, alert and no distress  ABDOMEN: tenderness moderate suprapubic      ICD-10-CM    1. Lower abdominal pain  R10.30 Referral to Acute and Diagnostic Services (Day " of diagnostic / First order acute)      2. History of diverticulitis  Z87.19 Referral to Acute and Diagnostic Services (Day of diagnostic / First order acute)      3. Dysuria  R30.0 UA Macroscopic with reflex to Microscopic and Culture - Clinic Collect        Pt will be seen at [facility]

## 2024-02-27 NOTE — PROGRESS NOTES
"ANTICOAGULATION  MANAGEMENT     Interacting Medication Review    Interacting medication(s): Metronidazole (Flagyl) with warfarin.    Duration: 10 days  (2/27 to 3/8)    Indication: Diverticulitis    New medication?: Yes, interaction may increase INR and risk of bleeding. With Metronidazole (oral/IV), [facility] protocol Appendix G recommends empiric reduction of warfarin dose in therapeutic/supratherapeutic patients.        PLAN     Decrease your warfarin dose (15.8% change) with next INR in 3 days        Summary  As of 2/27/2024      Full warfarin instructions:  7.5 mg every Sun, Thu; 5 mg all other days   Next INR check:  3/1/2024               Telephone call with Geneva who verbalizes understanding and agrees to plan and who agrees to plan and repeated back plan correctly    Spoke with Pallavi and advised of new dosing     Patient already took today's dose - will start new MD tomorrow.     Maintenance dose modified on anticoagulation calendar for interaction > 7 days; maintenance dose to be readjusted post interaction    [facility] priority set/moved to \"high\" for new major drug interaction    Plan made per [facility] anticoagulation protocol    Chanda Bynum RN  Anticoagulation Clinic    "

## 2024-02-27 NOTE — PROGRESS NOTES
ANTICOAGULATION MANAGEMENT     Geneva Shepherd 83 year old female is on warfarin with therapeutic INR result. (Goal INR 2.0-3.0)    Recent labs: (last 7 days)     02/27/24  1118   INR 2.5*       ASSESSMENT     Source(s): Chart Review, Patient/Caregiver Call, and Home Care/Facility Nurse     Warfarin doses taken: Warfarin taken as instructed  Diet: No new diet changes identified  Medication/supplement changes: None noted  New illness, injury, or hospitalization: No  Signs or symptoms of bleeding or clotting: No  Previous result: Subtherapeutic  Additional findings: None       PLAN     Recommended plan for no diet, medication or health factor changes affecting INR     Dosing Instructions: Continue your current warfarin dose with next INR in 1 week       Summary  As of 2/27/2024      Full warfarin instructions:  5 mg every Mon, Fri; 7.5 mg all other days   Next INR check:  3/5/2024               Telephone call with [facility] home care nurse who agrees to plan and repeated back plan correctly    Orders given to  Homecare nurse/facility to recheck    Education provided:   None required    Plan made per [facility] anticoagulation protocol    Sarah Redd RN  Anticoagulation Clinic  2/27/2024    _______________________________________________________________________     Anticoagulation Episode Summary       Current INR goal:  2.0-3.0   TTR:  74.6% (11.6 mo)   Target end date:  Indefinite   Send INR reminders to:  [facility] MONITOR[facility]    Indications    Personal history of RLE DVT (deep vein thrombosis)(2003) [Z86.718]  Long term current use of anticoagulant therapy [Z79.01]  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

## 2024-02-27 NOTE — PROGRESS NOTES
Assessment & Plan     Lower abdominal pain    CT scan shows POS for diverticulitis  CBC is normal  CMP shows normal renal function    History of diverticulitis    Patient referred to the ADS for CT scan to rule out diverticulitis  - Referral to Acute and Diagnostic Services (Day of diagnostic / First order acute)  - IV access; Standing  - sodium chloride (PF) 0.9% PF flush 3 mL  - IV access    Diverticulitis of colon    Diverticulitis occurs when pouches form in the wall of the colon and become inflamed or infected. It can be very painful.  Doctors aren't sure what causes diverticulitis. There is no proof that foods such as nuts, seeds, or berries cause it or make it worse. A low-fiber diet can cause small, hard stools. This means it takes more pressure in the colon to move stools out of the body. This puts more pressure on the walls of the colon. The pressure from this may cause pouches to form in weak spots along the colon.    Start on medicaitons  - ciprofloxacin (CIPRO) 500 MG tablet; Take 1 tablet (500 mg) by mouth 2 times daily for 10 days  - metroNIDAZOLE (FLAGYL) 500 MG tablet; Take 1 tablet (500 mg) by mouth 3 times daily for 10 days    Long term current use of anticoagulant therapy    Patient to recheck INR with coumadin clinic    Review of external notes as documented elsewhere in note      At today's visit with Geneva Shepherd , we discussed results, diagnosis, medications and formulated a plan.  We also discussed red flags for immediate return to clinic/ER, as well as indications for follow up with PCP if not improved in 3 days. Patient understood and agreed to plan. Geneva Shepherd was discharged with stable vitals and has no further questions.       Return in about 5 days (around 3/3/2024).    Subjective   Geneva is a 83 year old, presenting for the following health issues:  No chief complaint on file.    HPI     Abdominal/Flank Pain  Onset/Duration: today  Description:   Character: Dull  "ache  Location: right flank left flank  Radiation: None  Intensity: 5/10  Progression of Symptoms:  worsening  Accompanying Signs & Symptoms:  Fever/chills: no   Gas/Bloating: YES- gas  Nausea: no   Vomitting: no   Diarrhea: no   Constipation:no   Dysuria: no            Hematuria: no            Frequency: YES-notes she drinks a lot of water           Incontinence of urine: YES- not a new sx  History:            Last bowel movement: yesterday-normal  Trauma: no   Previous similar pain: YES- \"feels like bladder infection\", hx of diverticulitis    Previous tests done: UA at  today           Previous Abdominal surgery: YES- Tubal ligation, Gallbladder removed  Precipitating factors:   Does the pain change with:     Food: no      Bowel Movement: no     Urination: no              Other factors: YES- appt. With Urology in a few weeks, see's [facility] for her GI  Therapies tried and outcome:  None    When food last eaten: 1/2 grapefruit @ 10AM        Review of Systems  Constitutional, neuro, ENT, endocrine, pulmonary, cardiac, gastrointestinal, genitourinary, musculoskeletal, integument and psychiatric systems are negative, except as otherwise noted.      Objective    Wt 79.4 kg (175 lb)   LMP  (LMP Unknown)   BMI 28.25 kg/m    Body mass index is 28.25 kg/m .  Physical Exam   GENERAL: alert and no distress  ABDOMEN: soft, nontender, no hepatosplenomegaly, no masses and bowel sounds normal  MS: no gross musculoskeletal defects noted, no edema  SKIN: no suspicious lesions or rashes  NEURO: Normal strength and tone, mentation intact and speech normal  PSYCH: mentation appears normal, affect normal/bright    Results for orders placed or performed during the hospital encounter of 02/27/24   CT Abdomen Pelvis w Contrast     Status: None    Narrative    CT ABDOMEN AND PELVIS WITHOUT CONTRAST 2/27/2024 4:19 PM    CLINICAL HISTORY: Lower abdominal pain.    TECHNIQUE: CT scan of the abdomen and pelvis was performed " following  injection of IV contrast. Multiplanar reformats were obtained. Dose  reduction techniques were used.    CONTRAST: 88mL Isovue-370    COMPARISON: November 23, 2023    FINDINGS:   LOWER CHEST: No infiltrates or effusions.    HEPATOBILIARY: No significant mass or bile duct dilatation.  Cholecystectomy. There is some prominence to the biliary system, which  is common post cholecystectomy and may not have clinical significance.    PANCREAS: No significant mass, duct dilatation, or inflammatory  change.    SPLEEN: Normal size.    ADRENAL GLANDS: No significant nodules.    KIDNEYS/BLADDER: No significant mass, stones, or hydronephrosis.    BOWEL: Mild inflammatory change and some wall thickening around the  sigmoid colon compatible with acute uncomplicated diverticulitis.  Moderate diverticulosis. No obstruction. No abscess or intraperitoneal  free air. Moderate to large amount of stool.    PELVIC ORGANS: Fibroid uterus.    ADDITIONAL FINDINGS: No adenopathy or free fluid. There are moderate  atherosclerotic changes of the visualized aorta and its branches.  There is no evidence of aortic dissection or aneurysm.    MUSCULOSKELETAL: No frankly destructive bony lesions. L1 and L3  compression deformities are stable.      Impression    IMPRESSION:   Acute uncomplicated sigmoid diverticulitis.    NICOLE CASTELLON MD         SYSTEM ID:  [id number]   Creatinine POCT     Status: Normal   Result Value Ref Range    Creatinine POCT 0.5 0.5 - 1.0 mg/dL    GFR, ESTIMATED POCT >60 >60 mL/min/1.73m2   Results for orders placed or performed in visit on 02/27/24   Comprehensive metabolic panel     Status: Abnormal   Result Value Ref Range    Sodium 138 135 - 145 mmol/L    Potassium 4.2 3.4 - 5.3 mmol/L    Carbon Dioxide (CO2) 28 22 - 29 mmol/L    Anion Gap 9 7 - 15 mmol/L    Urea Nitrogen 8.4 8.0 - 23.0 mg/dL    Creatinine 0.60 0.51 - 0.95 mg/dL    GFR Estimate 90 >60 mL/min/1.73m2    Calcium 10.6 (H) 8.8 - 10.2 mg/dL    Chloride  101 98 - 107 mmol/L    Glucose 89 70 - 99 mg/dL    Alkaline Phosphatase 139 40 - 150 U/L    AST 32 0 - 45 U/L    ALT 23 0 - 50 U/L    Protein Total 7.6 6.4 - 8.3 g/dL    Albumin 4.3 3.5 - 5.2 g/dL    Bilirubin Total 0.4 <=1.2 mg/dL   CBC with platelets and differential     Status: None   Result Value Ref Range    WBC Count 7.8 4.0 - 11.0 10e3/uL    RBC Count 4.59 3.80 - 5.20 10e6/uL    Hemoglobin 14.9 11.7 - 15.7 g/dL    Hematocrit 45.2 35.0 - 47.0 %    MCV 99 78 - 100 fL    MCH 32.5 26.5 - 33.0 pg    MCHC 33.0 31.5 - 36.5 g/dL    RDW 13.0 10.0 - 15.0 %    Platelet Count 158 150 - 450 10e3/uL    % Neutrophils 68 %    % Lymphocytes 23 %    % Monocytes 7 %    % Eosinophils 1 %    % Basophils 1 %    % Immature Granulocytes 0 %    NRBCs per 100 WBC 0 <1 /100    Absolute Neutrophils 5.3 1.6 - 8.3 10e3/uL    Absolute Lymphocytes 1.8 0.8 - 5.3 10e3/uL    Absolute Monocytes 0.6 0.0 - 1.3 10e3/uL    Absolute Eosinophils 0.1 0.0 - 0.7 10e3/uL    Absolute Basophils 0.1 0.0 - 0.2 10e3/uL    Absolute Immature Granulocytes 0.0 <=0.4 10e3/uL    Absolute NRBCs 0.0 10e3/uL   CBC with platelets differential     Status: None    Narrative    The following orders were created for panel order CBC with platelets differential.  Procedure                               Abnormality         Status                     ---------                               -----------         ------                     CBC with platelets and d...[[id number]]                      Final result                 Please view results for these tests on the individual orders.   Results for orders placed or performed in visit on 02/27/24   UA Macroscopic with reflex to Microscopic and Culture - Clinic Collect     Status: Normal    Specimen: Urine, Clean Catch   Result Value Ref Range    Color Urine Yellow Colorless, Straw, Light Yellow, Yellow    Appearance Urine Clear Clear    Glucose Urine Negative Negative mg/dL    Bilirubin Urine Negative Negative    Ketones Urine  Negative Negative mg/dL    Specific Gravity Urine <=1.005 1.003 - 1.035    Blood Urine Negative Negative    pH Urine 6.0 5.0 - 7.0    Protein Albumin Urine Negative Negative mg/dL    Urobilinogen Urine 0.2 0.2, 1.0 E.U./dL    Nitrite Urine Negative Negative    Leukocyte Esterase Urine Negative Negative    Narrative    Microscopic not indicated   Results for orders placed or performed in visit on 02/27/24   INR (External Result)     Status: Abnormal   Result Value Ref Range    INR (External) 2.5 (A) 0.9 - 1.1             Signed Electronically by: Zach Anaya, [facility], PA-C

## 2024-03-01 ENCOUNTER — NURSE TRIAGE (OUTPATIENT)
Dept: INTERNAL MEDICINE | Facility: CLINIC | Age: 84
End: 2024-03-01
Payer: MEDICARE

## 2024-03-01 ENCOUNTER — ANTICOAGULATION THERAPY VISIT (OUTPATIENT)
Dept: ANTICOAGULATION | Facility: CLINIC | Age: 84
End: 2024-03-01
Payer: MEDICARE

## 2024-03-01 ENCOUNTER — TELEPHONE (OUTPATIENT)
Dept: INTERNAL MEDICINE | Facility: CLINIC | Age: 84
End: 2024-03-01
Payer: MEDICARE

## 2024-03-01 ENCOUNTER — MEDICAL CORRESPONDENCE (OUTPATIENT)
Dept: HEALTH INFORMATION MANAGEMENT | Facility: CLINIC | Age: 84
End: 2024-03-01

## 2024-03-01 ENCOUNTER — HOSPITAL ENCOUNTER (EMERGENCY)
Facility: CLINIC | Age: 84
Discharge: HOME | End: 2024-03-01
Attending: EMERGENCY MEDICINE | Admitting: EMERGENCY MEDICINE
Payer: MEDICARE

## 2024-03-01 VITALS
OXYGEN SATURATION: 95 % | DIASTOLIC BLOOD PRESSURE: 70 MMHG | TEMPERATURE: 97.2 F | SYSTOLIC BLOOD PRESSURE: 143 MMHG | WEIGHT: 175 LBS | BODY MASS INDEX: 28.12 KG/M2 | RESPIRATION RATE: 17 BRPM | HEART RATE: 79 BPM | HEIGHT: 66 IN

## 2024-03-01 DIAGNOSIS — Z79.01 LONG TERM CURRENT USE OF ANTICOAGULANT THERAPY: ICD-10-CM

## 2024-03-01 DIAGNOSIS — Z86.718 PERSONAL HISTORY OF DVT (DEEP VEIN THROMBOSIS): Primary | ICD-10-CM

## 2024-03-01 DIAGNOSIS — L03.119 CELLULITIS OF LOWER EXTREMITY, UNSPECIFIED LATERALITY: ICD-10-CM

## 2024-03-01 DIAGNOSIS — D68.51 HETEROZYGOUS FACTOR V LEIDEN MUTATION (H): ICD-10-CM

## 2024-03-01 LAB
ALBUMIN SERPL BCG-MCNC: 4.3 G/DL (ref 3.5–5.2)
ALP SERPL-CCNC: 130 U/L (ref 40–150)
ALT SERPL W P-5'-P-CCNC: 18 U/L (ref 0–50)
ANION GAP SERPL CALCULATED.3IONS-SCNC: 11 MMOL/L (ref 7–15)
AST SERPL W P-5'-P-CCNC: 30 U/L (ref 0–45)
BASOPHILS # BLD AUTO: 0 10E3/UL (ref 0–0.2)
BASOPHILS NFR BLD AUTO: 0 %
BILIRUB SERPL-MCNC: 0.6 MG/DL
BUN SERPL-MCNC: 6.7 MG/DL (ref 8–23)
CALCIUM SERPL-MCNC: 10.5 MG/DL (ref 8.8–10.2)
CHLORIDE SERPL-SCNC: 98 MMOL/L (ref 98–107)
CREAT SERPL-MCNC: 0.67 MG/DL (ref 0.51–0.95)
DEPRECATED HCO3 PLAS-SCNC: 25 MMOL/L (ref 22–29)
EGFRCR SERPLBLD CKD-EPI 2021: 87 ML/MIN/1.73M2
EOSINOPHIL # BLD AUTO: 0.2 10E3/UL (ref 0–0.7)
EOSINOPHIL NFR BLD AUTO: 2 %
ERYTHROCYTE [DISTWIDTH] IN BLOOD BY AUTOMATED COUNT: 13.1 % (ref 10–15)
GLUCOSE SERPL-MCNC: 107 MG/DL (ref 70–99)
HCT VFR BLD AUTO: 43.5 % (ref 35–47)
HGB BLD-MCNC: 14.3 G/DL (ref 11.7–15.7)
HOLD SPECIMEN: NORMAL
HOLD SPECIMEN: NORMAL
IMM GRANULOCYTES # BLD: 0 10E3/UL
IMM GRANULOCYTES NFR BLD: 0 %
INR (EXTERNAL): 2.6 (ref 0.9–1.1)
INR PPP: 2.48 (ref 0.85–1.15)
LYMPHOCYTES # BLD AUTO: 1.2 10E3/UL (ref 0.8–5.3)
LYMPHOCYTES NFR BLD AUTO: 16 %
MCH RBC QN AUTO: 32 PG (ref 26.5–33)
MCHC RBC AUTO-ENTMCNC: 32.9 G/DL (ref 31.5–36.5)
MCV RBC AUTO: 97 FL (ref 78–100)
MONOCYTES # BLD AUTO: 0.7 10E3/UL (ref 0–1.3)
MONOCYTES NFR BLD AUTO: 9 %
NEUTROPHILS # BLD AUTO: 5.5 10E3/UL (ref 1.6–8.3)
NEUTROPHILS NFR BLD AUTO: 73 %
NRBC # BLD AUTO: 0 10E3/UL
NRBC BLD AUTO-RTO: 0 /100
NT-PROBNP SERPL-MCNC: 315 PG/ML (ref 0–1800)
PLATELET # BLD AUTO: 183 10E3/UL (ref 150–450)
POTASSIUM SERPL-SCNC: 4 MMOL/L (ref 3.4–5.3)
PROT SERPL-MCNC: 7.8 G/DL (ref 6.4–8.3)
RBC # BLD AUTO: 4.47 10E6/UL (ref 3.8–5.2)
SODIUM SERPL-SCNC: 134 MMOL/L (ref 135–145)
TROPONIN T SERPL HS-MCNC: 17 NG/L
TROPONIN T SERPL HS-MCNC: 17 NG/L
WBC # BLD AUTO: 7.6 10E3/UL (ref 4–11)

## 2024-03-01 PROCEDURE — 83880 ASSAY OF NATRIURETIC PEPTIDE: CPT | Performed by: EMERGENCY MEDICINE

## 2024-03-01 PROCEDURE — 80053 COMPREHEN METABOLIC PANEL: CPT | Performed by: STUDENT IN AN ORGANIZED HEALTH CARE EDUCATION/TRAINING PROGRAM

## 2024-03-01 PROCEDURE — 93005 ELECTROCARDIOGRAM TRACING: CPT

## 2024-03-01 PROCEDURE — 80053 COMPREHEN METABOLIC PANEL: CPT | Performed by: EMERGENCY MEDICINE

## 2024-03-01 PROCEDURE — 84484 ASSAY OF TROPONIN QUANT: CPT | Performed by: EMERGENCY MEDICINE

## 2024-03-01 PROCEDURE — 83880 ASSAY OF NATRIURETIC PEPTIDE: CPT | Performed by: STUDENT IN AN ORGANIZED HEALTH CARE EDUCATION/TRAINING PROGRAM

## 2024-03-01 PROCEDURE — 85025 COMPLETE CBC W/AUTO DIFF WBC: CPT | Performed by: EMERGENCY MEDICINE

## 2024-03-01 PROCEDURE — 36415 COLL VENOUS BLD VENIPUNCTURE: CPT | Performed by: STUDENT IN AN ORGANIZED HEALTH CARE EDUCATION/TRAINING PROGRAM

## 2024-03-01 PROCEDURE — 85610 PROTHROMBIN TIME: CPT | Performed by: EMERGENCY MEDICINE

## 2024-03-01 PROCEDURE — 85025 COMPLETE CBC W/AUTO DIFF WBC: CPT | Performed by: STUDENT IN AN ORGANIZED HEALTH CARE EDUCATION/TRAINING PROGRAM

## 2024-03-01 PROCEDURE — 99284 EMERGENCY DEPT VISIT MOD MDM: CPT | Mod: 25

## 2024-03-01 PROCEDURE — 36415 COLL VENOUS BLD VENIPUNCTURE: CPT | Performed by: EMERGENCY MEDICINE

## 2024-03-01 PROCEDURE — 250N000013 HC RX MED GY IP 250 OP 250 PS 637: Performed by: EMERGENCY MEDICINE

## 2024-03-01 PROCEDURE — 84484 ASSAY OF TROPONIN QUANT: CPT | Performed by: STUDENT IN AN ORGANIZED HEALTH CARE EDUCATION/TRAINING PROGRAM

## 2024-03-01 RX ADMIN — AMOXICILLIN AND CLAVULANATE POTASSIUM 1 TABLET: 875; 125 TABLET, FILM COATED ORAL at 19:53

## 2024-03-01 ASSESSMENT — ACTIVITIES OF DAILY LIVING (ADL)
ADLS_ACUITY_SCORE: 42

## 2024-03-01 NOTE — ED TRIAGE NOTES
"Pt presents with dizziness and bilateral leg \"tightness\". Pt recently started Cipro and Flagyl due to recent diagnosis of Diverticulitis.         "

## 2024-03-01 NOTE — PROGRESS NOTES
ANTICOAGULATION MANAGEMENT     Geneva Shepherd 83 year old female is on warfarin with therapeutic INR result. (Goal INR 2.0-3.0)    Recent labs: (last 7 days)     02/27/24  1118   INR 2.5*       ASSESSMENT     Source(s): Chart Review and Home Care/Facility Nurse     Warfarin doses taken: Warfarin taken as instructed  Diet: No new diet changes identified  Medication/supplement changes:  Flagyl and Cipro started 2/27-3/8, interaction expected  New illness, injury, or hospitalization: Yes: Diverticulitis, significant leg swelling, new for patient.   Signs or symptoms of bleeding or clotting: No  Previous result: Therapeutic last visit; previously outside of goal range  Additional findings:  Emperic reduction of warfarin made for expect interaction of Flagyl , 15.8% change   Patient advised to seek ED or urgent care for new edema. Patient agrees. Family notified by RN       PLAN     Recommended plan for temporary change(s) affecting INR     Dosing Instructions: Continue your current warfarin dose with next INR in 3 days       Summary  As of 3/1/2024      Full warfarin instructions:  7.5 mg every Sun, Wed; 5 mg all other days   Next INR check:  3/4/2024               Telephone call with [facility] home care nurse who agrees to plan and repeated back plan correctly    Orders given to  Homecare nurse/facility to recheck    Education provided:   Please call back if any changes to your diet, medications or how you've been taking warfarin    Plan made per [facility] anticoagulation protocol    Mere Ying RN  Anticoagulation Clinic  3/1/2024    _______________________________________________________________________     Anticoagulation Episode Summary       Current INR goal:  2.0-3.0   TTR:  74.6% (11.6 mo)   Target end date:  Indefinite   Send INR reminders to:  [facility] HOME MONITORING    Indications    Personal history of RLE DVT (deep vein thrombosis)(2003) [Z86.718]  Long term current use of anticoagulant therapy  [Z79.01]  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

## 2024-03-01 NOTE — ED PROVIDER NOTES
"PIT/Triage Evaluation    Patient presented with bilateral leg tightness and swelling today.  She woke up feeling her legs were \"tight\".  She also notes some increased swelling.  She also feels a little lightheaded today.  She was started 3 days ago on Cipro and Flagyl for diverticulitis.  She has a history of factor V Leiden and is on warfarin.  INR was obtained today and was 2.6.  No history of heart disease.    Exam is notable for:  Patient Vitals for the past 24 hrs:   BP Temp Pulse Resp SpO2   03/01/24 1634 128/57 97.2  F (36.2  C) 79 18 97 %     General: Alert, pleasant female of stated age  HENT: Atraumatic, normocephalic  Eyes: Extraocular movements intact  Cardiovascular: Regular rate  Pulmonary: Easy work of breathing  Skin: Warm and dry, erythema over the anterior aspect of bilateral lower extremities  MSK: Mild swelling in bilateral lower extremities  Neuro: Alert and oriented, motor and sensation grossly intact    Appropriate interventions for symptom management were initiated if applicable.  Appropriate diagnostic tests were initiated if indicated.    Important information for subsequent clinician:  Labs ordered. and EKG ordered      I briefly evaluated the patient and developed an initial plan of care. I discussed this plan and explained that this brief interaction does not constitute a full evaluation. Patient/family understands that they should wait to be fully evaluated and discuss any test results with another clinician prior to leaving the hospital.     Malick Lovett MD  03/01/24 [id number]    "

## 2024-03-01 NOTE — TELEPHONE ENCOUNTER
Nurse Triage SBAR    Is this a 2nd Level Triage? NO    Situation: Raven KEARNEY with [facility] calling with patient to report new, bilateral leg swelling with redness that appeared this morning.     Background: Per patient, patient was recently prescribed Flagyl and Cipro.     Assessment: Raven RN reported swelling extends to mid calves on both legs. Legs are red and tender, patient has had slightly worsening mobility. Vital signs WNL, and swelling was reported to be a +2 or +3.     Protocol Recommended Disposition:   See in Office Today    Recommendation: Per disposition and provider availability, RN advised patient to be seen in  today for evaluation. Patient agreed with plan of care and will contact family member to help transport patient to  today. Raven KEARNEY did not have any additional reports at this time.     Does the patient meet one of the following criteria for ADS visit consideration? 16+ years old, with an [facility] PCP     TIP  Providers, please consider if this condition is appropriate for management at one of our Acute and Diagnostic Services sites.     If patient is a good candidate, please use dotphrase <dot>triageresponse and select Refer to ADS to document.    Reason for Disposition   MODERATE swelling of both ankles (e.g., swelling extends up to the knees) AND new-onset or worsening    Additional Information   Negative: Sounds like a life-threatening emergency to the triager   Negative: Chest pain   Negative: Followed an insect bite and has localized swelling (e.g., small area of puffy or swollen skin)   Negative: Followed a knee injury   Negative: Ankle or foot injury   Negative: Pregnant with leg swelling or edema   Negative: Difficulty breathing at rest   Negative: Entire foot is cool or blue in comparison to other side   Negative: SEVERE swelling (e.g., swelling extends above knee, entire leg is swollen, weeping fluid)   Negative: Cast on leg or ankle and has increasing pain   Negative: Can't walk or  can barely stand (new-onset)   Negative: Fever and red area (or area very tender to touch)   Negative: Patient sounds very sick or weak to the triager   Negative: Swelling of face, arm or hands  (Exception: Slight puffiness of fingers during hot weather.)   Negative: Pregnant 20 or more weeks and sudden weight gain (i.e., > 2 lbs, 1 kg in one week)   Negative: Thigh or calf pain and only 1 side and present > 1 hour   Negative: Thigh, calf, or ankle swelling in only one leg   Negative: Thigh, calf, or ankle swelling in both legs, but one side is definitely more swollen (Exception: Longstanding difference between legs.)    Protocols used: Leg Swelling and Edema-A-[place]

## 2024-03-02 LAB
ATRIAL RATE - MUSE: 77 BPM
DIASTOLIC BLOOD PRESSURE - MUSE: NORMAL MMHG
INTERPRETATION ECG - MUSE: NORMAL
P AXIS - MUSE: 39 DEGREES
PR INTERVAL - MUSE: 172 MS
QRS DURATION - MUSE: 80 MS
QT - MUSE: 386 MS
QTC - MUSE: 436 MS
R AXIS - MUSE: 10 DEGREES
SYSTOLIC BLOOD PRESSURE - MUSE: NORMAL MMHG
T AXIS - MUSE: 52 DEGREES
VENTRICULAR RATE- MUSE: 77 BPM

## 2024-03-02 NOTE — DISCHARGE INSTRUCTIONS
I recommend stopping the ciprofloxacin and Flagyl antibiotics, and replacing them with Augmentin.  This medication will treat both the diverticulitis and cellulitis.  Return to the ED right away if you feel that the redness, swelling, or pain in your legs is worsening, or if you develop a fever while on the medication.  Otherwise, recommend following up with your primary care doctor in 4 days for recheck of both your diverticulitis and cellulitis symptoms.

## 2024-03-02 NOTE — ED PROVIDER NOTES
"  History     Chief Complaint:  Dizziness and Sore       The history is provided by the patient.      Geneva Shepherd is a 83 year old female on Warfarin with history of hyperlipidemia, deep venous thrombosis, degenerative joint disease who presents with bilateral leg swelling, redness, and tightness. Patient states she woke up this morning and her legs were \"tight\". She states she is having some leg soreness. Denies itchiness or injuries to legs but has been having open sores. Patient mentions that she was diagnosed with diverticulitis this week and has been put on Cipro and Flagyl. Denies fever, body aches or chills. Patient has history of blood clots.         Independent Historian:   None - Patient Only    Review of External Notes:   I reviewed PIT note.  I reviewed nurse triage note.  I reviewed clinic note from 1/24.  Been on Cipro and Flagyl for diverticular for rupture or abscess on CT.      Medications:    Elavil  Cipro   Aricept   Carnitor   Levothyroxine   Flagyl   Paxil   Warfarin       Past Medical History:    Asymptomatic varicose veins  Depression  Diverticulitis   Degenerative joint disease  Gastro-oesophageal reflux disease  Hypercalcemia  Hyperlipidemia   Insomnia  Irritable bowel syndrome  Obesity  Hypothyroidism   Urinary tract infection  Deep venous thrombosis     Past Surgical History:    Arthroplasty hip   Arthroplasty knee  Cholecystectomy   Colonoscopy  Endoscopic gastrointestinal tract  Tubal ligation   Endometrial biopsy   Coronary angiogram   Varicose vein stripping       Physical Exam   Patient Vitals for the past 24 hrs:   BP Temp Pulse Resp SpO2 Height Weight   03/01/24 1912 -- -- 79 17 -- -- --   03/01/24 1844 -- -- -- -- -- 1.676 m (5' 6\") 79.4 kg (175 lb)   03/01/24 1840 (!) 143/70 -- 76 17 95 % -- --   03/01/24 1634 128/57 97.2  F (36.2  C) 79 18 97 % -- --        Physical Exam  General: alert, lying comfortably on gurney  HENT: mucous membranes moist  CV: regular rate, regular " rhythm  Resp: normal effort, clear throughout, no crackles or wheezing  GI: abdomen soft and nontender, no guarding  MSK: no bony tenderness  Skin: appropriately warm and dry  Extremities: Bilateral lower extremities with mild swelling, nonpitting edema.  To bilateral legs below the knees, right greater than left.  The erythema starts 2 to 3 cm below the knee, extends to the ankle.  Blanches easily.  Mild petechiae overlying.  No obvious open wounds or sores.  2+ DP pulses bilaterally.  Full range of motion in bilateral knees and ankles without pain.  Compartments are soft.  Feet are warm, well-perfused, cap refill less than 2 seconds.  Fine to sensation grossly intact.  5 out of 5 dorsiflexion, plantarflexion, knee extension, knee flexion.  Neuro: alert, clear speech, oriented  Psych: normal mood and affect      Emergency Department Course   ECG  ECG taken at 1627, ECG read at 1942  Normal sinus rhythm   Possible left atrial enlargement   Anteroseptal infarct, age undetermined  Abnormal ECG   No changes as compared to prior, dated 3/29/22.  Rate 77 bpm. DE interval 172 ms. QRS duration 80 ms. QT/QTc 386/436 ms. P-R-T axes 39 10 52.     Imaging:  No orders to display        Laboratory:  Labs Ordered and Resulted from Time of ED Arrival to Time of ED Departure   COMPREHENSIVE METABOLIC PANEL - Abnormal       Result Value    Sodium 134 (*)     Potassium 4.0      Carbon Dioxide (CO2) 25      Anion Gap 11      Urea Nitrogen 6.7 (*)     Creatinine 0.67      GFR Estimate 87      Calcium 10.5 (*)     Chloride 98      Glucose 107 (*)     Alkaline Phosphatase 130      AST 30      ALT 18      Protein Total 7.8      Albumin 4.3      Bilirubin Total 0.6     TROPONIN T, HIGH SENSITIVITY - Abnormal    Troponin T, High Sensitivity 17 (*)    NT PROBNP INPATIENT - Normal    N terminal Pro BNP Inpatient 315     CBC WITH PLATELETS AND DIFFERENTIAL    WBC Count 7.6      RBC Count 4.47      Hemoglobin 14.3      Hematocrit 43.5      MCV  97      MCH 32.0      MCHC 32.9      RDW 13.1      Platelet Count 183      % Neutrophils 73      % Lymphocytes 16      % Monocytes 9      % Eosinophils 2      % Basophils 0      % Immature Granulocytes 0      NRBCs per 100 WBC 0      Absolute Neutrophils 5.5      Absolute Lymphocytes 1.2      Absolute Monocytes 0.7      Absolute Eosinophils 0.2      Absolute Basophils 0.0      Absolute Immature Granulocytes 0.0      Absolute NRBCs 0.0     TROPONIN T, HIGH SENSITIVITY   INR        Emergency Department Course & Assessments:    Interventions:  Medications   amoxicillin-clavulanate (AUGMENTIN) 875-125 MG per tablet 1 tablet (has no administration in time range)        Assessments:  1927 I obtained history and examined the patient as noted above.   2044 I rechecked and updated the patient.     Independent Interpretation (X-rays, CTs, rhythm strip):  None    Consultations/Discussion of Management or Tests:   ED Course as of 03/01/24 1943   Fri Mar 01, 2024   1939 I reviewed the patient with the ED pharmacist.  We agree that simplifying antibiotics to Augmentin only would be a better approach, given this is first-line for diverticulitis, and will also provide gram-positive coverage for suspected cellulitis.       Social Determinants of Health affecting care:   None    Disposition:  The patient was discharged.     Impression & Plan        Medical Decision Making:  Geneva Shepherd is a 83 year old female with a history of DVT, DJD, presenting today with bilateral lower extremity swelling and redness.  On exam, the patient is afebrile, well-appearing.  She has no signs to suggest arterial ischemia, compartment syndrome, and no suspicion for congestive heart failure.  BN peptide is within normal limits.  EKG without ischemia.  Troponin is slightly elevated, but flat.  Overall, symptoms are not consistent with acute coronary syndrome, and testing confirms this.  Her INR is therapeutic.  The appearance of her legs is consistent  with cellulitis, given therapeutic INR, I do not think she needs additional imaging for DVT.  In regards to the cellulitis, she is well-appearing, without fever or elevated white blood cell count.  She is ambulatory without assistance.  I discussed the patient briefly with the ED pharmacist, and I recommend discontinuing Cipro and Flagyl for diverticulitis, and starting Augmentin instead.  This is good coverage for diverticulitis, and will also provide gram-positive coverage for cellulitis.  I recommended return to the ED for any progression of redness, increased pain, swelling, fevers, otherwise, recommend follow-up with PCP in 4 to 5 days for recheck of symptoms.        Diagnosis:    ICD-10-CM    1. Cellulitis of lower extremity, unspecified laterality  L03.119            Discharge Medications:  New Prescriptions    AMOXICILLIN-CLAVULANATE (AUGMENTIN) 875-125 MG TABLET    Take 1 tablet by mouth 2 times daily for 10 days          Scribe Disclosure:  ED Raffirobin Quinonez, am serving as a scribe at 7:33 PM on 3/1/2024 to document services personally performed by Larisa Navarrete MD based on my observations and the provider's statements to me.   3/1/2024   Larisa Navarrete MD Pepper, Tracy Lynn, MD  03/04/24 0135

## 2024-03-04 ENCOUNTER — TELEPHONE (OUTPATIENT)
Dept: ANTICOAGULATION | Facility: CLINIC | Age: 84
End: 2024-03-04
Payer: MEDICARE

## 2024-03-04 DIAGNOSIS — D68.51 HETEROZYGOUS FACTOR V LEIDEN MUTATION (H): ICD-10-CM

## 2024-03-04 DIAGNOSIS — Z86.718 PERSONAL HISTORY OF DVT (DEEP VEIN THROMBOSIS): Primary | ICD-10-CM

## 2024-03-04 DIAGNOSIS — Z79.01 LONG TERM CURRENT USE OF ANTICOAGULANT THERAPY: ICD-10-CM

## 2024-03-04 NOTE — TELEPHONE ENCOUNTER
ANTICOAGULATION  MANAGEMENT: Discharge Review    Geneva Shepherd chart reviewed for anticoagulation continuity of care    Emergency room visit on 03/01/2024 for Cellulitis.    Discharge disposition: Home    Results:    Recent labs: (last 7 days)     02/27/24  1118 03/01/24  1507 03/01/24  1650   INR 2.5* 2.6* 2.48*     Anticoagulation inpatient management:     not applicable     Anticoagulation discharge instructions:     Warfarin dosing: home regimen continued   Bridging: No   INR goal change: No      Medication changes affecting anticoagulation: Yes: START taking:  amoxicillin-clavulanate (AUGMENTIN)  STOP taking:  ciprofloxacin 500 MG tablet (Cipro)  metroNIDAZOLE 500 MG tablet (FLAGYL)    Additional factors affecting anticoagulation: No     PLAN     Recommend to check INR on 3/07/2024  Recommend to adjust dose to prior to empiric adjustment dosing.    Spoke with Geneva about dosing and recheck  Left a detailed message for [facility]. Informed to call back if unable to recheck on Thursday.    Anticoagulation Calendar updated    Sarah Redd RN

## 2024-03-05 ENCOUNTER — PATIENT OUTREACH (OUTPATIENT)
Dept: INTERNAL MEDICINE | Facility: CLINIC | Age: 84
End: 2024-03-05
Payer: MEDICARE

## 2024-03-05 ENCOUNTER — MEDICAL CORRESPONDENCE (OUTPATIENT)
Dept: HEALTH INFORMATION MANAGEMENT | Facility: CLINIC | Age: 84
End: 2024-03-05
Payer: MEDICARE

## 2024-03-05 ENCOUNTER — ANTICOAGULATION THERAPY VISIT (OUTPATIENT)
Dept: ANTICOAGULATION | Facility: CLINIC | Age: 84
End: 2024-03-05
Payer: MEDICARE

## 2024-03-05 ENCOUNTER — DOCUMENTATION ONLY (OUTPATIENT)
Dept: INTERNAL MEDICINE | Facility: CLINIC | Age: 84
End: 2024-03-05
Payer: MEDICARE

## 2024-03-05 DIAGNOSIS — D68.51 HETEROZYGOUS FACTOR V LEIDEN MUTATION (H): ICD-10-CM

## 2024-03-05 DIAGNOSIS — Z86.718 PERSONAL HISTORY OF DVT (DEEP VEIN THROMBOSIS): Primary | ICD-10-CM

## 2024-03-05 DIAGNOSIS — Z79.01 LONG TERM CURRENT USE OF ANTICOAGULANT THERAPY: ICD-10-CM

## 2024-03-05 LAB — INR (EXTERNAL): 3.3 (ref 0.9–1.1)

## 2024-03-05 NOTE — PROGRESS NOTES
ANTICOAGULATION MANAGEMENT     Marsha Joao 83 year old female is on warfarin with supratherapeutic INR result. (Goal INR 2.0-3.0)    Recent labs: (last 7 days)     03/05/24  1350   INR 3.3*       ASSESSMENT     Source(s): Chart Review and Home Care/Facility Nurse     Warfarin doses taken: Warfarin taken as instructed  Diet: Decreased greens/vitamin K in diet; plans to resume previous intake  Medication/supplement changes:  Augmentin 10 day course (dates: 3/1 PM-3/12) which may be increasing INR today  Stopped cipro and flagyl -will resume maintenance dose prior to empiric reduction  New illness, injury, or hospitalization: Yes: 3/1 ED visit for cellulitis of lower extremity  Signs or symptoms of bleeding or clotting: No  Previous result: Therapeutic last 2(+) visits  Additional findings:  Discharging from Home care today   will go to [facility] for labs - will send reminders back to [facility] Pool  Shared clinical decision, patient wants to hold warfarin versus partial dose today.       PLAN     Recommended plan for temporary change(s) affecting INR     Dosing Instructions: hold dose then continue your current warfarin dose with next INR in 3 days       Summary  As of 3/5/2024      Full warfarin instructions:  3/5: Hold; Otherwise 5 mg every Mon, Fri; 7.5 mg all other days   Next INR check:  3/8/2024               Telephone call with [place] home care nurse and Geneva   who verbalizes understanding and agrees to plan and who agrees to plan and repeated back plan correctly    Lab visit scheduled    Education provided:   Contact [phone]  with any changes, questions or concerns.     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

## 2024-03-05 NOTE — PROGRESS NOTES
Geneva Shepherd has an upcoming lab appointment:    Future Appointments   Date Time Provider Department Center   3/8/2024  2:00 PM [facility]   3/13/2024  1:00 PM Jacoby Boo MD [facility] OX   6/18/2024  2:30 PM Alessio West MD [facility] S[facility]     Patient is scheduled for the following lab(s): INR plus other [facility] labs    There is no order available. Please review and place either future orders or HMPO (Review of Health Maintenance Protocol Orders), as appropriate.    Health Maintenance Due   Topic    ANNUAL REVIEW OF HM ORDERS      Carl Maxwell

## 2024-03-05 NOTE — TELEPHONE ENCOUNTER
Patient does not require any lab draw prior to scheduled appointment with me.  Troponin level minimally elevated and stable and overall symptoms were not consistent with acute coronary syndrome per review of note from ER

## 2024-03-05 NOTE — TELEPHONE ENCOUNTER
What type of discharge? Emergency Department  Risk of Hospital admission or ED visit: 87.7%  Is a TCM episode required? No  When should the patient follow up with PCP? SILVER Garsia RN  [facility]

## 2024-03-05 NOTE — TELEPHONE ENCOUNTER
Spoke with patient to conduct ED/Hosp follow up visit. Per patient, Troponin was elevated on initial and repeat draw on 3/1/2024. Other lab values non-remarkable.     Routing to PCP for recommendations. Would you like a repeat Troponin drawn or other labs? Thank you!

## 2024-03-05 NOTE — TELEPHONE ENCOUNTER
"  ED for acute condition Discharge Protocol    \"Hi, my name is Justice L. Phoenix, RN, a registered nurse, and I am calling from [facility].  I am calling to follow up and see how things are going for you after your recent emergency visit.\"    Tell me how you are doing now that you are home?\" \"Swelling has improved. I'm not feeling that horrible.\"       Discharge Instructions    \"Let's review your discharge instructions.  What is/are the follow-up recommendations?  Pt. Response: \"I was recommended to follow up with Dr. Boo\".     \"Has an appointment with your primary care provider been scheduled?\"  No (needed - schedule appointment and remind to bring meds)    Medications    \"Tell me what changed about your medicines when you discharged?\"    \"They took me off of Flagyl and Cipro and put me on Augmentin\".    \"What questions do you have about your medications?\"   None    On warfarin: \"Were you given any recommendations for follow-up with the anticoagulation clinic?\" Yes - Anticoagulation clinic appointment is already scheduled at appropriate interval    Call Summary    \"What questions or concerns do you have about your recent visit and your follow-up care?\"     none    \"If you have questions or things don't continue to improve, we encourage you contact us through the main clinic number (give number).  Even if the clinic is not open, triage nurses are available 24/7 to help you.     We would like you to know that our clinic has extended hours (provide information).  We also have urgent care (provide details on closest location and hours/contact info)\"    \"Thank you for your time and take care!\"          "

## 2024-03-08 ENCOUNTER — ANTICOAGULATION THERAPY VISIT (OUTPATIENT)
Dept: ANTICOAGULATION | Facility: CLINIC | Age: 84
End: 2024-03-08

## 2024-03-08 ENCOUNTER — TELEPHONE (OUTPATIENT)
Dept: ANTICOAGULATION | Facility: CLINIC | Age: 84
End: 2024-03-08

## 2024-03-08 ENCOUNTER — MEDICAL CORRESPONDENCE (OUTPATIENT)
Dept: HEALTH INFORMATION MANAGEMENT | Facility: CLINIC | Age: 84
End: 2024-03-08

## 2024-03-08 ENCOUNTER — LAB (OUTPATIENT)
Dept: LAB | Facility: CLINIC | Age: 84
End: 2024-03-08
Payer: MEDICARE

## 2024-03-08 DIAGNOSIS — Z79.01 LONG TERM CURRENT USE OF ANTICOAGULANT THERAPY: ICD-10-CM

## 2024-03-08 DIAGNOSIS — D68.51 ACTIVATED PROTEIN C RESISTANCE (H): ICD-10-CM

## 2024-03-08 DIAGNOSIS — D68.51 HETEROZYGOUS FACTOR V LEIDEN MUTATION (H): ICD-10-CM

## 2024-03-08 DIAGNOSIS — Z86.718 PERSONAL HISTORY OF DVT (DEEP VEIN THROMBOSIS): ICD-10-CM

## 2024-03-08 DIAGNOSIS — Z86.718 PERSONAL HISTORY OF DVT (DEEP VEIN THROMBOSIS): Primary | ICD-10-CM

## 2024-03-08 LAB — INR BLD: 1.8 (ref 0.9–1.1)

## 2024-03-08 PROCEDURE — 36416 COLLJ CAPILLARY BLOOD SPEC: CPT

## 2024-03-08 PROCEDURE — 85610 PROTHROMBIN TIME: CPT

## 2024-03-08 NOTE — PROGRESS NOTES
ANTICOAGULATION MANAGEMENT     Geneva Shepherd 83 year old female is on warfarin with subtherapeutic INR result. (Goal INR 2.0-3.0)    Recent labs: (last 7 days)     03/08/24  1415   INR 1.8*       ASSESSMENT     Source(s): Chart Review  Previous INR was Supratherapeutic  Medication, diet, health changes since last INR chart reviewed:  Intentional hold on Tuesday 3/5/24   Augmentin 10 day course (dates: 3/1 PM-3/12) which may be increasing INR today + Cipro and Flagyl recent stopped  ER visit on 3/1/24 for cellulitis of bilateral lower extremity          PLAN     Unable to reach Geneva today.    Left message to continue 5 mg tonight & 7.5 mg Sat/Sun this weekend. Request call back for assessment.    Follow up required to assess for changes  and discuss out of range result     Kathy Warren, RN  Anticoagulation Clinic  3/8/2024

## 2024-03-08 NOTE — PROGRESS NOTES
ANTICOAGULATION MANAGEMENT     Geneva Shepherd 83 year old female is on warfarin with subtherapeutic INR result. (Goal INR 2.0-3.0)    Recent labs: (last 7 days)     03/08/24  1415   INR 1.8*       ASSESSMENT     Source(s): Chart Review and Patient/Caregiver Call     Warfarin doses taken: Warfarin taken as instructed  Diet: No new diet changes identified  Medication/supplement changes:  see earlier note, will be done with this round of antibiotics on 3/12  New illness, injury, or hospitalization: No  Signs or symptoms of bleeding or clotting: No  Previous result: Supratherapeutic  Additional findings: None is feeling better, discussed if continues with sub inr may need adjustment of maint dose with next inr       PLAN     Recommended plan for temporary change(s) affecting INR     Dosing Instructions: Continue your current warfarin dose with next INR in 5 days       Summary  As of 3/8/2024      Full warfarin instructions:  5 mg every Mon, Fri; 7.5 mg all other days   Next INR check:  3/13/2024               Telephone call with Geneva who verbalizes understanding and agrees to plan    Lab visit scheduled    Education provided:   Please call back if any changes to your diet, medications or how you've been taking warfarin  Goal range and lab monitoring: goal range and significance of current result, Importance of therapeutic range, and Importance of following up at instructed interval    Plan made per [facility] anticoagulation protocol    Tiffany Flores RN  Anticoagulation Clinic  3/8/2024    _______________________________________________________________________     Anticoagulation Episode Summary       Current INR goal:  2.0-3.0   TTR:  73.8% (11.6 mo)   Target end date:  Indefinite   [facility]    Indications    Personal history of RLE DVT (deep vein thrombosis)(2003) [Z86.718]  Long term current use of anticoagulant therapy [Z79.01]  Activated protein C resistance (H24) (Resolved)  [D68.22]  Heterozygous factor V Leiden mutation (H24) [D68.51]             Comments:               Anticoagulation Care Providers       Provider Role Specialty Phone number    Jacoby Boo MD Referring Internal Medicine [phone]

## 2024-03-08 NOTE — TELEPHONE ENCOUNTER
FYI - Status Update    Who is Calling: patient    Update: Patient returned phone call for INR nurse.    Does caller want a call/response back: Yes     Could we send this information to you in [employer] or would you prefer to receive a phone call?:   Patient would prefer a phone call   Okay to leave a detailed message?: Yes at Home number on file [phone] (home)

## 2024-03-13 ENCOUNTER — ANTICOAGULATION THERAPY VISIT (OUTPATIENT)
Dept: ANTICOAGULATION | Facility: CLINIC | Age: 84
End: 2024-03-13

## 2024-03-13 ENCOUNTER — OFFICE VISIT (OUTPATIENT)
Dept: INTERNAL MEDICINE | Facility: CLINIC | Age: 84
End: 2024-03-13
Payer: MEDICARE

## 2024-03-13 VITALS
TEMPERATURE: 98.3 F | DIASTOLIC BLOOD PRESSURE: 78 MMHG | RESPIRATION RATE: 16 BRPM | SYSTOLIC BLOOD PRESSURE: 132 MMHG | HEIGHT: 66 IN | OXYGEN SATURATION: 97 % | BODY MASS INDEX: 27.88 KG/M2 | HEART RATE: 78 BPM | WEIGHT: 173.5 LBS

## 2024-03-13 DIAGNOSIS — D68.51 HETEROZYGOUS FACTOR V LEIDEN MUTATION (H): ICD-10-CM

## 2024-03-13 DIAGNOSIS — Z86.718 PERSONAL HISTORY OF DVT (DEEP VEIN THROMBOSIS): Primary | ICD-10-CM

## 2024-03-13 DIAGNOSIS — Z79.01 LONG TERM CURRENT USE OF ANTICOAGULANT THERAPY: ICD-10-CM

## 2024-03-13 DIAGNOSIS — F32.0 MAJOR DEPRESSIVE DISORDER, SINGLE EPISODE, MILD (H): ICD-10-CM

## 2024-03-13 DIAGNOSIS — D68.51 ACTIVATED PROTEIN C RESISTANCE (H): ICD-10-CM

## 2024-03-13 DIAGNOSIS — Z86.718 PERSONAL HISTORY OF DVT (DEEP VEIN THROMBOSIS): ICD-10-CM

## 2024-03-13 DIAGNOSIS — E83.52 HYPERCALCEMIA: ICD-10-CM

## 2024-03-13 DIAGNOSIS — I83.90 ASYMPTOMATIC VARICOSE VEINS: ICD-10-CM

## 2024-03-13 DIAGNOSIS — Z87.2 HISTORY OF CELLULITIS: ICD-10-CM

## 2024-03-13 PROBLEM — K63.5 POLYP OF COLON: Status: ACTIVE | Noted: 2021-10-05

## 2024-03-13 PROBLEM — D12.2 BENIGN NEOPLASM OF ASCENDING COLON: Status: ACTIVE | Noted: 2021-10-07

## 2024-03-13 PROBLEM — R10.84 GENERALIZED ABDOMINAL PAIN: Status: ACTIVE | Noted: 2024-03-13

## 2024-03-13 LAB — INR BLD: 2.8 (ref 0.9–1.1)

## 2024-03-13 PROCEDURE — 36416 COLLJ CAPILLARY BLOOD SPEC: CPT | Performed by: INTERNAL MEDICINE

## 2024-03-13 PROCEDURE — 99214 OFFICE O/P EST MOD 30 MIN: CPT | Performed by: INTERNAL MEDICINE

## 2024-03-13 PROCEDURE — 85610 PROTHROMBIN TIME: CPT | Performed by: INTERNAL MEDICINE

## 2024-03-13 RX ORDER — PAROXETINE 20 MG/1
40 TABLET, FILM COATED ORAL AT BEDTIME
Qty: 180 TABLET | Refills: 3 | Status: SHIPPED | OUTPATIENT
Start: 2024-03-13

## 2024-03-13 ASSESSMENT — PATIENT HEALTH QUESTIONNAIRE - PHQ9: SUM OF ALL RESPONSES TO PHQ QUESTIONS 1-9: 0

## 2024-03-13 ASSESSMENT — PAIN SCALES - GENERAL: PAINLEVEL: NO PAIN (0)

## 2024-03-13 NOTE — PROGRESS NOTES
ANTICOAGULATION MANAGEMENT     Geneva Richardson Mikeyalla 83 year old female is on warfarin with therapeutic INR result. (Goal INR 2.0-3.0)    Recent labs: (last 7 days)     03/13/24  1345   INR 2.8*       ASSESSMENT     Source(s): Chart Review and Patient/Caregiver Call     Warfarin doses taken: Warfarin taken as instructed  Diet: No new diet changes identified  Medication/supplement changes:  completed abx on Monday 3/11/24 and went back to former maintenance dose  New illness, injury, or hospitalization: No  Signs or symptoms of bleeding or clotting: No  Previous result: Subtherapeutic  Additional findings: None       PLAN     Recommended plan for no diet, medication or health factor changes affecting INR     Dosing Instructions: Continue your current warfarin dose with next INR in 2 weeks       Summary  As of 3/13/2024      Full warfarin instructions:  5 mg every Mon, Fri; 7.5 mg all other days   Next INR check:  3/27/2024               Telephone call with Geneva who verbalizes understanding and agrees to plan    Lab visit scheduled    Education provided:   Please call back if any changes to your diet, medications or how you've been taking warfarin    Plan made per [facility] anticoagulation protocol    Robert Fulton RN  Anticoagulation Clinic  3/13/2024    _______________________________________________________________________     Anticoagulation Episode Summary       Current INR goal:  2.0-3.0   TTR:  73.5% (11.6 mo)   Target end date:  Indefinite   Send INR reminders to:  MARIAELENA SHANKS [facility]    Indications    Personal history of RLE DVT (deep vein thrombosis)(2003) [Z86.718]  Long term current use of anticoagulant therapy [Z79.01]  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

## 2024-03-13 NOTE — PROGRESS NOTES
ASSESSMENT:    1. History of cellulitis  Recently completed Augmentin therapy.  Cellulitis resolved    2. Hypercalcemia  Recent calcium level 5.5.  Asymptomatic.  Previous PTH level normal.  Denies calcium supplementation.  Will stop magnesium supplements as this may contribute.  Recheck lab 1 month  - Basic metabolic panel; Future  - Parathyroid Hormone Intact; Future    3. Major depressive disorder, single episode, mild (H24)  Controlled.  PHQ-9 = 0.  Continue paroxetine  - PARoxetine (PAXIL) 20 MG tablet; Take 2 tablets (40 mg) by mouth at bedtime  Dispense: 180 tablet; Refill: 3    4. Asymptomatic varicose veins  Well-controlled with compression therapy    5. Heterozygous factor V Leiden mutation (H24)  On long-term anticoagulation with warfarin.  Due for INR today as previously ordered      PLAN:   Stop Magnesium supplement to see if contributing to high calcium level Continue other current medications. Paroxetine refilled   Lab today for INR  Continue compression stocking use  Call  [phone] or use [employer] to schedule a future lab appointment  non-fasting in 1 month to recheck calcium, PTH levels. May due when coming in for future INR           Subjective   Geneva is a 83 year old, presenting for the following health issues:  Hospital F/U        3/13/2024    12:56 PM   Additional Questions   Roomed by Leslie REECE CMA     [facility]       ED/UC Followup:    Facility:  [facility]  Date of visit: 3-1-24  Reason for visit: LEG [PAIN  DX: Cellulitis of lower extremity,   Current Status: had home care, now feeling better-balance is not as good but working on it.     ER note reviewed. Part of the  note as below:    Chief Complaint:  Dizziness and Sore        The history is provided by the patient.      Geneva Shepherd is a 83 year old female on Warfarin with history of hyperlipidemia, deep venous thrombosis, degenerative joint disease who presents with bilateral leg swelling, redness, and tightness. Patient states  "she woke up this morning and her legs were \"tight\". She states she is having some leg soreness. Denies itchiness or injuries to legs but has been having open sores. Patient mentions that she was diagnosed with diverticulitis this week and has been put on Cipro and Flagyl. Denies fever, body aches or chills. Patient has history of blood clots.           Physical Exam   Patient Vitals for the past 24 hrs:    BP Temp Pulse Resp SpO2 Height Weight   03/01/24 1912 -- -- 79 17 -- -- --   03/01/24 1844 -- -- -- -- -- 1.676 m (5' 6\") 79.4 kg (175 lb)   03/01/24 1840 (!) 143/70 -- 76 17 95 % -- --   03/01/24 1634 128/57 97.2  F (36.2  C) 79 18 97 % -- --         Physical Exam  General: alert, lying comfortably on gurney  HENT: mucous membranes moist  CV: regular rate, regular rhythm  Resp: normal effort, clear throughout, no crackles or wheezing  GI: abdomen soft and nontender, no guarding  MSK: no bony tenderness  Skin: appropriately warm and dry  Extremities: Bilateral lower extremities with mild swelling, nonpitting edema.  To bilateral legs below the knees, right greater than left.  The erythema starts 2 to 3 cm below the knee, extends to the ankle.  Blanches easily.  Mild petechiae overlying.  No obvious open wounds or sores.  2+ DP pulses bilaterally.  Full range of motion in bilateral knees and ankles without pain.  Compartments are soft.  Feet are warm, well-perfused, cap refill less than 2 seconds.  Fine to sensation grossly intact.  5 out of 5 dorsiflexion, plantarflexion, knee extension, knee flexion.  Neuro: alert, clear speech, oriented  Psych: normal mood and affect        Emergency Department Course   ECG  ECG taken at 1627, ECG read at 1942  Normal sinus rhythm   Possible left atrial enlargement   Anteroseptal infarct, age undetermined  Abnormal ECG   No changes as compared to prior, dated 3/29/22.  Rate 77 bpm. VT interval 172 ms. QRS duration 80 ms. QT/QTc 386/436 ms. P-R-T axes 39 10 52.      Imaging:  No " orders to display         Laboratory:        Labs Ordered and Resulted from Time of ED Arrival to Time of ED Departure   COMPREHENSIVE METABOLIC PANEL - Abnormal       Result Value      Sodium 134 (*)       Potassium 4.0        Carbon Dioxide (CO2) 25        Anion Gap 11        Urea Nitrogen 6.7 (*)       Creatinine 0.67        GFR Estimate 87        Calcium 10.5 (*)       Chloride 98        Glucose 107 (*)       Alkaline Phosphatase 130        AST 30        ALT 18        Protein Total 7.8        Albumin 4.3        Bilirubin Total 0.6      TROPONIN T, HIGH SENSITIVITY - Abnormal     Troponin T, High Sensitivity 17 (*)     NT PROBNP INPATIENT - Normal     N terminal Pro BNP Inpatient 315      CBC WITH PLATELETS AND DIFFERENTIAL     WBC Count 7.6        RBC Count 4.47        Hemoglobin 14.3        Hematocrit 43.5        MCV 97        MCH 32.0        MCHC 32.9        RDW 13.1        Platelet Count 183        % Neutrophils 73        % Lymphocytes 16        % Monocytes 9        % Eosinophils 2        % Basophils 0        % Immature Granulocytes 0        NRBCs per 100 WBC 0        Absolute Neutrophils 5.5        Absolute Lymphocytes 1.2        Absolute Monocytes 0.7        Absolute Eosinophils 0.2        Absolute Basophils 0.0        Absolute Immature Granulocytes 0.0        Absolute NRBCs 0.0      TROPONIN T, HIGH SENSITIVITY   INR         Emergency Department Course & Assessments:     Interventions:  Medications   amoxicillin-clavulanate (AUGMENTIN) 875-125 MG per tablet 1 tablet (has no administration in time range)         Assessments:  1927    I obtained history and examined the patient as noted above.   2044    I rechecked and updated the patient.      Independent Interpretation (X-rays, CTs, rhythm strip):  None     Consultations/Discussion of Management or Tests:       ED Course as of 03/01/24 1943   Fri Mar 01, 2024   1939 I reviewed the patient with the ED pharmacist.  We agree that simplifying antibiotics to  Augmentin only would be a better approach, given this is first-line for diverticulitis, and will also provide gram-positive coverage for suspected cellulitis.         Social Determinants of Health affecting care:   None     Disposition:  The patient was discharged.      Impression & Plan          Medical Decision Making:  Geneva Shepherd is a 83 year old female with a history of DVT, DJD, presenting today with bilateral lower extremity swelling and redness.  On exam, the patient is afebrile, well-appearing.  She has no signs to suggest arterial ischemia, compartment syndrome, and no suspicion for congestive heart failure.  BN peptide is within normal limits.  EKG without ischemia.  Troponin is slightly elevated, but flat.  Overall, symptoms are not consistent with acute coronary syndrome, and testing confirms this.  Her INR is therapeutic.  The appearance of her legs is consistent with cellulitis, given therapeutic INR, I do not think she needs additional imaging for DVT.  In regards to the cellulitis, she is well-appearing, without fever or elevated white blood cell count.  She is ambulatory without assistance.  I discussed the patient briefly with the ED pharmacist, and I recommend discontinuing Cipro and Flagyl for diverticulitis, and starting Augmentin instead.  This is good coverage for diverticulitis, and will also provide gram-positive coverage for cellulitis.  I recommended return to the ED for any progression of redness, increased pain, swelling, fevers, otherwise, recommend follow-up with PCP in 4 to 5 days for recheck of symptoms.           Diagnosis:      ICD-10-CM     1. Cellulitis of lower extremity, unspecified laterality  L03.119               Discharge Medications:       New Prescriptions     AMOXICILLIN-CLAVULANATE (AUGMENTIN) 875-125 MG TABLET    Take 1 tablet by mouth 2 times daily for 10 days            Scribe Disclosure:  Raffi WARD, am serving as a scribe at 7:33 PM on 3/1/2024 to  "document services personally performed by Larisa Navarrete MD based on my observations and the provider's statements to me.   3/1/2024   Larisa Navarrete MD Pepper, Tracy Lynn, MD  03/04/24 0138           ED Provider Notes  Malick Lovett MD (Physician)  Emergency Medicine  PIT/Triage Evaluation     Patient presented with bilateral leg tightness and swelling today.  She woke up feeling her legs were \"tight\".  She also notes some increased swelling.  She also feels a little lightheaded today.  She was started 3 days ago on Cipro and Flagyl for diverticulitis.  She has a history of factor V Leiden and is on warfarin.  INR was obtained today and was 2.6.  No history of heart disease.     Exam is notable for:  Patient Vitals for the past 24 hrs:    BP Temp Pulse Resp SpO2   03/01/24 1634 128/57 97.2  F (36.2  C) 79 18 97 %     General: Alert, pleasant female of stated age  HENT: Atraumatic, normocephalic  Eyes: Extraocular movements intact  Cardiovascular: Regular rate  Pulmonary: Easy work of breathing  Skin: Warm and dry, erythema over the anterior aspect of bilateral lower extremities  MSK: Mild swelling in bilateral lower extremities  Neuro: Alert and oriented, motor and sensation grossly intact     Appropriate interventions for symptom management were initiated if applicable.  Appropriate diagnostic tests were initiated if indicated.     Important information for subsequent clinician:  Labs ordered. and EKG ordered       I briefly evaluated the patient and developed an initial plan of care. I discussed this plan and explained that this brief interaction does not constitute a full evaluation. Patient/family understands that they should wait to be fully evaluated and discuss any test results with another clinician prior to leaving the hospital.     Malick Lovett MD  03/01/24 4409           Was changed from Cirp and Flagyl that had been on prior to ER for diverticulitis to Augmentin and LEs sx " "resolved nd no abd pain now. Last abx done 2 days ago. Needs INR recheck today.    Had some prior lightheadedness but that has also resolved. Did some recent physical therapy through home care.   Mood good with axil. PHQ9 = 0 today  Using compression Stockings    Additional ROS:   Constitutional, HEENT, Cardiovascular, Pulmonary, GI and , Neuro, MSK and Psych review of systems/symptoms are otherwise negative or unchanged from previous, except as noted above.      OBJECTIVE:  /78   Pulse 78   Temp 98.3  F (36.8  C) (Tympanic)   Resp 16   Ht 1.676 m (5' 6\")   Wt 78.7 kg (173 lb 8 oz)   LMP  (LMP Unknown)   SpO2 97%   Breastfeeding No   BMI 28.00 kg/m     Estimated body mass index is 28 kg/m  as calculated from the following:    Height as of this encounter: 1.676 m (5' 6\").    Weight as of this encounter: 78.7 kg (173 lb 8 oz).     Neck: no adenopathy. Thyroid normal to palpation. No bruits  Pulm: Lungs clear to auscultation   CV: Regular rates and rhythm  GI: Soft, nontender, Normal active bowel sounds, No hepatosplenomegaly or masses palpable  Ext: Peripheral pulses intact.  Mild BLE edema. Nontender varicose veins. No increased warmth. Minimal erythema  BLE calves  Neuro: Normal strength and tone, sensory exam grossly normal      MED REC REQUIRED  Post Medication Reconciliation Status:  Discharge medications reconciled and changed, see notes/orders       (Chart documentation was completed, in part, with [employer] voice-recognition software. Even though reviewed, some grammatical, spelling, and word errors may remain.)    Jacoby Boo MD  Internal Medicine Department  [facility]            "

## 2024-03-13 NOTE — PATIENT INSTRUCTIONS
Stop Magnesium supplement to see if contributing to high calcium level Continue other current medications. Paroxetine refilled   Lab today for INR  Continue compression stocking use  Call  [phone] or use [employer] to schedule a future lab appointment  non-fasting in 1 month to recheck calcium, PTH levels. May due when coming in for future INR

## 2024-03-28 ENCOUNTER — ANTICOAGULATION THERAPY VISIT (OUTPATIENT)
Dept: ANTICOAGULATION | Facility: CLINIC | Age: 84
End: 2024-03-28

## 2024-03-28 ENCOUNTER — LAB (OUTPATIENT)
Dept: LAB | Facility: CLINIC | Age: 84
End: 2024-03-28
Payer: MEDICARE

## 2024-03-28 DIAGNOSIS — Z79.01 LONG TERM CURRENT USE OF ANTICOAGULANT THERAPY: ICD-10-CM

## 2024-03-28 DIAGNOSIS — D68.51 ACTIVATED PROTEIN C RESISTANCE (H): ICD-10-CM

## 2024-03-28 DIAGNOSIS — Z86.718 PERSONAL HISTORY OF DVT (DEEP VEIN THROMBOSIS): ICD-10-CM

## 2024-03-28 DIAGNOSIS — D68.51 HETEROZYGOUS FACTOR V LEIDEN MUTATION (H): ICD-10-CM

## 2024-03-28 DIAGNOSIS — Z86.718 PERSONAL HISTORY OF DVT (DEEP VEIN THROMBOSIS): Primary | ICD-10-CM

## 2024-03-28 LAB — INR BLD: 2.4 (ref 0.9–1.1)

## 2024-03-28 PROCEDURE — 36416 COLLJ CAPILLARY BLOOD SPEC: CPT

## 2024-03-28 PROCEDURE — 85610 PROTHROMBIN TIME: CPT

## 2024-03-28 NOTE — PROGRESS NOTES
ANTICOAGULATION MANAGEMENT     Geneva Shepherd 83 year old female is on warfarin with therapeutic INR result. (Goal INR 2.0-3.0)    Recent labs: (last 7 days)     03/28/24  1417   INR 2.4*       ASSESSMENT     Source(s): Chart Review and Patient/Caregiver Call     Warfarin doses taken: Warfarin taken as instructed  Diet: No new diet changes identified  Medication/supplement changes: None noted  New illness, injury, or hospitalization: No  Signs or symptoms of bleeding or clotting: No  Previous result: Therapeutic last 2(+) visits  Additional findings: None       PLAN     Recommended plan for no diet, medication or health factor changes affecting INR     Dosing Instructions: Continue your current warfarin dose with next INR in 3 weeks       Summary  As of 3/28/2024      Full warfarin instructions:  5 mg every Mon, Fri; 7.5 mg all other days   Next INR check:  4/18/2024               Telephone call with Geneva who verbalizes understanding and agrees to plan    Lab visit scheduled    Education provided:   Contact [phone]  with any changes, questions or concerns.     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

## 2024-04-07 ENCOUNTER — TRANSFERRED RECORDS (OUTPATIENT)
Dept: HEALTH INFORMATION MANAGEMENT | Facility: CLINIC | Age: 84
End: 2024-04-07
Payer: MEDICARE

## 2024-04-18 ENCOUNTER — HOSPITAL ENCOUNTER (EMERGENCY)
Facility: CLINIC | Age: 84
Discharge: HOME | End: 2024-04-18
Attending: EMERGENCY MEDICINE | Admitting: EMERGENCY MEDICINE
Payer: MEDICARE

## 2024-04-18 VITALS
OXYGEN SATURATION: 95 % | HEIGHT: 66 IN | RESPIRATION RATE: 16 BRPM | WEIGHT: 170 LBS | HEART RATE: 95 BPM | SYSTOLIC BLOOD PRESSURE: 111 MMHG | DIASTOLIC BLOOD PRESSURE: 79 MMHG | TEMPERATURE: 98.2 F | BODY MASS INDEX: 27.32 KG/M2

## 2024-04-18 DIAGNOSIS — N30.00 ACUTE CYSTITIS WITHOUT HEMATURIA: ICD-10-CM

## 2024-04-18 LAB
ALBUMIN UR-MCNC: 10 MG/DL
APPEARANCE UR: ABNORMAL
BACTERIA #/AREA URNS HPF: ABNORMAL /HPF
BILIRUB UR QL STRIP: NEGATIVE
COLOR UR AUTO: YELLOW
GLUCOSE UR STRIP-MCNC: NEGATIVE MG/DL
HGB UR QL STRIP: NEGATIVE
KETONES UR STRIP-MCNC: NEGATIVE MG/DL
LEUKOCYTE ESTERASE UR QL STRIP: ABNORMAL
MUCOUS THREADS #/AREA URNS LPF: PRESENT /LPF
NITRATE UR QL: POSITIVE
PH UR STRIP: 5.5 [PH] (ref 5–7)
RBC URINE: 2 /HPF
SP GR UR STRIP: 1.02 (ref 1–1.03)
SQUAMOUS EPITHELIAL: 3 /HPF
UROBILINOGEN UR STRIP-MCNC: NORMAL MG/DL
WBC CLUMPS #/AREA URNS HPF: PRESENT /HPF
WBC URINE: >182 /HPF

## 2024-04-18 PROCEDURE — 87086 URINE CULTURE/COLONY COUNT: CPT | Performed by: EMERGENCY MEDICINE

## 2024-04-18 PROCEDURE — 81001 URINALYSIS AUTO W/SCOPE: CPT | Performed by: EMERGENCY MEDICINE

## 2024-04-18 PROCEDURE — 99283 EMERGENCY DEPT VISIT LOW MDM: CPT

## 2024-04-18 RX ORDER — CEFPODOXIME PROXETIL 200 MG/1
200 TABLET, FILM COATED ORAL 2 TIMES DAILY
Qty: 20 TABLET | Refills: 0 | Status: SHIPPED | OUTPATIENT
Start: 2024-04-18 | End: 2024-04-28

## 2024-04-18 ASSESSMENT — ACTIVITIES OF DAILY LIVING (ADL)
ADLS_ACUITY_SCORE: 42
ADLS_ACUITY_SCORE: 42

## 2024-04-18 NOTE — DISCHARGE INSTRUCTIONS
Please contact your urology clinic tomorrow to arrange close follow-up.    Return to the ER for fever, vomiting, back pain, or any new concerns.    Drink plenty of fluids to stay hydrated.

## 2024-04-18 NOTE — ED PROVIDER NOTES
"  History     Chief Complaint:  Urinary Frequency       HPI   Geneva Shepherd is a 83 year old female with history of UTI who presents to the ED with a family member for evaluation of urinary frequency. She reports having urinary frequency and slight dysuria for the past five days along with being very weak and \"unstable\". She states getting UTI's frequently and finished her recent antibiotics that was prescribed by her urologist a week ago. Patient can self cath but hasn't done it for a long time. She  also endorses pain around her bladder area. Patient was admitted for same complaint a few months ago. Denies fever, vomiting, problems with eating or drinking.     Independent Historian:   The patient's granddaughter is present and provides additional history in regards to her prior infections    Review of External Notes:   Urology notes reviewed from April 3, 2024.  The patient had been on prior treatment with Macrobid until she had elevation of her transaminases.    Medications:    Elavil  Ascorbic acid  Beta carotene  Biotin  Chromium picolinate  Cyanocobalamin  Aricept  Fish oil  Flax seed oil  Folic acid  Carnitor  Levothyroxine  Paxil  Tumeric  Coumadin  Zince gluconate     Past Medical History:    Asymptomatic varicose veins  Depression  Diverticulitis of colon  DJD (degenerative joint disease)  Embolism and thrombosis of unspecified site  GERD  Hypercalcemia  Hyperlipidemia   Insomnia  Irritable bowel syndrome  Obesity  Phlebitis and thrombophlebitis of superficial vessels of lower extremities  Unspecified hypothyroidism  Urinary tract infection  Memory loss  Osteoarthrosis   Dilation of biliary tract    Past Surgical History:    Left hip arthroplasty x2  Right total knee arthroplasty  Cholecystectomy  Colonoscopy  Endoscopic US upper GI tract x2  Tubal ligation  Endometrial biopsy  Coronary angiogram  Varicose vein stripping      Physical Exam   Patient Vitals for the past 24 hrs:   BP Temp Temp src Pulse " "Resp SpO2 Height Weight   04/18/24 1641 111/79 98.2  F (36.8  C) Temporal 95 16 95 % 1.676 m (5' 6\") 77.1 kg (170 lb)      Physical Exam  Constitutional:       General: She is not in acute distress.     Appearance: Normal appearance. She is not diaphoretic.   HENT:      Head: Atraumatic.      Mouth/Throat:      Mouth: Mucous membranes are moist.   Eyes:      General: No scleral icterus.     Conjunctiva/sclera: Conjunctivae normal.   Cardiovascular:      Rate and Rhythm: Normal rate and regular rhythm.      Heart sounds: Normal heart sounds.   Pulmonary:      Effort: No respiratory distress.      Breath sounds: Normal breath sounds.   Abdominal:      General: Abdomen is flat. There is no distension.      Tenderness: There is no abdominal tenderness.   Musculoskeletal:      Cervical back: Neck supple.      Right lower leg: No edema.      Left lower leg: No edema.   Skin:     General: Skin is warm.      Findings: No rash.   Neurological:      General: No focal deficit present.      Mental Status: She is alert and oriented to person, place, and time.   Psychiatric:         Mood and Affect: Mood normal.         Behavior: Behavior normal.           Emergency Department Course     Laboratory:  Labs Ordered and Resulted from Time of ED Arrival to Time of ED Departure   ROUTINE UA WITH MICROSCOPIC REFLEX TO CULTURE - Abnormal       Result Value    Color Urine Yellow      Appearance Urine Cloudy (*)     Glucose Urine Negative      Bilirubin Urine Negative      Ketones Urine Negative      Specific Gravity Urine 1.018      Blood Urine Negative      pH Urine 5.5      Protein Albumin Urine 10 (*)     Urobilinogen Urine Normal      Nitrite Urine Positive (*)     Leukocyte Esterase Urine Large (*)     Bacteria Urine Many (*)     WBC Clumps Urine Present (*)     Mucus Urine Present (*)     RBC Urine 2      WBC Urine >182 (*)     Squamous Epithelials Urine 3 (*)    URINE CULTURE      Emergency Department Course & " Assessments:    Assessments/Consultations/Discussion of Management or Tests:  ED Course as of 04/19/24 0155   Thu Apr 18, 2024 1831 I obtained history and examined the patient as noted above.    1838 I prepared the patient to be discharged home.        Disposition:  The patient was discharged.     Impression & Plan      Medical Decision Making:  This patient is an 83-year-old who presents to the emergency department for evaluation of dysuria.  She has had frequent urinary tract infections and follows with urology.  She has had a complex resistance pattern in the past.  When she was admitted to the hospital in November she ultimately was discharged on Vantin which was effective.  The patient does not have evidence of pyelonephritis.  She feels comfortable with home management.  She will be prescribed Vantin.  She is to call her urologist tomorrow to arrange for the follow-up.  We discussed return precautions.    Diagnosis:    ICD-10-CM    1. Acute cystitis without hematuria  N30.00            Discharge Medications:  Discharge Medication List as of 4/18/2024  6:41 PM        START taking these medications    Details   cefpodoxime (VANTIN) 200 MG tablet Take 1 tablet (200 mg) by mouth 2 times daily for 10 days, Disp-20 tablet, R-0, E-Prescribe            Scribe Disclosure:  I, Lynnette Weller, am serving as a scribe at 6:41 PM on 4/18/2024 to document services personally performed by Dino Medrano MD based on my observations and the provider's statements to me.   4/18/2024   Dino Medrano MD McRoberts, Sean Edward, MD  04/19/24 0155

## 2024-04-19 ENCOUNTER — TELEPHONE (OUTPATIENT)
Dept: ANTICOAGULATION | Facility: CLINIC | Age: 84
End: 2024-04-19
Payer: MEDICARE

## 2024-04-19 DIAGNOSIS — Z79.01 LONG TERM CURRENT USE OF ANTICOAGULANT THERAPY: ICD-10-CM

## 2024-04-19 DIAGNOSIS — D68.51 HETEROZYGOUS FACTOR V LEIDEN MUTATION (H): ICD-10-CM

## 2024-04-19 DIAGNOSIS — Z86.718 PERSONAL HISTORY OF DVT (DEEP VEIN THROMBOSIS): Primary | ICD-10-CM

## 2024-04-19 LAB — BACTERIA UR CULT: ABNORMAL

## 2024-04-19 NOTE — TELEPHONE ENCOUNTER
"ANTICOAGULATION  MANAGEMENT: Discharge Review    Geneva Shepherd chart reviewed for anticoagulation continuity of care    Emergency room visit on 4/18/24 for acute cystitis w/o hematuria.    Discharge disposition: Home    Results:    No results for input(s): \"INR\", \"[id number]\", \"F2\", \"ALMWH\", \"AAUFH\" in the last 168 hours.    Anticoagulation inpatient management:     not applicable     Anticoagulation discharge instructions:     Warfarin dosing: home regimen continued   Bridging: No   INR goal change: No      Medication changes affecting anticoagulation: Yes: 10 day course of Cefpodoxime (4/18/24-4/28/24) - may raise INR level    Additional factors affecting anticoagulation: No     PLAN     Recommend to check INR on 4/22/24    Left VM with INR recheck recommendations    Anticoagulation Calendar updated    Kathy Warren RN    "

## 2024-04-20 ENCOUNTER — TELEPHONE (OUTPATIENT)
Dept: EMERGENCY MEDICINE | Facility: CLINIC | Age: 84
End: 2024-04-20
Payer: MEDICARE

## 2024-04-20 NOTE — TELEPHONE ENCOUNTER
[facility]    Reason for call: Lab Result Notification     Lab Result (including Rx patient on, if applicable).  If culture, copy of lab report at bottom.  Lab Result: Final Urine Culture Report on 4/19/24  [facility] discharge antibiotic prescribed: Cefpodoxime (Vantin) 200 MG tablet, 1 tablet (200 mg) by mouth 2 times daily for 10 days   Date of Rx (if applicable):  4/18/24     #1. Bacteria, >100,000 CFU/ML Escherichia coli is SUSCEPTIBLE to Antibiotic.    No change in treatment per [facility] ED lab result Urine Culture protocol.    Creatinine Level (mg/dl)   Creatinine   Date Value Ref Range Status   03/01/2024 0.67 0.51 - 0.95 mg/dL Final   06/02/2021 0.62 0.52 - 1.04 mg/dL Final    Creatinine clearance (ml/min), if applicable    Serum creatinine: 0.67 mg/dL 03/01/24 1650  Estimated creatinine clearance: 69 mL/min     Patient's current Symptoms:   Left voicemail message requesting a call back to [facility] ED Lab Result RN at [phone]. RN is available every day between 9 a.m. and 5:30 p.m.     Malick Rao RN

## 2024-04-22 ENCOUNTER — ANTICOAGULATION THERAPY VISIT (OUTPATIENT)
Dept: ANTICOAGULATION | Facility: CLINIC | Age: 84
End: 2024-04-22

## 2024-04-22 ENCOUNTER — LAB (OUTPATIENT)
Dept: LAB | Facility: CLINIC | Age: 84
End: 2024-04-22
Payer: MEDICARE

## 2024-04-22 DIAGNOSIS — Z86.718 PERSONAL HISTORY OF DVT (DEEP VEIN THROMBOSIS): Primary | ICD-10-CM

## 2024-04-22 DIAGNOSIS — D68.51 HETEROZYGOUS FACTOR V LEIDEN MUTATION (H): ICD-10-CM

## 2024-04-22 DIAGNOSIS — Z86.718 PERSONAL HISTORY OF DVT (DEEP VEIN THROMBOSIS): ICD-10-CM

## 2024-04-22 DIAGNOSIS — Z79.01 LONG TERM CURRENT USE OF ANTICOAGULANT THERAPY: ICD-10-CM

## 2024-04-22 DIAGNOSIS — D68.51 ACTIVATED PROTEIN C RESISTANCE (H): ICD-10-CM

## 2024-04-22 DIAGNOSIS — E83.52 HYPERCALCEMIA: ICD-10-CM

## 2024-04-22 LAB
ANION GAP SERPL CALCULATED.3IONS-SCNC: 9 MMOL/L (ref 7–15)
BUN SERPL-MCNC: 11.2 MG/DL (ref 8–23)
CALCIUM SERPL-MCNC: 10.3 MG/DL (ref 8.8–10.2)
CHLORIDE SERPL-SCNC: 102 MMOL/L (ref 98–107)
CREAT SERPL-MCNC: 0.59 MG/DL (ref 0.51–0.95)
DEPRECATED HCO3 PLAS-SCNC: 27 MMOL/L (ref 22–29)
EGFRCR SERPLBLD CKD-EPI 2021: 90 ML/MIN/1.73M2
GLUCOSE SERPL-MCNC: 130 MG/DL (ref 70–99)
INR BLD: 2.9 (ref 0.9–1.1)
POTASSIUM SERPL-SCNC: 4.5 MMOL/L (ref 3.4–5.3)
PTH-INTACT SERPL-MCNC: 73 PG/ML (ref 15–65)
SODIUM SERPL-SCNC: 138 MMOL/L (ref 135–145)

## 2024-04-22 PROCEDURE — 36416 COLLJ CAPILLARY BLOOD SPEC: CPT

## 2024-04-22 PROCEDURE — 36415 COLL VENOUS BLD VENIPUNCTURE: CPT

## 2024-04-22 PROCEDURE — 85610 PROTHROMBIN TIME: CPT

## 2024-04-22 PROCEDURE — 80048 BASIC METABOLIC PNL TOTAL CA: CPT

## 2024-04-22 PROCEDURE — 83970 ASSAY OF PARATHORMONE: CPT

## 2024-04-22 NOTE — PROGRESS NOTES
ANTICOAGULATION MANAGEMENT     Geneva Shepherd 83 year old female is on warfarin with therapeutic INR result. (Goal INR 2.0-3.0)    Recent labs: (last 7 days)     04/22/24  1507   INR 2.9*       ASSESSMENT     Source(s): Chart Review  Previous INR was Therapeutic last 2(+) visits  10 day course of Cefpodoxime (4/18/24-4/28/24). INR looks good despite use.          PLAN     Recommended plan for no diet, medication or health factor changes affecting INR     Dosing Instructions: Continue your current warfarin dose with next INR in 4 weeks       Summary  As of 4/22/2024      Full warfarin instructions:  5 mg every Mon, Fri; 7.5 mg all other days   Next INR check:  5/20/2024               Detailed voice message left for Geneva with dosing instructions and follow up date.     Contact [phone]  to schedule and with any changes, questions or concerns.     Education provided:   Please call back if any changes to your diet, medications or how you've been taking warfarin    Plan made per [facility] anticoagulation protocol    Robert Fulton RN  Anticoagulation Clinic  4/22/2024    _______________________________________________________________________     Anticoagulation Episode Summary       Current INR goal:  2.0-3.0   TTR:  73.5% (11.6 mo)   Target end date:  Indefinite   Send INR reminders to:  [facility]    Indications    Personal history of RLE DVT (deep vein thrombosis)(2003) [Z86.718]  Long term current use of anticoagulant therapy [Z79.01]  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

## 2024-04-23 ENCOUNTER — PATIENT OUTREACH (OUTPATIENT)
Dept: CARE COORDINATION | Facility: CLINIC | Age: 84
End: 2024-04-23
Payer: MEDICARE

## 2024-04-23 NOTE — PROGRESS NOTES
Clinic Care Coordination Contact  Community Health Worker Initial Outreach    CHW Initial Information Gathering:  Referral Source: ED Follow-Up  CHW Additional Questions  If ED/Hospital discharge, follow-up appointment scheduled as recommended?: Other (Pt has Urologist follow-up scheduled and declined to schedule ED follow-up with PCP at this time)  Sai active?: Yes    Patient accepts CC: No, patient declined at this time. She was appreciative to know of the program and may be interested in the future. CHW recommended she contact her clinic if any need for care coordination arises in the future and we can start a referral at that time. Patient will be sent Care Coordination introduction letter for future reference.       Rosalinda Pagan  Community Health Worker  Connected Care Resource Center, [facility]    *Connected Care Resource Team does NOT follow patient ongoing. Referrals are identified based on internal discharge reports and the outreach is to ensure patient has an understanding of their discharge instructions.

## 2024-04-23 NOTE — LETTER
Geneva Shepherd  [address]  [place] [address]    Dear Geneva Shepherd,      I am a team member within the Connected Care Resource Center with [facility]. I recently contacted you to ensure you are doing well following a visit within our health system. I also wanted to take this chance to introduce Clinic Care Coordination should you have any interest in this program in the future.    Below is a description of Clinic Care Coordination and how this team can further assist you:       The Clinic Care Coordination team is made up of a Registered Nurse, , Financial Resource Worker, and a Community Health Worker who understand and can help navigate the health care system. The goal of clinic care coordination is to help you manage your health, improve access to care, and achieve optimal health outcomes. They work alongside your provider to assist you in determining your health and social needs, obtain health care and community resources, and provide you with necessary information and education. Clinic Care Coordination can work with you through any barriers and develop a care plan that helps coordinate and strengthen the relationship between you and your care team.    If you wish to connect with the Clinic Care Coordination Team, please let your [facility] Primary Care Provider or Clinic Care Team know and they can place a referral. The Clinic Care Coordination team will then reach out by phone to further support you.    We are focused on providing you with the highest-quality healthcare experience possible.    Sincerely,   Your care team with [facility]

## 2024-04-26 ENCOUNTER — TRANSFERRED RECORDS (OUTPATIENT)
Dept: HEALTH INFORMATION MANAGEMENT | Facility: CLINIC | Age: 84
End: 2024-04-26
Payer: MEDICARE

## 2024-05-17 DIAGNOSIS — E03.9 HYPOTHYROIDISM, UNSPECIFIED TYPE: ICD-10-CM

## 2024-05-17 DIAGNOSIS — Z86.718 PERSONAL HISTORY OF DVT (DEEP VEIN THROMBOSIS): ICD-10-CM

## 2024-05-17 DIAGNOSIS — D68.51 HETEROZYGOUS FACTOR V LEIDEN MUTATION (H): ICD-10-CM

## 2024-05-17 DIAGNOSIS — Z79.01 LONG TERM CURRENT USE OF ANTICOAGULANT THERAPY: ICD-10-CM

## 2024-05-20 RX ORDER — LEVOTHYROXINE SODIUM 50 UG/1
50 TABLET ORAL DAILY
Qty: 90 TABLET | Refills: 0 | Status: SHIPPED | OUTPATIENT
Start: 2024-05-20 | End: 2024-08-01

## 2024-05-21 RX ORDER — WARFARIN SODIUM 5 MG/1
TABLET ORAL
Qty: 120 TABLET | Refills: 3 | Status: SHIPPED | OUTPATIENT
Start: 2024-05-21

## 2024-06-04 ENCOUNTER — TELEPHONE (OUTPATIENT)
Dept: INTERNAL MEDICINE | Facility: CLINIC | Age: 84
End: 2024-06-04
Payer: MEDICARE

## 2024-06-04 ENCOUNTER — TELEPHONE (OUTPATIENT)
Dept: ANTICOAGULATION | Facility: CLINIC | Age: 84
End: 2024-06-04
Payer: MEDICARE

## 2024-06-04 NOTE — TELEPHONE ENCOUNTER
ANTICOAGULATION     Geneva Shepherd is overdue for an INR check.     Spoke with Geneva who declined to schedule a lab appointment at this time. If calling back please schedule as soon as possible. Wants to ask PCP to order other labs and she will get INR done at the same time.    Robert Fulton RN

## 2024-06-04 NOTE — TELEPHONE ENCOUNTER
Reason for Call:  Appointment Request    Patient requesting this type of appt:  office visit- discuss health issues    Requested provider: Jacoby Boo    Reason patient unable to be scheduled: Not within requested timeframe    When does patient want to be seen/preferred time: 1-2 days    Comments: only pcp    Could we send this information to you in [facility] or would you prefer to receive a phone call?:   Patient would prefer a phone call   Okay to leave a detailed message?: Yes at Cell number on file:    Telephone Information:   Mobile [phone]       Call taken on 6/4/2024 at 11:32 AM by Dulce DIAZ

## 2024-06-04 NOTE — TELEPHONE ENCOUNTER
I have no openings  Wed or Friday this week. Will route to triage to call pt and get more info to see if appropriate for pt to be scheduled  for a virtual visit with me next week (video or telephone) to discuss or if requiring appt with other [facility] or other [facility] provider earlier

## 2024-06-04 NOTE — TELEPHONE ENCOUNTER
"Patient has episodes of fatigue \"It's been for a while\". Patient also states she would like to have labs ordered to ensure her urine infection from April has resolved, and would like to discuss her ongoing gait issues that are chronic.     No symptoms at this time.     Patient does not want VV, prefers to be seen in person. Please advise if patient may be seen in same or next day slot.   "

## 2024-06-07 ENCOUNTER — ANTICOAGULATION THERAPY VISIT (OUTPATIENT)
Dept: ANTICOAGULATION | Facility: CLINIC | Age: 84
End: 2024-06-07

## 2024-06-07 ENCOUNTER — LAB (OUTPATIENT)
Dept: LAB | Facility: CLINIC | Age: 84
End: 2024-06-07
Payer: MEDICARE

## 2024-06-07 DIAGNOSIS — D68.51 HETEROZYGOUS FACTOR V LEIDEN MUTATION (H): ICD-10-CM

## 2024-06-07 DIAGNOSIS — Z86.718 PERSONAL HISTORY OF DVT (DEEP VEIN THROMBOSIS): Primary | ICD-10-CM

## 2024-06-07 DIAGNOSIS — D68.51 ACTIVATED PROTEIN C RESISTANCE (H): ICD-10-CM

## 2024-06-07 DIAGNOSIS — Z86.718 PERSONAL HISTORY OF DVT (DEEP VEIN THROMBOSIS): ICD-10-CM

## 2024-06-07 DIAGNOSIS — Z79.01 LONG TERM CURRENT USE OF ANTICOAGULANT THERAPY: ICD-10-CM

## 2024-06-07 LAB — INR BLD: 2.5 (ref 0.9–1.1)

## 2024-06-07 PROCEDURE — 36416 COLLJ CAPILLARY BLOOD SPEC: CPT

## 2024-06-07 PROCEDURE — 85610 PROTHROMBIN TIME: CPT

## 2024-06-07 NOTE — PROGRESS NOTES
ANTICOAGULATION MANAGEMENT     Geneva Shepherd 83 year old female is on warfarin with therapeutic INR result. (Goal INR 2.0-3.0)    Recent labs: (last 7 days)     06/07/24  1617   INR 2.5*       ASSESSMENT     Source(s): Chart Review  Previous INR was Therapeutic last 2(+) visits  Medication, diet, health changes since last INR chart reviewed; none identified         PLAN     Recommended plan for no diet, medication or health factor changes affecting INR     Dosing Instructions: Continue your current warfarin dose with next INR in 5 weeks       Summary  As of 6/7/2024      Full warfarin instructions:  5 mg every Mon, Fri; 7.5 mg all other days   Next INR check:  7/12/2024               Unable to leave a voice message for Geneva with dosing instructions and follow up date, attempted several times. The phone rings and once there was a loud beep that sounded like a fax and on subsequent attempts the call dropped after several rings.   Sent [employer] message with dosing and follow up instructions. She does access her [employer] account and did so this week.     Contact [phone]  to schedule and with any changes, questions or concerns.     Education provided:   Please call back if any changes to your diet, medications or how you've been taking warfarin  Contact [phone]  with any changes, questions or concerns.     Plan made per [facility] anticoagulation protocol    Carmen Marsh RN  Anticoagulation Clinic  6/7/2024    _______________________________________________________________________     Anticoagulation Episode Summary       Current INR goal:  2.0-3.0   TTR:  73.5% (11.6 mo)   Target end date:  Indefinite   Send INR reminders to:  MARIAELENA DUMONT    Indications    Personal history of RLE DVT (deep vein thrombosis)(2003) [Z86.718]  Long term current use of anticoagulant therapy [Z79.01]  Activated protein C resistance (H24) (Resolved) [D68.51]  Heterozygous factor V Leiden mutation (H24) [D68.51]              Comments:               Anticoagulation Care Providers       Provider Role Specialty Phone number    Jacoby Boo MD Referring Internal Medicine [phone]

## 2024-06-10 NOTE — TELEPHONE ENCOUNTER
Next available in-person ap[pt with me is 6.17.24. Call pt and schedule appt at that time in current next day appt slot or see partner earlier if any acute changes

## 2024-06-17 ENCOUNTER — OFFICE VISIT (OUTPATIENT)
Dept: INTERNAL MEDICINE | Facility: CLINIC | Age: 84
End: 2024-06-17
Payer: MEDICARE

## 2024-06-17 VITALS
HEIGHT: 66 IN | BODY MASS INDEX: 28.75 KG/M2 | OXYGEN SATURATION: 97 % | TEMPERATURE: 97.6 F | HEART RATE: 75 BPM | DIASTOLIC BLOOD PRESSURE: 77 MMHG | SYSTOLIC BLOOD PRESSURE: 117 MMHG | WEIGHT: 178.9 LBS

## 2024-06-17 DIAGNOSIS — E21.3 HYPERPARATHYROIDISM (H): ICD-10-CM

## 2024-06-17 DIAGNOSIS — E03.9 HYPOTHYROIDISM, UNSPECIFIED TYPE: ICD-10-CM

## 2024-06-17 DIAGNOSIS — R53.83 OTHER FATIGUE: ICD-10-CM

## 2024-06-17 PROCEDURE — G2211 COMPLEX E/M VISIT ADD ON: HCPCS | Performed by: INTERNAL MEDICINE

## 2024-06-17 PROCEDURE — 99214 OFFICE O/P EST MOD 30 MIN: CPT | Performed by: INTERNAL MEDICINE

## 2024-06-17 NOTE — PROGRESS NOTES
ASSESSMENT:    1. Other fatigue  Improves when has better sleep number of hours.  Counseled to try to go to bed earlier at night.  Thyroid labs as below.  Has appointment with sleep clinic also tomorrow to evaluate for sleep apnea contributing to symptoms.  Has some nocturia but able to fall right back to sleep so unlikely to be contributing much to symptoms    2. Hypothyroidism, unspecified type  On levothyroxine.  Previous thyroid function normal.  Recheck with fatigue symptoms.  Continue current levothyroxine dose pending lab results  - TSH with free T4 reflex; Future    3. Hyperparathyroidism (H24)  Previous calcium 10.3 with PTH level 73.  Under observation at this time as asymptomatic.   Repeat labs in November  - Basic metabolic panel; Future  - Parathyroid Hormone Intact; Future      PLAN:  Try going to bed closer to 10:30pm to see if larger quantity of sleep hours helps your energy   Call  [phone] or use [employer] to schedule a future lab appointment  non-fasting in mid November 2024  for calcium, thyroid etc  Continue current medications  See sleep clinic tomorrow as scheduled to evaluate for possible apnea, other quality issue to sleep          Subjective   Geneva is a 83 year old, presenting for the following health issues:  Fatigue  Pt stated they have sleep study consult tomorrow       History of Present Illness       Vascular Disease:  She presents for follow up of vascular disease.     She never takes nitroglycerin. She is not taking daily aspirin.    She eats 2-3 servings of fruits and vegetables daily.She consumes 0 sweetened beverage(s) daily.She exercises with enough effort to increase her heart rate 10 to 19 minutes per day.  She exercises with enough effort to increase her heart rate 3 or less days per week.   She is taking medications regularly.       Most recent lab results reviewed with pt.       Some days tired when first wakes up and ome says no fatigue at all.  Was able to do  "activities in the yard yesterday through the day anmd feels \"reallly good and refreshed today.  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  Not trying to go to bed earlier,  Woke up  11:00 am this AM and usual wake-up  is 9:30am.  Usually better when gets more sleep  overall  in quantity. Has nocturia during the night and trying to limit fluid intake. Wears pads due to incontinence and working with  Urology. Able to fall  right back to sleep   On Elavil every  night. Rare benadryl at night and not feeling hung over the next day  Thyroid function normal when  having same sx of fatigue  [facility]. Had more regimented sleep regimen with  going to bed earlier and was much more rested then respite havcing to do therapy exercises today.  Has been in a good mood.  Denies current depression symptoms     Component      Latest Ref Rng 12/21/2023  3:44 PM   Sodium      135 - 145 mmol/L 134 (L)    Potassium      3.4 - 5.3 mmol/L 4.3    Carbon Dioxide (CO2)      22 - 29 mmol/L 27    Anion Gap      7 - 15 mmol/L 8    Urea Nitrogen      8.0 - 23.0 mg/dL 7.7 (L)    Creatinine      0.51 - 0.95 mg/dL 0.58    GFR Estimate      >60 mL/min/1.73m2 90    Calcium      8.8 - 10.2 mg/dL 10.5 (H)    Chloride      98 - 107 mmol/L 99    Glucose      70 - 99 mg/dL 94    Alkaline Phosphatase      40 - 150 U/L 141    AST      0 - 45 U/L 44    ALT      0 - 50 U/L 26    Protein Total      6.4 - 8.3 g/dL 7.2    Albumin      3.5 - 5.2 g/dL 4.0    Bilirubin Total      <=1.2 mg/dL 0.6    WBC      4.0 - 11.0 10e3/uL 9.2    RBC Count      3.80 - 5.20 10e6/uL 4.29    Hemoglobin      11.7 - 15.7 g/dL 14.2    Hematocrit      35.0 - 47.0 % 44.1    MCV      78 - 100 fL 103 (H)    MCH      26.5 - 33.0 pg 33.1 (H)    MCHC      31.5 - 36.5 g/dL 32.2    RDW      10.0 - 15.0 % 13.7    Platelet Count      150 - 450 10e3/uL 154    Iron      37 - 145 ug/dL 151 (H)  " "  Iron Binding Capacity      240 - 430 ug/dL 354    Iron Sat Index      15 - 46 % 43    TSH      0.30 - 4.20 uIU/mL 1.69    Ferritin      11 - 328 ng/mL 116    Cortisol Serum        ug/dL 7.3    Parathyroid Hormone Intact      15 - 65 pg/mL 38          Component      Latest Ref Rng 4/22/2024  3:07 PM   Sodium      135 - 145 mmol/L 138    Potassium      3.4 - 5.3 mmol/L 4.5    Chloride      98 - 107 mmol/L 102    Carbon Dioxide (CO2)      22 - 29 mmol/L 27    Anion Gap      7 - 15 mmol/L 9    Urea Nitrogen      8.0 - 23.0 mg/dL 11.2    Creatinine      0.51 - 0.95 mg/dL 0.59    GFR Estimate      >60 mL/min/1.73m2 90    Calcium      8.8 - 10.2 mg/dL 10.3 (H)    Glucose      70 - 99 mg/dL 130 (H)    Parathyroid Hormone Intact      15 - 65 pg/mL 73 (H)       Legend:  (H) High    Additional ROS:   Constitutional, HEENT, Cardiovascular, Pulmonary, GI and , Neuro, MSK and Psych review of systems/symptoms are otherwise negative or unchanged from previous, except as noted above.      OBJECTIVE:  /77   Pulse 75   Temp 97.6  F (36.4  C) (Temporal)   Ht 1.676 m (5' 6\")   Wt 81.1 kg (178 lb 14.4 oz)   LMP  (LMP Unknown)   SpO2 97%   BMI 28.88 kg/m     Estimated body mass index is 28.88 kg/m  as calculated from the following:    Height as of this encounter: 1.676 m (5' 6\").    Weight as of this encounter: 81.1 kg (178 lb 14.4 oz).     HENT: ear canals and TM's normal and nose and mouth without ulcers or lesions.  Mildly narrowed posterior pharyngeal airway  Neck: no adenopathy. Thyroid normal to palpation. No bruits  Pulm: Lungs clear to auscultation   CV: Regular rates and rhythm  GI: Soft, nontender, Normal active bowel sounds, No hepatosplenomegaly or masses palpable  Ext: Peripheral pulses intact. No edema.   Gen: Normal affect        The longitudinal plan of care for the diagnosis(es)/condition(s) as documented were addressed during this visit. Due to the added complexity in care, I will continue to support " Geneva in the subsequent management and with ongoing continuity of care.    (Chart documentation was completed, in part, with [email or web address] voice-recognition software. Even though reviewed, some grammatical, spelling, and word errors may remain.)    Jacoby Boo MD  Internal Medicine Department  [facility]

## 2024-06-17 NOTE — PATIENT INSTRUCTIONS
Try going to bed closer to 10:30pm to see if larger quantity of sleep hours helps your energy   Call  [phone] or use [employer] to schedule a future lab appointment  non-fasting in mid November 2024  for calcium, thyroid etc  Continue current medications  See sleep clinic tomorrow as scheduled to evaluate for possible apnea, other quality issue to sleep

## 2024-06-18 ENCOUNTER — OFFICE VISIT (OUTPATIENT)
Dept: SLEEP MEDICINE | Facility: CLINIC | Age: 84
End: 2024-06-18
Attending: INTERNAL MEDICINE
Payer: MEDICARE

## 2024-06-18 VITALS
DIASTOLIC BLOOD PRESSURE: 76 MMHG | SYSTOLIC BLOOD PRESSURE: 125 MMHG | BODY MASS INDEX: 28.77 KG/M2 | OXYGEN SATURATION: 96 % | HEART RATE: 83 BPM | HEIGHT: 66 IN | WEIGHT: 179 LBS

## 2024-06-18 DIAGNOSIS — G47.8 NON-RESTORATIVE SLEEP: Primary | ICD-10-CM

## 2024-06-18 DIAGNOSIS — R06.83 SNORING: ICD-10-CM

## 2024-06-18 DIAGNOSIS — R53.82 CHRONIC FATIGUE: ICD-10-CM

## 2024-06-18 PROCEDURE — 99203 OFFICE O/P NEW LOW 30 MIN: CPT | Performed by: INTERNAL MEDICINE

## 2024-06-18 ASSESSMENT — SLEEP AND FATIGUE QUESTIONNAIRES
HOW LIKELY ARE YOU TO NOD OFF OR FALL ASLEEP WHILE SITTING QUIETLY AFTER LUNCH WITHOUT ALCOHOL: SLIGHT CHANCE OF DOZING
HOW LIKELY ARE YOU TO NOD OFF OR FALL ASLEEP WHILE SITTING AND TALKING TO SOMEONE: WOULD NEVER DOZE
HOW LIKELY ARE YOU TO NOD OFF OR FALL ASLEEP WHILE LYING DOWN TO REST IN THE AFTERNOON WHEN CIRCUMSTANCES PERMIT: MODERATE CHANCE OF DOZING
HOW LIKELY ARE YOU TO NOD OFF OR FALL ASLEEP WHILE SITTING INACTIVE IN A PUBLIC PLACE: SLIGHT CHANCE OF DOZING
HOW LIKELY ARE YOU TO NOD OFF OR FALL ASLEEP WHEN YOU ARE A PASSENGER IN A CAR FOR AN HOUR WITHOUT A BREAK: WOULD NEVER DOZE
HOW LIKELY ARE YOU TO NOD OFF OR FALL ASLEEP WHILE WATCHING TV: MODERATE CHANCE OF DOZING
HOW LIKELY ARE YOU TO NOD OFF OR FALL ASLEEP WHILE SITTING AND READING: HIGH CHANCE OF DOZING
HOW LIKELY ARE YOU TO NOD OFF OR FALL ASLEEP IN A CAR, WHILE STOPPED FOR A FEW MINUTES IN TRAFFIC: WOULD NEVER DOZE

## 2024-06-18 NOTE — NURSING NOTE
"Chief Complaint   Patient presents with    Sleep Problem     Not getting quality sleep, it is affecting my daily activity       Initial /76   Pulse 83   Ht 1.676 m (5' 6\")   Wt 81.2 kg (179 lb)   LMP  (LMP Unknown)   SpO2 96%   BMI 28.89 kg/m   Estimated body mass index is 28.89 kg/m  as calculated from the following:    Height as of this encounter: 1.676 m (5' 6\").    Weight as of this encounter: 81.2 kg (179 lb).    Medication Reconciliation: complete  ESS   Neck circumference: 37  centimeters.  Rosalinda De Anda MA   "

## 2024-06-18 NOTE — PROGRESS NOTES
Sleep Consultation:    Date on this visit: 6/18/2024    Geneva Shepherd  is referred by Jacoby Boo for a sleep consultation.     Primary Physician: Jacoby Boo     Geneva Shepherd reports frequent poor quality of sleep for many years.     She is a retired RN, and lives alone. Her medical history is significant for hypothyroidism, factor V Leiden mutation and history of depression.     Patient complains of having poor sleep quality and excessive daytime fatigue.     Geneva does snore. Without a bed partner, we do not have an accurate assessment of snoring or witnessed apneas. Her sons have told her that she alvarez snore loudly. Patient sleeps on her side and occasionally on her back. She has frequent morning dry mouth, denies no morning headaches and restless legs.     Geneva goes to sleep at 1:00 AM. She wakes up at 9:30 AM. She falls asleep in 20 minutes.  Geneva has occasional difficulty falling asleep.  She wakes up 3-4 times a night for 10 minutes before falling back to sleep.  Geneva wakes up to go to the bathroom.  Patient gets an average of 7 hours of sleep per night.     Geneva denies any sleep walking, sleep talking, dream enactment, sleep paralysis, cataplexy, and hypnogogic/hypnopompic hallucinations.    Geneva denies difficulty breathing through her nose.      Patient's [place] Sleepiness score 9/24.      Geneva naps 0 times per week. She takes some inadvertant naps.  She denies closing eyes, dozing, and falling asleep while driving. Patient was counseled on the importance of driving while alert, to pull over if drowsy, or nap before getting into the vehicle if sleepy.      She uses 1 cups/day of coffee, 1 cups/day of tea. Last caffeine intake is usually before noon.    Problem List:  Patient Active Problem List    Diagnosis Date Noted    Generalized abdominal pain 03/13/2024     Priority: Medium    Hepatitis 12/07/2023     Priority: Medium    Hyponatremia 11/23/2023     Priority: Medium    Generalized weakness  11/23/2023     Priority: Medium    Heterozygous factor V Leiden mutation (H24) 10/19/2022     Priority: Medium    Dilation of biliary tract 10/10/2021     Priority: Medium    Recurrent UTI 10/10/2021     Priority: Medium    Benign neoplasm of ascending colon 10/07/2021     Priority: Medium    Polyp of colon 10/05/2021     Priority: Medium    Memory loss 07/16/2020     Priority: Medium    Gastroesophageal reflux disease, esophagitis presence not specified 11/23/2017     Priority: Medium     IMO Regulatory Load OCT 2020      Major depressive disorder, single episode, mild (H24) 03/14/2017     Priority: Medium    Knee joint replacement status 01/16/2017     Priority: Medium    Insomnia 02/17/2016     Priority: Medium    Hypothyroidism 01/01/2016     Priority: Medium    Long term current use of anticoagulant therapy 12/15/2015     Priority: Medium    Asymptomatic varicose veins, bilateral 09/03/2015     Priority: Medium    Personal history of RLE DVT (deep vein thrombosis)(2003) 09/03/2015     Priority: Medium    Hip joint replacement status: Left 8/31/15 08/31/2015     Priority: Medium    Elevated antinuclear antibody (JUAN ANTONIO) level 07/04/2015     Priority: Medium    Osteoarthrosis involving lower leg 01/21/2013     Priority: Medium     Problem list name updated by automated process. Provider to review  Replacing diagnoses that were inactivated after the 10/1/2021 regulatory import.      FACTOR 5 LEIDEN DEFECT (HYPERCOAGULABLE) 07/30/2003     Priority: Medium    Irritable bowel syndrome 09/09/2002     Priority: Medium        Past Medical/Surgical History:  Past Medical History:   Diagnosis Date    Ankle fracture, lateral malleolus, closed  March 2012    right ankle    Asymptomatic varicose veins     Depression, major     Diverticulitis of colon (without mention of hemorrhage)(562.11)     DJD (degenerative joint disease)     Elevated antinuclear antibody (JUAN ANTONIO) level 7/4/2015    minimal    Embolism and thrombosis of  "unspecified site 3/03    RLE    FACTOR 5 LEIDEN DEFECT (HYPERCOAGULABLE)      Heterozygote    FRACTURE OF RIGHT LATERAL PROXIMAL TIBIA     Gastro-oesophageal reflux disease     rare    Hypercalcemia     Hyperlipidemia LDL goal <160 10/31/2010    Insomnia     Irritable bowel syndrome     Obesity 2013    Pain in joint, lower leg     Phlebitis and thrombophlebitis of superficial vessels of lower extremities     right    Sprain of lumbar region     Unspecified hypothyroidism     Urinary tract infection, site not specified      Social History     Tobacco Use    Smoking status: Former     Current packs/day: 0.00     Types: Cigarettes     Quit date: 1968     Years since quittin.8    Smokeless tobacco: Never    Tobacco comments:     quit in    Vaping Use    Vaping status: Never Used   Substance Use Topics    Alcohol use: Not Currently     Comment: 1 glass of wine a month    Drug use: No     Physical Examination:  Vitals: /76   Pulse 83   Ht 1.676 m (5' 6\")   Wt 81.2 kg (179 lb)   LMP  (LMP Unknown)   SpO2 96%   BMI 28.89 kg/m    BMI= Body mass index is 28.89 kg/m .  GENERAL APPEARANCE: alert and active  HENT: oropharynx crowded  NEURO: mentation intact and speech normal  PSYCH: mentation appears normal and affect normal/bright  Mallampati Class: III.  Tonsillar Stage: 1  hidden by pillars.    Impression/Plan:    To rule out obstructive sleep apnea  Chronic fatigue     Patient is a 83 years old female, with BMI of 28, medical comorbidity of depression, hypothyroidism, who presents with history of nonrestorative sleep and excessive daytime fatigue.  Without a bed partner, she does not have an accurate estimation of snoring or witnessed apneas.  Her sons have previously told her that she snores loudly.  Oropharynx is Mallampati class III on examination.  In order to assess if there is sleep disordered breathing or other sleep fragmenting disorder to explain her daytime fatigue, I " recommended performing a PSG.    Plan:     PSG for assessment of nonrestorative sleep    She will follow up with me in approximately two weeks after her sleep study has been competed to review the results and discuss plan of care.       Polysomnography reviewed.  Obstructive sleep apnea reviewed.  Complications of untreated sleep apnea were reviewed.    I spent a total of 40 minutes for this appointment on this date of service which include time spent before, during and after the visit for chart review, patient care, counseling and coordination of care.    Dr. Alessio West     CC: Jacoby Boo

## 2024-07-19 ENCOUNTER — TELEPHONE (OUTPATIENT)
Dept: ANTICOAGULATION | Facility: CLINIC | Age: 84
End: 2024-07-19

## 2024-07-19 NOTE — TELEPHONE ENCOUNTER
ANTICOAGULATION     Geneva Shepherd is overdue for an INR check.     Left message for patient to call and schedule lab appointment as soon as possible. If returning call, please schedule.     Tiffany Flores RN

## 2024-07-23 ENCOUNTER — LAB (OUTPATIENT)
Dept: LAB | Facility: CLINIC | Age: 84
End: 2024-07-23
Payer: MEDICARE

## 2024-07-23 ENCOUNTER — ANTICOAGULATION THERAPY VISIT (OUTPATIENT)
Dept: ANTICOAGULATION | Facility: CLINIC | Age: 84
End: 2024-07-23

## 2024-07-23 DIAGNOSIS — D68.51 HETEROZYGOUS FACTOR V LEIDEN MUTATION (H): ICD-10-CM

## 2024-07-23 DIAGNOSIS — Z86.718 PERSONAL HISTORY OF DVT (DEEP VEIN THROMBOSIS): Primary | ICD-10-CM

## 2024-07-23 DIAGNOSIS — Z79.01 LONG TERM CURRENT USE OF ANTICOAGULANT THERAPY: ICD-10-CM

## 2024-07-23 DIAGNOSIS — D68.51 ACTIVATED PROTEIN C RESISTANCE (H): ICD-10-CM

## 2024-07-23 DIAGNOSIS — Z86.718 PERSONAL HISTORY OF DVT (DEEP VEIN THROMBOSIS): ICD-10-CM

## 2024-07-23 LAB — INR BLD: 2.5 (ref 0.9–1.1)

## 2024-07-23 PROCEDURE — 36416 COLLJ CAPILLARY BLOOD SPEC: CPT

## 2024-07-23 PROCEDURE — 85610 PROTHROMBIN TIME: CPT

## 2024-07-23 NOTE — PROGRESS NOTES
ANTICOAGULATION MANAGEMENT     Geneva Shepherd 83 year old female is on warfarin with therapeutic INR result. (Goal INR 2.0-3.0)    Recent labs: (last 7 days)     07/23/24  1535   INR 2.5*       ASSESSMENT     Source(s): Chart Review  Previous INR was Therapeutic last 2(+) visits  Medication, diet, health changes since last INR chart reviewed; none identified    I left a detailed voicemail with the orders reflected in flowsheet. I have also requested a call back if there have been any missed doses, concerns, illness, fever, or if there have been any changes in medications, activity level, or diet       PLAN     Recommended plan for no diet, medication or health factor changes affecting INR     Dosing Instructions: Continue your current warfarin dose with next INR in 7 weeks       Summary  As of 7/23/2024      Full warfarin instructions:  5 mg every Mon, Fri; 7.5 mg all other days   Next INR check:  9/10/2024               Detailed voice message left for Geneva with dosing instructions and follow up date.     Contact [phone] to schedule and with any changes, questions or concerns.     Education provided: Please call back if any changes to your diet, medications or how you've been taking warfarin    Plan made per [facility] anticoagulation protocol    Ronda Kerr, RN  Anticoagulation Clinic  7/23/2024    _______________________________________________________________________     Anticoagulation Episode Summary       Current INR goal:  2.0-3.0   TTR:  86.2% (11.6 mo)   Target end date:  Indefinite   Send INR reminders to:  [facility]    Indications    Personal history of RLE DVT (deep vein thrombosis)(2003) [Z86.718]  Long term current use of anticoagulant therapy [Z79.01]  Activated protein C resistance (H24) (Resolved) [D68.51]  Heterozygous factor V Leiden mutation (H24) [D68.51]             Comments:               Anticoagulation Care Providers       Provider Role Specialty Phone number    Rajeev  Jacoby VILLEDA MD [facility] Internal Medicine [phone]

## 2024-07-30 ENCOUNTER — TRANSFERRED RECORDS (OUTPATIENT)
Dept: HEALTH INFORMATION MANAGEMENT | Facility: CLINIC | Age: 84
End: 2024-07-30
Payer: MEDICARE

## 2024-07-30 DIAGNOSIS — G31.84 MILD COGNITIVE IMPAIRMENT: ICD-10-CM

## 2024-07-30 DIAGNOSIS — G47.00 INSOMNIA, UNSPECIFIED TYPE: ICD-10-CM

## 2024-07-30 RX ORDER — DONEPEZIL HYDROCHLORIDE 10 MG/1
TABLET, FILM COATED ORAL
Qty: 45 TABLET | Refills: 4 | Status: SHIPPED | OUTPATIENT
Start: 2024-07-30

## 2024-08-01 DIAGNOSIS — E03.9 HYPOTHYROIDISM, UNSPECIFIED TYPE: ICD-10-CM

## 2024-08-01 RX ORDER — LEVOTHYROXINE SODIUM 50 UG/1
50 TABLET ORAL DAILY
Qty: 90 TABLET | Refills: 2 | Status: SHIPPED | OUTPATIENT
Start: 2024-08-01

## 2024-08-16 ENCOUNTER — NURSE TRIAGE (OUTPATIENT)
Dept: INTERNAL MEDICINE | Facility: CLINIC | Age: 84
End: 2024-08-16
Payer: MEDICARE

## 2024-08-16 NOTE — TELEPHONE ENCOUNTER
" Per pt, this has been present for \"\"quite awhile\" so doesn't require  ER visit or Urgent care and doesn't sound like acute cellulitis requiring abx empirically.  Has known venous insufficiency that is likely causing but would need to examine area of concern further  before determining whether th refer to vascular vein clinic vs other. Would suggest that pt see me in clonic next Monday 8/19/24 in current open next day appt slot at 10:00 AM foe exam. If develops increasing redness or new fever over the weekend,m then be seen in urgent care. Schedule appt and inform pt  "

## 2024-08-16 NOTE — TELEPHONE ENCOUNTER
Spoke with pt and informed her of appt next week since triage has left for the day. Appt scheduled. Pt states sx present for at least a couple weeks and gets better with walking and has not been wearing compression stockings recently so also likely then related to venous insuff but will see next week

## 2024-08-16 NOTE — TELEPHONE ENCOUNTER
"Nurse Triage SBAR    Is this a 2nd Level Triage? YES, LICENSED PRACTITIONER REVIEW IS REQUIRED    Situation: Pt called the clinic reporting a very painful sore on her left ankle.     Background: Pt states she has never addressed this in the clinic prior. She reports a history of venous insufficiency.     Assessment: Pt currently rates pain a 9/10. The sore is painful all the time, even without touching the area. Pt denies a fever. She is unsure exactly how long it has been present but states, \"quite awhile.\" The sore has a \"small black hole\", with redness surrounding the area. The redness is approximately the size of a 50 cent piece.     Protocol Recommended Disposition:   Go To ED/UCC Now (Or To Office With PCP Approval)    Recommendation: Pt would prefer not to go to UC/ED, and would like PCP's opinion whether she could be seen in the clinic or wound clinic.     Routed to provider    Does the patient meet one of the following criteria for ADS visit consideration? 16+ years old, with an [facility] PCP     TIP  Providers, please consider if this condition is appropriate for management at one of our Acute and Diagnostic Services sites.     If patient is a good candidate, please use dotphrase <dot>triageresponse and select Refer to ADS to document.    Reason for Disposition   Black (necrotic), dark purple, or blisters develop in area of sore    Additional Information   Negative: Sounds like a life-threatening emergency to the triager   Negative: Followed a burn   Negative: Followed an injury to the skin (such as a cut or scrape)   Negative: Boil (abscess) suspected (painful red lump)   Negative: Widespread rash or redness   Negative: Cold sore suspected (i.e., fever blister sore on the outer lip)   Negative: Insect bite(s) suspected   Negative: Poison ivy, oak, or sumac rash suspected (e.g., itchy rash after contact with poison ivy)   Negative: Sore(s) on female genital area  (e.g., labia, vagina, vulva)   Negative: " "Sore(s) on male genital area (e.g., penis, scrotum)   Negative: Wound infection suspected (in traumatic or surgical wound)    Answer Assessment - Initial Assessment Questions  1. APPEARANCE of SORES: \"What do the sores look like?\"      Black spot in middle (small), red around it   2. NUMBER: \"How many sores are there?\"      1  3. SIZE: \"How big is the largest sore?\"      Small black hole, then reddened around it (size of 50 cent piece)  4. LOCATION: \"Where are the sores located?\"      Left ankle  5. ONSET: \"When did the sores begin?\"      \"Quite awhile\"  6. TENDER: \"Does it hurt when you touch it?\"  (Scale 1-10; or mild, moderate, severe)       Painful all the time, 9/10  7. CAUSE: \"What do you think is causing the sores?\"      Venous insufficiency, age  8. OTHER SYMPTOMS: \"Do you have any other symptoms?\" (e.g., fever, new weakness)      No fever    Protocols used: Sores-A-OH  Thank you,  Delfina Chicas, RN    "

## 2024-08-19 ENCOUNTER — TELEPHONE (OUTPATIENT)
Dept: WOUND CARE | Facility: CLINIC | Age: 84
End: 2024-08-19

## 2024-08-19 ENCOUNTER — OFFICE VISIT (OUTPATIENT)
Dept: INTERNAL MEDICINE | Facility: CLINIC | Age: 84
End: 2024-08-19
Payer: MEDICARE

## 2024-08-19 VITALS
HEART RATE: 87 BPM | DIASTOLIC BLOOD PRESSURE: 80 MMHG | HEIGHT: 66 IN | OXYGEN SATURATION: 95 % | SYSTOLIC BLOOD PRESSURE: 128 MMHG | WEIGHT: 179 LBS | TEMPERATURE: 97.1 F | BODY MASS INDEX: 28.77 KG/M2

## 2024-08-19 DIAGNOSIS — L03.115 CELLULITIS OF RIGHT LOWER EXTREMITY: Primary | ICD-10-CM

## 2024-08-19 DIAGNOSIS — L98.491 SKIN ULCER, LIMITED TO BREAKDOWN OF SKIN (H): ICD-10-CM

## 2024-08-19 DIAGNOSIS — I83.93 VARICOSE VEINS OF BOTH LOWER EXTREMITIES, UNSPECIFIED WHETHER COMPLICATED: ICD-10-CM

## 2024-08-19 PROCEDURE — 99214 OFFICE O/P EST MOD 30 MIN: CPT | Performed by: INTERNAL MEDICINE

## 2024-08-19 RX ORDER — CEPHALEXIN 500 MG/1
500 CAPSULE ORAL 4 TIMES DAILY
Qty: 28 CAPSULE | Refills: 0 | Status: SHIPPED | OUTPATIENT
Start: 2024-08-19 | End: 2024-08-26

## 2024-08-19 ASSESSMENT — PATIENT HEALTH QUESTIONNAIRE - PHQ9
SUM OF ALL RESPONSES TO PHQ QUESTIONS 1-9: 1
SUM OF ALL RESPONSES TO PHQ QUESTIONS 1-9: 1
10. IF YOU CHECKED OFF ANY PROBLEMS, HOW DIFFICULT HAVE THESE PROBLEMS MADE IT FOR YOU TO DO YOUR WORK, TAKE CARE OF THINGS AT HOME, OR GET ALONG WITH OTHER PEOPLE: NOT DIFFICULT AT ALL

## 2024-08-19 NOTE — PROGRESS NOTES
ASSESSMENT:    1. Cellulitis of right lower extremity  Mild.  Secondary to small skin ulceration.  Will treat with cephalexin for 7 days  - cephALEXin (KEFLEX) 500 MG capsule; Take 1 capsule (500 mg) by mouth 4 times daily for 7 days  Dispense: 28 capsule; Refill: 0    2. Skin ulcer, limited to breakdown of skin (H)  Secondary to superficial small varicose veins.  Risk for poor healing with thin skin and superficial veins.  Will refer to wound care for further evaluation/treatment.  Continue compression stocking over bandaged wound  - Wound Care Referral; Future    3. Varicose veins of both lower extremities, unspecified whether complicated  Small superficial varicose veins.  Continue compression therapy.  Not candidate for diuretic  - Wound Care Referral; Future      PLAN:  Cephalexin 500mg tab, 1 tab 4 times a day for 7 days for ankle skin infection   Referral to [facility].  Call for appt [phone]   Apply OTC Aquaphor to small ulcerations and  then cover with bandaid/other small dressing until seen by WoundCare  clinic  May continue compression stockings after bandaid placed   May use ES Tylenol 500mg tab, 1-2  tabs up to 3 times a day as needed for pain           Subjective   Geneva is a 83 year old, presenting for the following health issues:  Laceration (Leg)    History of Present Illness       Reason for visit:  Wound on left leg  Symptom onset:  1-2 weeks ago  Symptoms include:  Wound on left ankle, lots of redness and some pain  Symptom intensity:  Moderate  Symptom progression:  Worsening  Had these symptoms before:  No  What makes it worse:  None  What makes it better:  None    She eats 2-3 servings of fruits and vegetables daily.She consumes 0 sweetened beverage(s) daily.She exercises with enough effort to increase her heart rate 10 to 19 minutes per day.  She exercises with enough effort to increase her heart rate 3 or less days per week.   She is taking medications regularly.     Most  "recent lab results reviewed with pt.      History chronic superficial small varicose veins distal lower extremities.  Has developed a small 2-3 mm ulceration right lateral malleolus area present for about a couple weeks per patient.  No current drainage.  Has developed some increased redness around the ankle area in addition.  No current fevers, chills.  Mild pain at site of erythema.  No trauma.  Chronic lower extremity edema, mild.  Using compression stockings.  Denies shortness of breath, orthopnea, PND       Additional ROS:   Constitutional, HEENT, Cardiovascular, Pulmonary, GI and , Neuro, MSK and Psych review of systems/symptoms are otherwise negative or unchanged from previous, except as noted above.      OBJECTIVE:  /80   Pulse 87   Temp 97.1  F (36.2  C) (Temporal)   Ht 1.676 m (5' 6\")   Wt 81.2 kg (179 lb)   LMP  (LMP Unknown)   SpO2 95%   BMI 28.89 kg/m     Estimated body mass index is 28.89 kg/m  as calculated from the following:    Height as of this encounter: 1.676 m (5' 6\").    Weight as of this encounter: 81.2 kg (179 lb).     Neck: no adenopathy. Thyroid normal to palpation. No bruits. NOJVD  Pulm: Lungs clear to auscultation   CV: Regular rates and rhythm  GI: Soft, nontender, Normal active bowel sounds, No hepatosplenomegaly or masses palpable  Ext: Peripheral pulses intact. Mild BLE edema.  Multiple small and superficial varicose veins present.3mm ulceration present right lateral malleolus area with some surrounding erythema and mild increased warmth to palpation  Neuro: Normal strength and tone, sensory exam grossly normal.  Stable gait with a cane      (Chart documentation was completed, in part, with [employer] voice-recognition software. Even though reviewed, some grammatical, spelling, and word errors may remain.)    Jacoby Boo MD  Internal Medicine Department  [facility]            "

## 2024-08-19 NOTE — TELEPHONE ENCOUNTER
Consult received via Workqueue from Jacoby Boo MD in  INTERNAL MEDICINE  for wound of the ankles.    Does the patient have an open and draining wound? Yes    Please schedule with Lyela Wilkins PA-C, Cintia Vargas NP, Jourdan Og M.D., Blade Wetzel, KATHERIN, or Zach Abraham M.D. at [facility] [place] for next available appointment.    **For all providers, Kristie Lin PA-C, Dr. Hassan, Cintia Vargas NP or Dr. Abraham, please schedule a follow up 4 weeks after initial appointment.**  --If unable to schedule within 2-3 weeks then please place on cancellation list--    Is the patient able to make their own medical decisions? Yes    Can the patient be scheduled on a Wednesday (Leigh) or Thursday (Sam)? Yes    Is patient a RAJAN lift? PLEASE INQUIRE WHEN MAKING THE APPOINTMENT AND PUT IN APPOINTMENT NOTES    Routing to  Wound Healing Scheduling.

## 2024-08-19 NOTE — PATIENT INSTRUCTIONS
Cephalexin 500mg tab, 1 tab 4 times a day for 7 days for ankle skin infection   Referral to [facility].  Call for appt [phone]   Apply OTC Aquaphor to small ulcerations and  then cover with bandaid until seen by WoundCare  clinic  May continue compression stockings after bandaid placed  May use ES Tylenol 500mg tab, 1-2  tabs up to 3 times a day as needed for pain

## 2024-09-11 ENCOUNTER — HOSPITAL ENCOUNTER (OUTPATIENT)
Dept: WOUND CARE | Facility: CLINIC | Age: 84
Discharge: HOME | End: 2024-09-11
Attending: FAMILY MEDICINE | Admitting: FAMILY MEDICINE
Payer: MEDICARE

## 2024-09-11 ENCOUNTER — TELEPHONE (OUTPATIENT)
Dept: WOUND CARE | Facility: CLINIC | Age: 84
End: 2024-09-11

## 2024-09-11 VITALS — DIASTOLIC BLOOD PRESSURE: 92 MMHG | SYSTOLIC BLOOD PRESSURE: 162 MMHG | TEMPERATURE: 96.8 F | HEART RATE: 82 BPM

## 2024-09-11 DIAGNOSIS — L98.491 SKIN ULCER, LIMITED TO BREAKDOWN OF SKIN (H): ICD-10-CM

## 2024-09-11 DIAGNOSIS — R60.0 LOWER EXTREMITY EDEMA: ICD-10-CM

## 2024-09-11 DIAGNOSIS — M79.89 OTHER SPECIFIED SOFT TISSUE DISORDERS: ICD-10-CM

## 2024-09-11 DIAGNOSIS — I83.93 VARICOSE VEINS OF BOTH LOWER EXTREMITIES, UNSPECIFIED WHETHER COMPLICATED: ICD-10-CM

## 2024-09-11 DIAGNOSIS — L97.922 BILATERAL LEG ULCER, WITH FAT LAYER EXPOSED (H): ICD-10-CM

## 2024-09-11 DIAGNOSIS — L97.912 BILATERAL LEG ULCER, WITH FAT LAYER EXPOSED (H): ICD-10-CM

## 2024-09-11 DIAGNOSIS — S91.001A OPEN WOUND OF RIGHT ANKLE, INITIAL ENCOUNTER: Primary | ICD-10-CM

## 2024-09-11 PROCEDURE — 97602 WOUND(S) CARE NON-SELECTIVE: CPT

## 2024-09-11 PROCEDURE — 99204 OFFICE O/P NEW MOD 45 MIN: CPT | Performed by: FAMILY MEDICINE

## 2024-09-11 PROCEDURE — G0463 HOSPITAL OUTPT CLINIC VISIT: HCPCS | Mod: 25

## 2024-09-11 RX ORDER — AMOXICILLIN AND CLAVULANATE POTASSIUM 500; 125 MG/1; MG/1
1 TABLET, FILM COATED ORAL 2 TIMES DAILY
Qty: 28 TABLET | Refills: 0 | Status: SHIPPED | OUTPATIENT
Start: 2024-09-11 | End: 2024-09-25

## 2024-09-11 NOTE — TELEPHONE ENCOUNTER
Please schedule patient for Bilateral  Venous competency      Mobility:    Does the patient use an assistive device? (i.e. walker, wheelchair) Patient uses a cane    Does the patient need assistance getting on/off the ultrasound table? No    Does the patient need assistance undressing/redressing the wounds? No         Wound wraps/dressings:    Is a 2 layer wrap being used? No (If yes, vein staff to assist patient in cutting this off.)    Is [place] being used? No (If yes, vein team not to cut, but able to remove dressing.)     Will the patient bring dressings to reapply after US? Yes    o   If not, can they be sent home with a chux to have home care or family member redress? No    o   Do they need a coordinated appointment for a nurse visit at wound to redress? No    If coordinated appointment required, wound nurse to alert charge nurse or  staff who will call Vein  at [phone] and/or Vascular schedulers [phone] to schedule the testing needed and coordinated wound clinic visit.    The dressing can always be removed if testing is ordered.     Please include in the order to be scheduled by [facility] or [facility], whichever is appropriate. Route to [facility] and/or [facility] scheduling pools. If the patient is a RAJAN lift, route to clinic manager and charge nurse. They will help to get the patient scheduled at the appropriate place.    Routing to [facility] Scheduling Pool for Venous Competency     Zach Abraham M.D.

## 2024-09-11 NOTE — DISCHARGE INSTRUCTIONS
09/11/2024   Geneva Shepherd   1940  A DME order for supplies has been placed to [employer]. If there are any issues with your order including not receiving the order please call [employer] at [phone]. They can also provide a tracking number for you.    Plan 09/11/2024   Dressing changes performed by Patient  Referral placed for Bilateral Venous Ultrasound Competency to [facility]. Please call [phone] to schedule.  Elevate your legs 2 times a day above your heart for 30 minutes  Prescription for Antibiotic Augmentin placed today for 14 days- if the wound does not improve after 3-4 days please call [facility] or go to Urgent care to get a different antibiotic  Continue High protein diet    Wound Dressing Change: Bilateral Lateral Lower leg    - Prepare clean surface and wash your hands with soap and water  -Cleanse with mild unscented soap and water (such as Cetaphil, Cerave or Dove)   -Primary dressing: cut 1/10th piece of 4x5 Hydrofera Blue Ready transfer to cover each wound  -Secondary dressing: cover each with 4x4 Zetuvit Silicone Plus with border  Change Daily    Elevation: elevate your legs above the level of your heart for 30 minutes: 2 times a day   - Lay on the couch or your bed and prop your legs up on pillows   - Recline back as far as you can go in your recliner and prop your legs on pillows.   Walk as much as possible thru day and elevate when sitting     A diet high in protein is important for wound healing, we recommend getting 90 grams of protein per day. Taking protein shakes or bars are a good way to get extra protein in your diet. Good sources of protein:  Pork 26g per 3 oz  Whey protein powder - 24g per scoop (on average)  Greek yogurt - 23g per 8oz   Chicken or Turkey - 23g per 3oz  Fish - 20-25g per 3oz  Beef - 18-23g per 3oz  Tofu - 10g per 1/2 cup  Navy beans - 20g per cup  Cottage cheese - 14g per 1/2 cup   Lentils - 13g per 1/4 cup  Beef jerky 13g per 1oz  2% milk - 8g per  cup  Peanut butter - 8g per 2 tablespoons  Eggs - 6g per egg  Mixed nuts - 6g per 2oz       Main Provider: Zach Abraham M.D. September 11, 2024    Call us at [phone] if you have any questions about your wounds, if you have redness or swelling around your wound, have a fever of 101 degrees Fahrenheit or greater or if you have any other problems or concerns. We answer the phone Monday through Friday 8 am to 4 pm, please leave a message as we check the voicemail frequently throughout the day. If you have a concern over the weekend, please leave a message and we will return your call Monday. If the need is urgent, go to the ER or urgent care.    If you had a positive experience please indicate that on your patient satisfaction survey form that [facility] will be sending you.    It was a pleasure meeting with you today.  Thank you for allowing me and my team the privilege of caring for you today.  YOU are the reason we are here, and I truly hope we provided you with the excellent service you deserve.  Please let us know if there is anything else we can do for you so that we can be sure you are leaving completely satisfied with your care experience.      If you have any billing related questions please call the [facility] Business office at [phone]. The clinic staff does not handle billing related matters.    If you are scheduled to have a follow up appointment, you will receive a reminder call the day before your visit. On the appointment day please arrive 15 minutes prior to your appointment time. If you are unable to keep that appointment, please call the clinic to cancel or reschedule. If you are more than 10 minutes late or greater for your scheduled appointment time, the clinic policy is that you may be asked to reschedule.

## 2024-09-11 NOTE — PROGRESS NOTES
Wound Clinic Note         Visit date: 09/11/2024       Cheif Complaint:     Geneva Shepherd is a 83 year old   female had concerns including WOUND CARE.  The patient has lower extremity edema and bilateral leg ulcers.         HISTORY OF PRESENT ILLNESS:    Geneva Shepherd reports the wound has been present since early August 2024.  The wound began without a clear cause.   She has previously had a number of wounds but all on the right ankle.  She was last seen here in the wound clinic by Dr. Stoner on June 24, 2021.  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  She believes she has had a vein stripping procedure on the right leg performed in the past but she is not sure of any other details.  She is a retired nurse.  On August 19, 2024 she was seen in an urgent care clinic for possible infection and was prescribed Keflex which she took with no symptoms of an adverse reaction.  She reports that antibiotic did not seem to help much.  The left ankle wound has become much more painful in the last few days and the skin around the right ankle wound has become more red.        The patient has been bandaging the wound with Aquaphor and a Band-Aid change twice a day.  There has been Light serous  drainage from the wound.       The pateint denies fevers or chills.  They report the pain from the wound has been 8/10 and has remained about the same recently.      The patient reports they currently do not have any routine for elevating their legs.     Today the patient reports maintaining a high protein diet, but has not been taking protein supplements lately.        The patient denies a history of diabetes, smoking or chronic steroid use.               Problem List:   Past Medical History:   Diagnosis Date    Ankle fracture, lateral malleolus, closed  March 2012    right ankle    Asymptomatic varicose veins     Depression, major     Diverticulitis of colon (without mention of  hemorrhage)(562.11)     DJD (degenerative joint disease)     Elevated antinuclear antibody (JUAN ANTONIO) level 7/4/2015    minimal    Embolism and thrombosis of unspecified site 3/03    RLE    FACTOR 5 LEIDEN DEFECT (HYPERCOAGULABLE) 7/03     Heterozygote    FRACTURE OF RIGHT LATERAL PROXIMAL TIBIA 11/07    Gastro-oesophageal reflux disease     rare    Hypercalcemia     Hyperlipidemia LDL goal <160 10/31/2010    Insomnia     Irritable bowel syndrome     Obesity 9/11/2013    Pain in joint, lower leg     Phlebitis and thrombophlebitis of superficial vessels of lower extremities 7/03    right    Sprain of lumbar region     Unspecified hypothyroidism     Urinary tract infection, site not specified              Family Hx: family history includes Cardiovascular in her mother; Circulatory in her sister; Coronary Artery Disease in her father; Heart Disease in her father.       Surgical Hx:   Past Surgical History:   Procedure Laterality Date    ARTHROPLASTY HIP Left 8/31/2015    Procedure: ARTHROPLASTY HIP;  Surgeon: Benjamín Maddox MD;  Location:  OR    ARTHROPLASTY KNEE  1/17/2013    Procedure: ARTHROPLASTY KNEE;  RIGHT TOTAL KNEE ARTHROPLASTY (BIOMET)^ ;  Surgeon: Benjamín Maddox MD;  Location:  OR    ARTHROPLASTY KNEE Left 1/16/2017    Procedure: ARTHROPLASTY KNEE;  Surgeon: Benjamín Maddox MD;  Location:  OR    CHOLECYSTECTOMY      COLONOSCOPY N/A 4/26/2016    Procedure: COMBINED COLONOSCOPY, SINGLE OR MULTIPLE BIOPSY/POLYPECTOMY BY BIOPSY;  Surgeon: Mario Coe MD;  Location:  GI    ENDOSCOPIC ULTRASOUND UPPER GASTROINTESTINAL TRACT (GI) N/A 4/5/2022    Procedure: ENDOSCOPIC ULTRASOUND, WITH BIOPSY;  Surgeon: Sammie Christian MD;  Location:  OR    ENDOSCOPIC ULTRASOUND UPPER GASTROINTESTINAL TRACT (GI) N/A 12/12/2023    Procedure: Endoscopic Ultrasound;  Surgeon: Sammie Christian MD;  Location:  OR    GYN SURGERY      tubal    VASCULAR SURGERY      [facility]  NONSPECIFIC PROCEDURE  7/00/01    Endometrial Biopsy.    [facility] NONSPECIFIC PROCEDURE      Tubal Ligation.    [facility] NONSPECIFIC PROCEDURE  6/02    negative coronary angio    [facility] NONSPECIFIC PROCEDURE  7/04    RLE varicose vein stripping          Allergies:  No Known Allergies           Medication History:    Current Outpatient Medications   Medication Sig Dispense Refill    amoxicillin-clavulanate (AUGMENTIN) 500-125 MG tablet Take 1 tablet by mouth 2 times daily for 14 days. 28 tablet 0    acetaminophen (TYLENOL) 500 MG tablet Take 500 mg by mouth every 4 hours as needed      amitriptyline (ELAVIL) 25 MG tablet TAKE 1 TABLET BY MOUTH EVERYDAY AT BEDTIME 90 tablet 3    Ascorbic Acid (VITAMIN C PO) Take 1,000 mg by mouth every morning (Patient takes 2 X 500 mg = 1,000 mg dose)      BETA CAROTENE PO Take 25,000 Units by mouth daily.      Biotin 1 MG CAPS Take 1 tablet by mouth daily      CHROMIUM PICOLINATE 800 MCG OR TABS Take 1 capsule by mouth daily      Cyanocobalamin 1000 MCG CAPS Take 1 tablet by mouth daily      donepezil (ARICEPT) 10 MG tablet TAKE 1/2 TABLET BY MOUTH EVERY NIGHT AT BEDTIME 45 tablet 4    FISH OIL 1000 MG OR CAPS Take 1 g by mouth daily      FLAX SEED OIL 1000 MG OR CAPS Take 1 capsule by mouth daily      FOLIC ACID PO Take 400 mcg by mouth daily       levOCARNitine (CARNITOR) 330 MG tablet Take 330 mg by mouth every morning      levothyroxine (SYNTHROID/LEVOTHROID) 50 MCG tablet TAKE 1 TABLET BY MOUTH EVERY DAY 90 tablet 2    PARoxetine (PAXIL) 20 MG tablet Take 2 tablets (40 mg) by mouth at bedtime 180 tablet 3    TURMERIC PO Take 1 capsule by mouth every morning      warfarin ANTICOAGULANT (COUMADIN) 5 MG tablet Take 1.5 tablets (7.5 mg) by mouth every Sun, Tue, Thu; and take 1 tablet (5 mg) all other days or as directed by your ACC Team based on INR lab results. 120 tablet 3    zinc gluconate 50 MG tablet Take 50 mg by mouth daily       Current Facility-Administered Medications   Medication  Dose Route Frequency Provider Last Rate Last Admin    sodium chloride (PF) 0.9% PF flush 3 mL  3 mL Intracatheter q1 min prn Zach Anaya PA-C             Tobacco History:  reports that she quit smoking about 56 years ago. Her smoking use included cigarettes. She has never used smokeless tobacco.       REVIEW OF SYMPTOMS:   The review of systems was negative except as noted in the HPI.           PHYSICAL EXAMINATION:     BP (!) 162/92 (BP Location: Left arm, Patient Position: Sitting, Cuff Size: Adult Regular)   Pulse 82   Temp 96.8  F (36  C) (Temporal)   LMP  (LMP Unknown)            GENERAL: The patient overall appears well and is no acute distress.   HEAD: normocephalic   EYES: Sclera and conjunctiva clear   NECK: no obvious masses   LUNGS: breathing is unlabored.   EXTREMITIES: No clubbing, cyanosis or edema   SKIN: No rashes or other abnormalities except as noted under the Wound section below.   NEUROLOGICAL: normal motor and sensory function   EDEMA: Mild      WOUND: There is a periwound erythema present today with increased warmth of the skin in the area.  There is no subcutaneous emphysema or woody induration.     Wound bed: necrotic material  Periwound: cellulitic without crepitus or woody induration   There is some loose necrotic material in both of the wound beds which I was able to clear away with some gauze, no sharp debridement was required.      Also see below for wound details:       Circumferential volume measures:             No data to display                Ulceration(s)/Wound(s):   Please see the media tab under the chart review for pictures of the wounds.  Nursing staff removed dressings and cleansed wound.    Wound (used by OP [facility] only) 09/11/24 0801 leg Left lateral ulceration, venous (Active)   Thickness/Stage full thickness 09/11/24 0800   Base slough;granulating 09/11/24 0800   Periwound edematous;redness 09/11/24 0800   Periwound Temperature cool 09/11/24 0800   Periwound Skin  "Turgor soft 09/11/24 0800   Edges open 09/11/24 0800   Length (cm) 0.8 09/11/24 0800   Width (cm) 0.5 09/11/24 0800   Depth (cm) 0.3 09/11/24 0800   Wound (cm^2) 0.4 cm^2 09/11/24 0800   Wound Volume (cm^3) 0.12 cm^3 09/11/24 0800   Drainage Characteristics/Odor serosanguineous 09/11/24 0800   Drainage Amount moderate 09/11/24 0800   Care, Wound non-select wound debridement performed. 09/11/24 0800       Wound (used by OP [facility] only) 09/11/24 0802 leg Right lateral ulceration, venous (Active)   Thickness/Stage full thickness 09/11/24 0800   Base slough;granulating 09/11/24 0800   Periwound edematous;redness;warm 09/11/24 0800   Periwound Temperature warm 09/11/24 0800   Periwound Skin Turgor firm 09/11/24 0800   Edges open 09/11/24 0800   Length (cm) 0.2 09/11/24 0800   Width (cm) 0.6 09/11/24 0800   Depth (cm) 0.2 09/11/24 0800   Wound (cm^2) 0.12 cm^2 09/11/24 0800   Wound Volume (cm^3) 0.02 cm^3 09/11/24 0800   Drainage Characteristics/Odor serosanguineous 09/11/24 0800   Drainage Amount moderate 09/11/24 0800   Care, Wound non-select wound debridement performed. 09/11/24 0800             No results for input(s): \"HGBA1C\", \"A1C\", \"EAG\" in the last 61272 hours.    Invalid input(s): \"CXTSNERUG1D\"       Recent Labs   Lab Test 03/01/24  1650 02/27/24  1539 12/21/23  1544   ALBUMIN 4.3 4.3 4.0              No sharp debridement performed today.                  ASSESSMENT:   This is a 83 year old  female with bilateral leg ulcers, the patient also has lower extremity edema which was also managed during today's clinic visit.          PLAN:   She will bandage the wounds with Hydrofera Blue, silicone adhesive bandage and her compression stockings changed once a day.  I will prescribe her Augmentin for her cellulitis.  I have advised her that if the redness gets much worse or she develops fever she should go to the emergency department.  I will also order a bilateral lower extremity venous insufficiency ultrasound to " evaluate for areas of superficial venous insufficiency.    Separate from the wound care instructions we then discussed management strategies for lower extremity edema.  I explained the keys for managing lower extremity edema are compression and elevation.  I explained to the patient today that controlling the edema is probably the most important thing we can do to help heal the wound.  I have specifically recommended that they lay down with their legs above the level of the heart for 30 minutes at least twice a day.     I have explained to the patient the importance of protein intake to wound healing.  I have explained that increasing protein intake will speed wound healing.  We discussed several types of food that are high in protein and the wound care nurse gave the patient a handout that summarizes this information.  In addition to further speed wound healing I have encouraged the patient to take a protein supplement.   The patient will return to the wound clinic in 3 to 4 weeks to see me again.        45 minutes spent on the date of the encounter doing chart review, history and exam, documentation and further activities per the note, this time excludes any procedure time      Zach Abraham MD  09/11/2024   8:26 AM   [facility] V[facility]/Wound  [phone]    This note was electronically signed by Zach Abraham MD        Further instructions from your care team         09/11/2024   Geneva Shepherd   1940  A DME order for supplies has been placed to [employer]. If there are any issues with your order including not receiving the order please call [employer] at [phone]. They can also provide a tracking number for you.    Plan 09/11/2024   Dressing changes performed by Patient  Referral placed for Bilateral Venous Ultrasound Competency to [facility]. Please call [phone] to schedule.  Elevate your legs 2 times a day above your heart for 30 minutes  Prescription for  Antibiotic Augmentin placed today for 14 days- if the wound does not improve after 3-4 days please call I or go to Urgent care to get a different antibiotic  Continue High protein diet    Wound Dressing Change: Bilateral Lateral Lower leg    - Prepare clean surface and wash your hands with soap and water  -Cleanse with mild unscented soap and water (such as Cetaphil, Cerave or Dove)   -Primary dressing: cut 1/10th piece of Hydrofera Blue Ready transfer to cover each wound  -Secondary dressing: cover each with 4x4 Zetuvit Silicone Plus with border  Change Daily    Elevation: elevate your legs above the level of your heart for 30 minutes: 2 times a day   - Lay on the couch or your bed and prop your legs up on pillows   - Recline back as far as you can go in your recliner and prop your legs on pillows.     A diet high in protein is important for wound healing, we recommend getting 90 grams of protein per day. Taking protein shakes or bars are a good way to get extra protein in your diet. Good sources of protein:  Pork 26g per 3 oz  Whey protein powder - 24g per scoop (on average)  Greek yogurt - 23g per 8oz   Chicken or Turkey - 23g per 3oz  Fish - 20-25g per 3oz  Beef - 18-23g per 3oz  Tofu - 10g per 1/2 cup  Navy beans - 20g per cup  Cottage cheese - 14g per 1/2 cup   Lentils - 13g per 1/4 cup  Beef jerky 13g per 1oz  2% milk - 8g per cup  Peanut butter - 8g per 2 tablespoons  Eggs - 6g per egg  Mixed nuts - 6g per 2oz       Main Provider: Zach Abraham M.D. September 11, 2024    Call us at [phone] if you have any questions about your wounds, if you have redness or swelling around your wound, have a fever of 101 degrees Fahrenheit or greater or if you have any other problems or concerns. We answer the phone Monday through Friday 8 am to 4 pm, please leave a message as we check the voicemail frequently throughout the day. If you have a concern over the weekend, please leave a message and we will return  your call Monday. If the need is urgent, go to the ER or urgent care.    If you had a positive experience please indicate that on your patient satisfaction survey form that [facility] will be sending you.    It was a pleasure meeting with you today.  Thank you for allowing me and my team the privilege of caring for you today.  YOU are the reason we are here, and I truly hope we provided you with the excellent service you deserve.  Please let us know if there is anything else we can do for you so that we can be sure you are leaving completely satisfied with your care experience.      If you have any billing related questions please call the [facility] Business office at [phone]. The clinic staff does not handle billing related matters.    If you are scheduled to have a follow up appointment, you will receive a reminder call the day before your visit. On the appointment day please arrive 15 minutes prior to your appointment time. If you are unable to keep that appointment, please call the clinic to cancel or reschedule. If you are more than 10 minutes late or greater for your scheduled appointment time, the clinic policy is that you may be asked to reschedule.         ,

## 2024-09-17 ENCOUNTER — TELEPHONE (OUTPATIENT)
Dept: WOUND CARE | Facility: CLINIC | Age: 84
End: 2024-09-17
Payer: MEDICARE

## 2024-09-17 ENCOUNTER — TELEPHONE (OUTPATIENT)
Dept: ANTICOAGULATION | Facility: CLINIC | Age: 84
End: 2024-09-17
Payer: MEDICARE

## 2024-09-17 NOTE — TELEPHONE ENCOUNTER
[facility]    Who is the name of the provider?:  [facility]    What is the location you see this provider at/preferred location?: [facility]  Person calling / Facility: Self  Phone number:  [phone]   Nurse call back needed:  YES   Can we leave a detailed message on this number?  YES     Reason for call:  Has not received wound supplies from [employer].  States [facility] told her they are out of the supplies.  She is totally out of the samples of Hydrofera Blue and 4 x 4's.    Pharmacy location:  [facility]

## 2024-09-17 NOTE — TELEPHONE ENCOUNTER
[facility] responded that the zetuvit is in stock but not the hyderfera blue ready transfer but it should be in stock today or tomorrow. Returned call to patient informing her of this. The patient states if the supplies are able to be shipped by tomorrow at the latest she is good waiting for the supplies to be shipped by [facility] but if there is any more a delay she requests the order be sent to another DME company. [facility] informed of this. They will keep I informed if there is another delay.

## 2024-09-17 NOTE — TELEPHONE ENCOUNTER
ANTICOAGULATION     Marsha Mikeyalla is overdue for an INR check.     Spoke with Geneva and scheduled lab appointment on 9/20/24    Geneva states she is on Augmentin, prescribed at wound clinic, 9/11/24 - 9/25/24. Advised, then even more important to check her lab since the interaction can raise INR. Patient stated understanding and scheduled lab for end of the week.    Nemo Reyes, RN  9/17/2024  Anticoagulation Clinic  [employer] for routing messages: asia [facility]ERRELL [facility] patient phone line: [phone]

## 2024-09-20 ENCOUNTER — LAB (OUTPATIENT)
Dept: LAB | Facility: CLINIC | Age: 84
End: 2024-09-20
Payer: MEDICARE

## 2024-09-20 ENCOUNTER — ANTICOAGULATION THERAPY VISIT (OUTPATIENT)
Dept: ANTICOAGULATION | Facility: CLINIC | Age: 84
End: 2024-09-20

## 2024-09-20 ENCOUNTER — TELEPHONE (OUTPATIENT)
Dept: ANTICOAGULATION | Facility: CLINIC | Age: 84
End: 2024-09-20

## 2024-09-20 DIAGNOSIS — D68.51 ACTIVATED PROTEIN C RESISTANCE (H): ICD-10-CM

## 2024-09-20 DIAGNOSIS — D68.51 HETEROZYGOUS FACTOR V LEIDEN MUTATION (H): ICD-10-CM

## 2024-09-20 DIAGNOSIS — Z86.718 PERSONAL HISTORY OF DVT (DEEP VEIN THROMBOSIS): Primary | ICD-10-CM

## 2024-09-20 DIAGNOSIS — Z79.01 LONG TERM CURRENT USE OF ANTICOAGULANT THERAPY: ICD-10-CM

## 2024-09-20 DIAGNOSIS — Z86.718 PERSONAL HISTORY OF DVT (DEEP VEIN THROMBOSIS): ICD-10-CM

## 2024-09-20 LAB — INR BLD: 3.2 (ref 0.9–1.1)

## 2024-09-20 PROCEDURE — 36416 COLLJ CAPILLARY BLOOD SPEC: CPT

## 2024-09-20 PROCEDURE — 85610 PROTHROMBIN TIME: CPT

## 2024-09-20 NOTE — PROGRESS NOTES
ANTICOAGULATION MANAGEMENT     Geneva Shepherd 83 year old female is on warfarin with supratherapeutic INR result. (Goal INR 2.0-3.0)    Recent labs: (last 7 days)     09/20/24  1547   INR 3.2*       ASSESSMENT     Source(s): Chart Review and Patient/Caregiver Call     Warfarin doses taken: Warfarin taken as instructed  Diet: No new diet changes identified  Medication/supplement changes:  on Augmentin for wounds in her legs, (cellulitis) finishes on Wednesday  New illness, injury, or hospitalization: Yes: leg cellulitis  Signs or symptoms of bleeding or clotting: No  Previous result: Therapeutic last 2(+) visits  Additional findings: None       PLAN     Recommended plan for temporary change(s) affecting INR     Dosing Instructions: hold dose then continue your current warfarin dose with next INR in 2 weeks       Summary  As of 9/20/2024      Full warfarin instructions:  9/23: Hold; Otherwise 5 mg every Mon, Fri; 7.5 mg all other days   Next INR check:  10/4/2024               Telephone call with Geneva who verbalizes understanding and agrees to plan    Lab visit scheduled    Education provided: Please call back if any changes to your diet, medications or how you've been taking warfarin  Goal range and lab monitoring: goal range and significance of current result, Importance of therapeutic range, and Importance of following up at instructed interval  Importance of notifying anticoagulation clinic for: changes in medications; a sooner lab recheck maybe needed    Plan made per [facility] anticoagulation protocol    Tiffany Flores RN  9/23/2024  Anticoagulation Clinic  [employer] for routing messages: [facility]  [facility] patient phone line: [phone]        _______________________________________________________________________     Anticoagulation Episode Summary       Current INR goal:  2.0-3.0   TTR:  81.2% (11.5 mo)   Target end date:  Indefinite   Send INR reminders to:  MARIAELENA DUMONT     Indications    Personal history of RLE DVT (deep vein thrombosis)(2003) [Z86.718]  Long term current use of anticoagulant therapy [Z79.01]  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 (Goal INR 2.0-3.0)    Recent labs: (last 7 days)     09/20/24  1547   INR 3.2*       ASSESSMENT     Source(s): Chart Review  Previous INR was Therapeutic last 2(+) visits  Medication, diet, health changes since last INR last inr was 7 weeks ago, and were not able to talk with Geneva         PLAN     Unable to reach Geneva today.    Left message to continue usual doing of warfarin this weekend. Request call back for assessment.    Follow up required to confirm warfarin dose taken and assess for changes and discuss dosing instructions and confirm understanding of instructions    Tiffany C Dumke, RN  9/20/2024  Anticoagulation Clinic  [employer] for routing messages: asia [facility] patient phone line: [phone]

## 2024-09-20 NOTE — TELEPHONE ENCOUNTER
ANTICOAGULATION CLINIC REFERRAL RENEWAL REQUEST       An annual renewal order is required for all patients referred to [facility].?  Please review and sign the pended referral order for Geneva Shepherd.       ANTICOAGULATION SUMMARY      Warfarin indication(s)   DVT, Protein C Deficiency, and Heterozygous Factor V Leiden    Mechanical heart valve present?  NO       Current goal range   INR: 2.0-3.0     Goal appropriate for indication? Goal INR 2-3, standard for indication(s) above     Time in Therapeutic Range (TTR)  (Goal > 60%) 86.2%       Office visit with referring provider's group within last year yes on 8/19/24       Tiffany Flores RN  [facility] Anticoagulation Clinic

## 2024-09-27 ENCOUNTER — THERAPY VISIT (OUTPATIENT)
Dept: SLEEP MEDICINE | Facility: CLINIC | Age: 84
End: 2024-09-27
Payer: COMMERCIAL

## 2024-09-27 DIAGNOSIS — G47.8 NON-RESTORATIVE SLEEP: ICD-10-CM

## 2024-09-27 DIAGNOSIS — R06.83 SNORING: ICD-10-CM

## 2024-09-27 DIAGNOSIS — R53.82 CHRONIC FATIGUE: ICD-10-CM

## 2024-10-03 NOTE — PROCEDURES
"SLEEP STUDY INTERPRETATION  DIAGNOSTIC POLYSOMNOGRAPHY REPORT      Patient: BERTA MCCORMICK  YOB: 1940  Study Date: 9/27/2024  MRN: [id number]  Referring Provider: -  Ordering Provider: MD Alessio West    Indications for Polysomnography: The patient is a 83 year old Female who is 5' 6\" and weighs 179.0 lbs. Her BMI is 29.1, Temple sleepiness scale 9 and neck circumference is 37 cm. A diagnostic polysomnogram was performed to evaluate for sleep apnea.    Polysomnogram Data: A full night polysomnogram recorded the standard physiologic parameters including EEG, EOG, EMG, ECG, nasal and oral airflow. Respiratory parameters of chest and abdominal movements were recorded with respiratory inductance plethysmography. Oxygen saturation was recorded by pulse oximetry. Hypopnea scoring rule used: 1B 4%.    Sleep Architecture: The total recording time of the polysomnogram was 451.0 minutes. The total sleep time was 417.0 minutes. Sleep latency was decreased at 1.5 minutes without the use of a sleep aid. REM latency was 335.5 minutes. Arousal index was increased at 62.3 arousals per hour. Sleep efficiency was normal at 92.5%. Wake after sleep onset was 32.5 minutes. The patient spent 9.7% of total sleep time in Stage N1, 69.8% in Stage N2, 15.2% in Stage N3, and 5.3% in REM. Time in REM supine was - minutes.    Respiration: Events ? The polysomnogram revealed a presence of 84 obstructive, 45 central, and 57 mixed apneas resulting in an apnea index of 26.8 events per hour. There were 82 obstructive hypopneas and 4 central hypopneas resulting in an obstructive hypopnea index of 11.8 and central hypopnea index of 0.6 events per hour. The combined apnea/hypopnea index was 39.1 events per hour (central apnea/hypopnea index was 7.1 events per hour). The REM AHI was 32.7 events per hour. The supine AHI was 44.7 events per hour. The RERA index was 10.9 events per hour.  The RDI was 50.1 events per hour.  Snoring - was " reported as moderate to loud.  Respiratory rate and pattern - was notable for normal respiratory rate and pattern.  Sustained Sleep Associated Hypoventilation - Transcutaneous carbon dioxide monitoring was not used.  Sleep Associated Hypoxemia - (Greater than 5 minutes O2 sat at or below 88%) was present. Baseline oxygen saturation was 90.7%. Lowest oxygen saturation was 83.0%. Time spent less than or equal to 88% was 52.3 minutes. Time spent less than or equal to 89% was 102.4 minutes.    Movement Activity: Mildly increased periodic limb movements of sleep without significant degree of associated arousals.   Periodic Limb Activity - There were 181 PLMs during the entire study. The PLM index was 26.0 movements per hour. The PLM Arousal Index was 4.3 per hour.  REM EMG Activity - Excessive transient/sustained muscle activity was not present.  Nocturnal Behavior - Abnormal sleep related behaviors were not noted during/arising out of NREM / REM sleep.   Bruxism - None apparent.    Cardiac Summary: Sinus rhythm.   The average pulse rate was 70.6 bpm. The minimum pulse rate was 62.0 bpm while the maximum pulse rate was 84.0 bpm.  Arrhythmias were not noted.    Assessment:   Severe obstructive sleep apnea with associated oxygen desaturations, sleep fragmentation and sleep associated hypoxemia. A significant proportion of sleep disordered breathing events were central and mixed apneas.     Recommendations:  Positive airway pressure therapy is recommended for treatment of severe sleep apnea. Recommend repeat polysomnography with full night titration of positive airway pressure therapy for the control of sleep disordered breathing. Alternatively, treatment could be empirically initiated with Auto?titrating PAP therapy.  Recommend appropriate clinical follow up after initiating treatment to monitor response, compliance and CPAP download data.  If CPAP therapy is not tolerated, secondary alternative to consider include dental  appliance through referral to Sleep Dentistry.   Suggest optimizing sleep schedule and avoiding sleep deprivation.  Pharmacologic therapy should be used for management of restless legs syndrome only if present and clinically indicated and not based on the presence of periodic limb movements alone.    Diagnostic Codes:   Obstructive Sleep Apnea G47.33  Sleep Hypoxemia G47.36   Repetitive Intrusions Into Sleep F51.8  Central Sleep Apnea G47.31    9/27/2024 [facility] Sleep Study (179.0 lbs) - AHI 39.1, RDI 50.1, Supine AHI 44.7, REM AHI 32.7, Low O2 83.0%, Time Spent ?88% 52.3 minutes / Time Spent ?89% 102.4 minutes.     _____________________________________   Electronically Signed By: Alessio West MD 10/02/2024

## 2024-10-04 ENCOUNTER — TELEPHONE (OUTPATIENT)
Dept: SLEEP MEDICINE | Facility: CLINIC | Age: 84
End: 2024-10-04

## 2024-10-04 ENCOUNTER — OFFICE VISIT (OUTPATIENT)
Dept: URGENT CARE | Facility: URGENT CARE | Age: 84
End: 2024-10-04
Payer: MEDICARE

## 2024-10-04 ENCOUNTER — LAB (OUTPATIENT)
Dept: LAB | Facility: CLINIC | Age: 84
End: 2024-10-04
Payer: MEDICARE

## 2024-10-04 ENCOUNTER — ANTICOAGULATION THERAPY VISIT (OUTPATIENT)
Dept: ANTICOAGULATION | Facility: CLINIC | Age: 84
End: 2024-10-04

## 2024-10-04 VITALS
RESPIRATION RATE: 16 BRPM | TEMPERATURE: 96.8 F | OXYGEN SATURATION: 97 % | WEIGHT: 179 LBS | HEART RATE: 83 BPM | SYSTOLIC BLOOD PRESSURE: 138 MMHG | BODY MASS INDEX: 28.89 KG/M2 | DIASTOLIC BLOOD PRESSURE: 70 MMHG

## 2024-10-04 DIAGNOSIS — D68.51 ACTIVATED PROTEIN C RESISTANCE (H): ICD-10-CM

## 2024-10-04 DIAGNOSIS — R30.0 DYSURIA: Primary | ICD-10-CM

## 2024-10-04 DIAGNOSIS — Z79.01 LONG TERM CURRENT USE OF ANTICOAGULANT THERAPY: ICD-10-CM

## 2024-10-04 DIAGNOSIS — G47.33 OSA (OBSTRUCTIVE SLEEP APNEA): Primary | ICD-10-CM

## 2024-10-04 DIAGNOSIS — N39.0 RECURRENT UTI (URINARY TRACT INFECTION): ICD-10-CM

## 2024-10-04 DIAGNOSIS — Z86.718 PERSONAL HISTORY OF DVT (DEEP VEIN THROMBOSIS): Primary | ICD-10-CM

## 2024-10-04 DIAGNOSIS — D68.51 HETEROZYGOUS FACTOR V LEIDEN MUTATION (H): ICD-10-CM

## 2024-10-04 DIAGNOSIS — N39.0 COMPLICATED UTI (URINARY TRACT INFECTION): ICD-10-CM

## 2024-10-04 DIAGNOSIS — Z86.718 PERSONAL HISTORY OF DVT (DEEP VEIN THROMBOSIS): ICD-10-CM

## 2024-10-04 LAB
ALBUMIN UR-MCNC: ABNORMAL MG/DL
APPEARANCE UR: ABNORMAL
BACTERIA #/AREA URNS HPF: ABNORMAL /HPF
BILIRUB UR QL STRIP: NEGATIVE
COLOR UR AUTO: YELLOW
GLUCOSE UR STRIP-MCNC: NEGATIVE MG/DL
HGB UR QL STRIP: ABNORMAL
INR BLD: 2.2 (ref 0.9–1.1)
KETONES UR STRIP-MCNC: NEGATIVE MG/DL
LEUKOCYTE ESTERASE UR QL STRIP: ABNORMAL
NITRATE UR QL: POSITIVE
PH UR STRIP: 6 [PH] (ref 5–7)
RBC #/AREA URNS AUTO: ABNORMAL /HPF
SP GR UR STRIP: 1.02 (ref 1–1.03)
SQUAMOUS #/AREA URNS AUTO: ABNORMAL /LPF
UROBILINOGEN UR STRIP-ACNC: 0.2 E.U./DL
WBC #/AREA URNS AUTO: ABNORMAL /HPF

## 2024-10-04 PROCEDURE — 85610 PROTHROMBIN TIME: CPT

## 2024-10-04 PROCEDURE — 87088 URINE BACTERIA CULTURE: CPT | Performed by: PHYSICIAN ASSISTANT

## 2024-10-04 PROCEDURE — 36416 COLLJ CAPILLARY BLOOD SPEC: CPT

## 2024-10-04 PROCEDURE — 87186 SC STD MICRODIL/AGAR DIL: CPT | Performed by: PHYSICIAN ASSISTANT

## 2024-10-04 PROCEDURE — 99214 OFFICE O/P EST MOD 30 MIN: CPT | Performed by: PHYSICIAN ASSISTANT

## 2024-10-04 PROCEDURE — 81001 URINALYSIS AUTO W/SCOPE: CPT | Performed by: PHYSICIAN ASSISTANT

## 2024-10-04 PROCEDURE — 87086 URINE CULTURE/COLONY COUNT: CPT | Performed by: PHYSICIAN ASSISTANT

## 2024-10-04 RX ORDER — CEFDINIR 300 MG/1
300 CAPSULE ORAL 2 TIMES DAILY
Qty: 20 CAPSULE | Refills: 0 | Status: SHIPPED | OUTPATIENT
Start: 2024-10-04 | End: 2024-10-15

## 2024-10-04 RX ORDER — CEFDINIR 300 MG/1
300 CAPSULE ORAL 2 TIMES DAILY
Qty: 14 CAPSULE | Refills: 0 | Status: SHIPPED | OUTPATIENT
Start: 2024-10-04 | End: 2024-10-04

## 2024-10-04 NOTE — TELEPHONE ENCOUNTER
Sleep Study Follow-Up:    Date: 10/4/2024    Geneva Shepherd was contacted today for follow-up of her sleep study done on 9/27/24 at the [facility] for possible sleep apnea.    Sleep latency 1.5 minutes without Ambien.  REM achieved.   REM latency 335.5 minutes.  Sleep efficiency 92.5%. Total sleep time 417 minutes.    Sleep architecture:  Stage 1, 9.7% (5%), stage 2, 69.8% (45-55%), stage 3, 15.2% (15-20%), stage REM, 5.3% (20-25%).  AHI was 39, with desaturations. RDI 50.  REM AHI 32.7, consistent with severe REM JULIO.  Supine AHI 44.7, consistent with severe SUPINE JULIO.  Periodic Limb Movement Index 26/hour.         These findings were reviewed with patient.     Past medical/surgical history, family history, social history, medications and allergies were reviewed.      Impression/Plan:    Severe sleep apnea    PSG result was reviewed in detail.  Discussed severe sleep apnea, consequences of other disease and management options.  Considering central & mixed apneas, a titration PSG would be ideal approach.  Patient does not want to have another night in the sleep lab.  We can try auto CPAP therapy and see how she responds.    Plan:     Start auto PAP therapy 5-15 cm H2O    She will follow up with me in about 3 month(s).       Electronically signed by Dr. Alessio West, Diplomate, Sleep Medicine, ABPN       CC: Jacoby Boo

## 2024-10-04 NOTE — PROGRESS NOTES
ANTICOAGULATION MANAGEMENT     Geneva Shepherd 83 year old female is on warfarin with therapeutic INR result. (Goal INR 2.0-3.0)    Recent labs: (last 7 days)     10/04/24  1454   INR 2.2*       ASSESSMENT     Source(s): Chart Review     Medication/supplement changes:  Cefdinir x 10 days  prescribed today for UTI  Previous result: Supratherapeutic       PLAN     Recommended plan for temporary change(s) affecting INR     Dosing Instructions: Continue your current warfarin dose with next INR in 5 days       Summary  As of 10/4/2024      Full warfarin instructions:  5 mg every Mon, Fri; 7.5 mg all other days   Next INR check:  10/9/2024               Detailed voice message left for Geneva with dosing instructions and follow up date.     Contact [phone] to schedule and with any changes, questions or concerns.     Education provided: Please call back if any changes to your diet, medications or how you've been taking warfarin  Interaction IS anticipated between warfarin and cefdinir    Plan made per [facility] anticoagulation protocol    Robert Fulton RN  10/4/2024  Anticoagulation Clinic  [facility] for routing messages: as[place] [facility] patient phone line: [phone]        _______________________________________________________________________     Anticoagulation Episode Summary       Current INR goal:  2.0-3.0   TTR:  80.5% (11.6 mo)   Target end date:  Indefinite   Send INR reminders to:  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

## 2024-10-04 NOTE — PROGRESS NOTES
Assessment & Plan     Dysuria    UA is pos for infection  Urine cutlure pendign  - UA Macroscopic with reflex to Microscopic and Culture - Clinic Collect  - Urine Microscopic Exam  - Urine Culture    Long term current use of anticoagulant therapy    Recheck INR in 5 days due to antibitoics    Urinary tract infection with hematuria, site unspecified    You have been diagnosed with a UTI.  This is one of the most common infections in women because women have a shorter urethra than men. Bacteria have a shorter distance to travel to reach the bladder.. Women who have gone through menopause also lose the protection from estrogen that lowers the chance of getting a UTI. And some women are at higher risk because of their genes. The most common cause of bladder infections is bacteria from the bowels. The bacteria get onto the skin around the opening of the urethra. From there, they can get into the urine. Then they travel up to the bladder, causing inflammation and infection.  Make sure you urinate after sex, drink plenty of fluids and do not hold in your urine.  We have started you on antibiotics for infection and we are awaiting urine culture results, if there is antibiotic resistance on the culture we will call you and switch antibiotics.     - cefdinir (OMNICEF) 300 MG capsule  Dispense: 14 capsule; Refill: 0         At today's visit with Geneva Shepherd , we discussed results, diagnosis, medications and formulated a plan.  We also discussed red flags for immediate return to clinic/ER, as well as indications for follow up with PCP if not improved in 3 days. Patient understood and agreed to plan. Geneva Shepherd was discharged with stable vitals and has no further questions.       No follow-ups on file.    Zach Anaya, [facility], PA-C  [facility]    Jeff Bean is a 83 year old female who presents to clinic today for the following health issues:  Chief Complaint   Patient presents with     UTI     Pt has a hx of UTI's-Pt has cloudy urine        HPI  Review of Systems  Constitutional, HEENT, cardiovascular, pulmonary, gi and gu systems are negative, except as otherwise noted.      Objective    /70   Pulse 83   Temp 96.8  F (36  C) (Tympanic)   Resp 16   Wt 81.2 kg (179 lb)   LMP  (LMP Unknown)   SpO2 97%   BMI 28.89 kg/m    Physical Exam   GENERAL: alert and no distress  ABDOMEN: soft, nontender, no hepatosplenomegaly, no masses and bowel sounds normal  MS: no gross musculoskeletal defects noted, no edema  SKIN: no suspicious lesions or rashes  NEURO: Normal strength and tone, mentation intact and speech normal  PSYCH: mentation appears normal, affect normal/bright      Results for orders placed or performed in visit on 10/04/24   UA Macroscopic with reflex to Microscopic and Culture - Clinic Collect     Status: Abnormal    Specimen: Urine, Midstream   Result Value Ref Range    Color Urine Yellow Colorless, Straw, Light Yellow, Yellow    Appearance Urine Slightly Cloudy (A) Clear    Glucose Urine Negative Negative mg/dL    Bilirubin Urine Negative Negative    Ketones Urine Negative Negative mg/dL    Specific Gravity Urine 1.020 1.003 - 1.035    Blood Urine Trace (A) Negative    pH Urine 6.0 5.0 - 7.0    Protein Albumin Urine Trace (A) Negative mg/dL    Urobilinogen Urine 0.2 0.2, 1.0 E.U./dL    Nitrite Urine Positive (A) Negative    Leukocyte Esterase Urine Moderate (A) Negative   Urine Microscopic Exam     Status: Abnormal   Result Value Ref Range    Bacteria Urine Many (A) None Seen /HPF    RBC Urine 2-5 (A) 0-2 /HPF /HPF    WBC Urine  (A) 0-5 /HPF /HPF    Squamous Epithelials Urine Few (A) None Seen /LPF

## 2024-10-05 LAB — BACTERIA UR CULT: ABNORMAL

## 2024-10-15 ENCOUNTER — PATIENT OUTREACH (OUTPATIENT)
Dept: CARE COORDINATION | Facility: CLINIC | Age: 84
End: 2024-10-15

## 2024-10-15 ENCOUNTER — NURSE TRIAGE (OUTPATIENT)
Dept: INTERNAL MEDICINE | Facility: CLINIC | Age: 84
End: 2024-10-15

## 2024-10-15 ENCOUNTER — OFFICE VISIT (OUTPATIENT)
Dept: FAMILY MEDICINE | Facility: CLINIC | Age: 84
End: 2024-10-15
Payer: MEDICARE

## 2024-10-15 VITALS
HEIGHT: 66 IN | WEIGHT: 181 LBS | DIASTOLIC BLOOD PRESSURE: 80 MMHG | SYSTOLIC BLOOD PRESSURE: 134 MMHG | HEART RATE: 94 BPM | RESPIRATION RATE: 18 BRPM | TEMPERATURE: 98.2 F | OXYGEN SATURATION: 96 % | BODY MASS INDEX: 29.09 KG/M2

## 2024-10-15 DIAGNOSIS — N39.0 RECURRENT UTI: Primary | ICD-10-CM

## 2024-10-15 PROCEDURE — 96372 THER/PROPH/DIAG INJ SC/IM: CPT | Performed by: PHYSICIAN ASSISTANT

## 2024-10-15 PROCEDURE — 99214 OFFICE O/P EST MOD 30 MIN: CPT | Mod: 25 | Performed by: PHYSICIAN ASSISTANT

## 2024-10-15 RX ORDER — CEFPODOXIME PROXETIL 200 MG/1
200 TABLET, FILM COATED ORAL 2 TIMES DAILY
Qty: 10 TABLET | Refills: 0 | Status: SHIPPED | OUTPATIENT
Start: 2024-10-15

## 2024-10-15 RX ORDER — CEFTRIAXONE SODIUM 1 G
1 VIAL (EA) INJECTION ONCE
Status: COMPLETED | OUTPATIENT
Start: 2024-10-15 | End: 2024-10-15

## 2024-10-15 RX ADMIN — Medication 1 G: at 16:48

## 2024-10-15 ASSESSMENT — PAIN SCALES - GENERAL: PAINLEVEL: MILD PAIN (3)

## 2024-10-15 NOTE — TELEPHONE ENCOUNTER
"Symptoms got better and came back.     Lower abdominal pain 10/10, sharp, left and right. Pain improving with tylenol and narcotic pt had on hand.     1. INFECTION: \"What infection is the antibiotic being given for?\"      UTI   2. ANTIBIOTIC: \"What antibiotic are you taking\" \"How many times per day?\"        cefdinir (OMNICEF) 300 MG capsule 20 capsule 0 10/4/2024 10/14/2024 No   Sig - Route: Take 1 capsule (300 mg) by mouth 2 times daily for 10 days. - Oral   Sent to pharmacy as: Cefdinir 300 MG Oral Capsule (OMNICEF)     3. DURATION: \"When was the antibiotic started?\"      10/4-10/14  4. MAIN CONCERN OR SYMPTOM:  \"What is your main concern right now?\"      Lower abdominal pain and frequency   5. BETTER-SAME-WORSE: \"Are you getting better, staying the same, or getting worse compared to when you first started the antibiotics?\" If getting worse, ask: \"In what way?\"       Got better then got worse   6. FEVER: \"Do you have a fever?\" If Yes, ask: \"What is your temperature, how was it measured, and when did it start?\"      Denies   7. SYMPTOMS: \"Are there any other symptoms you're concerned about?\" If Yes, ask: \"When did it start?\"      Severe pain at times  8. FOLLOW-UP APPOINTMENT: \"Do you have a follow-up appointment with your doctor?\"      No      Reason for Disposition   Finished taking antibiotics and symptoms are BETTER but are not completely gone    Additional Information   Negative: SEVERE difficulty breathing (e.g., struggling for each breath, speaks in single words)   Negative: Sounds like a life-threatening emergency to the triager   Negative: Sinus infection and taking an antibiotic   Negative: Wound infection and taking an antibiotic   Negative: MODERATE difficulty breathing (e.g., speaks in phrases, SOB even at rest, pulse 100-120)   Negative: Fever > 103 F  (39.4 C)   Negative: Patient sounds very sick or weak to the triager    Protocols used: Infection on Antibiotic Follow-up Call-A-[place]    "

## 2024-10-15 NOTE — Clinical Note
Hi, I'm seeing Geneva for recurrent UTI symptoms, do you mind if I prescribe vaginal estrogen for her (she does have factor V leiden mutation and is on warfarin)?  I'll also have her follow-up with Dr. Cartagena.  Thanks

## 2024-10-15 NOTE — PROGRESS NOTES
Assessment and Plan:     (N39.0) Recurrent UTI  (primary encounter diagnosis)  Comment: has long Hx recurrent UTI, follows with Dr. Cartagena, was admitted a year ago with pyelo, was previously on prophy therapy with macrobid but developed transaminitis so it was stopped, current UTI symptoms started a few weeks ago, she was seen at  and prescribed omnicef which she completed yesterday, she continues to have frequency and intermittent suprapubic pain, no systemic sxs, UC grew p. Regggeri sensitive to 3rd generation cephalosporins, she has previously been treated with vantin with good results  Plan: cefpodoxime (VANTIN) 200 MG tablet        Rocephin today  Continue to push fluids  Follow-up with Dr. Cartagena  Will message pcp and see if okay to trial vaginal estrogen which she was on several years ago  Discussed reasons to be seen promptly    JOYCE [facility] Same Day Provider   30 minutes on the day of the encounter doing chart review, history and exam, documentation and further activities as noted above.        Subjective   Geneva is a 83 year old, presenting for the following health issues:  UTI    UTI    History of Present Illness       Reason for visit:  UTI  Symptom onset:  1-3 days ago  Symptoms include:  Abd pain  Symptom intensity:  Mild  Symptom progression:  Staying the same  Had these symptoms before:  Yes  Has tried/received treatment for these symptoms:  Yes  What makes it worse:  Pain medication  What makes it better:  Pain medication   She is taking medications regularly.    Geneva is here for UTI symptoms  She has history of chronic UTIs   She was seen at  on 10/4/24 and was prescribed omnicef, culture was sensitive  She finished the omnicef yesterday  She continues to have intermittent suprapubic pain  She denies fever/chills, nausea/vomiting   She denies dysuria      She was admitted for pyelo last fall, was prescribed vantin at that time          Objective    /80 (BP Location:  "Left arm, Patient Position: Sitting, Cuff Size: Adult Regular)   Pulse 94   Temp 98.2  F (36.8  C) (Oral)   Resp 18   Ht 1.676 m (5' 6\")   Wt 82.1 kg (181 lb)   LMP  (LMP Unknown)   SpO2 96%   Breastfeeding No   BMI 29.21 kg/m    Body mass index is 29.21 kg/m .    Physical Exam     GENERAL: healthy, alert and no distress  RESP: lungs clear to auscultation - no rales, no rhonchi, no wheezes  CV: regular rates and rhythm, normal S1 S2, no S3 or S4 and no murmur, no click or rub   ABD: soft, NT, no CVA tenderness   MS: extremities- no gross deformities noted, no edema            Signed Electronically by: Gill Leahy PA-C    "

## 2024-10-15 NOTE — PATIENT INSTRUCTIONS
Increase fluid intake    Start cefpodoxime today    Follow-up with Dr. Cartagena in next week

## 2024-10-17 ENCOUNTER — APPOINTMENT (OUTPATIENT)
Dept: CT IMAGING | Facility: CLINIC | Age: 84
End: 2024-10-17
Attending: EMERGENCY MEDICINE
Payer: MEDICARE

## 2024-10-17 ENCOUNTER — TELEPHONE (OUTPATIENT)
Dept: ANTICOAGULATION | Facility: CLINIC | Age: 84
End: 2024-10-17
Payer: MEDICARE

## 2024-10-17 ENCOUNTER — HOSPITAL ENCOUNTER (EMERGENCY)
Facility: CLINIC | Age: 84
Discharge: HOME | End: 2024-10-17
Attending: EMERGENCY MEDICINE | Admitting: EMERGENCY MEDICINE
Payer: MEDICARE

## 2024-10-17 VITALS
OXYGEN SATURATION: 99 % | RESPIRATION RATE: 16 BRPM | DIASTOLIC BLOOD PRESSURE: 59 MMHG | HEART RATE: 76 BPM | SYSTOLIC BLOOD PRESSURE: 125 MMHG | TEMPERATURE: 98.1 F

## 2024-10-17 DIAGNOSIS — R79.1 SUPRATHERAPEUTIC INR: ICD-10-CM

## 2024-10-17 DIAGNOSIS — R10.30 LOWER ABDOMINAL PAIN: ICD-10-CM

## 2024-10-17 DIAGNOSIS — K59.00 CONSTIPATION, UNSPECIFIED CONSTIPATION TYPE: ICD-10-CM

## 2024-10-17 LAB
ALBUMIN SERPL BCG-MCNC: 4 G/DL (ref 3.5–5.2)
ALBUMIN UR-MCNC: NEGATIVE MG/DL
ALP SERPL-CCNC: 115 U/L (ref 40–150)
ALT SERPL W P-5'-P-CCNC: 17 U/L (ref 0–50)
ANION GAP SERPL CALCULATED.3IONS-SCNC: 10 MMOL/L (ref 7–15)
APPEARANCE UR: CLEAR
AST SERPL W P-5'-P-CCNC: 21 U/L (ref 0–45)
BASOPHILS # BLD AUTO: 0 10E3/UL (ref 0–0.2)
BASOPHILS NFR BLD AUTO: 0 %
BILIRUB SERPL-MCNC: 0.5 MG/DL
BILIRUB UR QL STRIP: NEGATIVE
BUN SERPL-MCNC: 8.3 MG/DL (ref 8–23)
CALCIUM SERPL-MCNC: 10.4 MG/DL (ref 8.8–10.4)
CHLORIDE SERPL-SCNC: 98 MMOL/L (ref 98–107)
COLOR UR AUTO: NORMAL
CREAT SERPL-MCNC: 0.6 MG/DL (ref 0.51–0.95)
EGFRCR SERPLBLD CKD-EPI 2021: 90 ML/MIN/1.73M2
EOSINOPHIL # BLD AUTO: 0.1 10E3/UL (ref 0–0.7)
EOSINOPHIL NFR BLD AUTO: 1 %
ERYTHROCYTE [DISTWIDTH] IN BLOOD BY AUTOMATED COUNT: 13.1 % (ref 10–15)
GLUCOSE SERPL-MCNC: 105 MG/DL (ref 70–99)
GLUCOSE UR STRIP-MCNC: NEGATIVE MG/DL
HCO3 SERPL-SCNC: 26 MMOL/L (ref 22–29)
HCT VFR BLD AUTO: 42.8 % (ref 35–47)
HGB BLD-MCNC: 14 G/DL (ref 11.7–15.7)
HGB UR QL STRIP: NEGATIVE
HOLD SPECIMEN: NORMAL
HOLD SPECIMEN: NORMAL
IMM GRANULOCYTES # BLD: 0.1 10E3/UL
IMM GRANULOCYTES NFR BLD: 1 %
INR PPP: 3.96 (ref 0.85–1.15)
KETONES UR STRIP-MCNC: NEGATIVE MG/DL
LEUKOCYTE ESTERASE UR QL STRIP: NEGATIVE
LIPASE SERPL-CCNC: 16 U/L (ref 13–60)
LYMPHOCYTES # BLD AUTO: 1.8 10E3/UL (ref 0.8–5.3)
LYMPHOCYTES NFR BLD AUTO: 15 %
MCH RBC QN AUTO: 31.9 PG (ref 26.5–33)
MCHC RBC AUTO-ENTMCNC: 32.7 G/DL (ref 31.5–36.5)
MCV RBC AUTO: 98 FL (ref 78–100)
MONOCYTES # BLD AUTO: 0.8 10E3/UL (ref 0–1.3)
MONOCYTES NFR BLD AUTO: 7 %
NEUTROPHILS # BLD AUTO: 9.1 10E3/UL (ref 1.6–8.3)
NEUTROPHILS NFR BLD AUTO: 77 %
NITRATE UR QL: NEGATIVE
NRBC # BLD AUTO: 0 10E3/UL
NRBC BLD AUTO-RTO: 0 /100
PH UR STRIP: 6 [PH] (ref 5–7)
PLATELET # BLD AUTO: 193 10E3/UL (ref 150–450)
POTASSIUM SERPL-SCNC: 4.1 MMOL/L (ref 3.4–5.3)
PROT SERPL-MCNC: 7.4 G/DL (ref 6.4–8.3)
RBC # BLD AUTO: 4.39 10E6/UL (ref 3.8–5.2)
RBC URINE: 1 /HPF
SODIUM SERPL-SCNC: 134 MMOL/L (ref 135–145)
SP GR UR STRIP: 1 (ref 1–1.03)
SQUAMOUS EPITHELIAL: <1 /HPF
UROBILINOGEN UR STRIP-MCNC: NORMAL MG/DL
WBC # BLD AUTO: 11.8 10E3/UL (ref 4–11)
WBC URINE: 4 /HPF

## 2024-10-17 PROCEDURE — 99285 EMERGENCY DEPT VISIT HI MDM: CPT | Mod: 25

## 2024-10-17 PROCEDURE — 81001 URINALYSIS AUTO W/SCOPE: CPT | Performed by: EMERGENCY MEDICINE

## 2024-10-17 PROCEDURE — 80053 COMPREHEN METABOLIC PANEL: CPT | Performed by: EMERGENCY MEDICINE

## 2024-10-17 PROCEDURE — 74177 CT ABD & PELVIS W/CONTRAST: CPT | Mod: MG

## 2024-10-17 PROCEDURE — 36415 COLL VENOUS BLD VENIPUNCTURE: CPT | Performed by: EMERGENCY MEDICINE

## 2024-10-17 PROCEDURE — 84132 ASSAY OF SERUM POTASSIUM: CPT | Performed by: EMERGENCY MEDICINE

## 2024-10-17 PROCEDURE — 83690 ASSAY OF LIPASE: CPT | Performed by: EMERGENCY MEDICINE

## 2024-10-17 PROCEDURE — 85025 COMPLETE CBC W/AUTO DIFF WBC: CPT | Performed by: EMERGENCY MEDICINE

## 2024-10-17 PROCEDURE — 250N000009 HC RX 250: Performed by: EMERGENCY MEDICINE

## 2024-10-17 PROCEDURE — 82947 ASSAY GLUCOSE BLOOD QUANT: CPT | Performed by: EMERGENCY MEDICINE

## 2024-10-17 PROCEDURE — 250N000011 HC RX IP 250 OP 636: Performed by: EMERGENCY MEDICINE

## 2024-10-17 PROCEDURE — 85610 PROTHROMBIN TIME: CPT | Performed by: EMERGENCY MEDICINE

## 2024-10-17 RX ORDER — IOPAMIDOL 755 MG/ML
91 INJECTION, SOLUTION INTRAVASCULAR ONCE
Status: COMPLETED | OUTPATIENT
Start: 2024-10-17 | End: 2024-10-17

## 2024-10-17 RX ORDER — IOPAMIDOL 755 MG/ML
100 INJECTION, SOLUTION INTRAVASCULAR ONCE
Status: DISCONTINUED | OUTPATIENT
Start: 2024-10-17 | End: 2024-10-17

## 2024-10-17 RX ADMIN — IOPAMIDOL 91 ML: 755 INJECTION, SOLUTION INTRAVENOUS at 16:48

## 2024-10-17 RX ADMIN — SODIUM CHLORIDE 66 ML: 9 INJECTION, SOLUTION INTRAVENOUS at 16:48

## 2024-10-17 ASSESSMENT — COLUMBIA-SUICIDE SEVERITY RATING SCALE - C-SSRS
1. IN THE PAST MONTH, HAVE YOU WISHED YOU WERE DEAD OR WISHED YOU COULD GO TO SLEEP AND NOT WAKE UP?: NO
6. HAVE YOU EVER DONE ANYTHING, STARTED TO DO ANYTHING, OR PREPARED TO DO ANYTHING TO END YOUR LIFE?: NO
2. HAVE YOU ACTUALLY HAD ANY THOUGHTS OF KILLING YOURSELF IN THE PAST MONTH?: NO

## 2024-10-17 ASSESSMENT — ACTIVITIES OF DAILY LIVING (ADL)
ADLS_ACUITY_SCORE: 42
ADLS_ACUITY_SCORE: 42

## 2024-10-17 NOTE — DISCHARGE INSTRUCTIONS
Tylenol as needed for pain control    MiraLAX as needed for constipation    Hold warfarin for 1 day and then resume normal dosing thereafter.  Recommend close follow-up in coagulation clinic to recheck INR

## 2024-10-17 NOTE — ED PROVIDER NOTES
Emergency Department Note      History of Present Illness     Chief Complaint   Abdominal Pain      HPI   Geneva Shepherd is a 83 year old female presents with lower abdominal pain.  Patient notes that she has had waxing and waning of lower abdominal pain described as cramping for the last 4 days.  Pain has been severe at times.  She has been taking Tylenol without significant improvement.  Reports regular though harder than normal bowel movements.  Pain is currently 4/10.  Previous history of diverticulitis though notes that this pain is somewhat worse than previous episodes.  Currently she is taking Vantin for recently diagnosed urinary tract infection (history of chronic UTI); review of urine cultures demonstrates sensitivity.  Patient notes improvement in urinary symptoms.    Independent Historian   None    Review of External Notes   N/A    Past Medical History     Medical History and Problem List   Past Medical History:   Diagnosis Date    Ankle fracture, lateral malleolus, closed  March 2012    Asymptomatic varicose veins     Depression, major     Diverticulitis of colon (without mention of hemorrhage)(562.11)     DJD (degenerative joint disease)     Elevated antinuclear antibody (JUAN ANTONIO) level 7/4/2015    Embolism and thrombosis of unspecified site 3/03    FACTOR 5 LEIDEN DEFECT (HYPERCOAGULABLE) 7/03    FRACTURE OF RIGHT LATERAL PROXIMAL TIBIA 11/07    Gastro-oesophageal reflux disease     Hypercalcemia     Hyperlipidemia LDL goal <160 10/31/2010    Insomnia     Irritable bowel syndrome     Obesity 9/11/2013    Pain in joint, lower leg     Phlebitis and thrombophlebitis of superficial vessels of lower extremities 7/03    Sprain of lumbar region     Unspecified hypothyroidism     Urinary tract infection, site not specified        Medications   acetaminophen (TYLENOL) 500 MG tablet  amitriptyline (ELAVIL) 25 MG tablet  Ascorbic Acid (VITAMIN C PO)  BETA CAROTENE PO  Biotin 1 MG CAPS  cefpodoxime (VANTIN) 200  MG tablet  CHROMIUM PICOLINATE 800 MCG OR TABS  Cyanocobalamin 1000 MCG CAPS  donepezil (ARICEPT) 10 MG tablet  FISH OIL 1000 MG OR CAPS  FLAX SEED OIL 1000 MG OR CAPS  FOLIC ACID PO  levOCARNitine (CARNITOR) 330 MG tablet  levothyroxine (SYNTHROID/LEVOTHROID) 50 MCG tablet  PARoxetine (PAXIL) 20 MG tablet  TURMERIC PO  warfarin ANTICOAGULANT (COUMADIN) 5 MG tablet  zinc gluconate 50 MG tablet        Surgical History   Past Surgical History:   Procedure Laterality Date    ARTHROPLASTY HIP Left 8/31/2015    Procedure: ARTHROPLASTY HIP;  Surgeon: Benjamín Maddox MD;  Location:  OR    ARTHROPLASTY KNEE  1/17/2013    Procedure: ARTHROPLASTY KNEE;  RIGHT TOTAL KNEE ARTHROPLASTY (BIOMET)^ ;  Surgeon: Benjamín Maddox MD;  Location:  OR    ARTHROPLASTY KNEE Left 1/16/2017    Procedure: ARTHROPLASTY KNEE;  Surgeon: Benjamín Maddox MD;  Location:  OR    CHOLECYSTECTOMY      COLONOSCOPY N/A 4/26/2016    Procedure: COMBINED COLONOSCOPY, SINGLE OR MULTIPLE BIOPSY/POLYPECTOMY BY BIOPSY;  Surgeon: Mario Coe MD;  Location:  GI    ENDOSCOPIC ULTRASOUND UPPER GASTROINTESTINAL TRACT (GI) N/A 4/5/2022    Procedure: ENDOSCOPIC ULTRASOUND, WITH BIOPSY;  Surgeon: Sammie Christian MD;  Location:  OR    ENDOSCOPIC ULTRASOUND UPPER GASTROINTESTINAL TRACT (GI) N/A 12/12/2023    Procedure: Endoscopic Ultrasound;  Surgeon: Sammie Christian MD;  Location:  OR    GYN SURGERY      tubal    VASCULAR SURGERY      Z NONSPECIFIC PROCEDURE  7/00/01    Endometrial Biopsy.    ZZC NONSPECIFIC PROCEDURE      Tubal Ligation.    ZZC NONSPECIFIC PROCEDURE  6/02    negative coronary angio    ZZC NONSPECIFIC PROCEDURE  7/04    RLE varicose vein stripping       Physical Exam     Patient Vitals for the past 24 hrs:   BP Temp Pulse Resp SpO2   10/17/24 1457 125/59 98.1  F (36.7  C) 76 16 99 %     Physical Exam  General: Alert and cooperative with exam. Patient in mild distress. Normal  mentation.  Well-appearing  Head:  Scalp is NC/AT  Eyes:  No scleral icterus, PERRL  ENT:  The external nose and ears are normal. The oropharynx is normal and without erythema; mucus membranes are moist. Uvula midline, no evidence of deep space infection.  Neck:  Normal range of motion without rigidity.  CV:  Regular rate and rhythm  Resp:  Breath sounds are clear bilaterally    Non-labored, no retractions or accessory muscle use  GI:  Abdomen is soft, no distension, mild tenderness to lower abdomen. No peritoneal signs  MS:  No lower extremity edema   Skin:  Warm and dry, No rash or lesions noted.  Neuro: Oriented x 3. No gross motor deficits.      Diagnostics     Lab Results   Labs Ordered and Resulted from Time of ED Arrival to Time of ED Departure   COMPREHENSIVE METABOLIC PANEL - Abnormal       Result Value    Sodium 134 (*)     Potassium 4.1      Carbon Dioxide (CO2) 26      Anion Gap 10      Urea Nitrogen 8.3      Creatinine 0.60      GFR Estimate 90      Calcium 10.4      Chloride 98      Glucose 105 (*)     Alkaline Phosphatase 115      AST 21      ALT 17      Protein Total 7.4      Albumin 4.0      Bilirubin Total 0.5     CBC WITH PLATELETS AND DIFFERENTIAL - Abnormal    WBC Count 11.8 (*)     RBC Count 4.39      Hemoglobin 14.0      Hematocrit 42.8      MCV 98      MCH 31.9      MCHC 32.7      RDW 13.1      Platelet Count 193      % Neutrophils 77      % Lymphocytes 15      % Monocytes 7      % Eosinophils 1      % Basophils 0      % Immature Granulocytes 1      NRBCs per 100 WBC 0      Absolute Neutrophils 9.1 (*)     Absolute Lymphocytes 1.8      Absolute Monocytes 0.8      Absolute Eosinophils 0.1      Absolute Basophils 0.0      Absolute Immature Granulocytes 0.1      Absolute NRBCs 0.0     INR - Abnormal    INR 3.96 (*)    ROUTINE UA WITH MICROSCOPIC REFLEX TO CULTURE - Normal    Color Urine Light Yellow      Appearance Urine Clear      Glucose Urine Negative      Bilirubin Urine Negative       Ketones Urine Negative      Specific Gravity Urine 1.004      Blood Urine Negative      pH Urine 6.0      Protein Albumin Urine Negative      Urobilinogen Urine Normal      Nitrite Urine Negative      Leukocyte Esterase Urine Negative      RBC Urine 1      WBC Urine 4      Squamous Epithelials Urine <1     LIPASE - Normal    Lipase 16         Imaging   CT Abdomen Pelvis w Contrast   Final Result   IMPRESSION:    1.  Sequela of prior sigmoid diverticulitis without definite acute inflammation in this region.   2.  Questionable cystitis, correlate with urinalysis.          Independent Interpretation   None    ED Course      Medications Administered   Medications   sodium chloride 0.9 % bag 500 mL for CT scan flush use (66 mLs Intravenous $Given 10/17/24 1648)   iopamidol (ISOVUE-370) solution 91 mL (91 mLs Intravenous $Given 10/17/24 1648)       Procedures   Procedures     Discussion of Management   None    ED Course        Additional Documentation  None    Medical Decision Making / Diagnosis     CMS Diagnoses: None    MIPS       None    [facility]    Geneva Shepherd is a 83 year old female who presents with lower abdominal pain.  She looks overall well and without a concerning etiology of abdominal pain.  A broad differential diagnosis was of course considered. The workup in the ED is at this point negative.  No etiology for the patients pain is found at this point and my suspicion of an intraabdominal catastrophe or other worrisome etiology is very low. CT and lab work are reassuring.  Patient's UA is without evidence of infection and currently prescribed antibiotic demonstrate sensitivity on recent urine culture.  Vital signs are normal.  Repeat abdominal exam reassuring.  There is potentially element of constipation; discussed supportive care.  INR was found to be supratherapeutic and I recommended holding tonight's dose of warfarin and resuming normal dosing tomorrow with close follow-up in clinic for recheck of INR.   Close follow-up with PCP if symptoms not improving.  Return precautions discussed.  Patient discharged home.    Disposition   The patient was discharged.     Diagnosis     ICD-10-CM    1. Lower abdominal pain  R10.30       2. Supratherapeutic INR  R79.1       3. Constipation, unspecified constipation type  K59.00              Jasiel Esposito,   10/17/24 9111

## 2024-10-17 NOTE — ED TRIAGE NOTES
Pt with chronic UTIs, currently on antibiotics reports lower abdominal pain since Monday. Denies nausea or vomiting, c/o lack of appetite and intermittent chills.      Triage Assessment (Adult)       Row Name 10/17/24 5800          Respiratory WDL    Respiratory WDL WDL        Cardiac WDL    Cardiac WDL WDL        Cognitive/Neuro/Behavioral WDL    Cognitive/Neuro/Behavioral WDL WDL

## 2024-10-17 NOTE — TELEPHONE ENCOUNTER
ANTICOAGULATION     Geneva Shepherd is overdue for an INR check.     Left message for patient to call and schedule lab appointment as soon as possible. If returning call, please schedule.     Kathy Warren RN  10/17/2024  Anticoagulation Clinic  [facility] for routing messages: asia [facility] patient phone line: [phone]

## 2024-10-18 ENCOUNTER — ANTICOAGULATION THERAPY VISIT (OUTPATIENT)
Dept: ANTICOAGULATION | Facility: CLINIC | Age: 84
End: 2024-10-18
Payer: MEDICARE

## 2024-10-18 ENCOUNTER — TELEPHONE (OUTPATIENT)
Dept: ANTICOAGULATION | Facility: CLINIC | Age: 84
End: 2024-10-18
Payer: MEDICARE

## 2024-10-18 DIAGNOSIS — Z79.01 LONG TERM CURRENT USE OF ANTICOAGULANT THERAPY: ICD-10-CM

## 2024-10-18 DIAGNOSIS — D68.51 ACTIVATED PROTEIN C RESISTANCE (H): ICD-10-CM

## 2024-10-18 DIAGNOSIS — D68.51 HETEROZYGOUS FACTOR V LEIDEN MUTATION (H): ICD-10-CM

## 2024-10-18 DIAGNOSIS — Z86.718 PERSONAL HISTORY OF DVT (DEEP VEIN THROMBOSIS): Primary | ICD-10-CM

## 2024-10-18 NOTE — PROGRESS NOTES
ANTICOAGULATION MANAGEMENT     Geneva Shepherd 83 year old female is on warfarin with supratherapeutic INR result. (Goal INR 2.0-3.0)    Recent labs: (last 7 days)     10/17/24  1506   INR 3.96*     UTI--taking vantin x 5 days, started 10/15  ER    ASSESSMENT     Source(s): Chart Review and Patient/Caregiver Call     Warfarin doses taken: Held for 1 day  recently which may be affecting INR  Diet:  patient reports overall decreased appetite recently and currently.  Medication/supplement changes:  Vantin 5 day course (dates: 10/15-10/20/24) which may be increasing INR today  New illness, injury, or hospitalization: Yes: currently being treated for UTI, Also, patient was seen in ER on 10/17/24 for back pain and diarrhea. Patient reports she plans to follow up with [facility] for the diarrhea.  Signs or symptoms of bleeding or clotting: No  Previous result: Therapeutic last visit; previously outside of goal range  Additional findings: ACRN to consider reducing warfarin maintenance dose at next visit if diarrhea and diet continue to be ongoing factors.       PLAN     Recommended plan for temporary change(s) and ongoing change(s) affecting INR     Dosing Instructions: hold dose then continue your current warfarin dose with next INR in 1 week       Summary  As of 10/18/2024      Full warfarin instructions:  5 mg every Mon, Fri; 7.5 mg all other days   Next INR check:  10/24/2024               Telephone call with Geneva who verbalizes understanding and agrees to plan and who agrees to plan and repeated back plan correctly    Lab visit scheduled    Education provided: Importance of notifying anticoagulation clinic for: changes in medications; a sooner lab recheck maybe needed and diarrhea, nausea/vomiting, reduced intake, cold/flu, and/or infections; a sooner lab recheck maybe needed  How pain,infection,antibiotic and change in diet can affect INR    Plan made per [facility] anticoagulation protocol    Sanaz Browne,  RN  10/18/2024  Anticoagulation Clinic  [facility] for routing messages: asia [facility]  [facility] patient phone line: [phone]        _______________________________________________________________________     Anticoagulation Episode Summary       Current INR goal:  2.0-3.0   TTR:  78.4% (11.5 mo)   Target end date:  Indefinite   Send INR reminders to:  MARIAELENA [facility]    Indications    Personal history of RLE DVT (deep vein thrombosis)(2003) [Z86.718]  Long term current use of anticoagulant therapy [Z79.01]  Heterozygous factor V Leiden mutation (H) [D68.51]  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

## 2024-10-18 NOTE — TELEPHONE ENCOUNTER
ANTICOAGULATION  MANAGEMENT: Discharge Review    Geneva Shepherd chart reviewed for anticoagulation continuity of care    Emergency room visit on 10/17/24 for lower abdominal pain; supratherapeutic INR.    Discharge disposition: Home    Results:    Recent labs: (last 7 days)     10/17/24  1506   INR 3.96*     Anticoagulation inpatient management:     not applicable     Anticoagulation discharge instructions:     Warfarin dosing: hold 10/17/24 dose   Bridging: No   INR goal change: No      Medication changes affecting anticoagulation: No    Additional factors affecting anticoagulation: Yes: currently being treated for UTI     PLAN     Recommend to check INR on 10-14 days    Left a detailed message for [place]    Anticoagulation Calendar updated--updated calendar in [facility] encounter given INR was drawn at ER--see [facility] encounter.    Sanaz Browne, RN  10/18/2024  Anticoagulation Clinic  [facility] for routing messages: asia [facility] patient phone line: [phone]

## 2024-10-24 ENCOUNTER — LAB (OUTPATIENT)
Dept: LAB | Facility: CLINIC | Age: 84
End: 2024-10-24
Payer: MEDICARE

## 2024-10-24 ENCOUNTER — ANTICOAGULATION THERAPY VISIT (OUTPATIENT)
Dept: ANTICOAGULATION | Facility: CLINIC | Age: 84
End: 2024-10-24

## 2024-10-24 DIAGNOSIS — Z86.718 PERSONAL HISTORY OF DVT (DEEP VEIN THROMBOSIS): ICD-10-CM

## 2024-10-24 DIAGNOSIS — D68.51 HETEROZYGOUS FACTOR V LEIDEN MUTATION (H): ICD-10-CM

## 2024-10-24 DIAGNOSIS — D68.51 ACTIVATED PROTEIN C RESISTANCE (H): ICD-10-CM

## 2024-10-24 DIAGNOSIS — Z79.01 LONG TERM CURRENT USE OF ANTICOAGULANT THERAPY: ICD-10-CM

## 2024-10-24 DIAGNOSIS — Z86.718 PERSONAL HISTORY OF DVT (DEEP VEIN THROMBOSIS): Primary | ICD-10-CM

## 2024-10-24 LAB — INR BLD: 2.6 (ref 0.9–1.1)

## 2024-10-24 PROCEDURE — 85610 PROTHROMBIN TIME: CPT

## 2024-10-24 PROCEDURE — 36416 COLLJ CAPILLARY BLOOD SPEC: CPT

## 2024-10-24 NOTE — PROGRESS NOTES
ANTICOAGULATION MANAGEMENT     Geneva Shepherd 83 year old female is on warfarin with therapeutic INR result. (Goal INR 2.0-3.0)    Recent labs: (last 7 days)     10/24/24  1448   INR 2.6*       ASSESSMENT     Source(s): Chart Review  Previous INR was Supratherapeutic  Medication, diet, health changes since last INR chart reviewed; none identified         PLAN     Recommended plan for no diet, medication or health factor changes affecting INR     Dosing Instructions: Continue your current warfarin dose with next INR in 2 weeks       Summary  As of 10/24/2024      Full warfarin instructions:  5 mg every Mon, Fri; 7.5 mg all other days   Next INR check:  11/7/2024               Detailed voice message left for Geneva with dosing instructions and follow up date.     Contact [phone] to schedule and with any changes, questions or concerns.     Education provided: Please call back if any changes to your diet, medications or how you've been taking warfarin    Plan made per [facility] anticoagulation protocol    Robert Fulton RN  10/24/2024  Anticoagulation Clinic  [facility] for routing messages: asia [facility] patient phone line: [phone]        _______________________________________________________________________     Anticoagulation Episode Summary       Current INR goal:  2.0-3.0   TTR:  77.1% (11.6 mo)   Target end date:  Indefinite   Send INR reminders to:  MARIAELENA 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

## 2024-10-25 ENCOUNTER — DOCUMENTATION ONLY (OUTPATIENT)
Dept: SLEEP MEDICINE | Facility: CLINIC | Age: 84
End: 2024-10-25
Payer: COMMERCIAL

## 2024-10-25 DIAGNOSIS — G47.33 OSA (OBSTRUCTIVE SLEEP APNEA): Primary | ICD-10-CM

## 2024-10-25 NOTE — PROGRESS NOTES
Patient was offered choice of vendor and chose [facility].  Patient Geneva Shepherd was set up at [facility]  on October 25, 2024. Patient received a Resmed Airsense 11 Pressures were set at  5-15 cm H2O.   Patient s ramp is 4 cm H2O for Auto and FLEX/EPR is EPR, 2.  Patient received a Resmed Mask name: AIRFIT [id number]  Nasal mask size For Her, Small, heated tubing and heated humidifier.  Patient has the following compliance requirements: using and visit requirements  Patient has a follow up on TBD with Dr. West.    Andrea Schwab

## 2024-10-29 ENCOUNTER — DOCUMENTATION ONLY (OUTPATIENT)
Dept: SLEEP MEDICINE | Facility: CLINIC | Age: 84
End: 2024-10-29
Payer: COMMERCIAL

## 2024-10-29 NOTE — PROGRESS NOTES
3 day Sleep therapy management telephone visit    Diagnostic AHI:PS.1         Confirmed with patient at time of call- Yes Patient is still interested in STM service       Subjective measures:  Patient doing well with CPAP she is benefiting from her CPAP. We talked about using a chinstrap or CPAP tape for her high mask leak.          Objective data     Order Settings for PAP  CPAP min     CPAP max     CPAP fixed         Device settings from machine CPAP min 5.0     CPAP max 15.0      CPAP fixed      EPR Setting TWO    RESMED soft response  OFF     Assessment: Nightly usage over four hours      Patient has the following upcoming sleep appts:  Future Sleep Appointments         Provider Department    2024 11:30 AM (Arrive by 11:15 AM) Alessio West MD [facility] [place]            Replacement device: No  STM ordered by provider: Yes     Total time spent on accessing and  interpreting remote patient PAP therapy data  10 minutes    Total time spent counseling, coaching  and reviewing PAP therapy data with patient  8 minutes    03431 no

## 2024-11-13 ENCOUNTER — DOCUMENTATION ONLY (OUTPATIENT)
Dept: SLEEP MEDICINE | Facility: CLINIC | Age: 84
End: 2024-11-13
Payer: COMMERCIAL

## 2024-11-20 ENCOUNTER — OFFICE VISIT (OUTPATIENT)
Dept: INTERNAL MEDICINE | Facility: CLINIC | Age: 84
End: 2024-11-20
Payer: COMMERCIAL

## 2024-11-20 ENCOUNTER — ANTICOAGULATION THERAPY VISIT (OUTPATIENT)
Dept: ANTICOAGULATION | Facility: CLINIC | Age: 84
End: 2024-11-20

## 2024-11-20 VITALS
DIASTOLIC BLOOD PRESSURE: 75 MMHG | SYSTOLIC BLOOD PRESSURE: 118 MMHG | HEART RATE: 77 BPM | HEIGHT: 66 IN | WEIGHT: 178.3 LBS | TEMPERATURE: 97.1 F | BODY MASS INDEX: 28.66 KG/M2 | OXYGEN SATURATION: 97 %

## 2024-11-20 DIAGNOSIS — E83.52 HYPERCALCEMIA: ICD-10-CM

## 2024-11-20 DIAGNOSIS — D68.51 HETEROZYGOUS FACTOR V LEIDEN MUTATION (H): ICD-10-CM

## 2024-11-20 DIAGNOSIS — D68.51 ACTIVATED PROTEIN C RESISTANCE (H): ICD-10-CM

## 2024-11-20 DIAGNOSIS — E03.9 HYPOTHYROIDISM, UNSPECIFIED TYPE: ICD-10-CM

## 2024-11-20 DIAGNOSIS — Z79.01 LONG TERM CURRENT USE OF ANTICOAGULANT THERAPY: ICD-10-CM

## 2024-11-20 DIAGNOSIS — E87.1 HYPONATREMIA: ICD-10-CM

## 2024-11-20 DIAGNOSIS — R41.3 MEMORY LOSS: ICD-10-CM

## 2024-11-20 DIAGNOSIS — Z86.718 PERSONAL HISTORY OF DVT (DEEP VEIN THROMBOSIS): Primary | ICD-10-CM

## 2024-11-20 LAB — INR PPP: 2.19 (ref 0.85–1.15)

## 2024-11-20 NOTE — PROGRESS NOTES
ASSESSMENT:    1. Memory loss   Stable.  Patient counseled not to vary dose of donepezil as this can contribute to lightheadedness.  Continue 5 mg daily     2. Hypothyroidism, unspecified type  On levothyroxine.  Previous thyroid function normal 1 year ago.  Due for lab recheck.  Clinically euthyroid  - TSH with free T4 reflex; Future  - TSH with free T4 reflex    3. Hyponatremia  Previous sodium minimally low at 134.  Needs lab recheck  - Basic metabolic panel  (Ca, Cl, CO2, Creat, Gluc, K, Na, BUN); Future  - Basic metabolic panel  (Ca, Cl, CO2, Creat, Gluc, K, Na, BUN)    4. Hypercalcemia  Has had some mild hypercalcemia over the last year though calcium was high normal with ER check 1 month ago.  Needs lab recheck  - Parathyroid Hormone Intact; Future  - Basic metabolic panel  (Ca, Cl, CO2, Creat, Gluc, K, Na, BUN); Future  - Parathyroid Hormone Intact  - Basic metabolic panel  (Ca, Cl, CO2, Creat, Gluc, K, Na, BUN)    5. Activated protein C resistance (H)  On long-term anticoagulation with warfarin.  Due for repeat INR today.  No current leg pain symptoms suggesting thrombosis concern  - INR; Future  - INR      PLAN:  Labs as ordered   Have an extra 1-2 glasses of water/day   Continue  walking exercise as able. Use cane or walker  for safety  Pt declines vaccinations  Go back to Donepizil 1/2 tab (total 5mg daily) for memory to lessen  risk of lightheadedness  Continue other medications           Subjective   Geneva is a 83 year old, presenting for the following health issues:  Hospital F/U    [facility]       ED/UC Followup:    Facility: [facility] ED  Date of visit: 10/17/2024  Reason for visit: Lower Abdominal Pain  Current Status: Feeling better,  pt experiencing  occ light headed    Most recent lab results reviewed with pt.      Patient was seen in the ER 10/17/2024.  ER note reviewed.  Has been put on antibiotic therapy for a UTI in the week or so prior to that.  Urinalysis in the ER was normal.  Abdominal  "x-ray reviewed.  Patient denies any current abdominal pain.  Bowel movements are normal.  Eating okay.  Has occasional lightheadedness without vertigo.  Patient states however that she has been varying her donepezil dose sometimes taking a full 10 mg tablet daily all other days taking previous 1/2 tablet (5mg) on donepezil for.  History mild memory loss/decreased cognition.  Patient feels her memory has been stable overall lately.  No nausea with the medication and eating okay.  Has now started using a CPAP for sleep apnea.  States that she does not drink fluids very much during the day and it may also be contributing to lightheadedness.  Mood is good.  Most recent PHQ-9 from August 2024 = 1.  Prior lower extremity superficial leg wounds have healed.  Patient now using compression stockings with a 8-15mmHg compression       Additional ROS:   Constitutional, HEENT, Cardiovascular, Pulmonary, GI and , Neuro, MSK and Psych review of systems/symptoms are otherwise negative or unchanged from previous, except as noted above.      OBJECTIVE:  /75   Pulse 77   Temp 97.1  F (36.2  C) (Temporal)   Ht 1.676 m (5' 6\")   Wt 80.9 kg (178 lb 4.8 oz)   LMP  (LMP Unknown)   SpO2 97%   BMI 28.78 kg/m     Estimated body mass index is 28.78 kg/m  as calculated from the following:    Height as of this encounter: 1.676 m (5' 6\").    Weight as of this encounter: 80.9 kg (178 lb 4.8 oz).     Neck: no adenopathy. Thyroid normal to palpation. No bruits  Pulm: Lungs clear to auscultation   CV: Regular rates and rhythm  GI: Soft, nontender, Normal active bowel sounds, No hepatosplenomegaly or masses palpable  Ext: Peripheral pulses intact. Mild BLE edema. No skin breakdown  Neuro: Answering questions appropriately.  Alert.   Stable gait with useof a cane      (Chart documentation was completed, in part, with i.[employer] voice-recognition software. Even though reviewed, some grammatical, spelling, and word errors may remain.)    Jacoby" KENNEDY Boo MD  Internal Medicine Department  [facility]

## 2024-11-20 NOTE — PATIENT INSTRUCTIONS
Labs as ordered   Have an extra 1-2 glasses of water/day   Continue  walking exercise as able. Use cane or walker  for safety  Pt declines vaccinations  Go back to Donepizil 1/2 tab (total 5mg daily) for memory to lessen  risk of lightheadedness  Continue other medications

## 2024-11-21 LAB
ANION GAP SERPL CALCULATED.3IONS-SCNC: 11 MMOL/L (ref 7–15)
BUN SERPL-MCNC: 19.8 MG/DL (ref 8–23)
CALCIUM SERPL-MCNC: 10.5 MG/DL (ref 8.8–10.4)
CHLORIDE SERPL-SCNC: 104 MMOL/L (ref 98–107)
CREAT SERPL-MCNC: 0.59 MG/DL (ref 0.51–0.95)
EGFRCR SERPLBLD CKD-EPI 2021: 90 ML/MIN/1.73M2
GLUCOSE SERPL-MCNC: 98 MG/DL (ref 70–99)
HCO3 SERPL-SCNC: 24 MMOL/L (ref 22–29)
POTASSIUM SERPL-SCNC: 4.6 MMOL/L (ref 3.4–5.3)
PTH-INTACT SERPL-MCNC: 61 PG/ML (ref 15–65)
SODIUM SERPL-SCNC: 139 MMOL/L (ref 135–145)
TSH SERPL DL<=0.005 MIU/L-ACNC: 1.98 UIU/ML (ref 0.3–4.2)

## 2024-11-21 NOTE — PROGRESS NOTES
ANTICOAGULATION MANAGEMENT     Geneva Richardson Mikeyalla 83 year old female is on warfarin with therapeutic INR result. (Goal INR 2.0-3.0)    Recent labs: (last 7 days)     11/20/24  1537   INR 2.19*       ASSESSMENT     Source(s): Patient/Caregiver Call     Warfarin doses taken: Warfarin taken as instructed  Diet: No new diet changes identified  Medication/supplement changes: None noted  New illness, injury, or hospitalization: No  Signs or symptoms of bleeding or clotting: No  Previous result: Therapeutic last visit; previously outside of goal range  Additional findings: None       PLAN     Recommended plan for no diet, medication or health factor changes affecting INR     Dosing Instructions: Continue your current warfarin dose with next INR in 4 weeks       Summary  As of 11/20/2024      Full warfarin instructions:  5 mg every Mon, Fri; 7.5 mg all other days   Next INR check:  12/19/2024               Telephone call with Geneva who verbalizes understanding and agrees to plan    Lab visit scheduled    Education provided: Please call back if any changes to your diet, medications or how you've been taking warfarin  Contact [phone] with any changes, questions or concerns.     Plan made per [facility] anticoagulation protocol    Nemo Reyes RN  11/20/2024  Anticoagulation Clinic  [employer] for routing messages: asia [facility]ER[facility] patient phone line: [phone]        _______________________________________________________________________     Anticoagulation Episode Summary       Current INR goal:  2.0-3.0   TTR:  78.2% (11.6 mo)   Target end date:  Indefinite   Send INR reminders [facility]  [facility] [place] [facility]    Indications    Personal history of RLE DVT (deep vein thrombosis)(2003) [Z86.278]  Long term current use of anticoagulant therapy [Z79.01]  Heterozygous factor V Leiden mutation (H) [D68.51]  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

## 2024-12-30 ENCOUNTER — TELEPHONE (OUTPATIENT)
Dept: ANTICOAGULATION | Facility: CLINIC | Age: 84
End: 2024-12-30
Payer: MEDICARE

## 2024-12-30 NOTE — TELEPHONE ENCOUNTER
ANTICOAGULATION     Geneva Shepherd is overdue for an INR check.     Left message for patient to call and schedule lab appointment as soon as possible. If returning call, please schedule.     Kathy Warren RN  12/30/2024  Anticoagulation Clinic  [facility] for routing messages: asia [facility] patient phone line: [phone]

## 2025-01-06 ENCOUNTER — TELEPHONE (OUTPATIENT)
Dept: ANTICOAGULATION | Facility: CLINIC | Age: 85
End: 2025-01-06
Payer: MEDICARE

## 2025-01-06 NOTE — TELEPHONE ENCOUNTER
ANTICOAGULATION     Geneva Shepherd is overdue for an INR check.     Left message for patient to call and schedule lab appointment as soon as possible. If returning call, please schedule.     Tiffany Flores, RN  1/6/2025  Anticoagulation Clinic  [facility] for routing messages: asia [facility] patient phone line: [phone]

## 2025-01-07 ENCOUNTER — NURSE TRIAGE (OUTPATIENT)
Dept: INTERNAL MEDICINE | Facility: CLINIC | Age: 85
End: 2025-01-07
Payer: MEDICARE

## 2025-01-07 NOTE — TELEPHONE ENCOUNTER
DESCRIPTION: just tired - up for a while but would lose a lot of energy, sick on [place] but has not gotten better.   SEVERITY: 6/10  ONSET: [place]   CAUSE: around family   NEW MEDICINE: deines  OTHER SYMPTOM: cough - green discharge, no chest pain or SOB  Appointments in Next Year      Jan 09, 2025 1:30 PM  INR LAB with [facility]  [facility] ([facility] ) [phone]     Jan 09, 2025 2:00 PM  (Arrive by 1:40 PM)  Provider Visit with MONY Small CNP  [facility] ([facility] ) [phone]     Jaquan Banks RN  [facility]  Reason for Disposition   Patient wants to be seen    Additional Information   Negative: SEVERE difficulty breathing (e.g., struggling for each breath, speaks in single words)   Negative: Shock suspected (e.g., cold/pale/clammy skin, too weak to stand, low BP, rapid pulse)   Negative: Difficult to awaken or acting confused (e.g., disoriented, slurred speech)   Negative: Fainted > 15 minutes ago and still feels too weak or dizzy to stand   Negative: SEVERE weakness (e.g., unable to walk or barely able to walk, requires support) and new-onset or getting worse   Negative: Sounds like a life-threatening emergency to the triager   Negative: Weakness of the face, arm or leg on one side of the body   Negative: Has diabetes mellitus and weakness from low blood sugar (i.e., < 60 mg/dL or 3.5 mmol/L)   Negative: Recent heat exposure, suspected cause of weakness   Negative: Vomiting is main symptom   Negative: Diarrhea is main symptom   Negative: Difficulty breathing   Negative: Heart beating < 50 beats per minute OR > 140 beats per minute   Negative: Extra heartbeats, irregular heart beating, or heart is beating very fast (i.e., 'palpitations')   Negative: Follows large amount of bleeding (e.g., from vomiting, rectum, vagina)  (Exception: Small transient weakness from sight of a small amount blood.)   Negative: Black or tarry bowel movements   Negative: MODERATE weakness or fatigue from poor fluid intake with no improvement after 2 hours of rest and fluids   Negative: Drinking very little and dehydration suspected (e.g., no urine > 12 hours, very dry mouth, very lightheaded)   Negative: Patient sounds very sick or weak to the triager   Negative: MODERATE weakness (e.g., interferes with work, school, normal activities) and cause unknown (Exceptions: Weakness from acute minor illness or from poor fluid intake; weakness is chronic and not worse.)   Negative: Fever > 103 F  (39.4 C) and not able to get the Fever down using CARE ADVICE   Negative: Fever > 100 F (37.8 C) and bedridden (e.g., CVA, chronic illness, recovering from surgery)   Negative: Fever > 101 F (38.3 C) and over 60 years of age   Negative: Fever > 100 F (37.8 C) and diabetes mellitus or weak immune system (e.g., HIV positive, cancer chemo, splenectomy, organ transplant, chronic steroids)   Negative: Pale skin (pallor)   Negative: MODERATE weakness (e.g., interferes with work, school, normal activities) and persists > 3 days   Negative: Taking a medicine that could cause weakness (e.g., blood pressure medications, diuretics)    Protocols used: Weakness (Generalized) and Fatigue-A-OH

## 2025-01-23 ENCOUNTER — OFFICE VISIT (OUTPATIENT)
Dept: SLEEP MEDICINE | Facility: CLINIC | Age: 85
End: 2025-01-23
Payer: COMMERCIAL

## 2025-01-23 VITALS
BODY MASS INDEX: 28.12 KG/M2 | HEIGHT: 66 IN | WEIGHT: 175 LBS | HEART RATE: 81 BPM | DIASTOLIC BLOOD PRESSURE: 76 MMHG | OXYGEN SATURATION: 99 % | SYSTOLIC BLOOD PRESSURE: 133 MMHG

## 2025-01-23 DIAGNOSIS — G47.33 OSA (OBSTRUCTIVE SLEEP APNEA): Primary | ICD-10-CM

## 2025-01-23 ASSESSMENT — SLEEP AND FATIGUE QUESTIONNAIRES
HOW LIKELY ARE YOU TO NOD OFF OR FALL ASLEEP WHEN YOU ARE A PASSENGER IN A CAR FOR AN HOUR WITHOUT A BREAK: WOULD NEVER DOZE
HOW LIKELY ARE YOU TO NOD OFF OR FALL ASLEEP WHILE SITTING QUIETLY AFTER LUNCH WITHOUT ALCOHOL: WOULD NEVER DOZE
HOW LIKELY ARE YOU TO NOD OFF OR FALL ASLEEP WHILE LYING DOWN TO REST IN THE AFTERNOON WHEN CIRCUMSTANCES PERMIT: SLIGHT CHANCE OF DOZING
HOW LIKELY ARE YOU TO NOD OFF OR FALL ASLEEP WHILE SITTING AND READING: SLIGHT CHANCE OF DOZING
HOW LIKELY ARE YOU TO NOD OFF OR FALL ASLEEP WHILE WATCHING TV: WOULD NEVER DOZE
HOW LIKELY ARE YOU TO NOD OFF OR FALL ASLEEP WHILE SITTING INACTIVE IN A PUBLIC PLACE: WOULD NEVER DOZE
HOW LIKELY ARE YOU TO NOD OFF OR FALL ASLEEP IN A CAR, WHILE STOPPED FOR A FEW MINUTES IN TRAFFIC: WOULD NEVER DOZE
HOW LIKELY ARE YOU TO NOD OFF OR FALL ASLEEP WHILE SITTING AND TALKING TO SOMEONE: WOULD NEVER DOZE

## 2025-01-23 NOTE — NURSING NOTE
"Chief Complaint   Patient presents with    CPAP Follow Up       Initial /76   Pulse 81   Ht 1.676 m (5' 6\")   Wt 79.4 kg (175 lb)   LMP  (LMP Unknown)   SpO2 99%   BMI 28.25 kg/m   Estimated body mass index is 28.25 kg/m  as calculated from the following:    Height as of this encounter: 1.676 m (5' 6\").    Weight as of this encounter: 79.4 kg (175 lb).    Medication Reconciliation: complete  ESS 2  Rosalinda De Anda MA   "

## 2025-01-23 NOTE — PROGRESS NOTES
[facility]   Outpatient Sleep Medicine  Jan 23, 2025     Name: Geneva Shepherd MRN# [id number]   Age: 84 year old YOB: 1940          Assessment and Plan:   1. JULIO (obstructive sleep apnea) (Primary)  Patient's sleep apnea is adequately managed and well treated with current PAP settings [id number] with low residual AHI of 2 events per hour. Compliance is good, meeting all goals, few nonuse days due to illness over holidays but back to using regularly.  Tolerating PAP well. Daytime symptoms and nighttime sleep quality are improved. No indication to adjust settings today. She will continue nightly use and mask/supplies order was renewed. Follow-up in 1 year for annual visit or sooner prn.   - Comprehensive DME       Chief Complaint      Chief Complaint   Patient presents with    CPAP Follow Up          History of Present Illness:     Geneva Shepherd is a 84 year old female who presents to the clinic for follow-up of their severe obstructive sleep apnea treated with CPAP.    Previously seen by my colleague Dr. West with initial consult completed 6/2024 with concerns of nonrestorative sleep and excessive daytime fatigue, no bed partner to comment on snoring, witnessed apneas, etc. but her sons in the past would complain of snoring.    Diagnostic PSG completed 9/27/2024 (179#, BMI 29.1) revealed AHI 39.1, RDI 50.1, Supine AHI 44.7, REM AHI 32.7, Low O2 83.0%, Time Spent <=88% 52.3 minutes / Time Spent <=89% 102.4 minutes.  PLM index 26 with PLM arousal 4.3.    Patient was set up with her ResMed AirSense 11 auto CPAP 5-15 cm H2O on 10/25/2024.  Has both using and visit requirement.    Presents to my clinic today for insurance required follow-up.    Overall, the patient rates their experience with PAP as 10 (0 poor, 10 great). Patient is using a nasal mask. The mask is comfortable. The mask is not leaking. They are not snoring with the mask on. They are not having gasp arousals.  They  "are not having significant oral/nasal dryness or epistaxis.  They are not having pain/skin breakdown. The pressure settings are comfortable.     Bedtime is typically 11:00PM. Usually it takes about 30 minutes to fall asleep with the mask on. Wake time is typically 9:00AM.      Improvements noted with CPAP include waking up more refreshed, feeling rested in the morning, sleeping better with less arousals, improved energy level during the day.     ResMed Auto-PAP 5-15 cmH2O download (12/23/2024 - 1/21/2025):  23 total days of use. 7 nonuse days. 23 days with >4 hours use.  Average use 7 hours 11 minutes per day. Median Leak 5.5 L/min. 95%ile Leak 24.4 L/min. Median pressure 5.8cm. CPAP 95% pressure 9.0cm. AHI 2.0    SCALES:   INSOMNIA: Insomnia Severity Score: 6   SLEEPINESS: Jackson Sleepiness Score: 2    Past medical/surgical history, family history, social history, medications and allergies were reviewed.           Physical Examination:   /76   Pulse 81   Ht 1.676 m (5' 6\")   Wt 79.4 kg (175 lb)   LMP  (LMP Unknown)   SpO2 99%   BMI 28.25 kg/m    General appearance: Awake, alert, cooperative. Well groomed. Sitting comfortably in chair. In no apparent distress.  HEENT: Head: Normocephalic, atraumatic. Eyes:Conjunctiva clear. Sclera normal. Nose: External appearance without deformity.   Pulmonary:  Able to speak easily in full sentences. No cough or wheeze.   Skin:  No rashes or significant lesions on visible skin.   Neurologic: Alert, oriented x3.   Psychiatric: Mood euthymic. Affect congruent with full range and intensity.      CC:  Jacoby Boo PA-C  Jan 23, 2025     [facility]  [address] , [place] [address]     [facility]  [address] [place] [id number]    Chart documentation was completed, in part, with [employer] voice-recognition software. Even though reviewed, some grammatical, spelling, and word " errors may remain.    34 minutes spent on day of encounter doing chart review, history and exam, documentation, and further activities as noted above

## 2025-01-31 ENCOUNTER — ANCILLARY PROCEDURE (OUTPATIENT)
Dept: ULTRASOUND IMAGING | Facility: CLINIC | Age: 85
End: 2025-01-31
Attending: FAMILY MEDICINE
Payer: COMMERCIAL

## 2025-01-31 DIAGNOSIS — S91.001A OPEN WOUND OF RIGHT ANKLE, INITIAL ENCOUNTER: ICD-10-CM

## 2025-01-31 DIAGNOSIS — M79.89 OTHER SPECIFIED SOFT TISSUE DISORDERS: ICD-10-CM

## 2025-01-31 PROCEDURE — 93970 EXTREMITY STUDY: CPT | Performed by: SURGERY

## 2025-02-03 ENCOUNTER — TELEPHONE (OUTPATIENT)
Dept: WOUND CARE | Facility: CLINIC | Age: 85
End: 2025-02-03
Payer: MEDICARE

## 2025-02-03 DIAGNOSIS — S91.001A OPEN WOUND OF RIGHT ANKLE, INITIAL ENCOUNTER: Primary | ICD-10-CM

## 2025-02-03 NOTE — TELEPHONE ENCOUNTER
Message received from Dr. Abraham:    I last saw this patient in the wound clinic on September 11, 2024 at which time I ordered bilateral lower extremity venous insufficiency ultrasounds.  She just had the studies done on January 31 which showed significant venous insufficiency in both legs.  I recommend that she meet with a provider in the vein clinic to discuss treatment options for her vein problems.  I will put in a referral.  She should also follow-up with us if there is any further open wounds on her legs.     Patient contacted regarding message above and informed her that Dr. Abraham placed the referral for [facility]. Provided patient with the number for [facility]. Patient verbalized understanding. No further questions or concerns.

## 2025-02-07 ENCOUNTER — OFFICE VISIT (OUTPATIENT)
Dept: INTERNAL MEDICINE | Facility: CLINIC | Age: 85
End: 2025-02-07
Attending: INTERNAL MEDICINE
Payer: MEDICARE

## 2025-02-07 VITALS
TEMPERATURE: 97.2 F | HEART RATE: 77 BPM | DIASTOLIC BLOOD PRESSURE: 78 MMHG | OXYGEN SATURATION: 98 % | WEIGHT: 176.2 LBS | SYSTOLIC BLOOD PRESSURE: 136 MMHG | BODY MASS INDEX: 28.44 KG/M2

## 2025-02-07 DIAGNOSIS — E03.9 HYPOTHYROIDISM, UNSPECIFIED TYPE: ICD-10-CM

## 2025-02-07 DIAGNOSIS — G31.84 MILD COGNITIVE IMPAIRMENT: ICD-10-CM

## 2025-02-07 DIAGNOSIS — F32.0 MAJOR DEPRESSIVE DISORDER, SINGLE EPISODE, MILD: ICD-10-CM

## 2025-02-07 DIAGNOSIS — Z00.00 MEDICARE ANNUAL WELLNESS VISIT, SUBSEQUENT: Primary | ICD-10-CM

## 2025-02-07 DIAGNOSIS — D68.51 HETEROZYGOUS FACTOR V LEIDEN MUTATION: ICD-10-CM

## 2025-02-07 DIAGNOSIS — Z79.01 LONG TERM CURRENT USE OF ANTICOAGULANT THERAPY: ICD-10-CM

## 2025-02-07 DIAGNOSIS — G47.00 INSOMNIA, UNSPECIFIED TYPE: ICD-10-CM

## 2025-02-07 PROCEDURE — 99214 OFFICE O/P EST MOD 30 MIN: CPT | Mod: 25 | Performed by: INTERNAL MEDICINE

## 2025-02-07 PROCEDURE — G0439 PPPS, SUBSEQ VISIT: HCPCS | Performed by: INTERNAL MEDICINE

## 2025-02-07 RX ORDER — WARFARIN SODIUM 5 MG/1
TABLET ORAL
Qty: 120 TABLET | Refills: 3 | Status: SHIPPED | OUTPATIENT
Start: 2025-02-07

## 2025-02-07 RX ORDER — DONEPEZIL HYDROCHLORIDE 10 MG/1
TABLET, FILM COATED ORAL
Qty: 45 TABLET | Refills: 3 | Status: SHIPPED | OUTPATIENT
Start: 2025-02-07

## 2025-02-07 RX ORDER — PAROXETINE 20 MG/1
40 TABLET, FILM COATED ORAL AT BEDTIME
Qty: 180 TABLET | Refills: 3 | Status: SHIPPED | OUTPATIENT
Start: 2025-02-07

## 2025-02-07 RX ORDER — LEVOTHYROXINE SODIUM 50 UG/1
50 TABLET ORAL DAILY
Qty: 90 TABLET | Refills: 3 | Status: SHIPPED | OUTPATIENT
Start: 2025-02-07

## 2025-02-07 SDOH — HEALTH STABILITY: PHYSICAL HEALTH: ON AVERAGE, HOW MANY DAYS PER WEEK DO YOU ENGAGE IN MODERATE TO STRENUOUS EXERCISE (LIKE A BRISK WALK)?: 3 DAYS

## 2025-02-07 ASSESSMENT — SOCIAL DETERMINANTS OF HEALTH (SDOH): HOW OFTEN DO YOU GET TOGETHER WITH FRIENDS OR RELATIVES?: ONCE A WEEK

## 2025-02-07 ASSESSMENT — PATIENT HEALTH QUESTIONNAIRE - PHQ9
SUM OF ALL RESPONSES TO PHQ QUESTIONS 1-9: 0
SUM OF ALL RESPONSES TO PHQ QUESTIONS 1-9: 0

## 2025-02-07 NOTE — PROGRESS NOTES
Preventive Care Visit  [facility]  Jacoby Boo MD, Internal Medicine  Feb 7, 2025       ASSESSMENT:    1. Medicare annual wellness visit, subsequent (Primary)  Patient declines COVID, fluids and RSV vaccinations.  Other vaccinations up-to-date for age.  Declines further breast cancer screening with mammography.  Not requiring further colon, cervical cancer screening based on age.  Gait is slower and requires a cane with ambulation but stable.  Denies recent falls and denies need for physical therapy.  Candidate for handicap parking permit and application completed.  Due for eye examination.  Counseled regarding diet and exercise as able.  Repeat screening labs in 6 months.  Hearing not bothersome enough the patient wishes to use hearing aids  - Lipid panel reflex to direct LDL Fasting; Future  - Comprehensive metabolic panel; Future    2. Insomnia, unspecified type  Controlled.  Continue amitriptyline  - amitriptyline (ELAVIL) 25 MG tablet; Take 1 tablet (25 mg) by mouth at bedtime.  Dispense: 90 tablet; Refill: 3    3. Mild cognitive impairment  Controlled.  Has had some intolerance in the past with higher dose donepezil.  Continue current 5 mg daily dose.  Patient still driving locally.  Denies episodes of getting lost.  Appropriate and answering general questions today despite abnormal clock test  - donepezil (ARICEPT) 10 MG tablet; TAKE 1/2  TABLET BY MOUTH EVERY NIGHT AT BEDTIME  Dispense: 45 tablet; Refill: 3    4. Hypothyroidism, unspecified type  Recent TSH normal.  Continue levothyroxine.  Recheck thyroid function in 6 months  - levothyroxine (SYNTHROID/LEVOTHROID) 50 MCG tablet; Take 1 tablet (50 mcg) by mouth daily.  Dispense: 90 tablet; Refill: 3  - TSH with free T4 reflex; Future    5. Major depressive disorder, single episode, mild  Controlled.  PHQ-9 = 0.  Continue current medication  - PARoxetine (PAXIL) 20 MG tablet; Take 2 tablets (40 mg) by mouth at bedtime.  Dispense: 180  tablet; Refill: 3    6. Long term current use of anticoagulant therapy  Previous history DVT and factor V Leiden positive.  Continue long-term anticoagulation.  INR up-to-date  - warfarin ANTICOAGULANT (COUMADIN) 5 MG tablet; Take 1.5 tablets (7.5 mg) by mouth every Sun, Tue, Thu; and take 1 tablet (5 mg) all other days or as directed by your ACC Team based on INR lab results.  Dispense: 120 tablet; Refill: 3    7. Heterozygous factor V Leiden mutation  See #6  - warfarin ANTICOAGULANT (COUMADIN) 5 MG tablet; Take 1.5 tablets (7.5 mg) by mouth every Sun, Tue, Thu; and take 1 tablet (5 mg) all other days or as directed by your ACC Team based on INR lab results.  Dispense: 120 tablet; Refill: 3         PLAN:  Continue current medications  Prescriptions refilled on file at your pharmacy to be filled in the future when needed  Call  [phone] or use [employer] to schedule a future lab appointment  fasting in 6 months.   For fasting labs, please refrain from eating for 8 hours or more.   Drink 2 glasses of water before your lab appointment. It is fine to take your  oral medications on the morning of the lab test as usual  Continue diet and exercise  Schedule an eye exam appointment. Please ask the eye clinic to fax us a report of your eye exam to [phone], attention Dr Boo   Healthcare  Directive discussed and given copy.   Patient to complete and return to clinic  Pt declines vaccination and mammogram  Handicap parking application completed  Pt was informed regarding extra E&M billing for management of new or established medical issues not related to today's wellness/screening visit           Jeff Bean is a 84 year old, presenting for the following:  Wellness Visit        2/7/2025     3:13 PM   Additional Questions   Roomed by Milagros FAN     Health Care Directive  Patient does not have a Health Care Directive: Discussed advance care planning with patient; information given to patient to  review.      2/7/2025   General Health   How would you rate your overall physical health? Good   Feel stress (tense, anxious, or unable to sleep) Not at all         2/7/2025   Nutrition   Diet: Regular (no restrictions)         2/7/2025   Exercise   Days per week of moderate/strenous exercise 3 days         2/7/2025   Social Factors   Frequency of gathering with friends or relatives Once a week   Worry food won't last until get money to buy more No   Food not last or not have enough money for food? No   Do you have housing? (Housing is defined as stable permanent housing and does not include staying ouside in a car, in a tent, in an abandoned building, in an overnight shelter, or couch-surfing.) Yes   Are you worried about losing your housing? No   Lack of transportation? No   Unable to get utilities (heat,electricity)? No         2/7/2025   Fall Risk   Fallen 2 or more times in the past year? Yes   Trouble with walking or balance? Yes      Gait speed test performed and  > 5 seconds        2/7/2025   Activities of Daily Living- Home Safety   Needs help with the following daily activites None of the above   Safety concerns in the home None of the above         2/7/2025   Dental   Dentist two times every year? (!) DECLINE         2/7/2025   Hearing Screening   Hearing concerns? (!) TROUBLE UNDERSTANDING SOFT OR WHISPERED SPEECH.    (!) TROUBLE UNDERSTANDING SPEECH ON THE TELEPHONE       Multiple values from one day are sorted in reverse-chronological order         2/7/2025   Driving Risk Screening   Patient/family members have concerns about driving No         2/7/2025   General Alertness/Fatigue Screening   Have you been more tired than usual lately? No         2/7/2025   Urinary Incontinence Screening   Bothered by leaking urine in past 6 months Yes          Today's PHQ-9 Score:       2/7/2025     2:57 PM   PHQ-9 SCORE   PHQ-9 Total Score MyChart 0   PHQ-9 Total Score 0        Patient-reported         2/7/2025    Substance Use   Alcohol more than 3/day or more than 7/wk Not Applicable   Do you have a current opioid prescription? No   How severe/bad is pain from 1 to 10? 0/10 (No Pain)   Do you use any other substances recreationally? No     Social History     Tobacco Use    Smoking status: Former     Current packs/day: 0.00     Types: Cigarettes     Quit date: 1968     Years since quittin.4    Smokeless tobacco: Never    Tobacco comments:     quit in    Vaping Use    Vaping status: Never Used   Substance Use Topics    Alcohol use: Not Currently     Comment: 1 glass of wine a month    Drug use: No       Mammogram Screening - After age 74- determine frequency with patient based on health status, life expectancy and patient goals       Pt's past medical history, family history, habits, medications and allergies were reviewed with the patient today.   Most recent lab results reviewed with pt. Problem list and histories reviewed & adjusted, as indicated.    Screening questionnaire responses above  regarding general health status, nutrition, exercise, social engagement, fall risk, ADL home safety, dental concerns, hearing concerns, driving concerns, alertness, incontinence concerns, mental health, substance concerns were reviewed with the patient today. Cognitive status  was also reviewed as below. Weight/BMI status reviewed.  Preventative Healthcare counseling provided to pt as applicable based on positive pt responses/concerns/results of above screening questionnaire.  See pt instructions for additional recommendations.  Otherwise reinforced continuation of good health care maintenance as above.  Also reviewed immunization guidelines,   annual eye screening for vision/glaucoma, routine cancer screenings including annual skin cancer check with myself or dermatology.  Patient longer requiring cervical or colon cancer screening based on age.  Patient declines further mammography screening.  DEXA/bone density status:  Patient declines desire to treat osteoporosis so further DEXA screening deferred as would not           Current providers sharing in care for this patient include:  Patient Care Team:  Jacoby Boo MD as PCP - General  Jacoby Boo MD as Assigned PCP  Alessio West MD as Assigned Sleep Provider    The following health maintenance items are reviewed in Epic and correct as of today:  Health Maintenance   Topic Date Due    ZOSTER IMMUNIZATION (1 of 2) Never done    RSV VACCINE (1 - 1-dose 75+ series) Never done    ANNUAL REVIEW OF HM ORDERS  08/23/2023    INFLUENZA VACCINE (1) 09/01/2024    COVID-19 Vaccine (1 - 2024-25 season) Never done    MEDICARE ANNUAL WELLNESS VISIT  11/14/2024    PHQ-9  08/07/2025    TSH W/FREE T4 REFLEX  11/20/2025    FALL RISK ASSESSMENT  02/07/2026    DTAP/TDAP/TD IMMUNIZATION (2 - Td or Tdap) 06/28/2028    ADVANCE CARE PLANNING  12/07/2028    COLORECTAL CANCER SCREENING  10/05/2031    DEXA  04/04/2034    DEPRESSION ACTION PLAN  Completed    Pneumococcal Vaccine: 50+ Years  Completed    HPV IMMUNIZATION  Aged Out    MENINGITIS IMMUNIZATION  Aged Out    MAMMO SCREENING  Discontinued         Review of Systems  CONSTITUTIONAL: NEGATIVE for fever, chills. Weight up 1 pound from 1 year ago  INTEGUMENTARY/SKIN: NEGATIVE for worrisome rashes, moles or lesions  EYES: NEGATIVE for vision changes or irritation  ENT/MOUTH: NEGATIVE for ear, mouth and throat problems  RESP: NEGATIVE for significant cough or SOB. Using CPAP for JULIO  BREAST: NEGATIVE for masses, tenderness or discharge  CV: NEGATIVE for chest pain, palpitations.  Stable BLE edema. Using compression stockings  GI: NEGATIVE for nausea, abdominal pain, heartburn, or change in bowel habits  : NEGATIVE for frequency, dysuria, or hematuria. Has some incontinence and better with unknown medication from Urology  MUSCULOSKELETAL: NEGATIVE for significant arthralgias or myalgia that stops activity  NEURO: NEGATIVE for weakness,  "dizziness or paresthesias. Stable gait with use of a cane. Needs updating handicap parking permit  ENDOCRINE: NEGATIVE for temperature intolerance. On thyroid replacement. Hx osteoporosis on DEXA 2019. Pt declines treatment   HEME: NEGATIVE for bleeding problems  PSYCHIATRIC: NEGATIVE for changes in mood or affect with meds.  pHQ9 = 0. Sleeping OK with Trazodone. No hangover feeling in AM     Objective    Exam  /78   Pulse 77   Temp 97.2  F (36.2  C) (Temporal)   Wt 79.9 kg (176 lb 3.2 oz)   LMP  (LMP Unknown)   SpO2 98%   BMI 28.44 kg/m     Estimated body mass index is 28.44 kg/m  as calculated from the following:    Height as of 1/23/25: 1.676 m (5' 6\").    Weight as of this encounter: 79.9 kg (176 lb 3.2 oz).    Physical Exam    General appearance - alert, no distress  Skin - No rashes or lesions.  Head - normocephalic, atraumatic  Eyes - JASSI, EOMI, fundi exam with nondilated pupils negative.  Ears - External ears normal. Canals clear. TM's normal.  Nose/Sinuses - Nares normal. Septum midline. Mucosa normal. No drainage or sinus tenderness.  Oropharynx - No erythema, no adenopathy, no exudates.  Neck - Supple without adenopathy or thyromegaly. No bruits.  Lungs - Clear to auscultation without wheezes/rhonchi.  Heart - Regular rate and rhythm without murmurs, clicks, or gallops.  Nodes - No supraclavicular, axillary, or inguinal adenopathy palpable.  Breasts - deferred  Abdomen - Abdomen soft, non-tender. BS normal. No masses or hepatosplenomegaly palpable. No bruits.  Extremities -No cyanosis, clubbing. Mild chronic 1 plus BLE peripheral edema with nontender varicose veins  Musculoskeletal - Spine ROM normal. Muscular strength intact.   Peripheral pulses - radial=4/4, femoral=4/4, posterior tibial=4/4, dorsalis pedis=4/4,  Neuro - Gait slower but stable with use of a cane. Sensation grossly WNL.  Genital/Rectal - deferred          2/7/2025   Mini Cog   Clock Draw Score 0 Abnormal   3 Item Recall 2 " objects recalled   Mini Cog Total Score 2     Numbers were correct on clock test but hands were placed as 11:00 rather than 11:10    Signed Electronically by: Jacoby Boo MD

## 2025-02-07 NOTE — PATIENT INSTRUCTIONS
Continue current medications  Prescriptions refilled on file at your pharmacy to be filled in the future when needed  Call  [phone] or use [employer] to schedule a future lab appointment  fasting in 6 months.   For fasting labs, please refrain from eating for 8 hours or more.   Drink 2 glasses of water before your lab appointment. It is fine to take your  oral medications on the morning of the lab test as usual  Continue diet and exercise  Schedule an eye exam appointment. Please ask the eye clinic to fax us a report of your eye exam to [phone], attention Dr Boo   [facility]  Directive discussed and given copy.   Patient to complete and return to clinic  Handicap parking application completed  Pt was informed regarding extra E&M billing for management of new or established medical issues not related to today's wellness/screening visit

## 2025-02-08 PROBLEM — G31.84 MILD COGNITIVE IMPAIRMENT: Status: ACTIVE | Noted: 2025-02-08

## 2025-02-08 PROBLEM — R10.84 GENERALIZED ABDOMINAL PAIN: Status: RESOLVED | Noted: 2024-03-13 | Resolved: 2025-02-08

## 2025-02-08 PROBLEM — L97.912 BILATERAL LEG ULCER, WITH FAT LAYER EXPOSED (H): Status: RESOLVED | Noted: 2024-09-11 | Resolved: 2025-02-08

## 2025-02-08 PROBLEM — R53.1 GENERALIZED WEAKNESS: Status: RESOLVED | Noted: 2023-11-23 | Resolved: 2025-02-08

## 2025-02-08 PROBLEM — N39.0 RECURRENT UTI: Status: RESOLVED | Noted: 2021-10-10 | Resolved: 2025-02-08

## 2025-02-08 PROBLEM — S91.001A OPEN WOUND OF RIGHT ANKLE: Status: RESOLVED | Noted: 2024-09-11 | Resolved: 2025-02-08

## 2025-02-08 PROBLEM — M81.0 OSTEOPOROSIS WITHOUT CURRENT PATHOLOGICAL FRACTURE, UNSPECIFIED OSTEOPOROSIS TYPE: Status: ACTIVE | Noted: 2025-02-08

## 2025-02-08 PROBLEM — L97.922 BILATERAL LEG ULCER, WITH FAT LAYER EXPOSED (H): Status: RESOLVED | Noted: 2024-09-11 | Resolved: 2025-02-08

## 2025-02-08 PROBLEM — K75.9 HEPATITIS: Status: RESOLVED | Noted: 2023-12-07 | Resolved: 2025-02-08

## 2025-02-08 PROBLEM — D12.2 BENIGN NEOPLASM OF ASCENDING COLON: Status: RESOLVED | Noted: 2021-10-07 | Resolved: 2025-02-08

## 2025-02-08 PROBLEM — E87.1 HYPONATREMIA: Status: RESOLVED | Noted: 2023-11-23 | Resolved: 2025-02-08

## 2025-02-08 PROBLEM — K83.8 DILATION OF BILIARY TRACT: Status: RESOLVED | Noted: 2021-10-10 | Resolved: 2025-02-08

## 2025-02-26 ENCOUNTER — OFFICE VISIT (OUTPATIENT)
Dept: VASCULAR SURGERY | Facility: CLINIC | Age: 85
End: 2025-02-26
Attending: FAMILY MEDICINE
Payer: COMMERCIAL

## 2025-02-26 DIAGNOSIS — Z96.653 HISTORY OF BILATERAL KNEE REPLACEMENT: ICD-10-CM

## 2025-02-26 DIAGNOSIS — Z98.890 STATUS POST ENDOVENOUS RADIOFREQUENCY ABLATION (RFA) OF SAPHENOUS VEIN: Primary | ICD-10-CM

## 2025-02-26 DIAGNOSIS — S91.001A OPEN WOUND OF RIGHT ANKLE, INITIAL ENCOUNTER: ICD-10-CM

## 2025-02-26 DIAGNOSIS — Z86.718 PERSONAL HISTORY OF DVT (DEEP VEIN THROMBOSIS): ICD-10-CM

## 2025-02-26 DIAGNOSIS — Z79.01 CHRONIC ANTICOAGULATION: ICD-10-CM

## 2025-02-26 PROCEDURE — 99214 OFFICE O/P EST MOD 30 MIN: CPT | Performed by: SURGERY

## 2025-02-26 ASSESSMENT — ENCOUNTER SYMPTOMS
EYES NEGATIVE: 1
ALLERGIC/IMMUNOLOGIC NEGATIVE: 1
GASTROINTESTINAL NEGATIVE: 1
COUGH: 1
ENDOCRINE NEGATIVE: 1
PSYCHIATRIC NEGATIVE: 1
COLOR CHANGE: 1
BRUISES/BLEEDS EASILY: 1
FREQUENCY: 1
NEUROLOGICAL NEGATIVE: 1

## 2025-02-26 NOTE — PROGRESS NOTES
VEIN SOLUTIONS CONSULT    Impression:  1.  Bilateral lower extremity edema with healed bilateral venous stasis ulcers.  No open ulcerations at today's visit.    2.  Status post bilateral lower extremity vein ablations.    3.  Status post bilateral knee and left hip replacements in remote past.    4.  Heterozygous factor V Leiden mutation.    5.  History of DVT chronically anticoagulated on Coumadin.    Plan:  I reviewed all the above with Geneva.  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  Her bilateral ankle ulcerations have healed with good wound care.  Presently there is no indication for additional venous intervention.  She does have some incompetence of her bilateral veins of Giacomini and her left greater saphenous vein.  If she were to develop recurrent lower extremity ulcerations those structures would be potentially amenable to ablation.  With all of her other medical comorbidities and the fact that her ulcerations are currently healed, presently she is not interested in additional surgery.  She wears knee-high compression stockings on a daily basis.  She is given literature on EdemaWear and I have encouraged her to begin usage of that product at night.  Per her request, follow-up through this office will be on an as-needed basis.    Total length of this encounter was 30 minutes with time spent reviewing records, interviewing and examining the patient, answering questions, and coordinating a treatment plan.      HPI:   Geneva Shepherd is an 84-year-old female previously evaluated by Dr. Durham on 11/5/2019 for a right lower extremity medial ankle venous stasis ulcer.  At that time her ulcer healed with good wound therapy and compression.  She is a retired registered nurse.  Her past medical history is notable for being a heterozygous factor V Leiden mutation patient.  She is status post right proximal tibia fracture in the remote past.  She has undergone bilateral lower extremity vein  ablations, bilateral total knee replacements, and left hip replacement all in the remote past.    She returned to the [facility] [place] on 9/11/2020 for with complaints of bilateral lower extremity edema and bilateral leg ulcers.  She has previously been prescribed multiple courses of antibiotics.  Bilateral lower extremity venous competency studies were performed on 1/31/2025 and she is ultimately referred to our [facility] for further evaluation.    On arrival today she is in good spirits and has no complaints.  She states that she almost did not keep the appointment because her bilateral ankle ulcers have healed and currently she has no lower extremity venous complaints.  She is compliant with her knee-high compression stockings on a daily basis.  No other issues.          CURRENT MEDICATIONS  Current Outpatient Medications   Medication Sig Dispense Refill    acetaminophen (TYLENOL) 500 MG tablet Take 500 mg by mouth every 4 hours as needed      amitriptyline (ELAVIL) 25 MG tablet Take 1 tablet (25 mg) by mouth at bedtime. 90 tablet 3    Ascorbic Acid (VITAMIN C PO) Take 1,000 mg by mouth every morning (Patient takes 2 X 500 mg = 1,000 mg dose)      BETA CAROTENE PO Take 25,000 Units by mouth daily.      Biotin 1 MG CAPS Take 1 tablet by mouth daily      CHROMIUM PICOLINATE 800 MCG OR TABS Take 1 capsule by mouth daily      Cyanocobalamin 1000 MCG CAPS Take 1 tablet by mouth daily.      donepezil (ARICEPT) 10 MG tablet TAKE 1/2  TABLET BY MOUTH EVERY NIGHT AT BEDTIME 45 tablet 3    FLAX SEED OIL 1000 MG OR CAPS Take 1 capsule by mouth daily      FOLIC ACID PO Take 400 mcg by mouth daily       levothyroxine (SYNTHROID/LEVOTHROID) 50 MCG tablet Take 1 tablet (50 mcg) by mouth daily. 90 tablet 3    PARoxetine (PAXIL) 20 MG tablet Take 2 tablets (40 mg) by mouth at bedtime. 180 tablet 3    TURMERIC PO Take 1 capsule by mouth every morning      warfarin ANTICOAGULANT (COUMADIN) 5 MG tablet Take 1.5  tablets (7.5 mg) by mouth every Sun, Tue, Thu; and take 1 tablet (5 mg) all other days or as directed by your ACC Team based on INR lab results. 120 tablet 3    zinc gluconate 50 MG tablet Take 50 mg by mouth daily       Current Facility-Administered Medications   Medication Dose Route Frequency Provider Last Rate Last Admin    sodium chloride (PF) 0.9% PF flush 3 mL  3 mL Intracatheter q1 min prn Zach Anaya, JOYCE             PAST MEDICAL HISTORY  Past Medical History:   Diagnosis Date    Activated protein C resistance 09/20/2024    Ankle fracture, lateral malleolus, closed 03/2012    right ankle    Asymptomatic varicose veins     Depression, major     Diverticulitis of colon (without mention of hemorrhage)(562.11)     DJD (degenerative joint disease)     Elevated antinuclear antibody (JUAN ANTONIO) level 07/04/2015    minimal    Embolism and thrombosis of unspecified site 03/2003    RLE    FACTOR 5 LEIDEN DEFECT (HYPERCOAGULABLE) 07/2003     Heterozygote    FRACTURE OF RIGHT LATERAL PROXIMAL TIBIA 11/2007    Gastro-oesophageal reflux disease     rare    Gastroesophageal reflux disease, esophagitis presence not specified 11/23/2017    IMO Regulatory Load OCT 2020      Heterozygous factor V Leiden mutation 10/19/2022    Hypercalcemia     Hyperlipidemia LDL goal <160 10/31/2010    Hypothyroidism 01/01/2016    Insomnia     Irritable bowel syndrome     Long term current use of anticoagulant therapy 12/15/2015    Lower extremity edema 09/11/2024    Major depressive disorder, single episode, mild 03/14/2017    Memory loss 07/16/2020    Mild cognitive impairment 02/08/2025    Obesity 09/11/2013    Osteoarthrosis involving lower leg 01/21/2013    Problem list name updated by automated process. Provider to review  Replacing diagnoses that were inactivated after the 10/1/2021 regulatory import.      Osteoporosis without current pathological fracture, unspecified osteoporosis type 02/08/2025    Pain in joint, lower leg      Personal history of RLE DVT (deep vein thrombosis)() 2015    Phlebitis and thrombophlebitis of superficial vessels of lower extremities 2003    right    Polyp of colon 10/05/2021    Sprain of lumbar region     Unspecified hypothyroidism     Urinary tract infection, site not specified          PAST SURGICAL HISTORY:  Past Surgical History:   Procedure Laterality Date    ARTHROPLASTY HIP Left 2015    Procedure: ARTHROPLASTY HIP;  Surgeon: Benjamín Maddox MD;  Location:  OR    ARTHROPLASTY KNEE  2013    Procedure: ARTHROPLASTY KNEE;  RIGHT TOTAL KNEE ARTHROPLASTY (BIOMET)^ ;  Surgeon: Benjamín Maddox MD;  Location:  OR    ARTHROPLASTY KNEE Left 2017    Procedure: ARTHROPLASTY KNEE;  Surgeon: Benjamín Maddox MD;  Location:  OR    CHOLECYSTECTOMY      COLONOSCOPY N/A 2016    Procedure: COMBINED COLONOSCOPY, SINGLE OR MULTIPLE BIOPSY/POLYPECTOMY BY BIOPSY;  Surgeon: Mario Coe MD;  Location:  GI    ENDOSCOPIC ULTRASOUND UPPER GASTROINTESTINAL TRACT (GI) N/A 2022    Procedure: ENDOSCOPIC ULTRASOUND, WITH BIOPSY;  Surgeon: Sammie Christian MD;  Location:  OR    ENDOSCOPIC ULTRASOUND UPPER GASTROINTESTINAL TRACT (GI) N/A 2023    Procedure: Endoscopic Ultrasound;  Surgeon: Sammie Christian MD;  Location:  OR    GYN SURGERY      tubal    VASCULAR SURGERY      Z NONSPECIFIC PROCEDURE      Endometrial Biopsy.    [facility] NONSPECIFIC PROCEDURE      Tubal Ligation.    ZZC NONSPECIFIC PROCEDURE      negative coronary angio    Z NONSPECIFIC PROCEDURE      RLE varicose vein stripping       ALLERGIES   No Known Allergies    FAMILY HISTORY  Family History   Problem Relation Age of Onset    Cardiovascular Mother          aa age 76    Heart Disease Father          age 63 mi    Coronary Artery Disease Father     Circulatory Sister        SOCIAL HISTORY  Social History     Tobacco Use    Smoking  status: Former     Current packs/day: 0.00     Types: Cigarettes     Quit date: 1968     Years since quittin.5    Smokeless tobacco: Never    Tobacco comments:     quit in    Vaping Use    Vaping status: Never Used   Substance Use Topics    Alcohol use: Not Currently     Comment: 1 glass of wine a month    Drug use: No       ROS:   Review of Systems   Constitutional: Positive for malaise/fatigue.   HENT: Negative.     Eyes: Negative.    Cardiovascular:  Positive for leg swelling.   Respiratory:  Positive for cough.    Endocrine: Negative.    Hematologic/Lymphatic: Bruises/bleeds easily.   Skin:  Positive for color change.   Musculoskeletal:  Positive for arthritis.   Gastrointestinal: Negative.    Genitourinary:  Positive for frequency.        Bladder infections.   Neurological: Negative.    Psychiatric/Behavioral: Negative.     Allergic/Immunologic: Negative.          EXAM:  LMP  (LMP Unknown)   Physical Exam  Constitutional:       Appearance: Normal appearance.   HENT:      Head: Normocephalic and atraumatic.   Eyes:      General: No scleral icterus.     Extraocular Movements: Extraocular movements intact.      Pupils: Pupils are equal, round, and reactive to light.   Cardiovascular:      Pulses:           Dorsalis pedis pulses are detected w/ Doppler on the right side and detected w/ Doppler on the left side.        Posterior tibial pulses are detected w/ Doppler on the right side and detected w/ Doppler on the left side.      Comments: Multiple bilateral lower extremity varicosities.  Healed ulcerations of the bilateral ankles.  No open wounds.  Musculoskeletal:         General: Normal range of motion.      Cervical back: Normal range of motion.      Right lower leg: No edema.      Left lower leg: No edema.   Skin:     General: Skin is warm and dry.   Neurological:      General: No focal deficit present.      Mental Status: She is alert and oriented to person, place, and time. Mental status is at  "baseline.   Psychiatric:         Mood and Affect: Mood normal.         Behavior: Behavior normal.         Thought Content: Thought content normal.         Judgment: Judgment normal.         Labs:  LIPID RESULTS:  Lab Results   Component Value Date    CHOL 142 03/07/2023    CHOL 175 05/01/2019    HDL 49 (L) 03/07/2023    HDL 62 05/01/2019    LDL 78 03/07/2023    LDL 95 05/01/2019    TRIG 76 03/07/2023    TRIG 90 05/01/2019    CHOLHDLRATIO 3.8 07/26/2011       CBC RESULTS:  Lab Results   Component Value Date    WBC 11.8 (H) 10/17/2024    WBC 6.7 06/02/2021    RBC 4.39 10/17/2024    RBC 4.36 06/02/2021    HGB 14.0 10/17/2024    HGB 13.9 06/02/2021    HCT 42.8 10/17/2024    HCT 43.0 06/02/2021    MCV 98 10/17/2024    MCV 99 06/02/2021    MCH 31.9 10/17/2024    MCH 31.9 06/02/2021    MCHC 32.7 10/17/2024    MCHC 32.3 06/02/2021    RDW 13.1 10/17/2024    RDW 13.4 06/02/2021     10/17/2024     06/02/2021       BMP RESULTS:  Lab Results   Component Value Date     11/20/2024     06/02/2021    POTASSIUM 4.6 11/20/2024    POTASSIUM 4.1 08/23/2022    POTASSIUM 4.1 06/02/2021    CHLORIDE 104 11/20/2024    CHLORIDE 102 08/23/2022    CHLORIDE 105 06/02/2021    CO2 24 11/20/2024    CO2 27 08/23/2022    CO2 28 06/02/2021    ANIONGAP 11 11/20/2024    ANIONGAP 4 08/23/2022    ANIONGAP 4 06/02/2021    GLC 98 11/20/2024    GLC 97 08/23/2022     (H) 06/02/2021    BUN 19.8 11/20/2024    BUN 8 08/23/2022    BUN 18 06/02/2021    CR 0.59 11/20/2024    CR 0.62 06/02/2021    GFRESTIMATED 90 11/20/2024    GFRESTIMATED >60 02/27/2024    GFRESTIMATED 86 06/02/2021    GFRESTBLACK >90 06/02/2021    TUAN 10.5 (H) 11/20/2024    TUAN 10.4 (H) 06/02/2021        A1C RESULTS:  No results found for: \"A1C\"      Imaging:    I have reviewed the bilateral venous competency studies from 1/31/2025.            Camilo Watts MD      "

## 2025-02-26 NOTE — LETTER
2/26/2025      Geneva Shepherd  [address] [id number]      Dear Colleague,    Thank you for referring your patient, Geneva Shepherd, to the [facility] [place]. Please see a copy of my visit note below.    VEIN SOLUTIONS CONSULT    Impression:  1.  Bilateral lower extremity edema with healed bilateral venous stasis ulcers.  No open ulcerations at today's visit.    2.  Status post bilateral lower extremity vein ablations.    3.  Status post bilateral knee and left hip replacements in remote past.    4.  Heterozygous factor V Leiden mutation.    5.  History of DVT chronically anticoagulated on Coumadin.    Plan:  I reviewed all the above with Geneva.  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  Her bilateral ankle ulcerations have healed with good wound care.  Presently there is no indication for additional venous intervention.  She does have some incompetence of her bilateral veins of Giacomini and her left greater saphenous vein.  If she were to develop recurrent lower extremity ulcerations those structures would be potentially amenable to ablation.  With all of her other medical comorbidities and the fact that her ulcerations are currently healed, presently she is not interested in additional surgery.  She wears knee-high compression stockings on a daily basis.  She is given literature on EdemaWear and I have encouraged her to begin usage of that product at night.  Per her request, follow-up through this office will be on an as-needed basis.    Total length of this encounter was 30 minutes with time spent reviewing records, interviewing and examining the patient, answering questions, and coordinating a treatment plan.      HPI:   Geneva Shepherd is an 84-year-old female previously evaluated by Dr. Durham on 11/5/2019 for a right lower extremity medial ankle venous stasis ulcer.  At that time her ulcer healed with good wound therapy and compression.  She  is a retired registered nurse.  Her past medical history is notable for being a heterozygous factor V Leiden mutation patient.  She is status post right proximal tibia fracture in the remote past.  She has undergone bilateral lower extremity vein ablations, bilateral total knee replacements, and left hip replacement all in the remote past.    She returned to the [facility] [place] on 9/11/2020 for with complaints of bilateral lower extremity edema and bilateral leg ulcers.  She has previously been prescribed multiple courses of antibiotics.  Bilateral lower extremity venous competency studies were performed on 1/31/2025 and she is ultimately referred to our [facility] for further evaluation.    On arrival today she is in good spirits and has no complaints.  She states that she almost did not keep the appointment because her bilateral ankle ulcers have healed and currently she has no lower extremity venous complaints.  She is compliant with her knee-high compression stockings on a daily basis.  No other issues.          CURRENT MEDICATIONS  Current Outpatient Medications   Medication Sig Dispense Refill     acetaminophen (TYLENOL) 500 MG tablet Take 500 mg by mouth every 4 hours as needed       amitriptyline (ELAVIL) 25 MG tablet Take 1 tablet (25 mg) by mouth at bedtime. 90 tablet 3     Ascorbic Acid (VITAMIN C PO) Take 1,000 mg by mouth every morning (Patient takes 2 X 500 mg = 1,000 mg dose)       BETA CAROTENE PO Take 25,000 Units by mouth daily.       Biotin 1 MG CAPS Take 1 tablet by mouth daily       CHROMIUM PICOLINATE 800 MCG OR TABS Take 1 capsule by mouth daily       Cyanocobalamin 1000 MCG CAPS Take 1 tablet by mouth daily.       donepezil (ARICEPT) 10 MG tablet TAKE 1/2  TABLET BY MOUTH EVERY NIGHT AT BEDTIME 45 tablet 3     FLAX SEED OIL 1000 MG OR CAPS Take 1 capsule by mouth daily       FOLIC ACID PO Take 400 mcg by mouth daily        levothyroxine (SYNTHROID/LEVOTHROID) 50 MCG tablet  Take 1 tablet (50 mcg) by mouth daily. 90 tablet 3     PARoxetine (PAXIL) 20 MG tablet Take 2 tablets (40 mg) by mouth at bedtime. 180 tablet 3     TURMERIC PO Take 1 capsule by mouth every morning       warfarin ANTICOAGULANT (COUMADIN) 5 MG tablet Take 1.5 tablets (7.5 mg) by mouth every Sun, Tue, Thu; and take 1 tablet (5 mg) all other days or as directed by your ACC Team based on INR lab results. 120 tablet 3     zinc gluconate 50 MG tablet Take 50 mg by mouth daily       Current Facility-Administered Medications   Medication Dose Route Frequency Provider Last Rate Last Admin     sodium chloride (PF) 0.9% PF flush 3 mL  3 mL Intracatheter q1 min prn Zach Anaya, JOYCE             PAST MEDICAL HISTORY  Past Medical History:   Diagnosis Date     Activated protein C resistance 09/20/2024     Ankle fracture, lateral malleolus, closed 03/2012    right ankle     Asymptomatic varicose veins      Depression, major      Diverticulitis of colon (without mention of hemorrhage)(562.11)      DJD (degenerative joint disease)      Elevated antinuclear antibody (JUAN ANTONIO) level 07/04/2015    minimal     Embolism and thrombosis of unspecified site 03/2003    RLE     FACTOR 5 LEIDEN DEFECT (HYPERCOAGULABLE) 07/2003     Heterozygote     FRACTURE OF RIGHT LATERAL PROXIMAL TIBIA 11/2007     Gastro-oesophageal reflux disease     rare     Gastroesophageal reflux disease, esophagitis presence not specified 11/23/2017    IMO Regulatory Load OCT 2020       Heterozygous factor V Leiden mutation 10/19/2022     Hypercalcemia      Hyperlipidemia LDL goal <160 10/31/2010     Hypothyroidism 01/01/2016     Insomnia      Irritable bowel syndrome      Long term current use of anticoagulant therapy 12/15/2015     Lower extremity edema 09/11/2024     Major depressive disorder, single episode, mild 03/14/2017     Memory loss 07/16/2020     Mild cognitive impairment 02/08/2025     Obesity 09/11/2013     Osteoarthrosis involving lower leg 01/21/2013     Problem list name updated by automated process. Provider to review  Replacing diagnoses that were inactivated after the 10/1/2021 regulatory import.       Osteoporosis without current pathological fracture, unspecified osteoporosis type 02/08/2025     Pain in joint, lower leg      Personal history of RLE DVT (deep vein thrombosis)(2003) 09/03/2015     Phlebitis and thrombophlebitis of superficial vessels of lower extremities 07/2003    right     Polyp of colon 10/05/2021     Sprain of lumbar region      Unspecified hypothyroidism      Urinary tract infection, site not specified          PAST SURGICAL HISTORY:  Past Surgical History:   Procedure Laterality Date     ARTHROPLASTY HIP Left 8/31/2015    Procedure: ARTHROPLASTY HIP;  Surgeon: Benjamín Maddox MD;  Location:  OR     ARTHROPLASTY KNEE  1/17/2013    Procedure: ARTHROPLASTY KNEE;  RIGHT TOTAL KNEE ARTHROPLASTY (BIOMET)^ ;  Surgeon: Benjamín Maddox MD;  Location:  OR     ARTHROPLASTY KNEE Left 1/16/2017    Procedure: ARTHROPLASTY KNEE;  Surgeon: Benjamín Maddox MD;  Location:  OR     CHOLECYSTECTOMY       COLONOSCOPY N/A 4/26/2016    Procedure: COMBINED COLONOSCOPY, SINGLE OR MULTIPLE BIOPSY/POLYPECTOMY BY BIOPSY;  Surgeon: Mario Coe MD;  Location:  GI     ENDOSCOPIC ULTRASOUND UPPER GASTROINTESTINAL TRACT (GI) N/A 4/5/2022    Procedure: ENDOSCOPIC ULTRASOUND, WITH BIOPSY;  Surgeon: Sammie Christian MD;  Location:  OR     ENDOSCOPIC ULTRASOUND UPPER GASTROINTESTINAL TRACT (GI) N/A 12/12/2023    Procedure: Endoscopic Ultrasound;  Surgeon: Sammie Christian MD;  Location:  OR     GYN SURGERY      tubal     VASCULAR SURGERY       Z NONSPECIFIC PROCEDURE  7/00/01    Endometrial Biopsy.     ZZC NONSPECIFIC PROCEDURE      Tubal Ligation.     ZZC NONSPECIFIC PROCEDURE  6/02    negative coronary angio     ZZC NONSPECIFIC PROCEDURE  7/04    RLE varicose vein stripping       ALLERGIES   No Known  Allergies    FAMILY HISTORY  Family History   Problem Relation Age of Onset     Cardiovascular Mother          aa age 76     Heart Disease Father          age 63 mi     Coronary Artery Disease Father      Circulatory Sister        SOCIAL HISTORY  Social History     Tobacco Use     Smoking status: Former     Current packs/day: 0.00     Types: Cigarettes     Quit date: 1968     Years since quittin.5     Smokeless tobacco: Never     Tobacco comments:     quit in    Vaping Use     Vaping status: Never Used   Substance Use Topics     Alcohol use: Not Currently     Comment: 1 glass of wine a month     Drug use: No       ROS:   Review of Systems   Constitutional: Positive for malaise/fatigue.   HENT: Negative.     Eyes: Negative.    Cardiovascular:  Positive for leg swelling.   Respiratory:  Positive for cough.    Endocrine: Negative.    Hematologic/Lymphatic: Bruises/bleeds easily.   Skin:  Positive for color change.   Musculoskeletal:  Positive for arthritis.   Gastrointestinal: Negative.    Genitourinary:  Positive for frequency.        Bladder infections.   Neurological: Negative.    Psychiatric/Behavioral: Negative.     Allergic/Immunologic: Negative.          EXAM:  LMP  (LMP Unknown)   Physical Exam  Constitutional:       Appearance: Normal appearance.   HENT:      Head: Normocephalic and atraumatic.   Eyes:      General: No scleral icterus.     Extraocular Movements: Extraocular movements intact.      Pupils: Pupils are equal, round, and reactive to light.   Cardiovascular:      Pulses:           Dorsalis pedis pulses are detected w/ Doppler on the right side and detected w/ Doppler on the left side.        Posterior tibial pulses are detected w/ Doppler on the right side and detected w/ Doppler on the left side.      Comments: Multiple bilateral lower extremity varicosities.  Healed ulcerations of the bilateral ankles.  No open wounds.  Musculoskeletal:         General: Normal range of  motion.      Cervical back: Normal range of motion.      Right lower leg: No edema.      Left lower leg: No edema.   Skin:     General: Skin is warm and dry.   Neurological:      General: No focal deficit present.      Mental Status: She is alert and oriented to person, place, and time. Mental status is at baseline.   Psychiatric:         Mood and Affect: Mood normal.         Behavior: Behavior normal.         Thought Content: Thought content normal.         Judgment: Judgment normal.         Labs:  LIPID RESULTS:  Lab Results   Component Value Date    CHOL 142 03/07/2023    CHOL 175 05/01/2019    HDL 49 (L) 03/07/2023    HDL 62 05/01/2019    LDL 78 03/07/2023    LDL 95 05/01/2019    TRIG 76 03/07/2023    TRIG 90 05/01/2019    CHOLHDLRATIO 3.8 07/26/2011       CBC RESULTS:  Lab Results   Component Value Date    WBC 11.8 (H) 10/17/2024    WBC 6.7 06/02/2021    RBC 4.39 10/17/2024    RBC 4.36 06/02/2021    HGB 14.0 10/17/2024    HGB 13.9 06/02/2021    HCT 42.8 10/17/2024    HCT 43.0 06/02/2021    MCV 98 10/17/2024    MCV 99 06/02/2021    MCH 31.9 10/17/2024    MCH 31.9 06/02/2021    MCHC 32.7 10/17/2024    MCHC 32.3 06/02/2021    RDW 13.1 10/17/2024    RDW 13.4 06/02/2021     10/17/2024     06/02/2021       BMP RESULTS:  Lab Results   Component Value Date     11/20/2024     06/02/2021    POTASSIUM 4.6 11/20/2024    POTASSIUM 4.1 08/23/2022    POTASSIUM 4.1 06/02/2021    CHLORIDE 104 11/20/2024    CHLORIDE 102 08/23/2022    CHLORIDE 105 06/02/2021    CO2 24 11/20/2024    CO2 27 08/23/2022    CO2 28 06/02/2021    ANIONGAP 11 11/20/2024    ANIONGAP 4 08/23/2022    ANIONGAP 4 06/02/2021    GLC 98 11/20/2024    GLC 97 08/23/2022     (H) 06/02/2021    BUN 19.8 11/20/2024    BUN 8 08/23/2022    BUN 18 06/02/2021    CR 0.59 11/20/2024    CR 0.62 06/02/2021    GFRESTIMATED 90 11/20/2024    GFRESTIMATED >60 02/27/2024    GFRESTIMATED 86 06/02/2021    GFRESTBLACK >90 06/02/2021    TUAN 10.5 (H)  "11/20/2024    TUAN 10.4 (H) 06/02/2021        A1C RESULTS:  No results found for: \"A1C\"      Imaging:    I have reviewed the bilateral venous competency studies from 1/31/2025.            Camilo Watts MD        Again, thank you for allowing me to participate in the care of your patient.        Sincerely,        Camilo Watts MD    Electronically signed"

## 2025-02-26 NOTE — PATIENT INSTRUCTIONS
You have been recommended to wear Edemawear at night.    How to Apply EdemaWear   - Apply EdemaWear starting from toes pulling up to knees with a cuff at the top of the stockings.  - DO NOT CUT OR TRIM STOCKINGS, fold over the stocking until the top of it lays just BELOW your knee crease  - If wearing socks, wear them over top of EdemaWear. EdemaWear should be in direct contact with the skin.  - Apply a fresh pair daily    Care of EdemaWear  - DO NOT THROW AWAY THE OLD PAIR OF EDEMAWEAR, WASH IT.       o  Hand or machine wash in cold water and hang dry    EdemaWear Sizes  Size  Calf circumference  Stripe color   Small  up to 45cm  Navy_______   Medium up to 75cm  Yellow______   Large  up to 115cm  Red________   X-Large up to 150cm  Aqua_______    Options for purchasing  - [employer] [facility] [employer] [facility] ([facility])    [address] [address]    Ph: [phone]    - [employer] ([facility])    Ph: [phone]  Email: [email or web address]     Visit [email or web address] for more information

## 2025-03-11 DIAGNOSIS — F32.0 MAJOR DEPRESSIVE DISORDER, SINGLE EPISODE, MILD: ICD-10-CM

## 2025-03-11 NOTE — TELEPHONE ENCOUNTER
Medication Question or Refill    Contacts       Contact Date/Time Type Contact Phone/Fax    03/11/2025 11:35 AM CDT Phone (Incoming) Joao Marsha (Self) [phone] (H)            What medication are you calling about (include dose and sig)?: Paroxetine 20 mg    Preferred Pharmacy:   [place] [address] [place] [facility] [address] [facility]  [address] [facility] [address]  Phone: [phone] Fax: [phone]    [facility] [place], [place] - [address]  [address] [address]  Phone: [phone] Fax: [phone] Alternate Fax: [phone], [phone]    [facility] #[id number] [facility] [address] AT [place] [address]  [facility] [address]  Phone: [phone] Fax: [phone]    [facility] #[id number] [address] AT [address] [place] [address] [address]VE [address]  [facility] [address]  Phone: [phone] Fax: [phone]    [facility] #[id number] [address] AT [facility]  [address]  [facility] [address]  Phone: [phone] Fax: [phone]      Controlled Substance Agreement on file:   CSA -- Patient Level:    CSA: None found at the patient level.       Who prescribed the medication?: Dr Boo    Do you need a refill? Yes    When did you use the medication last? 3/10/2025    Patient offered an appointment? Yes pt was seen     Do you have any questions or concerns?  Yes: Pt said pharmacy told her it was discontinued and she ran out on on 3/10/2025. Pls refill to [facility]      Could we send this information to you in [employer] or would you prefer to receive a phone call?:   Patient would prefer a phone call   Okay to leave a detailed message?: Yes at Home number on file [phone] (home)

## 2025-03-12 RX ORDER — PAROXETINE 20 MG/1
40 TABLET, FILM COATED ORAL AT BEDTIME
Qty: 180 TABLET | Refills: 2 | Status: SHIPPED | OUTPATIENT
Start: 2025-03-12

## 2025-03-12 NOTE — TELEPHONE ENCOUNTER
New pharmacy for this refill request. Remaining refills sent to this new pharmacy.    Nany Wset BSN, RN     81 year old  male pmhx HLD CAD s/p PCI 2018, AS s/p TAVR 11/2021, Afib (on xarelto) c/b CVA (L hemiparesis) who was discharged couple days ago post TAVR on MCOT. Experienced dizziness and MCOT showed slow afib (HR 30s) so presented back to hospital and admitted to CTU with dopamine gtt.  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

## 2025-03-17 ENCOUNTER — MYC MEDICAL ADVICE (OUTPATIENT)
Dept: ANTICOAGULATION | Facility: CLINIC | Age: 85
End: 2025-03-17
Payer: MEDICARE

## 2025-03-24 ENCOUNTER — TELEPHONE (OUTPATIENT)
Dept: ANTICOAGULATION | Facility: CLINIC | Age: 85
End: 2025-03-24
Payer: MEDICARE

## 2025-03-24 NOTE — TELEPHONE ENCOUNTER
ANTICOAGULATION     Geneva Shepherd is overdue for an INR check.     Left message for patient to call and schedule lab appointment as soon as possible. If returning call, please schedule.     Tiffany Flores, RN  3/24/2025  Anticoagulation Clinic  [facility] for routing messages: asia [facility] patient phone line: [phone]

## 2025-03-26 ENCOUNTER — LAB (OUTPATIENT)
Dept: LAB | Facility: CLINIC | Age: 85
End: 2025-03-26
Payer: COMMERCIAL

## 2025-03-26 ENCOUNTER — ANTICOAGULATION THERAPY VISIT (OUTPATIENT)
Dept: ANTICOAGULATION | Facility: CLINIC | Age: 85
End: 2025-03-26

## 2025-03-26 DIAGNOSIS — Z86.718 PERSONAL HISTORY OF DVT (DEEP VEIN THROMBOSIS): Primary | ICD-10-CM

## 2025-03-26 DIAGNOSIS — D68.51 HETEROZYGOUS FACTOR V LEIDEN MUTATION: ICD-10-CM

## 2025-03-26 DIAGNOSIS — Z79.01 LONG TERM CURRENT USE OF ANTICOAGULANT THERAPY: ICD-10-CM

## 2025-03-26 DIAGNOSIS — D68.51 ACTIVATED PROTEIN C RESISTANCE: ICD-10-CM

## 2025-03-26 DIAGNOSIS — Z86.718 PERSONAL HISTORY OF DVT (DEEP VEIN THROMBOSIS): ICD-10-CM

## 2025-03-26 LAB — INR BLD: 2.2 (ref 0.9–1.1)

## 2025-03-26 PROCEDURE — 36416 COLLJ CAPILLARY BLOOD SPEC: CPT

## 2025-03-26 PROCEDURE — 85610 PROTHROMBIN TIME: CPT

## 2025-03-26 NOTE — PROGRESS NOTES
ANTICOAGULATION MANAGEMENT     Geneva Shepherd 84 year old female is on warfarin with therapeutic INR result. (Goal INR 2.0-3.0)    Recent labs: (last 7 days)     03/26/25  1536   INR 2.2*       ASSESSMENT     Source(s): Chart Review and Patient/Caregiver Call     Warfarin doses taken: Warfarin taken as instructed  Diet: No new diet changes identified  Medication/supplement changes: None noted  New illness, injury, or hospitalization: No  Signs or symptoms of bleeding or clotting: No  Previous result: Therapeutic last 2(+) visits  Additional findings: None       PLAN     Recommended plan for no diet, medication or health factor changes affecting INR     Dosing Instructions: Continue your current warfarin dose with next INR in 6 weeks       Summary  As of 3/26/2025      Full warfarin instructions:  5 mg every Mon, Fri; 7.5 mg all other days   Next INR check:  5/7/2025               Telephone call with Geneva who agrees to plan and repeated back plan correctly    Lab visit scheduled    Education provided: Please call back if any changes to your diet, medications or how you've been taking warfarin    Plan made per [facility] anticoagulation protocol    Jenn Amaya RN  3/26/2025  Anticoagulation Clinic  [employer] for routing messages: asia [employer] [facility] patient phone line: [phone]        _______________________________________________________________________     Anticoagulation Episode Summary       Current INR goal:  2.0-3.0   TTR:  91.3% (1 y)   Target end date:  Indefinite   Send INR reminders to:  [facility] [facility]    Indications    Personal history of RLE DVT (deep vein thrombosis)(2003) [Z86.718]  Long term current use of anticoagulant therapy [Z79.01]  Heterozygous factor V Leiden mutation [D68.51]  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

## 2025-04-08 NOTE — PROGRESS NOTES
----- Message from Skylar Diaz sent at 4/7/2025  7:45 PM CDT -----  Hi,   I see that he has a lab appt at the [facility] this Wednesday 4/9. Can we get him scheduled for a LVAD dressing change and send AFB culture of the driveline drainage?   Thanks  AB   14 day Sleep therapy management telephone visit    Diagnostic AHI:   PS.1         LEFT VOICE MESSAGE FOR PATIENT TO RETURN CALL      Objective measures: 14 day rolling measures   COMPLIANCE LEAK AHI AVERAGE USE IN MINUTES   100 % 34.16 2.54 382   GOAL >70% GOAL < 24 LPM GOAL <5 GOAL >240          Device settings:  CPAP MIN CPAP MAX EPR RESMED SOFT RESPONSE SETTING   5.0 cm  H20 15.0 cm  H20 TWO OFF         Patient has the following upcoming sleep appts:  Future Sleep Appointments         Provider Department    2024 11:30 AM (Arrive by 11:15 AM) Alessio West MD [facility] [place]            Replacement device: No  STM ordered by provider: Yes     Total time spent on accessing and  interpreting remote patient PAP therapy data  10 minutes    Total time spent counseling, coaching  and reviewing PAP therapy data with patient  0 minutes

## 2025-04-26 DIAGNOSIS — D68.51 HETEROZYGOUS FACTOR V LEIDEN MUTATION: ICD-10-CM

## 2025-04-26 DIAGNOSIS — Z79.01 LONG TERM CURRENT USE OF ANTICOAGULANT THERAPY: ICD-10-CM

## 2025-04-28 ENCOUNTER — HOSPITAL ENCOUNTER (INPATIENT)
Facility: CLINIC | Age: 85
DRG: 605 | End: 2025-04-28
Attending: EMERGENCY MEDICINE | Admitting: INTERNAL MEDICINE
Payer: MEDICARE

## 2025-04-28 ENCOUNTER — APPOINTMENT (OUTPATIENT)
Dept: CT IMAGING | Facility: CLINIC | Age: 85
DRG: 605 | End: 2025-04-28
Attending: EMERGENCY MEDICINE
Payer: MEDICARE

## 2025-04-28 DIAGNOSIS — Z79.01 ANTICOAGULATED: ICD-10-CM

## 2025-04-28 DIAGNOSIS — Z96.642 HISTORY OF TOTAL HIP ARTHROPLASTY, LEFT: ICD-10-CM
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
## 2025-04-28 DIAGNOSIS — S30.0XXA HEMATOMA OF LEFT BUTTOCK: ICD-10-CM

## 2025-04-28 DIAGNOSIS — Z86.2 HISTORY OF FACTOR V LEIDEN MUTATION: ICD-10-CM

## 2025-04-28 DIAGNOSIS — N39.0 URINARY TRACT INFECTION WITHOUT HEMATURIA, SITE UNSPECIFIED: Primary | ICD-10-CM

## 2025-04-28 LAB
ANION GAP SERPL CALCULATED.3IONS-SCNC: 11 MMOL/L (ref 7–15)
BASOPHILS # BLD AUTO: 0 10E3/UL (ref 0–0.2)
BASOPHILS NFR BLD AUTO: 0 %
BUN SERPL-MCNC: 14.5 MG/DL (ref 8–23)
CALCIUM SERPL-MCNC: 10.7 MG/DL (ref 8.8–10.4)
CHLORIDE SERPL-SCNC: 100 MMOL/L (ref 98–107)
CREAT SERPL-MCNC: 0.61 MG/DL (ref 0.51–0.95)
EGFRCR SERPLBLD CKD-EPI 2021: 89 ML/MIN/1.73M2
EOSINOPHIL # BLD AUTO: 0.1 10E3/UL (ref 0–0.7)
EOSINOPHIL NFR BLD AUTO: 1 %
ERYTHROCYTE [DISTWIDTH] IN BLOOD BY AUTOMATED COUNT: 13.2 % (ref 10–15)
GLUCOSE SERPL-MCNC: 119 MG/DL (ref 70–99)
HCO3 SERPL-SCNC: 26 MMOL/L (ref 22–29)
HCT VFR BLD AUTO: 41.1 % (ref 35–47)
HGB BLD-MCNC: 13.4 G/DL (ref 11.7–15.7)
IMM GRANULOCYTES # BLD: 0 10E3/UL
IMM GRANULOCYTES NFR BLD: 0 %
INR PPP: 2.19 (ref 0.85–1.15)
LYMPHOCYTES # BLD AUTO: 1.5 10E3/UL (ref 0.8–5.3)
LYMPHOCYTES NFR BLD AUTO: 16 %
MCH RBC QN AUTO: 31.8 PG (ref 26.5–33)
MCHC RBC AUTO-ENTMCNC: 32.6 G/DL (ref 31.5–36.5)
MCV RBC AUTO: 97 FL (ref 78–100)
MONOCYTES # BLD AUTO: 0.6 10E3/UL (ref 0–1.3)
MONOCYTES NFR BLD AUTO: 6 %
NEUTROPHILS # BLD AUTO: 7.1 10E3/UL (ref 1.6–8.3)
NEUTROPHILS NFR BLD AUTO: 77 %
NRBC # BLD AUTO: 0 10E3/UL
NRBC BLD AUTO-RTO: 0 /100
PLATELET # BLD AUTO: 171 10E3/UL (ref 150–450)
POTASSIUM SERPL-SCNC: 4.7 MMOL/L (ref 3.4–5.3)
RBC # BLD AUTO: 4.22 10E6/UL (ref 3.8–5.2)
SODIUM SERPL-SCNC: 137 MMOL/L (ref 135–145)
WBC # BLD AUTO: 9.3 10E3/UL (ref 4–11)

## 2025-04-28 PROCEDURE — 99285 EMERGENCY DEPT VISIT HI MDM: CPT | Mod: 25

## 2025-04-28 PROCEDURE — 250N000013 HC RX MED GY IP 250 OP 250 PS 637: Performed by: EMERGENCY MEDICINE

## 2025-04-28 PROCEDURE — 85025 COMPLETE CBC W/AUTO DIFF WBC: CPT | Performed by: EMERGENCY MEDICINE

## 2025-04-28 PROCEDURE — 72192 CT PELVIS W/O DYE: CPT

## 2025-04-28 PROCEDURE — 80048 BASIC METABOLIC PNL TOTAL CA: CPT | Performed by: EMERGENCY MEDICINE

## 2025-04-28 PROCEDURE — 85610 PROTHROMBIN TIME: CPT | Performed by: EMERGENCY MEDICINE

## 2025-04-28 PROCEDURE — 36415 COLL VENOUS BLD VENIPUNCTURE: CPT | Performed by: EMERGENCY MEDICINE

## 2025-04-28 PROCEDURE — 70450 CT HEAD/BRAIN W/O DYE: CPT

## 2025-04-28 PROCEDURE — 99222 1ST HOSP IP/OBS MODERATE 55: CPT | Mod: AI | Performed by: HOSPITALIST

## 2025-04-28 RX ORDER — WARFARIN SODIUM 5 MG/1
TABLET ORAL
Qty: 124 TABLET | Refills: 1 | Status: ON HOLD | OUTPATIENT
Start: 2025-04-28

## 2025-04-28 RX ORDER — ACETAMINOPHEN 325 MG/1
650 TABLET ORAL ONCE
Status: COMPLETED | OUTPATIENT
Start: 2025-04-28 | End: 2025-04-28

## 2025-04-28 RX ADMIN — OXYCODONE HYDROCHLORIDE 2.5 MG: 5 TABLET ORAL at 23:41

## 2025-04-28 ASSESSMENT — ACTIVITIES OF DAILY LIVING (ADL)
ADLS_ACUITY_SCORE: 63
ADLS_ACUITY_SCORE: 63

## 2025-04-29 LAB
ANION GAP SERPL CALCULATED.3IONS-SCNC: 9 MMOL/L (ref 7–15)
BASOPHILS # BLD AUTO: 0 10E3/UL (ref 0–0.2)
BASOPHILS NFR BLD AUTO: 0 %
BUN SERPL-MCNC: 12.4 MG/DL (ref 8–23)
CALCIUM SERPL-MCNC: 9.7 MG/DL (ref 8.8–10.4)
CHLORIDE SERPL-SCNC: 104 MMOL/L (ref 98–107)
CREAT SERPL-MCNC: 0.57 MG/DL (ref 0.51–0.95)
EGFRCR SERPLBLD CKD-EPI 2021: 90 ML/MIN/1.73M2
EOSINOPHIL # BLD AUTO: 0.1 10E3/UL (ref 0–0.7)
EOSINOPHIL NFR BLD AUTO: 2 %
ERYTHROCYTE [DISTWIDTH] IN BLOOD BY AUTOMATED COUNT: 13.2 % (ref 10–15)
GLUCOSE BLDC GLUCOMTR-MCNC: 96 MG/DL (ref 70–99)
GLUCOSE SERPL-MCNC: 102 MG/DL (ref 70–99)
HCO3 SERPL-SCNC: 24 MMOL/L (ref 22–29)
HCT VFR BLD AUTO: 35.3 % (ref 35–47)
HGB BLD-MCNC: 12.1 G/DL (ref 11.7–15.7)
IMM GRANULOCYTES # BLD: 0 10E3/UL
IMM GRANULOCYTES NFR BLD: 0 %
INR PPP: 2.18 (ref 0.85–1.15)
LYMPHOCYTES # BLD AUTO: 2.3 10E3/UL (ref 0.8–5.3)
LYMPHOCYTES NFR BLD AUTO: 29 %
MCH RBC QN AUTO: 32.5 PG (ref 26.5–33)
MCHC RBC AUTO-ENTMCNC: 34.3 G/DL (ref 31.5–36.5)
MCV RBC AUTO: 95 FL (ref 78–100)
MONOCYTES # BLD AUTO: 0.7 10E3/UL (ref 0–1.3)
MONOCYTES NFR BLD AUTO: 8 %
NEUTROPHILS # BLD AUTO: 4.8 10E3/UL (ref 1.6–8.3)
NEUTROPHILS NFR BLD AUTO: 61 %
NRBC # BLD AUTO: 0 10E3/UL
NRBC BLD AUTO-RTO: 0 /100
PLATELET # BLD AUTO: 165 10E3/UL (ref 150–450)
POTASSIUM SERPL-SCNC: 4 MMOL/L (ref 3.4–5.3)
RBC # BLD AUTO: 3.72 10E6/UL (ref 3.8–5.2)
SODIUM SERPL-SCNC: 137 MMOL/L (ref 135–145)
WBC # BLD AUTO: 8 10E3/UL (ref 4–11)

## 2025-04-29 PROCEDURE — 36415 COLL VENOUS BLD VENIPUNCTURE: CPT | Performed by: HOSPITALIST

## 2025-04-29 PROCEDURE — 120N000001 HC R&B MED SURG/OB

## 2025-04-29 PROCEDURE — G0378 HOSPITAL OBSERVATION PER HR: HCPCS

## 2025-04-29 PROCEDURE — 85004 AUTOMATED DIFF WBC COUNT: CPT | Performed by: HOSPITALIST

## 2025-04-29 PROCEDURE — 250N000013 HC RX MED GY IP 250 OP 250 PS 637: Performed by: INTERNAL MEDICINE

## 2025-04-29 PROCEDURE — 96376 TX/PRO/DX INJ SAME DRUG ADON: CPT

## 2025-04-29 PROCEDURE — 258N000003 HC RX IP 258 OP 636: Performed by: HOSPITALIST

## 2025-04-29 PROCEDURE — 85610 PROTHROMBIN TIME: CPT | Performed by: HOSPITALIST

## 2025-04-29 PROCEDURE — 85025 COMPLETE CBC W/AUTO DIFF WBC: CPT | Performed by: HOSPITALIST

## 2025-04-29 PROCEDURE — 82962 GLUCOSE BLOOD TEST: CPT

## 2025-04-29 PROCEDURE — 96374 THER/PROPH/DIAG INJ IV PUSH: CPT

## 2025-04-29 PROCEDURE — 250N000011 HC RX IP 250 OP 636: Mod: JZ | Performed by: HOSPITALIST

## 2025-04-29 PROCEDURE — 80048 BASIC METABOLIC PNL TOTAL CA: CPT | Performed by: HOSPITALIST

## 2025-04-29 PROCEDURE — 99233 SBSQ HOSP IP/OBS HIGH 50: CPT | Performed by: INTERNAL MEDICINE

## 2025-04-29 PROCEDURE — 96361 HYDRATE IV INFUSION ADD-ON: CPT

## 2025-04-29 PROCEDURE — 250N000013 HC RX MED GY IP 250 OP 250 PS 637: Performed by: HOSPITALIST

## 2025-04-29 RX ORDER — SODIUM CHLORIDE 9 MG/ML
INJECTION, SOLUTION INTRAVENOUS CONTINUOUS
Status: DISCONTINUED | OUTPATIENT
Start: 2025-04-29 | End: 2025-04-29

## 2025-04-29 RX ORDER — ACETAMINOPHEN 325 MG/1
975 TABLET ORAL 3 TIMES DAILY
Status: DISCONTINUED | OUTPATIENT
Start: 2025-04-29 | End: 2025-05-02 | Stop reason: HOSPADM

## 2025-04-29 RX ORDER — ONDANSETRON 4 MG/1
4 TABLET, ORALLY DISINTEGRATING ORAL EVERY 6 HOURS PRN
Status: DISCONTINUED | OUTPATIENT
Start: 2025-04-29 | End: 2025-05-02 | Stop reason: HOSPADM

## 2025-04-29 RX ORDER — HYDROMORPHONE HCL IN WATER/PF 6 MG/30 ML
0.4 PATIENT CONTROLLED ANALGESIA SYRINGE INTRAVENOUS
Status: DISCONTINUED | OUTPATIENT
Start: 2025-04-29 | End: 2025-05-02 | Stop reason: HOSPADM

## 2025-04-29 RX ORDER — AMOXICILLIN 250 MG
1 CAPSULE ORAL 2 TIMES DAILY PRN
Status: DISCONTINUED | OUTPATIENT
Start: 2025-04-29 | End: 2025-05-02 | Stop reason: HOSPADM

## 2025-04-29 RX ORDER — AMOXICILLIN 250 MG
2 CAPSULE ORAL 2 TIMES DAILY PRN
Status: DISCONTINUED | OUTPATIENT
Start: 2025-04-29 | End: 2025-05-02 | Stop reason: HOSPADM

## 2025-04-29 RX ORDER — PROCHLORPERAZINE MALEATE 5 MG/1
5 TABLET ORAL EVERY 6 HOURS PRN
Status: DISCONTINUED | OUTPATIENT
Start: 2025-04-29 | End: 2025-05-02 | Stop reason: HOSPADM

## 2025-04-29 RX ORDER — DONEPEZIL HYDROCHLORIDE 5 MG/1
5 TABLET, FILM COATED ORAL AT BEDTIME
Status: DISCONTINUED | OUTPATIENT
Start: 2025-04-29 | End: 2025-05-02 | Stop reason: HOSPADM

## 2025-04-29 RX ORDER — POLYETHYLENE GLYCOL 3350 17 G/17G
17 POWDER, FOR SOLUTION ORAL 2 TIMES DAILY PRN
Status: DISCONTINUED | OUTPATIENT
Start: 2025-04-29 | End: 2025-05-02 | Stop reason: HOSPADM

## 2025-04-29 RX ORDER — DEXTROSE MONOHYDRATE 25 G/50ML
25-50 INJECTION, SOLUTION INTRAVENOUS
Status: DISCONTINUED | OUTPATIENT
Start: 2025-04-29 | End: 2025-05-02 | Stop reason: HOSPADM

## 2025-04-29 RX ORDER — HYDROMORPHONE HCL IN WATER/PF 6 MG/30 ML
0.2 PATIENT CONTROLLED ANALGESIA SYRINGE INTRAVENOUS
Status: DISCONTINUED | OUTPATIENT
Start: 2025-04-29 | End: 2025-05-02 | Stop reason: HOSPADM

## 2025-04-29 RX ORDER — LEVOTHYROXINE SODIUM 50 UG/1
50 TABLET ORAL DAILY
Status: DISCONTINUED | OUTPATIENT
Start: 2025-04-29 | End: 2025-05-02 | Stop reason: HOSPADM

## 2025-04-29 RX ORDER — ONDANSETRON 2 MG/ML
4 INJECTION INTRAMUSCULAR; INTRAVENOUS EVERY 6 HOURS PRN
Status: DISCONTINUED | OUTPATIENT
Start: 2025-04-29 | End: 2025-05-02 | Stop reason: HOSPADM

## 2025-04-29 RX ORDER — NICOTINE POLACRILEX 4 MG
15-30 LOZENGE BUCCAL
Status: DISCONTINUED | OUTPATIENT
Start: 2025-04-29 | End: 2025-05-02 | Stop reason: HOSPADM

## 2025-04-29 RX ORDER — PAROXETINE 20 MG/1
40 TABLET, FILM COATED ORAL AT BEDTIME
Status: DISCONTINUED | OUTPATIENT
Start: 2025-04-29 | End: 2025-05-02 | Stop reason: HOSPADM

## 2025-04-29 RX ORDER — OXYCODONE HYDROCHLORIDE 5 MG/1
5 TABLET ORAL EVERY 4 HOURS PRN
Status: DISCONTINUED | OUTPATIENT
Start: 2025-04-29 | End: 2025-04-30

## 2025-04-29 RX ORDER — OXYCODONE HYDROCHLORIDE 5 MG/1
10 TABLET ORAL EVERY 4 HOURS PRN
Status: DISCONTINUED | OUTPATIENT
Start: 2025-04-29 | End: 2025-04-30

## 2025-04-29 RX ADMIN — HYDROMORPHONE HYDROCHLORIDE 0.4 MG: 0.2 INJECTION, SOLUTION INTRAMUSCULAR; INTRAVENOUS; SUBCUTANEOUS at 05:22

## 2025-04-29 RX ADMIN — ACETAMINOPHEN 975 MG: 325 TABLET, FILM COATED ORAL at 20:15

## 2025-04-29 RX ADMIN — ACETAMINOPHEN 975 MG: 325 TABLET, FILM COATED ORAL at 08:32

## 2025-04-29 RX ADMIN — LEVOTHYROXINE SODIUM 50 MCG: 50 TABLET ORAL at 14:45

## 2025-04-29 RX ADMIN — PAROXETINE HYDROCHLORIDE 40 MG: 20 TABLET, FILM COATED ORAL at 03:29

## 2025-04-29 RX ADMIN — OXYCODONE 5 MG: 5 TABLET ORAL at 08:32

## 2025-04-29 RX ADMIN — DONEPEZIL HYDROCHLORIDE 5 MG: 5 TABLET ORAL at 22:35

## 2025-04-29 RX ADMIN — AMITRIPTYLINE HYDROCHLORIDE 25 MG: 25 TABLET, FILM COATED ORAL at 22:35

## 2025-04-29 RX ADMIN — OXYCODONE 5 MG: 5 TABLET ORAL at 20:14

## 2025-04-29 RX ADMIN — OXYCODONE HYDROCHLORIDE 2.5 MG: 5 TABLET ORAL at 01:06

## 2025-04-29 RX ADMIN — HYDROMORPHONE HYDROCHLORIDE 0.4 MG: 0.2 INJECTION, SOLUTION INTRAMUSCULAR; INTRAVENOUS; SUBCUTANEOUS at 03:07

## 2025-04-29 RX ADMIN — SODIUM CHLORIDE: 0.9 INJECTION, SOLUTION INTRAVENOUS at 02:14

## 2025-04-29 RX ADMIN — AMITRIPTYLINE HYDROCHLORIDE 25 MG: 25 TABLET, FILM COATED ORAL at 03:29

## 2025-04-29 RX ADMIN — DONEPEZIL HYDROCHLORIDE 5 MG: 5 TABLET ORAL at 03:29

## 2025-04-29 RX ADMIN — PAROXETINE HYDROCHLORIDE 40 MG: 20 TABLET, FILM COATED ORAL at 22:34

## 2025-04-29 RX ADMIN — ACETAMINOPHEN 975 MG: 325 TABLET, FILM COATED ORAL at 14:45

## 2025-04-29 ASSESSMENT — ACTIVITIES OF DAILY LIVING (ADL)
ADLS_ACUITY_SCORE: 63
DEPENDENT_IADLS:: INDEPENDENT
ADLS_ACUITY_SCORE: 66
ADLS_ACUITY_SCORE: 63
ADLS_ACUITY_SCORE: 66
ADLS_ACUITY_SCORE: 63
ADLS_ACUITY_SCORE: 66
ADLS_ACUITY_SCORE: 66
ADLS_ACUITY_SCORE: 63
ADLS_ACUITY_SCORE: 66
ADLS_ACUITY_SCORE: 63
ADLS_ACUITY_SCORE: 63
ADLS_ACUITY_SCORE: 66
ADLS_ACUITY_SCORE: 63
ADLS_ACUITY_SCORE: 63
ADLS_ACUITY_SCORE: 66

## 2025-04-29 NOTE — PROGRESS NOTES
Observation goals  PRIOR TO DISCHARGE        Comments: -diagnostic tests and consults completed and resulted: Not met   -vital signs normal or at patient baseline: Not met   -tolerating oral intake to maintain hydration: Not met, NPO  -adequate pain control on oral analgesics: Progressing   -returns to baseline functional status: Not met   -safe disposition plan has been identified: Not met   Nurse to notify provider when observation goals have been met and patient is ready for discharge.
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
## 2025-04-29 NOTE — ED TRIAGE NOTES
BIBA from home due to fall sustained left hip pain,on warfarin,no LOC,did not hit her head.  Had tylenol prior  ED.     Triage Assessment (Adult)       Row Name 04/28/25 2202          Triage Assessment    Airway WDL WDL        Respiratory WDL    Respiratory WDL WDL        Skin Circulation/Temperature WDL    Skin Circulation/Temperature WDL WDL        Cardiac WDL    Cardiac WDL X;all     Pulse Rate & Regularity radial pulse irregular        Peripheral/Neurovascular WDL    Peripheral Neurovascular WDL WDL        Cognitive/Neuro/Behavioral WDL    Cognitive/Neuro/Behavioral WDL WDL
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
## 2025-04-29 NOTE — PLAN OF CARE
PRIMARY Concern: Fall with Left Gluteal Hematoma    SAFETY RISK Concerns (fall risk, behaviors, etc.): Fall       Aggression Tool Color: Green  Isolation/Type: None   Tests/Procedures for NEXT shift: Pending ortho consult   Consults? (Pending/following, signed-off?) Ortho surg  Where is patient from? (Home, TCU, etc.): Home   Other Important info for NEXT shift: No visible bruising on left side; continuing to monitor.  Anticipated DC date & active delays: TBD  _____________________________________________________________________________  SUMMARY NOTE:   Orientation/Cognitive: Aox4, forgetful at times  Observation Goals (Met/ Not Met): Not met   Mobility Level/Assist Equipment: A2 GB/W, WBAT per ortho. Patient declined ambulation.  Pain Management: Scheduled tylenol. PRN oxycodone 5-10mg, and Diluaddid IV 0.2-0.4mg  Complete Pain Reassessment: Y/N Y Due next: Next shift   Tele/VS/O2: VSS. 2L NC due to patient having sleep apnea and her sleeping often during the day.  ABNL Lab/BG: INR 2.18 due to being on warfarin   Diet: Regular   Bowel/Bladder: Purewick in place.  Skin Concerns: Left second toe callus and small scratch on left elbow.  Drains/Devices: PIV SL  Patient Stated Goal for Today: None

## 2025-04-29 NOTE — H&P
[facility]    History and Physical  Hospitalist       Date of Admission:  4/28/2025  Date of Service (when I saw the patient): 04/28/25    ASSESSMENT  Geneva Shepherd is a markedly pleasant 84 year old woman with past medical history that is most notable for Factor V Leiden deficiency and DVT, on Warfarin; as well as prior left RAF, with history of right lower extremity DVT, long-term use of Warfarin, among others; who presents with mechanical fall, and is found to have acute anti-coagulation associated left gluteal hematoma.    PLAN     Acute anti-coagulation associated left gluteal hematoma: Of note, Ms. Shepherd underwent left RAF in 2015. She also has had prior bilateral TKA surgeries. She is on long term Warfarin for history of right lower extremity DVT and Factor V Leiden Deficiency. She lives independently (she says that her sons want her to move into [facility] but she does not want to lose any independence).    Now, 04/28/25 she presents from home with posterior left hip pain after a mechanical fall. . In the ED, she is afebrile, without hypotension, tachycardia, or hypoxia. CBC and BMP are notable for Ca 10.7, otherwise are within the normal reference range. Initial HGB is 13.4. INR is 2.19. CTH shows no evidence for acute intracranial process. Pelvis CT shows a large intramuscular contusion/hematoma in the left gluteus tani muscle, which is difficult to measure in size per Radiology. Prior left hip replacement is seen, with no evidence for acute pelvis, sacral, or hip fracture.    Overall, her symptoms are consistent with acute anti-coagulation associated left gluteal hematoma.       -- Observation. Fall precautions. NPO. Orthopedics consulted. Multi-modal pain relief with hot, cold, scheduled Tylenol, prn Oxycodone, and IV Dilaudid as needed for pain. Anti-emetics as needed.    -- Repeat CBC 4/29/25. SW consulted for disposition planning.    Hypercalcemia, acute:  Mild. Ca 10.7.  could be due to hypovolemia.      -- 100 ml/hour NS IVF ordered for 10 hours. Repeat BMP 04/29/25    History of right lower extremity DVT and Factor V Leiden Deficiency:       -- Warfarin held. Repeat INR ordered 04/29/25    Bilateral lower extremity edema with healed bilateral venous stasis ulcers.  Status post bilateral lower extremity vein ablations. Followed by Vascular Surgery. Noted    Elevated Glucose: 119. She tells me she takes Metformin though she denies being diagnosed with Diabetes.       -- Metformin held. ISS insulin with hypoglycemia protocol ordered    Hypothyroid: Resume Synthroid when verified    History of dementia: Characterized as mild cognitive impairment. She also has chronic insomnia and depression.      -- Resume home Aricept, Paxil, and Levail when verified.    Hyperlipidemia, GERD, IBS, and history of recurrent UTI infections: Noted    I have spent 65 minutes on the date of service doing chart review, history, examination, documentation, and further activities per the note.    Chief Complaint   Left hip pain    History is obtained from the patient and the ED physician whom I have spoken with    History of Present Illness   Geneva Shepherd is a markedly pleasant 84 year old woman who lives independently, who presents with acute onset of pain in the posterior region of the left hip and buttock, that has been constant since she fell this evening. She says that earlier this evening (while a mike was brewing through the [employer]), she stepped outside her door, and a strong leah of wind knocked her backward onto her buttocks, without striking her head or losing consciousness. She denies radiation of the subsequent pain down either leg. Efforts at ambulation exacerbate the pain. Tylenol and Oxycodone given in the ED have partially improved her symptoms. 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 She otherwise denies other acute complaints.    In the ED,   04/28 2200 131/76  97.8  F (36.6  C) 77 15 98 %     CBC and BMP were notable for Ca 10.7, otherwise were within the normal reference range. WBC was 9.3. HGB was 13.4. Glucose was 119. INR was 2.19.    Recent Results (from the past 24 hours)   Head CT w/o contrast    Narrative    EXAM: CT HEAD W/O CONTRAST  LOCATION: [facility]  DATE: 4/28/2025    INDICATION: Trauma, fall  COMPARISON: CT head 3/29/2022  TECHNIQUE: Routine CT Head without IV contrast. Multiplanar reformats. Dose reduction techniques were used.    FINDINGS:  INTRACRANIAL CONTENTS: No intracranial hemorrhage, extraaxial collection, or mass effect.  No CT evidence of acute infarct. Mild to moderate presumed chronic small vessel ischemic changes. Mild to moderate generalized volume loss. No hydrocephalus.     VISUALIZED ORBITS/SINUSES/MASTOIDS: Prior bilateral cataract surgery. Visualized portions of the orbits are otherwise unremarkable. No paranasal sinus mucosal disease. Scattered fluid/membrane thickening in the right mastoid air cells. No apparent mass   in the posterior nasopharynx or skull base.    BONES/SOFT TISSUES: No acute abnormality.      Impression    IMPRESSION:  1.  No CT evidence for acute intracranial process.  2.  Brain atrophy and presumed chronic microvascular ischemic changes as above.     CT Pelvis Bone wo Contrast    Narrative    EXAM: CT PELVIS BONE WO CONTRAST  LOCATION: [facility]  DATE: 4/28/2025    INDICATION: Fall, left lateral hip and pelvic pain.  COMPARISON: CT abdomen and pelvis from 02/27/2024.  TECHNIQUE: CT scan of the pelvis was performed without IV contrast. Multiplanar reformats were obtained. Dose reduction techniques were used.  CONTRAST: None.    FINDINGS:    PELVIS ORGANS: No intraperitoneal or extraperitoneal hematoma in the pelvis, proper. Calcified uterine fibroid measures roughly 2 cm. Diverticulosis coli without evidence of diverticulitis.    MUSCULOSKELETAL: Prior left hip  replacement without dislocation or periprosthetic fracture. Normal right hip. No pelvis or sacral fracture. No subcutaneous contusion. Probable infiltrative-type hematoma in the left gluteus tani muscle which accounts   for the asymmetric enlargement relative to the right side (series 3, image 124) with minimal extension of blood products into the perisciatic nerve fat.      Impression    IMPRESSION:  1.  Large intramuscular contusion/hematoma in the left gluteus tani muscle, which is difficult to measure as any measurement will include a large portion of muscle fiber.  2.  No acute pelvis, sacral, or hip fracture.  3.  Prior left hip replacement.       PHYSICAL EXAM  Blood pressure 131/76, pulse 77, temperature 97.8  F (36.6  C), resp. rate 15, SpO2 98%, not currently breastfeeding.  Constitutional: Alert and oriented to person, place and time; no apparent distress  Respiratory: lungs clear to auscultation bilaterally  Cardiovascular: regular S1 S2  GI: abdomen soft non tender non distended bowel sounds positive  Musculoskeletal: moderate to severe bilateral LE edema  Neurologic: extra-ocular muscles intact; moves all four extremities  Psychiatric: appropriate affect, insight and judgment during my discussion     DVT Prophylaxis: Warfarin  Code Status: DNR / DNI, discussed and clearly confirmed with her.     Medically Ready for Discharge: Anticipated Tomorrow       Josep Reid MD, MD    Past Medical History    I have reviewed this patient's medical history and updated it with pertinent information if needed.   Past Medical History:   Diagnosis Date    Activated protein C resistance 09/20/2024    Ankle fracture, lateral malleolus, closed 03/2012    right ankle    Asymptomatic varicose veins     Depression, major     Diverticulitis of colon (without mention of hemorrhage)(562.11)     DJD (degenerative joint disease)     Elevated antinuclear antibody (JUAN ANTONIO) level 07/04/2015    minimal    Embolism and  thrombosis of unspecified site 03/2003    RLE    FACTOR 5 LEIDEN DEFECT (HYPERCOAGULABLE) 07/2003     Heterozygote    FRACTURE OF RIGHT LATERAL PROXIMAL TIBIA 11/2007    Gastro-oesophageal reflux disease     rare    Gastroesophageal reflux disease, esophagitis presence not specified 11/23/2017    IMO Regulatory Load OCT 2020      Heterozygous factor V Leiden mutation 10/19/2022    Hypercalcemia     Hyperlipidemia LDL goal <160 10/31/2010    Hypothyroidism 01/01/2016    Insomnia     Irritable bowel syndrome     Long term current use of anticoagulant therapy 12/15/2015    Lower extremity edema 09/11/2024    Major depressive disorder, single episode, mild 03/14/2017    Memory loss 07/16/2020    Mild cognitive impairment 02/08/2025    Obesity 09/11/2013    Osteoarthrosis involving lower leg 01/21/2013    Problem list name updated by automated process. Provider to review  Replacing diagnoses that were inactivated after the 10/1/2021 regulatory import.      Osteoporosis without current pathological fracture, unspecified osteoporosis type 02/08/2025    Pain in joint, lower leg     Personal history of RLE DVT (deep vein thrombosis)(2003) 09/03/2015    Phlebitis and thrombophlebitis of superficial vessels of lower extremities 07/2003    right    Polyp of colon 10/05/2021    Sprain of lumbar region     Unspecified hypothyroidism     Urinary tract infection, site not specified        Past Surgical History   I have reviewed this patient's surgical history and updated it with pertinent information if needed.  Past Surgical History:   Procedure Laterality Date    ARTHROPLASTY HIP Left 8/31/2015    Procedure: ARTHROPLASTY HIP;  Surgeon: Benjamín Maddox MD;  Location:  OR    ARTHROPLASTY KNEE  1/17/2013    Procedure: ARTHROPLASTY KNEE;  RIGHT TOTAL KNEE ARTHROPLASTY (BIOMET)^ ;  Surgeon: Benjamín Maddox MD;  Location: [facility] OR    ARTHROPLASTY KNEE Left 1/16/2017    Procedure: ARTHROPLASTY KNEE;  Surgeon:  Benjamín Maddox MD;  Location:  OR    CHOLECYSTECTOMY      COLONOSCOPY N/A 4/26/2016    Procedure: COMBINED COLONOSCOPY, SINGLE OR MULTIPLE BIOPSY/POLYPECTOMY BY BIOPSY;  Surgeon: Mario Coe MD;  Location:  GI    ENDOSCOPIC ULTRASOUND UPPER GASTROINTESTINAL TRACT (GI) N/A 4/5/2022    Procedure: ENDOSCOPIC ULTRASOUND, WITH BIOPSY;  Surgeon: Sammie Christian MD;  Location:  OR    ENDOSCOPIC ULTRASOUND UPPER GASTROINTESTINAL TRACT (GI) N/A 12/12/2023    Procedure: Endoscopic Ultrasound;  Surgeon: Sammie Christian MD;  Location:  OR    GYN SURGERY      tubal    VASCULAR SURGERY      [facility] NONSPECIFIC PROCEDURE  7/00/01    Endometrial Biopsy.    ZZ NONSPECIFIC PROCEDURE      Tubal Ligation.    ZZC NONSPECIFIC PROCEDURE  6/02    negative coronary angio    Z NONSPECIFIC PROCEDURE  7/04    RLE varicose vein stripping       Prior to Admission Medications   Prior to Admission Medications   Prescriptions Last Dose Informant Patient Reported? Taking?   Ascorbic Acid (VITAMIN C PO)  Self Yes No   Sig: Take 1,000 mg by mouth every morning (Patient takes 2 X 500 mg = 1,000 mg dose)   BETA CAROTENE PO  Self Yes No   Sig: Take 25,000 Units by mouth daily.   Biotin 1 MG CAPS   Yes No   Sig: Take 1 tablet by mouth daily   CHROMIUM PICOLINATE 800 MCG OR TABS  Self Yes No   Sig: Take 1 capsule by mouth daily   Cyanocobalamin 1000 MCG CAPS   Yes No   Sig: Take 1 tablet by mouth daily.   FLAX SEED OIL 1000 MG OR CAPS  Self Yes No   Sig: Take 1 capsule by mouth daily   FOLIC ACID PO  Self Yes No   Sig: Take 400 mcg by mouth daily    PARoxetine (PAXIL) 20 MG tablet   No No   Sig: Take 2 tablets (40 mg) by mouth at bedtime.   TURMERIC PO  Self Yes No   Sig: Take 1 capsule by mouth every morning   acetaminophen (TYLENOL) 500 MG tablet   Yes No   Sig: Take 500 mg by mouth every 4 hours as needed   amitriptyline (ELAVIL) 25 MG tablet   No No   Sig: Take 1 tablet (25 mg) by mouth at bedtime.   donepezil  (ARICEPT) 10 MG tablet   No No   Sig: TAKE 1/2  TABLET BY MOUTH EVERY NIGHT AT BEDTIME   levothyroxine (SYNTHROID/LEVOTHROID) 50 MCG tablet   No No   Sig: Take 1 tablet (50 mcg) by mouth daily.   warfarin ANTICOAGULANT (COUMADIN) 5 MG tablet   No No   Sig: Current dose: 5 mg every Monday and Friday + 7.5 mg all other days or as directed by ACC team.   zinc gluconate 50 MG tablet   Yes No   Sig: Take 50 mg by mouth daily      Facility-Administered Medications Last Administration Doses Remaining   sodium chloride (PF) 0.9% PF flush 3 mL None recorded         Allergies   No Known Allergies    Social History   I have reviewed this patient's social history and updated it with pertinent information if needed. Geneva Shepherd  reports that she quit smoking about 56 years ago. Her smoking use included cigarettes. She has never used smokeless tobacco. She reports that she does not currently use alcohol. She reports that she does not use drugs.    Family History   Family history assessed and, except as above, is non-contributory.    Family History   Problem Relation Age of Onset    Cardiovascular Mother          aa age 76    Heart Disease Father          age 63 mi    Coronary Artery Disease Father     Circulatory Sister        Review of Systems   The 10 point Review of Systems is negative other than noted in the HPI or here.     Primary Care Physician   Jacoby Boo    Data   Labs Ordered and Resulted from Time of ED Arrival to Time of ED Departure   BASIC METABOLIC PANEL - Abnormal       Result Value    Sodium 137      Potassium 4.7      Chloride 100      Carbon Dioxide (CO2) 26      Anion Gap 11      Urea Nitrogen 14.5      Creatinine 0.61      GFR Estimate 89      Calcium 10.7 (*)     Glucose 119 (*)    INR - Abnormal    INR 2.19 (*)    CBC WITH PLATELETS AND DIFFERENTIAL    WBC Count 9.3      RBC Count 4.22      Hemoglobin 13.4      Hematocrit 41.1      MCV 97      MCH 31.8      MCHC 32.6      RDW 13.2       Platelet Count 171      % Neutrophils 77      % Lymphocytes 16      % Monocytes 6      % Eosinophils 1      % Basophils 0      % Immature Granulocytes 0      NRBCs per 100 WBC 0      Absolute Neutrophils 7.1      Absolute Lymphocytes 1.5      Absolute Monocytes 0.6      Absolute Eosinophils 0.1      Absolute Basophils 0.0      Absolute Immature Granulocytes 0.0      Absolute NRBCs 0.0         Data reviewed today:  I personally reviewed the head CT image(s) showing no acute pathology and the pelvis CT image(s) showing left gluteal hematoma .

## 2025-04-29 NOTE — PHARMACY-ADMISSION MEDICATION HISTORY
Pharmacist Admission Medication History    Admission medication history is complete. The information provided in this note is only as accurate as the sources available at the time of the update.    Information Source(s): Patient and CareEverywhere/SureScripts via in-person    Pertinent Information: None    Changes made to PTA medication list:  Added:   Magnesium  Deleted: None  Changed:   Vit C 1000mg daily --> 500mg daily    Allergies reviewed with patient and updates made in EHR: yes    Medication History Completed By: Shagufta Mcgee [facility] 4/29/2025 7:56 AM    Current Facility-Administered Medications for the 4/28/25 encounter (Hospital Encounter)   Medication    sodium chloride (PF) 0.9% PF flush 3 mL     PTA Med List   Medication Sig Last Dose/Taking    acetaminophen (TYLENOL) 500 MG tablet Take 500 mg by mouth every 4 hours as needed Taking As Needed    amitriptyline (ELAVIL) 25 MG tablet Take 1 tablet (25 mg) by mouth at bedtime. 4/28/2025    Ascorbic Acid (VITAMIN C PO) Take 500 mg by mouth daily. 4/28/2025    BETA CAROTENE PO Take 25,000 Units by mouth daily. 4/28/2025    Biotin 1 MG CAPS Take 1 tablet by mouth daily 4/28/2025    CHROMIUM PICOLINATE 800 MCG OR TABS Take 1 capsule by mouth daily 4/28/2025    Cyanocobalamin 1000 MCG CAPS Take 1 tablet by mouth daily. 4/28/2025    donepezil (ARICEPT) 10 MG tablet TAKE 1/2  TABLET BY MOUTH EVERY NIGHT AT BEDTIME 4/28/2025    FLAX SEED OIL 1000 MG OR CAPS Take 1 capsule by mouth daily 4/28/2025    FOLIC ACID PO Take 400 mcg by mouth daily  4/28/2025    levothyroxine (SYNTHROID/LEVOTHROID) 50 MCG tablet Take 1 tablet (50 mcg) by mouth daily. 4/28/2025    Magnesium Oxide 250 MG TABS Take 1 tablet by mouth daily. 4/28/2025    PARoxetine (PAXIL) 20 MG tablet Take 2 tablets (40 mg) by mouth at bedtime. 4/28/2025    TURMERIC PO Take 1 capsule by mouth every morning 4/28/2025    warfarin ANTICOAGULANT (COUMADIN) 5 MG tablet Current dose: 5 mg every Monday and Friday +  7.5 mg all other days or as directed by ACC team. 4/28/2025    zinc gluconate 50 MG tablet Take 50 mg by mouth daily 4/28/2025

## 2025-04-29 NOTE — PROGRESS NOTES
RECEIVING UNIT ED HANDOFF REVIEW    ED Nurse Handoff Report was reviewed by: Gustavo Tamez RN on April 29, 2025 at 7:49 AM

## 2025-04-29 NOTE — CONSULTS
Care Management Initial Consult    General Information  Assessment completed with: Geneva Chin  Type of CM/SW Visit: Initial Assessment    Primary Care Provider verified and updated as needed: Yes   Readmission within the last 30 days: no previous admission in last 30 days      Reason for Consult: discharge planning  Advance Care Planning: Advance Care Planning Reviewed: concerns discussed          Communication Assessment  Patient's communication style: spoken language (English or Bilingual)             Cognitive  Cognitive/Neuro/Behavioral: WDL                      Living Environment:   People in home: alone     Current living Arrangements: house      Able to return to prior arrangements: other (see comments)       Family/Social Support:  Care provided by: self  Provides care for: no one  Marital Status:   Support system:            Description of Support System:           Current Resources:   Patient receiving home care services: No        Community Resources: None  Equipment currently used at home: cane, straight  Supplies currently used at home:      Employment/Financial:  Employment Status: retired        Financial Concerns: none           Does the patient's insurance plan have a 3 day qualifying hospital stay waiver?  No    Lifestyle & Psychosocial Needs:  Social Drivers of Health     Food Insecurity: Low Risk  (2/7/2025)    Food Insecurity     Within the past 12 months, did you worry that your food would run out before you got money to buy more?: No     Within the past 12 months, did the food you bought just not last and you didn t have money to get more?: No   Depression: Not at risk (2/7/2025)    PHQ-2     PHQ-2 Score: 0   Housing Stability: Low Risk  (2/7/2025)    Housing Stability     Do you have housing? : Yes     Are you worried about losing your housing?: No   Tobacco Use: Medium Risk (2/26/2025)    Patient History     Smoking Tobacco Use: Former     Smokeless Tobacco Use: Never     Passive  Exposure: Not on file   Financial Resource Strain: Low Risk  (2/7/2025)    Financial Resource Strain     Within the past 12 months, have you or your family members you live with been unable to get utilities (heat, electricity) when it was really needed?: No   Alcohol Use: Not on file   Transportation Needs: Low Risk  (2/7/2025)    Transportation Needs     Within the past 12 months, has lack of transportation kept you from medical appointments, getting your medicines, non-medical meetings or appointments, work, or from getting things that you need?: No   Physical Activity: Unknown (2/7/2025)    Exercise Vital Sign     Days of Exercise per Week: 3 days     Minutes of Exercise per Session: Not on file   Interpersonal Safety: Low Risk  (2/7/2025)    Interpersonal Safety     Do you feel physically and emotionally safe where you currently live?: Yes     Within the past 12 months, have you been hit, slapped, kicked or otherwise physically hurt by someone?: No     Within the past 12 months, have you been humiliated or emotionally abused in other ways by your partner or ex-partner?: No   Stress: No Stress Concern Present (2/7/2025)    [place] [employer] Occupational [employer] - Occupational Stress Questionnaire     Feeling of Stress : Not at all   Social Connections: Unknown (2/7/2025)    Social Connection and Isolation Panel [NHANES]     Frequency of Communication with Friends and Family: Not on file     Frequency of Social Gatherings with Friends and Family: Once a week     Attends [employer] Services: Not on file     Active Member of Clubs or Organizations: Not on file     Attends Club or Organization Meetings: Not on file     Marital Status: Not on file   Health Literacy: Not on file       Functional Status:  Prior to admission patient needed assistance:   Dependent ADLs:: Ambulation-cane  Dependent IADLs:: Independent       Mental Health Status:  Mental Health Status: No Current Concerns       Chemical Dependency  "Status:      No concerns          Values/Beliefs:  Spiritual, Cultural Beliefs, Jainism Practices, Values that affect care: no               Discussed  Partnership in Safe Discharge Planning  document with patient/family: Yes: verbally with patient.    Additional Information:  Per H&P, patient was admitted after a fall, along with a left gluteal hematoma with anticoagulation on warfarin.     Writer met with patient at bedside. Patient lives alone in a home that house has \"had for 50 years\", she a retired nurse. She knows that she needs to fill out a Health Care Directive.  She is supported by her son, Berry. She tell writer she is thankful for everyday to be as independent as she is. She loves [facility] is she had to go . She has been there 3 times, 2 for her knees another with a infection she had.    She hopes to go home and still be able to lives at home independently.    Will follow for discharge planning.    Next Steps: follow for discharge planning.    Jacklyn Ward RN      "

## 2025-04-29 NOTE — ED PROVIDER NOTES
Emergency Department Note      History of Present Illness     Chief Complaint   Fall and Hip Pain      HPI   Geneva Shepherd is a 84 year old female, anticoagulated on coumadin, with a history of hyperlipidemia and hypothyroidism who presents to the ED via EMS for evaluation following a fall. Patient reports that earlier this evening she was hanging out her door when a leah of wind pushed her back into the house. She landed on her rear and rolled back. She denies head strike. She was able to ambulate with some difficulty afterward. Her pain now is primarily in her artificial left hip.    Independent Historian   None    Review of External Notes   None    Past Medical History     Medical History and Problem List   Gastroesophageal reflux disease   Factor V leiden  Hypothyroidism  IBS   Insomnia  Deep vein thrombosis     Medications   Elavil  Aricept  Levothyroxine  Paxil coumadin     Surgical History   Total hip arthroplasty left  Total knee arthroplasty bilateral  Cholecystectomy  Colonoscopy  Esophagogastroduodenoscopy x 2  Tubal ligation  Coronary angiogram   Endometrial biopsy  Varicose vein stripping    Physical Exam     Patient Vitals for the past 24 hrs:   BP Temp Pulse Resp SpO2   04/28/25 2344 129/84 -- -- 17 97 %   04/28/25 2200 131/76 97.8  F (36.6  C) 77 15 98 %     Physical Exam  Constitutional: Alert, attentive, GCS 15   HENT:   Head: atraumatic  Neck: no midline tenderness, ranging neck freely.   Eyes: EOM are normal, anicteric, conjugate gaze  CV: distal extremities warm, well perfused  Chest: Non-labored breathing on RA  GI:  non tender. No distension. No guarding or rebound.    MSK: No focal bony tenderness upper or lower extremities, she does have tenderness to the left glued and lateral left upper leg, no midline lumbar or thoracic tenderness  Neurological: Alert, attentive, moving all extremities equally.   Skin: Skin is warm and dry.      Diagnostics     Lab Results   Labs Ordered and Resulted  from Time of ED Arrival to Time of ED Departure   BASIC METABOLIC PANEL - Abnormal       Result Value    Sodium 137      Potassium 4.7      Chloride 100      Carbon Dioxide (CO2) 26      Anion Gap 11      Urea Nitrogen 14.5      Creatinine 0.61      GFR Estimate 89      Calcium 10.7 (*)     Glucose 119 (*)    INR - Abnormal    INR 2.19 (*)    CBC WITH PLATELETS AND DIFFERENTIAL    WBC Count 9.3      RBC Count 4.22      Hemoglobin 13.4      Hematocrit 41.1      MCV 97      MCH 31.8      MCHC 32.6      RDW 13.2      Platelet Count 171      % Neutrophils 77      % Lymphocytes 16      % Monocytes 6      % Eosinophils 1      % Basophils 0      % Immature Granulocytes 0      NRBCs per 100 WBC 0      Absolute Neutrophils 7.1      Absolute Lymphocytes 1.5      Absolute Monocytes 0.6      Absolute Eosinophils 0.1      Absolute Basophils 0.0      Absolute Immature Granulocytes 0.0      Absolute NRBCs 0.0         Imaging   CT Pelvis Bone wo Contrast   Final Result   IMPRESSION:   1.  Large intramuscular contusion/hematoma in the left gluteus tani muscle, which is difficult to measure as any measurement will include a large portion of muscle fiber.   2.  No acute pelvis, sacral, or hip fracture.   3.  Prior left hip replacement.      Head CT w/o contrast   Final Result   IMPRESSION:   1.  No CT evidence for acute intracranial process.   2.  Brain atrophy and presumed chronic microvascular ischemic changes as above.           Independent Interpretation   I reviewed her head CT, see no evidence of intracranial hemorrhage  I personally reviewed her pelvis CT, see no evidence of fracture.    ED Course      Medications Administered   Medications   acetaminophen (TYLENOL) tablet 650 mg (650 mg Oral Not Given 4/28/25 2341)   oxyCODONE IR (ROXICODONE) half-tab 2.5 mg (2.5 mg Oral $Given 4/28/25 2341)       Procedures   Procedures     Discussion of Management   Admitting Hospitalist, Dr Reid    ED Course   ED Course as of 04/29/25  0029   Mon Apr 28, 2025 2143 I obtained the history and performed the examination as described.        Additional Documentation  None    Medical Decision Making / Diagnosis     CMS Diagnoses: None    MIPS       None    [facility]   Geneva Shepherd is a 84 year old female on Coumadin with history of factor V Leiden last thrombotic event was in the 1990s presenting for evaluation of mechanical fall which she attempted to open a door and a leah of wind pushed her backwards, she did land on her butt and maybe hit her head.  She is complaining of isolated left lateral hip more posterior gluteal pain.  CT imaging of her head and Noncon CT of pelvis showed no evidence of intracranial hemorrhage or fracture however there is evidence of left gluteal hematoma.  INR is 2.2, hemoglobin and vitals are stable, I do think given her anticoagulant status and this finding along with the degree of pain to the point she is not able to ambulate well she would warrant observation in the hospital overnight for serial hemoglobins, and pain control.  Case discussed with Dr. Reid, we will plan for OBS.  I do not see any indication for anticoagulation reversal at this time.    Disposition   The patient was discharged.     Diagnosis     ICD-10-CM    1. Hematoma of left buttock  S30.0XXA       2. Anticoagulated  Z79.01       3. Fall, initial encounter  W19.XXXA       4. History of factor V Leiden mutation  Z86.2       5. History of total hip arthroplasty, left  Z96.642          Brett Martinez MD  [employer]  12:29 AM 04/29/25         Scribe Disclosure:  I, Rui Carney, am serving as a scribe at 9:49 PM on 4/28/2025 to document services personally performed by Brett Martinez MD based on my observations and the provider's statements to me.        Brett Martinez MD  04/29/25 0029

## 2025-04-29 NOTE — ED NOTES
[facility]  ED Nurse Handoff Report    ED Chief complaint: Fall and Hip Pain      ED Diagnosis:   Final diagnoses:   Hematoma of left buttock   Anticoagulated   Fall, initial encounter   History of factor V Leiden mutation   History of total hip arthroplasty, left       Code Status: to be addressed    Allergies: No Known Allergies    Patient Story: presented in ED due to  fall sustained left hip pain,no LOC, did not  her head,on warfarin.  Focused Assessment:  not distress,vitally stable,alert.    Treatments and/or interventions provided: iv access,labs,xray,ct,see MAR.  Patient's response to treatments and/or interventions: tolerated    To be done/followed up on inpatient unit:  as per admission order.    Does this patient have any cognitive concerns?:  none    Activity level - Baseline/Home:  Independent  Activity Level - Current:   Total Care    Patient's Preferred language: English   Needed?: No    Isolation: None  Infection: Not Applicable  Patient tested for COVID 19 prior to admission: NO  Bariatric?: No    Vital Signs:   Vitals:    04/28/25 2200 04/28/25 2344   BP: 131/76 129/84   Pulse: 77    Resp: 15 17   Temp: 97.8  F (36.6  C)    SpO2: 98% 97%       Cardiac Rhythm:     Was the PSS-3 completed:   Yes  What interventions are required if any?               Family Comments: none  OBS brochure/video discussed/provided to patient/family: Yes              Name of person given brochure if not patient:               Relationship to patient:     For the majority of the shift this patient's behavior was Green.   Behavioral interventions performed were none.    ED NURSE PHONE NUMBER: [phone]

## 2025-04-29 NOTE — PROGRESS NOTES
Admission/Transfer from: ER  2 RN skin assessment completed. Yes with  Karen Crespo  Significant findings include: Left second toe callus and small abrasion on left elbow   WOC Nurse Consult Ordered? No

## 2025-04-30 ENCOUNTER — APPOINTMENT (OUTPATIENT)
Dept: PHYSICAL THERAPY | Facility: CLINIC | Age: 85
DRG: 605 | End: 2025-04-30
Attending: PHYSICIAN ASSISTANT
Payer: MEDICARE

## 2025-04-30 LAB
ANION GAP SERPL CALCULATED.3IONS-SCNC: 8 MMOL/L (ref 7–15)
BUN SERPL-MCNC: 12 MG/DL (ref 8–23)
CALCIUM SERPL-MCNC: 9.8 MG/DL (ref 8.8–10.4)
CHLORIDE SERPL-SCNC: 100 MMOL/L (ref 98–107)
CREAT SERPL-MCNC: 0.57 MG/DL (ref 0.51–0.95)
EGFRCR SERPLBLD CKD-EPI 2021: 90 ML/MIN/1.73M2
GLUCOSE SERPL-MCNC: 108 MG/DL (ref 70–99)
HCO3 SERPL-SCNC: 27 MMOL/L (ref 22–29)
HGB BLD-MCNC: 12.9 G/DL (ref 11.7–15.7)
POTASSIUM SERPL-SCNC: 4 MMOL/L (ref 3.4–5.3)
SODIUM SERPL-SCNC: 135 MMOL/L (ref 135–145)

## 2025-04-30 PROCEDURE — 36415 COLL VENOUS BLD VENIPUNCTURE: CPT | Performed by: INTERNAL MEDICINE

## 2025-04-30 PROCEDURE — 250N000013 HC RX MED GY IP 250 OP 250 PS 637: Performed by: INTERNAL MEDICINE

## 2025-04-30 PROCEDURE — 85018 HEMOGLOBIN: CPT | Performed by: INTERNAL MEDICINE

## 2025-04-30 PROCEDURE — 97161 PT EVAL LOW COMPLEX 20 MIN: CPT | Mod: GP

## 2025-04-30 PROCEDURE — 250N000013 HC RX MED GY IP 250 OP 250 PS 637: Performed by: HOSPITALIST

## 2025-04-30 PROCEDURE — 99232 SBSQ HOSP IP/OBS MODERATE 35: CPT | Performed by: INTERNAL MEDICINE

## 2025-04-30 PROCEDURE — 120N000001 HC R&B MED SURG/OB

## 2025-04-30 PROCEDURE — 97116 GAIT TRAINING THERAPY: CPT | Mod: GP

## 2025-04-30 PROCEDURE — 97530 THERAPEUTIC ACTIVITIES: CPT | Mod: GP

## 2025-04-30 PROCEDURE — 80048 BASIC METABOLIC PNL TOTAL CA: CPT | Performed by: INTERNAL MEDICINE

## 2025-04-30 RX ORDER — NALOXONE HYDROCHLORIDE 0.4 MG/ML
0.4 INJECTION, SOLUTION INTRAMUSCULAR; INTRAVENOUS; SUBCUTANEOUS
Status: DISCONTINUED | OUTPATIENT
Start: 2025-04-30 | End: 2025-05-02 | Stop reason: HOSPADM

## 2025-04-30 RX ORDER — NALOXONE HYDROCHLORIDE 0.4 MG/ML
0.2 INJECTION, SOLUTION INTRAMUSCULAR; INTRAVENOUS; SUBCUTANEOUS
Status: DISCONTINUED | OUTPATIENT
Start: 2025-04-30 | End: 2025-05-02 | Stop reason: HOSPADM

## 2025-04-30 RX ORDER — CALCIUM CARBONATE 500 MG/1
500 TABLET, CHEWABLE ORAL 3 TIMES DAILY PRN
Status: DISCONTINUED | OUTPATIENT
Start: 2025-04-30 | End: 2025-05-02 | Stop reason: HOSPADM

## 2025-04-30 RX ADMIN — ACETAMINOPHEN 975 MG: 325 TABLET, FILM COATED ORAL at 07:47

## 2025-04-30 RX ADMIN — OXYCODONE 5 MG: 5 TABLET ORAL at 07:47

## 2025-04-30 RX ADMIN — CALCIUM CARBONATE (ANTACID) CHEW TAB 500 MG 500 MG: 500 CHEW TAB at 22:13

## 2025-04-30 RX ADMIN — PAROXETINE HYDROCHLORIDE 40 MG: 20 TABLET, FILM COATED ORAL at 21:38

## 2025-04-30 RX ADMIN — LEVOTHYROXINE SODIUM 50 MCG: 50 TABLET ORAL at 07:47

## 2025-04-30 RX ADMIN — ACETAMINOPHEN 975 MG: 325 TABLET, FILM COATED ORAL at 20:13

## 2025-04-30 RX ADMIN — AMITRIPTYLINE HYDROCHLORIDE 25 MG: 25 TABLET, FILM COATED ORAL at 21:38

## 2025-04-30 RX ADMIN — DONEPEZIL HYDROCHLORIDE 5 MG: 5 TABLET ORAL at 21:38

## 2025-04-30 RX ADMIN — OXYCODONE 10 MG: 5 TABLET ORAL at 03:17

## 2025-04-30 RX ADMIN — ACETAMINOPHEN 975 MG: 325 TABLET, FILM COATED ORAL at 14:19

## 2025-04-30 ASSESSMENT — ACTIVITIES OF DAILY LIVING (ADL)
ADLS_ACUITY_SCORE: 68
ADLS_ACUITY_SCORE: 68
ADLS_ACUITY_SCORE: 72
ADLS_ACUITY_SCORE: 72
ADLS_ACUITY_SCORE: 68
ADLS_ACUITY_SCORE: 72
ADLS_ACUITY_SCORE: 68
ADLS_ACUITY_SCORE: 68
ADLS_ACUITY_SCORE: 72
ADLS_ACUITY_SCORE: 68
ADLS_ACUITY_SCORE: 72
ADLS_ACUITY_SCORE: 68
ADLS_ACUITY_SCORE: 72
ADLS_ACUITY_SCORE: 72
ADLS_ACUITY_SCORE: 68
ADLS_ACUITY_SCORE: 68
ADLS_ACUITY_SCORE: 72
ADLS_ACUITY_SCORE: 72

## 2025-04-30 NOTE — PLAN OF CARE
PRIMARY Concern: Fall with Left Gluteal Hematoma    SAFETY RISK Concerns (fall risk, behaviors, etc.): Fall risk       Aggression Tool Color: Green  Isolation/Type: None   Tests/Procedures for NEXT shift: Pending ortho consult   Consults? (Pending/following, signed-off?) Ortho surg said WBAT. PT recommending TCU. SW/CM following for placement  Where is patient from? (Home, TCU, etc.): Home   Other Important info for NEXT shift: patient refused CPAP at bed time , education provided .   Anticipated DC date & active delays: TBD  _____________________________________________________________________________  SUMMARY NOTE:  Orientation/Cognitive: patient is alert and oriented X4, forgetful at times. Patient make all needs known.    Observation Goals (Met/ Not Met): Inpt  Mobility Level/Assist Equipment:  Up with assist of  2 with walker and gait belt , WBAT.    Pain Management: Oxycodone 2.5mg and scheduled tylenol  Complete Pain Reassessment: Y/N Y Due next: Next shift   Tele/VS/O2: VSS on RA .  ABNL Lab/BG: INR 2.18   Diet: Regular Takes meds whole with thin liquids  Bowel/Bladder: Continent . Purewick in place at bedtime  Skin Concerns: Left hip bruising starting to show.   Drains/Devices: PIV Saline locked.   Patient Stated Goal for Today: None

## 2025-04-30 NOTE — PLAN OF CARE
Pt transferred from station [id number]    PRIMARY Concern: Fall with Left Gluteal Hematoma    Orientation/Cognitive: patient is alert and oriented X4, forgetful at times. Patient make all needs known.    Observation Goals (Met/ Not Met): Inpt  Mobility Level/Assist Equipment:  Up with assist of  2 with walker and gait belt , WBAT.    Pain Management: Scheduled tylenol  Complete Pain Reassessment: Y/N Y Due next: Next shift   Tele/VS/O2: VSS on RA .  ABNL Lab/BG: INR 2.18   Diet: Regular Takes meds whole with thin liquids  Bowel/Bladder: Continent . Purewick in place  Skin Concerns: Left hip bruising   Drains/Devices: IV SL  Patient Stated Goal for Today: None

## 2025-04-30 NOTE — PROGRESS NOTES
PRIMARY Concern: Fall with Left Gluteal Hematoma    SAFETY RISK Concerns (fall risk, behaviors, etc.): Fall       Aggression Tool Color: Green  Isolation/Type: None   Tests/Procedures for NEXT shift: Pending ortho consult   Consults? (Pending/following, signed-off?) Ortho surg  Where is patient from? (Home, TCU, etc.): Home   Other Important info for NEXT shift: bruising starting of left buttock  Anticipated DC date & active delays: TBD  _____________________________________________________________________________  SUMMARY NOTE:  Orientation/Cognitive: Aox4, forgetful at times  Observation Goals (Met/ Not Met): Inpt  Mobility Level/Assist Equipment: A2 GB/W, WBAT per ortho.   Pain Management: Scheduled tylenol. PRN oxycodone 5 mg, ice packs  Complete Pain Reassessment: Y/N Y Due next: Next shift   Tele/VS/O2: VSS. 1L NC due to patient having sleep apnea Cpap ordered, declining currently  ABNL Lab/BG: INR 2.18 due to being on warfarin   Diet: Regular   Bowel/Bladder: Purewick in place.  Skin Concerns: Left second toe callus, left pinkie toes red and small scratch on left elbow.  Drains/Devices: PIV infusing NS @ 100mL/hr  Patient Stated Goal for Today: None

## 2025-04-30 NOTE — PLAN OF CARE
Goal Outcome Evaluation:      Plan of Care Reviewed With: patient    Overall Patient Progress: improvingOverall Patient Progress: improving    Outcome Evaluation: Pain conntrol    Summary : 04/29/25. [id number]  PRIMARY Concern: Fall with Left Gluteal Hematoma    SAFETY RISK Concerns (fall risk, behaviors, etc.): Fall risk       Aggression Tool Color: Green  Isolation/Type: None   Tests/Procedures for NEXT shift: Pending ortho consult   Consults? (Pending/following, signed-off?) Ortho surg  Where is patient from? (Home, TCU, etc.): Home   Other Important info for NEXT shift: patient refused CPAP at bed time , education provided .   Anticipated DC date & active delays: TBD  _____________________________________________________________________________  SUMMARY NOTE:  Orientation/Cognitive: patient is alert and oriented X4, forgetful at times. Patient make all needs known.    Observation Goals (Met/ Not Met): Inpt  Mobility Level/Assist Equipment:  Up with assist of  2 with walker and gait belt , WBAT.    Pain Management: Patient reports pain, prn oxycodone was given with effective results  Complete Pain Reassessment: Y/N Y Due next: Next shift   Tele/VS/O2: VSS on RA . Patient use CPAP at bedtime , RT called for CPAP patient refused CPAP . Patient stated okay for tonight . Patient was delsating  and place on 1Lt oxygen via NC with of 93%.   ABNL Lab/BG: INR 2.18   Diet: Regular Takes meds whole with thin liquids  Bowel/Bladder: Continent . Purewick in place at bedtime  Skin Concerns:  See flow sheet   Drains/Devices: PIV Saline locked.   Patient Stated Goal for Today: None

## 2025-04-30 NOTE — PROVIDER NOTIFICATION
MD Notification    Notified Person: MD    Notified Person Name: Dr. Polk    Notification Date/Time: 4/29/25 2025    Notification Interaction: vandana    Purpose of Notification: order for Cpap at     Orders Received: received an order for Cpap    Comments:

## 2025-04-30 NOTE — PROGRESS NOTES
Patient refused NOC CPAP. She stated she wears one at home, but feels the O2 would be fine for tonight. Patient to have RRT called if she changes her mind.

## 2025-04-30 NOTE — PROGRESS NOTES
Observation goals  PRIOR TO DISCHARGE        Comments: -diagnostic tests and consults completed and resulted: Not met   -vital signs normal or at patient baseline: Met   -tolerating oral intake to maintain hydration: Met   -adequate pain control on oral analgesics: Met   -returns to baseline functional status: Not met   -safe disposition plan has been identified: Not met   Nurse to notify provider when observation goals have been met and patient is ready for discharge.

## 2025-04-30 NOTE — PROGRESS NOTES
[facility]    Medicine Progress Note - Hospitalist Service        Date of Admission:  4/28/2025  9:29 PM    Assessment & Plan:   Geneva Shepherd is a 84 year old female with past medical history that is most notable for Factor V Leiden deficiency and DVT, on Warfarin; as well as prior left RAF, with history of right lower extremity DVT, long-term use of Warfarin, among others; who presents with mechanical fall, and is found to have acute anti-coagulation associated left gluteal hematoma.     Acute anti-coagulation associated left gluteal hematoma  -On long term Warfarin for history of right lower extremity DVT and Factor V Leiden Deficiency.   -Presents from home with posterior left hip pain after a mechanical fall.   -CTH shows no evidence for acute intracranial process.   -Pelvis CT shows a large intramuscular contusion/hematoma in the left gluteus tani muscle, which is difficult to measure in size per Radiology.   -Orthopedics consulted, no evidence of compartment syndrome.  Conservative management recommended.  -Patient improving.  Continue pain control as needed  -Continue Tylenol 975 mg 3 times daily scheduled  -Due to dizziness will decrease oxycodone to 2.5-5 mg every 6 hours as needed  -Therapies currently recommending TCU however could discharge home with home services if quickly improving     Hypercalcemia, acute:  -Mild. Ca 10.7. could be due to hypovolemia.  -Resolved with IVF      History of right lower extremity DVT and Factor V Leiden Deficiency:   -Warfarin held.   -Anticipate resuming in the next 48 to 72 hours     Bilateral lower extremity edema with healed bilateral venous stasis ulcers.    -Status post bilateral lower extremity vein ablations. Followed by Vascular Surgery.      Hypothyroid   -PTA Synthroid      History of dementia  -Characterized as mild cognitive impairment. She also has chronic insomnia and depression.  -Aricept, Paxil, and amitriptyline      Hyperlipidemia, GERD, IBS, and history of recurrent UTI infections: Noted         Diet: Regular Diet Adult     DVT Prophylaxis: Pneumatic Compression Devices   Edward Catheter: Not present  Code Status: No CPR- Do NOT Intubate     Disposition Plan       Expected Discharge Date: 05/01/2025      Destination: home with help/services  Discharge Comments: Ortho  Care management    Pain management  Dispo needs      Entered: Basilio Gonzales MD 04/30/2025, 11:40 AM        Medically Ready for Discharge: Anticipated Tomorrow pending clinical course       Clinically Significant Risk Factors Present on Admission            # Hypercalcemia: Highest Ca = 10.7 mg/dL in last 2 days, will monitor as appropriate     # Drug Induced Coagulation Defect: home medication list includes an anticoagulant medication                  # Financial/Environmental Concerns: none          The patient's care was discussed with the Bedside Nurse and Patient.    Medical Decision Making       **CLEAR ALL SELECTIONS**      Labs/Imaging Reviewed:  See Information above and Data section below  Time SPENT BY ME on the date of service doing chart review, history, exam, documentation & further activities per the note:  35 MINUTES    Chart documentation was completed, in part, with [employer] voice-recognition software. Even though reviewed, some grammatical, spelling, and word errors may remain.    Basilio Gonzales MD  Hospitalist Service  [facility]  Text Page 7AM-6PM  Securely message with the Voc[employer] Web Console (learn more here)  Text page via [employer] Paging/Directory    ______________________________________________________________________    Interval History   Pain is better controlled.  She is slightly worried about the dizziness due to oxycodone.  We discussed reducing the dose today.  Hemoglobin has stayed stable.    Data reviewed today: I reviewed all medications, new labs and imaging results over the last 24 hours. I  "personally reviewed no images or EKG's today.    Physical Exam   Vital signs:  Temp: 97.8  F (36.6  C) Temp src: Oral BP: 134/77 Pulse: 84   Resp: 18 SpO2: 93 % O2 Device: Nasal cannula Oxygen Delivery: 1 LPM      Estimated body mass index is 28.44 kg/m  as calculated from the following:    Height as of 1/23/25: 1.676 m (5' 6\").    Weight as of 2/7/25: 79.9 kg (176 lb 3.2 oz).      Wt Readings from Last 2 Encounters:   02/07/25 79.9 kg (176 lb 3.2 oz)   01/23/25 79.4 kg (175 lb)       Gen: AAOX3, NAD  Resp: CTA B/L, normal WOB  CVS: RRR, no murmur  Abd/GI: Soft, non-tender. BS- normoactive.   Skin: Warm, dry no rashes  MSK: no pedal edema  Neuro- CN- intact. No focal deficits.    Data   Recent Labs   Lab 04/30/25  0716 04/29/25  0700 04/29/25  0627 04/28/25  2218   WBC  --   --  8.0 9.3   HGB 12.9  --  12.1 13.4   MCV  --   --  95 97   PLT  --   --  165 171   INR  --   --  2.18* 2.19*     --  137 137   POTASSIUM 4.0  --  4.0 4.7   CHLORIDE 100  --  104 100   CO2 27  --  24 26   BUN 12.0  --  12.4 14.5   CR 0.57  --  0.57 0.61   ANIONGAP 8  --  9 11   TUAN 9.8  --  9.7 10.7*   * 96 102* 119*       No results found for this or any previous visit (from the past 24 hours).  Medications   Current Facility-Administered Medications   Medication Dose Route Frequency Provider Last Rate Last Admin     Current Facility-Administered Medications   Medication Dose Route Frequency Provider Last Rate Last Admin    acetaminophen (TYLENOL) tablet 975 mg  975 mg Oral TID Josep Reid MD   975 mg at 04/30/25 0747    amitriptyline (ELAVIL) tablet 25 mg  25 mg Oral At Bedtime Josep Reid MD   25 mg at 04/29/25 2235    donepezil (ARICEPT) tablet 5 mg  5 mg Oral At Bedtime Josep Reid MD   5 mg at 04/29/25 2235    levothyroxine (SYNTHROID/LEVOTHROID) tablet 50 mcg  50 mcg Oral Daily Alla Zambrano DO   50 mcg at 04/30/25 0732    PARoxetine (PAXIL) tablet 40 mg  40 mg Oral At Bedtime " Josep Reid MD   40 mg at 04/29/25 7852

## 2025-04-30 NOTE — PROGRESS NOTES
"   04/30/25 0810   Appointment Info   Signing Clinician's Name / Credentials (PT) Marva Acuna, PT, DPT   Living Environment   People in Home alone   Current Living Arrangements house   Home Accessibility stairs to enter home;stairs within home   Number of Stairs, Main Entrance 4   Stair Railings, Main Entrance railings safe and in good condition   Number of Stairs, Within Home, Primary greater than 10 stairs   Stair Railings, Within Home, Primary railings safe and in good condition;railings on both sides of stairs   Transportation Anticipated family or friend will provide   Living Environment Comments Bedroom is on second level of home.   Self-Care   Usual Activity Tolerance good   Current Activity Tolerance moderate   Equipment Currently Used at Home cane, straight   Fall history within last six months yes   Number of times patient has fallen within last six months 2   Activity/Exercise/Self-Care Comment Patient reports she started using the cane ~4 years ago. She has a walker  but does not use it. \"I have to be very careful how I walk>   General Information   Onset of Illness/Injury or Date of Surgery 04/28/25   Referring Physician Catherine Warner PA-C   Patient/Family Therapy Goals Statement (PT) to return independently to her home   Pertinent History of Current Problem (include personal factors and/or comorbidities that impact the POC) Patient Is 85 YO F who presented to hospital after a mechanical fall, admitted on 4/28/25 with L gluteal hematoma. PMH includes R LE DVT and Factor V Leiden Deficiency, B LE edema with venous stasis ulcers, hypothyroid, mild cognitive impairment, B TKA and L RAF.   Existing Precautions/Restrictions fall   Weight-Bearing Status - LLE weight-bearing as tolerated   Weight-Bearing Status - RLE full weight-bearing   General Observations Patient sitting up in bed, agreeable to PT. Reports RN gave pain meds prior to PT session.   Cognition   Affect/Mental Status " (Cognition) WNL   Orientation Status (Cognition) oriented x 4   Follows Commands (Cognition) WNL   Pain Assessment   Patient Currently in Pain Yes, see Vital Sign flowsheet  (4/10 L buttock)   Integumentary/Edema   Integumentary/Edema Comments No acute edema concerns on B LE   Posture    Posture Protracted shoulders   Range of Motion (ROM)   ROM Comment Decreased L hip AROM limited by pain as noted when attempting lower body dressing   Strength (Manual Muscle Testing)   Strength (Manual Muscle Testing) Deficits observed during functional mobility   Strength Comments Functional weakness noted during sit to stand transfers   Bed Mobility   Comment, (Bed Mobility) Supine to sit SBA with use of bed rail   Transfers   Comment, (Transfers) Sit to stand from EOB, heavy use of hands, CGA and FWW   Gait/Stairs (Locomotion)   [place] Level (Gait) contact guard   Assistive Device (Gait) walker, front-wheeled   Distance in Feet (Gait) 5' during eval   Pattern (Gait) step-to   Deviations/Abnormal Patterns (Gait) antalgic;gait speed decreased;weight shifting decreased   Balance   Balance Comments Good sitting balance on EOB; Mild unsteadiness turning while ambulating despite support of FWW   Sensory Examination   Sensory Perception patient reports no sensory changes   Clinical Impression   Criteria for Skilled Therapeutic Intervention Yes, treatment indicated   PT Diagnosis (PT) Impaired mobility   Influenced by the following impairments Pain, weakness, impaired balance, decreased activity tolerance   Functional limitations due to impairments Increased difficulty with bed mobility, transfers and ambulaiton   Clinical Presentation (PT Evaluation Complexity) stable   Clinical Presentation Rationale Medical status, clinical judgement, stable vital signs   Clinical Decision Making (Complexity) low complexity   Planned Therapy Interventions (PT) balance training;bed mobility training;cryotherapy;gait training;home exercise  program;patient/family education;stair training;strengthening;transfer training;progressive activity/exercise;home program guidelines   Risk & Benefits of therapy have been explained evaluation/treatment results reviewed;care plan/treatment goals reviewed;risks/benefits reviewed;current/potential barriers reviewed;participants voiced agreement with care plan;patient;participants included   PT Total Evaluation Time   PT Eval, Low Complexity Minutes (29592) 8   Physical Therapy Goals   PT Frequency Daily   PT Predicted Duration/Target Date for Goal Attainment 05/06/25   PT Goals Bed Mobility;Transfers;Gait;Stairs   PT: Bed Mobility Independent;Supine to/from sit  (from HOB flat)   PT: Transfers Modified independent;Sit to/from stand;Assistive device  (FWW)   PT: Gait Modified independent;Rolling walker;150 feet   PT: Stairs Modified independent;Greater than 10 stairs;Rail on both sides   Interventions   Interventions Quick Adds Gait Training;Therapeutic Activity   Therapeutic Activity   Therapeutic Activities: dynamic activities to improve functional performance Minutes (41295) 10   Symptoms Noted During/After Treatment Fatigue;Increased pain;Dizziness   Treatment Detail/Skilled Intervention Education on WBAT status on L LE. Following evaluation, sit to stand from EOB with cues for safe hand placements and set up, CGA. Stand to sit following gait training with CGA. Sit <> stand repeated from EOB x 2 reps progressing to SBA but continued heavy use of arms needed to push up from bed. Patient able to perform hygiene in standing with close SBA, mild unsteadiness initially when removing hands from walker. Cues for technique to don brief on painful LE first, needed mod A to complete after 2 attempts in sitting. Able to complete pulling up brief in standing with SBA. Stand to sit into recliner chair at end of session, alarm activated. Breakfast tray set up in front of patient after PT. Updated CHRISTEL Chen.   Gait Training   Gait  "Training Minutes (27962) 8   Symptoms Noted During/After Treatment (Gait Training) dizziness;fatigue;increased pain   Treatment Detail/Skilled Intervention Patient ambulated 65' with FWW and CGA, antalgic pattern on L LE. Mild unsteadiness with turning with FWW, cues for safety. Mild \"wooziness\" reported. Seated rest on EOB, BP = 138/75. Patient then ambulated ~ 20' around room with FWW and CGA for safety due to wooziness (unchanged /stable with activity). Patient stating she does not like to take any pain meds and feels like that is what is making her head feel dizzy, RN notified. Patient declined trying stairs today due to dizziness.   Distance in Feet 65', 20'   PT Discharge Planning   PT Plan Progress ambulation to gym, stairs as able, bed mobility from [facility]   PT Discharge Recommendation (DC Rec) Transitional Care Facility;home with assist;home with home care physical therapy   PT Rationale for DC Rec Patient is below baseline level of independence but mobilizing well with a FWW, SBA to CGA at this time due to dizziness (likely from pain meds). At this time would recommend TCU as patient lives alone in a two-story house. If dizziness symptoms improve, patient may progress quickly to be able to return home with HHPT.   PT Brief overview of current status CGA with FWW; Goals of therapy will be to address safe mobility and make recs for d/c to next level of care. Pt and RN will continue to follow all falls risk precautions as documented by RN staff while hospitalized.   PT Total Distance Amb During Session (feet) 90   PT Equipment Needed at Discharge walker, rolling   Physical Therapy Time and Intention   Timed Code Treatment Minutes 18   Total Session Time (sum of timed and untimed services) 26       "

## 2025-05-01 ENCOUNTER — APPOINTMENT (OUTPATIENT)
Dept: PHYSICAL THERAPY | Facility: CLINIC | Age: 85
DRG: 605 | End: 2025-05-01
Payer: MEDICARE

## 2025-05-01 VITALS
OXYGEN SATURATION: 93 % | TEMPERATURE: 97 F | DIASTOLIC BLOOD PRESSURE: 67 MMHG | RESPIRATION RATE: 16 BRPM | SYSTOLIC BLOOD PRESSURE: 142 MMHG | HEART RATE: 78 BPM

## 2025-05-01 LAB
ALBUMIN UR-MCNC: 50 MG/DL
APPEARANCE UR: ABNORMAL
BACTERIA #/AREA URNS HPF: ABNORMAL /HPF
BILIRUB UR QL STRIP: NEGATIVE
COLOR UR AUTO: ABNORMAL
GLUCOSE UR STRIP-MCNC: NEGATIVE MG/DL
HGB UR QL STRIP: ABNORMAL
INR PPP: 1.28 (ref 0.85–1.15)
KETONES UR STRIP-MCNC: NEGATIVE MG/DL
LEUKOCYTE ESTERASE UR QL STRIP: ABNORMAL
NITRATE UR QL: NEGATIVE
PH UR STRIP: 6 [PH] (ref 5–7)
PROTHROMBIN TIME: 15.7 SECONDS (ref 11.8–14.8)
RBC URINE: >182 /HPF
SP GR UR STRIP: 1.01 (ref 1–1.03)
UROBILINOGEN UR STRIP-MCNC: NORMAL MG/DL
WBC CLUMPS #/AREA URNS HPF: PRESENT /HPF
WBC URINE: >182 /HPF

## 2025-05-01 PROCEDURE — 36415 COLL VENOUS BLD VENIPUNCTURE: CPT | Performed by: INTERNAL MEDICINE

## 2025-05-01 PROCEDURE — 97530 THERAPEUTIC ACTIVITIES: CPT | Mod: GP

## 2025-05-01 PROCEDURE — 250N000013 HC RX MED GY IP 250 OP 250 PS 637: Performed by: INTERNAL MEDICINE

## 2025-05-01 PROCEDURE — 250N000013 HC RX MED GY IP 250 OP 250 PS 637: Performed by: HOSPITALIST

## 2025-05-01 PROCEDURE — 97116 GAIT TRAINING THERAPY: CPT | Mod: GP

## 2025-05-01 PROCEDURE — 99232 SBSQ HOSP IP/OBS MODERATE 35: CPT | Performed by: INTERNAL MEDICINE

## 2025-05-01 PROCEDURE — 87186 SC STD MICRODIL/AGAR DIL: CPT | Performed by: INTERNAL MEDICINE

## 2025-05-01 PROCEDURE — 81003 URINALYSIS AUTO W/O SCOPE: CPT | Performed by: INTERNAL MEDICINE

## 2025-05-01 PROCEDURE — 250N000011 HC RX IP 250 OP 636: Performed by: INTERNAL MEDICINE

## 2025-05-01 PROCEDURE — 85610 PROTHROMBIN TIME: CPT | Performed by: INTERNAL MEDICINE

## 2025-05-01 PROCEDURE — 120N000001 HC R&B MED SURG/OB

## 2025-05-01 RX ORDER — CEFTRIAXONE 1 G/1
1 INJECTION, POWDER, FOR SOLUTION INTRAMUSCULAR; INTRAVENOUS EVERY 24 HOURS
Status: DISCONTINUED | OUTPATIENT
Start: 2025-05-01 | End: 2025-05-02 | Stop reason: HOSPADM

## 2025-05-01 RX ORDER — OXYCODONE HYDROCHLORIDE 5 MG/1
2.5 TABLET ORAL 3 TIMES DAILY PRN
Qty: 5 TABLET | Refills: 0 | Status: SHIPPED | OUTPATIENT
Start: 2025-05-01 | End: 2025-05-05

## 2025-05-01 RX ORDER — WARFARIN SODIUM 7.5 MG/1
7.5 TABLET ORAL
Status: COMPLETED | OUTPATIENT
Start: 2025-05-01 | End: 2025-05-01

## 2025-05-01 RX ORDER — CEPHALEXIN 500 MG/1
500 CAPSULE ORAL 3 TIMES DAILY
DISCHARGE
Start: 2025-05-02 | End: 2025-05-01

## 2025-05-01 RX ADMIN — WARFARIN SODIUM 7.5 MG: 7.5 TABLET ORAL at 18:20

## 2025-05-01 RX ADMIN — ACETAMINOPHEN 975 MG: 325 TABLET, FILM COATED ORAL at 08:52

## 2025-05-01 RX ADMIN — PAROXETINE HYDROCHLORIDE 40 MG: 20 TABLET, FILM COATED ORAL at 21:11

## 2025-05-01 RX ADMIN — CEFTRIAXONE SODIUM 1 G: 1 INJECTION, POWDER, FOR SOLUTION INTRAMUSCULAR; INTRAVENOUS at 14:09

## 2025-05-01 RX ADMIN — DONEPEZIL HYDROCHLORIDE 5 MG: 5 TABLET ORAL at 21:11

## 2025-05-01 RX ADMIN — LEVOTHYROXINE SODIUM 50 MCG: 50 TABLET ORAL at 08:52

## 2025-05-01 RX ADMIN — AMITRIPTYLINE HYDROCHLORIDE 25 MG: 25 TABLET, FILM COATED ORAL at 21:11

## 2025-05-01 RX ADMIN — ACETAMINOPHEN 975 MG: 325 TABLET, FILM COATED ORAL at 14:09

## 2025-05-01 RX ADMIN — OXYCODONE HYDROCHLORIDE 2.5 MG: 5 TABLET ORAL at 08:52

## 2025-05-01 RX ADMIN — ACETAMINOPHEN 975 MG: 325 TABLET, FILM COATED ORAL at 20:49

## 2025-05-01 ASSESSMENT — ACTIVITIES OF DAILY LIVING (ADL)
ADLS_ACUITY_SCORE: 72
ADLS_ACUITY_SCORE: 75
ADLS_ACUITY_SCORE: 68
ADLS_ACUITY_SCORE: 72
ADLS_ACUITY_SCORE: 68
ADLS_ACUITY_SCORE: 75
ADLS_ACUITY_SCORE: 72
ADLS_ACUITY_SCORE: 75
ADLS_ACUITY_SCORE: 71
ADLS_ACUITY_SCORE: 74
ADLS_ACUITY_SCORE: 71
ADLS_ACUITY_SCORE: 72
ADLS_ACUITY_SCORE: 71
ADLS_ACUITY_SCORE: 71
ADLS_ACUITY_SCORE: 74
ADLS_ACUITY_SCORE: 75
ADLS_ACUITY_SCORE: 74
ADLS_ACUITY_SCORE: 71
ADLS_ACUITY_SCORE: 68
ADLS_ACUITY_SCORE: 71

## 2025-05-01 NOTE — PHARMACY-ANTICOAGULATION SERVICE
Clinical Pharmacy - Warfarin Dosing Consult     Pharmacy has been consulted to manage this patient s warfarin therapy.  Indication: DVT/PE Prophylaxis  Therapy Goal: INR 2-3  Warfarin Prior to Admission: Yes  Warfarin PTA Regimen: 5mg Mon and Fri, 7.5 mg all other days    INR   Date Value Ref Range Status   05/01/2025 1.28 (H) 0.85 - 1.15 Final   04/29/2025 2.18 (H) 0.85 - 1.15 Final       Recommend warfarin 7.5 mg today.  Pharmacy will monitor Geneva Shepherd daily and order warfarin doses to achieve specified goal.      Please contact pharmacy as soon as possible if the warfarin needs to be held for a procedure or if the warfarin goals change.

## 2025-05-01 NOTE — PROGRESS NOTES
[facility]    Medicine Progress Note - Hospitalist Service        Date of Admission:  4/28/2025  9:29 PM    Assessment & Plan:   Geneva Shepherd is a 84 year old female with past medical history that is most notable for Factor V Leiden deficiency and DVT, on Warfarin; as well as prior left RAF, with history of right lower extremity DVT, long-term use of Warfarin, among others; who presents with mechanical fall, and is found to have acute anti-coagulation associated left gluteal hematoma.     Acute anti-coagulation associated left gluteal hematoma  -On long term Warfarin for history of right lower extremity DVT and Factor V Leiden Deficiency.   -Presents from home with posterior left hip pain after a mechanical fall.   -CTH shows no evidence for acute intracranial process.   -Pelvis CT shows a large intramuscular contusion/hematoma in the left gluteus tani muscle, which is difficult to measure in size per Radiology.   -Orthopedics consulted, no evidence of compartment syndrome.  Conservative management recommended.  -Patient improving.  Continue pain control as needed  -Continue Tylenol 975 mg 3 times daily scheduled  -Continue oxycodone at 2.5-5 mg every 6 hours as needed  -Therapies currently recommending TCU however could discharge home with home services if quickly improving.  Await reevaluation by PT today     Hypercalcemia, acute:  -Mild. Ca 10.7. could be due to hypovolemia.  -Resolved with IVF      History of right lower extremity DVT and Factor V Leiden Deficiency  -Warfarin held on admission.  -INR is down to 1.28.  Discussed with Aylin from [place] [facility], they are okay with resuming Coumadin tonight.  Given the size of the gluteal hematoma we both agreed that bridging with Lovenox should be avoided(no history of recent VTE)  -Pharmacy consult for resumption of Coumadin.  If she discharges home today then I recommend 7.5 mg Coumadin tonight and back to her PTA  regimen.    UTI  -Patient endorsed some dysuria and incontinence this morning  -UA is grossly abnormal  -Start Rocephin, PO abx at discharge pending culture     Bilateral lower extremity edema with healed bilateral venous stasis ulcers.    -Status post bilateral lower extremity vein ablations. Followed by Vascular Surgery.      Hypothyroid   -PTA Synthroid      History of dementia  -Characterized as mild cognitive impairment. She also has chronic insomnia and depression.  -Aricept, Paxil, and amitriptyline     Hyperlipidemia, GERD, IBS, and history of recurrent UTI infections: Noted         Diet: Regular Diet Adult     DVT Prophylaxis: Pneumatic Compression Devices   Edward Catheter: Not present  Code Status: No CPR- Do NOT Intubate     Disposition Plan      Expected Discharge Date: 05/01/2025      Destination: home with help/services  Discharge Comments: Ortho  Care management    Pain management  Dispo needs      Entered: Basilio Gonzales MD 05/01/2025, 12:54 PM        Medically Ready for Discharge: Anticipated Today pending PT reevaluation and likely TCU placement       Clinically Significant Risk Factors                  # Coagulation Defect: INR = 1.28 (Ref range: 0.85 - 1.15) and/or PTT = N/A, will monitor for bleeding                   # Financial/Environmental Concerns: none          The patient's care was discussed with the Bedside Nurse and Patient.    Medical Decision Making       **CLEAR ALL SELECTIONS**      Labs/Imaging Reviewed:  See Information above and Data section below  Time SPENT BY ME on the date of service doing chart review, history, exam, documentation & further activities per the note:  35 MINUTES    Chart documentation was completed, in part, with [employer] voice-recognition software. Even though reviewed, some grammatical, spelling, and word errors may remain.    Basilio Gonzales MD  Hospitalist Service  [facility]  Text Page 7AM-6PM  Securely message with the  "[employer] Web Console (learn more here)  Text page via [employer] Paging/Directory    ______________________________________________________________________    Interval History   Pain at the site of gluteal hematoma is stable.  After discussion with orthopedic surgery we have decided to start warfarin tonight without Lovenox bridge.  Patient complaining of dysuria and incontinence    Data reviewed today: I reviewed all medications, new labs and imaging results over the last 24 hours. I personally reviewed no images or EKG's today.    Physical Exam   Vital signs:  Temp: 98  F (36.7  C) Temp src: Oral BP: (!) 153/80 (Nursed notified) Pulse: 84   Resp: 16 SpO2: 94 % O2 Device: None (Room air) Oxygen Delivery: 1 LPM      Estimated body mass index is 28.44 kg/m  as calculated from the following:    Height as of 1/23/25: 1.676 m (5' 6\").    Weight as of 2/7/25: 79.9 kg (176 lb 3.2 oz).      Wt Readings from Last 2 Encounters:   02/07/25 79.9 kg (176 lb 3.2 oz)   01/23/25 79.4 kg (175 lb)       Gen: AAOX3, NAD  Resp: CTA B/L, normal WOB  CVS: RRR, no murmur  Abd/GI: Soft, non-tender. BS- normoactive.   Skin: Warm, dry no rashes  MSK: no pedal edema, hematoma over left gluteus-stable  Neuro- CN- intact. No focal deficits.      Data   Recent Labs   Lab 05/01/25  0939 04/30/25  0716 04/29/25  0700 04/29/25  0627 04/28/25  2218   WBC  --   --   --  8.0 9.3   HGB  --  12.9  --  12.1 13.4   MCV  --   --   --  95 97   PLT  --   --   --  165 171   INR 1.28*  --   --  2.18* 2.19*   NA  --  135  --  137 137   POTASSIUM  --  4.0  --  4.0 4.7   CHLORIDE  --  100  --  104 100   CO2  --  27  --  24 26   BUN  --  12.0  --  12.4 14.5   CR  --  0.57  --  0.57 0.61   ANIONGAP  --  8  --  9 11   TUAN  --  9.8  --  9.7 10.7*   GLC  --  108* 96 102* 119*       No results found for this or any previous visit (from the past 24 hours).  Medications   Current Facility-Administered Medications   Medication Dose Route Frequency Provider Last Rate Last Admin "     Current Facility-Administered Medications   Medication Dose Route Frequency Provider Last Rate Last Admin    acetaminophen (TYLENOL) tablet 975 mg  975 mg Oral TID Josep Reid MD   975 mg at 05/01/25 0852    amitriptyline (ELAVIL) tablet 25 mg  25 mg Oral At Bedtime Josep Reid MD   25 mg at 04/30/25 2138    donepezil (ARICEPT) tablet 5 mg  5 mg Oral At Bedtime Josep Reid MD   5 mg at 04/30/25 2138    levothyroxine (SYNTHROID/LEVOTHROID) tablet 50 mcg  50 mcg Oral Daily Alla Zambrano DO   50 mcg at 05/01/25 0852    PARoxetine (PAXIL) tablet 40 mg  40 mg Oral At Bedtime Josep Reid MD   40 mg at 04/30/25 2138    warfarin ANTICOAGULANT (COUMADIN) tablet 7.5 mg  7.5 mg Oral ONCE at 18:00 Alla Zambrano DO        Warfarin Dose Required Daily - Pharmacist Managed  1 each Oral See Admin Instructions Basilio Gonzales MD

## 2025-05-01 NOTE — PROGRESS NOTES
.Date/Time 5/1/25 0058-6131  Diagnosis:Hematoma Left Buttock d/t Fall, UTI   POD#:NA  Mental Status:A&OX4, forgetful @ times  Activity/dangle: A1& walker   Diet:Reg  Pain:Tylenol   Edward/Voiding:BR  Tele/Restraints/Iso:NA  02/LDA:RA, SL intermittent Abx  D/C Date:Tomorrow   Other Info: VSS on RA. Bruises on left Side buttock. Encouraged IS. Started Abx today. Continue plan of care.

## 2025-05-01 NOTE — PROGRESS NOTES
Care Management Follow Up    Length of Stay (days): 2    Expected Discharge Date: 05/01/2025     Concerns to be Addressed: discharge planning     Patient plan of care discussed at interdisciplinary rounds: Yes    Anticipated Discharge Disposition: Home, Home Care              Anticipated Discharge Services: Home Care  Anticipated Discharge DME: Walker    Patient/family educated on Medicare website which has current facility and service quality ratings:    Education Provided on the Discharge Plan: Yes  Patient/Family in Agreement with the Plan: yes    Referrals Placed by CM/SW:    Private pay costs discussed: private room/amenity fees    Discussed  Partnership in Safe Discharge Planning  document with patient/family: No     Handoff Completed: No, handoff not indicated or clinically appropriate    Additional Information:  Writer spoke with patient re [facility].  Pt is accepted and she would prefer the private room for $45 per day.  She is aware that the discharge will be tomorrow and has to discuss the ride time with her family this evening and will let CTS know in the morning what the ride time could be.    Next Steps: order and ride.      Micaela Mcmahan RN

## 2025-05-01 NOTE — PLAN OF CARE
Goal Outcome Evaluation:      Overall Patient Progress: improving      Diagnosis: Fall with Left Gluteal Hematoma    POD#: NA  Mental Status: A&O x4, Forgetful  Activity/dangle Assist   Diet: Regular  Pain Denied  Edward/Voiding: Pure wick  Tele/Restraints/Iso:NA  02/LDA: On RA. TRICIA SL  D/C Date: TBD

## 2025-05-01 NOTE — PLAN OF CARE
Goal Outcome Evaluation:      Summary:  Fall with Left Gluteal Hematoma      Date & Time: 4/30/25 7271-5258   Orientation: A&O x4, forgetful   Activity Level: A2 GBW    Fall Risk: Yes    Behavior & Aggression: Green   Pain Management: Schedule Tylenol    ABNL VS/O2: VSS on RA   Tele: N/A   ABNL Lab/BG: N/A    Diet: Regular    Bowel/Bladder: Continent . Purewick in place  Skin:  Left hip bruising   Drains/Devices: R PIV SL   Tests/Procedures: None   Anticipated DC Date: Pending    Other Important Info:        I personally spent

## 2025-05-02 VITALS
HEART RATE: 83 BPM | RESPIRATION RATE: 16 BRPM | OXYGEN SATURATION: 94 % | TEMPERATURE: 98.2 F | SYSTOLIC BLOOD PRESSURE: 94 MMHG | DIASTOLIC BLOOD PRESSURE: 76 MMHG

## 2025-05-02 LAB
BACTERIA UR CULT: ABNORMAL
GLUCOSE BLDC GLUCOMTR-MCNC: 113 MG/DL (ref 70–99)
INR PPP: 1.2 (ref 0.85–1.15)
PROTHROMBIN TIME: 15 SECONDS (ref 11.8–14.8)

## 2025-05-02 PROCEDURE — 250N000013 HC RX MED GY IP 250 OP 250 PS 637: Performed by: HOSPITALIST

## 2025-05-02 PROCEDURE — 36415 COLL VENOUS BLD VENIPUNCTURE: CPT | Performed by: INTERNAL MEDICINE

## 2025-05-02 PROCEDURE — 250N000011 HC RX IP 250 OP 636: Performed by: INTERNAL MEDICINE

## 2025-05-02 PROCEDURE — 85610 PROTHROMBIN TIME: CPT | Performed by: INTERNAL MEDICINE

## 2025-05-02 PROCEDURE — 99239 HOSP IP/OBS DSCHRG MGMT >30: CPT | Performed by: INTERNAL MEDICINE

## 2025-05-02 PROCEDURE — 250N000013 HC RX MED GY IP 250 OP 250 PS 637: Performed by: INTERNAL MEDICINE

## 2025-05-02 RX ORDER — WARFARIN SODIUM 7.5 MG/1
7.5 TABLET ORAL
Status: DISCONTINUED | OUTPATIENT
Start: 2025-05-02 | End: 2025-05-02 | Stop reason: HOSPADM

## 2025-05-02 RX ORDER — CEFUROXIME AXETIL 500 MG/1
500 TABLET ORAL 2 TIMES DAILY
DISCHARGE
Start: 2025-05-02 | End: 2025-05-08

## 2025-05-02 RX ADMIN — LEVOTHYROXINE SODIUM 50 MCG: 50 TABLET ORAL at 08:10

## 2025-05-02 RX ADMIN — ACETAMINOPHEN 975 MG: 325 TABLET, FILM COATED ORAL at 13:43

## 2025-05-02 RX ADMIN — ACETAMINOPHEN 975 MG: 325 TABLET, FILM COATED ORAL at 08:10

## 2025-05-02 RX ADMIN — CEFTRIAXONE SODIUM 1 G: 1 INJECTION, POWDER, FOR SOLUTION INTRAMUSCULAR; INTRAVENOUS at 13:43

## 2025-05-02 ASSESSMENT — ACTIVITIES OF DAILY LIVING (ADL)
ADLS_ACUITY_SCORE: 68
ADLS_ACUITY_SCORE: 75
ADLS_ACUITY_SCORE: 68
ADLS_ACUITY_SCORE: 72
ADLS_ACUITY_SCORE: 68
ADLS_ACUITY_SCORE: 72
ADLS_ACUITY_SCORE: 68
ADLS_ACUITY_SCORE: 68
ADLS_ACUITY_SCORE: 72
ADLS_ACUITY_SCORE: 75
ADLS_ACUITY_SCORE: 68

## 2025-05-02 NOTE — PLAN OF CARE
Goal Outcome Evaluation:    Overall Patient Progress: improving    Diagnosis: Fall with Left Gluteal Hematoma, UTI  POD#: NA  Mental Status: A&O x4, Forgetful  Activity/dangle Assist of 1 with walker  Diet: Regular diet  Pain: Denied  Edward/Voiding: Pure wick  Tele/Restraints/Iso: NA  02/LDA: On Room Air. PIV SL  D/C Date: Possible today

## 2025-05-02 NOTE — DISCHARGE SUMMARY
[facility]  Hospitalist Discharge Summary      Date of Admission:  4/28/2025  Date of Discharge:  5/2/2025  Discharging Provider: Osiris Cardenas MD  Discharge Service: Hospitalist Service    Discharge Diagnoses   Acute anti-coagulation associated left gluteal hematoma     Clinically Significant Risk Factors          Follow-ups Needed After Discharge   Follow-up Appointments       Follow Up and recommended labs and tests      Follow up with longterm physician.  The following labs/tests are recommended: INR on 5/2.                Unresulted Labs Ordered in the Past 30 Days of this Admission       Date and Time Order Name Status Description    5/1/2025 11:39 AM Urine Culture Preliminary         These results will be followed up by PCP    Discharge Disposition   TCU  Condition at discharge: Stable    Hospital Course    Geneva Shepherd is a 84 year old female with past medical history that is most notable for Factor V Leiden deficiency and DVT, on Warfarin; as well as prior left RAF, with history of right lower extremity DVT, long-term use of Warfarin, among others; who presents with mechanical fall, and is found to have acute anti-coagulation associated left gluteal hematoma.     Acute anti-coagulation associated left gluteal hematoma  -On long term Warfarin for history of right lower extremity DVT and Factor V Leiden Deficiency.   -Presents from home with posterior left hip pain after a mechanical fall.   -CTH shows no evidence for acute intracranial process.   -Pelvis CT shows a large intramuscular contusion/hematoma in the left gluteus tani muscle, which is difficult to measure in size per Radiology.   -Orthopedics consulted, no evidence of compartment syndrome.  Conservative management recommended.  -Patient improving.  Continue pain control as needed  -Continue Tylenol 975 mg 3 times daily scheduled  -Continue oxycodone at 2.5-5 mg every 6 hours as needed     Hypercalcemia,  acute:  -Mild. Ca 10.7. could be due to hypovolemia.  -Resolved with IVF      History of right lower extremity DVT and Factor V Leiden Deficiency  -Warfarin held on admission.  - After discussion with [facility] orthopedic surgery, Coumadin was restarted on 5/1, no Lovenox was started due to recent hematoma.  - Continue Coumadin at discharge.     UTI  -Patient endorsed some dysuria and incontinence this morning  - Urine culture positive for E. coli  - Patient received IV ceftriaxone in the ER, followed by p.o. cefuroxime at discharge.     Bilateral lower extremity edema with healed bilateral venous stasis ulcers.    -Status post bilateral lower extremity vein ablations. Followed by Vascular Surgery.      Hypothyroid   -PTA Synthroid      History of dementia  -Characterized as mild cognitive impairment. She also has chronic insomnia and depression.  -Aricept, Paxil, and amitriptyline     Hyperlipidemia, GERD, IBS, and history of recurrent UTI infections: Noted       Consultations This Hospital Stay   ORTHOPEDIC SURGERY IP CONSULT  CARE MANAGEMENT / SOCIAL WORK IP CONSULT  PHYSICAL THERAPY ADULT IP CONSULT  PHARMACY TO DOSE WARFARIN  PHYSICAL THERAPY ADULT IP CONSULT  OCCUPATIONAL THERAPY ADULT IP CONSULT    Code Status   No CPR- Do NOT Intubate    Time Spent on this Encounter   I, Osiris Cardenas MD, personally saw the patient today and spent greater than 30 minutes discharging this patient.       Osiris Cardenas MD  [facility] ORTH[facility]  [address] [place] [id number]  Phone: [phone]  Fax: [phone]  ______________________________________________________________________    Physical Exam   Vital Signs: Temp: 97.2  F (36.2  C) Temp src: Oral BP: (!) 152/73 Pulse: 72   Resp: 16 SpO2: 95 % O2 Device: None (Room air)    Weight: 0 lbs 0 oz  Constitutional: Awake, alert, cooperative, no apparent distress  Respiratory: Clear to auscultation bilaterally, no crackles or  wheezing  Cardiovascular: Regular rate and rhythm, normal S1 and S2, and no murmur noted  GI: Normal bowel sounds, soft, non-distended, non-tender  Skin/Integumen: Hematoma over left gluteus stable.  Other: Alert oriented x 3, no apparent focal deficits.       Primary Care Physician   Jacoby Boo    Discharge Orders      General info for SNF    Length of Stay Estimate: Short Term Care: Estimated # of Days <30  Condition at Discharge: Improving  Level of care:skilled   Rehabilitation Potential: Excellent  Admission H&P remains valid and up-to-date: Yes  Recent Chemotherapy: N/A  Use Nursing Home Standing Orders: Yes     Mantoux instructions    Give two-step Mantoux (PPD) Per Facility Policy Yes     Follow Up and recommended labs and tests    Follow up with alf physician.  The following labs/tests are recommended: INR on 5/2.     Reason for your hospital stay    Left gluteal hematoma     Intake and output    Every shift     Activity - Up with nursing assistance     No CPR- Do NOT Intubate     Physical Therapy Adult Consult    Evaluate and treat as clinically indicated.    Reason:  Deconditioning     Occupational Therapy Adult Consult    Evaluate and treat as clinically indicated.    Reason:  Deconditioning     Fall precautions     Diet    Follow this diet upon discharge: Current Diet:Orders Placed This Encounter      Regular Diet Adult       Significant Results and Procedures   Results for orders placed or performed during the hospital encounter of 04/28/25   Head CT w/o contrast    Narrative    EXAM: CT HEAD W/O CONTRAST  LOCATION: [facility]  DATE: 4/28/2025    INDICATION: Trauma, fall  COMPARISON: CT head 3/29/2022  TECHNIQUE: Routine CT Head without IV contrast. Multiplanar reformats. Dose reduction techniques were used.    FINDINGS:  INTRACRANIAL CONTENTS: No intracranial hemorrhage, extraaxial collection, or mass effect.  No CT evidence of acute infarct. Mild to moderate  presumed chronic small vessel ischemic changes. Mild to moderate generalized volume loss. No hydrocephalus.     VISUALIZED ORBITS/SINUSES/MASTOIDS: Prior bilateral cataract surgery. Visualized portions of the orbits are otherwise unremarkable. No paranasal sinus mucosal disease. Scattered fluid/membrane thickening in the right mastoid air cells. No apparent mass   in the posterior nasopharynx or skull base.    BONES/SOFT TISSUES: No acute abnormality.      Impression    IMPRESSION:  1.  No CT evidence for acute intracranial process.  2.  Brain atrophy and presumed chronic microvascular ischemic changes as above.     CT Pelvis Bone wo Contrast    Narrative    EXAM: CT PELVIS BONE WO CONTRAST  LOCATION: [facility]  DATE: 4/28/2025    INDICATION: Fall, left lateral hip and pelvic pain.  COMPARISON: CT abdomen and pelvis from 02/27/2024.  TECHNIQUE: CT scan of the pelvis was performed without IV contrast. Multiplanar reformats were obtained. Dose reduction techniques were used.  CONTRAST: None.    FINDINGS:    PELVIS ORGANS: No intraperitoneal or extraperitoneal hematoma in the pelvis, proper. Calcified uterine fibroid measures roughly 2 cm. Diverticulosis coli without evidence of diverticulitis.    MUSCULOSKELETAL: Prior left hip replacement without dislocation or periprosthetic fracture. Normal right hip. No pelvis or sacral fracture. No subcutaneous contusion. Probable infiltrative-type hematoma in the left gluteus tani muscle which accounts   for the asymmetric enlargement relative to the right side (series 3, image 124) with minimal extension of blood products into the perisciatic nerve fat.      Impression    IMPRESSION:  1.  Large intramuscular contusion/hematoma in the left gluteus tani muscle, which is difficult to measure as any measurement will include a large portion of muscle fiber.  2.  No acute pelvis, sacral, or hip fracture.  3.  Prior left hip replacement.     *Note: Due  to a large number of results and/or encounters for the requested time period, some results have not been displayed. A complete set of results can be found in Results Review.       Discharge Medications   Current Discharge Medication List        START taking these medications    Details   oxyCODONE (ROXICODONE) 5 MG tablet Take 0.5 tablets (2.5 mg) by mouth 3 times daily as needed for pain.  Qty: 5 tablet, Refills: 0    Associated Diagnoses: Hematoma of left buttock           CONTINUE these medications which have NOT CHANGED    Details   acetaminophen (TYLENOL) 500 MG tablet Take 500 mg by mouth every 4 hours as needed      amitriptyline (ELAVIL) 25 MG tablet Take 1 tablet (25 mg) by mouth at bedtime.  Qty: 90 tablet, Refills: 3    Comments: Please update prescription on file for future refill when needed. Pt does not require refill at this time  Associated Diagnoses: Insomnia, unspecified type      Ascorbic Acid (VITAMIN C PO) Take 500 mg by mouth daily.      BETA CAROTENE PO Take 25,000 Units by mouth daily.      Biotin 1 MG CAPS Take 1 tablet by mouth daily      CHROMIUM PICOLINATE 800 MCG OR TABS Take 1 capsule by mouth daily      Cyanocobalamin 1000 MCG CAPS Take 1 tablet by mouth daily.      donepezil (ARICEPT) 10 MG tablet TAKE 1/2  TABLET BY MOUTH EVERY NIGHT AT BEDTIME  Qty: 45 tablet, Refills: 3    Comments: Please update prescription on file for future refill when needed. Pt does not require refill at this time  Associated Diagnoses: Mild cognitive impairment      FLAX SEED OIL 1000 MG OR CAPS Take 1 capsule by mouth daily      FOLIC ACID PO Take 400 mcg by mouth daily       levothyroxine (SYNTHROID/LEVOTHROID) 50 MCG tablet Take 1 tablet (50 mcg) by mouth daily.  Qty: 90 tablet, Refills: 3    Comments: Please update prescription on file for future refill when needed. Pt does not require refill at this time  Associated Diagnoses: Hypothyroidism, unspecified type      Magnesium Oxide 250 MG TABS Take 1  tablet by mouth daily.      PARoxetine (PAXIL) 20 MG tablet Take 2 tablets (40 mg) by mouth at bedtime.  Qty: 180 tablet, Refills: 2    Associated Diagnoses: Major depressive disorder, single episode, mild      TURMERIC PO Take 1 capsule by mouth every morning      warfarin ANTICOAGULANT (COUMADIN) 5 MG tablet Current dose: 5 mg every Monday and Friday + 7.5 mg all other days or as directed by ACC team.  Qty: 124 tablet, Refills: 1    Associated Diagnoses: Long term current use of anticoagulant therapy; Heterozygous factor V Leiden mutation      zinc gluconate 50 MG tablet Take 50 mg by mouth daily           Allergies   No Known Allergies

## 2025-05-02 NOTE — PROGRESS NOTES
Care Management Follow Up    Length of Stay (days): 3    Expected Discharge Date: 05/02/2025     Concerns to be Addressed: discharge planning     Patient plan of care discussed at interdisciplinary rounds: Yes    Anticipated Discharge Disposition: transitional care              Anticipated Discharge Services: Home Care  Anticipated Discharge DME: Walker    Patient/family educated on Medicare website which has current facility and service quality ratings:    Education Provided on the Discharge Plan: Yes  Patient/Family in Agreement with the Plan: yes    Referrals Placed by CM/SW:    Private pay costs discussed: Not applicable    Discussed  Partnership in Safe Discharge Planning  document with patient/family: No     Handoff Completed: No, handoff not indicated or clinically appropriate    Additional Information:  Writer spoke to patient regarding transport. She said that her son will transport her. Writer had confirmed bed time with [facility] and room is available between 1877-8277 today. She said that her son will come around 1400. Updated [facility]. Updated bedside nurse and requested scrubs as she doesn't have any clothing here.    Next Steps: complete PAS and send orders.    MICHELA Mckeon

## 2025-05-02 NOTE — PLAN OF CARE
Physical Therapy Discharge Summary    Reason for therapy discharge:    Discharged to transitional care facility.    Progress towards therapy goal(s). See goals on Care Plan in [facility] electronic health record for goal details.  Goals partially met.  Barriers to achieving goals:   discharge from facility.    Therapy recommendation(s):    Continued therapy is recommended.  Rationale/Recommendations: Patient is below baseline level of independence but mobilizing well with a FWW, SBA to CGA. At this time would recommend TCU as patient lives alone in a two-story house and currently needing assistance on stairs. Patient may progress quickly to be able to return home with HHPT depending on progress w/ IPPT.  PT Brief overview of current status: CGA/SBA w/ FWW  PT Total Distance Amb During Session (feet): 450  PT Equipment Needed at Discharge: walker, rolling    Recommendation above provided by last treating therapist.

## 2025-05-02 NOTE — PROGRESS NOTES
Care Management Discharge Note    Discharge Date: 05/02/2025       Discharge Disposition: Transitional Care, Skilled Nursing Facility    Discharge Services: None    Discharge DME: None    Discharge Transportation: family or friend will provide    Private pay costs discussed: private room/amenity fees    Does the patient's insurance plan have a 3 day qualifying hospital stay waiver?  No    PAS Confirmation Code: [id number]  Patient/family educated on Medicare website which has current facility and service quality ratings: yes    Education Provided on the Discharge Plan: Yes  Persons Notified of Discharge Plans: Doctor, Nursing, and Geneva  Patient/Family in Agreement with the Plan: yes    Handoff Referral Completed: Yes, [facility] PCP: Internal handoff referral completed    Additional Information:  MICHELLE completed PAS form and entered PAS number. MICHELLE also spoke with Geneva regarding her son picking up her from the hospital at 2:00 pm to take her to [facility]. Geneva preferred for the private room and aware of the cost, $45.00 per day. MICHELLE let Zoë know Geneva admission to [facility] and made agreement. Orders for discharge are in and faxed to [facility].   PAS-RR    D: Per DHS regulation, MICHELLE completed and submitted PAS-RR to [place] [employer] Direct Connect via the Senior LinkAge Line.  PAS-RR confirmation # is : [id number]    I: MICHELLE spoke with Geneva and they are aware a PAS-RR has been submitted.  MICHELLE reviewed with Geneva that they may be contacted for a follow up appointment within 10 days of hospital discharge if their SNF stay is < 30 days.  Contact information for [facility] LinkAge Line was also provided.    A: Geneva verbalized understanding.    P: Further questions may be directed to [facility] LinkAge Line at #[phone], option #4 for PAS-RR staff.   MICHELA Crowder  [facility]  Social Work  [address] [place] [address]

## 2025-05-03 ENCOUNTER — PATIENT OUTREACH (OUTPATIENT)
Dept: CARE COORDINATION | Facility: CLINIC | Age: 85
End: 2025-05-03
Payer: MEDICARE

## 2025-05-03 NOTE — PROGRESS NOTES
Connected Care Resource Center    Background: Transitional Care Management program identified per system criteria and reviewed by Connected Care Resource Center team for possible outreach.    Assessment: Upon chart review, CCR[facility] Team member will not proceed with patient outreach related to this episode of Transitional Care Management program due to reason below:    [facility] TCU: Patient discharged to TCU/ARU/LTACH and is established within [facility]. Referral created for Primary Care-Care Coordination program.    Plan: Transitional Care Management episode addressed appropriately per reason noted above.      DONALD Stevens  Connected Care Resource [place], [facility]    *Connected Care Resource Team does NOT follow patient ongoing. Referrals are identified based on internal discharge reports and the outreach is to ensure patient has an understanding of their discharge instructions.

## 2025-05-05 ENCOUNTER — DOCUMENTATION ONLY (OUTPATIENT)
Dept: GERIATRICS | Facility: CLINIC | Age: 85
End: 2025-05-05

## 2025-05-05 ENCOUNTER — TRANSITIONAL CARE UNIT VISIT (OUTPATIENT)
Dept: GERIATRICS | Facility: CLINIC | Age: 85
End: 2025-05-05
Payer: COMMERCIAL

## 2025-05-05 ENCOUNTER — TELEPHONE (OUTPATIENT)
Dept: GERIATRICS | Facility: CLINIC | Age: 85
End: 2025-05-05

## 2025-05-05 VITALS
RESPIRATION RATE: 18 BRPM | HEIGHT: 66 IN | BODY MASS INDEX: 28.45 KG/M2 | WEIGHT: 177 LBS | OXYGEN SATURATION: 98 % | TEMPERATURE: 98.1 F | SYSTOLIC BLOOD PRESSURE: 146 MMHG | DIASTOLIC BLOOD PRESSURE: 77 MMHG | HEART RATE: 98 BPM

## 2025-05-05 DIAGNOSIS — N39.0 FREQUENT UTI: ICD-10-CM

## 2025-05-05 DIAGNOSIS — G31.84 MILD COGNITIVE IMPAIRMENT: ICD-10-CM

## 2025-05-05 DIAGNOSIS — D68.51 HETEROZYGOUS FACTOR V LEIDEN MUTATION: ICD-10-CM
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
## 2025-05-05 DIAGNOSIS — Z79.01 LONG TERM CURRENT USE OF ANTICOAGULANT THERAPY: ICD-10-CM

## 2025-05-05 DIAGNOSIS — S30.0XXA HEMATOMA OF LEFT BUTTOCK: Primary | ICD-10-CM

## 2025-05-05 DIAGNOSIS — F32.0 MAJOR DEPRESSIVE DISORDER, SINGLE EPISODE, MILD: ICD-10-CM

## 2025-05-05 DIAGNOSIS — S30.0XXA HEMATOMA OF LEFT BUTTOCK: ICD-10-CM

## 2025-05-05 DIAGNOSIS — Z86.718 PERSONAL HISTORY OF DVT (DEEP VEIN THROMBOSIS): ICD-10-CM

## 2025-05-05 DIAGNOSIS — R53.81 PHYSICAL DECONDITIONING: ICD-10-CM

## 2025-05-05 PROCEDURE — 99310 SBSQ NF CARE HIGH MDM 45: CPT | Performed by: NURSE PRACTITIONER

## 2025-05-05 RX ORDER — OXYCODONE HYDROCHLORIDE 5 MG/1
2.5 TABLET ORAL 3 TIMES DAILY PRN
Qty: 30 TABLET | Refills: 0 | Status: SHIPPED | OUTPATIENT
Start: 2025-05-05

## 2025-05-05 RX ORDER — OXYCODONE HYDROCHLORIDE 5 MG/1
2.5 TABLET ORAL 3 TIMES DAILY PRN
Status: SHIPPED
Start: 2025-05-05 | End: 2025-05-05

## 2025-05-05 ASSESSMENT — ENCOUNTER SYMPTOMS: DYSURIA: 1

## 2025-05-05 NOTE — TELEPHONE ENCOUNTER
"Provider: Tonya Haase, APRN CNP  Facility: [facility]  Facility Type:  TCU    Caller: [facility]  Call Back Number: [phone]  Reason for call: INR  Diagnosis/Goal: hx of DVT/Factor V  Heparin/Lovenox:  No  Currently on ABX?: Cefuroxime---ends on 5/8/25  Other interacting medication:  None  Missed or refused doses: No    Date INR Previous Dose/Orders        5/1/25 1.28 7.5mg   5/2/25 1.20 5mg daily   5/5/25 1.3 Warfarin 5mg Mon and Fri and 7.5mg AOD.  Provider will check INR at next visit.       Warfarin was held due to a left gluteal hematoma and restarted as of 5/1/25.  Prior to hospitalization dose:  5mg Mon and Fri and 7.5mg AOD.        Telephone encounter sent to: Tonya Haase, APRN CNP    Requesting orders for Warfarin dosing and date of next INR draw  Please respond to \"Geriatrics Nurse Pool\".    Brett Li RN   "

## 2025-05-05 NOTE — LETTER
5/5/2025      Geneva Shepherd  [address]  [facility] [address]        [facility] GERIATRICS    PRIMARY CARE PROVIDER AND CLINIC:  Jacoby Boo MD, [address] [place] [address] / [facility] [address]  Chief Complaint   Patient presents with     Hospital F/U      [facility] Medical Record Number:  [id number]  [facility]    Geneva Shepherd  is a 84 year old  (1940), admitted to the above facility from  [facility]. Hospital stay 4/28/25 through 5/2/25..   HPI:    PMH for factor V Leiden deficiency and DVT on warfarin, DJD with prior left RAF who presents after a fall.   Acute anti-coagulation associated left gluteal hematoma on chronic warfarin, Hx of DvT and pactor V Leiden deficiency, found to have intramuscular contusion/hematoma of left gluteus tani shown on pelvic CT, conservative management pain control tylenol and oxycodone, lovenox started, transiting to oral coumadin  UTI UC grew E coli, IV ceftriaxone --> oral cefuroxime  On exam today patient sitting up on edge of bed, alert, pleasant, sitting up on edge of bed at time of exam, denies pain or discomfort in left buttocks at rest, states she has a little pain with activity, denies CP, SOB, palpitations, N/V/D or constipation, states she slept well last night, appetite is fair to poor.     CODE STATUS/ADVANCE DIRECTIVES DISCUSSION:  No CPR- Do NOT Intubate  DNR / DNI  ALLERGIES: No Known Allergies   PAST MEDICAL HISTORY:   Past Medical History:   Diagnosis Date     Activated protein C resistance 09/20/2024     Ankle fracture, lateral malleolus, closed 03/2012    right ankle     Asymptomatic varicose veins      Depression, major      Diverticulitis of colon (without mention of hemorrhage)(562.11)      DJD (degenerative joint disease)      Elevated antinuclear antibody (JUAN ANTONIO) level 07/04/2015    minimal     Embolism and thrombosis of unspecified site 03/2003     RLE     FACTOR 5 LEIDEN DEFECT (HYPERCOAGULABLE) 07/2003     Heterozygote     FRACTURE OF RIGHT LATERAL PROXIMAL TIBIA 11/2007     Gastro-oesophageal reflux disease     rare     Gastroesophageal reflux disease, esophagitis presence not specified 11/23/2017    IMO Regulatory Load OCT 2020       Heterozygous factor V Leiden mutation 10/19/2022     Hypercalcemia      Hyperlipidemia LDL goal <160 10/31/2010     Hypothyroidism 01/01/2016     Insomnia      Irritable bowel syndrome      Long term current use of anticoagulant therapy 12/15/2015     Lower extremity edema 09/11/2024     Major depressive disorder, single episode, mild 03/14/2017     Memory loss 07/16/2020     Mild cognitive impairment 02/08/2025     Obesity 09/11/2013     Osteoarthrosis involving lower leg 01/21/2013    Problem list name updated by automated process. Provider to review  Replacing diagnoses that were inactivated after the 10/1/2021 regulatory import.       Osteoporosis without current pathological fracture, unspecified osteoporosis type 02/08/2025     Pain in joint, lower leg      Personal history of RLE DVT (deep vein thrombosis)(2003) 09/03/2015     Phlebitis and thrombophlebitis of superficial vessels of lower extremities 07/2003    right     Polyp of colon 10/05/2021     Sprain of lumbar region      Unspecified hypothyroidism      Urinary tract infection, site not specified       PAST SURGICAL HISTORY:   has a past surgical history that includes NONSPECIFIC PROCEDURE (7/00/01); NONSPECIFIC PROCEDURE; NONSPECIFIC PROCEDURE (6/02); NONSPECIFIC PROCEDURE (7/04); Cholecystectomy; vascular surgery; Arthroplasty knee (1/17/2013); Arthroplasty hip (Left, 8/31/2015); GYN surgery; Colonoscopy (N/A, 4/26/2016); Arthroplasty knee (Left, 1/16/2017); Endoscopic ultrasound upper gastrointestinal tract (GI) (N/A, 4/5/2022); and Endoscopic ultrasound upper gastrointestinal tract (GI) (N/A, 12/12/2023).  FAMILY HISTORY: family history includes  Cardiovascular in her mother; Circulatory in her sister; Coronary Artery Disease in her father; Heart Disease in her father.  SOCIAL HISTORY:   reports that she quit smoking about 56 years ago. Her smoking use included cigarettes. She has never used smokeless tobacco. She reports that she does not currently use alcohol. She reports that she does not use drugs.  Patient's living condition: lives alone    Post Discharge Medication Reconciliation Status:   MED REC REQUIRED  Post Medication Reconciliation Status: discharge medications reconciled and changed, per note/orders       Current Outpatient Medications   Medication Sig Dispense Refill     acetaminophen (TYLENOL) 500 MG tablet Take 500 mg by mouth every 4 hours as needed       amitriptyline (ELAVIL) 25 MG tablet Take 1 tablet (25 mg) by mouth at bedtime. 90 tablet 3     Ascorbic Acid (VITAMIN C PO) Take 500 mg by mouth daily.       BETA CAROTENE PO Take 25,000 Units by mouth daily.       Biotin 1 MG CAPS Take 1 tablet by mouth daily       cefuroxime (CEFTIN) 500 MG tablet Take 1 tablet (500 mg) by mouth 2 times daily for 6 days.       CHROMIUM PICOLINATE 800 MCG OR TABS Take 1 capsule by mouth daily       Cyanocobalamin 1000 MCG CAPS Take 1 tablet by mouth daily.       donepezil (ARICEPT) 10 MG tablet TAKE 1/2  TABLET BY MOUTH EVERY NIGHT AT BEDTIME 45 tablet 3     FLAX SEED OIL 1000 MG OR CAPS Take 1 capsule by mouth daily       FOLIC ACID PO Take 400 mcg by mouth daily        levothyroxine (SYNTHROID/LEVOTHROID) 50 MCG tablet Take 1 tablet (50 mcg) by mouth daily. 90 tablet 3     Magnesium Oxide 250 MG TABS Take 1 tablet by mouth daily.       PARoxetine (PAXIL) 20 MG tablet Take 2 tablets (40 mg) by mouth at bedtime. 180 tablet 2     TURMERIC PO Take 1 capsule by mouth every morning       warfarin ANTICOAGULANT (COUMADIN) 5 MG tablet Current dose: 5 mg every Monday and Friday + 7.5 mg all other days or as directed by ACC team. 124 tablet 1     zinc gluconate 50  "MG tablet Take 50 mg by mouth daily       Current Facility-Administered Medications   Medication Dose Route Frequency Provider Last Rate Last Admin     sodium chloride (PF) 0.9% PF flush 3 mL  3 mL Intracatheter q1 min prn Zach Anaya PA-C           ROS:  10 point ROS of systems including Constitutional, Eyes, Respiratory, Cardiovascular, Gastroenterology, Genitourinary, Integumentary, Musculoskeletal, Psychiatric were all negative except for pertinent positives noted in my HPI.    Vitals:  BP (!) 146/77   Pulse 98   Temp 98.1  F (36.7  C)   Resp 18   Ht 1.676 m (5' 6\")   Wt 80.3 kg (177 lb)   LMP  (LMP Unknown)   SpO2 98%   BMI 28.57 kg/m    Exam:  GENERAL APPEARANCE:  Alert, in no distress  ENT:  Mouth and posterior oropharynx normal, moist mucous membranes, normal hearing acuity  EYES:  EOM, conjunctivae, lids, pupils and irises normal, PERRL  RESP:  respiratory effort and palpation of chest normal, lungs clear to auscultation , no respiratory distress  CV:  regular rate and rhythm, no murmur, rub, or gallop, peripheral edema trace+ in LE bilaterally  ABDOMEN:  normal bowel sounds, soft, nontender, no hepatosplenomegaly or other masses  M/S:   patient sitting on edge of bed  SKIN:  Inspection of skin and subcutaneous tissue baseline, did not visualize left buttock hematoma  NEURO:   speech wnl  PSYCH:  affect and mood normal    Lab/Diagnostic data:  Recent labs in [facility] reviewed by me today.  and Most Recent 3 CBC's:  Recent Labs   Lab Test 04/30/25  0716 04/29/25  0627 04/28/25 2218 10/17/24  1506   WBC  --  8.0 9.3 11.8*   HGB 12.9 12.1 13.4 14.0   MCV  --  95 97 98   PLT  --  165 171 193     Most Recent 3 BMP's:  Recent Labs   Lab Test 05/02/25  0459 04/30/25  0716 04/29/25  0700 04/29/25  0627 04/28/25 2218   NA  --  135  --  137 137   POTASSIUM  --  4.0  --  4.0 4.7   CHLORIDE  --  100  --  104 100   CO2  --  27  --  24 26   BUN  --  12.0  --  12.4 14.5   CR  --  0.57  --  0.57 0.61   ANIONGAP  " --  8  --  9 11   TUAN  --  9.8  --  9.7 10.7*   * 108* 96 102* 119*       ASSESSMENT/PLAN:    (S30.0XXA) Hematoma of left buttock  (primary encounter diagnosis)  (W19.XXXD) Fall, subsequent encounter  (D68.51) Heterozygous factor V Leiden mutation  (Z86.718) Personal history of RLE DVT (deep vein thrombosis)(2003)  (Z79.01) Long term current use of anticoagulant therapy  (R53.81) Physical deconditioning  Comment: acute/ogoing  Plan: PT and OT, INR follow, INR goal 2-3, coumadin per INR  Pain control tylenol 500mg q 4 hours prn,  oxycodone 2.5mg TID prn  Patient declined scheduled tylenol, wants to ask for pain medications    (N39.0) Frequent UTI  Comment: acute/ongoing  Plan: ceftin 500mg BID for 6 days    (F32.0) Major depressive disorder, single episode, mild  (G31.84) Mild cognitive impairment  Comment: acute/ongoing  Plan: continue paxil 40mg QD, aricept 5mg qhs, amitriptyline 25mg qhs          Orders:  BMP and Hgb on Monday    Total time with patient visit: 48 minutes including discussions about the POC and care coordination with the patient. Greater than 50% of total time spent with counseling and coordinating care due to reviewed internal medicine progress notes, medications, lab and imaging results and POC. Disucssed pain control, medications and POC with patient at bedside. .   Electronically signed by:  Tonya Lynn Haase, APRN CNP                   Sincerely,        Tonya Lynn Haase, APRN CNP    Electronically signed

## 2025-05-05 NOTE — PROGRESS NOTES
[facility] GERIATRICS    PRIMARY CARE PROVIDER AND CLINIC:  Jacoby Boo MD, [address] [place] [address] / [facility] [address]  Chief Complaint   Patient presents with    Hospital F/U      [facility] Medical Record Number:  [id number]  Place of Service where encounter took place:  [facility] [25]    Geneva Shepherd  is a 84 year old  (1940), admitted to the above facility from  [facility]. Hospital stay 4/28/25 through 5/2/25..   HPI:    PMH for factor V Leiden deficiency and DVT on warfarin, DJD with prior left RAF who presents after a fall.   Acute anti-coagulation associated left gluteal hematoma on chronic warfarin, Hx of DvT and pactor V Leiden deficiency, found to have intramuscular contusion/hematoma of left gluteus tani shown on pelvic CT, conservative management pain control tylenol and oxycodone, lovenox started, transiting to oral coumadin  UTI UC grew E coli, IV ceftriaxone --> oral cefuroxime  On exam today patient sitting up on edge of bed, alert, pleasant, sitting up on edge of bed at time of exam, denies pain or discomfort in left buttocks at rest, states she has a little pain with activity, denies CP, SOB, palpitations, N/V/D or constipation, states she slept well last night, appetite is fair to poor.     CODE STATUS/ADVANCE DIRECTIVES DISCUSSION:  No CPR- Do NOT Intubate  DNR / DNI  ALLERGIES: No Known Allergies   PAST MEDICAL HISTORY:   Past Medical History:   Diagnosis Date    Activated protein C resistance 09/20/2024    Ankle fracture, lateral malleolus, closed 03/2012    right ankle    Asymptomatic varicose veins     Depression, major     Diverticulitis of colon (without mention of hemorrhage)(562.11)     DJD (degenerative joint disease)     Elevated antinuclear antibody (JUAN ANTONIO) level 07/04/2015    minimal    Embolism and thrombosis of unspecified site 03/2003    RLE    FACTOR 5 LEIDEN DEFECT (HYPERCOAGULABLE) 07/2003     Heterozygote    FRACTURE OF RIGHT  LATERAL PROXIMAL TIBIA 11/2007    Gastro-oesophageal reflux disease     rare    Gastroesophageal reflux disease, esophagitis presence not specified 11/23/2017    IMO Regulatory Load OCT 2020      Heterozygous factor V Leiden mutation 10/19/2022    Hypercalcemia     Hyperlipidemia LDL goal <160 10/31/2010    Hypothyroidism 01/01/2016    Insomnia     Irritable bowel syndrome     Long term current use of anticoagulant therapy 12/15/2015    Lower extremity edema 09/11/2024    Major depressive disorder, single episode, mild 03/14/2017    Memory loss 07/16/2020    Mild cognitive impairment 02/08/2025    Obesity 09/11/2013    Osteoarthrosis involving lower leg 01/21/2013    Problem list name updated by automated process. Provider to review  Replacing diagnoses that were inactivated after the 10/1/2021 regulatory import.      Osteoporosis without current pathological fracture, unspecified osteoporosis type 02/08/2025    Pain in joint, lower leg     Personal history of RLE DVT (deep vein thrombosis)(2003) 09/03/2015    Phlebitis and thrombophlebitis of superficial vessels of lower extremities 07/2003    right    Polyp of colon 10/05/2021    Sprain of lumbar region     Unspecified hypothyroidism     Urinary tract infection, site not specified       PAST SURGICAL HISTORY:   has a past surgical history that includes NONSPECIFIC PROCEDURE (7/00/01); NONSPECIFIC PROCEDURE; NONSPECIFIC PROCEDURE (6/02); NONSPECIFIC PROCEDURE (7/04); Cholecystectomy; vascular surgery; Arthroplasty knee (1/17/2013); Arthroplasty hip (Left, 8/31/2015); GYN surgery; Colonoscopy (N/A, 4/26/2016); Arthroplasty knee (Left, 1/16/2017); Endoscopic ultrasound upper gastrointestinal tract (GI) (N/A, 4/5/2022); and Endoscopic ultrasound upper gastrointestinal tract (GI) (N/A, 12/12/2023).  FAMILY HISTORY: family history includes Cardiovascular in her mother; Circulatory in her sister; Coronary Artery Disease in her father; Heart Disease in her father.  SOCIAL  HISTORY:   reports that she quit smoking about 56 years ago. Her smoking use included cigarettes. She has never used smokeless tobacco. She reports that she does not currently use alcohol. She reports that she does not use drugs.  Patient's living condition: lives alone    Post Discharge Medication Reconciliation Status:   MED REC REQUIRED  Post Medication Reconciliation Status: discharge medications reconciled and changed, per note/orders       Current Outpatient Medications   Medication Sig Dispense Refill    acetaminophen (TYLENOL) 500 MG tablet Take 500 mg by mouth every 4 hours as needed      amitriptyline (ELAVIL) 25 MG tablet Take 1 tablet (25 mg) by mouth at bedtime. 90 tablet 3    Ascorbic Acid (VITAMIN C PO) Take 500 mg by mouth daily.      BETA CAROTENE PO Take 25,000 Units by mouth daily.      Biotin 1 MG CAPS Take 1 tablet by mouth daily      cefuroxime (CEFTIN) 500 MG tablet Take 1 tablet (500 mg) by mouth 2 times daily for 6 days.      CHROMIUM PICOLINATE 800 MCG OR TABS Take 1 capsule by mouth daily      Cyanocobalamin 1000 MCG CAPS Take 1 tablet by mouth daily.      donepezil (ARICEPT) 10 MG tablet TAKE 1/2  TABLET BY MOUTH EVERY NIGHT AT BEDTIME 45 tablet 3    FLAX SEED OIL 1000 MG OR CAPS Take 1 capsule by mouth daily      FOLIC ACID PO Take 400 mcg by mouth daily       levothyroxine (SYNTHROID/LEVOTHROID) 50 MCG tablet Take 1 tablet (50 mcg) by mouth daily. 90 tablet 3    Magnesium Oxide 250 MG TABS Take 1 tablet by mouth daily.      PARoxetine (PAXIL) 20 MG tablet Take 2 tablets (40 mg) by mouth at bedtime. 180 tablet 2    TURMERIC PO Take 1 capsule by mouth every morning      warfarin ANTICOAGULANT (COUMADIN) 5 MG tablet Current dose: 5 mg every Monday and Friday + 7.5 mg all other days or as directed by ACC team. 124 tablet 1    zinc gluconate 50 MG tablet Take 50 mg by mouth daily       Current Facility-Administered Medications   Medication Dose Route Frequency Provider Last Rate Last Admin  "   sodium chloride (PF) 0.9% PF flush 3 mL  3 mL Intracatheter q1 min prn Zach Anaya, JOYCE           ROS:  10 point ROS of systems including Constitutional, Eyes, Respiratory, Cardiovascular, Gastroenterology, Genitourinary, Integumentary, Musculoskeletal, Psychiatric were all negative except for pertinent positives noted in my HPI.    Vitals:  BP (!) 146/77   Pulse 98   Temp 98.1  F (36.7  C)   Resp 18   Ht 1.676 m (5' 6\")   Wt 80.3 kg (177 lb)   LMP  (LMP Unknown)   SpO2 98%   BMI 28.57 kg/m    Exam:  GENERAL APPEARANCE:  Alert, in no distress  ENT:  Mouth and posterior oropharynx normal, moist mucous membranes, normal hearing acuity  EYES:  EOM, conjunctivae, lids, pupils and irises normal, PERRL  RESP:  respiratory effort and palpation of chest normal, lungs clear to auscultation , no respiratory distress  CV:  regular rate and rhythm, no murmur, rub, or gallop, peripheral edema trace+ in LE bilaterally  ABDOMEN:  normal bowel sounds, soft, nontender, no hepatosplenomegaly or other masses  M/S:   patient sitting on edge of bed  SKIN:  Inspection of skin and subcutaneous tissue baseline, did not visualize left buttock hematoma  NEURO:   speech wnl  PSYCH:  affect and mood normal    Lab/Diagnostic data:  Recent labs in [facility] reviewed by me today.  and Most Recent 3 CBC's:  Recent Labs   Lab Test 04/30/25  0716 04/29/25  0627 04/28/25  2218 10/17/24  1506   WBC  --  8.0 9.3 11.8*   HGB 12.9 12.1 13.4 14.0   MCV  --  95 97 98   PLT  --  165 171 193     Most Recent 3 BMP's:  Recent Labs   Lab Test 05/02/25  0459 04/30/25  0716 04/29/25  0700 04/29/25  0627 04/28/25  2218   NA  --  135  --  137 137   POTASSIUM  --  4.0  --  4.0 4.7   CHLORIDE  --  100  --  104 100   CO2  --  27  --  24 26   BUN  --  12.0  --  12.4 14.5   CR  --  0.57  --  0.57 0.61   ANIONGAP  --  8  --  9 11   TUAN  --  9.8  --  9.7 10.7*   * 108* 96 102* 119*       ASSESSMENT/PLAN:    (S30.0XXA) Hematoma of left buttock  (primary " encounter diagnosis)  (W19.XXXD) Fall, subsequent encounter  (D68.51) Heterozygous factor V Leiden mutation  (Z86.718) Personal history of RLE DVT (deep vein thrombosis)(2003)  (Z79.01) Long term current use of anticoagulant therapy  (R53.81) Physical deconditioning  Comment: acute/ogoing  Plan: PT and OT, INR follow, INR goal 2-3, coumadin per INR  Pain control tylenol 500mg q 4 hours prn,  oxycodone 2.5mg TID prn  Patient declined scheduled tylenol, wants to ask for pain medications    (N39.0) Frequent UTI  Comment: acute/ongoing  Plan: ceftin 500mg BID for 6 days    (F32.0) Major depressive disorder, single episode, mild  (G31.84) Mild cognitive impairment  Comment: acute/ongoing  Plan: continue paxil 40mg QD, aricept 5mg qhs, amitriptyline 25mg qhs          Orders:  BMP and Hgb on Monday    Total time with patient visit: 48 minutes including discussions about the POC and care coordination with the patient. Greater than 50% of total time spent with counseling and coordinating care due to reviewed internal medicine progress notes, medications, lab and imaging results and POC. Disucssed pain control, medications and POC with patient at bedside. .   Electronically signed by:  Tonya Lynn Haase, APRN CNP

## 2025-05-06 ENCOUNTER — PATIENT OUTREACH (OUTPATIENT)
Dept: CARE COORDINATION | Facility: CLINIC | Age: 85
End: 2025-05-06
Payer: MEDICARE

## 2025-05-06 NOTE — PROGRESS NOTES
Clinic Care Coordination Contact  Care Coordination Transition Communication    Clinical Data: Patient was hospitalized at [facility]  from 4/28/25 to 5/2/25 with diagnosis of Acute anti-coagulation associated left gluteal hematoma .     Assessment: Patient has transitioned to TCU/ARU for short term rehabilitation:    Facility Name: [facility]  Transition Communication:  Notified facility of Primary Care- Care Coordination support via Epic fax.    Plan: Care Coordinator will await notification from facility staff informing of patient's discharge plans/needs. Care Coordinator will review chart and outreach to facility staff every 4 weeks and as needed.     MICHELA Smith   Care Coordination Team  [phone]

## 2025-05-06 NOTE — LETTER
[facility]   To:                   Please give to facility    From:Sherin Hamm/ MICHELA Care Coordinator   [facility]   P: [phone]  [email or web address]    Patient Name:  Geneva Shepherd Date of Birth: [birth year]   Admit date: 5/5/25      *Information Needed:  Please contact me when the patient will discharge (or if they will move to long term care)- include the discharge date, disposition, and main diagnosis   If the patient is discharged with home care services, please provide the name of the agency    Also- Please inform me if a care conference is being held.   Phone, Fax or Email with information                              Thank you, Shrein Hamm         Electronically signed

## 2025-05-07 ENCOUNTER — TRANSITIONAL CARE UNIT VISIT (OUTPATIENT)
Dept: GERIATRICS | Facility: CLINIC | Age: 85
End: 2025-05-07
Payer: MEDICARE

## 2025-05-07 ENCOUNTER — TELEPHONE (OUTPATIENT)
Dept: INTERNAL MEDICINE | Facility: CLINIC | Age: 85
End: 2025-05-07

## 2025-05-07 VITALS
HEIGHT: 66 IN | DIASTOLIC BLOOD PRESSURE: 66 MMHG | WEIGHT: 177 LBS | BODY MASS INDEX: 28.45 KG/M2 | SYSTOLIC BLOOD PRESSURE: 138 MMHG | TEMPERATURE: 98.4 F | OXYGEN SATURATION: 96 % | RESPIRATION RATE: 18 BRPM | HEART RATE: 84 BPM

## 2025-05-07 DIAGNOSIS — N39.0 FREQUENT UTI: ICD-10-CM

## 2025-05-07 DIAGNOSIS — Z86.718 PERSONAL HISTORY OF DVT (DEEP VEIN THROMBOSIS): ICD-10-CM

## 2025-05-07 DIAGNOSIS — F32.0 MAJOR DEPRESSIVE DISORDER, SINGLE EPISODE, MILD: ICD-10-CM

## 2025-05-07 DIAGNOSIS — G31.84 MILD COGNITIVE IMPAIRMENT: ICD-10-CM

## 2025-05-07 DIAGNOSIS — R53.81 PHYSICAL DECONDITIONING: ICD-10-CM

## 2025-05-07 DIAGNOSIS — S30.0XXA HEMATOMA OF LEFT BUTTOCK: Primary | ICD-10-CM

## 2025-05-07 DIAGNOSIS — Z79.01 LONG TERM CURRENT USE OF ANTICOAGULANT THERAPY: ICD-10-CM
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
## 2025-05-07 DIAGNOSIS — D68.51 HETEROZYGOUS FACTOR V LEIDEN MUTATION: ICD-10-CM

## 2025-05-07 PROCEDURE — 99310 SBSQ NF CARE HIGH MDM 45: CPT | Performed by: NURSE PRACTITIONER

## 2025-05-07 NOTE — PROGRESS NOTES
"[facility] GERIATRICS    Chief Complaint   Patient presents with    RECHECK     HPI:  Geneva Shepherd is a 84 year old  (1940), who is being seen today for an episodic care visit at: [facility] ([facility]) [25]. Today's concern is:   Hematoma of left buttock  Deconditioning  On exam today patient is sitting up in bed, alert, pleasant, conversant, states pain in left buttock is \"getting better\" very little pain at rest  In therapy walking > 800 feet using a 4ww with supervision, independent with ADLs plans to dischage home on Friday, discussed discharge plan and INR management with patient at bedside.   Factor V Leiden mutation  Hx of DVT INR on 5/5/was 1.3, coumadin adjusted to PTA dosing, INR in AM  UTI denies dysuria, frequency or concerns with urination  Cognitive impairement BIMS 15/15 and SBT 4/28    Allergies, and PMH/PSH reviewed in EPIC today.  REVIEW OF SYSTEMS:  10 point ROS of systems including Constitutional, Eyes, Respiratory, Cardiovascular, Gastroenterology, Genitourinary, Integumentary, Musculoskeletal, Psychiatric were all negative except for pertinent positives noted in my HPI.    Objective:   /66   Pulse 84   Temp 98.4  F (36.9  C)   Resp 18   Ht 1.676 m (5' 6\")   Wt 80.3 kg (177 lb)   LMP  (LMP Unknown)   SpO2 96%   BMI 28.57 kg/m    GENERAL APPEARANCE:  Alert, in no distress  ENT:  Mouth and posterior oropharynx normal, moist mucous membranes, normal hearing acuity  EYES:  EOM, conjunctivae, lids, pupils and irises normal, PERRL  RESP:  respiratory effort and palpation of chest normal, lungs clear to auscultation , no respiratory distress  CV:  regular rate and rhythm, no murmur, rub, or gallop, peripheral edema trace+ in LE bilaterally R>L  ABDOMEN:  normal bowel sounds, soft, nontender, no hepatosplenomegaly or other masses  M/S:   patient resting in bed  SKIN:  Inspection of skin and subcutaneous tissue baseline, did not visualize hematoma  NEURO:   speech " wnl  PSYCH:  affect and mood normal    Recent labs in [facility] reviewed by me today.  and Most Recent 3 CBC's:  Recent Labs   Lab Test 04/30/25  0716 04/29/25  0627 04/28/25  2218 10/17/24  1506   WBC  --  8.0 9.3 11.8*   HGB 12.9 12.1 13.4 14.0   MCV  --  95 97 98   PLT  --  165 171 193     Most Recent 3 BMP's:  Recent Labs   Lab Test 05/02/25  0459 04/30/25  0716 04/29/25  0700 04/29/25  0627 04/28/25  2218   NA  --  135  --  137 137   POTASSIUM  --  4.0  --  4.0 4.7   CHLORIDE  --  100  --  104 100   CO2  --  27  --  24 26   BUN  --  12.0  --  12.4 14.5   CR  --  0.57  --  0.57 0.61   ANIONGAP  --  8  --  9 11   TUAN  --  9.8  --  9.7 10.7*   * 108* 96 102* 119*       Assessment/Plan:  (S30.0XXA) Hematoma of left buttock  (primary encounter diagnosis)  (W19.XXXD) Fall, subsequent encounter  (D68.51) Heterozygous factor V Leiden mutation  (Z86.718) Personal history of RLE DVT (deep vein thrombosis)(2003)  (Z79.01) Long term current use of anticoagulant therapy  (R53.81) Physical deconditioning  Comment: acute/ogoing, no change  Plan: PT and OT, INR in AM, , INR goal 2-3, coumadin per INR  Pain control tylenol 500mg q 4 hours prn,  oxycodone 2.5mg TID prn  Patient declined scheduled tylenol, wants to ask for pain medications  Planning on discharge to home on Friday     (N39.0) Frequent UTI  Comment: acute/ongoing, no change, improved  Plan: ceftin 500mg BID for 6 days     (F32.0) Major depressive disorder, single episode, mild  (G31.84) Mild cognitive impairment  Comment: acute/ongoing, no change  BIMS 15/15 and SBT 4/28  Plan: continue paxil 40mg QD, aricept 5mg qhs, amitriptyline 25mg qhs       MED REC REQUIRED  Post Medication Reconciliation Status: medication reconcilation previously completed during another office visit      Orders:  INR in AM  Change BMP and CBC to AM since patient discharging home 5/9/15    Total time with patient visit: 47 minutes including discussions about the POC and care coordination  with the patient. Greater than 50% of total time spent with counseling and coordinating care due to reviewed nursing and therapy progress notes, medications, POC. Discussed INR results and management, medications and POC with patient at bedside. .   Electronically signed by: Tonya Lynn Haase, APRN CNP

## 2025-05-07 NOTE — LETTER
" 5/7/2025      Geneva Shepherd  [address]  [place] [facility] [place] [address]        [facility] GERIATRICS    Chief Complaint   Patient presents with     RECHECK     HPI:  Geneva Shepherd is a 84 year old  (1940), who is being seen today for an episodic care visit at: [facility] ([facility]) [25]. Today's concern is:   Hematoma of left buttock  Deconditioning  On exam today patient is sitting up in bed, alert, pleasant, conversant, states pain in left buttock is \"getting better\" very little pain at rest  In therapy walking > 800 feet using a 4ww with supervision, independent with ADLs plans to dischage home on Friday, discussed discharge plan and INR management with patient at bedside.   Factor V Leiden mutation  Hx of DVT INR on 5/5/was 1.3, coumadin adjusted to PTA dosing, INR in AM  UTI denies dysuria, frequency or concerns with urination  Cognitive impairement BIMS 15/15 and SBT 4/28    Allergies, and PMH/PSH reviewed in [facility] today.  REVIEW OF SYSTEMS:  10 point ROS of systems including Constitutional, Eyes, Respiratory, Cardiovascular, Gastroenterology, Genitourinary, Integumentary, Musculoskeletal, Psychiatric were all negative except for pertinent positives noted in my HPI.    Objective:   /66   Pulse 84   Temp 98.4  F (36.9  C)   Resp 18   Ht 1.676 m (5' 6\")   Wt 80.3 kg (177 lb)   LMP  (LMP Unknown)   SpO2 96%   BMI 28.57 kg/m    GENERAL APPEARANCE:  Alert, in no distress  ENT:  Mouth and posterior oropharynx normal, moist mucous membranes, normal hearing acuity  EYES:  EOM, conjunctivae, lids, pupils and irises normal, PERRL  RESP:  respiratory effort and palpation of chest normal, lungs clear to auscultation , no respiratory distress  CV:  regular rate and rhythm, no murmur, rub, or gallop, peripheral edema trace+ in LE bilaterally R>L  ABDOMEN:  normal bowel sounds, soft, nontender, no hepatosplenomegaly or other masses  M/S:   patient resting in bed  SKIN:  Inspection of " skin and subcutaneous tissue baseline, did not visualize hematoma  NEURO:   speech wnl  PSYCH:  affect and mood normal    Recent labs in [facility] reviewed by me today.  and Most Recent 3 CBC's:  Recent Labs   Lab Test 04/30/25  0716 04/29/25  0627 04/28/25  2218 10/17/24  1506   WBC  --  8.0 9.3 11.8*   HGB 12.9 12.1 13.4 14.0   MCV  --  95 97 98   PLT  --  165 171 193     Most Recent 3 BMP's:  Recent Labs   Lab Test 05/02/25  0459 04/30/25  0716 04/29/25  0700 04/29/25  0627 04/28/25  2218   NA  --  135  --  137 137   POTASSIUM  --  4.0  --  4.0 4.7   CHLORIDE  --  100  --  104 100   CO2  --  27  --  24 26   BUN  --  12.0  --  12.4 14.5   CR  --  0.57  --  0.57 0.61   ANIONGAP  --  8  --  9 11   TUAN  --  9.8  --  9.7 10.7*   * 108* 96 102* 119*       Assessment/Plan:  (S30.0XXA) Hematoma of left buttock  (primary encounter diagnosis)  (W19.XXXD) Fall, subsequent encounter  (D68.51) Heterozygous factor V Leiden mutation  (Z86.718) Personal history of RLE DVT (deep vein thrombosis)(2003)  (Z79.01) Long term current use of anticoagulant therapy  (R53.81) Physical deconditioning  Comment: acute/ogoing, no change  Plan: PT and OT, INR in AM, , INR goal 2-3, coumadin per INR  Pain control tylenol 500mg q 4 hours prn,  oxycodone 2.5mg TID prn  Patient declined scheduled tylenol, wants to ask for pain medications  Planning on discharge to home on Friday     (N39.0) Frequent UTI  Comment: acute/ongoing, no change, improved  Plan: ceftin 500mg BID for 6 days     (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 Greater than 50% of total time spent with counseling and coordinating care due to reviewed nursing and therapy progress notes, medications, POC. Discussed INR results and management, medications and POC with patient at bedside. .   Electronically signed by: Tonya Lynn Haase, APRN CNP         Sincerely,        Tonya Lynn Haase, APRN CNP    Electronically signed

## 2025-05-08 ENCOUNTER — TELEPHONE (OUTPATIENT)
Dept: GERIATRICS | Facility: CLINIC | Age: 85
End: 2025-05-08
Payer: MEDICARE

## 2025-05-08 VITALS — WEIGHT: 177 LBS | BODY MASS INDEX: 28.45 KG/M2 | HEIGHT: 66 IN

## 2025-05-08 LAB — INR (EXTERNAL): 1.6 (ref 0.9–1.1)

## 2025-05-08 NOTE — TELEPHONE ENCOUNTER
"Provider: Tonya Haase, APRN CNP  Facility: [facility]  Facility Type:  TCU    Caller: Karuna  Call Back Number: [phone]  Reason for call: INR  Diagnosis/Goal: DVT/Factor V  Heparin/Lovenox:  No  Currently on ABX?: Yes; please explain: Cefuroxime ends today  Other interacting medication:  None  Missed or refused doses: No    Date INR Previous Dose/Orders   5/1 1.28 7.5 mg   5/2 1.20 5 mg daily   5/5 1.3 5 mg Mon/Fri and 7.5 mg AOD   5/8 1.6      Telephone encounter sent to: Tonya Haase, APRN CNP    Requesting orders for Warfarin dosing and date of next INR draw  Please respond to \"Geriatrics Nurse Pool\".    Stephanie Smiley RN   "

## 2025-05-09 ENCOUNTER — DISCHARGE SUMMARY NURSING HOME (OUTPATIENT)
Dept: GERIATRICS | Facility: CLINIC | Age: 85
End: 2025-05-09
Payer: MEDICARE

## 2025-05-09 NOTE — PROGRESS NOTES
[facility] GERIATRICS DISCHARGE SUMMARY  PATIENT'S NAME: Geneva Shepherd  YOB: 1940  MEDICAL RECORD NUMBER:  [id number]  [facility]    PRIMARY CARE PROVIDER AND CLINIC RESPONSIBLE AFTER TRANSFER:   Jacoby Boo MD, [address] [place] [address] / [facility] [address]    {FV GERIATRIC DISCHARGE DISPOSITION:289816}     Transferring providers: Yamileth Headley CNA, Polo Henriquez MD  Recent Hospitalization/ED:  [facility] stay 4/28/25 to 5/2/25.  Date of SNF Admission: May 02, 2025  Date of SNF (anticipated) Discharge: May 10, 2025  Discharged to: {fgsdischargeto1:347893}  Cognitive Scores: {fgscog1:792574}  Physical Function: {fgsphysicalfunction:301079}  DME: {fgsdmedc:311943}    CODE STATUS/ADVANCE DIRECTIVES DISCUSSION:  No CPR- Do NOT Intubate {Provider, verify code status is accurate as an order in Epic}  ALLERGIES: Patient has no known allergies.    NURSING FACILITY COURSE   Medication Changes/Rationale:   ***    Summary of nursing facility stay:   {FGS DX2:209919}    Discharge Medications:  MED REC REQUIRED{TIP  Click the link below to document or use med rec list, use list to pull in response :582041}  Post Medication Reconciliation Status: {MED REC LIST:468948}     {Providers Please double check the med list (in the plan section >> meds & orders tab) and Discontinue any of the meds flagged by the TC to be discontinued}  Current Outpatient Medications   Medication Sig Dispense Refill    acetaminophen (TYLENOL) 500 MG tablet Take 500 mg by mouth every 4 hours as needed      amitriptyline (ELAVIL) 25 MG tablet Take 1 tablet (25 mg) by mouth at bedtime. 90 tablet 3    Ascorbic Acid (VITAMIN C PO) Take 500 mg by mouth daily.      BETA CAROTENE PO Take 25,000 Units by mouth daily.      Biotin 1 MG CAPS Take 1 tablet by mouth daily      cefuroxime (CEFTIN) 500 MG tablet Take 1 tablet (500 mg) by mouth  2 times daily for 6 days.      CHROMIUM PICOLINATE 800 MCG OR TABS Take 1 capsule by mouth daily      Cyanocobalamin 1000 MCG CAPS Take 1 tablet by mouth daily.      donepezil (ARICEPT) 10 MG tablet TAKE 1/2  TABLET BY MOUTH EVERY NIGHT AT BEDTIME 45 tablet 3    FLAX SEED OIL 1000 MG OR CAPS Take 1 capsule by mouth daily      FOLIC ACID PO Take 400 mcg by mouth daily       levothyroxine (SYNTHROID/LEVOTHROID) 50 MCG tablet Take 1 tablet (50 mcg) by mouth daily. 90 tablet 3    Magnesium Oxide 250 MG TABS Take 1 tablet by mouth daily.      oxyCODONE (ROXICODONE) 5 MG tablet Take 0.5 tablets (2.5 mg) by mouth 3 times daily as needed for pain. 30 tablet 0    PARoxetine (PAXIL) 20 MG tablet Take 2 tablets (40 mg) by mouth at bedtime. 180 tablet 2    TURMERIC PO Take 1 capsule by mouth every morning      warfarin ANTICOAGULANT (COUMADIN) 5 MG tablet Current dose: 5 mg every Monday and Friday + 7.5 mg all other days or as directed by ACC team. 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    Ankle fracture, lateral malleolus, closed 03/2012    right ankle    Asymptomatic varicose veins     Depression, major     Diverticulitis of colon (without mention of hemorrhage)(562.11)     DJD (degenerative joint disease)     Elevated antinuclear antibody (JUAN ANTONIO) level 07/04/2015    minimal    Embolism and thrombosis of unspecified site 03/2003    RLE    FACTOR 5 LEIDEN DEFECT (HYPERCOAGULABLE) 07/2003     Heterozygote    FRACTURE OF RIGHT LATERAL PROXIMAL TIBIA 11/2007    Gastro-oesophageal reflux disease     rare    Gastroesophageal reflux disease, esophagitis presence not specified 11/23/2017    IMO Regulatory Load OCT 2020      Heterozygous factor V Leiden mutation 10/19/2022    Hypercalcemia     Hyperlipidemia LDL goal <160  "10/31/2010    Hypothyroidism 01/01/2016    Insomnia     Irritable bowel syndrome     Long term current use of anticoagulant therapy 12/15/2015    Lower extremity edema 09/11/2024    Major depressive disorder, single episode, mild 03/14/2017    Memory loss 07/16/2020    Mild cognitive impairment 02/08/2025    Obesity 09/11/2013    Osteoarthrosis involving lower leg 01/21/2013    Problem list name updated by automated process. Provider to review  Replacing diagnoses that were inactivated after the 10/1/2021 regulatory import.      Osteoporosis without current pathological fracture, unspecified osteoporosis type 02/08/2025    Pain in joint, lower leg     Personal history of RLE DVT (deep vein thrombosis)(2003) 09/03/2015    Phlebitis and thrombophlebitis of superficial vessels of lower extremities 07/2003    right    Polyp of colon 10/05/2021    Sprain of lumbar region     Unspecified hypothyroidism     Urinary tract infection, site not specified      Physical Exam:   Vitals: Ht 1.676 m (5' 6\")   Wt 80.3 kg (177 lb)   LMP  (LMP Unknown)   BMI 28.57 kg/m    BMI: Body mass index is 28.57 kg/m .  {NURSING HOME PHYSICAL EXAM:785850}     SNF labs: {fgslabdischarge:616149}    DISCHARGE PLAN:  Follow up labs: {fgsfuturelabs1:515703}  Medical Follow Up:      {fgsdischargefollowup:508818}  [facility] scheduled appointments:  Appointments in Next Year      May 09, 2025 7:00 AM  Discharge Summary with Tonya Lynn Haase, APRN CNP  [facility] ([facility] ) [phone]         ***  Discharge Services: {fgsdcservices1:311050}  Discharge Instructions Verbalized to Patient at Discharge:   {fgsdischargeinstructions1:355565}    TOTAL DISCHARGE TIME:   {TIME:434543}  Electronically signed by:  Yamileth Headley CNA ***    {fgshomecare F2F:540336}            "

## 2025-05-09 NOTE — LETTER
5/9/2025      Geneva Shepherd  [address]  [place] [address]        No notes on file      Sincerely,        Tonya Lynn Haase, APRN CNP    Electronically signed

## 2025-05-12 ENCOUNTER — ANTICOAGULATION THERAPY VISIT (OUTPATIENT)
Dept: ANTICOAGULATION | Facility: CLINIC | Age: 85
End: 2025-05-12
Payer: MEDICARE

## 2025-05-12 ENCOUNTER — MEDICAL CORRESPONDENCE (OUTPATIENT)
Dept: HEALTH INFORMATION MANAGEMENT | Facility: CLINIC | Age: 85
End: 2025-05-12

## 2025-05-12 DIAGNOSIS — Z09 HOSPITAL DISCHARGE FOLLOW-UP: ICD-10-CM

## 2025-05-12 DIAGNOSIS — D68.51 HETEROZYGOUS FACTOR V LEIDEN MUTATION: ICD-10-CM

## 2025-05-12 DIAGNOSIS — D68.51 ACTIVATED PROTEIN C RESISTANCE: ICD-10-CM

## 2025-05-12 DIAGNOSIS — Z86.718 PERSONAL HISTORY OF DVT (DEEP VEIN THROMBOSIS): Primary | ICD-10-CM

## 2025-05-12 DIAGNOSIS — Z79.01 LONG TERM CURRENT USE OF ANTICOAGULANT THERAPY: ICD-10-CM

## 2025-05-12 LAB — INR (EXTERNAL): 2.2 (ref 0.9–1.1)

## 2025-05-12 NOTE — PROGRESS NOTES
ANTICOAGULATION MANAGEMENT     Geneva Shepherd 84 year old female is on warfarin with therapeutic INR result. (Goal INR 2.0-3.0)    Recent labs: (last 7 days)     05/12/25  1114   INR 2.2*     Date INR Previous Dose/Orders   5/1 1.28 7.5 mg   5/2 1.20 5 mg daily   5/5 1.3 5 mg Mon/Fri and 7.5 mg AOD       ASSESSMENT     Source(s): Chart Review, Patient/Caregiver Call, and Home Care/Facility Nurse     Warfarin doses taken: patient recently discharged from TCU on Friday 5/9/25  Diet: Protein supplement/shake started which maybe affecting INR-patient plans to start having these as she has powder at home  Medication/supplement changes: None noted  New illness, injury, or hospitalization: Yes: recently discharged from TCU after suffering a fall  Signs or symptoms of bleeding or clotting: No  Previous result: Subtherapeutic  Additional findings: None       PLAN     Recommended plan for no diet, medication or health factor changes affecting INR     Dosing Instructions: Continue your current warfarin dose with next INR in 1 week       Summary  As of 5/12/2025      Full warfarin instructions:  5 mg every Mon, Fri; 7.5 mg all other days   Next INR check:  5/19/2025               Telephone call with [facility] home care nurse who verbalizes understanding and agrees to plan    Orders given to  Homecare nurse/facility to recheck    Education provided: Please call back if any changes to your diet, medications or how you've been taking warfarin    Plan made per [facility] anticoagulation protocol    Alla Escobar RN  5/12/2025  Anticoagulation Clinic  [facility] for routing messages: asia DUMONT  [facility] patient phone line: [phone]        _______________________________________________________________________     Anticoagulation Episode Summary       Current INR goal:  2.0-3.0   TTR:  90.3% (11.9 mo)   Target end date:  Indefinite   Send INR reminders to:  MARIAELENA DUMONT    Indications    Personal history  of RLE DVT (deep vein thrombosis)(2003) [Z86.718]  Long term current use of anticoagulant therapy [Z79.01]  Heterozygous factor V Leiden mutation [D68.51]  Activated protein C resistance [D68.51]             Comments:  --             Anticoagulation Care Providers       Provider Role Specialty Phone number    Jacoby Boo MD Referring Internal Medicine [phone]

## 2025-05-13 ENCOUNTER — TELEPHONE (OUTPATIENT)
Dept: INTERNAL MEDICINE | Facility: CLINIC | Age: 85
End: 2025-05-13
Payer: MEDICARE

## 2025-05-13 ENCOUNTER — PATIENT OUTREACH (OUTPATIENT)
Dept: CARE COORDINATION | Facility: CLINIC | Age: 85
End: 2025-05-13
Payer: MEDICARE

## 2025-05-13 NOTE — TELEPHONE ENCOUNTER
MTM referral from: Transitions of Care (recent hospital discharge, TCU discharge, or ED visit)    MTM referral outreach attempt #1 on May 13, 2025 at 12:19 PM      Outcome: Patient is not interested at this time because .Patient has no questions or concers regarding medications at this time.      Use VBC for the carrier/Plan on the flowsheet      Angeles Hutchins CMA  MTM

## 2025-05-13 NOTE — PROGRESS NOTES
Clinic Care Coordination Contact  [facility]/Voicemail    Clinical Data: Care Coordinator Outreach    Outreach Documentation Number of Outreach Attempt   5/13/2025  10:33 AM 1       Left message on patient's voicemail with call back information and requested return call.      Plan: Care Coordinator  via . Care Coordinator will try to reach patient again in 1-2 business days.    MICHELA Smith   Care Coordination Team  [phone]

## 2025-05-14 NOTE — PROGRESS NOTES
Clinic Care Coordination Contact  Transitions of Care Outreach  Chief Complaint   Patient presents with    Clinic Care Coordination - Post Hospital     TCM outreach       Most Recent Admission Date: 4/28/2025   Most Recent Admission Diagnosis: History of factor V Leiden mutation - Z86.2  Anticoagulated - Z79.01  Fall, initial encounter - W19.XXXA  History of total hip arthroplasty, left - Z96.642  Hematoma of left buttock - S30.0XXA     Most Recent Discharge Date: 5/2/2025   Most Recent Discharge Diagnosis: Hematoma of left buttock - S30.0XXA  Anticoagulated - Z79.01  Fall, initial encounter - W19.XXXA  History of factor V Leiden mutation - Z86.2  History of total hip arthroplasty, left - Z96.642  Urinary tract infection without hematuria, site unspecified - N39.0     Transitions of Care Assessment    Discharge Assessment  How are you doing now that you are home?: Doing well.  Home care started.  She prefers to call clinic herself to schedule after hospital visit.  How are your symptoms? (Red Flag symptoms escalate to triage hotline per guidelines): Improved  Do you know how to contact your clinic care team if you have future questions or changes to your health status? : Yes  Does the patient have their discharge instructions? : Yes  Does the patient have questions regarding their discharge instructions? : No  Were you started on any new medications or were there changes to any of your previous medications? : Yes  Does the patient have all of their medications?: Yes  Do you have questions regarding any of your medications? : No  Do you have all of your needed medical supplies or equipment (DME)?  (i.e. oxygen tank, CPAP, cane, etc.): Yes                  Follow up Plan     Discharge Follow-Up  Discharge follow up appointment scheduled in alignment with recommended follow up timeframe or Transitions of Risk Category? (Low = within 30 days; Moderate= within 14 days; High= within 7 days): No  Patient's follow up  appointment not scheduled: Patient declined scheduling support. Education on the importance of transitions of care follow up. Provided scheduling phone number.    No future appointments.    Outpatient Plan as outlined on AVS reviewed with patient.    For any urgent concerns, please contact our 24 hour nurse triage line: [phone] ([phone])       MICHELA Kirby

## 2025-05-15 ENCOUNTER — TELEPHONE (OUTPATIENT)
Dept: INTERNAL MEDICINE | Facility: CLINIC | Age: 85
End: 2025-05-15
Payer: MEDICARE

## 2025-05-15 NOTE — TELEPHONE ENCOUNTER
Home Care is calling regarding an established patient with [facility].  Requesting orders from: Jacoby Boo  RN APPROVED: RN able to provide verbal orders.  Home Care will send orders for signature.  RN will close encounter.  Is this a request for a temporary pause in the home care episode?  No    Orders Requested    Physical Therapy  Request for initial certification (first set of orders)   Frequency: 1 time a week for 5 weeks  RN gave verbal order: Yes      Contacts       Contact Date/Time Type Contact Phone/Fax    05/15/2025 04:49 PM CDT Phone (Incoming) MISHA Emery (Home Care) [phone]     Ok to leave detailed VM          Mariela Saunders RN

## 2025-05-19 ENCOUNTER — ANTICOAGULATION THERAPY VISIT (OUTPATIENT)
Dept: ANTICOAGULATION | Facility: CLINIC | Age: 85
End: 2025-05-19
Payer: MEDICARE

## 2025-05-19 DIAGNOSIS — Z86.718 PERSONAL HISTORY OF DVT (DEEP VEIN THROMBOSIS): Primary | ICD-10-CM

## 2025-05-19 DIAGNOSIS — D68.51 ACTIVATED PROTEIN C RESISTANCE: ICD-10-CM

## 2025-05-19 DIAGNOSIS — D68.51 HETEROZYGOUS FACTOR V LEIDEN MUTATION: ICD-10-CM

## 2025-05-19 DIAGNOSIS — Z79.01 LONG TERM CURRENT USE OF ANTICOAGULANT THERAPY: ICD-10-CM

## 2025-05-19 LAB — INR (EXTERNAL): 2.4 (ref 0.9–1.1)

## 2025-05-19 NOTE — PROGRESS NOTES
ANTICOAGULATION MANAGEMENT     Geneva Shepherd 84 year old female is on warfarin with therapeutic INR result. (Goal INR 2.0-3.0)    Recent labs: (last 7 days)     05/19/25  1348   INR 2.4*       ASSESSMENT     Source(s): Chart Review and Home Care/Facility Nurse     Warfarin doses taken: Warfarin taken as instructed  Diet: Protein supplement/shake started which maybe affecting INR-patient plans to start having these as she has powder at home, appetite is good.  Medication/supplement changes: None noted  New illness, injury, or hospitalization: No, bruising is resolving post fall after TCU stay.   Signs or symptoms of bleeding or clotting: No  Previous result: Therapeutic last visit; previously outside of goal range  Additional findings: None       PLAN     Recommended plan for no diet, medication or health factor changes affecting INR     Dosing Instructions: Continue your current warfarin dose with next INR in 2 weeks       Summary  As of 5/19/2025      Full warfarin instructions:  5 mg every Mon, Fri; 7.5 mg all other days   Next INR check:  6/2/2025               Telephone call with [facility] care nurse who verbalizes understanding and agrees to plan    Orders given to  Homecare nurse/facility to recheck    Education provided: Please call back if any changes to your diet, medications or how you've been taking warfarin    Plan made per [facility] anticoagulation protocol    Arianna Calix RN  5/19/2025  Anticoagulation Clinic  [employer] for routing messages: asia DUMONT  [facility] patient phone line: [phone]        _______________________________________________________________________     Anticoagulation Episode Summary       Current INR goal:  2.0-3.0   TTR:  90.3% (11.9 mo)   Target end date:  Indefinite   Send INR reminders to:  MARIAELENA DUMONT    Indications    Personal history of RLE DVT (deep vein thrombosis)(2003) [Z86.718]  Long term current use of anticoagulant therapy  [Z79.01]  Heterozygous factor V Leiden mutation [D68.51]  Activated protein C resistance [D68.51]             Comments:  --             Anticoagulation Care Providers       Provider Role Specialty Phone number    Jacoby Boo MD Referring Internal Medicine [phone]

## 2025-05-20 ENCOUNTER — TELEPHONE (OUTPATIENT)
Dept: INTERNAL MEDICINE | Facility: CLINIC | Age: 85
End: 2025-05-20
Payer: MEDICARE

## 2025-05-20 NOTE — TELEPHONE ENCOUNTER
FYI Medication Interaction: Warfarin and paroxetine      Home Care is calling regarding an established patient with [facility].  Requesting orders from: Jacoby Boo RN APPROVED: RN able to provide verbal orders.  Home Care will send orders for signature.  RN will close encounter.  Is this a request for a temporary pause in the home care episode?  No    Orders Requested    Skilled Nursing  Request for continuation of care with decrease in frequency   Goals have been met/progressing.  Frequency: cancel visit this week and next week as INR does not need to be rechecked until 6/2  RN gave verbal order: Yes      Contacts       Contact Date/Time Type Contact Phone/Fax    05/20/2025 05:44 PM CDT Phone (Incoming) CHRISTEL Valadez [facility] [phone]     confidential          Karoline Aragon RN

## 2025-05-28 ENCOUNTER — HOSPITAL ENCOUNTER (EMERGENCY)
Facility: CLINIC | Age: 85
Discharge: HOME | End: 2025-05-28
Attending: EMERGENCY MEDICINE
Payer: MEDICARE

## 2025-05-28 ENCOUNTER — APPOINTMENT (OUTPATIENT)
Dept: CT IMAGING | Facility: CLINIC | Age: 85
End: 2025-05-28
Attending: EMERGENCY MEDICINE
Payer: MEDICARE

## 2025-05-28 VITALS
HEART RATE: 73 BPM | SYSTOLIC BLOOD PRESSURE: 158 MMHG | WEIGHT: 175 LBS | OXYGEN SATURATION: 94 % | RESPIRATION RATE: 18 BRPM | HEIGHT: 65 IN | TEMPERATURE: 98.2 F | BODY MASS INDEX: 29.16 KG/M2 | DIASTOLIC BLOOD PRESSURE: 77 MMHG

## 2025-05-28 DIAGNOSIS — R42 DIZZINESS: ICD-10-CM

## 2025-05-28 DIAGNOSIS — S09.90XA INJURY OF HEAD, INITIAL ENCOUNTER: ICD-10-CM

## 2025-05-28 LAB
ALBUMIN SERPL BCG-MCNC: 4 G/DL (ref 3.5–5.2)
ALBUMIN UR-MCNC: NEGATIVE MG/DL
ALP SERPL-CCNC: 135 U/L (ref 40–150)
ALT SERPL W P-5'-P-CCNC: 20 U/L (ref 0–50)
ANION GAP SERPL CALCULATED.3IONS-SCNC: 8 MMOL/L (ref 7–15)
APPEARANCE UR: CLEAR
AST SERPL W P-5'-P-CCNC: 24 U/L (ref 0–45)
BASOPHILS # BLD AUTO: 0 10E3/UL (ref 0–0.2)
BASOPHILS NFR BLD AUTO: 0 %
BILIRUB DIRECT SERPL-MCNC: <0.08 MG/DL (ref 0–0.3)
BILIRUB SERPL-MCNC: 0.2 MG/DL
BILIRUB UR QL STRIP: NEGATIVE
BUN SERPL-MCNC: 9.7 MG/DL (ref 8–23)
CALCIUM SERPL-MCNC: 10.6 MG/DL (ref 8.8–10.4)
CHLORIDE SERPL-SCNC: 102 MMOL/L (ref 98–107)
COLOR UR AUTO: NORMAL
CREAT SERPL-MCNC: 0.57 MG/DL (ref 0.51–0.95)
EGFRCR SERPLBLD CKD-EPI 2021: 90 ML/MIN/1.73M2
EOSINOPHIL # BLD AUTO: 0.2 10E3/UL (ref 0–0.7)
EOSINOPHIL NFR BLD AUTO: 3 %
ERYTHROCYTE [DISTWIDTH] IN BLOOD BY AUTOMATED COUNT: 13.2 % (ref 10–15)
GLUCOSE SERPL-MCNC: 104 MG/DL (ref 70–99)
GLUCOSE UR STRIP-MCNC: NEGATIVE MG/DL
HCO3 SERPL-SCNC: 28 MMOL/L (ref 22–29)
HCT VFR BLD AUTO: 43.7 % (ref 35–47)
HGB BLD-MCNC: 14.1 G/DL (ref 11.7–15.7)
HGB UR QL STRIP: NEGATIVE
HOLD SPECIMEN: NORMAL
HOLD SPECIMEN: NORMAL
IMM GRANULOCYTES # BLD: 0 10E3/UL
IMM GRANULOCYTES NFR BLD: 0 %
INR PPP: 3.06 (ref 0.85–1.15)
KETONES UR STRIP-MCNC: NEGATIVE MG/DL
LEUKOCYTE ESTERASE UR QL STRIP: NEGATIVE
LYMPHOCYTES # BLD AUTO: 2 10E3/UL (ref 0.8–5.3)
LYMPHOCYTES NFR BLD AUTO: 30 %
MCH RBC QN AUTO: 32.5 PG (ref 26.5–33)
MCHC RBC AUTO-ENTMCNC: 32.3 G/DL (ref 31.5–36.5)
MCV RBC AUTO: 101 FL (ref 78–100)
MONOCYTES # BLD AUTO: 0.5 10E3/UL (ref 0–1.3)
MONOCYTES NFR BLD AUTO: 7 %
NEUTROPHILS # BLD AUTO: 4 10E3/UL (ref 1.6–8.3)
NEUTROPHILS NFR BLD AUTO: 60 %
NITRATE UR QL: NEGATIVE
NRBC # BLD AUTO: 0 10E3/UL
NRBC BLD AUTO-RTO: 0 /100
PH UR STRIP: 7 [PH] (ref 5–7)
PLATELET # BLD AUTO: 184 10E3/UL (ref 150–450)
POTASSIUM SERPL-SCNC: 5.6 MMOL/L (ref 3.4–5.3)
PROT SERPL-MCNC: 6.4 G/DL (ref 6.4–8.3)
PROTHROMBIN TIME: 30.3 SECONDS (ref 11.8–14.8)
RBC # BLD AUTO: 4.34 10E6/UL (ref 3.8–5.2)
RBC URINE: 1 /HPF
SODIUM SERPL-SCNC: 138 MMOL/L (ref 135–145)
SP GR UR STRIP: 1 (ref 1–1.03)
SQUAMOUS EPITHELIAL: <1 /HPF
UROBILINOGEN UR STRIP-MCNC: NORMAL MG/DL
WBC # BLD AUTO: 6.7 10E3/UL (ref 4–11)
WBC URINE: 3 /HPF

## 2025-05-28 PROCEDURE — 36415 COLL VENOUS BLD VENIPUNCTURE: CPT | Performed by: EMERGENCY MEDICINE

## 2025-05-28 PROCEDURE — 99284 EMERGENCY DEPT VISIT MOD MDM: CPT | Mod: 25

## 2025-05-28 PROCEDURE — 96361 HYDRATE IV INFUSION ADD-ON: CPT

## 2025-05-28 PROCEDURE — 96360 HYDRATION IV INFUSION INIT: CPT

## 2025-05-28 PROCEDURE — 85610 PROTHROMBIN TIME: CPT | Performed by: EMERGENCY MEDICINE

## 2025-05-28 PROCEDURE — 80048 BASIC METABOLIC PNL TOTAL CA: CPT | Performed by: EMERGENCY MEDICINE

## 2025-05-28 PROCEDURE — 70450 CT HEAD/BRAIN W/O DYE: CPT

## 2025-05-28 PROCEDURE — 85004 AUTOMATED DIFF WBC COUNT: CPT | Performed by: EMERGENCY MEDICINE

## 2025-05-28 PROCEDURE — 258N000003 HC RX IP 258 OP 636: Performed by: EMERGENCY MEDICINE

## 2025-05-28 PROCEDURE — 81001 URINALYSIS AUTO W/SCOPE: CPT | Performed by: EMERGENCY MEDICINE

## 2025-05-28 PROCEDURE — 82248 BILIRUBIN DIRECT: CPT | Performed by: EMERGENCY MEDICINE

## 2025-05-28 RX ADMIN — SODIUM CHLORIDE 1000 ML: 0.9 INJECTION, SOLUTION INTRAVENOUS at 17:41

## 2025-05-28 ASSESSMENT — ACTIVITIES OF DAILY LIVING (ADL)
ADLS_ACUITY_SCORE: 64

## 2025-05-28 ASSESSMENT — COLUMBIA-SUICIDE SEVERITY RATING SCALE - C-SSRS
6. HAVE YOU EVER DONE ANYTHING, STARTED TO DO ANYTHING, OR PREPARED TO DO ANYTHING TO END YOUR LIFE?: NO
1. IN THE PAST MONTH, HAVE YOU WISHED YOU WERE DEAD OR WISHED YOU COULD GO TO SLEEP AND NOT WAKE UP?: NO
2. HAVE YOU ACTUALLY HAD ANY THOUGHTS OF KILLING YOURSELF IN THE PAST MONTH?: NO

## 2025-05-28 NOTE — ED PROVIDER NOTES
Emergency Department Note      History of Present Illness     Chief Complaint   Fall and Dizziness      HPI   Geneva Shepherd is a 84 year old female anticoagulated on coumadin, with a history of Factor V leiden, DVT, recent fall, hyperlipidemia and hypothyroidism who presents to the ED via EMS for evaluation following a fall. She reports she fell last week at night while trying to go to the bathroom and hit her head on a cabinet.  She denies any loss of consciousness. It was a mechanical fall. She reports she has not been feeling well since then. She reports experiencing dizziness over past couple days. She denies any headache or any numbness, tingling or weakness of any extremities.  She denies neck pain or any other injuries. No recent chest pain, shortness of breath, nausea, vomiting, diarrhea, abdominal pain. She is on OTC medication for UTI prevention which she was started on by her urologist. No new dysuria, hematuria or any other urinary symptoms. She reports she has been eating well. She reports she has not been hydrating as well as she did previously.  She lives at home alone and has a home health care.     Independent Historian   None    Review of External Notes   None    Past Medical History     Medical History and Problem List   Gastroesophageal reflux disease   Factor V leiden  Hypothyroidism  IBS   Insomnia  Deep vein thrombosis      Medications   Elavil  Aricept  Levothyroxine  Paxil   Coumadin       Surgical History   Total hip arthroplasty left  Total knee arthroplasty bilateral  Cholecystectomy  Colonoscopy  Esophagogastroduodenoscopy x 2  Tubal ligation  Coronary angiogram   Endometrial biopsy  Varicose vein stripping  Physical Exam     Patient Vitals for the past 24 hrs:   BP Temp Temp src Pulse Resp SpO2 Height Weight   05/28/25 2023 (!) 158/77 -- -- 73 18 94 % -- --   05/28/25 2000 (!) 174/64 -- -- 76 -- -- -- --   05/28/25 1746 -- -- -- -- -- 92 % -- --   05/28/25 1744 (!) 143/84 -- -- 69  "-- -- -- --   05/28/25 1627 (!) 153/78 98.2  F (36.8  C) Temporal 74 18 97 % 1.651 m (5' 5\") 79.4 kg (175 lb)     Physical Exam  General: Well appearing, nontoxic.  Resting comfortably  Head:  Healing abrasion to the right temporal scalp.  No bony tenderness crepitus or step-offs.  The remainder of the head scalp and face is otherwise normal.  Eyes:  Pupils are equal, round, reactive to light EOMI, no nystagmus     Conjunctivae non-injected and sclerae white  ENT:    The external nose is normal    Pinnae are normal  Neck:  Normal range of motion. Cervical spine nontender, no stepoffs     There is no rigidity noted    Trachea is in the midline  CV:  Regular rate and rhythm     Normal S1/S2, no S3/S4    No murmur or rub. Radial pulses 2+ bilaterally.  Resp:  Lungs are clear and equal bilaterally  There is no tachypnea    No increased work of breathing    No rales, wheezing, or rhonchi  GI:  Abdomen is soft, no rigidity or guarding    No distension, or mass    No tenderness or rebound tenderness   MS:  Normal muscular tone. Full painless ROM of all extremities. Extremities non tender to palpation and atraumatic. Chest wall and posterior thorax atraumatic.     Symmetric motor strength    No lower extremity edema  Skin:  No rash or acute skin lesions noted  Neuro:  A&Ox3, GCS 15    CN II - XII intact    Speech clear, fluent, and normal    Strength 5/5 and symmetric in bilateral upper and lower extremities.    No pronator drift. No leg drift. SILT throughout.    No ankle clonus    FTN testing normal. No tremor.     No meningismus   Psych: Normal affect. Appropriate interactions.      Diagnostics     Lab Results   Labs Ordered and Resulted from Time of ED Arrival to Time of ED Departure   BASIC METABOLIC PANEL - Abnormal       Result Value    Sodium 138      Potassium 5.6 (*)     Chloride 102      Carbon Dioxide (CO2) 28      Anion Gap 8      Urea Nitrogen 9.7      Creatinine 0.57      GFR Estimate 90      Calcium 10.6 (*) "     Glucose 104 (*)    CBC WITH PLATELETS AND DIFFERENTIAL - Abnormal    WBC Count 6.7      RBC Count 4.34      Hemoglobin 14.1      Hematocrit 43.7       (*)     MCH 32.5      MCHC 32.3      RDW 13.2      Platelet Count 184      % Neutrophils 60      % Lymphocytes 30      % Monocytes 7      % Eosinophils 3      % Basophils 0      % Immature Granulocytes 0      NRBCs per 100 WBC 0      Absolute Neutrophils 4.0      Absolute Lymphocytes 2.0      Absolute Monocytes 0.5      Absolute Eosinophils 0.2      Absolute Basophils 0.0      Absolute Immature Granulocytes 0.0      Absolute NRBCs 0.0     INR - Abnormal    INR 3.06 (*)     PT 30.3 (*)    ROUTINE UA WITH MICROSCOPIC REFLEX TO CULTURE - Normal    Color Urine Straw      Appearance Urine Clear      Glucose Urine Negative      Bilirubin Urine Negative      Ketones Urine Negative      Specific Gravity Urine 1.005      Blood Urine Negative      pH Urine 7.0      Protein Albumin Urine Negative      Urobilinogen Urine Normal      Nitrite Urine Negative      Leukocyte Esterase Urine Negative      RBC Urine 1      WBC Urine 3      Squamous Epithelials Urine <1         Imaging   CT Head w/o Contrast   Final Result   IMPRESSION:   1.  No CT evidence for acute intracranial process.   2.  Brain atrophy and presumed chronic microvascular ischemic changes as above.          Independent Interpretation   See ED course below   ED Course      Medications Administered   Medications   sodium chloride 0.9% BOLUS 1,000 mL (0 mLs Intravenous Stopped 5/28/25 2005)       Procedures   Procedures     Discussion of Management   None    ED Course   ED Course as of 05/29/25 1019   Wed May 28, 2025   1705 I obtained the history and examined the patient as above.    1816 I performed an independent interpretation of the patient's Head CT. No large volume intracranial hemorrhage or midline shift seen.    1916 I rechecked and updated the patient.     1916 The patient was examined here in the  Emergency Department by myself, findings above.  I discussed the plan of treatment with the patient and she is agreeable to this. I answered all questions.   Patient discharged home, status improved, with instructions regarding supportive care, medications, and reasons to return as well as the importance of close follow-up was reviewed.         Additional Documentation  None    Medical Decision Making / Diagnosis     CMS Diagnoses: None    MIPS   None       [facility]   Geneva Shepherd is a 84 year old female who presents for evaluation of dizziness after recent closed head injury.  Patient is anticoagulated.  On my evaluation she is well-appearing, hemodynamically stable and afebrile.  She has no focal neurologic deficits.  Superficial healing abrasion to the right temporal scalp without any complication.  No infection.  No other injuries on head to toe evaluation.  CT scan of the head was negative for any acute cranial pathology.  No intracranial hemorrhage, skull fracture or any other acute abnormality.  Laboratory studies are otherwise reassuring.  CBC, CMP are unremarkable.  Potassium level falsely elevated due to hemolysis of the sample.  Urinalysis is negative.  Patient was recently treated for UTI and this appears to have cleared up at this time.  INR is at the upper end of goal range at 3.06.  Patient will take her lower dose of warfarin and have it rechecked next week.  ED evaluation is reassuring at this time and there is no evidence of any acute underlying emergent pathology.  Patient is stable for discharge home.  I recommended close outpatient primary care follow-up.  Return precautions were provided and she was discharged in stable condition.    Disposition   The patient was discharged.     Diagnosis     ICD-10-CM    1. Injury of head, initial encounter  S09.90XA       2. Dizziness  R42            Discharge Medications   Discharge Medication List as of 5/28/2025  8:23 PM            Scribe Disclosure:  I,  Domingo Garcia, joaquín serving as a scribe at 4:37 PM on 5/28/2025 to document services personally performed by Eliseo Malik MD based on my observations and the provider's statements to me.        Eliseo Malik MD  05/29/25 1024

## 2025-05-28 NOTE — ED TRIAGE NOTES
Pt had a fall last week when she lost balance in bathroom. Pt hit head on cabinet. Has been experiencing dizziness and unsteady gait since then. On warfarin. Denies headache. Alert, oriented x4. Lives at home by herself, has home care team who has been following her who recommended her to be seen.     Triage Assessment (Adult)       Row Name 05/28/25 7321          Triage Assessment    Airway WDL WDL        Respiratory WDL    Respiratory WDL WDL        Peripheral/Neurovascular WDL    Peripheral Neurovascular WDL WDL        Cognitive/Neuro/Behavioral WDL    Cognitive/Neuro/Behavioral WDL WDL        Eliazar Coma Scale    Best Eye Response 4-->(E4) spontaneous     Best Motor Response 6-->(M6) obeys commands     Best Verbal Response 5-->(V5) oriented     Eliazra Coma Scale Score 15     Assessment Qualifiers patient not sedated/intubated

## 2025-05-29 ENCOUNTER — TELEPHONE (OUTPATIENT)
Dept: ANTICOAGULATION | Facility: CLINIC | Age: 85
End: 2025-05-29
Payer: MEDICARE

## 2025-05-29 NOTE — ED NOTES
Lab notified this Writer that K 5.6 is falsely elevated due to hemolyzed blood specimen. MD notified for possible redraw.

## 2025-05-29 NOTE — DISCHARGE INSTRUCTIONS
Discharge Instructions  Head Injury    You have been seen today for a head injury. Your evaluation included a history and physical examination. You may have had a CT (CAT) scan performed, though most head injuries do not require a scan. Based on this evaluation, your provider today does not feel that your head injury is serious.    Generally, every Emergency Department visit should have a follow-up clinic visit with either a primary or a specialty clinic/provider. Please follow-up as instructed by your emergency provider today.  Return to the Emergency Department if:  You are confused or you are not acting right.  Your headache gets worse or you start to have a really bad headache even with your recommended treatment plan.  You vomit (throw up) more than once.  You have a seizure.  You have trouble walking.  You have weakness or paralysis (cannot move) in an arm or a leg.  You have blood or fluid coming from your ears or nose.  You have new symptoms or anything that worries you.    Sleeping:  It is okay for you to sleep, but someone should wake you up if instructed by your provider, and someone should check on you at your usual time to wake up.     Activity:  Do not drive for at least 24 hours.  Do not drive if you have dizzy spells or trouble concentrating, or remembering things.  Do not return to any contact sports until cleared by your regular provider.     MORE INFORMATION:    Concussion:  A concussion is a minor head injury that may cause temporary problems with the way the brain works. Although concussions are important, they are generally not an emergency or a reason that a person needs to be hospitalized. Some concussion symptoms include confusion, amnesia (forgetful), nausea (sick to your stomach) and vomiting (throwing up), dizziness, fatigue, memory or concentration problems, irritability and sleep problems. For most people, concussions are mild and temporary but some will have more severe and persistent  symptoms that require on-going care and treatment.  CT Scans: Your evaluation today may have included a CT scan (CAT scan) to look for things like bleeding or a skull fracture (broken bone).  CT scans involve radiation and too many CT scans can cause serious health problems like cancer, especially in children.  Because of this, your provider may not have ordered a CT scan today if they think you are at low risk for a serious or life threatening problem.    Blood Thinners. If you take blood thinners, there is a very small risk of delayed bleeding after your head injury. In the days/weeks to come, watch for the symptoms described above particularly headaches, confusion, problems walking, and weakness in an arm or leg.    If you were given a prescription for medicine here today, be sure to read all of the information (including the package insert) that comes with your prescription.  This will include important information about the medicine, its side effects, and any warnings that you need to know about.  The pharmacist who fills the prescription can provide more information and answer questions you may have about the medicine.  If you have questions or concerns that the pharmacist cannot address, please call or return to the Emergency Department.     Remember that you can always come back to the Emergency Department if you are not able to see your regular provider in the amount of time listed above, if you get any new symptoms, or if there is anything that worries you.    Discharge Instructions  Dizziness (Lightheaded)  Today you were seen for dizziness.  Dizziness can be caused by many things and it can be very difficult to determine the cause of dizziness.  At this time, your provider has found no signs that your dizziness is due to a serious or life-threatening condition. However, sometimes there is a serious problem that does not show up right away, and it is important for you to follow up with your regular  provider as instructed.  Generally, every Emergency Department visit should have a follow-up clinic visit with either a primary or a specialty clinic/provider. Please follow-up as instructed by your emergency provider today.      Return to the Emergency Department if:    You pass out (fainting or falling out), especially during exercise.    You develop chest pain, chest pressure or difficulty breathing.  Your feel an irregular heartbeat.  You have excessive vaginal bleeding, or blood in your stool or vomit (throw up).  You have a high fever.  Your symptoms get worse or more frequent.    If when you begin to feel dizzy or lightheaded, it is important to sit down or lay down immediately to prevent injury from falling.  If you were given a prescription for medicine here today, be sure to read all of the information (including the package insert) that comes with your prescription.  This will include important information about the medicine, its side effects, and any warnings that you need to know about.  The pharmacist who fills the prescription can provide more information and answer questions you may have about the medicine.  If you have questions or concerns that the pharmacist cannot address, please call or return to the Emergency Department.   Remember that you can always come back to the Emergency Department if you are not able to see your regular provider in the amount of time listed above, if you get any new symptoms, or if there is anything that worries you.

## 2025-05-29 NOTE — TELEPHONE ENCOUNTER
ANTICOAGULATION  MANAGEMENT: Discharge Review    Geneva Shepherd chart reviewed for anticoagulation continuity of care    Emergency room visit on 5/28/25 for fall with head strike and ongoing dizziness.    Discharge disposition: Home    Results:    Recent labs: (last 7 days)     05/28/25  1938   INR 3.06*     Anticoagulation inpatient management:     not applicable .     Anticoagulation discharge instructions:     Warfarin dosing: home regimen continued   Bridging: No   INR goal change: No      Medication changes affecting anticoagulation: No    Additional factors affecting anticoagulation: No, though it appears Chang has a homecare RN that will be checking INR on 6/2/25     PLAN     No adjustment to anticoagulation plan needed    Left a detailed message for [facility]. Advised she continue with same warfarin dose and make sure to keep up with her green veggies to stay with in her goal range and also make sure to have homecare RN check her INR as scheduled on 6/2/25. [facility] RN would be in touch with them at that time to discuss future warfarin dosing.     No adjustment to Anticoagulation Calendar was required    Nemo Reyes RN  5/29/2025  Anticoagulation Clinic  [facility] for routing messages: asia FERRELL [facility] patient phone line: [phone]

## 2025-05-30 ENCOUNTER — MEDICAL CORRESPONDENCE (OUTPATIENT)
Dept: HEALTH INFORMATION MANAGEMENT | Facility: CLINIC | Age: 85
End: 2025-05-30
Payer: MEDICARE

## 2025-06-02 ENCOUNTER — TELEPHONE (OUTPATIENT)
Dept: INTERNAL MEDICINE | Facility: CLINIC | Age: 85
End: 2025-06-02
Payer: MEDICARE

## 2025-06-02 ENCOUNTER — ANTICOAGULATION THERAPY VISIT (OUTPATIENT)
Dept: ANTICOAGULATION | Facility: CLINIC | Age: 85
End: 2025-06-02
Payer: MEDICARE

## 2025-06-02 DIAGNOSIS — D68.51 HETEROZYGOUS FACTOR V LEIDEN MUTATION: ICD-10-CM

## 2025-06-02 DIAGNOSIS — Z79.01 LONG TERM CURRENT USE OF ANTICOAGULANT THERAPY: ICD-10-CM

## 2025-06-02 DIAGNOSIS — Z86.718 PERSONAL HISTORY OF DVT (DEEP VEIN THROMBOSIS): Primary | ICD-10-CM

## 2025-06-02 DIAGNOSIS — D68.51 ACTIVATED PROTEIN C RESISTANCE: ICD-10-CM

## 2025-06-02 LAB — INR (EXTERNAL): 2.4 (ref 0.9–1.1)

## 2025-06-02 NOTE — PROGRESS NOTES
ANTICOAGULATION MANAGEMENT     Geneva Shepherd 84 year old female is on warfarin with therapeutic INR result. (Goal INR 2.0-3.0)    Recent labs: (last 7 days)     06/02/25  1336   INR 2.4*       ASSESSMENT     Source(s): Chart Review and Home Care/Facility Nurse     Warfarin doses taken: Warfarin taken as instructed  Diet: No new diet changes identified  Medication/supplement changes: None noted  New illness, injury, or hospitalization: Yes: patient was seen in the ER on 5/28/25 for ongoing dizziness, additionally patient had a fall a week prior. Patient states she is feeling somewhat better, but still tired. Patient denies any additional falls.   Signs or symptoms of bleeding or clotting: No  Previous result: Supratherapeutic  Additional findings: Patient will be discharging from home care on 6/10/25       PLAN     Recommended plan for no diet, medication or health factor changes affecting INR     Dosing Instructions: Continue your current warfarin dose with next INR in 8 days when home care returns for discharge      Summary  As of 6/2/2025      Full warfarin instructions:  5 mg every Mon, Fri; 7.5 mg all other days   Next INR check:  6/10/2025               Telephone call with [facility] care nurse who verbalizes understanding and agrees to plan    Orders given to  Homecare nurse/facility to recheck    Education provided: Please call back if any changes to your diet, medications or how you've been taking warfarin  Symptom monitoring: monitoring for bleeding signs and symptoms and monitoring for clotting signs and symptoms    Plan made per [facility] anticoagulation protocol    Connie Infante RN  6/2/2025  Anticoagulation Clinic  [employer] for routing messages: asia QUEVEDO  [facility] patient phone line: [phone]        _______________________________________________________________________     Anticoagulation Episode Summary       Current INR goal:  2.0-3.0   TTR:  89.9% (11.9 mo)   Target end date:  Indefinite    Send INR reminders to:  MARIAELENA QUEVEDO    Indications    Personal history of RLE DVT (deep vein thrombosis)(2003) [Z86.718]  Long term current use of anticoagulant therapy [Z79.01]  Heterozygous factor V Leiden mutation [D68.51]  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

## 2025-06-02 NOTE — TELEPHONE ENCOUNTER
Home care called and stated that patient was seen in the ED on Thursday. After seeing the patient today home care is recommending that the patient be screened for depression at the upcomming appointment.     Karoline BACK RN  [facility] Triage Team

## 2025-06-03 NOTE — TELEPHONE ENCOUNTER
Already has history depression and being treated with paroxetine.  Last PHQ-9 Feb 2025 = 0.  Seen in ER 1 week ago with history of a fall/head trauma.  Will assess further mental status and mood when patient seen by me next week

## 2025-06-09 ENCOUNTER — OFFICE VISIT (OUTPATIENT)
Dept: INTERNAL MEDICINE | Facility: CLINIC | Age: 85
End: 2025-06-09
Payer: COMMERCIAL

## 2025-06-09 VITALS
TEMPERATURE: 97.5 F | SYSTOLIC BLOOD PRESSURE: 129 MMHG | WEIGHT: 179.7 LBS | DIASTOLIC BLOOD PRESSURE: 78 MMHG | HEART RATE: 72 BPM | BODY MASS INDEX: 29.94 KG/M2 | OXYGEN SATURATION: 98 % | RESPIRATION RATE: 18 BRPM | HEIGHT: 65 IN

## 2025-06-09 DIAGNOSIS — Z91.81 PERSONAL HISTORY OF FALL: ICD-10-CM

## 2025-06-09 DIAGNOSIS — E87.5 HYPERKALEMIA: ICD-10-CM

## 2025-06-09 DIAGNOSIS — E83.52 HYPERCALCEMIA: ICD-10-CM

## 2025-06-09 LAB
ANION GAP SERPL CALCULATED.3IONS-SCNC: 9 MMOL/L (ref 7–15)
BUN SERPL-MCNC: 14.5 MG/DL (ref 8–23)
CALCIUM SERPL-MCNC: 10.6 MG/DL (ref 8.8–10.4)
CHLORIDE SERPL-SCNC: 101 MMOL/L (ref 98–107)
CREAT SERPL-MCNC: 0.53 MG/DL (ref 0.51–0.95)
EGFRCR SERPLBLD CKD-EPI 2021: >90 ML/MIN/1.73M2
GLUCOSE SERPL-MCNC: 98 MG/DL (ref 70–99)
HCO3 SERPL-SCNC: 26 MMOL/L (ref 22–29)
POTASSIUM SERPL-SCNC: 4.9 MMOL/L (ref 3.4–5.3)
PTH-INTACT SERPL-MCNC: 69 PG/ML (ref 15–65)
SODIUM SERPL-SCNC: 136 MMOL/L (ref 135–145)

## 2025-06-09 PROCEDURE — 3074F SYST BP LT 130 MM HG: CPT | Performed by: INTERNAL MEDICINE

## 2025-06-09 PROCEDURE — 83970 ASSAY OF PARATHORMONE: CPT | Performed by: INTERNAL MEDICINE

## 2025-06-09 PROCEDURE — 80048 BASIC METABOLIC PNL TOTAL CA: CPT | Performed by: INTERNAL MEDICINE

## 2025-06-09 PROCEDURE — 36415 COLL VENOUS BLD VENIPUNCTURE: CPT | Performed by: INTERNAL MEDICINE

## 2025-06-09 PROCEDURE — 99213 OFFICE O/P EST LOW 20 MIN: CPT | Performed by: INTERNAL MEDICINE

## 2025-06-09 PROCEDURE — 3078F DIAST BP <80 MM HG: CPT | Performed by: INTERNAL MEDICINE

## 2025-06-09 NOTE — PROGRESS NOTES
"  {PROVIDER CHARTING PREFERENCE:985777}    Jeff Bean is a 84 year old, presenting for the following health issues:  ER F/U      HPI      {MA/LPN/RN Pre-Provider Visit Orders- hCG/UA/Strep (Optional):399628}  ED/UC Followup:    Facility:  [facility] ED  Date of visit: 5/28/25  Reason for visit: Fall, Dizziness  Current Status: Pt states they are feeling better and has started to drink more water. Pt stated they are not getting dizzy spells like they were previously   {additonal problems for provider to add (Optional):344674}    {ROS Picklists (Optional):919557}      Objective    /78   Pulse 72   Temp 97.5  F (36.4  C) (Temporal)   Resp 18   Ht 1.651 m (5' 5\")   Wt 81.5 kg (179 lb 11.2 oz)   LMP  (LMP Unknown)   SpO2 98%   BMI 29.90 kg/m    Body mass index is 29.9 kg/m .  Physical Exam   {Exam List (Optional):367664}    {Diagnostic Test Results (Optional):134368}        Signed Electronically by: Jacoby Boo MD  {Email feedback regarding this note to [email or web address]   :336366}  " "today.  Strength overall improved.  Has been hydrating better.  Getting around her home okay.  Has a 1 &1/2 story home and bedroom is upstairs.  Laundry is in the basement.  Able to get to both of those areas without much difficulty.  Eating okay.  Some frustration with overall decrease in physical function if she gets older but otherwise states mood okay.  Independent in her ADLs       Additional ROS:   Constitutional, HEENT, Cardiovascular, Pulmonary, GI and , Neuro, MSK and Psych review of systems/symptoms are otherwise negative or unchanged from previous, except as noted above.      OBJECTIVE:  /78   Pulse 72   Temp 97.5  F (36.4  C) (Temporal)   Resp 18   Ht 1.651 m (5' 5\")   Wt 81.5 kg (179 lb 11.2 oz)   LMP  (LMP Unknown)   SpO2 98%   BMI 29.90 kg/m     Estimated body mass index is 29.9 kg/m  as calculated from the following:    Height as of this encounter: 1.651 m (5' 5\").    Weight as of this encounter: 81.5 kg (179 lb 11.2 oz).     Neck: no adenopathy. Thyroid normal to palpation. No bruits  Pulm: Lungs clear to auscultation   CV: Regular rates and rhythm  GI: Soft, nontender, Normal active bowel sounds, No hepatosplenomegaly or masses palpable  Ext: Peripheral pulses intact.  Mild chronic BLE edema and nontender varicose veins.  Neuro: Normal strength and tone, sensory exam grossly normal.  Lower but stable gait with use of a cane today.  Negative Romberg. Alert and answering questions appropriately    MED REC REQUIRED  Post Medication Reconciliation Status:  Discharge medications reconciled, continue medications without change    (Chart documentation was completed, in part, with Cell[employer] voice-recognition software. Even though reviewed, some grammatical, spelling, and word errors may remain.)    Jacoby Boo MD  Internal Medicine Department  [facility]            "

## 2025-06-09 NOTE — PATIENT INSTRUCTIONS
Continue current medications  Labs today as ordered  Continue improved hydration  Continue physical therapy exercises daily  Inform clinic if recurrent fall  Continue use of a  cane/walker with ambulation

## 2025-06-10 ENCOUNTER — ANTICOAGULATION THERAPY VISIT (OUTPATIENT)
Dept: ANTICOAGULATION | Facility: CLINIC | Age: 85
End: 2025-06-10
Payer: MEDICARE

## 2025-06-10 DIAGNOSIS — Z79.01 LONG TERM CURRENT USE OF ANTICOAGULANT THERAPY: ICD-10-CM

## 2025-06-10 DIAGNOSIS — Z86.718 PERSONAL HISTORY OF DVT (DEEP VEIN THROMBOSIS): Primary | ICD-10-CM

## 2025-06-10 DIAGNOSIS — D68.51 ACTIVATED PROTEIN C RESISTANCE: ICD-10-CM

## 2025-06-10 DIAGNOSIS — D68.51 HETEROZYGOUS FACTOR V LEIDEN MUTATION: ICD-10-CM

## 2025-06-10 LAB — INR (EXTERNAL): 2.3

## 2025-06-10 NOTE — PROGRESS NOTES
ANTICOAGULATION MANAGEMENT     Geneva Shepherd 84 year old female is on warfarin with therapeutic INR result. (Goal INR 2.0-3.0)    Recent labs: (last 7 days)     06/10/25  0000   INR 2.3       ASSESSMENT     Source(s): Chart Review and Patient/Caregiver Call     Warfarin doses taken: Warfarin taken as instructed  Diet: No new diet changes identified  Medication/supplement changes: None noted  New illness, injury, or hospitalization: No  Signs or symptoms of bleeding or clotting: No  Previous result: Therapeutic last visit; previously outside of goal range  Additional findings: Discharging from home care 6/10.        PLAN     Recommended plan for no diet, medication or health factor changes affecting INR     Dosing Instructions: Continue your current warfarin dose with next INR in 2 weeks       Summary  As of 6/10/2025      Full warfarin instructions:  5 mg every Mon, Fri; 7.5 mg all other days   Next INR check:  6/24/2025               Telephone call with Geneva who verbalizes understanding and agrees to plan and who agrees to plan and repeated back plan correctly    Lab visit scheduled    Education provided: Please call back if any changes to your diet, medications or how you've been taking warfarin    Plan made per [facility] anticoagulation protocol    Angel Perkins RN  6/10/2025  Anticoagulation Clinic  Pressable for routing messages: asia QUEVEDO  [facility] patient phone line: [phone]        _______________________________________________________________________     Anticoagulation Episode Summary       Current INR goal:  2.0-3.0   TTR:  89.9% (11.9 mo)   Target end date:  Indefinite   Send INR reminders to:  MARIAELENA QUEVEDO    Indications    Personal history of RLE DVT (deep vein thrombosis)(2003) [Z72.018]  Long term current use of anticoagulant therapy [Z79.01]  Heterozygous factor V Leiden mutation [D68.51]  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

## 2025-06-13 ENCOUNTER — MEDICAL CORRESPONDENCE (OUTPATIENT)
Dept: HEALTH INFORMATION MANAGEMENT | Facility: CLINIC | Age: 85
End: 2025-06-13
Payer: MEDICARE

## 2025-06-16 ENCOUNTER — MEDICAL CORRESPONDENCE (OUTPATIENT)
Dept: HEALTH INFORMATION MANAGEMENT | Facility: CLINIC | Age: 85
End: 2025-06-16
Payer: MEDICARE

## 2025-06-30 ENCOUNTER — MEDICAL CORRESPONDENCE (OUTPATIENT)
Dept: HEALTH INFORMATION MANAGEMENT | Facility: CLINIC | Age: 85
End: 2025-06-30
Payer: MEDICARE

## 2025-07-07 ENCOUNTER — TELEPHONE (OUTPATIENT)
Dept: INTERNAL MEDICINE | Facility: CLINIC | Age: 85
End: 2025-07-07
Payer: MEDICARE

## 2025-07-07 NOTE — TELEPHONE ENCOUNTER
Pt called and stated the amitriptyline had been cancelled at the pharmacy.     Called and spoke with Dick with pharmacy who stated there are refills on file and the med will be ready around 330pm today.     Called and relayed message from above to pt. Pt is appreciative.

## 2025-07-09 ENCOUNTER — TELEPHONE (OUTPATIENT)
Dept: ANTICOAGULATION | Facility: CLINIC | Age: 85
End: 2025-07-09
Payer: MEDICARE

## 2025-07-09 NOTE — TELEPHONE ENCOUNTER
ANTICOAGULATION     Geneva Shepherd is overdue for an INR check.     Care Everywhere updated: yes    Left message for patient to call and schedule lab appointment as soon as possible. If returning call, please schedule.     Robert Fulton RN  7/9/2025  Anticoagulation Clinic  [facility] for routing messages: asia FERRELL [facility] patient phone line: [phone]

## 2025-07-16 ENCOUNTER — RESULTS FOLLOW-UP (OUTPATIENT)
Dept: ANTICOAGULATION | Facility: CLINIC | Age: 85
End: 2025-07-16

## 2025-07-16 ENCOUNTER — ANTICOAGULATION THERAPY VISIT (OUTPATIENT)
Dept: ANTICOAGULATION | Facility: CLINIC | Age: 85
End: 2025-07-16

## 2025-07-16 ENCOUNTER — LAB (OUTPATIENT)
Dept: LAB | Facility: CLINIC | Age: 85
End: 2025-07-16
Payer: MEDICARE

## 2025-07-16 DIAGNOSIS — Z86.718 PERSONAL HISTORY OF DVT (DEEP VEIN THROMBOSIS): ICD-10-CM

## 2025-07-16 DIAGNOSIS — Z79.01 LONG TERM CURRENT USE OF ANTICOAGULANT THERAPY: ICD-10-CM

## 2025-07-16 DIAGNOSIS — D68.51 HETEROZYGOUS FACTOR V LEIDEN MUTATION: ICD-10-CM

## 2025-07-16 DIAGNOSIS — D68.51 ACTIVATED PROTEIN C RESISTANCE: ICD-10-CM

## 2025-07-16 DIAGNOSIS — Z86.718 PERSONAL HISTORY OF DVT (DEEP VEIN THROMBOSIS): Primary | ICD-10-CM

## 2025-07-16 LAB — INR BLD: 1.6 (ref 0.9–1.1)

## 2025-07-16 PROCEDURE — 85610 PROTHROMBIN TIME: CPT

## 2025-07-16 PROCEDURE — 36416 COLLJ CAPILLARY BLOOD SPEC: CPT

## 2025-07-16 NOTE — PROGRESS NOTES
ANTICOAGULATION MANAGEMENT     Geneva Shepherd 84 year old female is on warfarin with subtherapeutic INR result. (Goal INR 2.0-3.0)    Recent labs: (last 7 days)     07/16/25  1614   INR 1.6*       ASSESSMENT     Source(s): Chart Review and Patient/Caregiver Call     Warfarin doses taken: Warfarin taken as instructed  Diet: didn't have her usually spinach last night with her stomach issues  Medication/supplement changes: None noted  New illness, injury, or hospitalization: Yes: had diarrhea yesterday, but feeling fine today  Signs or symptoms of bleeding or clotting: No  Previous result: Therapeutic last 2(+) visits  Additional findings: Discharged from homecare 6/10/25 and was advised to check INR in lab two weeks later, 6/254/25, and scheduled. Now overdue, reminders made 7/1/25 and 7/9/25       PLAN     Recommended plan for temporary change(s) affecting INR     Dosing Instructions: booster dose then continue your current warfarin dose with next INR in 2 weeks       Summary  As of 7/16/2025      Full warfarin instructions:  7/16: 12.5 mg; Otherwise 5 mg every Mon, Fri; 7.5 mg all other days   Next INR check:  7/30/2025               Telephone call with Geneva who verbalizes understanding and agrees to plan    Lab visit scheduled    Education provided: Please call back if any changes to your diet, medications or how you've been taking warfarin  Symptom monitoring: monitoring for clotting signs and symptoms and when to seek medical attention/emergency care  Importance of notifying anticoagulation clinic for: changes in medications; a sooner lab recheck maybe needed and diarrhea, nausea/vomiting, reduced intake, cold/flu, and/or infections; a sooner lab recheck maybe needed  Contact [phone] with any changes, questions or concerns.     Plan made per [facility] anticoagulation protocol    Nemo Reyes, RN  7/16/2025  Anticoagulation Clinic  [employer] for routing messages: asia FERRELL [facility] patient  phone line: [phone]        _______________________________________________________________________     Anticoagulation Episode Summary       Current INR goal:  2.0-3.0   TTR:  84.1% (11.9 mo)   Target end date:  Indefinite   Send INR reminders to:  MARIAELENA DUMONT    Indications    Personal history of RLE DVT (deep vein thrombosis)(2003) [Z86.718]  Long term current use of anticoagulant therapy [Z79.01]  Heterozygous factor V Leiden mutation [D68.51]  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

## 2025-07-24 DIAGNOSIS — E03.9 HYPOTHYROIDISM, UNSPECIFIED TYPE: ICD-10-CM

## 2025-07-24 RX ORDER — LEVOTHYROXINE SODIUM 50 UG/1
50 TABLET ORAL DAILY
Qty: 90 TABLET | Refills: 0 | Status: SHIPPED | OUTPATIENT
Start: 2025-07-24

## 2025-08-12 ENCOUNTER — TELEPHONE (OUTPATIENT)
Dept: INTERNAL MEDICINE | Facility: CLINIC | Age: 85
End: 2025-08-12

## 2025-08-15 ENCOUNTER — DOCUMENTATION ONLY (OUTPATIENT)
Dept: ANTICOAGULATION | Facility: CLINIC | Age: 85
End: 2025-08-15

## 2025-08-15 DIAGNOSIS — Z86.718 PERSONAL HISTORY OF DVT (DEEP VEIN THROMBOSIS): Primary | ICD-10-CM

## 2025-08-15 DIAGNOSIS — D68.51 HETEROZYGOUS FACTOR V LEIDEN MUTATION: ICD-10-CM

## 2025-08-15 DIAGNOSIS — Z79.01 LONG TERM CURRENT USE OF ANTICOAGULANT THERAPY: ICD-10-CM

## 2025-08-15 DIAGNOSIS — D68.51 ACTIVATED PROTEIN C RESISTANCE: ICD-10-CM

## 2025-09-02 ENCOUNTER — RESULTS FOLLOW-UP (OUTPATIENT)
Dept: ANTICOAGULATION | Facility: CLINIC | Age: 85
End: 2025-09-02

## 2025-09-02 ENCOUNTER — ANTICOAGULATION THERAPY VISIT (OUTPATIENT)
Dept: ANTICOAGULATION | Facility: CLINIC | Age: 85
End: 2025-09-02

## 2025-09-02 ENCOUNTER — LAB (OUTPATIENT)
Dept: LAB | Facility: CLINIC | Age: 85
End: 2025-09-02
Payer: MEDICARE

## 2025-09-02 DIAGNOSIS — Z86.718 PERSONAL HISTORY OF DVT (DEEP VEIN THROMBOSIS): ICD-10-CM

## 2025-09-02 DIAGNOSIS — Z86.718 PERSONAL HISTORY OF DVT (DEEP VEIN THROMBOSIS): Primary | ICD-10-CM

## 2025-09-02 DIAGNOSIS — D68.51 HETEROZYGOUS FACTOR V LEIDEN MUTATION: ICD-10-CM

## 2025-09-02 DIAGNOSIS — D68.51 ACTIVATED PROTEIN C RESISTANCE: ICD-10-CM

## 2025-09-02 DIAGNOSIS — Z79.01 LONG TERM CURRENT USE OF ANTICOAGULANT THERAPY: ICD-10-CM

## 2025-09-02 LAB — INR BLD: 1.7 (ref 0.9–1.1)

## 2025-09-02 PROCEDURE — 36416 COLLJ CAPILLARY BLOOD SPEC: CPT

## 2025-09-02 PROCEDURE — 85610 PROTHROMBIN TIME: CPT

## (undated) DEVICE — ENDO BITE BLOCK 60 MAXI LF 00712804

## (undated) DEVICE — ESU GROUND PAD ADULT W/CORD E7507

## (undated) DEVICE — ENDO FORCEP ENDOJAW BIOPSY 2.8MMX230CM FB-220U

## (undated) DEVICE — DECANTER BAG 2002S

## (undated) DEVICE — RAD RX ISOVUE 300 (50ML) 61% IOPAMIDOL CHARGE PER ML

## (undated) DEVICE — SOL WATER IRRIG 1000ML BOTTLE 2F7114

## (undated) RX ORDER — LIDOCAINE HYDROCHLORIDE 20 MG/ML
INJECTION, SOLUTION EPIDURAL; INFILTRATION; INTRACAUDAL; PERINEURAL
Status: DISPENSED
Start: 2022-04-05

## (undated) RX ORDER — ONDANSETRON 2 MG/ML
INJECTION INTRAMUSCULAR; INTRAVENOUS
Status: DISPENSED
Start: 2022-04-05

## (undated) RX ORDER — PROPOFOL 10 MG/ML
INJECTION, EMULSION INTRAVENOUS
Status: DISPENSED
Start: 2022-04-05

## (undated) RX ORDER — DEXAMETHASONE SODIUM PHOSPHATE 4 MG/ML
INJECTION, SOLUTION INTRA-ARTICULAR; INTRALESIONAL; INTRAMUSCULAR; INTRAVENOUS; SOFT TISSUE
Status: DISPENSED
Start: 2022-04-05

## (undated) RX ORDER — FENTANYL CITRATE 50 UG/ML
INJECTION, SOLUTION INTRAMUSCULAR; INTRAVENOUS
Status: DISPENSED
Start: 2022-04-05

## (undated) RX ORDER — FENTANYL CITRATE 50 UG/ML
INJECTION, SOLUTION INTRAMUSCULAR; INTRAVENOUS
Status: DISPENSED
Start: 2023-12-12